# Patient Record
Sex: FEMALE | Race: WHITE | NOT HISPANIC OR LATINO | Employment: OTHER | URBAN - NONMETROPOLITAN AREA
[De-identification: names, ages, dates, MRNs, and addresses within clinical notes are randomized per-mention and may not be internally consistent; named-entity substitution may affect disease eponyms.]

---

## 2017-01-13 RX ORDER — FUROSEMIDE 20 MG/1
TABLET ORAL
Qty: 15 TABLET | Refills: 0 | Status: SHIPPED | OUTPATIENT
Start: 2017-01-13 | End: 2017-02-14 | Stop reason: SDUPTHER

## 2017-01-13 RX ORDER — AMLODIPINE BESYLATE 10 MG/1
TABLET ORAL
Qty: 30 TABLET | Refills: 0 | Status: SHIPPED | OUTPATIENT
Start: 2017-01-13 | End: 2017-03-14 | Stop reason: SDUPTHER

## 2017-02-14 RX ORDER — FUROSEMIDE 20 MG/1
TABLET ORAL
Qty: 15 TABLET | Refills: 0 | Status: SHIPPED | OUTPATIENT
Start: 2017-02-14 | End: 2017-03-14 | Stop reason: SDUPTHER

## 2017-03-14 RX ORDER — OMEPRAZOLE 40 MG/1
40 CAPSULE, DELAYED RELEASE ORAL DAILY
Qty: 30 CAPSULE | Refills: 0 | Status: SHIPPED | OUTPATIENT
Start: 2017-03-14 | End: 2017-11-14 | Stop reason: SDUPTHER

## 2017-03-14 RX ORDER — FUROSEMIDE 20 MG/1
TABLET ORAL
Qty: 15 TABLET | OUTPATIENT
Start: 2017-03-14

## 2017-03-14 RX ORDER — OMEPRAZOLE 40 MG/1
CAPSULE, DELAYED RELEASE ORAL
Qty: 30 CAPSULE | OUTPATIENT
Start: 2017-03-14

## 2017-03-14 RX ORDER — RISPERIDONE 1 MG/1
TABLET ORAL
Qty: 30 TABLET | OUTPATIENT
Start: 2017-03-14

## 2017-03-14 RX ORDER — AMLODIPINE BESYLATE 10 MG/1
TABLET ORAL
Qty: 30 TABLET | OUTPATIENT
Start: 2017-03-14

## 2017-03-14 RX ORDER — FUROSEMIDE 20 MG/1
TABLET ORAL
Qty: 15 TABLET | Refills: 0 | Status: SHIPPED | OUTPATIENT
Start: 2017-03-14 | End: 2017-04-14 | Stop reason: SDUPTHER

## 2017-03-14 RX ORDER — CARVEDILOL 3.12 MG/1
3.12 TABLET ORAL 2 TIMES DAILY
Qty: 60 TABLET | Refills: 11 | Status: CANCELLED | OUTPATIENT
Start: 2017-03-14

## 2017-03-14 RX ORDER — AMLODIPINE BESYLATE 10 MG/1
10 TABLET ORAL DAILY
Qty: 30 TABLET | Refills: 0 | Status: SHIPPED | OUTPATIENT
Start: 2017-03-14 | End: 2017-04-17

## 2017-03-14 RX ORDER — RISPERIDONE 1 MG/1
1 TABLET ORAL NIGHTLY
Qty: 30 TABLET | Refills: 0 | Status: SHIPPED | OUTPATIENT
Start: 2017-03-14 | End: 2017-11-14 | Stop reason: SDUPTHER

## 2017-04-14 RX ORDER — FUROSEMIDE 20 MG/1
TABLET ORAL
Qty: 15 TABLET | Refills: 0 | Status: SHIPPED | OUTPATIENT
Start: 2017-04-14 | End: 2017-05-15 | Stop reason: SDUPTHER

## 2017-04-17 ENCOUNTER — OFFICE VISIT (OUTPATIENT)
Dept: FAMILY MEDICINE CLINIC | Facility: CLINIC | Age: 62
End: 2017-04-17

## 2017-04-17 ENCOUNTER — APPOINTMENT (OUTPATIENT)
Dept: LAB | Facility: HOSPITAL | Age: 62
End: 2017-04-17

## 2017-04-17 VITALS
HEIGHT: 63 IN | DIASTOLIC BLOOD PRESSURE: 68 MMHG | OXYGEN SATURATION: 98 % | WEIGHT: 152 LBS | HEART RATE: 74 BPM | BODY MASS INDEX: 26.93 KG/M2 | SYSTOLIC BLOOD PRESSURE: 120 MMHG

## 2017-04-17 DIAGNOSIS — Z00.00 MEDICARE ANNUAL WELLNESS VISIT, SUBSEQUENT: Primary | ICD-10-CM

## 2017-04-17 DIAGNOSIS — I25.10 CORONARY ARTERIOSCLEROSIS: ICD-10-CM

## 2017-04-17 DIAGNOSIS — Z11.59 NEED FOR HEPATITIS C SCREENING TEST: ICD-10-CM

## 2017-04-17 PROCEDURE — 86803 HEPATITIS C AB TEST: CPT | Performed by: FAMILY MEDICINE

## 2017-04-17 PROCEDURE — G0439 PPPS, SUBSEQ VISIT: HCPCS | Performed by: FAMILY MEDICINE

## 2017-04-17 PROCEDURE — 36415 COLL VENOUS BLD VENIPUNCTURE: CPT | Performed by: FAMILY MEDICINE

## 2017-04-17 RX ORDER — CARVEDILOL 6.25 MG/1
6.25 TABLET ORAL 2 TIMES DAILY WITH MEALS
Qty: 60 TABLET | Refills: 5 | Status: SHIPPED | OUTPATIENT
Start: 2017-04-17 | End: 2017-09-26 | Stop reason: DRUGHIGH

## 2017-04-17 NOTE — PROGRESS NOTES
QUICK REFERENCE INFORMATION:  The ABCs of the Annual Wellness Visit    Subsequent Medicare Wellness Visit    HEALTH RISK ASSESSMENT    1955    Recent Hospitalizations:  No recent hospitalization(s)..        Current Medical Providers:  Patient Care Team:  Coral Berry MD as PCP - General  Coral Berry MD as PCP - Claims Attributed - PLEASE DO NOT REMOVE  Michele Rivera MD as Consulting Physician (Cardiology)  Juan C FARRELL MD as Consulting Physician (Neurology)        Smoking Status:  History   Smoking Status   • Current Every Day Smoker   Smokeless Tobacco   • Never Used     Comment: SMOKED FOR 20>PLUS YRS       Alcohol Consumption:  History   Alcohol use Not on file       Depression Screen:   PHQ-9 Depression Screening 4/17/2017   Little interest or pleasure in doing things 2   Feeling down, depressed, or hopeless 1   Trouble falling or staying asleep, or sleeping too much 2   Feeling tired or having little energy 2   Poor appetite or overeating 2   Feeling bad about yourself - or that you are a failure or have let yourself or your family down 2   Trouble concentrating on things, such as reading the newspaper or watching television 0   Moving or speaking so slowly that other people could have noticed. Or the opposite - being so fidgety or restless that you have been moving around a lot more than usual 0   Thoughts that you would be better off dead, or of hurting yourself in some way 0   PHQ-9 Total Score 11   If you checked off any problems, how difficult have these problems made it for you to do your work, take care of things at home, or get along with other people? Somewhat difficult       Health Habits and Functional and Cognitive Screening:  Functional & Cognitive Status 4/17/2017   Do you have difficulty preparing food and eating? No   Do you have difficulty bathing yourself? No   Do you have difficulty getting dressed? No   Do you have difficulty using the toilet? No   Do you have  difficulty moving around from place to place? No   In the past year have you fallen or experienced a near fall? No   Do you need help using the phone?  No   Are you deaf or do you have serious difficulty hearing?  No   Do you need help with transportation? No   Do you need help shopping? No   Do you need help preparing meals?  No   Do you need help with housework?  No   Do you need help with laundry? No   Do you need help taking your medications? No   Do you need help managing money? No              Does the patient have evidence of cognitive impairment? No    Aspirin use counseling: Taking ASA appropriately as indicated      Recent Lab Results:  CMP:  Lab Results   Component Value Date    BUN 13 04/05/2016    CREATININE 1.0 04/05/2016     (L) 04/05/2016    K 4.5 04/05/2016    CO2 26 04/05/2016    CALCIUM 9.4 04/05/2016    ALBUMIN 4.8 04/05/2016    BILITOT 0.6 04/05/2016    ALKPHOS 136 (H) 04/05/2016    AST 20 04/05/2016    ALT 20 04/05/2016     Lipid Panel:  Lab Results   Component Value Date    CHLPL 225 (H) 04/05/2016    TRIG 119 04/05/2016    HDL 77 04/05/2016     HbA1c:       Visual Acuity:   Visual Acuity Screening    Right eye Left eye Both eyes   Without correction: 20/40 20/50 20/25   With correction:       Finger rub hearing test on the right passed  Finger rub hearing test left passed    Age-appropriate Screening Schedule:  Refer to the list below for future screening recommendations based on patient's age, sex and/or medical conditions. Orders for these recommended tests are listed in the plan section. The patient has been provided with a written plan.    Health Maintenance   Topic Date Due   • TDAP/TD VACCINES (1 - Tdap) 11/13/1972   • ZOSTER VACCINE  11/10/2016   • PAP SMEAR  11/10/2016   • DXA SCAN  04/22/2018   • MAMMOGRAM  04/25/2018   • COLONOSCOPY  05/22/2024   • PNEUMOCOCCAL VACCINE (19-64 MEDIUM RISK)  Completed   • INFLUENZA VACCINE  Completed        Subjective   History of Present  Illness    Michelle Child is a 61 y.o. female who presents for an Subsequent Wellness Visit.    The following portions of the patient's history were reviewed and updated as appropriate: allergies, current medications, past family history, past medical history, past social history, past surgical history and problem list.    Outpatient Medications Prior to Visit   Medication Sig Dispense Refill   • albuterol (PROVENTIL HFA;VENTOLIN HFA) 108 (90 BASE) MCG/ACT inhaler Inhale 2 puffs Every 4 (Four) Hours As Needed for wheezing. 6.7 g 11   • amitriptyline (ELAVIL) 100 MG tablet Take 1 tablet by mouth Every Night. 30 tablet 11   • aspirin 81 MG chewable tablet Chew 1 tablet Daily. 30 tablet 11   • FLUoxetine (PROzac) 20 MG capsule TAKE 3 CAPSULES BY MOUTH ONCE DAILY 90 capsule 11   • furosemide (LASIX) 20 MG tablet TAKE 1/2 TABLET BY MOUTH ONCE DAILY AS NEEDED FOR SWELLING. 15 tablet 0   • losartan (COZAAR) 100 MG tablet Take 1 tablet by mouth Daily. 30 tablet 11   • Magnesium Oxide 400 MG capsule Take  by mouth 2 (Two) Times a Day.     • naproxen (NAPROSYN) 500 MG tablet Take 1 tablet by mouth 2 (Two) Times a Day. 60 tablet 11   • omeprazole (priLOSEC) 40 MG capsule Take 1 capsule by mouth Daily. 30 capsule 0   • risperiDONE (risperDAL) 1 MG tablet Take 1 tablet by mouth Every Night. 30 tablet 0   • alendronate (FOSAMAX) 70 MG tablet Take 70 mg by mouth Every 7 (Seven) Days.     • amLODIPine (NORVASC) 10 MG tablet Take 1 tablet by mouth Daily. 30 tablet 0   • carvedilol (COREG) 3.125 MG tablet Take 1 tablet by mouth 2 (Two) Times a Day. (Patient taking differently: Take 6.25 mg by mouth Daily.) 60 tablet 11   • Cholecalciferol (VITAMIN D3) 5000 UNITS capsule capsule Take 5,000 Units by mouth Daily. TAKE (2) CAPSULES BY MOUTH DAILY       No facility-administered medications prior to visit.        Patient Active Problem List   Diagnosis   • Tobacco dependence   • Migraine   • Iron deficiency   • Gastroesophageal reflux  "disease   • Essential hypertension   • Depressive disorder   • Coronary arteriosclerosis   • Bilateral pseudophakia   • Astigmatism       Advance Care Planning:  has an advance directive - a copy HAS NOT been provided    Identification of Risk Factors:  Risk factors include: weight , cardiovascular risk, tobacco use, increased fall risk and polypharmacy.    Review of Systems    Compared to one year ago, the patient feels her physical health is the same.  Compared to one year ago, the patient feels her mental health is the same.    Objective     Physical Exam    Vitals:    04/17/17 1432   BP: 120/68   BP Location: Left arm   Patient Position: Sitting   Cuff Size: Adult   Pulse: 74   SpO2: 98%   Weight: 152 lb (68.9 kg)   Height: 63\" (160 cm)   PainSc: 0-No pain       Body mass index is 26.93 kg/(m^2).  Discussed the patient's BMI with her. The BMI is above average; BMI management plan is completed.    Assessment/Plan   Patient Self-Management and Personalized Health Advice  The patient has been provided with information about: diet, exercise, weight management and tobacco cessation and preventive services including:   · Fall Risk assessment done, Smoking cessation counseling completed, Zostavax vaccine (Herpes Zoster).    Visit Diagnoses:    ICD-10-CM ICD-9-CM   1. Medicare annual wellness visit, subsequent Z00.00 V70.0   2. Need for hepatitis C screening test Z11.59 V73.89   3. Coronary arteriosclerosis I25.10 414.00       Orders Placed This Encounter   Procedures   • Hepatitis C Antibody       Outpatient Encounter Prescriptions as of 4/17/2017   Medication Sig Dispense Refill   • albuterol (PROVENTIL HFA;VENTOLIN HFA) 108 (90 BASE) MCG/ACT inhaler Inhale 2 puffs Every 4 (Four) Hours As Needed for wheezing. 6.7 g 11   • amitriptyline (ELAVIL) 100 MG tablet Take 1 tablet by mouth Every Night. 30 tablet 11   • aspirin 81 MG chewable tablet Chew 1 tablet Daily. 30 tablet 11   • FLUoxetine (PROzac) 20 MG capsule TAKE 3 " CAPSULES BY MOUTH ONCE DAILY 90 capsule 11   • furosemide (LASIX) 20 MG tablet TAKE 1/2 TABLET BY MOUTH ONCE DAILY AS NEEDED FOR SWELLING. 15 tablet 0   • losartan (COZAAR) 100 MG tablet Take 1 tablet by mouth Daily. 30 tablet 11   • Magnesium Oxide 400 MG capsule Take  by mouth 2 (Two) Times a Day.     • naproxen (NAPROSYN) 500 MG tablet Take 1 tablet by mouth 2 (Two) Times a Day. 60 tablet 11   • omeprazole (priLOSEC) 40 MG capsule Take 1 capsule by mouth Daily. 30 capsule 0   • risperiDONE (risperDAL) 1 MG tablet Take 1 tablet by mouth Every Night. 30 tablet 0   • [DISCONTINUED] alendronate (FOSAMAX) 70 MG tablet Take 70 mg by mouth Every 7 (Seven) Days.     • [DISCONTINUED] amLODIPine (NORVASC) 10 MG tablet Take 1 tablet by mouth Daily. 30 tablet 0   • [DISCONTINUED] carvedilol (COREG) 3.125 MG tablet Take 1 tablet by mouth 2 (Two) Times a Day. (Patient taking differently: Take 6.25 mg by mouth Daily.) 60 tablet 11   • [DISCONTINUED] Cholecalciferol (VITAMIN D3) 5000 UNITS capsule capsule Take 5,000 Units by mouth Daily. TAKE (2) CAPSULES BY MOUTH DAILY     • carvedilol (COREG) 6.25 MG tablet Take 1 tablet by mouth 2 (Two) Times a Day With Meals. 60 tablet 5     No facility-administered encounter medications on file as of 4/17/2017.        Reviewed use of high risk medication in the elderly: yes  Reviewed for potential of harmful drug interactions in the elderly: yes    Follow Up:  Return in about 1 year (around 4/17/2018) for Medicare Wellness, subs.     An After Visit Summary and PPPS with all of these plans were given to the patient. Please refer to scanned documents for full details of visit.         This document has been electronically signed by Coral Berry MD on April 18, 2017 4:42 PM

## 2017-04-18 LAB — HCV AB SER DONR QL: NEGATIVE

## 2017-05-04 ENCOUNTER — PROCEDURE VISIT (OUTPATIENT)
Dept: FAMILY MEDICINE CLINIC | Facility: CLINIC | Age: 62
End: 2017-05-04

## 2017-05-04 VITALS
BODY MASS INDEX: 27.11 KG/M2 | DIASTOLIC BLOOD PRESSURE: 80 MMHG | SYSTOLIC BLOOD PRESSURE: 130 MMHG | HEIGHT: 63 IN | OXYGEN SATURATION: 98 % | WEIGHT: 153 LBS | HEART RATE: 82 BPM

## 2017-05-04 DIAGNOSIS — Z01.419 NORMAL GYNECOLOGIC EXAMINATION: Primary | ICD-10-CM

## 2017-05-04 PROCEDURE — 99212-NC PR NO CHARGE CBC OFFICE OUTPATIENT VISIT 10 MINUTES: Performed by: FAMILY MEDICINE

## 2017-05-15 RX ORDER — FUROSEMIDE 20 MG/1
TABLET ORAL
Qty: 15 TABLET | Refills: 1 | Status: SHIPPED | OUTPATIENT
Start: 2017-05-15 | End: 2017-06-14 | Stop reason: SDUPTHER

## 2017-06-14 RX ORDER — FUROSEMIDE 20 MG/1
TABLET ORAL
Qty: 15 TABLET | Refills: 0 | Status: SHIPPED | OUTPATIENT
Start: 2017-06-14 | End: 2017-07-14 | Stop reason: SDUPTHER

## 2017-06-14 RX ORDER — LOSARTAN POTASSIUM 100 MG/1
TABLET ORAL
Qty: 90 TABLET | Refills: 0 | Status: SHIPPED | OUTPATIENT
Start: 2017-06-14 | End: 2017-09-19 | Stop reason: SDUPTHER

## 2017-07-14 RX ORDER — FUROSEMIDE 20 MG/1
TABLET ORAL
Qty: 15 TABLET | Refills: 3 | Status: ON HOLD | OUTPATIENT
Start: 2017-07-14 | End: 2018-01-22

## 2017-08-03 ENCOUNTER — OFFICE VISIT (OUTPATIENT)
Dept: FAMILY MEDICINE CLINIC | Facility: CLINIC | Age: 62
End: 2017-08-03

## 2017-08-03 VITALS
SYSTOLIC BLOOD PRESSURE: 128 MMHG | BODY MASS INDEX: 27.21 KG/M2 | DIASTOLIC BLOOD PRESSURE: 72 MMHG | HEART RATE: 84 BPM | WEIGHT: 153.56 LBS | OXYGEN SATURATION: 92 % | HEIGHT: 63 IN
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
## 2017-08-03 DIAGNOSIS — S40.021A CONTUSION OF RIGHT UPPER ARM, INITIAL ENCOUNTER: ICD-10-CM

## 2017-08-03 DIAGNOSIS — M79.601 RIGHT ARM PAIN: Primary | ICD-10-CM

## 2017-08-03 PROCEDURE — 99213 OFFICE O/P EST LOW 20 MIN: CPT | Performed by: STUDENT IN AN ORGANIZED HEALTH CARE EDUCATION/TRAINING PROGRAM

## 2017-08-03 NOTE — PROGRESS NOTES
ID: Michlele Child    CC:   Chief Complaint   Patient presents with   • Arm Pain     bruised, fallen        Subjective:     HPI   Michelle Child is a 61 y.o. female who presents for arm pain s/p fall. On 7/28, she was carrying a pot filled with grease, going down steps, when she fell. She missed the second step and fell down. She landed with her R arm on a vacuum  that was at the base of the stairs. She fell sideways onto it, arms were not extended all the way outward. She went down to the ground. She did not hit her head, no LOC. No headaches, dizziness, or nausea.     Since the fall, she has had swelling and bruising of the R upper arm. Her arm has been weaker, she cannot open the fridge. The pain has been gradually worsening, 7/10 baseline. It hurts more with use. She describes the pain as burning. She has been taking naproxen and tylenol, which help.     Preventative:  Over the past 2 weeks, have you felt down, depressed, or hopeless?Yes   Over the past 2 weeks, have you felt little interest or pleasure in doing things?No  Clinical depression screening refused by patient.No     On osteoporosis therapy?No     Past Medical Hx:  Past Medical History:   Diagnosis Date   • Alkaline phosphatase raised    • Astigmatism    • Bilateral pseudophakia    • Coronary arteriosclerosis    • Depressive disorder    • Edema of lower extremity    • Encounter for general adult medical examination with abnormal findings    • Essential hypertension    • Gastroesophageal reflux disease    • H/O bone density study     DXA BONE DENSITY AXIAL    04/22/2016   • H/O screening mammography     SCREENING MAMMOGRAPHY     04/25/2016   • Hx of screening mammography     x3; declined screening, understands risks and benefits and still declines      07/24/2015   • Influenza vaccine administered     INFLUENZA IMMUN ADMIN OR PREV RECV'D   x4       04/01/2016   • Iron deficiency    • Migraine    • Tobacco dependence     continuous          Past Surgical Hx:  Past Surgical History:   Procedure Laterality Date   • APPENDECTOMY     • BREAST IMPLANT SURGERY     • CATARACT EXTRACTION WITH INTRAOCULAR LENS IMPLANT  12/17/2003    Phacoemulsification of a cataract with intraocular lens implant of rigt eye, Nixon model SA 60-AT; serial # [id number];20.5 diopter   • CHOLECYSTECTOMY  08/06/2007    Laparoscopic cholecystectomy. Symptomatic gallstones   • COLONOSCOPY  10/15/2013    declined stool cards and colonscopy   • ENDOSCOPY AND COLONOSCOPY  05/22/2014    Internal & external hemorrhoids found.   • ENDOSCOPY W/ PEG TUBE PLACEMENT  05/22/2014    EGD w/ tube: Normal esopahgus. Gastritis in stomach. Biopsy taken. Normal duodenum. Biopsy taken.   • INJECTION OF MEDICATION  07/24/2013    Inj(s) Tend-Sheath, Ligament, Single   • INJECTION OF MEDICATION  11/17/2014    Kenalog x6   • INJECTION OF MEDICATION  04/01/2016    PNEUMOC VAC/ADMIN/RCVD    • INJECTION OF MEDICATION  12/05/2014    Toradol   • OTHER SURGICAL HISTORY  01/19/2014    Drain/Inject Major Joint   x2   • PAP SMEAR  10/06/2011   • PARTIAL HYSTERECTOMY      Partial hyst    • TONSILLECTOMY     • TUBAL ABDOMINAL LIGATION         Health Maintenance:  Health Maintenance   Topic Date Due   • ZOSTER VACCINE  11/10/2016   • INFLUENZA VACCINE  09/01/2017   • MEDICARE ANNUAL WELLNESS  04/17/2018   • DXA SCAN  04/22/2018   • MAMMOGRAM  04/25/2018   • COLONOSCOPY  05/22/2024   • TDAP/TD VACCINES (2 - Td) 04/20/2027   • PNEUMOCOCCAL VACCINE (19-64 MEDIUM RISK)  Completed   • HEPATITIS C SCREENING  Completed   • PAP SMEAR  Excluded       Current Meds:    Current Outpatient Prescriptions:   •  albuterol (PROVENTIL HFA;VENTOLIN HFA) 108 (90 BASE) MCG/ACT inhaler, Inhale 2 puffs Every 4 (Four) Hours As Needed for wheezing., Disp: 6.7 g, Rfl: 11  •  amitriptyline (ELAVIL) 100 MG tablet, Take 1 tablet by mouth Every Night., Disp: 30 tablet, Rfl: 11  •  aspirin 81 MG chewable tablet, Chew 1 tablet Daily., Disp: 30  tablet, Rfl: 11  •  carvedilol (COREG) 6.25 MG tablet, Take 1 tablet by mouth 2 (Two) Times a Day With Meals., Disp: 60 tablet, Rfl: 5  •  FLUoxetine (PROzac) 20 MG capsule, TAKE 3 CAPSULES BY MOUTH ONCE DAILY, Disp: 90 capsule, Rfl: 11  •  furosemide (LASIX) 20 MG tablet, TAKE 1/2 TABLET BY MOUTH ONCE DAILY AS NEEDED FOR SWELLING., Disp: 15 tablet, Rfl: 3  •  losartan (COZAAR) 100 MG tablet, TAKE ONE TABLET BY MOUTH ONCE DAILY, Disp: 90 tablet, Rfl: 0  •  Magnesium Oxide 400 MG capsule, Take  by mouth 2 (Two) Times a Day., Disp: , Rfl:   •  naproxen (NAPROSYN) 500 MG tablet, Take 1 tablet by mouth 2 (Two) Times a Day., Disp: 60 tablet, Rfl: 11  •  omeprazole (priLOSEC) 40 MG capsule, Take 1 capsule by mouth Daily., Disp: 30 capsule, Rfl: 0  •  risperiDONE (risperDAL) 1 MG tablet, Take 1 tablet by mouth Every Night., Disp: 30 tablet, Rfl: 0    Allergies:  Codeine; Penicillins; and Sulfa antibiotics    Family Hx:  Family History   Problem Relation Age of Onset   • Cholelithiasis Mother    • Thyroid cancer Sister    • Thyroid disease Sister      Thyroid disorder   • Graves' disease Sister    • Cholelithiasis Maternal Grandmother    • Thyroid cancer Other      Other 2nd degree relative, thyroid   • Diabetes Other    • Hypertension Other         Social History:  Social History     Social History   • Marital status:      Spouse name: N/A   • Number of children: N/A   • Years of education: N/A     Occupational History   • Not on file.     Social History Main Topics   • Smoking status: Current Every Day Smoker   • Smokeless tobacco: Never Used      Comment: SMOKED FOR 20>PLUS YRS   • Alcohol use Not on file   • Drug use: Not on file   • Sexual activity: Not on file     Other Topics Concern   • Not on file     Social History Narrative       Review of Systems   Constitutional: Negative for chills and fever.   Respiratory: Negative for cough, shortness of breath and wheezing.    Cardiovascular: Negative for chest pain  "and palpitations.   Gastrointestinal: Negative for nausea and vomiting.   Genitourinary: Negative for difficulty urinating and dysuria.   Musculoskeletal: Positive for myalgias. Negative for joint swelling and neck pain.   Skin: Positive for color change.   Neurological: Positive for weakness (R arm). Negative for dizziness and headaches.   Psychiatric/Behavioral: Positive for dysphoric mood. The patient is not nervous/anxious.            Objective:     /72 (BP Location: Left arm, Patient Position: Sitting, Cuff Size: Adult)  Pulse 84  Ht 63\" (160 cm)  Wt 153 lb 9 oz (69.7 kg)  SpO2 92%  BMI 27.2 kg/m2    Physical Exam   Constitutional: She is oriented to person, place, and time. She appears well-developed and well-nourished.   HENT:   Head: Normocephalic and atraumatic.   Neck: Normal range of motion. Neck supple.   Cardiovascular: Normal rate, regular rhythm and normal heart sounds.    Pulmonary/Chest: Effort normal and breath sounds normal.   Abdominal: Soft. Bowel sounds are normal.   Musculoskeletal:        Arms:  Able to slowly abduct R arm full, but expresses pain in lateral deltoid and bicep  Decreased ROM on internal rotation  No pain with palpation over subacromial bursa, AC joint, bicep tendon, or glenohumberal joint  No pain with palpation over scapular processes  Pain with R bicep and deltoid strength testing, 4/5 strength     Neurological: She is alert and oriented to person, place, and time.   Sensation to light touch intact   Skin:   Large area of ecchymosis R upper arm, from elbow joint to shoulder on anterior surface, few smaller scatter ecchymosis on lower arm ranging from 1-2 cm in size   Psychiatric: She has a normal mood and affect. Her behavior is normal.   Nursing note and vitals reviewed.         Assessment/Plan:     Michelle was seen today for arm pain.    Diagnoses and all orders for this visit:    Right arm pain  -     XR Shoulder 2+ View Right (In Office)    Fall (on) (from) " other stairs and steps, initial encounter  -     XR Shoulder 2+ View Right (In Office)    Contusion of right upper arm, initial encounter      D/w pt that she likely does not have a broken bone. She agrees. The main concern is soreness over the muscle. She did not have any tenderness over bony area, it was generalized over the area covered by the bruise. We discussed that I would place an order for a shoulder xray, and if the pain continues to get worse, she can come get it. She agreed and feels comfortable with that plan. She will continue to use the arm to prevent the shoulder freezing up. She will also use warm packs and anti-inflammatories.     Follow-up:     Return if symptoms worsen or fail to improve.      Health Maintenance   Topic Date Due   • ZOSTER VACCINE  11/10/2016   • INFLUENZA VACCINE  09/01/2017   • MEDICARE ANNUAL WELLNESS  04/17/2018   • DXA SCAN  04/22/2018   • MAMMOGRAM  04/25/2018   • COLONOSCOPY  05/22/2024   • TDAP/TD VACCINES (2 - Td) 04/20/2027   • PNEUMOCOCCAL VACCINE (19-64 MEDIUM RISK)  Completed   • HEPATITIS C SCREENING  Completed   • PAP SMEAR  Excluded       RISK SCORE: 4          This document has been electronically signed by Nicholas Gaines MD on August 3, 2017 11:17 AM

## 2017-08-03 NOTE — PROGRESS NOTES
Subjective   Michelle Child is a 61 y.o. female   CHIEF COMPLAINT/REASON FOR VISIT:  Chief Complaint   Patient presents with   • Arm Pain     bruised, fallen      PROBLEM LIST:  Patient Active Problem List    Diagnosis   • Tobacco dependence [F17.200]     Overview Note:     continuous       • Migraine [G43.909]   • Iron deficiency [E61.1]   • Gastroesophageal reflux disease [K21.9]   • Essential hypertension [I10]   • Depressive disorder [F32.9]   • Coronary arteriosclerosis [I25.10]   • Bilateral pseudophakia [Z96.1]   • Astigmatism [H52.209]     PAST MEDICAL/SURGICAL HISTORY:  She has a past medical history of Alkaline phosphatase raised; Astigmatism; Bilateral pseudophakia; Coronary arteriosclerosis; Depressive disorder; Edema of lower extremity; Encounter for general adult medical examination with abnormal findings; Essential hypertension; Gastroesophageal reflux disease; H/O bone density study; H/O screening mammography; screening mammography; Influenza vaccine administered; Iron deficiency; Migraine; and Tobacco dependence.  She has a past surgical history that includes Appendectomy; Breast Implant Revision; Cholecystectomy (08/06/2007); endoscopy and colonoscopy (05/22/2014); Colonoscopy (10/15/2013); Other surgical history (01/19/2014); Esophagogastroduodenoscopy w/ PEG (05/22/2014); Injection of Medication (07/24/2013); Injection of Medication (11/17/2014); Pap Smear (10/06/2011); Partial hysterectomy; Injection of Medication (04/01/2016); Cataract extraction w/  intraocular lens implant (12/17/2003); Tonsillectomy; Injection of Medication (12/05/2014); and Tubal ligation.  FAMILY HISTORY:  Her family history includes Cholelithiasis in her maternal grandmother and mother; Diabetes in her other; Graves' disease in her sister; Hypertension in her other; Thyroid cancer in her other and sister; Thyroid disease in her sister.  CURRENT MEDICATIONS:  Current Outpatient Prescriptions   Medication Sig Dispense  "Refill   • albuterol (PROVENTIL HFA;VENTOLIN HFA) 108 (90 BASE) MCG/ACT inhaler Inhale 2 puffs Every 4 (Four) Hours As Needed for wheezing. 6.7 g 11   • amitriptyline (ELAVIL) 100 MG tablet Take 1 tablet by mouth Every Night. 30 tablet 11   • aspirin 81 MG chewable tablet Chew 1 tablet Daily. 30 tablet 11   • carvedilol (COREG) 6.25 MG tablet Take 1 tablet by mouth 2 (Two) Times a Day With Meals. 60 tablet 5   • FLUoxetine (PROzac) 20 MG capsule TAKE 3 CAPSULES BY MOUTH ONCE DAILY 90 capsule 11   • furosemide (LASIX) 20 MG tablet TAKE 1/2 TABLET BY MOUTH ONCE DAILY AS NEEDED FOR SWELLING. 15 tablet 3   • losartan (COZAAR) 100 MG tablet TAKE ONE TABLET BY MOUTH ONCE DAILY 90 tablet 0   • Magnesium Oxide 400 MG capsule Take  by mouth 2 (Two) Times a Day.     • naproxen (NAPROSYN) 500 MG tablet Take 1 tablet by mouth 2 (Two) Times a Day. 60 tablet 11   • omeprazole (priLOSEC) 40 MG capsule Take 1 capsule by mouth Daily. 30 capsule 0   • risperiDONE (risperDAL) 1 MG tablet Take 1 tablet by mouth Every Night. 30 tablet 0     No current facility-administered medications for this visit.      ALLERGIES:  Codeine; Penicillins; and Sulfa antibiotics  Objective   /72 (BP Location: Left arm, Patient Position: Sitting, Cuff Size: Adult)  Pulse 84  Ht 63\" (160 cm)  Wt 153 lb 9 oz (69.7 kg)  SpO2 92%  BMI 27.2 kg/m2  Physical Exam   Constitutional: She appears well-developed and well-nourished.   HENT:   Head: Normocephalic and atraumatic.   Neck: Neck supple. No thyromegaly present.   Cardiovascular: Normal rate, regular rhythm and normal heart sounds.    Skin:   Ecchymosis on the right inner arm encompassing most of the upper arm and several smaller ones 3 x 5 cm across the right forearm.  No point tenderness anywhere in the arm.   Assessment/Plan   Orders Placed This Encounter   Procedures   • XR Shoulder 2+ View Right     Order Specific Question:   Reason for Exam:     Answer:   fall, shoulder pain        Michelle was " seen today for arm pain.    Diagnoses and all orders for this visit:    Right arm pain  -     Cancel: XR Shoulder 2+ View Right (In Office)  -     XR Shoulder 2+ View Right    Fall (on) (from) other stairs and steps, initial encounter  -     Cancel: XR Shoulder 2+ View Right (In Office)  -     XR Shoulder 2+ View Right    Contusion of right upper arm, initial encounter    Other orders  -     Cancel: XR Elbow 2 View Right (In Office)    I have seen this patient and discussed the case with resident and agree with the assessment and plan.  MARY Rebolledo M.D.   Sanchez Rebolledo MD  8/3/2017

## 2017-08-14 DIAGNOSIS — S42.301A: Primary | ICD-10-CM

## 2017-08-15 ENCOUNTER — OFFICE VISIT (OUTPATIENT)
Dept: ORTHOPEDIC SURGERY | Facility: CLINIC | Age: 62
End: 2017-08-15

## 2017-08-15 VITALS — WEIGHT: 153 LBS | BODY MASS INDEX: 27.11 KG/M2 | HEIGHT: 63 IN
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
## 2017-08-15 DIAGNOSIS — S42.291A OTHER CLOSED DISPLACED FRACTURE OF PROXIMAL END OF RIGHT HUMERUS, INITIAL ENCOUNTER: Primary | ICD-10-CM

## 2017-08-15 PROCEDURE — 23600 CLTX PROX HUMRL FX W/O MNPJ: CPT | Performed by: NURSE PRACTITIONER

## 2017-08-15 PROCEDURE — 99214 OFFICE O/P EST MOD 30 MIN: CPT | Performed by: NURSE PRACTITIONER

## 2017-08-15 RX ORDER — FUROSEMIDE 20 MG/1
TABLET ORAL
Qty: 15 TABLET | Refills: 1 | Status: SHIPPED | OUTPATIENT
Start: 2017-08-15 | End: 2018-01-10

## 2017-08-15 RX ORDER — HYDROCODONE BITARTRATE AND ACETAMINOPHEN 5; 325 MG/1; MG/1
1 TABLET ORAL EVERY 6 HOURS PRN
Qty: 40 TABLET | Refills: 0 | Status: SHIPPED | OUTPATIENT
Start: 2017-08-15 | End: 2017-08-25

## 2017-08-15 NOTE — PROGRESS NOTES
Michelle Child is a 61 y.o. female   Primary provider:  Coral Berry MD       Chief Complaint   Patient presents with   • Right Shoulder - Establish Care, Fracture       HISTORY OF PRESENT ILLNESS:    HPI Comments: Patient complains of right arm pain since falling down some steps on 7/28/2017.  Patient was seen by her primary care physician, Dr. Gaines, it was examined and had x-rays ordered.  Patient did not get her x-ray done until 2 weeks after the order was placed.  Pain is described as sharp and burning.  Pain is worse with movement of her right arm.  Pain improves with rest of her arm.  Patient's right arm has not been immobilized since the time of the injury.    Fracture   This is a new problem. The current episode started 1 to 4 weeks ago. The problem occurs constantly. The problem has been unchanged. Associated symptoms include neck pain. Associated symptoms comments: Sharp pain with burning. Exacerbated by: movement. She has tried rest for the symptoms.        CONCURRENT MEDICAL HISTORY:    Past Medical History:   Diagnosis Date   • Alkaline phosphatase raised    • Astigmatism    • Bilateral pseudophakia    • Coronary arteriosclerosis    • Depressive disorder    • Edema of lower extremity    • Encounter for general adult medical examination with abnormal findings    • Essential hypertension    • Gastroesophageal reflux disease    • H/O bone density study     DXA BONE DENSITY AXIAL    04/22/2016   • H/O screening mammography     SCREENING MAMMOGRAPHY     04/25/2016   • Hx of screening mammography     x3; declined screening, understands risks and benefits and still declines      07/24/2015   • Influenza vaccine administered     INFLUENZA IMMUN ADMIN OR PREV RECV'D   x4       04/01/2016   • Iron deficiency    • Migraine    • Tobacco dependence     continuous         Allergies   Allergen Reactions   • Codeine Nausea And Vomiting   • Penicillins    • Sulfa Antibiotics Nausea And Vomiting          Current Outpatient Prescriptions:   •  albuterol (PROVENTIL HFA;VENTOLIN HFA) 108 (90 BASE) MCG/ACT inhaler, Inhale 2 puffs Every 4 (Four) Hours As Needed for wheezing., Disp: 6.7 g, Rfl: 11  •  amitriptyline (ELAVIL) 100 MG tablet, Take 1 tablet by mouth Every Night., Disp: 30 tablet, Rfl: 11  •  aspirin 81 MG chewable tablet, Chew 1 tablet Daily., Disp: 30 tablet, Rfl: 11  •  carvedilol (COREG) 6.25 MG tablet, Take 1 tablet by mouth 2 (Two) Times a Day With Meals., Disp: 60 tablet, Rfl: 5  •  FLUoxetine (PROzac) 20 MG capsule, TAKE 3 CAPSULES BY MOUTH ONCE DAILY, Disp: 90 capsule, Rfl: 11  •  furosemide (LASIX) 20 MG tablet, TAKE 1/2 TABLET BY MOUTH ONCE DAILY AS NEEDED FOR SWELLING., Disp: 15 tablet, Rfl: 3  •  furosemide (LASIX) 20 MG tablet, TAKE 1/2 TABLET BY MOUTH ONCE DAILY AS NEEDED FOR SWELLING., Disp: 15 tablet, Rfl: 1  •  HYDROcodone-acetaminophen (NORCO) 5-325 MG per tablet, Take 1 tablet by mouth Every 6 (Six) Hours As Needed (PRN) for up to 10 days., Disp: 40 tablet, Rfl: 0  •  losartan (COZAAR) 100 MG tablet, TAKE ONE TABLET BY MOUTH ONCE DAILY, Disp: 90 tablet, Rfl: 0  •  Magnesium Oxide 400 MG capsule, Take  by mouth 2 (Two) Times a Day., Disp: , Rfl:   •  naproxen (NAPROSYN) 500 MG tablet, Take 1 tablet by mouth 2 (Two) Times a Day., Disp: 60 tablet, Rfl: 11  •  omeprazole (priLOSEC) 40 MG capsule, Take 1 capsule by mouth Daily., Disp: 30 capsule, Rfl: 0  •  risperiDONE (risperDAL) 1 MG tablet, Take 1 tablet by mouth Every Night., Disp: 30 tablet, Rfl: 0    Past Surgical History:   Procedure Laterality Date   • APPENDECTOMY     • BREAST IMPLANT SURGERY     • CATARACT EXTRACTION WITH INTRAOCULAR LENS IMPLANT  12/17/2003    Phacoemulsification of a cataract with intraocular lens implant of rigt eye, Nixon model SA 60-AT; serial # [id number];20.5 diopter   • CHOLECYSTECTOMY  08/06/2007    Laparoscopic cholecystectomy. Symptomatic gallstones   • COLONOSCOPY  10/15/2013    declined stool cards  "and colonscopy   • ENDOSCOPY AND COLONOSCOPY  05/22/2014    Internal & external hemorrhoids found.   • ENDOSCOPY W/ PEG TUBE PLACEMENT  05/22/2014    EGD w/ tube: Normal esopahgus. Gastritis in stomach. Biopsy taken. Normal duodenum. Biopsy taken.   • INJECTION OF MEDICATION  07/24/2013    Inj(s) Tend-Sheath, Ligament, Single   • INJECTION OF MEDICATION  11/17/2014    Kenalog x6   • INJECTION OF MEDICATION  04/01/2016    PNEUMOC VAC/ADMIN/RCVD    • INJECTION OF MEDICATION  12/05/2014    Toradol   • OTHER SURGICAL HISTORY  01/19/2014    Drain/Inject Major Joint   x2   • PAP SMEAR  10/06/2011   • PARTIAL HYSTERECTOMY      Partial hyst    • TONSILLECTOMY     • TUBAL ABDOMINAL LIGATION         Family History   Problem Relation Age of Onset   • Cholelithiasis Mother    • Thyroid cancer Sister    • Thyroid disease Sister      Thyroid disorder   • Graves' disease Sister    • Cholelithiasis Maternal Grandmother    • Thyroid cancer Other      Other 2nd degree relative, thyroid   • Diabetes Other    • Hypertension Other         Social History     Social History   • Marital status:      Spouse name: N/A   • Number of children: N/A   • Years of education: N/A     Occupational History   • Not on file.     Social History Main Topics   • Smoking status: Current Every Day Smoker   • Smokeless tobacco: Never Used      Comment: SMOKED FOR 20>PLUS YRS   • Alcohol use Not on file   • Drug use: Not on file   • Sexual activity: Not on file     Other Topics Concern   • Not on file     Social History Narrative        Review of Systems   Constitutional: Positive for unexpected weight change.   Respiratory: Positive for shortness of breath and wheezing.    Musculoskeletal: Positive for back pain and neck pain.        Right arm pain.    Psychiatric/Behavioral: Positive for dysphoric mood and sleep disturbance. The patient is nervous/anxious.    All other systems reviewed and are negative.      PHYSICAL EXAMINATION:       Ht 63\" (160 " cm)  Wt 153 lb (69.4 kg)  BMI 27.1 kg/m2    Physical Exam   Constitutional: She is oriented to person, place, and time. Vital signs are normal. She appears well-developed and well-nourished.   HENT:   Head: Normocephalic.   Pulmonary/Chest: Effort normal. No respiratory distress.   Abdominal: Soft. She exhibits no distension.   Neurological: She is alert and oriented to person, place, and time. GCS eye subscore is 4. GCS verbal subscore is 5. GCS motor subscore is 6.   Skin: Skin is warm, dry and intact.   Psychiatric: She has a normal mood and affect. Her speech is normal and behavior is normal. Judgment and thought content normal. Cognition and memory are normal.   Vitals reviewed.      GAIT:     [x]  Normal  []  Antalgic    Assistive device: [x]  None  []  Walker     []  Crutches  []  Cane     []  Wheelchair  []  Stretcher    Right Shoulder Exam     Tenderness   Right shoulder tenderness location: diffuse right upper arm tenderness.    Other   Erythema: absent  Scars: absent  Sensation: normal  Pulse: present    Comments:  Mild swelling and extensive ecchymosis  present to the right upper arm.  Strength assessment and range of motion deferred due to acute proximal humerus fracture.       Left Shoulder Exam   Left shoulder exam is normal.              Xr Shoulder 2+ View Right    Result Date: 8/14/2017  Narrative: PROCEDURE: Three views right shoulder COMPARISON: No comparison. HISTORY: Injury, pain FINDINGS: Three views of the right shoulder were obtained There is normal positioning of the humeral head within the glenoid. The acromioclavicular joint is grossly intact There is an acute, mildly impacted fracture of the humeral metaphysis, the more distal fragment is angulated downward. The visualized right lung is clear. The aortic arch contains atherosclerotic calcification.     Impression: CONCLUSION:  Acute, mildly impacted fracture of the humeral metaphysis, the more distal fragment is angulated downward.  Electronically signed by:  Sanchez Meza MD  8/14/2017 9:55 AM CDT Workstation: [id number]      ASSESSMENT:    Diagnoses and all orders for this visit:    Other closed displaced fracture of proximal end of right humerus, initial encounter    Fall with injury, initial encounter    Other orders  -     HYDROcodone-acetaminophen (NORCO) 5-325 MG per tablet; Take 1 tablet by mouth Every 6 (Six) Hours As Needed (PRN) for up to 10 days.    PLAN    X-rays of the right shoulder done on 8/14/2017 were reviewed today.  Recommend sling to the right arm for immobilization of her proximal humerus fracture.  Patient is instructed to keep her arm in the sling at this time and we will gradually transition her out of the sling after some period of time.  Recommend Norco for pain.  Prescription is given today per LUPE Maria.  Discussed fall precautions, especially when taking pain medication.  Discussed importance of safety and prevention of a new injury or worsening of her right arm fracture.  Follow-up in 2 weeks for recheck and repeat x-rays at that time.    Return in about 2 weeks (around 8/29/2017).    LUPE Leo

## 2017-08-29 ENCOUNTER — OFFICE VISIT (OUTPATIENT)
Dept: ORTHOPEDIC SURGERY | Facility: CLINIC | Age: 62
End: 2017-08-29
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
## 2017-08-29 DIAGNOSIS — S42.291D OTHER CLOSED DISPLACED FRACTURE OF PROXIMAL END OF RIGHT HUMERUS WITH ROUTINE HEALING, SUBSEQUENT ENCOUNTER: Primary | ICD-10-CM

## 2017-08-29 PROCEDURE — 99024 POSTOP FOLLOW-UP VISIT: CPT | Performed by: NURSE PRACTITIONER

## 2017-08-29 RX ORDER — HYDROCODONE BITARTRATE AND ACETAMINOPHEN 5; 325 MG/1; MG/1
1 TABLET ORAL EVERY 6 HOURS PRN
Qty: 60 TABLET | Refills: 0 | Status: SHIPPED | OUTPATIENT
Start: 2017-08-29 | End: 2017-09-08

## 2017-08-29 NOTE — PROGRESS NOTES
Michelle Child is a 61 y.o. female returns for     Chief Complaint   Patient presents with   • Right Shoulder - Fracture     Repeat xray         HISTORY OF PRESENT ILLNESS: Presents to office for recheck of right shoulder pain and proximal humerus fracture.  X-rays are repeated today.  Patient states she is doing well and pain has gradually started to improve.  Patient is wearing a sling today.       CONCURRENT MEDICAL HISTORY:    Past Medical History:   Diagnosis Date   • Alkaline phosphatase raised    • Astigmatism    • Bilateral pseudophakia    • Coronary arteriosclerosis    • Depressive disorder    • Edema of lower extremity    • Encounter for general adult medical examination with abnormal findings    • Essential hypertension    • Gastroesophageal reflux disease    • H/O bone density study     DXA BONE DENSITY AXIAL    04/22/2016   • H/O screening mammography     SCREENING MAMMOGRAPHY     04/25/2016   • Hx of screening mammography     x3; declined screening, understands risks and benefits and still declines      07/24/2015   • Influenza vaccine administered     INFLUENZA IMMUN ADMIN OR PREV RECV'D   x4       04/01/2016   • Iron deficiency    • Migraine    • Tobacco dependence     continuous         Allergies   Allergen Reactions   • Codeine Nausea And Vomiting   • Penicillins    • Sulfa Antibiotics Nausea And Vomiting         Current Outpatient Prescriptions:   •  albuterol (PROVENTIL HFA;VENTOLIN HFA) 108 (90 BASE) MCG/ACT inhaler, Inhale 2 puffs Every 4 (Four) Hours As Needed for wheezing., Disp: 6.7 g, Rfl: 11  •  amitriptyline (ELAVIL) 100 MG tablet, Take 1 tablet by mouth Every Night., Disp: 30 tablet, Rfl: 11  •  aspirin 81 MG chewable tablet, Chew 1 tablet Daily., Disp: 30 tablet, Rfl: 11  •  carvedilol (COREG) 6.25 MG tablet, Take 1 tablet by mouth 2 (Two) Times a Day With Meals., Disp: 60 tablet, Rfl: 5  •  FLUoxetine (PROzac) 20 MG capsule, TAKE 3 CAPSULES BY MOUTH ONCE DAILY, Disp: 90 capsule,  Rfl: 11  •  furosemide (LASIX) 20 MG tablet, TAKE 1/2 TABLET BY MOUTH ONCE DAILY AS NEEDED FOR SWELLING., Disp: 15 tablet, Rfl: 3  •  furosemide (LASIX) 20 MG tablet, TAKE 1/2 TABLET BY MOUTH ONCE DAILY AS NEEDED FOR SWELLING., Disp: 15 tablet, Rfl: 1  •  HYDROcodone-acetaminophen (NORCO) 5-325 MG per tablet, Take 1 tablet by mouth Every 6 (Six) Hours As Needed (PRN) for up to 10 days., Disp: 60 tablet, Rfl: 0  •  losartan (COZAAR) 100 MG tablet, TAKE ONE TABLET BY MOUTH ONCE DAILY, Disp: 90 tablet, Rfl: 0  •  Magnesium Oxide 400 MG capsule, Take  by mouth 2 (Two) Times a Day., Disp: , Rfl:   •  naproxen (NAPROSYN) 500 MG tablet, Take 1 tablet by mouth 2 (Two) Times a Day., Disp: 60 tablet, Rfl: 11  •  omeprazole (priLOSEC) 40 MG capsule, Take 1 capsule by mouth Daily., Disp: 30 capsule, Rfl: 0  •  risperiDONE (risperDAL) 1 MG tablet, Take 1 tablet by mouth Every Night., Disp: 30 tablet, Rfl: 0    Past Surgical History:   Procedure Laterality Date   • APPENDECTOMY     • BREAST IMPLANT SURGERY     • CATARACT EXTRACTION WITH INTRAOCULAR LENS IMPLANT  12/17/2003    Phacoemulsification of a cataract with intraocular lens implant of rigt eye, Nixon model SA 60-AT; serial # [id number];20.5 diopter   • CHOLECYSTECTOMY  08/06/2007    Laparoscopic cholecystectomy. Symptomatic gallstones   • COLONOSCOPY  10/15/2013    declined stool cards and colonscopy   • ENDOSCOPY AND COLONOSCOPY  05/22/2014    Internal & external hemorrhoids found.   • ENDOSCOPY W/ PEG TUBE PLACEMENT  05/22/2014    EGD w/ tube: Normal esopahgus. Gastritis in stomach. Biopsy taken. Normal duodenum. Biopsy taken.   • INJECTION OF MEDICATION  07/24/2013    Inj(s) Tend-Sheath, Ligament, Single   • INJECTION OF MEDICATION  11/17/2014    Kenalog x6   • INJECTION OF MEDICATION  04/01/2016    PNEUMOC VAC/ADMIN/RCVD    • INJECTION OF MEDICATION  12/05/2014    Toradol   • OTHER SURGICAL HISTORY  01/19/2014    Drain/Inject Major Joint   x2   • PAP SMEAR  10/06/2011    • PARTIAL HYSTERECTOMY      Partial hyst    • TONSILLECTOMY     • TUBAL ABDOMINAL LIGATION         ROS  No fevers or chills.  No chest pain or shortness of air.  No GI or  disturbances. Left shoulder pain. Left upper arm pain.     PHYSICAL EXAMINATION:       There were no vitals taken for this visit.    Physical Exam   Constitutional: She is oriented to person, place, and time. Vital signs are normal. She appears well-developed and well-nourished.   HENT:   Head: Normocephalic.   Pulmonary/Chest: Effort normal. No respiratory distress.   Abdominal: Soft. She exhibits no distension.   Neurological: She is alert and oriented to person, place, and time. GCS eye subscore is 4. GCS verbal subscore is 5. GCS motor subscore is 6.   Skin: Skin is warm, dry and intact.   Psychiatric: She has a normal mood and affect. Her speech is normal and behavior is normal. Judgment and thought content normal. Cognition and memory are normal.   Vitals reviewed.      GAIT:     [x]  Normal  []  Antalgic    Assistive device: [x]  None  []  Walker     []  Crutches  []  Cane     []  Wheelchair  []  Stretcher    Right Shoulder Exam     Tenderness   Right shoulder tenderness location: diffuse shoulder, upper arm.    Other   Erythema: absent  Scars: absent  Sensation: normal  Pulse: present    Comments:  Mild swelling is present to the right upper arm.  Moderate ecchymosis is noted to the medial aspect of the right upper arm.  Is intact.  Range of motion and strength assessment deferred due to acute proximal humerus fracture.  Capillary refill is less than 3 seconds.      Left Shoulder Exam   Left shoulder exam is normal.            Xr Shoulder 2+ View Right    Result Date: 8/30/2017  Narrative: 3 views of the right shoulder reveal a mildly displaced, stable and impacted fracture of the proximal humerus.  No significant changes or change in degree of displacement is noted when compared with previous images done on 8/14/2017.  No other  abnormalities are noted at this time.    Xr Shoulder 2+ View Right    Result Date: 8/14/2017  Narrative: PROCEDURE: Three views right shoulder COMPARISON: No comparison. HISTORY: Injury, pain FINDINGS: Three views of the right shoulder were obtained There is normal positioning of the humeral head within the glenoid. The acromioclavicular joint is grossly intact There is an acute, mildly impacted fracture of the humeral metaphysis, the more distal fragment is angulated downward. The visualized right lung is clear. The aortic arch contains atherosclerotic calcification.     Impression: CONCLUSION:  Acute, mildly impacted fracture of the humeral metaphysis, the more distal fragment is angulated downward. Electronically signed by:  Sanchez Meza MD  8/14/2017 9:55 AM CDT Workstation: [id number]        ASSESSMENT:    Diagnoses and all orders for this visit:    Other closed displaced fracture of proximal end of right humerus with routine healing, subsequent encounter    Fall with injury, subsequent encounter    Other orders  -     HYDROcodone-acetaminophen (NORCO) 5-325 MG per tablet; Take 1 tablet by mouth Every 6 (Six) Hours As Needed (PRN) for up to 10 days.      PLAN    X-rays of right shoulder reviewed today and compared with previous images done on 8/14/2017.  There are no significant changes noted and the fracture is stable.  Recommend to remain in sling for immobilization.  Patient is instructed to begin gentle, progressive range of motion and movement of her left arm while at rest, as tolerated and based on her pain.  Recommend to continue Norco as needed for pain.  This prescription is refilled today per LUPE Maria.  Follow-up in 4 weeks for recheck and repeat x-rays at that time.    Return in about 4 weeks (around 9/26/2017) for Recheck.    LUPE Leo

## 2017-09-19 RX ORDER — LOSARTAN POTASSIUM 100 MG/1
TABLET ORAL
Qty: 90 TABLET | Refills: 0 | Status: SHIPPED | OUTPATIENT
Start: 2017-09-19 | End: 2018-01-22 | Stop reason: HOSPADM

## 2017-09-25 DIAGNOSIS — S42.291D OTHER CLOSED DISPLACED FRACTURE OF PROXIMAL END OF RIGHT HUMERUS WITH ROUTINE HEALING, SUBSEQUENT ENCOUNTER: Primary | ICD-10-CM

## 2017-09-26 ENCOUNTER — OFFICE VISIT (OUTPATIENT)
Dept: ORTHOPEDIC SURGERY | Facility: CLINIC | Age: 62
End: 2017-09-26

## 2017-09-26 VITALS — BODY MASS INDEX: 25.34 KG/M2 | WEIGHT: 143 LBS | HEIGHT: 63 IN

## 2017-09-26 DIAGNOSIS — S42.291D OTHER CLOSED DISPLACED FRACTURE OF PROXIMAL END OF RIGHT HUMERUS WITH ROUTINE HEALING, SUBSEQUENT ENCOUNTER: Primary | ICD-10-CM

## 2017-09-26 PROBLEM — S42.309A CLOSED FRACTURE OF HUMERUS: Status: ACTIVE | Noted: 2017-09-26

## 2017-09-26 PROCEDURE — 99024 POSTOP FOLLOW-UP VISIT: CPT | Performed by: NURSE PRACTITIONER

## 2017-09-26 RX ORDER — CARVEDILOL 3.12 MG/1
TABLET ORAL
COMMUNITY
Start: 2017-09-18 | End: 2017-11-14 | Stop reason: SDUPTHER

## 2017-09-26 NOTE — PROGRESS NOTES
Michelle Child is a 61 y.o. female returns for     Chief Complaint   Patient presents with   • Right Shoulder - Follow-up   • Results     repeat xrays done today       HISTORY OF PRESENT ILLNESS: Patient follows up today for right humerus fracture. She states it is doing better but she still has some trouble with extension. No complaints of pain today. Patient is not wearing her sling. X-rays repeated today.        CONCURRENT MEDICAL HISTORY:    Past Medical History:   Diagnosis Date   • Alkaline phosphatase raised    • Astigmatism    • Bilateral pseudophakia    • Coronary arteriosclerosis    • Depressive disorder    • Edema of lower extremity    • Encounter for general adult medical examination with abnormal findings    • Essential hypertension    • Gastroesophageal reflux disease    • H/O bone density study     DXA BONE DENSITY AXIAL    04/22/2016   • H/O screening mammography     SCREENING MAMMOGRAPHY     04/25/2016   • Hx of screening mammography     x3; declined screening, understands risks and benefits and still declines      07/24/2015   • Influenza vaccine administered     INFLUENZA IMMUN ADMIN OR PREV RECV'D   x4       04/01/2016   • Iron deficiency    • Migraine    • Tobacco dependence     continuous         Allergies   Allergen Reactions   • Codeine Nausea And Vomiting   • Penicillins    • Sulfa Antibiotics Nausea And Vomiting         Current Outpatient Prescriptions:   •  albuterol (PROVENTIL HFA;VENTOLIN HFA) 108 (90 BASE) MCG/ACT inhaler, Inhale 2 puffs Every 4 (Four) Hours As Needed for wheezing., Disp: 6.7 g, Rfl: 11  •  amitriptyline (ELAVIL) 100 MG tablet, Take 1 tablet by mouth Every Night., Disp: 30 tablet, Rfl: 11  •  aspirin 81 MG chewable tablet, Chew 1 tablet Daily., Disp: 30 tablet, Rfl: 11  •  carvedilol (COREG) 3.125 MG tablet, , Disp: , Rfl:   •  FLUoxetine (PROzac) 20 MG capsule, TAKE 3 CAPSULES BY MOUTH ONCE DAILY, Disp: 90 capsule, Rfl: 11  •  furosemide (LASIX) 20 MG tablet, TAKE  1/2 TABLET BY MOUTH ONCE DAILY AS NEEDED FOR SWELLING., Disp: 15 tablet, Rfl: 3  •  furosemide (LASIX) 20 MG tablet, TAKE 1/2 TABLET BY MOUTH ONCE DAILY AS NEEDED FOR SWELLING., Disp: 15 tablet, Rfl: 1  •  losartan (COZAAR) 100 MG tablet, TAKE ONE TABLET BY MOUTH ONCE DAILY, Disp: 90 tablet, Rfl: 0  •  Magnesium Oxide 400 MG capsule, Take  by mouth 2 (Two) Times a Day., Disp: , Rfl:   •  naproxen (NAPROSYN) 500 MG tablet, Take 1 tablet by mouth 2 (Two) Times a Day., Disp: 60 tablet, Rfl: 11  •  omeprazole (priLOSEC) 40 MG capsule, Take 1 capsule by mouth Daily., Disp: 30 capsule, Rfl: 0  •  risperiDONE (risperDAL) 1 MG tablet, Take 1 tablet by mouth Every Night., Disp: 30 tablet, Rfl: 0    Past Surgical History:   Procedure Laterality Date   • APPENDECTOMY     • BREAST IMPLANT SURGERY     • CATARACT EXTRACTION WITH INTRAOCULAR LENS IMPLANT  12/17/2003    Phacoemulsification of a cataract with intraocular lens implant of rigt eye, Nixon model SA 60-AT; serial # [id number];20.5 diopter   • CHOLECYSTECTOMY  08/06/2007    Laparoscopic cholecystectomy. Symptomatic gallstones   • COLONOSCOPY  10/15/2013    declined stool cards and colonscopy   • ENDOSCOPY AND COLONOSCOPY  05/22/2014    Internal & external hemorrhoids found.   • ENDOSCOPY W/ PEG TUBE PLACEMENT  05/22/2014    EGD w/ tube: Normal esopahgus. Gastritis in stomach. Biopsy taken. Normal duodenum. Biopsy taken.   • INJECTION OF MEDICATION  07/24/2013    Inj(s) Tend-Sheath, Ligament, Single   • INJECTION OF MEDICATION  11/17/2014    Kenalog x6   • INJECTION OF MEDICATION  04/01/2016    PNEUMOC VAC/ADMIN/RCVD    • INJECTION OF MEDICATION  12/05/2014    Toradol   • OTHER SURGICAL HISTORY  01/19/2014    Drain/Inject Major Joint   x2   • PAP SMEAR  10/06/2011   • PARTIAL HYSTERECTOMY      Partial hyst    • TONSILLECTOMY     • TUBAL ABDOMINAL LIGATION         ROS  No fevers or chills.  No chest pain or shortness of air.  No GI or  disturbances.    PHYSICAL  "EXAMINATION:       Ht 63\" (160 cm)  Wt 143 lb (64.9 kg)  BMI 25.33 kg/m2    Physical Exam   Constitutional: She is oriented to person, place, and time. Vital signs are normal. She appears well-developed and well-nourished.   HENT:   Head: Normocephalic.   Pulmonary/Chest: Effort normal. No respiratory distress.   Abdominal: Soft. She exhibits no distension.   Neurological: She is alert and oriented to person, place, and time. GCS eye subscore is 4. GCS verbal subscore is 5. GCS motor subscore is 6.   Skin: Skin is warm, dry and intact.   Psychiatric: She has a normal mood and affect. Her speech is normal and behavior is normal. Judgment and thought content normal. Cognition and memory are normal.   Vitals reviewed.      GAIT:     [x]  Normal  []  Antalgic    Assistive device: [x]  None  []  Walker     []  Crutches  []  Cane     []  Wheelchair  []  Stretcher    Right Shoulder Exam     Tenderness   The patient is experiencing no tenderness.        Range of Motion   Active Abduction: abnormal   Passive Abduction: abnormal   Extension: abnormal   Forward Flexion: abnormal   External Rotation: abnormal     Other   Erythema: absent  Scars: absent  Sensation: normal  Pulse: present    Comments:  Full range of motion and strength assessment deferred due to acute proximal humerus fracture. No pain with gentle, active ROM. Capillary refill less than 3 seconds. No deformity. No swelling. No ecchymosis.       Left Shoulder Exam   Left shoulder exam is normal.              Xr Shoulder 2+ View Right    Result Date: 9/27/2017  Narrative: 3 views of the right shoulder reveal a mildly displaced, stable and impacted fracture of the proximal humerus. Acceptable alignment noted. No significant changes and no evidence of healing noted when compared with previous images from 8/30/2017 and 8/14/2017.  No other abnormalities are noted at this time. 09/27/17 at 8:45 AM by LUPE Leo     Xr Shoulder 2+ View Right    Result Date: " 8/30/2017  Narrative: 3 views of the right shoulder reveal a mildly displaced, stable and impacted fracture of the proximal humerus.  No significant changes or change in degree of displacement is noted when compared with previous images done on 8/14/2017.  No other abnormalities are noted at this time.        ASSESSMENT:    Diagnoses and all orders for this visit:    Other closed displaced fracture of proximal end of right humerus with routine healing, subsequent encounter    PLAN    X-rays of right shoulder reviewed and compared with previous images from 8/30/2017 and 8/14/2017. The fracture is stable, however, there is no evidence of healing noted. Initial injury occurred 8 weeks ago (7/28/2017). Patient reports pain is significantly better. Suspect patient is using her right arm too much. She is not wearing a sling in office today and was noted to  her purse with her right arm. Discussed with patient the importance of immobilization of the fracture and modified activity with limited use of her right arm to facilitate healing. Patient verbalized understanding. Instructed patient to put her sling back on the right arm when she arrived home and to keep it in place most of the time. Follow up in 4 weeks for recheck and repeat x-rays at that time.     Return in about 4 weeks (around 10/24/2017) for Recheck.    LUPE Leo

## 2017-10-23 DIAGNOSIS — S42.291D OTHER CLOSED DISPLACED FRACTURE OF PROXIMAL END OF RIGHT HUMERUS WITH ROUTINE HEALING, SUBSEQUENT ENCOUNTER: Primary | ICD-10-CM

## 2017-10-24 ENCOUNTER — OFFICE VISIT (OUTPATIENT)
Dept: ORTHOPEDIC SURGERY | Facility: CLINIC | Age: 62
End: 2017-10-24

## 2017-10-24 VITALS — HEIGHT: 63 IN | BODY MASS INDEX: 25.34 KG/M2 | WEIGHT: 143 LBS

## 2017-10-24 DIAGNOSIS — S42.291D OTHER CLOSED DISPLACED FRACTURE OF PROXIMAL END OF RIGHT HUMERUS WITH ROUTINE HEALING, SUBSEQUENT ENCOUNTER: Primary | ICD-10-CM
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
## 2017-10-24 PROCEDURE — 99024 POSTOP FOLLOW-UP VISIT: CPT | Performed by: NURSE PRACTITIONER

## 2017-10-24 NOTE — PROGRESS NOTES
Michelle Child is a 61 y.o. female returns for     Chief Complaint   Patient presents with   • Right Shoulder - Follow-up   • Results     repeat xrays done today       HISTORY OF PRESENT ILLNESS: Patient presents to office for follow up of right humerus fracture.  Initial injury occurred on 7/28/2017 due to a fall.  Patient reports that the pain is improved.  Patient has difficulty with range of motion of her right shoulder/arm.  Xrays are repeated today.       CONCURRENT MEDICAL HISTORY:    Past Medical History:   Diagnosis Date   • Alkaline phosphatase raised    • Astigmatism    • Bilateral pseudophakia    • Coronary arteriosclerosis    • Depressive disorder    • Edema of lower extremity    • Encounter for general adult medical examination with abnormal findings    • Essential hypertension    • Gastroesophageal reflux disease    • H/O bone density study     DXA BONE DENSITY AXIAL    04/22/2016   • H/O screening mammography     SCREENING MAMMOGRAPHY     04/25/2016   • Hx of screening mammography     x3; declined screening, understands risks and benefits and still declines      07/24/2015   • Influenza vaccine administered     INFLUENZA IMMUN ADMIN OR PREV RECV'D   x4       04/01/2016   • Iron deficiency    • Migraine    • Tobacco dependence     continuous         Allergies   Allergen Reactions   • Codeine Nausea And Vomiting   • Penicillins    • Sulfa Antibiotics Nausea And Vomiting         Current Outpatient Prescriptions:   •  albuterol (PROVENTIL HFA;VENTOLIN HFA) 108 (90 BASE) MCG/ACT inhaler, Inhale 2 puffs Every 4 (Four) Hours As Needed for wheezing., Disp: 6.7 g, Rfl: 11  •  amitriptyline (ELAVIL) 100 MG tablet, Take 1 tablet by mouth Every Night., Disp: 30 tablet, Rfl: 11  •  aspirin 81 MG chewable tablet, Chew 1 tablet Daily., Disp: 30 tablet, Rfl: 11  •  carvedilol (COREG) 3.125 MG tablet, , Disp: , Rfl:   •  FLUoxetine (PROzac) 20 MG capsule, TAKE 3 CAPSULES BY MOUTH ONCE DAILY, Disp: 90 capsule,  Rfl: 11  •  furosemide (LASIX) 20 MG tablet, TAKE 1/2 TABLET BY MOUTH ONCE DAILY AS NEEDED FOR SWELLING., Disp: 15 tablet, Rfl: 3  •  furosemide (LASIX) 20 MG tablet, TAKE 1/2 TABLET BY MOUTH ONCE DAILY AS NEEDED FOR SWELLING., Disp: 15 tablet, Rfl: 1  •  losartan (COZAAR) 100 MG tablet, TAKE ONE TABLET BY MOUTH ONCE DAILY, Disp: 90 tablet, Rfl: 0  •  Magnesium Oxide 400 MG capsule, Take  by mouth 2 (Two) Times a Day., Disp: , Rfl:   •  naproxen (NAPROSYN) 500 MG tablet, Take 1 tablet by mouth 2 (Two) Times a Day., Disp: 60 tablet, Rfl: 11  •  omeprazole (priLOSEC) 40 MG capsule, Take 1 capsule by mouth Daily., Disp: 30 capsule, Rfl: 0  •  risperiDONE (risperDAL) 1 MG tablet, Take 1 tablet by mouth Every Night., Disp: 30 tablet, Rfl: 0    Past Surgical History:   Procedure Laterality Date   • APPENDECTOMY     • BREAST IMPLANT SURGERY     • CATARACT EXTRACTION WITH INTRAOCULAR LENS IMPLANT  12/17/2003    Phacoemulsification of a cataract with intraocular lens implant of rigt eye, Nixon model SA 60-AT; serial # [id number];20.5 diopter   • CHOLECYSTECTOMY  08/06/2007    Laparoscopic cholecystectomy. Symptomatic gallstones   • COLONOSCOPY  10/15/2013    declined stool cards and colonscopy   • ENDOSCOPY AND COLONOSCOPY  05/22/2014    Internal & external hemorrhoids found.   • ENDOSCOPY W/ PEG TUBE PLACEMENT  05/22/2014    EGD w/ tube: Normal esopahgus. Gastritis in stomach. Biopsy taken. Normal duodenum. Biopsy taken.   • INJECTION OF MEDICATION  07/24/2013    Inj(s) Tend-Sheath, Ligament, Single   • INJECTION OF MEDICATION  11/17/2014    Kenalog x6   • INJECTION OF MEDICATION  04/01/2016    PNEUMOC VAC/ADMIN/RCVD    • INJECTION OF MEDICATION  12/05/2014    Toradol   • OTHER SURGICAL HISTORY  01/19/2014    Drain/Inject Major Joint   x2   • PAP SMEAR  10/06/2011   • PARTIAL HYSTERECTOMY      Partial hyst    • TONSILLECTOMY     • TUBAL ABDOMINAL LIGATION         ROS  No fevers or chills.  No chest pain or shortness of air.   "No GI or  disturbances.    PHYSICAL EXAMINATION:       Ht 63\" (160 cm)  Wt 143 lb (64.9 kg)  BMI 25.33 kg/m2    Physical Exam   Constitutional: She is oriented to person, place, and time. Vital signs are normal. She appears well-developed and well-nourished.   HENT:   Head: Normocephalic.   Pulmonary/Chest: Effort normal. No respiratory distress.   Abdominal: Soft. She exhibits no distension.   Neurological: She is alert and oriented to person, place, and time. GCS eye subscore is 4. GCS verbal subscore is 5. GCS motor subscore is 6.   Skin: Skin is warm, dry and intact.   Psychiatric: She has a normal mood and affect. Her speech is normal and behavior is normal. Judgment and thought content normal. Cognition and memory are normal.   Vitals reviewed.      GAIT:     [x]  Normal  []  Antalgic    Assistive device: [x]  None  []  Walker     []  Crutches  []  Cane     []  Wheelchair  []  Stretcher    Right Shoulder Exam     Tenderness   Right shoulder tenderness location: mild, diffuse upper arm.    Range of Motion   Active Abduction: 50   Passive Abduction: 70   Forward Flexion: 50     Muscle Strength   Abduction: 4/5   Supraspinatus: 4/5   Biceps: 4/5     Other   Erythema: absent  Sensation: normal  Pulse: present    Comments:  Pain and limitations with range of motion.      Left Shoulder Exam   Left shoulder exam is normal.            Xr Shoulder 2+ View Right    Result Date: 10/24/2017  Narrative: 3 views of the right shoulder reveal a mildly displaced, stable and impacted fracture of the proximal humerus. Acceptable alignment noted.  There is evidence of healing noted when compared with previous images from 9/26/2017 at 8/29/2017.  No other acute abnormalities are noted at this time. 10/24/17 at 12:51 PM by LUPE Leo     Xr Shoulder 2+ View Right    Result Date: 9/27/2017  Narrative: 3 views of the right shoulder reveal a mildly displaced, stable and impacted fracture of the proximal humerus. " Acceptable alignment noted. No significant changes and no evidence of healing noted when compared with previous images from 8/30/2017 and 8/14/2017.  No other abnormalities are noted at this time. 09/27/17 at 8:45 AM by LUPE Leo         ASSESSMENT:    Diagnoses and all orders for this visit:    Other closed displaced fracture of proximal end of right humerus with routine healing, subsequent encounter    Fall with injury, subsequent encounter      PLAN    X-rays of right shoulder reviewed today and compared with previous images from 9/27/2017.  The fracture remained stable with evidence of healing on x-ray.  She reports improved pain.  She continues to have difficulty with range of motion.  Recommend physical therapy for strengthening, conditioning and improved range of motion.  Recommend to continue modified activity and use of her right arm with avoidance of heavy lifting, pulling or tugging.  Recommend progressive, gentle range of motion of the right arm/shoulder.  Recommend Tylenol or Ibuprofen as needed for pain.  Follow-up in 6 weeks for recheck and repeat x-rays at that time.    Return in about 6 weeks (around 12/5/2017) for Recheck.      This document has been electronically signed by LUPE Leo on October 25, 2017 4:48 PM      LUPE Leo

## 2017-11-14 RX ORDER — OMEPRAZOLE 40 MG/1
CAPSULE, DELAYED RELEASE ORAL
Qty: 30 CAPSULE | Refills: 1 | Status: SHIPPED | OUTPATIENT
Start: 2017-11-14 | End: 2017-12-14 | Stop reason: SDUPTHER

## 2017-11-14 RX ORDER — RISPERIDONE 1 MG/1
TABLET ORAL
Qty: 30 TABLET | Refills: 1 | Status: SHIPPED | OUTPATIENT
Start: 2017-11-14 | End: 2017-12-14 | Stop reason: SDUPTHER

## 2017-11-14 RX ORDER — FLUOXETINE HYDROCHLORIDE 20 MG/1
CAPSULE ORAL
Qty: 90 CAPSULE | Refills: 1 | Status: SHIPPED | OUTPATIENT
Start: 2017-11-14 | End: 2018-03-16 | Stop reason: SDUPTHER

## 2017-11-14 RX ORDER — CARVEDILOL 3.12 MG/1
TABLET ORAL
Qty: 60 TABLET | Refills: 1 | Status: SHIPPED | OUTPATIENT
Start: 2017-11-14 | End: 2018-01-22 | Stop reason: HOSPADM

## 2017-12-04 DIAGNOSIS — S42.291D OTHER CLOSED DISPLACED FRACTURE OF PROXIMAL END OF RIGHT HUMERUS WITH ROUTINE HEALING, SUBSEQUENT ENCOUNTER: Primary | ICD-10-CM

## 2017-12-05 ENCOUNTER — OFFICE VISIT (OUTPATIENT)
Dept: ORTHOPEDIC SURGERY | Facility: CLINIC | Age: 62
End: 2017-12-05

## 2017-12-05 VITALS — BODY MASS INDEX: 25.34 KG/M2 | WEIGHT: 143 LBS | HEIGHT: 63 IN

## 2017-12-05 DIAGNOSIS — S42.291D OTHER CLOSED DISPLACED FRACTURE OF PROXIMAL END OF RIGHT HUMERUS WITH ROUTINE HEALING, SUBSEQUENT ENCOUNTER: Primary | ICD-10-CM
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
## 2017-12-05 PROBLEM — S42.201D CLOSED FRACTURE OF PROXIMAL END OF RIGHT HUMERUS WITH ROUTINE HEALING: Status: ACTIVE | Noted: 2017-12-05

## 2017-12-05 PROCEDURE — 99213 OFFICE O/P EST LOW 20 MIN: CPT | Performed by: NURSE PRACTITIONER

## 2017-12-05 NOTE — PROGRESS NOTES
The patient is a 62 y.o. female who presents for followup.    Chief Complaint   Patient presents with   • Left Upper Arm - Fracture       HPI: Patient presents to office for follow-up of right humerus fracture.  Initial injury occurred on 7/28/2017 due to a fall.  Patient reports pain in her right arm has improved.  She continues to have some weakness of the right arm and limited range of motion.  X-rays are repeated today. Patient also reports that she fell again one week ago is having some pain on right side in her upper ribs. Denies any new injury to the right arm.       Current Outpatient Prescriptions:   •  albuterol (PROVENTIL HFA;VENTOLIN HFA) 108 (90 BASE) MCG/ACT inhaler, Inhale 2 puffs Every 4 (Four) Hours As Needed for wheezing., Disp: 6.7 g, Rfl: 11  •  amitriptyline (ELAVIL) 100 MG tablet, Take 1 tablet by mouth Every Night., Disp: 30 tablet, Rfl: 11  •  aspirin 81 MG chewable tablet, Chew 1 tablet Daily., Disp: 30 tablet, Rfl: 11  •  carvedilol (COREG) 3.125 MG tablet, TAKE 1 TABLET BY MOUTH TWICE A DAY., Disp: 60 tablet, Rfl: 1  •  FLUoxetine (PROzac) 20 MG capsule, TAKE 3 CAPSULES BY MOUTH ONCE DAILY, Disp: 90 capsule, Rfl: 1  •  furosemide (LASIX) 20 MG tablet, TAKE 1/2 TABLET BY MOUTH ONCE DAILY AS NEEDED FOR SWELLING., Disp: 15 tablet, Rfl: 3  •  furosemide (LASIX) 20 MG tablet, TAKE 1/2 TABLET BY MOUTH ONCE DAILY AS NEEDED FOR SWELLING., Disp: 15 tablet, Rfl: 1  •  losartan (COZAAR) 100 MG tablet, TAKE ONE TABLET BY MOUTH ONCE DAILY, Disp: 90 tablet, Rfl: 0  •  Magnesium Oxide 400 MG capsule, Take  by mouth 2 (Two) Times a Day., Disp: , Rfl:   •  naproxen (NAPROSYN) 500 MG tablet, Take 1 tablet by mouth 2 (Two) Times a Day., Disp: 60 tablet, Rfl: 11  •  omeprazole (priLOSEC) 40 MG capsule, TAKE 1 CAPSULE BY MOUTH ONCE DAILY, Disp: 30 capsule, Rfl: 1  •  risperiDONE (risperDAL) 1 MG tablet, TAKE 1 TABLET BY MOUTH ONCE EVERY EVENING, Disp: 30 tablet, Rfl: 1    Allergies   Allergen Reactions   •  "Codeine Nausea And Vomiting   • Penicillins    • Sulfa Antibiotics Nausea And Vomiting       ROS:  No fevers or chills.  No nausea or vomiting.     PHYSICAL EXAM:    Vitals:    12/05/17 1016   Weight: 64.9 kg (143 lb)   Height: 160 cm (63\")   PainSc:   3       GAIT:     [x]  Normal  []  Antalgic    Assistive device: [x]  None  []  Walker     []  Crutches  []  Cane     []  Wheelchair  []  Stretcher    Patient is awake and alert, answers questions appropriately, and is in no apparent distress.  Left Shoulder Exam   Left shoulder exam is normal.    Right Shoulder Exam     Range of Motion   Active Abduction:                       70  Passive Abduction:                    90  Forward Flexion:                        80    Muscle Strength   Abduction:            4/5  Supraspinatus:     4/5  Biceps:                 4/5    Comments:  Weakness and limitations with range of motion.           Xr Shoulder 2+ View Right    Result Date: 12/5/2017  Narrative: 3 views of the right shoulder reveal a stable, impacted proximal humerus fracture with good evidence of healing when compared with previous images from 10/24/2017 at 9/27/2017.  Alignment remains acceptable.  No other acute radiologic abnormalities are noted at this time.12/05/17 at 11:36 AM by LUPE Leo       ASSESSMENT:  Diagnoses and all orders for this visit:    Other closed displaced fracture of proximal end of right humerus with routine healing, subsequent encounter  -     Ambulatory Referral to Physical Therapy Evaluate and treat    Fall with injury, subsequent encounter  -     Ambulatory Referral to Physical Therapy Evaluate and treat      PLAN:   X-rays of right shoulder reviewed today and compared with previous images from 10/23/2017 and 9/27/2017.  The fracture remains stable with progressive evidence of healing on x-ray. Patient reports improved pain.  She continues to have difficulty with range of motion. Recommend physical therapy for strengthening, " conditioning and improved range of motion. Recommend to continue modified activity and use of her right arm with avoidance of heavy lifting, pulling or tugging.  Recommend progressive, gentle range of motion of the right arm/shoulder. Recommend Tylenol or Ibuprofen as needed for pain.  Follow-up in 6 weeks for recheck and repeat x-rays at that time.    Return in about 6 weeks (around 1/16/2018) for Recheck.      This document has been electronically signed by LUPE Leo on December 5, 2017 4:34 PM      LUPE Leo

## 2017-12-14 RX ORDER — OMEPRAZOLE 40 MG/1
CAPSULE, DELAYED RELEASE ORAL
Qty: 30 CAPSULE | OUTPATIENT
Start: 2017-12-14

## 2017-12-14 RX ORDER — RISPERIDONE 1 MG/1
TABLET ORAL
Qty: 30 TABLET | OUTPATIENT
Start: 2017-12-14

## 2017-12-14 RX ORDER — RISPERIDONE 1 MG/1
1 TABLET ORAL DAILY
Qty: 30 TABLET | Refills: 0 | Status: SHIPPED | OUTPATIENT
Start: 2017-12-14 | End: 2018-01-22 | Stop reason: HOSPADM

## 2017-12-14 RX ORDER — OMEPRAZOLE 40 MG/1
40 CAPSULE, DELAYED RELEASE ORAL DAILY
Qty: 30 CAPSULE | Refills: 0 | Status: SHIPPED | OUTPATIENT
Start: 2017-12-14 | End: 2018-01-30 | Stop reason: ALTCHOICE

## 2018-01-10 ENCOUNTER — APPOINTMENT (OUTPATIENT)
Dept: CT IMAGING | Facility: HOSPITAL | Age: 63
End: 2018-01-10

## 2018-01-10 ENCOUNTER — APPOINTMENT (OUTPATIENT)
Dept: GENERAL RADIOLOGY | Facility: HOSPITAL | Age: 63
End: 2018-01-10

## 2018-01-10 ENCOUNTER — HOSPITAL ENCOUNTER (INPATIENT)
Facility: HOSPITAL | Age: 63
LOS: 12 days | Discharge: NURSING, REHAB OR LONG-TERM CARE | End: 2018-01-22
Attending: FAMILY MEDICINE | Admitting: FAMILY MEDICINE

## 2018-01-10 DIAGNOSIS — E87.6 HYPOKALEMIA: Primary | ICD-10-CM

## 2018-01-10 DIAGNOSIS — Z78.9 DECREASED ACTIVITIES OF DAILY LIVING (ADL): ICD-10-CM

## 2018-01-10 DIAGNOSIS — J18.9 PNEUMONIA OF LEFT LUNG DUE TO INFECTIOUS ORGANISM, UNSPECIFIED PART OF LUNG: ICD-10-CM

## 2018-01-10 DIAGNOSIS — Z74.09 IMPAIRED PHYSICAL MOBILITY: ICD-10-CM
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
## 2018-01-10 DIAGNOSIS — S00.03XA CONTUSION OF SCALP, INITIAL ENCOUNTER: ICD-10-CM

## 2018-01-10 PROBLEM — E87.1 HYPONATREMIA: Status: ACTIVE | Noted: 2018-01-10

## 2018-01-10 PROBLEM — J96.01 ACUTE RESPIRATORY FAILURE WITH HYPOXIA (HCC): Status: ACTIVE | Noted: 2018-01-10

## 2018-01-10 PROBLEM — R53.1 WEAKNESS: Status: ACTIVE | Noted: 2018-01-10

## 2018-01-10 PROBLEM — J15.9 COMMUNITY ACQUIRED BACTERIAL PNEUMONIA: Status: ACTIVE | Noted: 2018-01-10

## 2018-01-10 LAB
ALBUMIN SERPL-MCNC: 2.9 G/DL (ref 3.4–4.8)
ALBUMIN/GLOB SERPL: 1 G/DL (ref 1.1–1.8)
ALP SERPL-CCNC: 149 U/L (ref 38–126)
ALT SERPL W P-5'-P-CCNC: 29 U/L (ref 9–52)
ANION GAP SERPL CALCULATED.3IONS-SCNC: 8 MMOL/L (ref 5–15)
ARTERIAL PATENCY WRIST A: ABNORMAL
AST SERPL-CCNC: 27 U/L (ref 14–36)
ATMOSPHERIC PRESS: ABNORMAL MMHG
BACTERIA UR QL AUTO: NORMAL /HPF
BASE EXCESS BLDA CALC-SCNC: -0.6 MMOL/L (ref -2.4–2.4)
BASOPHILS # BLD AUTO: 0.01 10*3/MM3 (ref 0–0.2)
BASOPHILS NFR BLD AUTO: 0.1 % (ref 0–2)
BDY SITE: ABNORMAL
BILIRUB SERPL-MCNC: 0.5 MG/DL (ref 0.2–1.3)
BILIRUB UR QL STRIP: NEGATIVE
BUN BLD-MCNC: 9 MG/DL (ref 7–21)
BUN/CREAT SERPL: 11.3 (ref 7–25)
CA-I BLD-MCNC: 4.3 MG/DL (ref 4.5–4.9)
CALCIUM SPEC-SCNC: 8.2 MG/DL (ref 8.4–10.2)
CHLORIDE SERPL-SCNC: 97 MMOL/L (ref 95–110)
CK MB SERPL-CCNC: 1.96 NG/ML (ref 0–5)
CK SERPL-CCNC: 49 U/L (ref 30–135)
CLARITY UR: CLEAR
CO2 BLDA-SCNC: 25.5 MMOL/L (ref 23–27)
CO2 SERPL-SCNC: 28 MMOL/L (ref 22–31)
COLOR UR: YELLOW
CREAT BLD-MCNC: 0.8 MG/DL (ref 0.5–1)
CRP SERPL-MCNC: 13.7 MG/DL (ref 0–1)
D-LACTATE SERPL-SCNC: 1.3 MMOL/L (ref 0.5–2)
DEPRECATED RDW RBC AUTO: 48.4 FL (ref 36.4–46.3)
EOSINOPHIL # BLD AUTO: 0.07 10*3/MM3 (ref 0–0.7)
EOSINOPHIL NFR BLD AUTO: 0.6 % (ref 0–7)
ERYTHROCYTE [DISTWIDTH] IN BLOOD BY AUTOMATED COUNT: 15.6 % (ref 11.5–14.5)
GFR SERPL CREATININE-BSD FRML MDRD: 72 ML/MIN/1.73 (ref 45–104)
GLOBULIN UR ELPH-MCNC: 3 GM/DL (ref 2.3–3.5)
GLUCOSE BLD-MCNC: 89 MG/DL (ref 60–100)
GLUCOSE BLDA-MCNC: 133 MMOL/L
GLUCOSE UR STRIP-MCNC: NEGATIVE MG/DL
HCO3 BLDA-SCNC: 24.2 MMOL/L (ref 22–26)
HCT VFR BLD AUTO: 29.7 % (ref 35–45)
HCT VFR BLD CALC: 31 % (ref 38–47)
HGB BLD-MCNC: 9.5 G/DL (ref 12–15.5)
HGB BLDA-MCNC: 10.5 G/DL (ref 12–16)
HGB UR QL STRIP.AUTO: NEGATIVE
HOLD SPECIMEN: NORMAL
HOLD SPECIMEN: NORMAL
HYALINE CASTS UR QL AUTO: NORMAL /LPF
IMM GRANULOCYTES # BLD: 0.03 10*3/MM3 (ref 0–0.02)
IMM GRANULOCYTES NFR BLD: 0.3 % (ref 0–0.5)
INR PPP: 1.2 (ref 0.8–1.2)
KETONES UR QL STRIP: NEGATIVE
L PNEUMO1 AG UR QL IA: NEGATIVE
LEUKOCYTE ESTERASE UR QL STRIP.AUTO: ABNORMAL
LYMPHOCYTES # BLD AUTO: 0.82 10*3/MM3 (ref 0.6–4.2)
LYMPHOCYTES NFR BLD AUTO: 7.3 % (ref 10–50)
MAGNESIUM SERPL-MCNC: 1.8 MG/DL (ref 1.6–2.3)
MCH RBC QN AUTO: 26.8 PG (ref 26.5–34)
MCHC RBC AUTO-ENTMCNC: 32 G/DL (ref 31.4–36)
MCV RBC AUTO: 83.9 FL (ref 80–98)
MODALITY: ABNORMAL
MONOCYTES # BLD AUTO: 0.67 10*3/MM3 (ref 0–0.9)
MONOCYTES NFR BLD AUTO: 6 % (ref 0–12)
NEUTROPHILS # BLD AUTO: 9.59 10*3/MM3 (ref 2–8.6)
NEUTROPHILS NFR BLD AUTO: 85.7 % (ref 37–80)
NITRITE UR QL STRIP: NEGATIVE
NT-PROBNP SERPL-MCNC: 776 PG/ML (ref 0–900)
PCO2 BLDA: 40.5 MM HG (ref 35–45)
PH BLDA: 7.39 PH UNITS (ref 7.35–7.45)
PH UR STRIP.AUTO: 7 [PH] (ref 5–9)
PLATELET # BLD AUTO: 323 10*3/MM3 (ref 150–450)
PMV BLD AUTO: 9.3 FL (ref 8–12)
PO2 BLDA: 75.4 MM HG (ref 80–105)
POTASSIUM BLD-SCNC: 2.5 MMOL/L (ref 3.5–5.1)
POTASSIUM BLD-SCNC: 3.4 MMOL/L (ref 3.5–5.1)
POTASSIUM BLDA-SCNC: 2.67 MMOL/L (ref 3.6–4.9)
PROT SERPL-MCNC: 5.9 G/DL (ref 6.3–8.6)
PROT UR QL STRIP: NEGATIVE
PROTHROMBIN TIME: 15.2 SECONDS (ref 11.1–15.3)
RBC # BLD AUTO: 3.54 10*6/MM3 (ref 3.77–5.16)
RBC # UR: NORMAL /HPF
REF LAB TEST METHOD: NORMAL
S PNEUM AG SPEC QL LA: POSITIVE
SAO2 % BLDCOA: 94.4 % (ref 94–100)
SODIUM BLD-SCNC: 133 MMOL/L (ref 137–145)
SODIUM BLDA-SCNC: 139.8 MMOL/L (ref 138–146)
SP GR UR STRIP: 1.01 (ref 1–1.03)
SQUAMOUS #/AREA URNS HPF: NORMAL /HPF
UROBILINOGEN UR QL STRIP: ABNORMAL
WBC NRBC COR # BLD: 11.19 10*3/MM3 (ref 3.2–9.8)
WBC UR QL AUTO: NORMAL /HPF
WHOLE BLOOD HOLD SPECIMEN: NORMAL
WHOLE BLOOD HOLD SPECIMEN: NORMAL

## 2018-01-10 PROCEDURE — 99285 EMERGENCY DEPT VISIT HI MDM: CPT

## 2018-01-10 PROCEDURE — 82553 CREATINE MB FRACTION: CPT | Performed by: FAMILY MEDICINE

## 2018-01-10 PROCEDURE — 71046 X-RAY EXAM CHEST 2 VIEWS: CPT

## 2018-01-10 PROCEDURE — 25010000002 METHYLPREDNISOLONE PER 125 MG: Performed by: FAMILY MEDICINE

## 2018-01-10 PROCEDURE — 87040 BLOOD CULTURE FOR BACTERIA: CPT | Performed by: FAMILY MEDICINE

## 2018-01-10 PROCEDURE — 25010000002 ENOXAPARIN PER 10 MG: Performed by: FAMILY MEDICINE

## 2018-01-10 PROCEDURE — 87899 AGENT NOS ASSAY W/OPTIC: CPT | Performed by: FAMILY MEDICINE

## 2018-01-10 PROCEDURE — 83880 ASSAY OF NATRIURETIC PEPTIDE: CPT | Performed by: FAMILY MEDICINE

## 2018-01-10 PROCEDURE — 87581 M.PNEUMON DNA AMP PROBE: CPT | Performed by: FAMILY MEDICINE

## 2018-01-10 PROCEDURE — 81001 URINALYSIS AUTO W/SCOPE: CPT | Performed by: FAMILY MEDICINE

## 2018-01-10 PROCEDURE — 87086 URINE CULTURE/COLONY COUNT: CPT | Performed by: FAMILY MEDICINE

## 2018-01-10 PROCEDURE — 93010 ELECTROCARDIOGRAM REPORT: CPT | Performed by: INTERNAL MEDICINE

## 2018-01-10 PROCEDURE — 82550 ASSAY OF CK (CPK): CPT | Performed by: FAMILY MEDICINE

## 2018-01-10 PROCEDURE — 86140 C-REACTIVE PROTEIN: CPT | Performed by: FAMILY MEDICINE

## 2018-01-10 PROCEDURE — 85025 COMPLETE CBC W/AUTO DIFF WBC: CPT | Performed by: FAMILY MEDICINE

## 2018-01-10 PROCEDURE — 85610 PROTHROMBIN TIME: CPT | Performed by: FAMILY MEDICINE

## 2018-01-10 PROCEDURE — 84132 ASSAY OF SERUM POTASSIUM: CPT | Performed by: FAMILY MEDICINE

## 2018-01-10 PROCEDURE — 93005 ELECTROCARDIOGRAM TRACING: CPT | Performed by: FAMILY MEDICINE

## 2018-01-10 PROCEDURE — 94799 UNLISTED PULMONARY SVC/PX: CPT

## 2018-01-10 PROCEDURE — 83605 ASSAY OF LACTIC ACID: CPT | Performed by: FAMILY MEDICINE

## 2018-01-10 PROCEDURE — 94640 AIRWAY INHALATION TREATMENT: CPT

## 2018-01-10 PROCEDURE — 80053 COMPREHEN METABOLIC PANEL: CPT | Performed by: FAMILY MEDICINE

## 2018-01-10 PROCEDURE — 83735 ASSAY OF MAGNESIUM: CPT | Performed by: FAMILY MEDICINE

## 2018-01-10 PROCEDURE — 25010000002 LEVOFLOXACIN PER 250 MG: Performed by: FAMILY MEDICINE

## 2018-01-10 PROCEDURE — 94760 N-INVAS EAR/PLS OXIMETRY 1: CPT

## 2018-01-10 PROCEDURE — 99222 1ST HOSP IP/OBS MODERATE 55: CPT | Performed by: FAMILY MEDICINE

## 2018-01-10 PROCEDURE — 96372 THER/PROPH/DIAG INJ SC/IM: CPT

## 2018-01-10 PROCEDURE — 82803 BLOOD GASES ANY COMBINATION: CPT | Performed by: FAMILY MEDICINE

## 2018-01-10 PROCEDURE — 25010000003 POTASSIUM CHLORIDE 10 MEQ/100ML SOLUTION: Performed by: FAMILY MEDICINE

## 2018-01-10 PROCEDURE — 70450 CT HEAD/BRAIN W/O DYE: CPT

## 2018-01-10 PROCEDURE — 25010000002 LEVOFLOXACIN PER 250 MG

## 2018-01-10 RX ORDER — ALBUTEROL SULFATE 2.5 MG/3ML
2.5 SOLUTION RESPIRATORY (INHALATION) ONCE AS NEEDED
Status: COMPLETED | OUTPATIENT
Start: 2018-01-10 | End: 2018-01-10

## 2018-01-10 RX ORDER — LEVOFLOXACIN 5 MG/ML
INJECTION, SOLUTION INTRAVENOUS
Status: DISCONTINUED
Start: 2018-01-10 | End: 2018-01-22 | Stop reason: HOSPADM

## 2018-01-10 RX ORDER — FLUOXETINE HYDROCHLORIDE 20 MG/1
60 CAPSULE ORAL DAILY
Status: DISCONTINUED | OUTPATIENT
Start: 2018-01-11 | End: 2018-01-22 | Stop reason: HOSPADM

## 2018-01-10 RX ORDER — LEVOFLOXACIN 5 MG/ML
750 INJECTION, SOLUTION INTRAVENOUS EVERY 24 HOURS
Status: DISCONTINUED | OUTPATIENT
Start: 2018-01-10 | End: 2018-01-17

## 2018-01-10 RX ORDER — SODIUM CHLORIDE 9 MG/ML
50 INJECTION, SOLUTION INTRAVENOUS CONTINUOUS
Status: DISCONTINUED | OUTPATIENT
Start: 2018-01-10 | End: 2018-01-12

## 2018-01-10 RX ORDER — LOSARTAN POTASSIUM 50 MG/1
50 TABLET ORAL
Status: DISCONTINUED | OUTPATIENT
Start: 2018-01-10 | End: 2018-01-22 | Stop reason: HOSPADM

## 2018-01-10 RX ORDER — CARVEDILOL 3.12 MG/1
3.12 TABLET ORAL 2 TIMES DAILY WITH MEALS
Status: DISCONTINUED | OUTPATIENT
Start: 2018-01-10 | End: 2018-01-15

## 2018-01-10 RX ORDER — POTASSIUM CHLORIDE 750 MG/1
20 CAPSULE, EXTENDED RELEASE ORAL ONCE
Status: COMPLETED | OUTPATIENT
Start: 2018-01-10 | End: 2018-01-10

## 2018-01-10 RX ORDER — ASPIRIN 325 MG
325 TABLET ORAL ONCE
Status: COMPLETED | OUTPATIENT
Start: 2018-01-10 | End: 2018-01-10

## 2018-01-10 RX ORDER — IPRATROPIUM BROMIDE AND ALBUTEROL SULFATE 2.5; .5 MG/3ML; MG/3ML
3 SOLUTION RESPIRATORY (INHALATION) ONCE
Status: COMPLETED | OUTPATIENT
Start: 2018-01-10 | End: 2018-01-10

## 2018-01-10 RX ORDER — NICOTINE 21 MG/24HR
1 PATCH, TRANSDERMAL 24 HOURS TRANSDERMAL EVERY 24 HOURS
Status: DISCONTINUED | OUTPATIENT
Start: 2018-01-10 | End: 2018-01-22 | Stop reason: HOSPADM

## 2018-01-10 RX ORDER — ASPIRIN 81 MG/1
81 TABLET, CHEWABLE ORAL DAILY
Status: DISCONTINUED | OUTPATIENT
Start: 2018-01-10 | End: 2018-01-22 | Stop reason: HOSPADM

## 2018-01-10 RX ORDER — RISPERIDONE 1 MG/1
1 TABLET ORAL NIGHTLY
Status: DISCONTINUED | OUTPATIENT
Start: 2018-01-10 | End: 2018-01-22 | Stop reason: HOSPADM

## 2018-01-10 RX ORDER — LEVOFLOXACIN 5 MG/ML
750 INJECTION, SOLUTION INTRAVENOUS ONCE
Status: COMPLETED | OUTPATIENT
Start: 2018-01-10 | End: 2018-01-10

## 2018-01-10 RX ORDER — BISACODYL 5 MG/1
5 TABLET, DELAYED RELEASE ORAL DAILY PRN
Status: DISCONTINUED | OUTPATIENT
Start: 2018-01-10 | End: 2018-01-22 | Stop reason: HOSPADM

## 2018-01-10 RX ORDER — ACETAMINOPHEN 325 MG/1
650 TABLET ORAL EVERY 4 HOURS PRN
Status: DISCONTINUED | OUTPATIENT
Start: 2018-01-10 | End: 2018-01-22 | Stop reason: HOSPADM

## 2018-01-10 RX ORDER — FLUOXETINE HYDROCHLORIDE 20 MG/1
20 CAPSULE ORAL DAILY
Status: DISCONTINUED | OUTPATIENT
Start: 2018-01-10 | End: 2018-01-10

## 2018-01-10 RX ORDER — IPRATROPIUM BROMIDE AND ALBUTEROL SULFATE 2.5; .5 MG/3ML; MG/3ML
3 SOLUTION RESPIRATORY (INHALATION)
Status: DISCONTINUED | OUTPATIENT
Start: 2018-01-10 | End: 2018-01-22 | Stop reason: HOSPADM

## 2018-01-10 RX ORDER — HYDROCODONE BITARTRATE AND ACETAMINOPHEN 5; 325 MG/1; MG/1
1 TABLET ORAL EVERY 4 HOURS PRN
Status: DISCONTINUED | OUTPATIENT
Start: 2018-01-10 | End: 2018-01-22 | Stop reason: HOSPADM

## 2018-01-10 RX ORDER — ASPIRIN 81 MG/1
TABLET, CHEWABLE ORAL
Status: DISCONTINUED
Start: 2018-01-10 | End: 2018-01-22 | Stop reason: HOSPADM

## 2018-01-10 RX ORDER — ONDANSETRON 4 MG/1
4 TABLET, FILM COATED ORAL EVERY 6 HOURS PRN
Status: DISCONTINUED | OUTPATIENT
Start: 2018-01-10 | End: 2018-01-22 | Stop reason: HOSPADM

## 2018-01-10 RX ORDER — METHYLPREDNISOLONE SODIUM SUCCINATE 125 MG/2ML
125 INJECTION, POWDER, LYOPHILIZED, FOR SOLUTION INTRAMUSCULAR; INTRAVENOUS ONCE
Status: COMPLETED | OUTPATIENT
Start: 2018-01-10 | End: 2018-01-10

## 2018-01-10 RX ORDER — AMITRIPTYLINE HYDROCHLORIDE 50 MG/1
100 TABLET, FILM COATED ORAL NIGHTLY
Status: DISCONTINUED | OUTPATIENT
Start: 2018-01-10 | End: 2018-01-17

## 2018-01-10 RX ORDER — SODIUM CHLORIDE 0.9 % (FLUSH) 0.9 %
1-10 SYRINGE (ML) INJECTION AS NEEDED
Status: DISCONTINUED | OUTPATIENT
Start: 2018-01-10 | End: 2018-01-22 | Stop reason: HOSPADM

## 2018-01-10 RX ORDER — FAMOTIDINE 40 MG/1
40 TABLET, FILM COATED ORAL DAILY
Status: DISCONTINUED | OUTPATIENT
Start: 2018-01-10 | End: 2018-01-22 | Stop reason: HOSPADM

## 2018-01-10 RX ORDER — RISPERIDONE 1 MG/1
1 TABLET ORAL DAILY
Status: DISCONTINUED | OUTPATIENT
Start: 2018-01-10 | End: 2018-01-10

## 2018-01-10 RX ORDER — POTASSIUM CHLORIDE 7.45 MG/ML
10 INJECTION INTRAVENOUS ONCE
Status: COMPLETED | OUTPATIENT
Start: 2018-01-10 | End: 2018-01-10

## 2018-01-10 RX ORDER — SODIUM CHLORIDE 0.9 % (FLUSH) 0.9 %
10 SYRINGE (ML) INJECTION AS NEEDED
Status: DISCONTINUED | OUTPATIENT
Start: 2018-01-10 | End: 2018-01-22 | Stop reason: HOSPADM

## 2018-01-10 RX ORDER — POTASSIUM CHLORIDE 750 MG/1
40 CAPSULE, EXTENDED RELEASE ORAL
Status: DISCONTINUED | OUTPATIENT
Start: 2018-01-10 | End: 2018-01-12

## 2018-01-10 RX ADMIN — IPRATROPIUM BROMIDE AND ALBUTEROL SULFATE 3 ML: 2.5; .5 SOLUTION RESPIRATORY (INHALATION) at 11:08

## 2018-01-10 RX ADMIN — POTASSIUM CHLORIDE 20 MEQ: 750 CAPSULE, EXTENDED RELEASE ORAL at 12:13

## 2018-01-10 RX ADMIN — SODIUM CHLORIDE 125 ML/HR: 9 INJECTION, SOLUTION INTRAVENOUS at 16:31

## 2018-01-10 RX ADMIN — ENOXAPARIN SODIUM 40 MG: 40 INJECTION SUBCUTANEOUS at 16:33

## 2018-01-10 RX ADMIN — POTASSIUM CHLORIDE 10 MEQ: 7.46 INJECTION, SOLUTION INTRAVENOUS at 14:13

## 2018-01-10 RX ADMIN — ALBUTEROL SULFATE 2.5 MG: 2.5 SOLUTION RESPIRATORY (INHALATION) at 17:19

## 2018-01-10 RX ADMIN — CARVEDILOL 3.12 MG: 3.12 TABLET, FILM COATED ORAL at 17:50

## 2018-01-10 RX ADMIN — LOSARTAN POTASSIUM 50 MG: 50 TABLET, FILM COATED ORAL at 16:32

## 2018-01-10 RX ADMIN — METHYLPREDNISOLONE SODIUM SUCCINATE 125 MG: 125 INJECTION, POWDER, FOR SOLUTION INTRAMUSCULAR; INTRAVENOUS at 11:07

## 2018-01-10 RX ADMIN — FAMOTIDINE 40 MG: 20 TABLET, FILM COATED ORAL at 16:32

## 2018-01-10 RX ADMIN — FLUOXETINE 20 MG: 20 CAPSULE ORAL at 16:32

## 2018-01-10 RX ADMIN — POTASSIUM CHLORIDE 40 MEQ: 750 CAPSULE, EXTENDED RELEASE ORAL at 17:50

## 2018-01-10 RX ADMIN — LEVOFLOXACIN 750 MG: 5 INJECTION, SOLUTION INTRAVENOUS at 12:15

## 2018-01-10 RX ADMIN — ASPIRIN 325 MG: 325 TABLET, COATED ORAL at 12:13

## 2018-01-10 RX ADMIN — AMITRIPTYLINE HYDROCHLORIDE 100 MG: 50 TABLET, FILM COATED ORAL at 21:04

## 2018-01-10 RX ADMIN — NICOTINE 1 PATCH: 21 PATCH TRANSDERMAL at 16:31

## 2018-01-10 RX ADMIN — RISPERIDONE 1 MG: 1 TABLET ORAL at 21:04

## 2018-01-10 RX ADMIN — SODIUM CHLORIDE 500 ML: 9 INJECTION, SOLUTION INTRAVENOUS at 11:07

## 2018-01-10 RX ADMIN — SODIUM CHLORIDE 1905 ML: 900 INJECTION, SOLUTION INTRAVENOUS at 14:12

## 2018-01-10 NOTE — PLAN OF CARE
Problem: Patient Care Overview (Adult)  Goal: Plan of Care Review  Outcome: Ongoing (interventions implemented as appropriate)   01/10/18 9674   Coping/Psychosocial Response Interventions   Plan Of Care Reviewed With patient   Patient Care Overview   Progress no change     Goal: Adult Individualization and Mutuality  Outcome: Ongoing (interventions implemented as appropriate)    Goal: Discharge Needs Assessment  Outcome: Ongoing (interventions implemented as appropriate)      Problem: Pneumonia (Adult)  Goal: Signs and Symptoms of Listed Potential Problems Will be Absent or Manageable (Pneumonia)  Outcome: Ongoing (interventions implemented as appropriate)      Problem: Fall Risk (Adult)  Goal: Identify Related Risk Factors and Signs and Symptoms  Outcome: Outcome(s) achieved Date Met: 01/10/18    Goal: Absence of Falls  Outcome: Ongoing (interventions implemented as appropriate)      Problem: Fluid Volume Deficit (Adult)  Goal: Identify Related Risk Factors and Signs and Symptoms  Outcome: Outcome(s) achieved Date Met: 01/10/18    Goal: Fluid/Electrolyte Balance  Outcome: Ongoing (interventions implemented as appropriate)    Goal: Comfort/Well Being  Outcome: Ongoing (interventions implemented as appropriate)

## 2018-01-10 NOTE — H&P
Community acquired bacterial pneumonia  Subjective:     Michelle Child is a 62 y.o. female who presents after sustaining a fall at the clinic parking lot. She stumbled over an object and fell on the back of her head. Her daughter-in-law notes she has been having weakness and confusion at home since before Fan. She also endorses patient talking about past events frequently.  She has not been able to walk her usual activities and she has been more short of breath at rest.  She is coughing thick yellow sputum.  Patient endorses lightheadedness and has stopped taking some of her medications because she thought they were causing it.    The following portions of the patient's history were reviewed and updated as appropriate: allergies, current medications, past family history, past medical history, past social history, past surgical history and problem list.    Concurrent Medical Hx:  Past Medical History:   Diagnosis Date   • Alkaline phosphatase raised    • Astigmatism    • Bilateral pseudophakia    • Coronary arteriosclerosis    • Depressive disorder    • Edema of lower extremity    • Encounter for general adult medical examination with abnormal findings    • Essential hypertension    • Gastroesophageal reflux disease    • H/O bone density study     DXA BONE DENSITY AXIAL    04/22/2016   • H/O screening mammography     SCREENING MAMMOGRAPHY     04/25/2016   • Hx of screening mammography     x3; declined screening, understands risks and benefits and still declines      07/24/2015   • Influenza vaccine administered     INFLUENZA IMMUN ADMIN OR PREV RECV'D   x4       04/01/2016   • Insomnia    • Iron deficiency    • Migraine    • Tobacco dependence     continuous         Past Surgical Hx:  Past Surgical History:   Procedure Laterality Date   • APPENDECTOMY     • BREAST IMPLANT SURGERY     • CATARACT EXTRACTION WITH INTRAOCULAR LENS IMPLANT  12/17/2003    Phacoemulsification of a cataract with intraocular lens  implant of rigt eye, Nixon model SA 60-AT; serial # [id number];20.5 diopter   • CHOLECYSTECTOMY  08/06/2007    Laparoscopic cholecystectomy. Symptomatic gallstones   • COLONOSCOPY  10/15/2013    declined stool cards and colonscopy   • ENDOSCOPY AND COLONOSCOPY  05/22/2014    Internal & external hemorrhoids found.   • ENDOSCOPY W/ PEG TUBE PLACEMENT  05/22/2014    EGD w/ tube: Normal esopahgus. Gastritis in stomach. Biopsy taken. Normal duodenum. Biopsy taken.   • INJECTION OF MEDICATION  07/24/2013    Inj(s) Tend-Sheath, Ligament, Single   • INJECTION OF MEDICATION  11/17/2014    Kenalog x6   • INJECTION OF MEDICATION  04/01/2016    PNEUMOC VAC/ADMIN/RCVD    • INJECTION OF MEDICATION  12/05/2014    Toradol   • OTHER SURGICAL HISTORY  01/19/2014    Drain/Inject Major Joint   x2   • PAP SMEAR  10/06/2011   • PARTIAL HYSTERECTOMY      Partial hyst    • TONSILLECTOMY     • TUBAL ABDOMINAL LIGATION       Family Hx:  Family History   Problem Relation Age of Onset   • Cholelithiasis Mother    • Thyroid cancer Sister    • Thyroid disease Sister      Thyroid disorder   • Graves' disease Sister    • Cholelithiasis Maternal Grandmother    • Thyroid cancer Other      Other 2nd degree relative, thyroid   • Diabetes Other    • Hypertension Other       Social History:  Social History     Social History   • Marital status:      Spouse name: N/A   • Number of children: N/A   • Years of education: N/A     Occupational History   • Not on file.     Social History Main Topics   • Smoking status: Current Every Day Smoker   • Smokeless tobacco: Never Used      Comment: SMOKED FOR 20>PLUS YRS   • Alcohol use No   • Drug use: No   • Sexual activity: Defer     Other Topics Concern   • Not on file     Social History Narrative       Home Medications:  No current facility-administered medications on file prior to encounter.      Current Outpatient Prescriptions on File Prior to Encounter   Medication Sig Dispense Refill   • albuterol  (PROVENTIL HFA;VENTOLIN HFA) 108 (90 BASE) MCG/ACT inhaler Inhale 2 puffs Every 4 (Four) Hours As Needed for wheezing. 6.7 g 11   • amitriptyline (ELAVIL) 100 MG tablet Take 1 tablet by mouth Every Night. 30 tablet 11   • aspirin 81 MG chewable tablet Chew 1 tablet Daily. 30 tablet 11   • carvedilol (COREG) 3.125 MG tablet TAKE 1 TABLET BY MOUTH TWICE A DAY. 60 tablet 1   • FLUoxetine (PROzac) 20 MG capsule TAKE 3 CAPSULES BY MOUTH ONCE DAILY 90 capsule 1   • losartan (COZAAR) 100 MG tablet TAKE ONE TABLET BY MOUTH ONCE DAILY 90 tablet 0   • naproxen (NAPROSYN) 500 MG tablet Take 1 tablet by mouth 2 (Two) Times a Day. 60 tablet 11   • omeprazole (priLOSEC) 40 MG capsule Take 1 capsule by mouth Daily. 30 capsule 0   • risperiDONE (risperDAL) 1 MG tablet Take 1 tablet by mouth Daily. (Patient taking differently: Take 1 mg by mouth every night at bedtime.) 30 tablet 0   • [DISCONTINUED] Magnesium Oxide 400 MG capsule Take  by mouth 2 (Two) Times a Day.     • furosemide (LASIX) 20 MG tablet TAKE 1/2 TABLET BY MOUTH ONCE DAILY AS NEEDED FOR SWELLING. 15 tablet 3   • [DISCONTINUED] furosemide (LASIX) 20 MG tablet TAKE 1/2 TABLET BY MOUTH ONCE DAILY AS NEEDED FOR SWELLING. 15 tablet 1       Allergies:  Codeine; Penicillins; and Sulfa antibiotics    Review of Systems  Review of Systems   Constitutional: Positive for activity change, appetite change and fatigue. Negative for fever.   HENT: Positive for hearing loss. Negative for ear pain and sore throat.    Eyes: Negative for pain and visual disturbance.   Respiratory: Positive for cough, shortness of breath and wheezing.    Cardiovascular: Negative for chest pain and palpitations.   Gastrointestinal: Negative for abdominal pain, constipation, diarrhea, nausea and vomiting.   Endocrine: Negative for cold intolerance and heat intolerance.   Genitourinary: Negative for difficulty urinating and dysuria.   Musculoskeletal: Positive for arthralgias and gait problem.   Skin:  "Negative for color change and rash.   Neurological: Positive for dizziness, syncope and weakness. Negative for headaches.   Hematological: Negative for adenopathy. Does not bruise/bleed easily.   Psychiatric/Behavioral: Positive for confusion. Negative for agitation and sleep disturbance.       Objective:     /60  Pulse 90  Temp 97.9 °F (36.6 °C) (Oral)   Resp 20  Ht 162.6 cm (64\")  Wt 63.5 kg (140 lb)  SpO2 95%  BMI 24.03 kg/m2  Physical Exam   Constitutional: She is oriented to person, place, and time. She appears well-developed and well-nourished.   HENT:   Head: Normocephalic.       Right Ear: External ear normal.   Left Ear: External ear normal.   Nose: Nose normal.   Mouth/Throat: Oropharynx is clear and moist.   Eyes: Conjunctivae and EOM are normal. Pupils are equal, round, and reactive to light.   Neck: Normal range of motion. Neck supple.   Cardiovascular: Normal rate, regular rhythm and normal heart sounds.    Pulmonary/Chest: Effort normal. She has decreased breath sounds. She has wheezes in the left lower field. She has rhonchi in the left lower field.   Abdominal: Soft. Bowel sounds are normal. She exhibits no distension. There is no tenderness.   Musculoskeletal: Normal range of motion.   Neurological: She is alert and oriented to person, place, and time.   Skin: Skin is warm and dry.   Psychiatric: She has a normal mood and affect. Her speech is normal and behavior is normal. She exhibits abnormal recent memory.   AAOx3 but doesn't always connect thoughts appropriately     I reviewed the patient's new clinical results.  I reviewed the patient's new imaging results.  Assessment/Plan:     Active Hospital Problems (** Indicates Principal Problem)    Diagnosis Date Noted   • **Community acquired bacterial pneumonia [J15.9] 01/10/2018     Left lower lobe  Blood culture, sputum culture, check atypicals.   Levaquin as PCN allergy     • Hypokalemia [E87.6] 01/10/2018     Replace Orally and " recheck   Check magnesium     • Weakness [R53.1] 01/10/2018     PT/OT     • Hyponatremia [E87.1] 01/10/2018     IV fluids     • Acute respiratory failure with hypoxia [J96.01] 01/10/2018     NC oxygen     • Fall with injury [W19.XXXA] 08/15/2017     Monitor for mental status changes with neurochecks  AAOx3     • Tobacco dependence [F17.200]      Nicotine patch 21mg transdermally     • Gastroesophageal reflux disease [K21.9]      Pepcid     • Essential hypertension [I10]      Losartan, coreg     • Depressive disorder [F32.9]      Prozac, Elavil, and Risperdal        Resolved Hospital Problems    Diagnosis Date Noted Date Resolved   No resolved problems to display.     DVT Prophylaxis: SCDs/TEDs/Lovenox  Anticipate 3-4 days of hospitalization. Case Management for discharge planning. Patient lives alone.            This document has been electronically signed by Coral Berry MD on January 10, 2018 4:12 PM

## 2018-01-10 NOTE — ED PROVIDER NOTES
Subjective   Patient is a 62 y.o. female presenting with syncope and shortness of breath.   Syncope   Episode history:  Single  Most recent episode:  Today  Timing:  Constant  Progression:  Improving  Chronicity:  New  Context: standing up    Witnessed: yes    Relieved by:  Nothing  Worsened by:  Nothing  Ineffective treatments:  None tried  Associated symptoms: difficulty breathing, dizziness, malaise/fatigue, recent fall, recent injury, shortness of breath and weakness    Associated symptoms: no chest pain, no confusion, no diaphoresis, no fever, no focal sensory loss, no focal weakness, no headaches, no nausea, no recent surgery, no rectal bleeding, no seizures, no visual change and no vomiting    Shortness of Breath   Severity:  Moderate  Onset quality:  Gradual  Duration:  3 weeks  Timing:  Constant  Progression:  Waxing and waning  Chronicity:  Recurrent  Relieved by:  Rest  Worsened by:  Nothing  Ineffective treatments:  None tried  Associated symptoms: sputum production and syncope    Associated symptoms: no abdominal pain, no chest pain, no cough, no diaphoresis, no ear pain, no fever, no headaches, no neck pain, no rash, no sore throat, no vomiting and no wheezing    Risk factors: tobacco use    Risk factors comment:  COPD      Review of Systems   Constitutional: Positive for malaise/fatigue. Negative for appetite change, chills, diaphoresis, fatigue and fever.   HENT: Negative for congestion, ear discharge, ear pain, nosebleeds, rhinorrhea, sinus pressure, sore throat and trouble swallowing.    Eyes: Negative for discharge and redness.   Respiratory: Positive for sputum production and shortness of breath. Negative for apnea, cough, chest tightness and wheezing.    Cardiovascular: Positive for syncope. Negative for chest pain.   Gastrointestinal: Negative for abdominal pain, diarrhea, nausea and vomiting.   Endocrine: Negative for polyuria.   Genitourinary: Negative for dysuria, frequency and urgency.    Musculoskeletal: Negative for myalgias and neck pain.   Skin: Negative for color change and rash.   Allergic/Immunologic: Negative for immunocompromised state.   Neurological: Positive for dizziness and weakness. Negative for focal weakness, seizures, syncope, light-headedness and headaches.   Hematological: Negative for adenopathy. Does not bruise/bleed easily.   Psychiatric/Behavioral: Negative for behavioral problems and confusion.   All other systems reviewed and are negative.      Past Medical History:   Diagnosis Date   • Alkaline phosphatase raised    • Astigmatism    • Bilateral pseudophakia    • Coronary arteriosclerosis    • Depressive disorder    • Edema of lower extremity    • Encounter for general adult medical examination with abnormal findings    • Essential hypertension    • Gastroesophageal reflux disease    • H/O bone density study     DXA BONE DENSITY AXIAL    04/22/2016   • H/O screening mammography     SCREENING MAMMOGRAPHY     04/25/2016   • Hx of screening mammography     x3; declined screening, understands risks and benefits and still declines      07/24/2015   • Influenza vaccine administered     INFLUENZA IMMUN ADMIN OR PREV RECV'D   x4       04/01/2016   • Iron deficiency    • Migraine    • Tobacco dependence     continuous         Allergies   Allergen Reactions   • Codeine Nausea And Vomiting   • Penicillins    • Sulfa Antibiotics Nausea And Vomiting       Past Surgical History:   Procedure Laterality Date   • APPENDECTOMY     • BREAST IMPLANT SURGERY     • CATARACT EXTRACTION WITH INTRAOCULAR LENS IMPLANT  12/17/2003    Phacoemulsification of a cataract with intraocular lens implant of rigt eye, Nixon model SA 60-AT; serial # [id number];20.5 diopter   • CHOLECYSTECTOMY  08/06/2007    Laparoscopic cholecystectomy. Symptomatic gallstones   • COLONOSCOPY  10/15/2013    declined stool cards and colonscopy   • ENDOSCOPY AND COLONOSCOPY  05/22/2014    Internal & external hemorrhoids found.    • ENDOSCOPY W/ PEG TUBE PLACEMENT  05/22/2014    EGD w/ tube: Normal esopahgus. Gastritis in stomach. Biopsy taken. Normal duodenum. Biopsy taken.   • INJECTION OF MEDICATION  07/24/2013    Inj(s) Tend-Sheath, Ligament, Single   • INJECTION OF MEDICATION  11/17/2014    Kenalog x6   • INJECTION OF MEDICATION  04/01/2016    PNEUMOC VAC/ADMIN/RCVD    • INJECTION OF MEDICATION  12/05/2014    Toradol   • OTHER SURGICAL HISTORY  01/19/2014    Drain/Inject Major Joint   x2   • PAP SMEAR  10/06/2011   • PARTIAL HYSTERECTOMY      Partial hyst    • TONSILLECTOMY     • TUBAL ABDOMINAL LIGATION         Family History   Problem Relation Age of Onset   • Cholelithiasis Mother    • Thyroid cancer Sister    • Thyroid disease Sister      Thyroid disorder   • Graves' disease Sister    • Cholelithiasis Maternal Grandmother    • Thyroid cancer Other      Other 2nd degree relative, thyroid   • Diabetes Other    • Hypertension Other        Social History     Social History   • Marital status:      Spouse name: N/A   • Number of children: N/A   • Years of education: N/A     Social History Main Topics   • Smoking status: Current Every Day Smoker   • Smokeless tobacco: Never Used      Comment: SMOKED FOR 20>PLUS YRS   • Alcohol use None   • Drug use: None   • Sexual activity: Not Asked     Other Topics Concern   • None     Social History Narrative           Objective   Physical Exam   Constitutional: She is oriented to person, place, and time. She appears well-developed and well-nourished.   HENT:   Head: Normocephalic and atraumatic.       Nose: Nose normal.   Mouth/Throat: Oropharynx is clear and moist.   Eyes: Conjunctivae and EOM are normal. Pupils are equal, round, and reactive to light. Right eye exhibits no discharge. Left eye exhibits no discharge. No scleral icterus.   Neck: Normal range of motion. Neck supple. No tracheal deviation present.   Cardiovascular: Normal rate, regular rhythm and normal heart sounds.    No  murmur heard.  Pulmonary/Chest: Effort normal. No stridor. No respiratory distress. She has wheezes. She has rhonchi. She has no rales.   Abdominal: Soft. Bowel sounds are normal. She exhibits no distension and no mass. There is no tenderness. There is no rebound and no guarding.   Musculoskeletal: She exhibits no edema.   Neurological: She is alert and oriented to person, place, and time. Coordination normal.   Skin: Skin is warm and dry. No rash noted. No erythema.   Psychiatric: She has a normal mood and affect. Her behavior is normal. Thought content normal.   Nursing note and vitals reviewed.      ECG 12 Lead    Date/Time: 1/10/2018 12:04 PM  Performed by: LAYTON CAVANAUGH  Authorized by: LAYTON CAVANAUGH   Rhythm: sinus rhythm  Ectopy comments: PAC  Rate: normal  BPM: 75  QRS axis: normal  ST Segments: ST segments normal                 ED Course  ED Course          Labs Reviewed   COMPREHENSIVE METABOLIC PANEL - Abnormal; Notable for the following:        Result Value    Sodium 133 (*)     Potassium 2.5 (*)     Calcium 8.2 (*)     Total Protein 5.9 (*)     Albumin 2.90 (*)     Alkaline Phosphatase 149 (*)     A/G Ratio 1.0 (*)     All other components within normal limits   CBC WITH AUTO DIFFERENTIAL - Abnormal; Notable for the following:     WBC 11.19 (*)     RBC 3.54 (*)     Hemoglobin 9.5 (*)     Hematocrit 29.7 (*)     RDW 15.6 (*)     RDW-SD 48.4 (*)     Neutrophil % 85.7 (*)     Lymphocyte % 7.3 (*)     Neutrophils, Absolute 9.59 (*)     Immature Grans, Absolute 0.03 (*)     All other components within normal limits   BNP (IN-HOUSE) - Normal   PROTIME-INR - Normal    Narrative:     Therapeutic range for most indications is 2.0-3.0 INR,  or 2.5-3.5 for mechanical heart valves.   CK - Normal   CK MB - Normal   BLOOD CULTURE   BLOOD CULTURE   RAINBOW DRAW    Narrative:     The following orders were created for panel order [place] Draw.  Procedure                               Abnormality          Status                     ---------                               -----------         ------                     Light Blue Top[[id number]]                                   Final result               Green Top (Gel)[[id number]]                                  Final result               Lavender Top[[id number]]                                     Final result               Gold Top - SST[[id number]]                                   Final result                 Please view results for these tests on the individual orders.   LACTIC ACID, PLASMA   BLOOD GAS, ARTERIAL   CBC AND DIFFERENTIAL    Narrative:     The following orders were created for panel order CBC & Differential.  Procedure                               Abnormality         Status                     ---------                               -----------         ------                     CBC Auto Differential[[id number]]        Abnormal            Final result                 Please view results for these tests on the individual orders.   LIGHT BLUE TOP   GREEN TOP   LAVENDER TOP   GOLD TOP - SST       CT Head Without Contrast   Final Result   1.  Right parietal scalp hematoma.   2.  Mild cerebral atrophy.   3.  Bilateral frontal and parietal lobe periventricular deep   white matter small foci of chronic ischemic gliosis secondary to   microvascular disease.      Electronically signed by:  Bryon Almendarez MD  1/10/2018 11:58 AM CST   Workstation: [id number]      XR Chest 2 View   Final Result   CONCLUSION:   Lingular and left lower lobe subsegmental infiltrates.   Cardiomegaly.   Previously demonstrated comminuted impacted fracture right   humeral head and surgical neck.      [id number]      Electronically signed by:  Andres Cornejo MD  1/10/2018 11:34 AM   CST Workstation: [id number]                    [facility]    Final diagnoses:   Hypokalemia   Pneumonia of left lung due to infectious organism, unspecified part of lung   Fall, initial encounter   Contusion of scalp,  initial encounter            Toro Shine MD  01/10/18 2533

## 2018-01-11 ENCOUNTER — EPISODE CHANGES (OUTPATIENT)
Dept: CASE MANAGEMENT | Facility: OTHER | Age: 63
End: 2018-01-11

## 2018-01-11 ENCOUNTER — TELEPHONE (OUTPATIENT)
Dept: FAMILY MEDICINE CLINIC | Facility: CLINIC | Age: 63
End: 2018-01-11

## 2018-01-11 PROBLEM — E87.1 HYPONATREMIA: Status: RESOLVED | Noted: 2018-01-10 | Resolved: 2018-01-11

## 2018-01-11 PROBLEM — J13 PNEUMONIA OF LEFT LOWER LOBE DUE TO STREPTOCOCCUS PNEUMONIAE (HCC): Status: ACTIVE | Noted: 2018-01-11

## 2018-01-11 PROBLEM — J15.9 COMMUNITY ACQUIRED BACTERIAL PNEUMONIA: Status: RESOLVED | Noted: 2018-01-10 | Resolved: 2018-01-11

## 2018-01-11 PROBLEM — E87.6 HYPOKALEMIA: Status: RESOLVED | Noted: 2018-01-10 | Resolved: 2018-01-11

## 2018-01-11 LAB
ALBUMIN SERPL-MCNC: 2.7 G/DL (ref 3.4–4.8)
ALBUMIN/GLOB SERPL: 0.9 G/DL (ref 1.1–1.8)
ALP SERPL-CCNC: 132 U/L (ref 38–126)
ALT SERPL W P-5'-P-CCNC: 35 U/L (ref 9–52)
ANION GAP SERPL CALCULATED.3IONS-SCNC: 7 MMOL/L (ref 5–15)
ARTERIAL PATENCY WRIST A: ABNORMAL
AST SERPL-CCNC: 22 U/L (ref 14–36)
ATMOSPHERIC PRESS: ABNORMAL MMHG
BASE EXCESS BLDA CALC-SCNC: -2.3 MMOL/L (ref -2.4–2.4)
BASOPHILS # BLD AUTO: 0.01 10*3/MM3 (ref 0–0.2)
BASOPHILS NFR BLD AUTO: 0.1 % (ref 0–2)
BDY SITE: ABNORMAL
BILIRUB SERPL-MCNC: 0.2 MG/DL (ref 0.2–1.3)
BUN BLD-MCNC: 6 MG/DL (ref 7–21)
BUN/CREAT SERPL: 8.7 (ref 7–25)
CA-I BLD-MCNC: 4.6 MG/DL (ref 4.5–4.9)
CALCIUM SPEC-SCNC: 8.1 MG/DL (ref 8.4–10.2)
CHLORIDE SERPL-SCNC: 108 MMOL/L (ref 95–110)
CO2 BLDA-SCNC: 25.4 MMOL/L (ref 23–27)
CO2 SERPL-SCNC: 22 MMOL/L (ref 22–31)
CREAT BLD-MCNC: 0.69 MG/DL (ref 0.5–1)
DEPRECATED RDW RBC AUTO: 50.1 FL (ref 36.4–46.3)
EOSINOPHIL # BLD AUTO: 0 10*3/MM3 (ref 0–0.7)
EOSINOPHIL NFR BLD AUTO: 0 % (ref 0–7)
ERYTHROCYTE [DISTWIDTH] IN BLOOD BY AUTOMATED COUNT: 16 % (ref 11.5–14.5)
GFR SERPL CREATININE-BSD FRML MDRD: 86 ML/MIN/1.73 (ref 45–104)
GLOBULIN UR ELPH-MCNC: 2.9 GM/DL (ref 2.3–3.5)
GLUCOSE BLD-MCNC: 90 MG/DL (ref 60–100)
GLUCOSE BLDA-MCNC: 104 MMOL/L
GLUCOSE BLDC GLUCOMTR-MCNC: 93 MG/DL (ref 70–130)
HCO3 BLDA-SCNC: 24 MMOL/L (ref 22–26)
HCT VFR BLD AUTO: 28.6 % (ref 35–45)
HCT VFR BLD CALC: 30 % (ref 38–47)
HGB BLD-MCNC: 9.1 G/DL (ref 12–15.5)
HGB BLDA-MCNC: 10.1 G/DL (ref 12–16)
IMM GRANULOCYTES # BLD: 0.04 10*3/MM3 (ref 0–0.02)
IMM GRANULOCYTES NFR BLD: 0.3 % (ref 0–0.5)
LYMPHOCYTES # BLD AUTO: 1.29 10*3/MM3 (ref 0.6–4.2)
LYMPHOCYTES NFR BLD AUTO: 9.2 % (ref 10–50)
MCH RBC QN AUTO: 27.2 PG (ref 26.5–34)
MCHC RBC AUTO-ENTMCNC: 31.8 G/DL (ref 31.4–36)
MCV RBC AUTO: 85.4 FL (ref 80–98)
MODALITY: ABNORMAL
MONOCYTES # BLD AUTO: 0.9 10*3/MM3 (ref 0–0.9)
MONOCYTES NFR BLD AUTO: 6.4 % (ref 0–12)
MYCOPLASMAE PNEUMONIAE BY PCR: NEGATIVE
NEUTROPHILS # BLD AUTO: 11.79 10*3/MM3 (ref 2–8.6)
NEUTROPHILS NFR BLD AUTO: 84 % (ref 37–80)
NRBC BLD MANUAL-RTO: 0 /100 WBC (ref 0–0)
PCO2 BLDA: 48 MM HG (ref 35–45)
PH BLDA: 7.32 PH UNITS (ref 7.35–7.45)
PLATELET # BLD AUTO: 338 10*3/MM3 (ref 150–450)
PMV BLD AUTO: 9 FL (ref 8–12)
PO2 BLDA: 68.9 MM HG (ref 80–105)
POTASSIUM BLD-SCNC: 3.5 MMOL/L (ref 3.5–5.1)
POTASSIUM BLDA-SCNC: 3.87 MMOL/L (ref 3.6–4.9)
PROT SERPL-MCNC: 5.6 G/DL (ref 6.3–8.6)
RBC # BLD AUTO: 3.35 10*6/MM3 (ref 3.77–5.16)
SAO2 % BLDCOA: 92.1 %
SODIUM BLD-SCNC: 137 MMOL/L (ref 137–145)
SODIUM BLDA-SCNC: 145.1 MMOL/L (ref 138–146)
WBC NRBC COR # BLD: 14.03 10*3/MM3 (ref 3.2–9.8)

## 2018-01-11 PROCEDURE — G8987 SELF CARE CURRENT STATUS: HCPCS

## 2018-01-11 PROCEDURE — 85025 COMPLETE CBC W/AUTO DIFF WBC: CPT | Performed by: FAMILY MEDICINE

## 2018-01-11 PROCEDURE — 94799 UNLISTED PULMONARY SVC/PX: CPT

## 2018-01-11 PROCEDURE — G8979 MOBILITY GOAL STATUS: HCPCS | Performed by: PHYSICAL THERAPIST

## 2018-01-11 PROCEDURE — G8978 MOBILITY CURRENT STATUS: HCPCS | Performed by: PHYSICAL THERAPIST

## 2018-01-11 PROCEDURE — 97530 THERAPEUTIC ACTIVITIES: CPT

## 2018-01-11 PROCEDURE — 82803 BLOOD GASES ANY COMBINATION: CPT | Performed by: FAMILY MEDICINE

## 2018-01-11 PROCEDURE — 97166 OT EVAL MOD COMPLEX 45 MIN: CPT

## 2018-01-11 PROCEDURE — G8988 SELF CARE GOAL STATUS: HCPCS

## 2018-01-11 PROCEDURE — 25010000002 ENOXAPARIN PER 10 MG: Performed by: FAMILY MEDICINE

## 2018-01-11 PROCEDURE — 97162 PT EVAL MOD COMPLEX 30 MIN: CPT | Performed by: PHYSICAL THERAPIST

## 2018-01-11 PROCEDURE — 36600 WITHDRAWAL OF ARTERIAL BLOOD: CPT

## 2018-01-11 PROCEDURE — 94640 AIRWAY INHALATION TREATMENT: CPT

## 2018-01-11 PROCEDURE — 97530 THERAPEUTIC ACTIVITIES: CPT | Performed by: PHYSICAL THERAPIST

## 2018-01-11 PROCEDURE — 80053 COMPREHEN METABOLIC PANEL: CPT | Performed by: FAMILY MEDICINE

## 2018-01-11 PROCEDURE — 25010000002 METHYLPREDNISOLONE PER 40 MG: Performed by: FAMILY MEDICINE

## 2018-01-11 PROCEDURE — 99232 SBSQ HOSP IP/OBS MODERATE 35: CPT | Performed by: FAMILY MEDICINE

## 2018-01-11 PROCEDURE — 94760 N-INVAS EAR/PLS OXIMETRY 1: CPT

## 2018-01-11 PROCEDURE — 25010000002 LEVOFLOXACIN PER 250 MG: Performed by: FAMILY MEDICINE

## 2018-01-11 PROCEDURE — 82962 GLUCOSE BLOOD TEST: CPT

## 2018-01-11 RX ORDER — METHYLPREDNISOLONE SODIUM SUCCINATE 40 MG/ML
40 INJECTION, POWDER, LYOPHILIZED, FOR SOLUTION INTRAMUSCULAR; INTRAVENOUS DAILY
Status: COMPLETED | OUTPATIENT
Start: 2018-01-11 | End: 2018-01-15

## 2018-01-11 RX ORDER — PREDNISONE 20 MG/1
40 TABLET ORAL
Status: DISCONTINUED | OUTPATIENT
Start: 2018-01-11 | End: 2018-01-11

## 2018-01-11 RX ADMIN — METHYLPREDNISOLONE SODIUM SUCCINATE 40 MG: 40 INJECTION, POWDER, FOR SOLUTION INTRAMUSCULAR; INTRAVENOUS at 11:03

## 2018-01-11 RX ADMIN — AMITRIPTYLINE HYDROCHLORIDE 100 MG: 50 TABLET, FILM COATED ORAL at 20:33

## 2018-01-11 RX ADMIN — SODIUM CHLORIDE 50 ML/HR: 9 INJECTION, SOLUTION INTRAVENOUS at 21:21

## 2018-01-11 RX ADMIN — POTASSIUM CHLORIDE 40 MEQ: 750 CAPSULE, EXTENDED RELEASE ORAL at 13:27

## 2018-01-11 RX ADMIN — NICOTINE 1 PATCH: 21 PATCH TRANSDERMAL at 18:02

## 2018-01-11 RX ADMIN — POTASSIUM CHLORIDE 40 MEQ: 750 CAPSULE, EXTENDED RELEASE ORAL at 18:02

## 2018-01-11 RX ADMIN — HYDROCODONE BITARTRATE AND ACETAMINOPHEN 1 TABLET: 5; 325 TABLET ORAL at 21:28

## 2018-01-11 RX ADMIN — POTASSIUM CHLORIDE 40 MEQ: 750 CAPSULE, EXTENDED RELEASE ORAL at 09:39

## 2018-01-11 RX ADMIN — ASPIRIN 81 MG 81 MG: 81 TABLET ORAL at 09:39

## 2018-01-11 RX ADMIN — FAMOTIDINE 40 MG: 20 TABLET, FILM COATED ORAL at 09:42

## 2018-01-11 RX ADMIN — RISPERIDONE 1 MG: 1 TABLET ORAL at 20:33

## 2018-01-11 RX ADMIN — ACETAMINOPHEN 650 MG: 325 TABLET ORAL at 15:50

## 2018-01-11 RX ADMIN — IPRATROPIUM BROMIDE AND ALBUTEROL SULFATE 3 ML: 2.5; .5 SOLUTION RESPIRATORY (INHALATION) at 14:47

## 2018-01-11 RX ADMIN — IPRATROPIUM BROMIDE AND ALBUTEROL SULFATE 3 ML: 2.5; .5 SOLUTION RESPIRATORY (INHALATION) at 11:41

## 2018-01-11 RX ADMIN — ONDANSETRON 4 MG: 4 TABLET, FILM COATED ORAL at 19:41

## 2018-01-11 RX ADMIN — CARVEDILOL 3.12 MG: 3.12 TABLET, FILM COATED ORAL at 18:02

## 2018-01-11 RX ADMIN — LOSARTAN POTASSIUM 50 MG: 50 TABLET, FILM COATED ORAL at 09:42

## 2018-01-11 RX ADMIN — Medication 10 ML: at 11:03

## 2018-01-11 RX ADMIN — CARVEDILOL 3.12 MG: 3.12 TABLET, FILM COATED ORAL at 09:39

## 2018-01-11 RX ADMIN — IPRATROPIUM BROMIDE AND ALBUTEROL SULFATE 3 ML: 2.5; .5 SOLUTION RESPIRATORY (INHALATION) at 07:59

## 2018-01-11 RX ADMIN — ENOXAPARIN SODIUM 40 MG: 40 INJECTION SUBCUTANEOUS at 18:01

## 2018-01-11 RX ADMIN — FLUOXETINE 60 MG: 20 CAPSULE ORAL at 13:27

## 2018-01-11 RX ADMIN — LEVOFLOXACIN 750 MG: 5 INJECTION, SOLUTION INTRAVENOUS at 17:23

## 2018-01-11 NOTE — PLAN OF CARE
"Problem: Patient Care Overview (Adult)  Goal: Plan of Care Review  Outcome: Ongoing (interventions implemented as appropriate)   01/11/18 0856   Coping/Psychosocial Response Interventions   Plan Of Care Reviewed With patient   Patient Care Overview   Progress progress toward functional goals as expected   Outcome Evaluation   Outcome Summary/Follow up Plan ALLYSSA eros completed this date. Pt. disoriented at times mistaking therapist for another person and asking off the wall questions. Then patient would become alert, and stated, \"I am just so confused with my thoughts lately.\" Pt. requriring assistance with functional activities and constand re-direction. Pt. woudl cont to benefit from skilled OT d.t decreased balance, decreased strength, decreased activity tolerance, decreased ind in ADLs. Anticipated dc is home with 24 hour assist and HH if cognition does not improve. 1/11 0900       Problem: Inpatient Occupational Therapy  Goal: Transfer Training Goal 1 LTG- OT  Outcome: Ongoing (interventions implemented as appropriate)   01/11/18 0856   Transfer Training OT LTG   Transfer Training OT LTG, Date Established 01/11/18   Transfer Training OT LTG, Time to Achieve by discharge   Transfer Training OT LTG, Activity Type all transfers   Transfer Training OT LTG, [place] Level independent     Goal: Strength Goal LTG- OT  Outcome: Ongoing (interventions implemented as appropriate)   01/11/18 0856   Strength OT LTG   Strength Goal OT LTG, Date Established 01/11/18   Strength Goal OT LTG, Time to Achieve by discharge   Strength Goal OT LTG, Measure to Achieve Pt. will complete BUE strengthening exercises all planes and joints to increase BUE strength to 5/5 for ADLs.      Goal: ADL Goal LTG- OT  Outcome: Ongoing (interventions implemented as appropriate)   01/11/18 0856   ADL OT LTG   ADL OT LTG, Date Established 01/11/18   ADL OT LTG, Time to Achieve by discharge   ADL OT LTG, Activity Type ADL skills   ADL OT LTG, " [place] Level modified independent   ADL OT LTG, Additional Goal AE PRN

## 2018-01-11 NOTE — TELEPHONE ENCOUNTER
DR HERNANDEZ    PT DAUGHTER CALLED ASKING FOR A RETURN CALL ON PT CONDITION IN HOSPITAL. DAUGHTER STATES SHE HAS MISSED YOU BOTH TIMES YOU CAME IN TO ROOM TODAY

## 2018-01-11 NOTE — PROGRESS NOTES
FAMILY MEDICINE PROGRESS NOTE  NAME: Michelle Child  : 1955  MRN: [id number]     LOS: 1 day   Full Code  PROVIDER OF SERVICE:     Chief Complaint:  Pneumonia of left lower lobe due to Streptococcus pneumoniae    Subjective     Interval History:  History taken from: patient  Subjective: Patient endorses insomnia despite getting her medications. She said she coughed all night long. She is still a bit confused from her baseline.      Review of Systems:   Review of Systems   Constitutional: Positive for activity change and appetite change. Negative for fatigue and fever.   HENT: Negative for ear pain and sore throat.    Eyes: Negative for pain and visual disturbance.   Respiratory: Positive for cough, shortness of breath and wheezing.    Cardiovascular: Negative for chest pain and palpitations.   Gastrointestinal: Negative for abdominal pain and nausea.   Endocrine: Negative for cold intolerance and heat intolerance.   Genitourinary: Negative for difficulty urinating and dysuria.   Musculoskeletal: Negative for arthralgias and gait problem.   Skin: Negative for color change and rash.   Neurological: Positive for weakness. Negative for dizziness and headaches.   Hematological: Negative for adenopathy. Does not bruise/bleed easily.   Psychiatric/Behavioral: Positive for confusion. Negative for agitation and sleep disturbance.       Objective     Vital Signs  Temp:  [97.1 °F (36.2 °C)-98.4 °F (36.9 °C)] 97.1 °F (36.2 °C)  Heart Rate:  [77-98] 91  Resp:  [20] 20  BP: (131-171)/(58-83) 148/80    Physical Exam  Physical Exam   Constitutional: She is oriented to person, place, and time. She appears well-developed and well-nourished.   HENT:   Head: Normocephalic and atraumatic.   Right Ear: External ear normal.   Left Ear: External ear normal.   Nose: Nose normal.   Mouth/Throat: Oropharynx is clear and moist.   Eyes: Conjunctivae and EOM are normal.   Neck: Normal range of motion. Neck supple.   Cardiovascular:  Normal rate, regular rhythm and normal heart sounds.    Pulmonary/Chest: Effort normal. She has decreased breath sounds. She has wheezes in the left lower field. She has rhonchi in the left lower field.   Abdominal: Soft. Bowel sounds are normal. She exhibits no distension. There is no tenderness.   Musculoskeletal: Normal range of motion.   Neurological: She is alert and oriented to person, place, and time.   Skin: Skin is warm and dry.   Psychiatric: She has a normal mood and affect. Her speech is normal and behavior is normal. Cognition and memory are normal.       Medication Review    Current Facility-Administered Medications:   •  acetaminophen (TYLENOL) tablet 650 mg, 650 mg, Oral, Q4H PRN, Coral Berry MD  •  amitriptyline (ELAVIL) tablet 100 mg, 100 mg, Oral, Nightly, Coral Berry MD, 100 mg at 01/10/18 2104  •  aspirin chewable tablet 81 mg, 81 mg, Oral, Daily, Coral Berry MD  •  bisacodyl (DULCOLAX) EC tablet 5 mg, 5 mg, Oral, Daily PRN, Coral Berry MD  •  carvedilol (COREG) tablet 3.125 mg, 3.125 mg, Oral, BID With Meals, Coral Berry MD, 3.125 mg at 01/10/18 1750  •  enoxaparin (LOVENOX) syringe 40 mg, 40 mg, Subcutaneous, Q24H, Coral Berry MD, 40 mg at 01/10/18 1633  •  famotidine (PEPCID) tablet 40 mg, 40 mg, Oral, Daily, Coral Berry MD, 40 mg at 01/10/18 1632  •  FLUoxetine (PROzac) capsule 60 mg, 60 mg, Oral, Daily, Coral Berry MD  •  HYDROcodone-acetaminophen (NORCO) 5-325 MG per tablet 1 tablet, 1 tablet, Oral, Q4H PRN, Coral Berry MD  •  influenza vac split quad (FLUZONE QUADRIVALENT) IM suspension 0.5 mL, 0.5 mL, Intramuscular, During Hospitalization, Coral Berry MD  •  ipratropium-albuterol (DUO-NEB) nebulizer solution 3 mL, 3 mL, Nebulization, 4x Daily - RT, Coral Berry MD  •  levoFLOXacin (LEVAQUIN) 750 mg/150 mL D5W (premix) (LEVAQUIN) 750 mg, 750 mg, Intravenous, Q24H, Coral Berry MD  •  losartan (COZAAR) tablet 50 mg, 50  mg, Oral, Q24H, Coral Berry MD, 50 mg at 01/10/18 1632  •  nicotine (NICODERM CQ) 21 MG/24HR patch 1 patch, 1 patch, Transdermal, Q24H, Coral Berry MD, 1 patch at 01/10/18 1631  •  ondansetron (ZOFRAN) tablet 4 mg, 4 mg, Oral, Q6H PRN, Coral Berry MD  •  potassium chloride (MICRO-K) CR capsule 40 mEq, 40 mEq, Oral, TID With Meals, Coral Berry MD, 40 mEq at 01/10/18 1750  •  predniSONE (DELTASONE) tablet 40 mg, 40 mg, Oral, Daily With Breakfast, Coral Berry MD  •  risperiDONE (risperDAL) tablet 1 mg, 1 mg, Oral, Nightly, Coral Berry MD, 1 mg at 01/10/18 2104  •  sodium chloride 0.9 % flush 1-10 mL, 1-10 mL, Intravenous, PRN, Coral Berry MD  •  sodium chloride 0.9 % flush 10 mL, 10 mL, Intravenous, PRN, Toro Shine MD  •  sodium chloride 0.9 % infusion, 50 mL/hr, Intravenous, Continuous, Coral Berry MD, Last Rate: 125 mL/hr at 01/10/18 1717, 125 mL/hr at 01/10/18 1717     Diagnostic Data      I reviewed the patient's new clinical results and imaging.      Assessment/Plan     Active Hospital Problems (** Indicates Principal Problem)    Diagnosis Date Noted   • **Pneumonia of left lower lobe due to Streptococcus pneumoniae [J13] 01/11/2018     Levaquin     • Weakness [R53.1] 01/10/2018     PT/OT     • Acute respiratory failure with hypoxia [J96.01] 01/10/2018     NC oxygen     • Fall with injury [W19.XXXA] 08/15/2017     Monitor for mental status changes with neurochecks  AAOx3     • Tobacco dependence [F17.200]      Nicotine patch 21mg transdermally     • Gastroesophageal reflux disease [K21.9]      Pepcid     • Essential hypertension [I10]      Losartan, coreg     • Depressive disorder [F32.9]      Prozac, Elavil, and Risperdal        Resolved Hospital Problems    Diagnosis Date Noted Date Resolved   • Hypokalemia [E87.6] 01/10/2018 01/11/2018     Replace Orally and recheck   Check magnesium     • Community acquired bacterial pneumonia [J15.9] 01/10/2018  01/11/2018     Left lower lobe  Blood culture, sputum culture, check atypicals.   Levaquin as PCN allergy     • Hyponatremia [E87.1] 01/10/2018 01/11/2018     IV fluids           DVT prophylaxis: SCDs/TEDs      Disposition:Uncertain at this time 3-4 days in hospital          This document has been electronically signed by Coral Berry MD on January 11, 2018 7:31 AM

## 2018-01-11 NOTE — THERAPY EVALUATION
Acute Care - Physical Therapy Initial Evaluation  [facility]     Patient Name: Michelle Child  : 1955  MRN: [id number]  Today's Date: 2018   Onset of Illness/Injury or Date of Surgery Date: 01/10/18  Date of Referral to PT: 01/10/18  Referring Physician: Dr Berry      Admit Date: 1/10/2018     Visit Dx:    ICD-10-CM ICD-9-CM   1. Hypokalemia E87.6 276.8   2. Pneumonia of left lung due to infectious organism, unspecified part of lung J18.9 486   3. Fall, initial encounter W19.XXXA E888.9   4. Contusion of scalp, initial encounter S00.03XA 920   5. Decreased activities of daily living (ADL) Z78.9 V49.89   6. Impaired physical mobility Z74.09 781.99     Patient Active Problem List   Diagnosis   • Tobacco dependence   • Migraine   • Iron deficiency   • Gastroesophageal reflux disease   • Essential hypertension   • Depressive disorder   • Coronary arteriosclerosis   • Bilateral pseudophakia   • Astigmatism   • Right humeral fracture   • Fall with injury   • Closed fracture of humerus   • Cause of injury, fall   • Closed fracture of proximal end of right humerus with routine healing   • Weakness   • Acute respiratory failure with hypoxia   • Pneumonia of left lower lobe due to Streptococcus pneumoniae     Past Medical History:   Diagnosis Date   • Alkaline phosphatase raised    • Astigmatism    • Bilateral pseudophakia    • Coronary arteriosclerosis    • Depressive disorder    • Edema of lower extremity    • Encounter for general adult medical examination with abnormal findings    • Essential hypertension    • Gastroesophageal reflux disease    • H/O bone density study     DXA BONE DENSITY AXIAL    2016   • H/O screening mammography     SCREENING MAMMOGRAPHY     2016   • Hx of screening mammography     x3; declined screening, understands risks and benefits and still declines      2015   • Influenza vaccine administered     INFLUENZA IMMUN ADMIN OR PREV RECV'D   x4       2016  "  • Insomnia    • Iron deficiency    • Migraine    • Tobacco dependence     continuous       Past Surgical History:   Procedure Laterality Date   • APPENDECTOMY     • BREAST IMPLANT SURGERY     • CATARACT EXTRACTION WITH INTRAOCULAR LENS IMPLANT  12/17/2003    Phacoemulsification of a cataract with intraocular lens implant of rigt eye, Nixon model SA 60-AT; serial # [id number];20.5 diopter   • CHOLECYSTECTOMY  08/06/2007    Laparoscopic cholecystectomy. Symptomatic gallstones   • COLONOSCOPY  10/15/2013    declined stool cards and colonscopy   • ENDOSCOPY AND COLONOSCOPY  05/22/2014    Internal & external hemorrhoids found.   • ENDOSCOPY W/ PEG TUBE PLACEMENT  05/22/2014    EGD w/ tube: Normal esopahgus. Gastritis in stomach. Biopsy taken. Normal duodenum. Biopsy taken.   • INJECTION OF MEDICATION  07/24/2013    Inj(s) Tend-Sheath, Ligament, Single   • INJECTION OF MEDICATION  11/17/2014    Kenalog x6   • INJECTION OF MEDICATION  04/01/2016    PNEUMOC VAC/ADMIN/RCVD    • INJECTION OF MEDICATION  12/05/2014    Toradol   • OTHER SURGICAL HISTORY  01/19/2014    Drain/Inject Major Joint   x2   • PAP SMEAR  10/06/2011   • PARTIAL HYSTERECTOMY      Partial hyst    • TONSILLECTOMY     • TUBAL ABDOMINAL LIGATION            PT ASSESSMENT (last 72 hours)      PT Evaluation       01/11/18 0855 01/11/18 0820    Rehab Evaluation    Document Type evaluation  -CB evaluation  -NN    Subjective Information agree to therapy  -CB agree to therapy;complains of;fatigue;weakness;pain;dyspnea  -NN    Patient Effort, Rehab Treatment adequate  -CB     Symptoms Noted During/After Treatment --   would be alert and then say something completely unrelated t  -CB     Symptoms Noted Comment  Pt. very confused and kept mistaking therapist for daughter in law and asked, \"Did you get another deer yet\" \"Are you cleaning the house today\" Pt. would then become alert and would state \"I'm just so confused with my thoughts\" It is as if she becomes confused " but then will become aware she is confused.   -NN    General Information    Patient Profile Review yes  -CB yes  -NN    Onset of Illness/Injury or Date of Surgery Date 01/10/18  -CB 01/10/18  -NN    Referring Physician Dr Berry  -CB Dr. Berry  -NN    General Observations laying in bed O2 on face but not in nose, had telemetry IV SCD   -CB Alert, fowlers, IV, O2 via nc, tele  -NN    Pertinent History Of Current Problem fall in parking lot , had SOB, weakness, confusion and cough  -CB S/P fall at clinic, Admit for Pneumonia LLL  -NN    Precautions/Limitations fall precautions;oxygen therapy device and L/min   droplet precautions  -CB fall precautions;oxygen therapy device and L/min  -NN    Prior Level of Function independent:;gait;ADL's  -CB independent:;all household mobility;community mobility;ADL's;home management;cooking;cleaning;driving  -NN    Equipment Currently Used at Home none   has a cane at home if needed  -CB none  -NN    Plans/Goals Discussed With patient  -CB patient;agreed upon  -NN    Risks Reviewed patient:;LOB;nausea/vomiting  -CB patient:;LOB;nausea/vomiting;dizziness;increased discomfort;change in vital signs;increased drainage;lines disloged  -NN    Benefits Reviewed patient:;increase strength  -CB patient:;improve function;increase strength;increase independence;increase balance;decrease pain;decrease risk of DVT;improve skin integrity;increase knowledge  -NN    Barriers to Rehab cognitive status  -CB none identified  -NN    Living Environment    Lives With alone  -CB alone  -NN    Living Arrangements apartment  -CB apartment  -NN    Home Accessibility no concerns  -CB bed and bath on same level;tub/shower is not walk in  -NN    Stair Railings at Home none  -CB none  -NN    Transportation Available car  -CB     Living Environment Comment  Son lives next door  -NN    Clinical Impression    Date of Referral to PT 01/10/18  -CB     PT Diagnosis impaired physical mobility due to pneumonia, hypokalemia  fall with contusion to scalp  -CB     Criteria for Skilled Therapeutic Interventions Met yes;treatment indicated  -CB     Pathology/Pathophysiology Noted (Describe Specifically for Each System) musculoskeletal;pulmonary  -CB     Impairments Found (describe specific impairments) aerobic capacity/endurance;arousal, attention, and cognition;gait, locomotion, and balance  -CB     Functional Limitations in Following Categories (Describe Specific Limitations) self-care;home management  -CB     Rehab Potential good, to achieve stated therapy goals  -CB     Predicted Duration of Therapy Intervention (days/wks) until goals met or discharged  -CB     Vital Signs    Pre Systolic BP Rehab 142  -  -NN    Pre Treatment Diastolic BP 83  -CB 76  -NN    Post Systolic BP Rehab 143  -CB     Post Treatment Diastolic BP 72  -CB     Pretreatment Heart Rate (beats/min) 100  -  -NN    Posttreatment Heart Rate (beats/min) 102  -  -NN    Pre SpO2 (%) 87  -CB 93  -NN    O2 Delivery Pre Treatment room air  -CB supplemental O2   2L O2  -NN    Post SpO2 (%) 92  -CB 93  -NN    O2 Delivery Post Treatment supplemental O2  -CB supplemental O2  -NN    Pre Patient Position Supine  -CB Sitting  -NN    Intra Patient Position Standing  -CB     Post Patient Position Supine  -CB Supine  -NN    Pain Assessment    Pain Assessment No/denies pain  -CB No/denies pain  -NN    Vision Assessment/Intervention    Visual Impairment WFL with corrective lenses   contact lenses part time  -CB WFL  -NN    Cognitive Assessment/Intervention    Current Cognitive/Communication Assessment impaired  -CB impaired  -NN    Orientation Status oriented x 4  -CB oriented x 4  -NN    Follows Commands/Answers Questions 100% of the time  -% of the time;able to follow multi-step instructions;needs increased time  -NN    Personal Safety decreased awareness, need for assist  -CB decreased awareness, need for assist;decreased awareness, need for safety;decreased  insight to deficits  -NN    Personal Safety Interventions fall prevention program maintained;gait belt;nonskid shoes/slippers when out of bed  -CB fall prevention program maintained;gait belt;muscle strengthening facilitated;nonskid shoes/slippers when out of bed;supervised activity  -NN    ROM (Range of Motion)    General ROM no range of motion deficits identified  -CB upper extremity range of motion deficits identified  -NN    General ROM Detail  Diego shoulders impaired approx 25%, Distal BUE WFL  -NN    MMT (Manual Muscle Testing)    General MMT Assessment lower extremity strength deficits identified  -CB upper extremity strength deficits identified  -NN    General MMT Assessment Detail grossly 3/5 BLE  -CB BUE functionally 4-/5  -NN    Bed Mobility, Assessment/Treatment    Bed Mobility, Assistive Device bed rails;head of bed elevated  -CB bed rails;head of bed elevated  -NN    Bed Mobility, Scoot/Bridge, [place]  minimum assist (75% patient effort)  -NN    Bed Mob, Supine to Sit, [place] contact guard assist  -CB contact guard assist  -NN    Bed Mob, Sit to Supine, [place] contact guard assist  -CB minimum assist (75% patient effort)   Min with BLE  -NN    Bed Mobility, Safety Issues cognitive deficits limit understanding  -CB cognitive deficits limit understanding;decreased use of arms for pushing/pulling;decreased use of legs for bridging/pushing;impaired trunk control for bed mobility  -NN    Bed Mobility, Impairments strength decreased  -CB strength decreased;impaired balance   CONFUSION  -NN    Bed Mobility, Comment  Pt. would doze off sitting EOB and require vc to become alert  -NN    Transfer Assessment/Treatment    Transfers, Sit-Stand [place] contact guard assist  -CB contact guard assist  -NN    Transfers, Stand-Sit [place] contact guard assist  -CB contact guard assist  -NN    Transfers, Sit-Stand-Sit, Assist Device rolling walker  -CB --   HHA  -NN    Transfer, Safety Issues  balance decreased during turns;sequencing ability decreased;step length decreased;weight-shifting ability decreased  -CB step length decreased  -NN    Transfer, Impairments  impaired balance;strength decreased;postural control impaired  -NN    Gait Assessment/Treatment    Gait, [place] Level contact guard assist  -CB     Gait, Assistive Device rolling walker  -CB     Gait, Distance (Feet) 10  -CB     Gait, Safety Issues balance decreased during turns;weight-shifting ability decreased;step length decreased  -CB     Motor Skills/Interventions    Additional Documentation  Balance Skills Training (Group)  -NN    Balance Skills Training    Sitting-Level of Assistance  Close supervision;Contact guard  -NN    Sitting-Balance Support  Right upper extremity supported;Left upper extremity supported;Feet supported  -NN    Standing-Level of Assistance  Contact guard  -NN    Static Standing Balance Support  Right upper extremity supported  -NN    Gait Balance-Level of Assistance  Contact guard  -NN    Gait Balance Support  Right upper extremity supported  -NN    Sensory Assessment/Intervention    Sensory Impairment  --   WNL per pt.   -NN    Light Touch LLE;RLE  -CB     LLE Light Touch WNL  -CB     RLE Light Touch WNL  -CB     Positioning and Restraints    Pre-Treatment Position in bed  -CB in bed  -NN    Post Treatment Position bed  -CB bed  -NN    In Bed supine;call light within reach;encouraged to call for assist;exit alarm on;side rails up x2;SCD pump applied  -CB fowlers;call light within reach;encouraged to call for assist;exit alarm on;side rails up x3;legs elevated  -NN      01/10/18 1610 01/10/18 1607    General Information    Equipment Currently Used at Home  nebulizer  -AB    Living Environment    Lives With alone   son lives next door  -AB     Living Arrangements apartment  -AB     Home Accessibility no concerns  -AB     Stair Railings at Home none  -AB     Type of Financial/Environmental Concern none  -AB      Transportation Available car;family or friend will provide  -AB       User Key  (r) = Recorded By, (t) = Taken By, (c) = Cosigned By    Initials Name Provider Type    CB Vanesa Ramos, PT Physical Therapist    AB Daisy Barnard, RN Registered Nurse    DELFINA Garibay, OTR/L Occupational Therapist          Physical Therapy Education     Title: PT OT SLP Therapies (Active)     Topic: Physical Therapy (Active)     Point: Mobility training (Active)    Learning Progress Summary    Learner Readiness Method Response Comment Documented by Status   Patient Acceptance D NR   01/11/18 1129 Active               Point: Precautions (Active)    Learning Progress Summary    Learner Readiness Method Response Comment Documented by Status   Patient Acceptance D NR   01/11/18 1129 Active                      User Key     Initials Effective Dates Name Provider Type Discipline     04/06/17 -  Vanesa Ramos, PT Physical Therapist PT                PT Recommendation and Plan  Anticipated Equipment Needs At Discharge:  (to be determined)  Anticipated Discharge Disposition: home with home health  Demonstrates Need for Referral to Another Service: home health care  Planned Therapy Interventions: bed mobility training, gait training, home exercise program, strengthening, transfer training  PT Frequency:  (5-14)  Plan of Care Review  Plan Of Care Reviewed With: patient  Outcome Summary/Follow up Plan: PT eval completed, patient intermittentently confused and then alert, able to come to sit EOB with HOB up and supervision of one, able to ambulate with rolling walker and CGA of one but was off balance and leaning and kept her eyes closed, could benefit from skilled PT to regain and return to highest level of function in bed  mobility, trnasfers, gait and strength          IP PT Goals       01/11/18 0855          Bed Mobility PT STG    Bed Mobility PT STG, Date Established 01/11/18  -      Bed Mobility PT STG, Time to Achieve 2 -  3 days  -CB      Bed Mobility PT STG, Activity Type supine to sit/sit to supine  -CB      Bed Mobility PT STG, [place] Level independent  -CB      Bed Mobility PT STG, Additional Goal HOB down and no rails  -CB      Transfer Training PT STG    Transfer Training PT STG, Date Established 01/11/18  -CB      Transfer Training PT STG, Time to Achieve 2 - 3 days  -CB      Transfer Training PT STG, Activity Type bed to chair /chair to bed;sit to stand/stand to sit  -CB      Transfer Training PT STG, [place] Level supervision required  -CB      Transfer Training PT STG, Assist Device --   least restrictive  -CB      Gait Training PT LTG    Gait Training Goal PT LTG, Date Established 01/11/18  -CB      Gait Training Goal PT LTG, Time to Achieve by discharge  -CB      Gait Training Goal PT LTG, [place] Level conditional independence  -CB      Gait Training Goal PT LTG, Assist Device --   least restrictive assistive device  -CB      Gait Training Goal PT LTG, Distance to Achieve 200 feet  -CB      Strength Goal PT LTG    Strength Goal PT LTG, Date Established 01/11/18  -CB      Strength Goal PT LTG, Time to Achieve 4 days  -CB      Strength Goal PT LTG, Measure to Achieve 20 reps all ex BLE actively sitting/ standing  -CB        User Key  (r) = Recorded By, (t) = Taken By, (c) = Cosigned By    Initials Name Provider Type    CB Vanesa Ramos, PT Physical Therapist                Outcome Measures       01/11/18 0855 01/11/18 0800       How much help from another person do you currently need...    Turning from your back to your side while in flat bed without using bedrails? 3  -CB      Moving from lying on back to sitting on the side of a flat bed without bedrails? 2  -CB      Moving to and from a bed to a chair (including a wheelchair)? 2  -CB      Standing up from a chair using your arms (e.g., wheelchair, bedside chair)? 3  -CB      Climbing 3-5 steps with a railing? 2  -CB      To walk in hospital room? 3   -CB      AM-PAC 6 Clicks Score 15  -CB      How much help from another is currently needed...    Putting on and taking off regular lower body clothing?  3  -NN     Bathing (including washing, rinsing, and drying)  2  -NN     Toileting (which includes using toilet bed pan or urinal)  3  -NN     Putting on and taking off regular upper body clothing  3  -NN     Taking care of personal grooming (such as brushing teeth)  3  -NN     Eating meals  4  -NN     Score  18  -NN     Functional Assessment    Outcome Measure Options AM-PAC 6 Clicks Basic Mobility (PT)  -CB AM-PAC 6 Clicks Daily Activity (OT)  -NN       User Key  (r) = Recorded By, (t) = Taken By, (c) = Cosigned By    Initials Name Provider Type    CB Vanesa Ramos, PT Physical Therapist    NN Janet Garibay, OTR/L Occupational Therapist           Time Calculation:         PT Charges       01/11/18 1136          Time Calculation    Start Time 0855  -CB      Stop Time 0925  -CB      Time Calculation (min) 30 min  -CB      PT Received On 01/11/18  -CB      PT Goal Re-Cert Due Date 01/24/18  -CB      Time Calculation- PT    Total Timed Code Minutes- PT 15 minute(s)  -CB        User Key  (r) = Recorded By, (t) = Taken By, (c) = Cosigned By    Initials Name Provider Type    YUE Ramos, PT Physical Therapist          Therapy Charges for Today     Code Description Service Date Service Provider Modifiers Qty    49132949536 HC PT MOBILITY CURRENT 1/11/2018 Vanesa Rmaos, PT GP, CK 1    29208376261 HC PT MOBILITY PROJECTED 1/11/2018 Vanesa Ramos, PT GP, CJ 1    54193008330 HC PT EVAL MOD COMPLEXITY 1 1/11/2018 Vanesa Ramos, PT GP 1    50295925093 HC PT THERAPEUTIC ACT EA 15 MIN 1/11/2018 Vanesa Ramos, PT GP 1          PT G-Codes  PT Professional Judgement Used?: Yes  Outcome Measure Options: AM-PAC 6 Clicks Basic Mobility (PT)  Score: 15  Functional Limitation: Mobility: Walking and moving around  Mobility: Walking and Moving Around Current Status  (): At least 40 percent but less than 60 percent impaired, limited or restricted  Mobility: Walking and Moving Around Goal Status (): At least 20 percent but less than 40 percent impaired, limited or restricted      Vanesa Ramos, PT  1/11/2018

## 2018-01-11 NOTE — PLAN OF CARE
Problem: Patient Care Overview (Adult)  Goal: Plan of Care Review  Outcome: Ongoing (interventions implemented as appropriate)   01/11/18 0855   Coping/Psychosocial Response Interventions   Plan Of Care Reviewed With patient   Outcome Evaluation   Outcome Summary/Follow up Plan PT eval completed, patient intermittentently confused and then alert, able to come to sit EOB with HOB up and supervision of one, able to ambulate with rolling walker and CGA of one but was off balance and leaning and kept her eyes closed, could benefit from skilled PT to regain and return to highest level of function in bed mobility, trnasfers, gait and strength     Goal: Discharge Needs Assessment  Outcome: Ongoing (interventions implemented as appropriate)   01/11/18 0855   Discharge Needs Assessment   Concerns To Be Addressed discharge planning concerns   Equipment Needed After Discharge (to be determined)   Discharge Facility/Level Of Care Needs home with home health   Current Discharge Risk lives alone;physical impairment   Discharge Disposition still a patient   Current Health   Outpatient/Agency/Support Group Needs homecare agency (specify level of care)   Living Environment   Transportation Available car   Self-Care   Equipment Currently Used at Home none  (has a cane at home if needed)       Problem: Inpatient Physical Therapy  Goal: Bed Mobility Goal STG- PT  Outcome: Ongoing (interventions implemented as appropriate)   01/11/18 0855   Bed Mobility PT STG   Bed Mobility PT STG, Date Established 01/11/18   Bed Mobility PT STG, Time to Achieve 2 - 3 days   Bed Mobility PT STG, Activity Type supine to sit/sit to supine   Bed Mobility PT STG, [place] Level independent   Bed Mobility PT STG, Additional Goal HOB down and no rails     Goal: Transfer Training Goal 1 STG- PT  Outcome: Ongoing (interventions implemented as appropriate)   01/11/18 0855   Transfer Training PT STG   Transfer Training PT STG, Date Established 01/11/18    Transfer Training PT STG, Time to Achieve 2 - 3 days   Transfer Training PT STG, Activity Type bed to chair /chair to bed;sit to stand/stand to sit   Transfer Training PT STG, [place] Level supervision required   Transfer Training PT STG, Assist Device (least restrictive)     Goal: Gait Training Goal LTG- PT  Outcome: Ongoing (interventions implemented as appropriate)   01/11/18 0855   Gait Training PT LTG   Gait Training Goal PT LTG, Date Established 01/11/18   Gait Training Goal PT LTG, Time to Achieve by discharge   Gait Training Goal PT LTG, [place] Level conditional independence   Gait Training Goal PT LTG, Assist Device (least restrictive assistive device)   Gait Training Goal PT LTG, Distance to Achieve 200 feet     Goal: Strength Goal LTG- PT  Outcome: Ongoing (interventions implemented as appropriate)

## 2018-01-11 NOTE — PROGRESS NOTES
Discharge Planning Assessment  [facility]     Patient Name: Michelle Child  MRN: [id number]  Today's Date: 1/11/2018    Admit Date: 1/10/2018          Discharge Needs Assessment       01/11/18 1620    Living Environment    Lives With alone    Living Arrangements apartment   Son lives next door    Home Accessibility no concerns    Stair Railings at Home none    Type of Financial/Environmental Concern none    Transportation Available car;family or friend will provide    Living Environment    Provides Primary Care For no one    Quality Of Family Relationships supportive            Discharge Plan       01/11/18 1621    Case Management/Social Work Plan    Additional Comments CM recieved consult, pt confused at this time. CM contacted pt's son Bryon. Per Bryon, pts baseline is alert and oriented that confusion is new.  Per Bryon pt was independent prior and has good family support.  Due to new onset confusion CM will continue to follow for dc needed. Will review PT OT evcamelia and MD notes.   Belgica Guzman RNCM        Discharge Placement     No information found        Expected Discharge Date and Time     Expected Discharge Date Expected Discharge Time    [place] 15, 2018               Demographic Summary       01/11/18 1618    Referral Information    Admission Type inpatient    Reason For Consult discharge planning    Primary Care Physician Information    Name LYNDA Arce MD            Functional Status       01/11/18 1619    Functional Status Prior    Ambulation 0-->independent    Transferring 0-->independent    Toileting 0-->independent    Bathing 0-->independent    Dressing 0-->independent    Eating 0-->independent    Communication 0-->understands/communicates without difficulty    Swallowing 0-->swallows foods/liquids without difficulty    IADL    Medications independent    Meal Preparation independent    Housekeeping independent    Laundry independent    Shopping independent    Oral Care independent    Activity  Tolerance    Current Activity Limitations none    Usual Activity Tolerance excellent    Cognitive/Perceptual/Developmental    Current Mental Status/Cognitive Functioning unable to assess   Pt currently confused    Recent Changes in Mental Status/Cognitive Functioning unable to assess    Developmental Stage (Eriksson's Stages of Development) Stage 7 (35-65 years/Middle Adulthood) Generativity vs. Stagnation            Psychosocial     None            Abuse/Neglect     None            Legal     None            Substance Abuse     None            Patient Forms     None          [place] C

## 2018-01-11 NOTE — PLAN OF CARE
Problem: Patient Care Overview (Adult)  Goal: Plan of Care Review  Outcome: Ongoing (interventions implemented as appropriate)      Problem: Pneumonia (Adult)  Goal: Signs and Symptoms of Listed Potential Problems Will be Absent or Manageable (Pneumonia)  Outcome: Ongoing (interventions implemented as appropriate)      Problem: Fall Risk (Adult)  Goal: Absence of Falls  Outcome: Ongoing (interventions implemented as appropriate)      Problem: Fluid Volume Deficit (Adult)  Goal: Fluid/Electrolyte Balance  Outcome: Ongoing (interventions implemented as appropriate)    Goal: Comfort/Well Being  Outcome: Ongoing (interventions implemented as appropriate)

## 2018-01-11 NOTE — CONSULTS
"Adult Nutrition  Assessment    Patient Name:  Michelle Child  YOB: 1955  MRN: [id number]  Admit Date:  1/10/2018    Assessment Date:  1/11/2018    Comments:  Pt is a 62 year old woman admitted after suffering a fall in the clinic parking lot due to weakness, has pneumonia dx.  RD spoke w/pt and family present.  Pt states that her appetite has been decreased since being ill.  Wt stable.  No GI complaints.  RD made pt aware of menu options available.  Pt requested JellO and willing to try supplements.  Will add Boost on some trays.  RD will monitor.          Reason for Assessment       01/11/18 1520    Reason for Assessment    Reason For Assessment/Visit admission assessment                Nutrition/Diet History       01/11/18 1520    Nutrition/Diet History    Typical Food/Fluid Intake Pt reports she has had decreased appetite since being ill but no GI complaints.  Requested JellO and willing to try supplement.            Anthropometrics       01/11/18 1521    Anthropometrics    Height 162.6 cm (64\")    Weight 66.7 kg (147 lb)    RD Documented Weight on Admission 63.5 kg (140 lb)    Anthropometrics (Special Considerations)    Height Used for Calculations 1.626 m (5' 4\")    Weight Used for Calculations 66.7 kg (147 lb)    RD Calculated     RD Calculated %     Ideal Body Weight (IBW)    Ideal Body Weight (IBW), Female 55.4    % Ideal Body Weight 120.6    Body Mass Index (BMI)    BMI (kg/m2) 25.29    BMI Grade 25 - 29.9 - overweight            Labs/Tests/Procedures/Meds       01/11/18 1522    Labs/Tests/Procedures/Meds    Labs/Tests Review Reviewed    Medication Review Reviewed, pertinent;Steroid              Estimated/Assessed Needs       01/11/18 1523    Calculation Measurements    Weight Used For Calculations 66.7 kg (147 lb)    Height Used for Calculations 1.626 m (5' 4\")    Estimated/Assessed Energy Needs    Energy Need Method [place]    Age 62    RMR ([place] " Equation) 1201.79    Activity Factors ([place] [facility])  Out of bed, ambulatory  1.3    [facility] needs [facility] 1561    Estimated/Assessed Protein Needs    Weight Used for Protein Calculation 66.7 kg (147 lb)    Protein (gm/kg) 1.2    1.2 Gm Protein (gm) 80.01    Estimated Protein Range 67-80g    Estimated/Assessed Fluid Needs    Fluid Need Method RDA method    RDA Method (mL)  1561            Nutrition Prescription Ordered       01/11/18 1523    Nutrition Prescription PO    Current PO Diet Regular            Evaluation of Received Nutrient/Fluid Intake       01/11/18 1524    PO Evaluation    Number of Meals 1    % PO Intake 50            Electronically signed by:  Nikkie Davis RD  01/11/18 3:24 PM

## 2018-01-11 NOTE — THERAPY EVALUATION
Acute Care - Occupational Therapy Initial Evaluation  [facility]     Patient Name: Michelle Child  : 1955  MRN: [id number]  Today's Date: 2018  Onset of Illness/Injury or Date of Surgery Date: 01/10/18  Date of Referral to OT: 01/10/18  Referring Physician: Dr. Berry    Admit Date: 1/10/2018       ICD-10-CM ICD-9-CM   1. Hypokalemia E87.6 276.8   2. Pneumonia of left lung due to infectious organism, unspecified part of lung J18.9 486   3. Fall, initial encounter W19.XXXA E888.9   4. Contusion of scalp, initial encounter S00.03XA 920   5. Decreased activities of daily living (ADL) Z78.9 V49.89     Patient Active Problem List   Diagnosis   • Tobacco dependence   • Migraine   • Iron deficiency   • Gastroesophageal reflux disease   • Essential hypertension   • Depressive disorder   • Coronary arteriosclerosis   • Bilateral pseudophakia   • Astigmatism   • Right humeral fracture   • Fall with injury   • Closed fracture of humerus   • Cause of injury, fall   • Closed fracture of proximal end of right humerus with routine healing   • Weakness   • Acute respiratory failure with hypoxia   • Pneumonia of left lower lobe due to Streptococcus pneumoniae     Past Medical History:   Diagnosis Date   • Alkaline phosphatase raised    • Astigmatism    • Bilateral pseudophakia    • Coronary arteriosclerosis    • Depressive disorder    • Edema of lower extremity    • Encounter for general adult medical examination with abnormal findings    • Essential hypertension    • Gastroesophageal reflux disease    • H/O bone density study     DXA BONE DENSITY AXIAL    2016   • H/O screening mammography     SCREENING MAMMOGRAPHY     2016   • Hx of screening mammography     x3; declined screening, understands risks and benefits and still declines      2015   • Influenza vaccine administered     INFLUENZA IMMUN ADMIN OR PREV RECV'D   x4       2016   • Insomnia    • Iron deficiency    • Migraine    •  "Tobacco dependence     continuous       Past Surgical History:   Procedure Laterality Date   • APPENDECTOMY     • BREAST IMPLANT SURGERY     • CATARACT EXTRACTION WITH INTRAOCULAR LENS IMPLANT  12/17/2003    Phacoemulsification of a cataract with intraocular lens implant of rigt eye, Nixon model SA 60-AT; serial # [id number];20.5 diopter   • CHOLECYSTECTOMY  08/06/2007    Laparoscopic cholecystectomy. Symptomatic gallstones   • COLONOSCOPY  10/15/2013    declined stool cards and colonscopy   • ENDOSCOPY AND COLONOSCOPY  05/22/2014    Internal & external hemorrhoids found.   • ENDOSCOPY W/ PEG TUBE PLACEMENT  05/22/2014    EGD w/ tube: Normal esopahgus. Gastritis in stomach. Biopsy taken. Normal duodenum. Biopsy taken.   • INJECTION OF MEDICATION  07/24/2013    Inj(s) Tend-Sheath, Ligament, Single   • INJECTION OF MEDICATION  11/17/2014    Kenalog x6   • INJECTION OF MEDICATION  04/01/2016    PNEUMOC VAC/ADMIN/RCVD    • INJECTION OF MEDICATION  12/05/2014    Toradol   • OTHER SURGICAL HISTORY  01/19/2014    Drain/Inject Major Joint   x2   • PAP SMEAR  10/06/2011   • PARTIAL HYSTERECTOMY      Partial hyst    • TONSILLECTOMY     • TUBAL ABDOMINAL LIGATION            OT ASSESSMENT FLOWSHEET (last 72 hours)      OT Evaluation       01/11/18 0820 01/10/18 1610 01/10/18 1607          Rehab Evaluation    Document Type evaluation  -NN        Subjective Information agree to therapy;complains of;fatigue;weakness;pain;dyspnea  -NN        Symptoms Noted Comment Pt. very confused and kept mistaking therapist for daughter in law and asked, \"Did you get another deer yet\" \"Are you cleaning the house today\" Pt. would then become alert and would state \"I'm just so confused with my thoughts\" It is as if she becomes confused but then will become aware she is confused.   -NN        General Information    Patient Profile Review yes  -NN        Onset of Illness/Injury or Date of Surgery Date 01/10/18  -NN        Referring Physician Dr. Berry "  -NN        General Observations Alert, fowlers, IV, O2 via nc, tele  -NN        Pertinent History Of Current Problem S/P fall at clinic, Admit for Pneumonia LLL  -NN        Precautions/Limitations fall precautions;oxygen therapy device and L/min  -NN        Prior Level of Function independent:;all household mobility;community mobility;ADL's;home management;cooking;cleaning;driving  -NN        Equipment Currently Used at Home none  -NN  nebulizer  -AB      Plans/Goals Discussed With patient;agreed upon  -NN        Risks Reviewed patient:;LOB;nausea/vomiting;dizziness;increased discomfort;change in vital signs;increased drainage;lines disloged  -NN        Benefits Reviewed patient:;improve function;increase strength;increase independence;increase balance;decrease pain;decrease risk of DVT;improve skin integrity;increase knowledge  -NN        Barriers to Rehab none identified  -NN        Living Environment    Lives With alone  -NN alone   son lives next door  -AB       Living Arrangements apartment  -NN apartment  -AB       Home Accessibility bed and bath on same level;tub/shower is not walk in  -NN no concerns  -AB       Stair Railings at Home none  -NN none  -AB       Type of Financial/Environmental Concern  none  -AB       Transportation Available  car;family or friend will provide  -AB       Living Environment Comment Son lives next door  -NN        Clinical Impression    Date of Referral to OT 01/10/18  -NN        OT Diagnosis decreased adl  -NN        Prognosis good  -NN        Impairments Found (describe specific impairments) aerobic capacity/endurance;gait, locomotion, and balance;ergonomics and body mechanics  -NN        Patient/Family Goals Statement go home  -NN        Criteria for Skilled Therapeutic Interventions Met yes;treatment indicated  -NN        Rehab Potential good, to achieve stated therapy goals  -NN        Therapy Frequency --   3-14x/week  -NN        Predicted Duration of Therapy Intervention  (days/wks) until dc  -NN        Anticipated Discharge Disposition home with home health;home with assist;home with 24/7 care  -NN        Functional Level Prior    Ambulation   0-->independent  -AB      Transferring   0-->independent  -AB      Toileting   0-->independent  -AB      Bathing   0-->independent  -AB      Dressing   0-->independent  -AB      Eating   0-->independent  -AB      Communication   0-->understands/communicates without difficulty  -AB      Swallowing   0-->swallows foods/liquids without difficulty  -AB      Vital Signs    Pre Systolic BP Rehab 150  -NN        Pre Treatment Diastolic BP 76  -NN        Pretreatment Heart Rate (beats/min) 106  -NN        Posttreatment Heart Rate (beats/min) 110  -NN        Pre SpO2 (%) 93  -NN        O2 Delivery Pre Treatment supplemental O2   2L O2  -NN        Post SpO2 (%) 93  -NN        O2 Delivery Post Treatment supplemental O2  -NN        Pre Patient Position Sitting  -NN        Post Patient Position Supine  -NN        Pain Assessment    Pain Assessment No/denies pain  -NN        Vision Assessment/Intervention    Visual Impairment WFL  -NN        Cognitive Assessment/Intervention    Current Cognitive/Communication Assessment impaired  -NN        Orientation Status oriented x 4  -NN        Follows Commands/Answers Questions 100% of the time;able to follow multi-step instructions;needs increased time  -NN        Personal Safety decreased awareness, need for assist;decreased awareness, need for safety;decreased insight to deficits  -NN        Personal Safety Interventions fall prevention program maintained;gait belt;muscle strengthening facilitated;nonskid shoes/slippers when out of bed;supervised activity  -NN        ROM (Range of Motion)    General ROM upper extremity range of motion deficits identified  -NN        General ROM Detail [place] shoulders impaired approx 25%, Distal BUE WFL  -NN        MMT (Manual Muscle Testing)    General MMT Assessment upper extremity  strength deficits identified  -NN        General MMT Assessment Detail BUE functionally 4-/5  -NN        Bed Mobility, Assessment/Treatment    Bed Mobility, Assistive Device bed rails;head of bed elevated  -NN        Bed Mobility, Scoot/Bridge, [place] minimum assist (75% patient effort)  -NN        Bed Mob, Supine to Sit, [place] contact guard assist  -NN        Bed Mob, Sit to Supine, [place] minimum assist (75% patient effort)   Min with BLE  -NN        Bed Mobility, Safety Issues cognitive deficits limit understanding;decreased use of arms for pushing/pulling;decreased use of legs for bridging/pushing;impaired trunk control for bed mobility  -NN        Bed Mobility, Impairments strength decreased;impaired balance   CONFUSION  -NN        Bed Mobility, Comment Pt. would doze off sitting EOB and require vc to become alert  -NN        Transfer Assessment/Treatment    Transfers, Sit-Stand [place] contact guard assist  -NN        Transfers, Stand-Sit [place] contact guard assist  -NN        Transfers, Sit-Stand-Sit, Assist Device --   HHA  -NN        Transfer, Safety Issues step length decreased  -NN        Transfer, Impairments impaired balance;strength decreased;postural control impaired  -NN        Functional Mobility    Functional Mobility- Ind. Level verbal cues required;contact guard assist  -NN        Functional Mobility- Device --   HHA  -NN        Functional Mobility-Distance (Feet) --   in room  -NN        Functional Mobility- Safety Issues step length decreased;supplemental O2  -NN        Functional Mobility- Comment Pt. very lethargic limiting distance and raising safety concern  -NN        Lower Body Dressing Assessment/Training    LB Dressing Assess/Train, Clothing Type donning:;doffing:;socks  -NN        LB Dressing Assess/Train, Position edge of bed  -NN        LB Dressing Assess/Train, [place] contact guard assist  -NN        LB Dressing Assess/Train, Impairments strength  decreased;impaired balance  -NN        Motor Skills/Interventions    Additional Documentation Balance Skills Training (Group)  -NN        Balance Skills Training    Sitting-Level of Assistance Close supervision;Contact guard  -NN        Sitting-Balance Support Right upper extremity supported;Left upper extremity supported;Feet supported  -NN        Standing-Level of Assistance Contact guard  -NN        Static Standing Balance Support Right upper extremity supported  -NN        Gait Balance-Level of Assistance Contact guard  -NN        Gait Balance Support Right upper extremity supported  -NN        Sensory Assessment/Intervention    Sensory Impairment --   WNL per pt.   -NN        General Therapy Interventions    Planned Therapy Interventions activity intolerance;ADL retraining;balance training;bed mobility training;energy conservation;home exercise program;strengthening;transfer training  -NN        Positioning and Restraints    Pre-Treatment Position in bed  -NN        Post Treatment Position bed  -NN        In Bed fowlers;call light within reach;encouraged to call for assist;exit alarm on;side rails up x3;legs elevated  -NN          User Key  (r) = Recorded By, (t) = Taken By, (c) = Cosigned By    Initials Name Effective Dates    AB Daisy Barnard RN 10/17/16 -     NN MAIK Sheffield/DALTON 11/08/17 -            Occupational Therapy Education     Title: PT OT SLP Therapies (Active)     Topic: Occupational Therapy (Active)     Point: ADL training (Active)    Description: Instruct learner(s) on proper safety adaptation and remediation techniques during self care or transfers.   Instruct in proper use of assistive devices.    Learning Progress Summary    Learner Readiness Method Response Comment Documented by Status   Patient Acceptance E NR Pt. educated on role of OT, safe t/f, calling for assistance, benefit of therapy, correcting posture, progression with poc NN 01/11/18 0854 Active               Point: Home  "exercise program (Active)    Description: Instruct learner(s) on appropriate technique for monitoring, assisting and/or progressing therapeutic exercises/activities.    Learning Progress Summary    Learner Readiness Method Response Comment Documented by Status   Patient Acceptance E NR Pt. educated on role of OT, safe t/f, calling for assistance, benefit of therapy, correcting posture, progression with poc NN 01/11/18 0854 Active               Point: Body mechanics (Active)    Description: Instruct learner(s) on proper positioning and spine alignment during self-care, functional mobility activities and/or exercises.    Learning Progress Summary    Learner Readiness Method Response Comment Documented by Status   Patient Acceptance E NR Pt. educated on role of OT, safe t/f, calling for assistance, benefit of therapy, correcting posture, progression with poc NN 01/11/18 0854 Active                      User Key     Initials Effective Dates Name Provider Type Discipline    NN 11/08/17 -  Janet Garibay, OTR/L Occupational Therapist OT                  OT Recommendation and Plan  Anticipated Discharge Disposition: home with home health, home with assist, home with /7 care  Planned Therapy Interventions: activity intolerance, ADL retraining, balance training, bed mobility training, energy conservation, home exercise program, strengthening, transfer training  Therapy Frequency:  (3-14x/week)  Plan of Care Review  Plan Of Care Reviewed With: patient  Progress: progress toward functional goals as expected  Outcome Summary/Follow up Plan: OT eros completed this date. Pt. disoriented at times mistaking therapist for another person and asking off the wall questions. Then patient would become alert, and stated, \"I am just so confused with my thoughts lately.\" Pt. requriring assistance with functional activities and constand re-direction. Pt. woudl cont to benefit from skilled OT d.t decreased balance, decreased strength, " decreased activity tolerance, decreased ind in ADLs. Anticipated dc is home with 24 hour assist and HH if cognition does not improve. 1/11 0900          OT Goals       01/11/18 0856          Transfer Training OT LTG    Transfer Training OT LTG, Date Established 01/11/18  -NN      Transfer Training OT LTG, Time to Achieve by discharge  -NN      Transfer Training OT LTG, Activity Type all transfers  -NN      Transfer Training OT LTG, [place] Level independent  -NN      Strength OT LTG    Strength Goal OT LTG, Date Established 01/11/18  -NN      Strength Goal OT LTG, Time to Achieve by discharge  -NN      Strength Goal OT LTG, Measure to Achieve Pt. will complete BUE strengthening exercises all planes and joints to increase BUE strength to 5/5 for ADLs.   -NN      ADL OT LTG    ADL OT LTG, Date Established 01/11/18  -NN      ADL OT LTG, Time to Achieve by discharge  -NN      ADL OT LTG, Activity Type ADL skills  -NN      ADL OT LTG, [place] Level modified independent  -NN      ADL OT LTG, Additional Goal AE PRN  -NN        User Key  (r) = Recorded By, (t) = Taken By, (c) = Cosigned By    Initials Name Provider Type    DELFINA Garibay, OTR/L Occupational Therapist                Outcome Measures       01/11/18 0800          How much help from another is currently needed...    Putting on and taking off regular lower body clothing? 3  -NN      Bathing (including washing, rinsing, and drying) 2  -NN      Toileting (which includes using toilet bed pan or urinal) 3  -NN      Putting on and taking off regular upper body clothing 3  -NN      Taking care of personal grooming (such as brushing teeth) 3  -NN      Eating meals 4  -NN      Score 18  -NN      Functional Assessment    Outcome Measure Options AM-PAC 6 Clicks Daily Activity (OT)  -NN        User Key  (r) = Recorded By, (t) = Taken By, (c) = Cosigned By    Initials Name Provider Type    DELFINA Garibay, OTR/L Occupational Therapist          Time  Calculation:   OT Start Time: 0820  OT Stop Time: 0850  OT Time Calculation (min): 30 min    Therapy Charges for Today     Code Description Service Date Service Provider Modifiers Qty    14401547680 HC OT EVAL MOD COMPLEXITY 1 1/11/2018 Janet Garibay OTR/L GO 1    38087035879  OT THERAPEUTIC ACT EA 15 MIN 1/11/2018 Janet Garibay OTR/L GO 1    10088921855 HC OT SELFCARE CURRENT 1/11/2018 Janet Garibay OTR/L GO, CK 1    91124163315 HC OT SELFCARE PROJECTED 1/11/2018 Janet Garibay OTR/L GO, CJ 1          OT G-codes  OT Functional Scales Options: AM-PAC 6 Clicks Daily Activity (OT)  Functional Limitation: Self care  Self Care Current Status (): At least 40 percent but less than 60 percent impaired, limited or restricted  Self Care Goal Status (): At least 20 percent but less than 40 percent impaired, limited or restricted    Janet Garibay OTR/L  1/11/2018

## 2018-01-12 LAB
ALBUMIN SERPL-MCNC: 2.7 G/DL (ref 3.4–4.8)
ALBUMIN/GLOB SERPL: 1 G/DL (ref 1.1–1.8)
ALP SERPL-CCNC: 117 U/L (ref 38–126)
ALT SERPL W P-5'-P-CCNC: 33 U/L (ref 9–52)
ANION GAP SERPL CALCULATED.3IONS-SCNC: 7 MMOL/L (ref 5–15)
AST SERPL-CCNC: 28 U/L (ref 14–36)
BACTERIA SPEC AEROBE CULT: NORMAL
BASOPHILS # BLD AUTO: 0.01 10*3/MM3 (ref 0–0.2)
BASOPHILS NFR BLD AUTO: 0.1 % (ref 0–2)
BILIRUB SERPL-MCNC: 0.4 MG/DL (ref 0.2–1.3)
BUN BLD-MCNC: 9 MG/DL (ref 7–21)
BUN/CREAT SERPL: 12.3 (ref 7–25)
CALCIUM SPEC-SCNC: 8.2 MG/DL (ref 8.4–10.2)
CHLORIDE SERPL-SCNC: 107 MMOL/L (ref 95–110)
CO2 SERPL-SCNC: 21 MMOL/L (ref 22–31)
CREAT BLD-MCNC: 0.73 MG/DL (ref 0.5–1)
CRP SERPL-MCNC: 13.8 MG/DL (ref 0–1)
DEPRECATED RDW RBC AUTO: 50.7 FL (ref 36.4–46.3)
EOSINOPHIL # BLD AUTO: 0.01 10*3/MM3 (ref 0–0.7)
EOSINOPHIL NFR BLD AUTO: 0.1 % (ref 0–7)
ERYTHROCYTE [DISTWIDTH] IN BLOOD BY AUTOMATED COUNT: 16 % (ref 11.5–14.5)
GFR SERPL CREATININE-BSD FRML MDRD: 80 ML/MIN/1.73 (ref 60–104)
GLOBULIN UR ELPH-MCNC: 2.8 GM/DL (ref 2.3–3.5)
GLUCOSE BLD-MCNC: 92 MG/DL (ref 60–100)
HCT VFR BLD AUTO: 27.2 % (ref 35–45)
HGB BLD-MCNC: 8.4 G/DL (ref 12–15.5)
IMM GRANULOCYTES # BLD: 0.05 10*3/MM3 (ref 0–0.02)
IMM GRANULOCYTES NFR BLD: 0.4 % (ref 0–0.5)
LYMPHOCYTES # BLD AUTO: 0.78 10*3/MM3 (ref 0.6–4.2)
LYMPHOCYTES NFR BLD AUTO: 5.6 % (ref 10–50)
MCH RBC QN AUTO: 26.7 PG (ref 26.5–34)
MCHC RBC AUTO-ENTMCNC: 30.9 G/DL (ref 31.4–36)
MCV RBC AUTO: 86.3 FL (ref 80–98)
MONOCYTES # BLD AUTO: 0.83 10*3/MM3 (ref 0–0.9)
MONOCYTES NFR BLD AUTO: 5.9 % (ref 0–12)
NEUTROPHILS # BLD AUTO: 12.27 10*3/MM3 (ref 2–8.6)
NEUTROPHILS NFR BLD AUTO: 87.9 % (ref 37–80)
NRBC BLD MANUAL-RTO: 0 /100 WBC (ref 0–0)
PLATELET # BLD AUTO: 322 10*3/MM3 (ref 150–450)
PMV BLD AUTO: 9.3 FL (ref 8–12)
POTASSIUM BLD-SCNC: 5 MMOL/L (ref 3.5–5.1)
PROT SERPL-MCNC: 5.5 G/DL (ref 6.3–8.6)
RBC # BLD AUTO: 3.15 10*6/MM3 (ref 3.77–5.16)
SODIUM BLD-SCNC: 135 MMOL/L (ref 137–145)
WBC NRBC COR # BLD: 13.95 10*3/MM3 (ref 3.2–9.8)

## 2018-01-12 PROCEDURE — 85025 COMPLETE CBC W/AUTO DIFF WBC: CPT | Performed by: FAMILY MEDICINE

## 2018-01-12 PROCEDURE — 94799 UNLISTED PULMONARY SVC/PX: CPT

## 2018-01-12 PROCEDURE — 25010000002 LEVOFLOXACIN PER 250 MG: Performed by: FAMILY MEDICINE

## 2018-01-12 PROCEDURE — 99232 SBSQ HOSP IP/OBS MODERATE 35: CPT | Performed by: FAMILY MEDICINE

## 2018-01-12 PROCEDURE — 80053 COMPREHEN METABOLIC PANEL: CPT | Performed by: FAMILY MEDICINE

## 2018-01-12 PROCEDURE — 94760 N-INVAS EAR/PLS OXIMETRY 1: CPT

## 2018-01-12 PROCEDURE — 86140 C-REACTIVE PROTEIN: CPT | Performed by: FAMILY MEDICINE

## 2018-01-12 PROCEDURE — 97530 THERAPEUTIC ACTIVITIES: CPT

## 2018-01-12 PROCEDURE — 25010000002 ENOXAPARIN PER 10 MG: Performed by: FAMILY MEDICINE

## 2018-01-12 PROCEDURE — 97535 SELF CARE MNGMENT TRAINING: CPT

## 2018-01-12 PROCEDURE — 97116 GAIT TRAINING THERAPY: CPT

## 2018-01-12 PROCEDURE — 25010000002 METHYLPREDNISOLONE PER 40 MG: Performed by: FAMILY MEDICINE

## 2018-01-12 RX ORDER — POTASSIUM CHLORIDE 750 MG/1
40 CAPSULE, EXTENDED RELEASE ORAL DAILY
Status: DISCONTINUED | OUTPATIENT
Start: 2018-01-13 | End: 2018-01-13

## 2018-01-12 RX ADMIN — FAMOTIDINE 40 MG: 20 TABLET, FILM COATED ORAL at 09:57

## 2018-01-12 RX ADMIN — METHYLPREDNISOLONE SODIUM SUCCINATE 40 MG: 40 INJECTION, POWDER, FOR SOLUTION INTRAMUSCULAR; INTRAVENOUS at 09:57

## 2018-01-12 RX ADMIN — CARVEDILOL 3.12 MG: 3.12 TABLET, FILM COATED ORAL at 17:39

## 2018-01-12 RX ADMIN — ASPIRIN 81 MG 81 MG: 81 TABLET ORAL at 09:58

## 2018-01-12 RX ADMIN — AMITRIPTYLINE HYDROCHLORIDE 100 MG: 50 TABLET, FILM COATED ORAL at 20:06

## 2018-01-12 RX ADMIN — LEVOFLOXACIN 750 MG: 5 INJECTION, SOLUTION INTRAVENOUS at 15:54

## 2018-01-12 RX ADMIN — CARVEDILOL 3.12 MG: 3.12 TABLET, FILM COATED ORAL at 09:58

## 2018-01-12 RX ADMIN — LOSARTAN POTASSIUM 50 MG: 50 TABLET, FILM COATED ORAL at 09:57

## 2018-01-12 RX ADMIN — ENOXAPARIN SODIUM 40 MG: 40 INJECTION SUBCUTANEOUS at 15:54

## 2018-01-12 RX ADMIN — NICOTINE 1 PATCH: 21 PATCH TRANSDERMAL at 15:54

## 2018-01-12 RX ADMIN — POTASSIUM CHLORIDE 40 MEQ: 750 CAPSULE, EXTENDED RELEASE ORAL at 09:58

## 2018-01-12 RX ADMIN — HYDROCODONE BITARTRATE AND ACETAMINOPHEN 1 TABLET: 5; 325 TABLET ORAL at 19:45

## 2018-01-12 RX ADMIN — FLUOXETINE 60 MG: 20 CAPSULE ORAL at 09:57

## 2018-01-12 RX ADMIN — IPRATROPIUM BROMIDE AND ALBUTEROL SULFATE 3 ML: 2.5; .5 SOLUTION RESPIRATORY (INHALATION) at 19:23

## 2018-01-12 RX ADMIN — RISPERIDONE 1 MG: 1 TABLET ORAL at 20:06

## 2018-01-12 RX ADMIN — IPRATROPIUM BROMIDE AND ALBUTEROL SULFATE 3 ML: 2.5; .5 SOLUTION RESPIRATORY (INHALATION) at 10:52

## 2018-01-12 NOTE — THERAPY TREATMENT NOTE
Acute Care - Occupational Therapy Treatment Note  [facility]     Patient Name: Michelle Child  : 1955  MRN: [id number]  Today's Date: 2018  Onset of Illness/Injury or Date of Surgery Date: 01/10/18  Date of Referral to OT: 01/10/18  Referring Physician: Dr Berry      Admit Date: 1/10/2018    Visit Dx:     ICD-10-CM ICD-9-CM   1. Hypokalemia E87.6 276.8   2. Pneumonia of left lung due to infectious organism, unspecified part of lung J18.9 486   3. Fall, initial encounter W19.XXXA E888.9   4. Contusion of scalp, initial encounter S00.03XA 920   5. Decreased activities of daily living (ADL) Z78.9 V49.89   6. Impaired physical mobility Z74.09 781.99     Patient Active Problem List   Diagnosis   • Tobacco dependence   • Migraine   • Iron deficiency   • Gastroesophageal reflux disease   • Essential hypertension   • Depressive disorder   • Coronary arteriosclerosis   • Bilateral pseudophakia   • Astigmatism   • Right humeral fracture   • Fall with injury   • Closed fracture of humerus   • Cause of injury, fall   • Closed fracture of proximal end of right humerus with routine healing   • Weakness   • Acute respiratory failure with hypoxia   • Pneumonia of left lower lobe due to Streptococcus pneumoniae             Adult Rehabilitation Note       18 1103 18 0755       Rehab Assessment/Intervention    Discipline physical therapy assistant  -SY occupational therapy assistant  -LW     Document Type therapy note (daily note)  - therapy note (daily note)  -LW     Subjective Information agree to therapy;nausea/vomiting;dyspnea  - agree to therapy  -LW     Patient Effort, Rehab Treatment adequate  - adequate  -LW     Symptoms Noted Comment  Pt is very confused.  -LW     Precautions/Limitations fall precautions;oxygen therapy device and L/min  - fall precautions  -LW     Equipment Issued to Patient gait belt  - gait belt  -LW     Recorded by [SY] Sadia French PTA [LW] Jaz Garcia,  CAN/L     Vital Signs    Pre Systolic BP Rehab 152  -  -LW     Pre Treatment Diastolic BP 72  -SY 69  -LW     Pretreatment Heart Rate (beats/min) 98  -SY 99  -LW     Intratreatment Heart Rate (beats/min) 93  -SY 96  -LW     Posttreatment Heart Rate (beats/min)  93  -LW     Pre SpO2 (%) 95  -SY 96  -LW     O2 Delivery Pre Treatment supplemental O2  -SY supplemental O2  -LW     Intra SpO2 (%)  98  -LW     O2 Delivery Intra Treatment  supplemental O2  -LW     Post SpO2 (%)  97  -LW     O2 Delivery Post Treatment  supplemental O2  -LW     Pre Patient Position Supine  -SY Supine  -LW     Intra Patient Position Sitting  -SY Standing  -LW     Post Patient Position  Sitting  -LW     Recorded by [SY] Sadia French PTA [LW] JUDE WadeA/L     Pain Assessment    Pain Assessment No/denies pain  -SY No/denies pain  -LW     Recorded by [SY] Sadia French PTA [LW] Jaz Garcia CAN/L     Vision Assessment/Intervention    Visual Impairment WNL;WFL  -SY WNL;WFL  -LW     Recorded by [SY] Sadia French PTA [LW] Jaz Garcia, CAN/L     Cognitive Assessment/Intervention    Current Cognitive/Communication Assessment impaired  -SY impaired  -LW     Orientation Status oriented to;person;place  -SY oriented to;person;place  -LW     Follows Commands/Answers Questions 100% of the time  -% of the time  -LW     Personal Safety decreased awareness, need for assist  -SY decreased awareness, need for assist  -LW     Personal Safety Interventions fall prevention program maintained;supervised activity  -SY fall prevention program maintained;gait belt  -LW     Recorded by [SY] Sadia French PTA [LW] Jaz Garcia CAN/L     Bed Mobility, Assessment/Treatment    Bed Mobility, Assistive Device bed rails;head of bed elevated  -SY bed rails;head of bed elevated  -LW     Bed Mobility, Roll Left, [place] supervision required  -SY      Bed Mobility, Roll Right, [place] not tested  -SY supervision required   -LW     Bed Mobility, Scoot/Bridge, [place] supervision required  -SY      Bed Mob, Supine to Sit, [place] supervision required  -SY supervision required  -LW     Bed Mob, Sit to Supine, [place] supervision required  -SY supervision required  -LW     Bed Mobility, Safety Issues cognitive deficits limit understanding  -SY cognitive deficits limit understanding  -LW     Recorded by [SY] Sadia French PTA [LW] JUDE WadeA/L     Transfer Assessment/Treatment    Transfers, Bed-Chair [place]  contact guard assist  -LW     Transfers, Sit-Stand [place] contact guard assist  -SY contact guard assist  -LW     Transfers, Stand-Sit [place] contact guard assist  -SY contact guard assist  -LW     Transfers, Sit-Stand-Sit, Assist Device other (see comments)   none  -SY other (see comments)   none  -LW     Toilet Transfer, [place] contact guard assist  -SY contact guard assist  -LW     Toilet Transfer, Assistive Device other (see comments)   HHA  -SY other (see comments)   none  -LW     Transfer, Impairments impaired balance  -SY impaired balance  -LW     Recorded by [SY] Sadia French PTA [LW] JUDE WadeA/DALTON     Gait Assessment/Treatment    Gait, [place] Level contact guard assist  -SY      Gait, Distance (Feet) 10   x2  -SY      Gait, Comment recommend AD for increased distances  -SY      Recorded by [SY] Sadia French PTA      Functional Mobility    Functional Mobility- Ind. Level  contact guard assist  -LW     Functional Mobility- Device  other (see comments)   HHA  -     Functional Mobility-Distance (Feet)  10  -LW     Recorded by  [LW] Jaz Garcia CAN/L     Upper Body Bathing Assessment/Training    UB Bathing Assess/Train Assistive Device  other (see comments)   wash cloth Pan bath  -LW     UB Bathing Assess/Train, Position  sitting;edge of bed  -LW     UB Bathing Assess/Train, [place] Level  supervision required;set up required  -LW     Recorded by   [LW] KINGSLEY Wade     Lower Body Bathing Assessment/Training    LB Bathing Assess/Train Assistive Device  other (see comments)   wash cloth Pan bath  -LW     LB Bathing Assess/Train, Position  sitting;edge of bed  -LW     LB Bathing Assess/Train, [place] Level  supervision required;set up required  -LW     Recorded by  [LW] KINGSLEY Wade     Upper Body Dressing Assessment/Training    UB Dressing Assess/Train, Clothing Type  doffing:;donning:;hospital gown  -LW     UB Dressing Assess/Train, Position  sitting;edge of bed  -LW     UB Dressing Assess/Train, [place]  supervision required  -LW     Recorded by  [LW] KINGSLEY Wade     Lower Body Dressing Assessment/Training    LB Dressing Assess/Train, Clothing Type  doffing:;donning:;pants;shorts;slipper socks  -LW     LB Dressing Assess/Train, Position  edge of bed;sitting;standing  -LW     LB Dressing Assess/Train, [place]  supervision required  -LW     Recorded by  [LW] KINGSLEY Wade     Toileting Assessment/Training    Toileting Assess/Train, Assistive Device  grab bars  -LW     Toileting Assess/Train, Position  sitting  -LW     Toileting Assess/Train, Indepen Level  supervision required  -LW     Recorded by  [LW] KINGSLEY Wade     Grooming Assessment/Training    Grooming Assess/Train, Position  edge of bed;sitting  -LW     Grooming Assess/Train, Indepen Level  supervision required;set up required  -LW     Grooming Assess/Train, Comment  Oral care, wash face and hands, comb hair.   -LW     Recorded by  [LW] KINGSLEY Wade     Positioning and Restraints    Pre-Treatment Position in bed  -SY in bed  -LW     Post Treatment Position bed  -SY chair  -LW     In Bed fowlers;call light within reach;exit alarm on   eating lunch  -SY sitting;call light within reach;encouraged to call for assist;exit alarm on  -LW     Recorded by [SY] Sadia French PTA [LW] KINGSLEY Wade       User Key  (r) =  Recorded By, (t) = Taken By, (c) = Cosigned By    Initials Name Effective Dates    SY Sadia ALMANZA Manolo, PTA 10/17/16 -     LW PAMELLA Wade/DALTON 10/17/16 -                 OT Goals       01/12/18 1236 01/11/18 0856       Transfer Training OT LTG    Transfer Training OT LTG, Date Established  01/11/18  -NN     Transfer Training OT LTG, Time to Achieve  by discharge  -NN     Transfer Training OT LTG, Activity Type  all transfers  -NN     Transfer Training OT LTG, [place] Level  independent  -NN     Transfer Training OT LTG, Date Goal Reviewed (P)  01/12/18  -LW      Transfer Training OT LTG, Outcome (P)  goal ongoing  -LW      Strength OT LTG    Strength Goal OT LTG, Date Established  01/11/18  -NN     Strength Goal OT LTG, Time to Achieve  by discharge  -NN     Strength Goal OT LTG, Measure to Achieve  Pt. will complete BUE strengthening exercises all planes and joints to increase BUE strength to 5/5 for ADLs.   -NN     Strength Goal OT LTG, Date Goal Reviewed (P)  01/12/18  -LW      Strength Goal OT LTG, Outcome (P)  goal ongoing  -LW      ADL OT LTG    ADL OT LTG, Date Established  01/11/18  -NN     ADL OT LTG, Time to Achieve  by discharge  -NN     ADL OT LTG, Activity Type  ADL skills  -NN     ADL OT LTG, [place] Level  modified independent  -NN     ADL OT LTG, Additional Goal  AE PRN  -NN     ADL OT LTG, Date Goal Reviewed (P)  01/12/18  -LW      ADL OT LTG, Outcome (P)  goal ongoing  -LW        User Key  (r) = Recorded By, (t) = Taken By, (c) = Cosigned By    Initials Name Provider Type    LW Jaz Garcia CAN/L Occupational Therapy Assistant    DELFINA Garibay OTR/L Occupational Therapist          Occupational Therapy Education     Title: PT OT SLP Therapies (Active)     Topic: Occupational Therapy (Done)     Point: ADL training (Done)    Description: Instruct learner(s) on proper safety adaptation and remediation techniques during self care or transfers.   Instruct in proper use of  assistive devices.    Learning Progress Summary    Learner Readiness Method Response Comment Documented by Status   Patient Acceptance E VU  LW 01/12/18 1236 Done    Acceptance E NR Pt. educated on role of OT, safe t/f, calling for assistance, benefit of therapy, correcting posture, progression with poc NN 01/11/18 0854 Active               Point: Home exercise program (Done)    Description: Instruct learner(s) on appropriate technique for monitoring, assisting and/or progressing therapeutic exercises/activities.    Learning Progress Summary    Learner Readiness Method Response Comment Documented by Status   Patient Acceptance E VU  LW 01/12/18 1236 Done    Acceptance E NR Pt. educated on role of OT, safe t/f, calling for assistance, benefit of therapy, correcting posture, progression with poc NN 01/11/18 0854 Active               Point: Body mechanics (Done)    Description: Instruct learner(s) on proper positioning and spine alignment during self-care, functional mobility activities and/or exercises.    Learning Progress Summary    Learner Readiness Method Response Comment Documented by Status   Patient Acceptance E   LW 01/12/18 1236 Done    Acceptance E NR Pt. educated on role of OT, safe t/f, calling for assistance, benefit of therapy, correcting posture, progression with poc NN 01/11/18 0854 Active                      User Key     Initials Effective Dates Name Provider Type Discipline     10/17/16 -  PAMELLA Wade/L Occupational Therapy Assistant OT    NN 11/08/17 -  MAIK Sheffield/L Occupational Therapist OT                  OT Recommendation and Plan  Anticipated Discharge Disposition: home with home health, home with assist, home with 24/7 care  Planned Therapy Interventions: activity intolerance, ADL retraining, balance training, bed mobility training, energy conservation, home exercise program, strengthening, transfer training  Therapy Frequency:  (3-14x/week)  Plan of Care Review  Plan  "Of Care Reviewed With: (P) patient  Progress: (P) improving  Outcome Summary/Follow up Plan: (P) Pt bathed this morning. Pt is very confused....first thing that pt said was \"I don't know what Im doing here\"        Outcome Measures       01/12/18 1103 01/12/18 0755 01/11/18 0855    How much help from another person do you currently need...    Turning from your back to your side while in flat bed without using bedrails? 3  -SY  3  -CB    Moving from lying on back to sitting on the side of a flat bed without bedrails? 3  -SY  2  -CB    Moving to and from a bed to a chair (including a wheelchair)? 3  -SY  2  -CB    Standing up from a chair using your arms (e.g., wheelchair, bedside chair)? 3  -SY  3  -CB    Climbing 3-5 steps with a railing? 2  -SY  2  -CB    To walk in hospital room? 3  -SY  3  -CB    AM-PAC 6 Clicks Score 17  -SY  15  -CB    How much help from another is currently needed...    Putting on and taking off regular lower body clothing?  3  -LW     Bathing (including washing, rinsing, and drying)  3  -LW     Toileting (which includes using toilet bed pan or urinal)  3  -LW     Putting on and taking off regular upper body clothing  3  -LW     Taking care of personal grooming (such as brushing teeth)  3  -LW     Eating meals  4  -LW     Score  19  -LW     Functional Assessment    Outcome Measure Options   AM-PAC 6 Clicks Basic Mobility (PT)  -CB      01/11/18 0800          How much help from another is currently needed...    Putting on and taking off regular lower body clothing? 3  -NN      Bathing (including washing, rinsing, and drying) 2  -NN      Toileting (which includes using toilet bed pan or urinal) 3  -NN      Putting on and taking off regular upper body clothing 3  -NN      Taking care of personal grooming (such as brushing teeth) 3  -NN      Eating meals 4  -NN      Score 18  -NN      Functional Assessment    Outcome Measure Options AM-PAC 6 Clicks Daily Activity (OT)  -NN        User Key  (r) = " Recorded By, (t) = Taken By, (c) = Cosigned By    Initials Name Provider Type    CB Vanesa Ramos, PT Physical Therapist    SY Sadia French, PTA Physical Therapy Assistant    LW PAMELLA Wade/L Occupational Therapy Assistant    DELFNIA Garibay, OTR/L Occupational Therapist           Time Calculation:         Time Calculation- OT       01/12/18 1238          Time Calculation- OT    OT Start Time 0755  -LW      OT Stop Time 0910  -LW      OT Time Calculation (min) 75 min  -LW      Total Timed Code Minutes- OT 75 minute(s)  -LW      OT Received On 01/12/18  -LW        User Key  (r) = Recorded By, (t) = Taken By, (c) = Cosigned By    Initials Name Provider Type    LW JUDE WadeA/L Occupational Therapy Assistant           Therapy Charges for Today     Code Description Service Date Service Provider Modifiers Qty    [id number] HC OT SELF CARE/MGMT/TRAIN EA 15 MIN 1/12/2018 PAMELLA Wade/L GO 5          OT G-codes  OT Functional Scales Options: AM-PAC 6 Clicks Daily Activity (OT)  Functional Limitation: Self care  Self Care Current Status (): At least 40 percent but less than 60 percent impaired, limited or restricted  Self Care Goal Status (): At least 20 percent but less than 40 percent impaired, limited or restricted    PAMELLA Wade/DALTON  1/12/2018

## 2018-01-12 NOTE — PROGRESS NOTES
FAMILY MEDICINE PROGRESS NOTE  NAME: Michelle Child  : 1955  MRN: [id number]     LOS: 2 days   Full Code  PROVIDER OF SERVICE:     Chief Complaint:  Pneumonia of left lower lobe due to Streptococcus pneumoniae    Subjective     Interval History:  History taken from: patient  Subjective: Patient is sitting up at the edge of the bed getting ready to eat breakfast. Says she slept well last night. She says she is feeling some better.      Review of Systems:   Review of Systems   Constitutional: Positive for activity change and appetite change. Negative for fatigue and fever.   HENT: Negative for ear pain and sore throat.    Eyes: Negative for pain and visual disturbance.   Respiratory: Positive for cough, shortness of breath and wheezing.    Cardiovascular: Negative for chest pain and palpitations.   Gastrointestinal: Negative for abdominal pain and nausea.   Endocrine: Negative for cold intolerance and heat intolerance.   Genitourinary: Negative for difficulty urinating and dysuria.   Musculoskeletal: Negative for arthralgias and gait problem.   Skin: Negative for color change and rash.   Neurological: Positive for weakness. Negative for dizziness and headaches.   Hematological: Negative for adenopathy. Does not bruise/bleed easily.   Psychiatric/Behavioral: Positive for confusion. Negative for agitation and sleep disturbance.       Objective     Vital Signs  Temp:  [97.7 °F (36.5 °C)-100 °F (37.8 °C)] 99 °F (37.2 °C)  Heart Rate:  [] 95  Resp:  [18-24] 18  BP: (121-188)/(57-87) 124/59    Physical Exam  Physical Exam   Constitutional: She is oriented to person, place, and time. She appears well-developed and well-nourished.   HENT:   Head: Normocephalic and atraumatic.   Right Ear: External ear normal.   Left Ear: External ear normal.   Nose: Nose normal.   Mouth/Throat: Oropharynx is clear and moist.   Eyes: Conjunctivae and EOM are normal.   Neck: Normal range of motion. Neck supple.    Cardiovascular: Normal rate, regular rhythm and normal heart sounds.    Pulmonary/Chest: Effort normal. She has decreased breath sounds. She has rhonchi in the left lower field.   Abdominal: Soft. Bowel sounds are normal. She exhibits no distension. There is no tenderness.   Musculoskeletal: Normal range of motion.   Neurological: She is alert and oriented to person, place, and time.   Skin: Skin is warm and dry.   Psychiatric: She has a normal mood and affect. Her speech is normal and behavior is normal. Cognition and memory are normal.       Medication Review    Current Facility-Administered Medications:   •  acetaminophen (TYLENOL) tablet 650 mg, 650 mg, Oral, Q4H PRN, Coral Berry MD, 650 mg at 01/11/18 1550  •  amitriptyline (ELAVIL) tablet 100 mg, 100 mg, Oral, Nightly, Coral Berry MD, 100 mg at 01/11/18 2033  •  aspirin chewable tablet 81 mg, 81 mg, Oral, Daily, Coral Berry MD, 81 mg at 01/11/18 0939  •  bisacodyl (DULCOLAX) EC tablet 5 mg, 5 mg, Oral, Daily PRN, Coral Berry MD  •  carvedilol (COREG) tablet 3.125 mg, 3.125 mg, Oral, BID With Meals, Coral Berry MD, 3.125 mg at 01/11/18 1802  •  enoxaparin (LOVENOX) syringe 40 mg, 40 mg, Subcutaneous, Q24H, Coral Berry MD, 40 mg at 01/11/18 1801  •  famotidine (PEPCID) tablet 40 mg, 40 mg, Oral, Daily, Coral Berry MD, 40 mg at 01/11/18 0942  •  FLUoxetine (PROzac) capsule 60 mg, 60 mg, Oral, Daily, Coral Berry MD, 60 mg at 01/11/18 1327  •  HYDROcodone-acetaminophen (NORCO) 5-325 MG per tablet 1 tablet, 1 tablet, Oral, Q4H PRN, Coral Berry MD, 1 tablet at 01/11/18 2128  •  influenza vac split quad (FLUZONE QUADRIVALENT) IM suspension 0.5 mL, 0.5 mL, Intramuscular, During Hospitalization, Coral Berry MD  •  ipratropium-albuterol (DUO-NEB) nebulizer solution 3 mL, 3 mL, Nebulization, 4x Daily - RT, Coral Berry MD, 3 mL at 01/11/18 1447  •  levoFLOXacin (LEVAQUIN) 750 mg/150 mL D5W (premix)  (LEVAQUIN) 750 mg, 750 mg, Intravenous, Q24H, Coral Berry MD, 750 mg at 01/11/18 1723  •  losartan (COZAAR) tablet 50 mg, 50 mg, Oral, Q24H, Coral Berry MD, 50 mg at 01/11/18 0942  •  methylPREDNISolone sodium succinate (SOLU-Medrol) injection 40 mg, 40 mg, Intravenous, Daily, Coral Berry MD, 40 mg at 01/11/18 1103  •  nicotine (NICODERM CQ) 21 MG/24HR patch 1 patch, 1 patch, Transdermal, Q24H, Coral Berry MD, 1 patch at 01/11/18 1802  •  ondansetron (ZOFRAN) tablet 4 mg, 4 mg, Oral, Q6H PRN, Coral Berry MD, 4 mg at 01/11/18 1941  •  pneumococcal polysaccharide 23-valent (PNEUMOVAX-23) vaccine 0.5 mL, 0.5 mL, Intramuscular, During Hospitalization, Coral Berry MD  •  potassium chloride (MICRO-K) CR capsule 40 mEq, 40 mEq, Oral, TID With Meals, Coral Berry MD, 40 mEq at 01/11/18 1802  •  risperiDONE (risperDAL) tablet 1 mg, 1 mg, Oral, Nightly, Coral Berry MD, 1 mg at 01/11/18 2033  •  sodium chloride 0.9 % flush 1-10 mL, 1-10 mL, Intravenous, PRN, Coral Berry MD  •  sodium chloride 0.9 % flush 10 mL, 10 mL, Intravenous, PRN, Toro Shine MD, 10 mL at 01/11/18 1103  •  sodium chloride 0.9 % infusion, 50 mL/hr, Intravenous, Continuous, Coral Berry MD, Last Rate: 50 mL/hr at 01/11/18 2121, 50 mL/hr at 01/11/18 2121     Diagnostic Data      I reviewed the patient's new clinical results and imaging.      Assessment/Plan     Active Hospital Problems (** Indicates Principal Problem)    Diagnosis Date Noted   • **Pneumonia of left lower lobe due to Streptococcus pneumoniae [J13] 01/11/2018     Levaquin     • Weakness [R53.1] 01/10/2018     PT/OT     • Acute respiratory failure with hypoxia [J96.01] 01/10/2018     NC oxygen     • Fall with injury [W19.XXXA] 08/15/2017     PT/OT  AAOx3     • Tobacco dependence [F17.200]      Nicotine patch 21mg transdermally     • Gastroesophageal reflux disease [K21.9]      Pepcid     • Essential hypertension [I10]       Losartan, coreg     • Depressive disorder [F32.9]      Prozac, Elavil, and Risperdal        Resolved Hospital Problems    Diagnosis Date Noted Date Resolved   • Hypokalemia [E87.6] 01/10/2018 01/11/2018     Replace Orally and recheck   Check magnesium     • Community acquired bacterial pneumonia [J15.9] 01/10/2018 01/11/2018     Left lower lobe  Blood culture, sputum culture, check atypicals.   Levaquin as PCN allergy     • Hyponatremia [E87.1] 01/10/2018 01/11/2018     IV fluids           DVT prophylaxis: SCDs/TEDs and Lovenox      Disposition:Uncertain at this time 3-4 days in hospital          This document has been electronically signed by Coral Berry MD on January 12, 2018 7:32 AM

## 2018-01-12 NOTE — PLAN OF CARE
"Problem: Patient Care Overview (Adult)  Goal: Plan of Care Review  Outcome: Ongoing (interventions implemented as appropriate)   01/12/18 1236   Coping/Psychosocial Response Interventions   Plan Of Care Reviewed With patient   Patient Care Overview   Progress improving   Outcome Evaluation   Outcome Summary/Follow up Plan Pt bathed this morning. Pt is very confused....first thing that pt said was \"I don't know what Im doing here\"       Problem: Inpatient Occupational Therapy  Goal: Transfer Training Goal 1 LTG- OT  Outcome: Ongoing (interventions implemented as appropriate)   01/11/18 0856 01/12/18 1236   Transfer Training OT LTG   Transfer Training OT LTG, Date Established 01/11/18 --    Transfer Training OT LTG, Time to Achieve by discharge --    Transfer Training OT LTG, Activity Type all transfers --    Transfer Training OT LTG, [place] Level independent --    Transfer Training OT LTG, Date Goal Reviewed --  01/12/18   Transfer Training OT LTG, Outcome --  goal ongoing     Goal: Strength Goal LTG- OT  Outcome: Ongoing (interventions implemented as appropriate)   01/11/18 0856 01/12/18 1236   Strength OT LTG   Strength Goal OT LTG, Date Established 01/11/18 --    Strength Goal OT LTG, Time to Achieve by discharge --    Strength Goal OT LTG, Measure to Achieve Pt. will complete BUE strengthening exercises all planes and joints to increase BUE strength to 5/5 for ADLs.  --    Strength Goal OT LTG, Date Goal Reviewed --  01/12/18   Strength Goal OT LTG, Outcome --  goal ongoing     Goal: ADL Goal LTG- OT  Outcome: Ongoing (interventions implemented as appropriate)   01/11/18 0856 01/12/18 1236   ADL OT LTG   ADL OT LTG, Date Established 01/11/18 --    ADL OT LTG, Time to Achieve by discharge --    ADL OT LTG, Activity Type ADL skills --    ADL OT LTG, [place] Level modified independent --    ADL OT LTG, Additional Goal AE PRN --    ADL OT LTG, Date Goal Reviewed --  01/12/18   ADL OT LTG, Outcome --  goal " ongoing

## 2018-01-12 NOTE — THERAPY TREATMENT NOTE
Acute Care - Physical Therapy Treatment Note  [facility]     Patient Name: Michelle Child  : 1955  MRN: [id number]  Today's Date: 2018  Onset of Illness/Injury or Date of Surgery Date: 01/10/18  Date of Referral to PT: 01/10/18  Referring Physician: Dr Berry    Admit Date: 1/10/2018    Visit Dx:    ICD-10-CM ICD-9-CM   1. Hypokalemia E87.6 276.8   2. Pneumonia of left lung due to infectious organism, unspecified part of lung J18.9 486   3. Fall, initial encounter W19.XXXA E888.9   4. Contusion of scalp, initial encounter S00.03XA 920   5. Decreased activities of daily living (ADL) Z78.9 V49.89   6. Impaired physical mobility Z74.09 781.99     Patient Active Problem List   Diagnosis   • Tobacco dependence   • Migraine   • Iron deficiency   • Gastroesophageal reflux disease   • Essential hypertension   • Depressive disorder   • Coronary arteriosclerosis   • Bilateral pseudophakia   • Astigmatism   • Right humeral fracture   • Fall with injury   • Closed fracture of humerus   • Cause of injury, fall   • Closed fracture of proximal end of right humerus with routine healing   • Weakness   • Acute respiratory failure with hypoxia   • Pneumonia of left lower lobe due to Streptococcus pneumoniae               Adult Rehabilitation Note       18 1103          Rehab Assessment/Intervention    Discipline physical therapy assistant  -      Document Type therapy note (daily note)  -      Subjective Information agree to therapy;nausea/vomiting;dyspnea  -      Patient Effort, Rehab Treatment adequate  -SY      Precautions/Limitations fall precautions;oxygen therapy device and L/min  -      Equipment Issued to Patient gait belt  -      Recorded by [SY] Sadia French PTA      Vital Signs    Pre Systolic BP Rehab 152  -SY      Pre Treatment Diastolic BP 72  -SY      Pretreatment Heart Rate (beats/min) 98  -SY      Intratreatment Heart Rate (beats/min) 93  -SY      Pre SpO2 (%) 95  -SY      O2  Delivery Pre Treatment supplemental O2  -SY      Pre Patient Position Supine  -SY      Intra Patient Position Sitting  -SY      Recorded by [SY] Sadia French PTA      Pain Assessment    Pain Assessment No/denies pain  -SY      Recorded by [SY] Sadia French PTA      Vision Assessment/Intervention    Visual Impairment WNL;WFL  -SY      Recorded by [SY] Sadia French PTA      Cognitive Assessment/Intervention    Current Cognitive/Communication Assessment impaired  -SY      Orientation Status oriented to;person;place  -SY      Follows Commands/Answers Questions 100% of the time  -SY      Personal Safety decreased awareness, need for assist  -SY      Personal Safety Interventions fall prevention program maintained;supervised activity  -SY      Recorded by [SY] Sadia French PTA      Bed Mobility, Assessment/Treatment    Bed Mobility, Assistive Device bed rails;head of bed elevated  -SY      Bed Mobility, Roll Left, [place] supervision required  -SY      Bed Mobility, Roll Right, [place] not tested  -SY      Bed Mobility, Scoot/Bridge, [place] supervision required  -SY      Bed Mob, Supine to Sit, [place] supervision required  -SY      Bed Mob, Sit to Supine, [place] supervision required  -SY      Bed Mobility, Safety Issues cognitive deficits limit understanding  -SY      Recorded by [SY] Sadia French PTA      Transfer Assessment/Treatment    Transfers, Sit-Stand [place] contact guard assist  -SY      Transfers, Stand-Sit [place] contact guard assist  -SY      Transfers, Sit-Stand-Sit, Assist Device other (see comments)   none  -SY      Toilet Transfer, [place] contact guard assist  -SY      Toilet Transfer, Assistive Device other (see comments)   HHA  -SY      Transfer, Impairments impaired balance  -SY      Recorded by [SY] Sadia French PTA      Gait Assessment/Treatment    Gait, [place] Level contact guard assist  -SY      Gait, Distance (Feet) 10   x2  -SY       Gait, Comment recommend AD for increased distances  -SY      Recorded by [SY] Sadia French PTA      Positioning and Restraints    Pre-Treatment Position in bed  -SY      Post Treatment Position bed  -SY      In Bed fowlers;call light within reach;exit alarm on   eating lunch  -SY      Recorded by [SY] Sadia French PTA        User Key  (r) = Recorded By, (t) = Taken By, (c) = Cosigned By    Initials Name Effective Dates    SY Sadia French PTA 10/17/16 -                 IP PT Goals       01/12/18 1103 01/11/18 0855       Bed Mobility PT STG    Bed Mobility PT STG, Date Established  01/11/18  -CB     Bed Mobility PT STG, Time to Achieve  2 - 3 days  -CB     Bed Mobility PT STG, Activity Type  supine to sit/sit to supine  -CB     Bed Mobility PT STG, [place] Level  independent  -CB     Bed Mobility PT STG, Additional Goal  HOB down and no rails  -CB     Bed Mobility PT STG, Date Goal Reviewed 01/12/18  -SY      Bed Mobility PT STG, Outcome goal ongoing  -SY      Transfer Training PT STG    Transfer Training PT STG, Date Established  01/11/18  -CB     Transfer Training PT STG, Time to Achieve  2 - 3 days  -CB     Transfer Training PT STG, Activity Type  bed to chair /chair to bed;sit to stand/stand to sit  -CB     Transfer Training PT STG, [place] Level  supervision required  -CB     Transfer Training PT STG, Assist Device  --   least restrictive  -CB     Transfer Training PT STG, Date Goal Reviewed 01/12/18  -SY      Transfer Training PT STG, Outcome goal ongoing  -SY      Gait Training PT LTG    Gait Training Goal PT LTG, Date Established  01/11/18  -CB     Gait Training Goal PT LTG, Time to Achieve  by discharge  -CB     Gait Training Goal PT LTG, [place] Level  conditional independence  -CB     Gait Training Goal PT LTG, Assist Device  --   least restrictive assistive device  -CB     Gait Training Goal PT LTG, Distance to Achieve  200 feet  -CB     Gait Training Goal PT LTG, Date Goal  Reviewed 01/12/18  -SY      Gait Training Goal PT LTG, Outcome goal ongoing  -SY      Strength Goal PT LTG    Strength Goal PT LTG, Date Established  01/11/18  -CB     Strength Goal PT LTG, Time to Achieve  4 days  -CB     Strength Goal PT LTG, Measure to Achieve  20 reps all ex BLE actively sitting/ standing  -CB     Strength Goal PT LTG, Date Goal Reviewed 01/12/18  -SY      Strength Goal PT LTG, Outcome goal ongoing  -SY        User Key  (r) = Recorded By, (t) = Taken By, (c) = Cosigned By    Initials Name Provider Type    CB Vanesa Ramos, PT Physical Therapist    SY Sadia French, PTA Physical Therapy Assistant          Physical Therapy Education     Title: PT OT SLP Therapies (Active)     Topic: Physical Therapy (Active)     Point: Mobility training (Active)    Learning Progress Summary    Learner Readiness Method Response Comment Documented by Status   Patient Acceptance D NR   01/11/18 1129 Active               Point: Precautions (Active)    Learning Progress Summary    Learner Readiness Method Response Comment Documented by Status   Patient Acceptance D NR   01/11/18 1129 Active                      User Key     Initials Effective Dates Name Provider Type Discipline     04/06/17 -  Vanesa Ramos, PT Physical Therapist PT                    PT Recommendation and Plan  Anticipated Equipment Needs At Discharge:  (to be determined)  Anticipated Discharge Disposition: home with home health  Demonstrates Need for Referral to Another Service: home health care  Planned Therapy Interventions: bed mobility training, gait training, home exercise program, strengthening, transfer training  PT Frequency:  (5-14)  Plan of Care Review  Plan Of Care Reviewed With: patient  Progress: progress towards functional goals is fair  Outcome Summary/Follow up Plan: pt still showing confusion and demosntrated decreased balance with short distance gait.  Pt required vvc for safety and did not meet any goals at this time.             Outcome Measures       01/12/18 1103 01/11/18 0855 01/11/18 0800    How much help from another person do you currently need...    Turning from your back to your side while in flat bed without using bedrails? 3  -SY 3  -CB     Moving from lying on back to sitting on the side of a flat bed without bedrails? 3  -SY 2  -CB     Moving to and from a bed to a chair (including a wheelchair)? 3  -SY 2  -CB     Standing up from a chair using your arms (e.g., wheelchair, bedside chair)? 3  -SY 3  -CB     Climbing 3-5 steps with a railing? 2  -SY 2  -CB     To walk in hospital room? 3  -SY 3  -CB     AM-PAC 6 Clicks Score 17  -SY 15  -CB     How much help from another is currently needed...    Putting on and taking off regular lower body clothing?   3  -NN    Bathing (including washing, rinsing, and drying)   2  -NN    Toileting (which includes using toilet bed pan or urinal)   3  -NN    Putting on and taking off regular upper body clothing   3  -NN    Taking care of personal grooming (such as brushing teeth)   3  -NN    Eating meals   4  -NN    Score   18  -NN    Functional Assessment    Outcome Measure Options  AM-PAC 6 Clicks Basic Mobility (PT)  -CB AM-PAC 6 Clicks Daily Activity (OT)  -NN      User Key  (r) = Recorded By, (t) = Taken By, (c) = Cosigned By    Initials Name Provider Type    CB Vanesa Ramos, PT Physical Therapist    SY French PTA Physical Therapy Assistant    NN Janet Garibay, OTR/L Occupational Therapist           Time Calculation:         PT Charges       01/12/18 1136          Time Calculation    Start Time 1103  -SY      Stop Time 1130  -SY      Time Calculation (min) 27 min  -SY      Time Calculation- PT    Total Timed Code Minutes- PT 27 minute(s)  -SY        User Key  (r) = Recorded By, (t) = Taken By, (c) = Cosigned By    Initials Name Provider Type    SY French PTA Physical Therapy Assistant          Therapy Charges for Today     Code Description Service Date Service  Provider Modifiers Qty    53083154059 HC GAIT TRAINING EA 15 MIN 1/12/2018 Sadia French PTA GP 1    42342430302 HC PT THERAPEUTIC ACT EA 15 MIN 1/12/2018 Sadia French PTA GP 1          PT G-Codes  PT Professional Judgement Used?: Yes  Outcome Measure Options: AM-PAC 6 Clicks Basic Mobility (PT)  Score: 15  Functional Limitation: Mobility: Walking and moving around  Mobility: Walking and Moving Around Current Status (): At least 40 percent but less than 60 percent impaired, limited or restricted  Mobility: Walking and Moving Around Goal Status (): At least 20 percent but less than 40 percent impaired, limited or restricted    Sadia French PTA  1/12/2018

## 2018-01-12 NOTE — PLAN OF CARE
Problem: Patient Care Overview (Adult)  Goal: Plan of Care Review  Outcome: Ongoing (interventions implemented as appropriate)   01/12/18 1103   Coping/Psychosocial Response Interventions   Plan Of Care Reviewed With patient   Patient Care Overview   Progress progress towards functional goals is fair   Outcome Evaluation   Outcome Summary/Follow up Plan pt still showing confusion and demosntrated decreased balance with short distance gait. Pt required vvc for safety and did not meet any goals at this time.        Problem: Inpatient Physical Therapy  Goal: Bed Mobility Goal STG- PT  Outcome: Ongoing (interventions implemented as appropriate)   01/11/18 0855 01/12/18 1103   Bed Mobility PT STG   Bed Mobility PT STG, Date Established 01/11/18 --    Bed Mobility PT STG, Time to Achieve 2 - 3 days --    Bed Mobility PT STG, Activity Type supine to sit/sit to supine --    Bed Mobility PT STG, [place] Level independent --    Bed Mobility PT STG, Additional Goal HOB down and no rails --    Bed Mobility PT STG, Date Goal Reviewed --  01/12/18   Bed Mobility PT STG, Outcome --  goal ongoing     Goal: Transfer Training Goal 1 STG- PT  Outcome: Ongoing (interventions implemented as appropriate)   01/11/18 0855 01/12/18 1103   Transfer Training PT STG   Transfer Training PT STG, Date Established 01/11/18 --    Transfer Training PT STG, Time to Achieve 2 - 3 days --    Transfer Training PT STG, Activity Type bed to chair /chair to bed;sit to stand/stand to sit --    Transfer Training PT STG, [place] Level supervision required --    Transfer Training PT STG, Assist Device (least restrictive) --    Transfer Training PT STG, Date Goal Reviewed --  01/12/18   Transfer Training PT STG, Outcome --  goal ongoing     Goal: Gait Training Goal LTG- PT  Outcome: Ongoing (interventions implemented as appropriate)   01/11/18 0855 01/12/18 1103   Gait Training PT LTG   Gait Training Goal PT LTG, Date Established 01/11/18 --    Gait  Training Goal PT LTG, Time to Achieve by discharge --    Gait Training Goal PT LTG, [place] Level conditional independence --    Gait Training Goal PT LTG, Assist Device (least restrictive assistive device) --    Gait Training Goal PT LTG, Distance to Achieve 200 feet --    Gait Training Goal PT LTG, Date Goal Reviewed --  01/12/18   Gait Training Goal PT LTG, Outcome --  goal ongoing     Goal: Strength Goal LTG- PT  Outcome: Ongoing (interventions implemented as appropriate)   01/11/18 0855 01/12/18 1103   Strength Goal PT LTG   Strength Goal PT LTG, Date Established 01/11/18 --    Strength Goal PT LTG, Time to Achieve 4 days --    Strength Goal PT LTG, Measure to Achieve 20 reps all ex BLE actively sitting/ standing --    Strength Goal PT LTG, Date Goal Reviewed --  01/12/18   Strength Goal PT LTG, Outcome --  goal ongoing

## 2018-01-12 NOTE — PROGRESS NOTES
Discussed with son, Bryon Nelson, about his mother having Strep pneumo PNA.  Patient's mental status has improved but she is quite hypoxic without oxygen. Anticipate her being her through early next week.  Have discussed that patient does live alone with family close by but am concerned about her eating and taking her medications. Family is not always available and son is unsure if she eats consistently because he works out of town.  He is not sure what to do at this point. I suggested that everyone that would be helping care for her get together and offered to facilitate a meeting to help with decision making.  He will talk to his family and let me know if he needs anything else.         This document has been electronically signed by Coral Berry MD on January 12, 2018 2:52 PM

## 2018-01-13 PROBLEM — E87.1 HYPONATREMIA: Status: ACTIVE | Noted: 2018-01-13

## 2018-01-13 PROBLEM — E87.5 HYPERKALEMIA: Status: ACTIVE | Noted: 2018-01-13

## 2018-01-13 PROBLEM — I67.89 CEREBRAL MICROVASCULAR DISEASE: Status: ACTIVE | Noted: 2018-01-13

## 2018-01-13 LAB
ALBUMIN SERPL-MCNC: 2.5 G/DL (ref 3.4–4.8)
ALBUMIN/GLOB SERPL: 0.9 G/DL (ref 1.1–1.8)
ALP SERPL-CCNC: 108 U/L (ref 38–126)
ALT SERPL W P-5'-P-CCNC: 31 U/L (ref 9–52)
ANION GAP SERPL CALCULATED.3IONS-SCNC: 7 MMOL/L (ref 5–15)
AST SERPL-CCNC: 31 U/L (ref 14–36)
BASOPHILS # BLD AUTO: 0.01 10*3/MM3 (ref 0–0.2)
BASOPHILS NFR BLD AUTO: 0.1 % (ref 0–2)
BILIRUB SERPL-MCNC: 0.4 MG/DL (ref 0.2–1.3)
BUN BLD-MCNC: 11 MG/DL (ref 7–21)
BUN/CREAT SERPL: 14.7 (ref 7–25)
CALCIUM SPEC-SCNC: 8.4 MG/DL (ref 8.4–10.2)
CHLORIDE SERPL-SCNC: 107 MMOL/L (ref 95–110)
CO2 SERPL-SCNC: 20 MMOL/L (ref 22–31)
CREAT BLD-MCNC: 0.75 MG/DL (ref 0.5–1)
DEPRECATED RDW RBC AUTO: 49.8 FL (ref 36.4–46.3)
EOSINOPHIL # BLD AUTO: 0 10*3/MM3 (ref 0–0.7)
EOSINOPHIL NFR BLD AUTO: 0 % (ref 0–7)
ERYTHROCYTE [DISTWIDTH] IN BLOOD BY AUTOMATED COUNT: 16 % (ref 11.5–14.5)
GFR SERPL CREATININE-BSD FRML MDRD: 78 ML/MIN/1.73 (ref 60–104)
GLOBULIN UR ELPH-MCNC: 2.7 GM/DL (ref 2.3–3.5)
GLUCOSE BLD-MCNC: 88 MG/DL (ref 60–100)
HCT VFR BLD AUTO: 26.4 % (ref 35–45)
HGB BLD-MCNC: 8.1 G/DL (ref 12–15.5)
IMM GRANULOCYTES # BLD: 0.08 10*3/MM3 (ref 0–0.02)
IMM GRANULOCYTES NFR BLD: 0.5 % (ref 0–0.5)
LYMPHOCYTES # BLD AUTO: 1.12 10*3/MM3 (ref 0.6–4.2)
LYMPHOCYTES NFR BLD AUTO: 7.5 % (ref 10–50)
MCH RBC QN AUTO: 26.4 PG (ref 26.5–34)
MCHC RBC AUTO-ENTMCNC: 30.7 G/DL (ref 31.4–36)
MCV RBC AUTO: 86 FL (ref 80–98)
MONOCYTES # BLD AUTO: 0.99 10*3/MM3 (ref 0–0.9)
MONOCYTES NFR BLD AUTO: 6.6 % (ref 0–12)
NEUTROPHILS # BLD AUTO: 12.81 10*3/MM3 (ref 2–8.6)
NEUTROPHILS NFR BLD AUTO: 85.3 % (ref 37–80)
NRBC BLD MANUAL-RTO: 0 /100 WBC (ref 0–0)
PLATELET # BLD AUTO: 281 10*3/MM3 (ref 150–450)
PMV BLD AUTO: 9.8 FL (ref 8–12)
POTASSIUM BLD-SCNC: 5.2 MMOL/L (ref 3.5–5.1)
PROT SERPL-MCNC: 5.2 G/DL (ref 6.3–8.6)
RBC # BLD AUTO: 3.07 10*6/MM3 (ref 3.77–5.16)
SODIUM BLD-SCNC: 134 MMOL/L (ref 137–145)
WBC NRBC COR # BLD: 15.01 10*3/MM3 (ref 3.2–9.8)

## 2018-01-13 PROCEDURE — 94799 UNLISTED PULMONARY SVC/PX: CPT

## 2018-01-13 PROCEDURE — 80053 COMPREHEN METABOLIC PANEL: CPT | Performed by: FAMILY MEDICINE

## 2018-01-13 PROCEDURE — 97535 SELF CARE MNGMENT TRAINING: CPT

## 2018-01-13 PROCEDURE — 25010000002 METHYLPREDNISOLONE PER 40 MG: Performed by: FAMILY MEDICINE

## 2018-01-13 PROCEDURE — 99232 SBSQ HOSP IP/OBS MODERATE 35: CPT | Performed by: FAMILY MEDICINE

## 2018-01-13 PROCEDURE — 25010000002 ENOXAPARIN PER 10 MG: Performed by: FAMILY MEDICINE

## 2018-01-13 PROCEDURE — 25010000002 LEVOFLOXACIN PER 250 MG: Performed by: FAMILY MEDICINE

## 2018-01-13 PROCEDURE — 85025 COMPLETE CBC W/AUTO DIFF WBC: CPT | Performed by: FAMILY MEDICINE

## 2018-01-13 PROCEDURE — 97110 THERAPEUTIC EXERCISES: CPT

## 2018-01-13 PROCEDURE — 94760 N-INVAS EAR/PLS OXIMETRY 1: CPT

## 2018-01-13 PROCEDURE — 97116 GAIT TRAINING THERAPY: CPT

## 2018-01-13 RX ORDER — POTASSIUM CHLORIDE 750 MG/1
10 CAPSULE, EXTENDED RELEASE ORAL DAILY
Status: DISCONTINUED | OUTPATIENT
Start: 2018-01-14 | End: 2018-01-16

## 2018-01-13 RX ADMIN — LEVOFLOXACIN 750 MG: 5 INJECTION, SOLUTION INTRAVENOUS at 16:09

## 2018-01-13 RX ADMIN — ENOXAPARIN SODIUM 40 MG: 40 INJECTION SUBCUTANEOUS at 16:09

## 2018-01-13 RX ADMIN — HYDROCODONE BITARTRATE AND ACETAMINOPHEN 1 TABLET: 5; 325 TABLET ORAL at 21:23

## 2018-01-13 RX ADMIN — IPRATROPIUM BROMIDE AND ALBUTEROL SULFATE 3 ML: 2.5; .5 SOLUTION RESPIRATORY (INHALATION) at 14:45

## 2018-01-13 RX ADMIN — IPRATROPIUM BROMIDE AND ALBUTEROL SULFATE 3 ML: 2.5; .5 SOLUTION RESPIRATORY (INHALATION) at 07:17

## 2018-01-13 RX ADMIN — CARVEDILOL 3.12 MG: 3.12 TABLET, FILM COATED ORAL at 09:09

## 2018-01-13 RX ADMIN — CARVEDILOL 3.12 MG: 3.12 TABLET, FILM COATED ORAL at 18:39

## 2018-01-13 RX ADMIN — LOSARTAN POTASSIUM 50 MG: 50 TABLET, FILM COATED ORAL at 09:09

## 2018-01-13 RX ADMIN — AMITRIPTYLINE HYDROCHLORIDE 100 MG: 50 TABLET, FILM COATED ORAL at 21:23

## 2018-01-13 RX ADMIN — METHYLPREDNISOLONE SODIUM SUCCINATE 40 MG: 40 INJECTION, POWDER, FOR SOLUTION INTRAMUSCULAR; INTRAVENOUS at 09:09

## 2018-01-13 RX ADMIN — ASPIRIN 81 MG 81 MG: 81 TABLET ORAL at 09:10

## 2018-01-13 RX ADMIN — FAMOTIDINE 40 MG: 20 TABLET, FILM COATED ORAL at 09:09

## 2018-01-13 RX ADMIN — RISPERIDONE 1 MG: 1 TABLET ORAL at 21:23

## 2018-01-13 RX ADMIN — NICOTINE 1 PATCH: 21 PATCH TRANSDERMAL at 16:09

## 2018-01-13 RX ADMIN — IPRATROPIUM BROMIDE AND ALBUTEROL SULFATE 3 ML: 2.5; .5 SOLUTION RESPIRATORY (INHALATION) at 20:38

## 2018-01-13 RX ADMIN — FLUOXETINE 60 MG: 20 CAPSULE ORAL at 09:09

## 2018-01-13 NOTE — THERAPY TREATMENT NOTE
Acute Care - Occupational Therapy Treatment Note  [facility]     Patient Name: Michelle Child  : 1955  MRN: [id number]  Today's Date: 2018  Onset of Illness/Injury or Date of Surgery Date: 01/10/18  Date of Referral to OT: 01/10/18  Referring Physician: Dr Berry      Admit Date: 1/10/2018    Visit Dx:     ICD-10-CM ICD-9-CM   1. Hypokalemia E87.6 276.8   2. Pneumonia of left lung due to infectious organism, unspecified part of lung J18.9 486   3. Fall, initial encounter W19.XXXA E888.9   4. Contusion of scalp, initial encounter S00.03XA 920   5. Decreased activities of daily living (ADL) Z78.9 V49.89   6. Impaired physical mobility Z74.09 781.99     Patient Active Problem List   Diagnosis   • Tobacco dependence   • Migraine   • Iron deficiency   • Gastroesophageal reflux disease   • Essential hypertension   • Depressive disorder   • Coronary arteriosclerosis   • Bilateral pseudophakia   • Astigmatism   • Right humeral fracture   • Fall with injury   • Closed fracture of humerus   • Cause of injury, fall   • Closed fracture of proximal end of right humerus with routine healing   • Weakness   • Acute respiratory failure with hypoxia   • Pneumonia of left lower lobe due to Streptococcus pneumoniae             Adult Rehabilitation Note       18 1015 18 0919 18 1103    Rehab Assessment/Intervention    Discipline occupational therapist  -NN physical therapy assistant  -CH physical therapy assistant  -SY    Document Type therapy note (daily note)  -NN therapy note (daily note)  - therapy note (daily note)  -SY    Subjective Information agree to therapy;complains of;weakness;fatigue  -NN agree to therapy  - agree to therapy;nausea/vomiting;dyspnea  -SY    Patient Effort, Rehab Treatment  adequate  -CH adequate  -SY    Precautions/Limitations fall precautions   decreased cognition  -NN fall precautions;oxygen therapy device and L/min  - fall precautions;oxygen therapy device and  L/min  -SY    Equipment Issued to Patient   gait belt  -SY    Recorded by [NN] Janet Garibay, OTR/L [] Nichelle Rahman, KIN [SY] Sadia French PTA    Vital Signs    Pre Systolic BP Rehab  141  -  -SY    Pre Treatment Diastolic BP  77  -CH 72  -SY    Intra Treatment Diastolic BP  101  -CH     Post Systolic BP Rehab  63  -CH     Pretreatment Heart Rate (beats/min)  97  -CH 98  -SY    Intratreatment Heart Rate (beats/min)   93  -SY    Posttreatment Heart Rate (beats/min)  101  -CH     Pre SpO2 (%)  92  -CH 95  -SY    O2 Delivery Pre Treatment  supplemental O2  -CH supplemental O2  -SY    Intra SpO2 (%)  90  -CH     O2 Delivery Intra Treatment  supplemental O2  -CH     Post SpO2 (%)  91  -CH     O2 Delivery Post Treatment  supplemental O2  -CH     Pre Patient Position  Sitting  -CH Supine  -SY    Intra Patient Position  Standing  -CH Sitting  -SY    Post Patient Position  Supine  -CH     Recorded by  [CH] Nichelle Rahman PTA [SY] Sadia French PTA    Pain Assessment    Pain Assessment No/denies pain  -NN No/denies pain  -CH No/denies pain  -SY    Recorded by [NN] Janet Garibay, OTR/L [CH] Nichelle Rahman, KIN [SY] Sadia French PTA    Vision Assessment/Intervention    Visual Impairment  WNL;WFL  -CH WNL;WFL  -SY    Recorded by  [CH] Nichelle Rahman, KIN [SY] Sadia French PTA    Cognitive Assessment/Intervention    Current Cognitive/Communication Assessment impaired  -NN impaired  -CH impaired  -SY    Orientation Status oriented to;person;place;disoriented to;time;situation  -NN oriented to;person;other (see comments)     -CH oriented to;person;place  -SY    Follows Commands/Answers Questions 75% of the time;able to follow single-step instructions;needs cueing;needs increased time;needs repetition  -% of the time;needs increased time  -% of the time  -SY    Personal Safety decreased awareness, need for assist;decreased awareness, need for safety;decreased insight to deficits   -NN decreased awareness, need for assist;decreased awareness, need for safety  - decreased awareness, need for assist  -    Personal Safety Interventions fall prevention program maintained;nonskid shoes/slippers when out of bed;muscle strengthening facilitated;supervised activity;gait belt  -NN fall prevention program maintained;gait belt;muscle strengthening facilitated;nonskid shoes/slippers when out of bed;supervised activity  - fall prevention program maintained;supervised activity  -SY    Recorded by [NN] Janet Garibay, OTR/L [CH] Nichelle Rahman PTA [SY] Sadia French, KIN    Bed Mobility, Assessment/Treatment    Bed Mobility, Assistive Device bed rails;head of bed elevated  -NN  bed rails;head of bed elevated  -    Bed Mobility, Roll Left, [place]  not tested  - supervision required  -SY    Bed Mobility, Roll Right, [place]  not tested  - not tested  -SY    Bed Mobility, Scoot/Bridge, [place] contact guard assist  -NN not tested  - supervision required  -    Bed Mob, Supine to Sit, [place] contact guard assist  -NN not tested  - supervision required  -SY    Bed Mob, Sit to Supine, [place] contact guard assist  - contact guard assist;minimum assist (75% patient effort)  - supervision required  -    Bed Mobility, Safety Issues   cognitive deficits limit understanding  -    Bed Mobility, Impairments strength decreased;impaired balance;postural control impaired  -      Bed Mobility, Comment Pt. very lethargic and dozing on and off and requiring min A for posterior leaning  -NN      Recorded by [NN] Janet Garibay, OTR/L [CH] Nichelle Rahman, PTA [SY] Sadia French, KIN    Transfer Assessment/Treatment    Transfers, Sit-Stand [place] contact guard assist  - contact guard assist  - contact guard assist  -SY    Transfers, Stand-Sit [place] contact guard assist  - contact guard assist  - contact guard assist  -SY    Transfers,  Sit-Stand-Sit, Assist Device --   hha  -NN rolling walker  -CH other (see comments)   none  -SY    Toilet Transfer, [place]   contact guard assist  -SY    Toilet Transfer, Assistive Device   other (see comments)   HHA  -SY    Transfer, Safety Issues step length decreased  -NN balance decreased during turns;loses balance backward  -     Transfer, Impairments strength decreased;impaired balance  -NN impaired balance  - impaired balance  -    Transfer, Comment  pt with LOB posteriorly upon standing EOB, was able to correct with cga X1  -CH     Recorded by [NN] Janet Garibay, OTR/L [CH] Nichelle Rahman PTA [SY] Sadia French, KIN    Gait Assessment/Treatment    Gait, [place] Level  contact guard assist;minimum assist (75% patient effort)  - contact guard assist  -SY    Gait, Assistive Device  rolling walker  -     Gait, Distance (Feet)  60  - 10   x2  -SY    Gait, Safety Issues  balance decreased during turns;weight-shifting ability decreased;step length decreased  -     Gait, Comment   recommend AD for increased distances  -SY    Recorded by  [CH] Nichelle Rahman PTA [SY] Sadia French PTA    Stairs Assessment/Treatment    Stairs, [place] Level  not tested  -     Recorded by  [] Nichelle Rahman PTA     Upper Body Bathing Assessment/Training    UB Bathing Assess/Train Assistive Device --   sponge bathing  -NN      UB Bathing Assess/Train, Position sitting  -NN      UB Bathing Assess/Train, [place] Level moderate assist (50% patient effort)  -NN      UB Bathing Assess/Train, Impairments strength decreased;impaired balance;postural control impaired  -NN      Recorded by [NN] Janet Garibay, OTR/L      Lower Body Bathing Assessment/Training    LB Bathing Assess/Train Assistive Device --   sponge bathing  -NN      LB Bathing Assess/Train, Position sitting  -NN      LB Bathing Assess/Train, [place] Level moderate assist (50% patient effort)  -NN      LB Bathing  Assess/Train, Impairments strength decreased   lethargic and disorientation  -NN      Recorded by [NN] Janet Garibay, OTR/L      Upper Body Dressing Assessment/Training    UB Dressing Assess/Train, Clothing Type donning:;doffing:;hospital gown  -NN      UB Dressing Assess/Train, Position edge of bed  -NN      UB Dressing Assess/Train, [place] moderate assist (50% patient effort)  -NN      UB Dressing Assess/Train, Impairments strength decreased;impaired balance;postural control impaired  -NN      Recorded by [NN] Janet Garibay, OTR/L      Grooming Assessment/Training    Grooming Assess/Train, Assistive Device --   wash face, brush hair  -NN      Grooming Assess/Train, Position sitting  -NN      Grooming Assess/Train, [place]ep[place] Level minimum assist (75% patient effort)  -NN      Grooming Assess/Train, Impairments strength decreased;impaired balance  -NN      Recorded by [NN] Janet Garibay, OTR/L      Therapy Exercises    Bilateral Lower Extremities  AROM:;20 reps;sitting;ankle pumps/circles;hip abduction/adduction;hip flexion;LAQ  -     Recorded by  [CH] Nichelle Rahman PTA     Positioning and Restraints    Pre-Treatment Position in bed  - in bed  - in bed  -    Post Treatment Position bed  - bed  - bed  -SY    In Bed fowlers;call light within reach;encouraged to call for assist;side rails up x2  - supine;call light within reach;encouraged to call for assist;exit alarm on;side rails up x2;SCD pump applied  - fowlers;call light within reach;exit alarm on   eating lunch  -    Recorded by [NN] Janet Garibay, OTR/L [CH] Nichelle Rahman PTA [SY] Sadia French PTA      01/12/18 0755          Rehab Assessment/Intervention    Discipline occupational therapy assistant  -LW      Document Type therapy note (daily note)  -LW      Subjective Information agree to therapy  -LW      Patient Effort, Rehab Treatment adequate  -LW      Symptoms Noted Comment Pt is very confused.  -LW       Precautions/Limitations fall precautions  -LW      Equipment Issued to Patient gait belt  -LW      Recorded by [LW] KINGSLEY Wade      Vital Signs    Pre Systolic BP Rehab 145  -LW      Pre Treatment Diastolic BP 69  -LW      Pretreatment Heart Rate (beats/min) 99  -LW      Intratreatment Heart Rate (beats/min) 96  -LW      Posttreatment Heart Rate (beats/min) 93  -LW      Pre SpO2 (%) 96  -LW      O2 Delivery Pre Treatment supplemental O2  -LW      Intra SpO2 (%) 98  -LW      O2 Delivery Intra Treatment supplemental O2  -LW      Post SpO2 (%) 97  -LW      O2 Delivery Post Treatment supplemental O2  -LW      Pre Patient Position Supine  -LW      Intra Patient Position Standing  -LW      Post Patient Position Sitting  -LW      Recorded by [LW] PAMELLA Wade/L      Pain Assessment    Pain Assessment No/denies pain  -LW      Recorded by [LW] PAMELLA Wade/L      Vision Assessment/Intervention    Visual Impairment WNL;WFL  -LW      Recorded by [LW] PAMELLA Wade/L      Cognitive Assessment/Intervention    Current Cognitive/Communication Assessment impaired  -LW      Orientation Status oriented to;person;place  -LW      Follows Commands/Answers Questions 100% of the time  -LW      Personal Safety decreased awareness, need for assist  -LW      Personal Safety Interventions fall prevention program maintained;gait belt  -LW      Recorded by [LW] PAMELLA Wade/L      Bed Mobility, Assessment/Treatment    Bed Mobility, Assistive Device bed rails;head of bed elevated  -LW      Bed Mobility, Roll Right, [place] supervision required  -LW      Bed Mob, Supine to Sit, [place] supervision required  -LW      Bed Mob, Sit to Supine, [place] supervision required  -LW      Bed Mobility, Safety Issues cognitive deficits limit understanding  -LW      Recorded by [LW] PAMELLA Wade/L      Transfer Assessment/Treatment    Transfers, Bed-Chair [place] contact guard assist   -LW      Transfers, Sit-Stand [place] contact guard assist  -LW      Transfers, Stand-Sit [place] contact guard assist  -LW      Transfers, Sit-Stand-Sit, Assist Device other (see comments)   none  -LW      Toilet Transfer, [place] contact guard assist  -LW      Toilet Transfer, Assistive Device other (see comments)   none  -LW      Transfer, Impairments impaired balance  -LW      Recorded by [LW] PAMELLA Wade/L      Functional Mobility    Functional Mobility- Ind. Level contact guard assist  -LW      Functional Mobility- Device other (see comments)   HHA  -LW      Functional Mobility-Distance (Feet) 10  -LW      Recorded by [LW] PAMELLA Wade/L      Upper Body Bathing Assessment/Training    UB Bathing Assess/Train Assistive Device other (see comments)   wash cloth Pan bath  -LW      UB Bathing Assess/Train, Position sitting;edge of bed  -LW      UB Bathing Assess/Train, [place] Level supervision required;set up required  -LW      Recorded by [LW] PAMELLA Wade/L      Lower Body Bathing Assessment/Training    LB Bathing Assess/Train Assistive Device other (see comments)   wash cloth Pan bath  -LW      LB Bathing Assess/Train, Position sitting;edge of bed  -LW      LB Bathing Assess/Train, [place] Level supervision required;set up required  -LW      Recorded by [LW] PAMELLA Wade/L      Upper Body Dressing Assessment/Training    UB Dressing Assess/Train, Clothing Type doffing:;donning:;hospital gown  -LW      UB Dressing Assess/Train, Position sitting;edge of bed  -LW      UB Dressing Assess/Train, [place] supervision required  -LW      Recorded by [LW] PAMELLA Wade/L      Lower Body Dressing Assessment/Training    LB Dressing Assess/Train, Clothing Type doffing:;donning:;pants;shorts;slipper socks  -LW      LB Dressing Assess/Train, Position edge of bed;sitting;standing  -LW      LB Dressing Assess/Train, [place] supervision required  -LW       Recorded by [LW] JUDE WadeA/L      Toileting Assessment/Training    Toileting Assess/Train, Assistive Device grab bars  -LW      Toileting Assess/Train, Position sitting  -LW      Toileting Assess/Train, Indepen Level supervision required  -LW      Recorded by [LW] JUDE WadeA/L      Grooming Assessment/Training    Grooming Assess/Train, Position edge of bed;sitting  -LW      Grooming Assess/Train, Indepen Level supervision required;set up required  -LW      Grooming Assess/Train, Comment Oral care, wash face and hands, comb hair.   -LW      Recorded by [LW] JUDE WadeA/L      Positioning and Restraints    Pre-Treatment Position in bed  -LW      Post Treatment Position chair  -LW      In Bed sitting;call light within reach;encouraged to call for assist;exit alarm on  -LW      Recorded by [LW] Jaz Garcia CAN/L        User Key  (r) = Recorded By, (t) = Taken By, (c) = Cosigned By    Initials Name Effective Dates    SY Sadia French, PTA 10/17/16 -     CH Nichelle Rahman, PTA 10/17/16 -     LW Jaz Garcia, CAN/L 10/17/16 -     NN Janet Garibay, OTR/L 11/08/17 -                 OT Goals       01/12/18 1236 01/11/18 0856       Transfer Training OT LTG    Transfer Training OT LTG, Date Established  01/11/18  -NN     Transfer Training OT LTG, Time to Achieve  by discharge  -NN     Transfer Training OT LTG, Activity Type  all transfers  -NN     Transfer Training OT LTG, [place] Level  independent  -NN     Transfer Training OT LTG, Date Goal Reviewed 01/12/18  -LW      Transfer Training OT LTG, Outcome goal ongoing  -LW      Strength OT LTG    Strength Goal OT LTG, Date Established  01/11/18  -NN     Strength Goal OT LTG, Time to Achieve  by discharge  -NN     Strength Goal OT LTG, Measure to Achieve  Pt. will complete BUE strengthening exercises all planes and joints to increase BUE strength to 5/5 for ADLs.   -NN     Strength Goal OT LTG, Date Goal Reviewed 01/12/18   -LW      Strength Goal OT LTG, Outcome goal ongoing  -LW      ADL OT LTG    ADL OT LTG, Date Established  01/11/18  -NN     ADL OT LTG, Time to Achieve  by discharge  -NN     ADL OT LTG, Activity Type  ADL skills  -NN     ADL OT LTG, [place] Level  modified independent  -NN     ADL OT LTG, Additional Goal  AE PRN  -NN     ADL OT LTG, Date Goal Reviewed 01/12/18  -LW      ADL OT LTG, Outcome goal ongoing  -LW        User Key  (r) = Recorded By, (t) = Taken By, (c) = Cosigned By    Initials Name Provider Type    LW Jaz Garcia, CAN/L Occupational Therapy Assistant    DELFINA Garibay, OTR/L Occupational Therapist          Occupational Therapy Education     Title: PT OT SLP Therapies (Active)     Topic: Occupational Therapy (Done)     Point: ADL training (Done)    Description: Instruct learner(s) on proper safety adaptation and remediation techniques during self care or transfers.   Instruct in proper use of assistive devices.    Learning Progress Summary    Learner Readiness Method Response Comment Documented by Status   Patient Acceptance E VU,NR Pt. educated on role of OT, safe bed mobility, benefit of OOB activity, progression with poc NN 01/13/18 1036 Done    Acceptance E VU  LW 01/12/18 1236 Done    Acceptance E NR Pt. educated on role of OT, safe t/f, calling for assistance, benefit of therapy, correcting posture, progression with poc  01/11/18 0854 Active               Point: Home exercise program (Done)    Description: Instruct learner(s) on appropriate technique for monitoring, assisting and/or progressing therapeutic exercises/activities.    Learning Progress Summary    Learner Readiness Method Response Comment Documented by Status   Patient Acceptance E VU,NR Pt. educated on role of OT, safe bed mobility, benefit of OOB activity, progression with poc NN 01/13/18 1036 Done    Acceptance E VU  LW 01/12/18 1236 Done    Acceptance E NR Pt. educated on role of OT, safe t/f, calling for assistance,  benefit of therapy, correcting posture, progression with poc NN 01/11/18 0854 Active               Point: Body mechanics (Done)    Description: Instruct learner(s) on proper positioning and spine alignment during self-care, functional mobility activities and/or exercises.    Learning Progress Summary    Learner Readiness Method Response Comment Documented by Status   Patient Acceptance E VU,NR Pt. educated on role of OT, safe bed mobility, benefit of OOB activity, progression with poc NN 01/13/18 1036 Done    Acceptance E VU  LW 01/12/18 1236 Done    Acceptance E NR Pt. educated on role of OT, safe t/f, calling for assistance, benefit of therapy, correcting posture, progression with poc NN 01/11/18 0854 Active                      User Key     Initials Effective Dates Name Provider Type Discipline     10/17/16 -  PAMELLA Wade/L Occupational Therapy Assistant OT     11/08/17 -  MAIK Sheffield/L Occupational Therapist OT                  OT Recommendation and Plan  Anticipated Discharge Disposition: home with home health, home with assist, home with 24/7 care  Planned Therapy Interventions: activity intolerance, ADL retraining, balance training, bed mobility training, energy conservation, home exercise program, strengthening, transfer training  Therapy Frequency:  (3-14x/week)  Plan of Care Review  Plan Of Care Reviewed With: patient  Progress: progress toward functional goals as expected  Outcome Summary/Follow up Plan: OT eros completed. Pt. very lethargic and remains very confused stating off the wall comments still. Pt. cont to benefit from skilled OT d.t decreased balance, decreased strength, decreased activity tolerance, decreased ind in ADls. 1/13 1038        Outcome Measures       01/13/18 1000 01/13/18 0919 01/12/18 1103    How much help from another person do you currently need...    Turning from your back to your side while in flat bed without using bedrails?  3  -CH 3  -YS    Moving  from lying on back to sitting on the side of a flat bed without bedrails?  3  -CH 3  -SY    Moving to and from a bed to a chair (including a wheelchair)?  3  -CH 3  -SY    Standing up from a chair using your arms (e.g., wheelchair, bedside chair)?  3  -CH 3  -SY    Climbing 3-5 steps with a railing?  2  -CH 2  -SY    To walk in hospital room?  3  -CH 3  -SY    AM-PAC 6 Clicks Score  17  -CH 17  -SY    How much help from another is currently needed...    Putting on and taking off regular lower body clothing? 2  -NN      Bathing (including washing, rinsing, and drying) 2  -NN      Toileting (which includes using toilet bed pan or urinal) 2  -NN      Putting on and taking off regular upper body clothing 3  -NN      Taking care of personal grooming (such as brushing teeth) 3  -NN      Eating meals 4  -NN      Score 16  -NN      Functional Assessment    Outcome Measure Options  AM-PAC 6 Clicks Basic Mobility (PT)  -       01/12/18 0755 01/11/18 0855 01/11/18 0800    How much help from another person do you currently need...    Turning from your back to your side while in flat bed without using bedrails?  3  -CB     Moving from lying on back to sitting on the side of a flat bed without bedrails?  2  -CB     Moving to and from a bed to a chair (including a wheelchair)?  2  -CB     Standing up from a chair using your arms (e.g., wheelchair, bedside chair)?  3  -CB     Climbing 3-5 steps with a railing?  2  -CB     To walk in hospital room?  3  -CB     AM-PAC 6 Clicks Score  15  -CB     How much help from another is currently needed...    Putting on and taking off regular lower body clothing? 3  -LW  3  -NN    Bathing (including washing, rinsing, and drying) 3  -LW  2  -NN    Toileting (which includes using toilet bed pan or urinal) 3  -LW  3  -NN    Putting on and taking off regular upper body clothing 3  -LW  3  -NN    Taking care of personal grooming (such as brushing teeth) 3  -LW  3  -NN    Eating meals 4  -LW  4  -NN     Score 19  -LW  18  -NN    Functional Assessment    Outcome Measure Options  AM-PAC 6 Clicks Basic Mobility (PT)  -CB AM-PAC 6 Clicks Daily Activity (OT)  -NN      User Key  (r) = Recorded By, (t) = Taken By, (c) = Cosigned By    Initials Name Provider Type    CB Vanesa Ramos, PT Physical Therapist    SY Sadia French, PTA Physical Therapy Assistant    CH Nichelle Rahman, PTA Physical Therapy Assistant    LW Jaz Garcia, CAN/L Occupational Therapy Assistant    NN Janet Garibay, OTR/L Occupational Therapist           Time Calculation:         Time Calculation- OT       01/13/18 1037          Time Calculation- OT    OT Start Time 1015  -NN      OT Stop Time 1038  -NN      OT Time Calculation (min) 23 min  -      Total Timed Code Minutes- OT 23 minute(s)  -      OT Received On 01/13/18  -        User Key  (r) = Recorded By, (t) = Taken By, (c) = Cosigned By    Initials Name Provider Type    NN Janet Garibay, OTR/L Occupational Therapist           Therapy Charges for Today     Code Description Service Date Service Provider Modifiers Qty    91003979223 HC OT SELF CARE/MGMT/TRAIN EA 15 MIN 1/13/2018 Janet Garibay, OTR/L GO 2          OT G-codes  OT Functional Scales Options: AM-PAC 6 Clicks Daily Activity (OT)  Functional Limitation: Self care  Self Care Current Status (): At least 40 percent but less than 60 percent impaired, limited or restricted  Self Care Goal Status (): At least 20 percent but less than 40 percent impaired, limited or restricted    Janet Garibay OTR/L  1/13/2018

## 2018-01-13 NOTE — PLAN OF CARE
Problem: Patient Care Overview (Adult)  Goal: Plan of Care Review  Outcome: Ongoing (interventions implemented as appropriate)   01/13/18 0337   Coping/Psychosocial Response Interventions   Plan Of Care Reviewed With patient   Patient Care Overview   Progress no change   Outcome Evaluation   Outcome Summary/Follow up Plan Pt slept through the night, no needs verbalized, will continue to monitor pt     Goal: Adult Individualization and Mutuality  Outcome: Ongoing (interventions implemented as appropriate)      Problem: Pneumonia (Adult)  Goal: Signs and Symptoms of Listed Potential Problems Will be Absent or Manageable (Pneumonia)  Outcome: Ongoing (interventions implemented as appropriate)      Problem: Fall Risk (Adult)  Goal: Absence of Falls  Outcome: Ongoing (interventions implemented as appropriate)      Problem: Fluid Volume Deficit (Adult)  Goal: Fluid/Electrolyte Balance  Outcome: Ongoing (interventions implemented as appropriate)    Goal: Comfort/Well Being  Outcome: Ongoing (interventions implemented as appropriate)

## 2018-01-13 NOTE — PLAN OF CARE
Problem: Patient Care Overview (Adult)  Goal: Plan of Care Review  Outcome: Ongoing (interventions implemented as appropriate)   01/13/18 1018   Coping/Psychosocial Response Interventions   Plan Of Care Reviewed With patient   Outcome Evaluation   Outcome Summary/Follow up Plan Pt partially met strength goal this tx. Pt presents with decreased balance with standing and mobility requiring cga/min A X1 for increased safety. Pt will continue to benefit from skilled therapy services within SNF upon D/C     Goal: Discharge Needs Assessment  Outcome: Ongoing (interventions implemented as appropriate)   01/11/18 0855 01/11/18 1620   Discharge Needs Assessment   Concerns To Be Addressed discharge planning concerns --    Equipment Needed After Discharge (to be determined) --    Discharge Facility/Level Of Care Needs home with home health --    Current Discharge Risk lives alone;physical impairment --    Discharge Disposition still a patient --    Current Health   Outpatient/Agency/Support Group Needs homecare agency (specify level of care) --    Living Environment   Transportation Available --  car;family or friend will provide   Self-Care   Equipment Currently Used at Home none  (has a cane at home if needed) --        Problem: Inpatient Physical Therapy  Goal: Bed Mobility Goal STG- PT  Outcome: Ongoing (interventions implemented as appropriate)   01/11/18 0855 01/13/18 1018   Bed Mobility PT STG   Bed Mobility PT STG, Date Established 01/11/18 --    Bed Mobility PT STG, Time to Achieve 2 - 3 days --    Bed Mobility PT STG, Activity Type supine to sit/sit to supine --    Bed Mobility PT STG, [place] Level independent --    Bed Mobility PT STG, Additional Goal HOB down and no rails --    Bed Mobility PT STG, Date Goal Reviewed --  01/13/18   Bed Mobility PT STG, Outcome --  goal ongoing     Goal: Transfer Training Goal 1 STG- PT  Outcome: Ongoing (interventions implemented as appropriate)   01/11/18 0855 01/13/18 1018    Transfer Training PT STG   Transfer Training PT STG, Date Established 01/11/18 --    Transfer Training PT STG, Time to Achieve 2 - 3 days --    Transfer Training PT STG, Activity Type bed to chair /chair to bed;sit to stand/stand to sit --    Transfer Training PT STG, [place] Level supervision required --    Transfer Training PT STG, Assist Device (least restrictive) --    Transfer Training PT STG, Date Goal Reviewed --  01/13/18   Transfer Training PT STG, Outcome --  goal ongoing     Goal: Gait Training Goal LTG- PT  Outcome: Ongoing (interventions implemented as appropriate)   01/11/18 0855 01/13/18 1018   Gait Training PT LTG   Gait Training Goal PT LTG, Date Established 01/11/18 --    Gait Training Goal PT LTG, Time to Achieve by discharge --    Gait Training Goal PT LTG, [place] Level conditional independence --    Gait Training Goal PT LTG, Assist Device (least restrictive assistive device) --    Gait Training Goal PT LTG, Distance to Achieve 200 feet --    Gait Training Goal PT LTG, Date Goal Reviewed --  01/13/18   Gait Training Goal PT LTG, Outcome --  goal ongoing     Goal: Strength Goal LTG- PT  Outcome: Ongoing (interventions implemented as appropriate)   01/11/18 0855 01/13/18 1018   Strength Goal PT LTG   Strength Goal PT LTG, Date Established 01/11/18 --    Strength Goal PT LTG, Time to Achieve 4 days --    Strength Goal PT LTG, Measure to Achieve 20 reps all ex BLE actively sitting/ standing --    Strength Goal PT LTG, Date Goal Reviewed --  01/13/18   Strength Goal PT LTG, Outcome --  goal partially met

## 2018-01-13 NOTE — PROGRESS NOTES
FAMILY MEDICINE DAILY PROGRESS NOTE  NAME: Michelle Child  : 1955  MRN: [id number]     LOS: 3 days     PROVIDER OF SERVICE: Sunny Linn DO    Chief Complaint: Pneumonia of left lower lobe due to Streptococcus pneumoniae    Subjective:     Interval History:  History taken from: patient RN  Patient Complaints: Cough  Patient Denies:  Chest pain    Review of Systems:   Review of Systems   Constitutional: Positive for activity change and appetite change. Negative for fatigue and fever.   HENT: Negative for ear pain and sore throat.    Eyes: Negative for pain and visual disturbance.   Respiratory: Positive for cough and shortness of breath.    Cardiovascular: Negative for chest pain and palpitations.   Gastrointestinal: Negative for abdominal pain and nausea.   Endocrine: Negative for cold intolerance and heat intolerance.   Genitourinary: Negative for difficulty urinating and dysuria.   Musculoskeletal: Negative for arthralgias and gait problem.   Skin: Negative for color change and rash.   Neurological: Negative for dizziness, weakness and headaches.   Hematological: Negative for adenopathy. Does not bruise/bleed easily.   Psychiatric/Behavioral: Negative for agitation, confusion and sleep disturbance.       Objective:     Vital Signs  Temp:  [97.5 °F (36.4 °C)-99.8 °F (37.7 °C)] 99.8 °F (37.7 °C)  Heart Rate:  [82-95] 95  Resp:  [18] 18  BP: (135-156)/(65-84) 146/65    Physical Exam  Physical Exam   Constitutional: She is oriented to person, place, and time. She appears well-developed and well-nourished.   HENT:   Head: Normocephalic and atraumatic.   Right Ear: External ear normal.   Left Ear: External ear normal.   Nose: Nose normal.   Mouth/Throat: Oropharynx is clear and moist.   Eyes: Conjunctivae and EOM are normal.   Neck: Normal range of motion. Neck supple.   Cardiovascular: Normal rate, regular rhythm and normal heart sounds.    Pulmonary/Chest: Effort normal. No accessory muscle usage.  No apnea. No respiratory distress. She has decreased breath sounds. She has wheezes. She has rhonchi. She has no rales.   Abdominal: Soft. Bowel sounds are normal. She exhibits no distension. There is no tenderness.   Musculoskeletal: Normal range of motion.   Neurological: She is alert and oriented to person, place, and time.   Skin: Skin is warm and dry.   Psychiatric: She has a normal mood and affect. Her speech is normal and behavior is normal. Cognition and memory are normal.       Medication Review    Current Facility-Administered Medications:   •  acetaminophen (TYLENOL) tablet 650 mg, 650 mg, Oral, Q4H PRN, Coral Berry MD, 650 mg at 01/11/18 1550  •  amitriptyline (ELAVIL) tablet 100 mg, 100 mg, Oral, Nightly, Coral Berry MD, 100 mg at 01/12/18 2006  •  aspirin chewable tablet 81 mg, 81 mg, Oral, Daily, Coral Berry MD, 81 mg at 01/13/18 0910  •  bisacodyl (DULCOLAX) EC tablet 5 mg, 5 mg, Oral, Daily PRN, Coral Berry MD  •  carvedilol (COREG) tablet 3.125 mg, 3.125 mg, Oral, BID With Meals, Coral Berry MD, 3.125 mg at 01/13/18 0909  •  enoxaparin (LOVENOX) syringe 40 mg, 40 mg, Subcutaneous, Q24H, Coral Berry MD, 40 mg at 01/12/18 1554  •  famotidine (PEPCID) tablet 40 mg, 40 mg, Oral, Daily, Coral Berry MD, 40 mg at 01/13/18 0909  •  FLUoxetine (PROzac) capsule 60 mg, 60 mg, Oral, Daily, Coral Berry MD, 60 mg at 01/13/18 0909  •  HYDROcodone-acetaminophen (NORCO) 5-325 MG per tablet 1 tablet, 1 tablet, Oral, Q4H PRN, Coral Berry MD, 1 tablet at 01/12/18 1945  •  influenza vac split quad (FLUZONE QUADRIVALENT) IM suspension 0.5 mL, 0.5 mL, Intramuscular, During Hospitalization, Coral Berry MD  •  ipratropium-albuterol (DUO-NEB) nebulizer solution 3 mL, 3 mL, Nebulization, 4x Daily - RT, Coral Berry MD, 3 mL at 01/13/18 0717  •  levoFLOXacin (LEVAQUIN) 750 mg/150 mL D5W (premix) (LEVAQUIN) 750 mg, 750 mg, Intravenous, Q24H, Coral Berry MD,  750 mg at 01/12/18 1554  •  losartan (COZAAR) tablet 50 mg, 50 mg, Oral, Q24H, Coral Berry MD, 50 mg at 01/13/18 0909  •  methylPREDNISolone sodium succinate (SOLU-Medrol) injection 40 mg, 40 mg, Intravenous, Daily, Coral Berry MD, 40 mg at 01/13/18 0909  •  nicotine (NICODERM CQ) 21 MG/24HR patch 1 patch, 1 patch, Transdermal, Q24H, Coral Berry MD, 1 patch at 01/12/18 1554  •  ondansetron (ZOFRAN) tablet 4 mg, 4 mg, Oral, Q6H PRN, Coral Berry MD, 4 mg at 01/11/18 1941  •  pneumococcal polysaccharide 23-valent (PNEUMOVAX-23) vaccine 0.5 mL, 0.5 mL, Intramuscular, During Hospitalization, Coral Berry MD  •  [START ON 1/14/2018] potassium chloride (MICRO-K) CR capsule 10 mEq, 10 mEq, Oral, Daily, Sunny Linn DO  •  risperiDONE (risperDAL) tablet 1 mg, 1 mg, Oral, Nightly, Coral Berry MD, 1 mg at 01/12/18 2006  •  sodium chloride 0.9 % flush 1-10 mL, 1-10 mL, Intravenous, PRN, Coral Berry MD  •  sodium chloride 0.9 % flush 10 mL, 10 mL, Intravenous, PRN, Toro Shine MD, 10 mL at 01/11/18 1103     Diagnostic Data    Lab Results (last 24 hours)     Procedure Component Value Units Date/Time    Blood Culture - Blood, [[id number]]  (Normal) Collected:  01/10/18 1338    Specimen:  Blood from Hand, Right Updated:  01/12/18 1346     Blood Culture No growth at 2 days    Blood Culture - Blood, [[id number]]  (Normal) Collected:  01/10/18 1339    Specimen:  Blood from Arm, Right Updated:  01/12/18 1346     Blood Culture No growth at 2 days    C-reactive Protein [[id number]]  (Abnormal) Collected:  01/12/18 1543    Specimen:  Blood Updated:  01/12/18 1612     C-Reactive Protein 13.80 (H) mg/dL     Comprehensive Metabolic Panel [[id number]]  (Abnormal) Collected:  01/13/18 0510    Specimen:  Blood Updated:  01/13/18 0604     Glucose 88 mg/dL      BUN 11 mg/dL      Creatinine 0.75 mg/dL      Sodium 134 (L) mmol/L      Potassium 5.2 (H) mmol/L      Chloride 107 mmol/L      CO2 20.0  (L) mmol/L      Calcium 8.4 mg/dL      Total Protein 5.2 (L) g/dL      Albumin 2.50 (L) g/dL      ALT (SGPT) 31 U/L      AST (SGOT) 31 U/L      Alkaline Phosphatase 108 U/L      Total Bilirubin 0.4 mg/dL      eGFR Non African Amer 78 mL/min/1.73      Globulin 2.7 gm/dL      A/G Ratio 0.9 (L) g/dL      BUN/Creatinine Ratio 14.7     Anion Gap 7.0 mmol/L     CBC & Differential [[id number]] Collected:  01/13/18 0510    Specimen:  Blood Updated:  01/13/18 0716    Narrative:       The following orders were created for panel order CBC & Differential.  Procedure                               Abnormality         Status                     ---------                               -----------         ------                     Scan Slide[[id number]]                                                                  CBC Auto Differential[[id number]]        Abnormal            Final result                 Please view results for these tests on the individual orders.    CBC Auto Differential [[id number]]  (Abnormal) Collected:  01/13/18 0641    Specimen:  Blood Updated:  01/13/18 0716     WBC 15.01 (H) 10*3/mm3      RBC 3.07 (L) 10*6/mm3      Hemoglobin 8.1 (L) g/dL      Hematocrit 26.4 (L) %      MCV 86.0 fL      MCH 26.4 (L) pg      MCHC 30.7 (L) g/dL      RDW 16.0 (H) %      RDW-SD 49.8 (H) fl      MPV 9.8 fL      Platelets 281 10*3/mm3      Neutrophil % 85.3 (H) %      Lymphocyte % 7.5 (L) %      Monocyte % 6.6 %      Eosinophil % 0.0 %      Basophil % 0.1 %      Immature Grans % 0.5 %      Neutrophils, Absolute 12.81 (H) 10*3/mm3      Lymphocytes, Absolute 1.12 10*3/mm3      Monocytes, Absolute 0.99 (H) 10*3/mm3      Eosinophils, Absolute 0.00 10*3/mm3      Basophils, Absolute 0.01 10*3/mm3      Immature Grans, Absolute 0.08 (H) 10*3/mm3      nRBC 0.0 /100 WBC            Imaging Results (last 24 hours)     ** No results found for the last 24 hours. **          I reviewed the patient's new clinical results.  I reviewed the patient's  new imaging results and agree with the interpretation.  I reviewed the patient's other test results and agree with the interpretation  I personally viewed and interpreted the patient's EKG/Telemetry data    Assessment/Plan:     Hospital Problem List     * (Principal)Pneumonia of left lower lobe due to Streptococcus pneumoniae    Overview Signed 1/11/2018  7:18 AM by Coral Berry MD     Levaquin         Tobacco dependence    Overview Addendum 1/10/2018  1:33 PM by Coral Berry MD     Nicotine patch 21mg transdermally         Gastroesophageal reflux disease    Overview Signed 1/10/2018  1:41 PM by Coral Berry MD     Pepcid         Essential hypertension    Overview Signed 1/10/2018  1:42 PM by Coral Berry MD     Losartan, coreg         Depressive disorder    Overview Signed 1/10/2018  1:40 PM by Coral Berry MD     Prozac, Elavil, and Risperdal         Fall with injury    Overview Addendum 1/12/2018  7:32 AM by Coral Berry MD     PT/OT  AAOx3         Weakness    Overview Signed 1/10/2018  1:53 PM by Coral Berry MD     PT/OT         Acute respiratory failure with hypoxia    Overview Addendum 1/13/2018 11:39 AM by Sunny Linn DO     NC oxygen, steroids, and neb treatements         Hyperkalemia    Overview Signed 1/13/2018 11:40 AM by Sunny Linn DO     Decrease KCL and monitor         Hyponatremia    Overview Signed 1/13/2018 11:40 AM by Sunny Linn DO     Continue NS and monitor         Cerebral microvascular disease            DVT prophylaxis: SCDs/TEDs and Lovenox  Full Code            This document has been electronically signed by Sunny Linn DO on January 13, 2018 11:43 AM

## 2018-01-13 NOTE — THERAPY TREATMENT NOTE
Acute Care - Physical Therapy Treatment Note  [facility]     Patient Name: Michelle Child  : 1955  MRN: [id number]  Today's Date: 2018  Onset of Illness/Injury or Date of Surgery Date: 01/10/18  Date of Referral to PT: 01/10/18  Referring Physician: Dr Berry    Admit Date: 1/10/2018    Visit Dx:    ICD-10-CM ICD-9-CM   1. Hypokalemia E87.6 276.8   2. Pneumonia of left lung due to infectious organism, unspecified part of lung J18.9 486   3. Fall, initial encounter W19.XXXA E888.9   4. Contusion of scalp, initial encounter S00.03XA 920   5. Decreased activities of daily living (ADL) Z78.9 V49.89   6. Impaired physical mobility Z74.09 781.99     Patient Active Problem List   Diagnosis   • Tobacco dependence   • Migraine   • Iron deficiency   • Gastroesophageal reflux disease   • Essential hypertension   • Depressive disorder   • Coronary arteriosclerosis   • Bilateral pseudophakia   • Astigmatism   • Right humeral fracture   • Fall with injury   • Closed fracture of humerus   • Cause of injury, fall   • Closed fracture of proximal end of right humerus with routine healing   • Weakness   • Acute respiratory failure with hypoxia   • Pneumonia of left lower lobe due to Streptococcus pneumoniae               Adult Rehabilitation Note       18 0919 18 1103 18 0755    Rehab Assessment/Intervention    Discipline physical therapy assistant  -CH physical therapy assistant  -SY occupational therapy assistant  -LW    Document Type therapy note (daily note)  -CH therapy note (daily note)  -SY therapy note (daily note)  -LW    Subjective Information agree to therapy  -CH agree to therapy;nausea/vomiting;dyspnea  -SY agree to therapy  -LW    Patient Effort, Rehab Treatment adequate  - adequate  -SY adequate  -LW    Symptoms Noted Comment   Pt is very confused.  -LW    Precautions/Limitations fall precautions;oxygen therapy device and L/min  -CH fall precautions;oxygen therapy device and  L/min  -SY fall precautions  -LW    Equipment Issued to Patient  gait belt  -SY gait belt  -LW    Recorded by [CH] Nichelle Rahman PTA [SY] Sadia French PTA [LW] Jaz Garcia, CAN/L    Vital Signs    Pre Systolic BP Rehab 141  -  -  -LW    Pre Treatment Diastolic BP 77  -CH 72  -SY 69  -LW    Intra Treatment Diastolic   -CH      Post Systolic BP Rehab 63  -CH      Pretreatment Heart Rate (beats/min) 97  -CH 98  -SY 99  -LW    Intratreatment Heart Rate (beats/min)  93  -SY 96  -LW    Posttreatment Heart Rate (beats/min) 101  -CH  93  -LW    Pre SpO2 (%) 92  -CH 95  -SY 96  -LW    O2 Delivery Pre Treatment supplemental O2  -CH supplemental O2  -SY supplemental O2  -LW    Intra SpO2 (%) 90  -CH  98  -LW    O2 Delivery Intra Treatment supplemental O2  -CH  supplemental O2  -LW    Post SpO2 (%) 91  -CH  97  -LW    O2 Delivery Post Treatment supplemental O2  -CH  supplemental O2  -LW    Pre Patient Position Sitting  -CH Supine  -SY Supine  -LW    Intra Patient Position Standing  -CH Sitting  -SY Standing  -LW    Post Patient Position Supine  -CH  Sitting  -LW    Recorded by [CH] Nichelle Rahman PTA [SY] Sadia French PTA [LW] Jaz Garcia, CAN/L    Pain Assessment    Pain Assessment No/denies pain  - No/denies pain  -SY No/denies pain  -LW    Recorded by [CH] Nichelle Rahman PTA [SY] Sadia French PTA [LW] Jaz Garcia, CAN/L    Vision Assessment/Intervention    Visual Impairment WNL;WFL  -CH WNL;WFL  -SY WNL;WFL  -LW    Recorded by [CH] Nichelle Rahman PTA [SY] Sadia French PTA [LW] Jaz Garcia, CAN/L    Cognitive Assessment/Intervention    Current Cognitive/Communication Assessment impaired  -CH impaired  -SY impaired  -LW    Orientation Status oriented to;person;other (see comments)     - oriented to;person;place  -SY oriented to;person;place  -LW    Follows Commands/Answers Questions 100% of the time;needs increased time  -% of the time  -% of the  time  -LW    Personal Safety decreased awareness, need for assist;decreased awareness, need for safety  - decreased awareness, need for assist  -SY decreased awareness, need for assist  -LW    Personal Safety Interventions fall prevention program maintained;gait belt;muscle strengthening facilitated;nonskid shoes/slippers when out of bed;supervised activity  - fall prevention program maintained;supervised activity  -SY fall prevention program maintained;gait belt  -LW    Recorded by [] Nichelle Rahman PTA [SY] Sadia French PTA [LW] JUDE WadeA/L    Bed Mobility, Assessment/Treatment    Bed Mobility, Assistive Device  bed rails;head of bed elevated  - bed rails;head of bed elevated  -    Bed Mobility, Roll Left, [place] not tested  - supervision required  -     Bed Mobility, Roll Right, [place] not tested  - not tested  - supervision required  -LW    Bed Mobility, Scoot/Bridge, [place] not tested  - supervision required  -SY     Bed Mob, Supine to Sit, [place] not tested  - supervision required  - supervision required  -LW    Bed Mob, Sit to Supine, [place] contact guard assist;minimum assist (75% patient effort)  - supervision required  - supervision required  -LW    Bed Mobility, Safety Issues  cognitive deficits limit understanding  -SY cognitive deficits limit understanding  -LW    Recorded by [CH] Nichelle Rahman PTA [SY] Sadia French PTA [LW] JUDE WadeA/L    Transfer Assessment/Treatment    Transfers, Bed-Chair [place]   contact guard assist  -LW    Transfers, Sit-Stand [place] contact guard assist  - contact guard assist  -SY contact guard assist  -LW    Transfers, Stand-Sit [place] contact guard assist  - contact guard assist  -SY contact guard assist  -LW    Transfers, Sit-Stand-Sit, Assist Device rolling walker  - other (see comments)   none  -SY other (see comments)   none  -LW    Toilet Transfer,  [place]  contact guard assist  -SY contact guard assist  -LW    Toilet Transfer, Assistive Device  other (see comments)   HHA  -SY other (see comments)   none  -LW    Transfer, Safety Issues balance decreased during turns;loses balance backward  -      Transfer, Impairments impaired balance  - impaired balance  -SY impaired balance  -LW    Transfer, Comment pt with LOB posteriorly upon standing EOB, was able to correct with cga X1  -CH      Recorded by [] Nichelle Rahman PTA [SY] Sadia French PTA [LW] PAMELLA Wade/DALTON    Gait Assessment/Treatment    Gait, [place] Level contact guard assist;minimum assist (75% patient effort)  - contact guard assist  -SY     Gait, Assistive Device rolling walker  -      Gait, Distance (Feet) 60  -CH 10   x2  -SY     Gait, Safety Issues balance decreased during turns;weight-shifting ability decreased;step length decreased  -      Gait, Comment  recommend AD for increased distances  -SY     Recorded by [CH] Nihcelle Rahman PTA [SY] Sadia French PTA     Stairs Assessment/Treatment    Stairs, [place] Level not tested  -      Recorded by [CH] Nichelle Rahman PTA      Functional Mobility    Functional Mobility- Ind. Level   contact guard assist  -LW    Functional Mobility- Device   other (see comments)   HHA  -LW    Functional Mobility-Distance (Feet)   10  -LW    Recorded by   [LW] PAMELLA Wade/L    Upper Body Bathing Assessment/Training    UB Bathing Assess/Train Assistive Device   other (see comments)   wash cloth Pan bath  -LW    UB Bathing Assess/Train, Position   sitting;edge of bed  -LW    UB Bathing Assess/Train, [place] Level   supervision required;set up required  -LW    Recorded by   [LW] PAMELLA Wade/L    Lower Body Bathing Assessment/Training    LB Bathing Assess/Train Assistive Device   other (see comments)   wash cloth Pan bath  -LW    LB Bathing Assess/Train, Position   sitting;edge of bed  -LW    LB  Bathing Assess/Train, [place] Level   supervision required;set up required  -LW    Recorded by   [LW] KINGSLEY Wade    Upper Body Dressing Assessment/Training    UB Dressing Assess/Train, Clothing Type   doffing:;donning:;hospital gown  -LW    UB Dressing Assess/Train, Position   sitting;edge of bed  -LW    UB Dressing Assess/Train, [place]   supervision required  -LW    Recorded by   [LW] KINGSLEY Wade    Lower Body Dressing Assessment/Training    LB Dressing Assess/Train, Clothing Type   doffing:;donning:;pants;shorts;slipper socks  -LW    LB Dressing Assess/Train, Position   edge of bed;sitting;standing  -LW    LB Dressing Assess/Train, [place]   supervision required  -LW    Recorded by   [LW] KINGSLEY Wade    Toileting Assessment/Training    Toileting Assess/Train, Assistive Device   grab bars  -LW    Toileting Assess/Train, Position   sitting  -LW    Toileting Assess/Train, Indepen Level   supervision required  -LW    Recorded by   [LW] KINGSLEY Wade    Grooming Assessment/Training    Grooming Assess/Train, Position   edge of bed;sitting  -LW    Grooming Assess/Train, Indepen Level   supervision required;set up required  -LW    Grooming Assess/Train, Comment   Oral care, wash face and hands, comb hair.   -LW    Recorded by   [LW] KINGSLEY Wade    Therapy Exercises    Bilateral Lower Extremities AROM:;20 reps;sitting;ankle pumps/circles;hip abduction/adduction;hip flexion;LAQ  -CH      Recorded by [CH] Nichelle Rahman, KIN      Positioning and Restraints    Pre-Treatment Position in bed  - in bed  - in bed  -    Post Treatment Position bed  - bed  - chair  -    In Bed supine;call light within reach;encouraged to call for assist;exit alarm on;side rails up x2;SCD pump applied  - fowlers;call light within reach;exit alarm on   eating lunch  - sitting;call light within reach;encouraged to call for assist;exit alarm on  -LW    Recorded  by [CH] Nichelle Rahman, PTA [SY] Sadia French, PTA [LW] Jaz Garcia, CAN/L      User Key  (r) = Recorded By, (t) = Taken By, (c) = Cosigned By    Initials Name Effective Dates     Sadia French, PTA 10/17/16 -      Nichelle Rahman, PTA 10/17/16 -     LW Jaz Garcia, CAN/L 10/17/16 -                 IP PT Goals       01/13/18 1018 01/12/18 1103 01/11/18 0855    Bed Mobility PT STG    Bed Mobility PT STG, Date Established   01/11/18  -CB    Bed Mobility PT STG, Time to Achieve   2 - 3 days  -CB    Bed Mobility PT STG, Activity Type   supine to sit/sit to supine  -CB    Bed Mobility PT STG, [place] Level   independent  -CB    Bed Mobility PT STG, Additional Goal   HOB down and no rails  -CB    Bed Mobility PT STG, Date Goal Reviewed 01/13/18  - 01/12/18  -SY     Bed Mobility PT STG, Outcome goal ongoing  - goal ongoing  -SY     Transfer Training PT STG    Transfer Training PT STG, Date Established   01/11/18  -CB    Transfer Training PT STG, Time to Achieve   2 - 3 days  -CB    Transfer Training PT STG, Activity Type   bed to chair /chair to bed;sit to stand/stand to sit  -CB    Transfer Training PT STG, [place] Level   supervision required  -CB    Transfer Training PT STG, Assist Device   --   least restrictive  -CB    Transfer Training PT STG, Date Goal Reviewed 01/13/18  -CH 01/12/18  -SY     Transfer Training PT STG, Outcome goal ongoing  - goal ongoing  -SY     Gait Training PT LTG    Gait Training Goal PT LTG, Date Established   01/11/18  -CB    Gait Training Goal PT LTG, Time to Achieve   by discharge  -CB    Gait Training Goal PT LTG, [place] Level   conditional independence  -CB    Gait Training Goal PT LTG, Assist Device   --   least restrictive assistive device  -CB    Gait Training Goal PT LTG, Distance to Achieve   200 feet  -CB    Gait Training Goal PT LTG, Date Goal Reviewed 01/13/18  - 01/12/18  -SY     Gait Training Goal PT LTG, Outcome goal ongoing  - goal  ongoing  -SY     Strength Goal PT LTG    Strength Goal PT LTG, Date Established   01/11/18  -CB    Strength Goal PT LTG, Time to Achieve   4 days  -CB    Strength Goal PT LTG, Measure to Achieve   20 reps all ex BLE actively sitting/ standing  -CB    Strength Goal PT LTG, Date Goal Reviewed 01/13/18  -CH 01/12/18  -SY     Strength Goal PT LTG, Outcome goal partially met  -CH goal ongoing  -SY       User Key  (r) = Recorded By, (t) = Taken By, (c) = Cosigned By    Initials Name Provider Type    CB Vanesa Ramos, PT Physical Therapist    SY Sadia French, PTA Physical Therapy Assistant     Nichelle Rahman, PTA Physical Therapy Assistant          Physical Therapy Education     Title: PT OT SLP Therapies (Active)     Topic: Physical Therapy (Active)     Point: Mobility training (Active)    Learning Progress Summary    Learner Readiness Method Response Comment Documented by Status   Patient Acceptance D NR  CB 01/11/18 1129 Active               Point: Precautions (Active)    Learning Progress Summary    Learner Readiness Method Response Comment Documented by Status   Patient Acceptance D NR  CB 01/11/18 1129 Active                      User Key     Initials Effective Dates Name Provider Type Discipline     04/06/17 -  Vanesa Ramos, PT Physical Therapist PT                    PT Recommendation and Plan  Anticipated Equipment Needs At Discharge:  (to be determined)  Anticipated Discharge Disposition: home with home health  Demonstrates Need for Referral to Another Service: home health care  Planned Therapy Interventions: bed mobility training, gait training, home exercise program, strengthening, transfer training  PT Frequency:  (5-14)  Plan of Care Review  Plan Of Care Reviewed With: patient  Outcome Summary/Follow up Plan: Pt partially met strength goal this tx. Pt presents with decreased balance with standing and mobility requiring cga/min A X1 for increased safety. Pt will continue to benefit from skilled  therapy services within SNF upon D/C          Outcome Measures       01/13/18 0919 01/12/18 1103 01/12/18 0755    How much help from another person do you currently need...    Turning from your back to your side while in flat bed without using bedrails? 3  -CH 3  -SY     Moving from lying on back to sitting on the side of a flat bed without bedrails? 3  -CH 3  -SY     Moving to and from a bed to a chair (including a wheelchair)? 3  -CH 3  -SY     Standing up from a chair using your arms (e.g., wheelchair, bedside chair)? 3  -CH 3  -SY     Climbing 3-5 steps with a railing? 2  -CH 2  -SY     To walk in hospital room? 3  -CH 3  -SY     AM-PAC 6 Clicks Score 17  -CH 17  -SY     How much help from another is currently needed...    Putting on and taking off regular lower body clothing?   3  -LW    Bathing (including washing, rinsing, and drying)   3  -LW    Toileting (which includes using toilet bed pan or urinal)   3  -LW    Putting on and taking off regular upper body clothing   3  -LW    Taking care of personal grooming (such as brushing teeth)   3  -LW    Eating meals   4  -LW    Score   19  -LW    Functional Assessment    Outcome Measure Options AM-PAC 6 Clicks Basic Mobility (PT)  -CH        01/11/18 0855 01/11/18 0800       How much help from another person do you currently need...    Turning from your back to your side while in flat bed without using bedrails? 3  -CB      Moving from lying on back to sitting on the side of a flat bed without bedrails? 2  -CB      Moving to and from a bed to a chair (including a wheelchair)? 2  -CB      Standing up from a chair using your arms (e.g., wheelchair, bedside chair)? 3  -CB      Climbing 3-5 steps with a railing? 2  -CB      To walk in hospital room? 3  -CB      AM-PAC 6 Clicks Score 15  -CB      How much help from another is currently needed...    Putting on and taking off regular lower body clothing?  3  -NN     Bathing (including washing, rinsing, and drying)  2  -NN      Toileting (which includes using toilet bed pan or urinal)  3  -NN     Putting on and taking off regular upper body clothing  3  -NN     Taking care of personal grooming (such as brushing teeth)  3  -NN     Eating meals  4  -NN     Score  18  -NN     Functional Assessment    Outcome Measure Options AM-PAC 6 Clicks Basic Mobility (PT)  -CB AM-PAC 6 Clicks Daily Activity (OT)  -NN       User Key  (r) = Recorded By, (t) = Taken By, (c) = Cosigned By    Initials Name Provider Type    CB Vanesa Ramos, PT Physical Therapist    SY Sadia French, PTA Physical Therapy Assistant     Nichelle Rahman, PTA Physical Therapy Assistant    LW Jaz Garcia, CAN/L Occupational Therapy Assistant    NN Janet Garibay, OTR/L Occupational Therapist           Time Calculation:         PT Charges       01/13/18 1020          Time Calculation    Start Time 0919  -      Stop Time 0953  -      Time Calculation (min) 34 min  -      PT Received On 01/13/18  -      Time Calculation- PT    Total Timed Code Minutes- PT 34 minute(s)  -        User Key  (r) = Recorded By, (t) = Taken By, (c) = Cosigned By    Initials Name Provider Type     Nichelle Rahman, KIN Physical Therapy Assistant          Therapy Charges for Today     Code Description Service Date Service Provider Modifiers Qty    93011391843 HC GAIT TRAINING EA 15 MIN 1/13/2018 Nichelle Rahman PTA GP 1     Duration:  20m ( 9:19 AM -  9:39 AM)      69325082501 HC PT THER PROC EA 15 MIN 1/13/2018 Nichelle Rahman PTA GP 1     Duration:  14m ( 9:39 AM -  9:53 AM)            PT G-Codes  PT Professional Judgement Used?: Yes  Outcome Measure Options: AM-PAC 6 Clicks Basic Mobility (PT)  Score: 15  Functional Limitation: Mobility: Walking and moving around  Mobility: Walking and Moving Around Current Status (): At least 40 percent but less than 60 percent impaired, limited or restricted  Mobility: Walking and Moving Around Goal Status (): At least 20 percent  but less than 40 percent impaired, limited or restricted    Nichelle Rahman, PTA  1/13/2018

## 2018-01-13 NOTE — SIGNIFICANT NOTE
01/13/18 1342   Rehab Treatment   Discipline physical therapy assistant   Treatment Not Performed patient/family declined treatment  (Pt deferred BID tx this PM despite encourangement from son and PTA. Pt agreeable to therapy checking back tomorrow)   Recommendation   PT - Next Appointment 01/14/18

## 2018-01-14 ENCOUNTER — APPOINTMENT (OUTPATIENT)
Dept: GENERAL RADIOLOGY | Facility: HOSPITAL | Age: 63
End: 2018-01-14

## 2018-01-14 ENCOUNTER — APPOINTMENT (OUTPATIENT)
Dept: CARDIOLOGY | Facility: HOSPITAL | Age: 63
End: 2018-01-14
Attending: FAMILY MEDICINE

## 2018-01-14 PROBLEM — R79.89 POSITIVE D DIMER: Status: ACTIVE | Noted: 2018-01-14

## 2018-01-14 PROBLEM — I07.1 RHEUMATIC TRICUSPID VALVE REGURGITATION: Status: ACTIVE | Noted: 2018-01-14

## 2018-01-14 PROBLEM — R77.8 ELEVATED TROPONIN: Status: ACTIVE | Noted: 2018-01-14

## 2018-01-14 PROBLEM — R79.89 ELEVATED BRAIN NATRIURETIC PEPTIDE (BNP) LEVEL: Status: ACTIVE | Noted: 2018-01-14

## 2018-01-14 PROBLEM — I27.20 PULMONARY HYPERTENSION (HCC): Status: ACTIVE | Noted: 2018-01-14

## 2018-01-14 PROBLEM — E87.5 HYPERKALEMIA: Status: RESOLVED | Noted: 2018-01-13 | Resolved: 2018-01-14

## 2018-01-14 PROBLEM — D64.9 ANEMIA: Status: ACTIVE | Noted: 2018-01-14

## 2018-01-14 PROBLEM — Z63.9 FAMILY DYNAMICS PROBLEM: Status: ACTIVE | Noted: 2018-01-14

## 2018-01-14 PROBLEM — R79.89 ELEVATED TROPONIN: Status: ACTIVE | Noted: 2018-01-14

## 2018-01-14 LAB
ALBUMIN SERPL-MCNC: 2.2 G/DL (ref 3.4–4.8)
ALBUMIN/GLOB SERPL: 0.9 G/DL (ref 1.1–1.8)
ALP SERPL-CCNC: 112 U/L (ref 38–126)
ALT SERPL W P-5'-P-CCNC: 36 U/L (ref 9–52)
ANION GAP SERPL CALCULATED.3IONS-SCNC: 7 MMOL/L (ref 5–15)
ARTERIAL PATENCY WRIST A: ABNORMAL
AST SERPL-CCNC: 26 U/L (ref 14–36)
ATMOSPHERIC PRESS: ABNORMAL MMHG
BASE EXCESS BLDA CALC-SCNC: -0.7 MMOL/L (ref -2.4–2.4)
BASOPHILS # BLD AUTO: 0 10*3/MM3 (ref 0–0.2)
BASOPHILS NFR BLD AUTO: 0 % (ref 0–2)
BDY SITE: ABNORMAL
BH CV ECHO MEAS - ACS: 1.3 CM
BH CV ECHO MEAS - AO ISTHMUS: 2.6 CM
BH CV ECHO MEAS - AO MAX PG (FULL): 9.1 MMHG
BH CV ECHO MEAS - AO MAX PG: 15.9 MMHG
BH CV ECHO MEAS - AO MEAN PG (FULL): 2.8 MMHG
BH CV ECHO MEAS - AO MEAN PG: 6.1 MMHG
BH CV ECHO MEAS - AO ROOT AREA (BSA CORRECTED): 1.7
BH CV ECHO MEAS - AO ROOT AREA: 6.7 CM^2
BH CV ECHO MEAS - AO ROOT DIAM: 2.9 CM
BH CV ECHO MEAS - AO V2 MAX: 199.1 CM/SEC
BH CV ECHO MEAS - AO V2 MEAN: 111.1 CM/SEC
BH CV ECHO MEAS - AO V2 VTI: 23.5 CM
BH CV ECHO MEAS - ASC AORTA: 2.8 CM
BH CV ECHO MEAS - AVA(I,A): 2.5 CM^2
BH CV ECHO MEAS - AVA(I,D): 2.5 CM^2
BH CV ECHO MEAS - AVA(V,A): 1.9 CM^2
BH CV ECHO MEAS - AVA(V,D): 1.9 CM^2
BH CV ECHO MEAS - BSA(HAYCOCK): 1.8 M^2
BH CV ECHO MEAS - BSA: 1.7 M^2
BH CV ECHO MEAS - BZI_BMI: 26.1 KILOGRAMS/M^2
BH CV ECHO MEAS - BZI_METRIC_HEIGHT: 162.6 CM
BH CV ECHO MEAS - BZI_METRIC_WEIGHT: 68.9 KG
BH CV ECHO MEAS - EDV(CUBED): 74.5 ML
BH CV ECHO MEAS - EDV(TEICH): 79 ML
BH CV ECHO MEAS - EF(CUBED): 75.6 %
BH CV ECHO MEAS - EF(TEICH): 68 %
BH CV ECHO MEAS - ESV(CUBED): 18.2 ML
BH CV ECHO MEAS - ESV(TEICH): 25.3 ML
BH CV ECHO MEAS - FS: 37.5 %
BH CV ECHO MEAS - IVS/LVPW: 1.3
BH CV ECHO MEAS - IVSD: 1.1 CM
BH CV ECHO MEAS - LA DIMENSION: 3.5 CM
BH CV ECHO MEAS - LA/AO: 1.2
BH CV ECHO MEAS - LV MASS(C)D: 136.3 GRAMS
BH CV ECHO MEAS - LV MASS(C)DI: 78.3 GRAMS/M^2
BH CV ECHO MEAS - LV MAX PG: 6.7 MMHG
BH CV ECHO MEAS - LV MEAN PG: 3.3 MMHG
BH CV ECHO MEAS - LV V1 MAX: 129.5 CM/SEC
BH CV ECHO MEAS - LV V1 MEAN: 83.5 CM/SEC
BH CV ECHO MEAS - LV V1 VTI: 19.4 CM
BH CV ECHO MEAS - LVIDD: 4.2 CM
BH CV ECHO MEAS - LVIDS: 2.6 CM
BH CV ECHO MEAS - LVOT AREA (M): 3.1 CM^2
BH CV ECHO MEAS - LVOT AREA: 3 CM^2
BH CV ECHO MEAS - LVOT DIAM: 2 CM
BH CV ECHO MEAS - LVPWD: 0.88 CM
BH CV ECHO MEAS - MV A MAX VEL: 199.6 CM/SEC
BH CV ECHO MEAS - MV DEC SLOPE: 631.7 CM/SEC^2
BH CV ECHO MEAS - MV E MAX VEL: 175.9 CM/SEC
BH CV ECHO MEAS - MV E/A: 0.88
BH CV ECHO MEAS - MV MAX PG: 18.9 MMHG
BH CV ECHO MEAS - MV MEAN PG: 9.3 MMHG
BH CV ECHO MEAS - MV P1/2T MAX VEL: 187.9 CM/SEC
BH CV ECHO MEAS - MV P1/2T: 87.1 MSEC
BH CV ECHO MEAS - MV V2 MAX: 217.2 CM/SEC
BH CV ECHO MEAS - MV V2 MEAN: 145.1 CM/SEC
BH CV ECHO MEAS - MV V2 VTI: 51.6 CM
BH CV ECHO MEAS - MVA P1/2T LCG: 1.2 CM^2
BH CV ECHO MEAS - MVA(P1/2T): 2.5 CM^2
BH CV ECHO MEAS - MVA(VTI): 1.1 CM^2
BH CV ECHO MEAS - PA MAX PG: 6.9 MMHG
BH CV ECHO MEAS - PA V2 MAX: 131.7 CM/SEC
BH CV ECHO MEAS - RAP SYSTOLE: 15 MMHG
BH CV ECHO MEAS - RVDD: 2.9 CM
BH CV ECHO MEAS - RVSP: 106.5 MMHG
BH CV ECHO MEAS - SI(AO): 90.9 ML/M^2
BH CV ECHO MEAS - SI(CUBED): 32.4 ML/M^2
BH CV ECHO MEAS - SI(LVOT): 33.4 ML/M^2
BH CV ECHO MEAS - SI(TEICH): 30.8 ML/M^2
BH CV ECHO MEAS - SV(AO): 158.2 ML
BH CV ECHO MEAS - SV(CUBED): 56.4 ML
BH CV ECHO MEAS - SV(LVOT): 58.1 ML
BH CV ECHO MEAS - SV(TEICH): 53.7 ML
BH CV ECHO MEAS - TR MAX VEL: 478.2 CM/SEC
BILIRUB SERPL-MCNC: 0.7 MG/DL (ref 0.2–1.3)
BILIRUB UR QL STRIP: NEGATIVE
BUN BLD-MCNC: 13 MG/DL (ref 7–21)
BUN/CREAT SERPL: 15.3 (ref 7–25)
CA-I BLD-MCNC: 4.4 MG/DL (ref 4.5–4.9)
CALCIUM SPEC-SCNC: 8.4 MG/DL (ref 8.4–10.2)
CHLORIDE SERPL-SCNC: 102 MMOL/L (ref 95–110)
CLARITY UR: CLEAR
CO2 BLDA-SCNC: 24.8 MMOL/L (ref 23–27)
CO2 SERPL-SCNC: 22 MMOL/L (ref 22–31)
COLOR UR: YELLOW
CREAT BLD-MCNC: 0.85 MG/DL (ref 0.5–1)
CRP SERPL-MCNC: 20.9 MG/DL (ref 0–1)
D-DIMER, QUANTITATIVE (MAD,POW, STR): 706 NG/ML (FEU) (ref 0–470)
D-LACTATE SERPL-SCNC: 1.3 MMOL/L (ref 0.5–2)
D-LACTATE SERPL-SCNC: 2.9 MMOL/L (ref 0.5–2)
DEPRECATED RDW RBC AUTO: 46.9 FL (ref 36.4–46.3)
EOSINOPHIL # BLD AUTO: 0 10*3/MM3 (ref 0–0.7)
EOSINOPHIL NFR BLD AUTO: 0 % (ref 0–7)
ERYTHROCYTE [DISTWIDTH] IN BLOOD BY AUTOMATED COUNT: 15.5 % (ref 11.5–14.5)
GFR SERPL CREATININE-BSD FRML MDRD: 67 ML/MIN/1.73 (ref 45–104)
GLOBULIN UR ELPH-MCNC: 2.5 GM/DL (ref 2.3–3.5)
GLUCOSE BLD-MCNC: 93 MG/DL (ref 60–100)
GLUCOSE BLDA-MCNC: 107 MMOL/L
GLUCOSE UR STRIP-MCNC: NEGATIVE MG/DL
HCO3 BLDA-SCNC: 23.7 MMOL/L (ref 22–26)
HCT VFR BLD AUTO: 24.7 % (ref 35–45)
HCT VFR BLD CALC: 26 % (ref 38–47)
HGB BLD-MCNC: 7.9 G/DL (ref 12–15.5)
HGB BLDA-MCNC: 9 G/DL (ref 12–16)
HGB UR QL STRIP.AUTO: NEGATIVE
HOLD SPECIMEN: NORMAL
IMM GRANULOCYTES # BLD: 0.07 10*3/MM3 (ref 0–0.02)
IMM GRANULOCYTES NFR BLD: 0.6 % (ref 0–0.5)
KETONES UR QL STRIP: NEGATIVE
LEUKOCYTE ESTERASE UR QL STRIP.AUTO: NEGATIVE
LYMPHOCYTES # BLD AUTO: 1.29 10*3/MM3 (ref 0.6–4.2)
LYMPHOCYTES NFR BLD AUTO: 10.3 % (ref 10–50)
MCH RBC QN AUTO: 26.6 PG (ref 26.5–34)
MCHC RBC AUTO-ENTMCNC: 32 G/DL (ref 31.4–36)
MCV RBC AUTO: 83.2 FL (ref 80–98)
MODALITY: ABNORMAL
MONOCYTES # BLD AUTO: 0.92 10*3/MM3 (ref 0–0.9)
MONOCYTES NFR BLD AUTO: 7.4 % (ref 0–12)
NEUTROPHILS # BLD AUTO: 10.21 10*3/MM3 (ref 2–8.6)
NEUTROPHILS NFR BLD AUTO: 81.7 % (ref 37–80)
NITRITE UR QL STRIP: NEGATIVE
NT-PROBNP SERPL-MCNC: 9300 PG/ML (ref 0–900)
PCO2 BLDA: 37.4 MM HG (ref 35–45)
PH BLDA: 7.42 PH UNITS (ref 7.35–7.45)
PH UR STRIP.AUTO: 6 [PH] (ref 5–9)
PLATELET # BLD AUTO: 262 10*3/MM3 (ref 150–450)
PMV BLD AUTO: 9.3 FL (ref 8–12)
PO2 BLDA: 70.7 MM HG (ref 80–105)
POTASSIUM BLD-SCNC: 4.8 MMOL/L (ref 3.5–5.1)
POTASSIUM BLDA-SCNC: 3.82 MMOL/L (ref 3.6–4.9)
PROT SERPL-MCNC: 4.7 G/DL (ref 6.3–8.6)
PROT UR QL STRIP: NEGATIVE
RBC # BLD AUTO: 2.97 10*6/MM3 (ref 3.77–5.16)
SAO2 % BLDCOA: 93.6 % (ref 94–100)
SODIUM BLD-SCNC: 131 MMOL/L (ref 137–145)
SODIUM BLDA-SCNC: 138.3 MMOL/L (ref 138–146)
SP GR UR STRIP: 1.01 (ref 1–1.03)
TROPONIN I SERPL-MCNC: 0.04 NG/ML
TROPONIN I SERPL-MCNC: 0.06 NG/ML
UROBILINOGEN UR QL STRIP: NORMAL
WBC NRBC COR # BLD: 12.49 10*3/MM3 (ref 3.2–9.8)

## 2018-01-14 PROCEDURE — 84484 ASSAY OF TROPONIN QUANT: CPT | Performed by: FAMILY MEDICINE

## 2018-01-14 PROCEDURE — 99232 SBSQ HOSP IP/OBS MODERATE 35: CPT | Performed by: FAMILY MEDICINE

## 2018-01-14 PROCEDURE — 82746 ASSAY OF FOLIC ACID SERUM: CPT | Performed by: FAMILY MEDICINE

## 2018-01-14 PROCEDURE — 82803 BLOOD GASES ANY COMBINATION: CPT | Performed by: FAMILY MEDICINE

## 2018-01-14 PROCEDURE — 93005 ELECTROCARDIOGRAM TRACING: CPT | Performed by: FAMILY MEDICINE

## 2018-01-14 PROCEDURE — 83550 IRON BINDING TEST: CPT | Performed by: FAMILY MEDICINE

## 2018-01-14 PROCEDURE — 94799 UNLISTED PULMONARY SVC/PX: CPT

## 2018-01-14 PROCEDURE — 85379 FIBRIN DEGRADATION QUANT: CPT | Performed by: FAMILY MEDICINE

## 2018-01-14 PROCEDURE — 25010000002 LEVOFLOXACIN PER 250 MG: Performed by: FAMILY MEDICINE

## 2018-01-14 PROCEDURE — 25010000002 METHYLPREDNISOLONE PER 40 MG: Performed by: FAMILY MEDICINE

## 2018-01-14 PROCEDURE — 83540 ASSAY OF IRON: CPT | Performed by: FAMILY MEDICINE

## 2018-01-14 PROCEDURE — 82607 VITAMIN B-12: CPT | Performed by: FAMILY MEDICINE

## 2018-01-14 PROCEDURE — 93306 TTE W/DOPPLER COMPLETE: CPT

## 2018-01-14 PROCEDURE — 81003 URINALYSIS AUTO W/O SCOPE: CPT | Performed by: FAMILY MEDICINE

## 2018-01-14 PROCEDURE — 25010000002 ENOXAPARIN PER 10 MG: Performed by: FAMILY MEDICINE

## 2018-01-14 PROCEDURE — 85025 COMPLETE CBC W/AUTO DIFF WBC: CPT | Performed by: FAMILY MEDICINE

## 2018-01-14 PROCEDURE — 97535 SELF CARE MNGMENT TRAINING: CPT

## 2018-01-14 PROCEDURE — 94760 N-INVAS EAR/PLS OXIMETRY 1: CPT

## 2018-01-14 PROCEDURE — 87040 BLOOD CULTURE FOR BACTERIA: CPT | Performed by: FAMILY MEDICINE

## 2018-01-14 PROCEDURE — 86140 C-REACTIVE PROTEIN: CPT | Performed by: FAMILY MEDICINE

## 2018-01-14 PROCEDURE — 83880 ASSAY OF NATRIURETIC PEPTIDE: CPT | Performed by: FAMILY MEDICINE

## 2018-01-14 PROCEDURE — 93306 TTE W/DOPPLER COMPLETE: CPT | Performed by: INTERNAL MEDICINE

## 2018-01-14 PROCEDURE — 80053 COMPREHEN METABOLIC PANEL: CPT | Performed by: FAMILY MEDICINE

## 2018-01-14 PROCEDURE — 93010 ELECTROCARDIOGRAM REPORT: CPT | Performed by: INTERNAL MEDICINE

## 2018-01-14 PROCEDURE — 83605 ASSAY OF LACTIC ACID: CPT | Performed by: FAMILY MEDICINE

## 2018-01-14 PROCEDURE — 36600 WITHDRAWAL OF ARTERIAL BLOOD: CPT

## 2018-01-14 PROCEDURE — 71045 X-RAY EXAM CHEST 1 VIEW: CPT

## 2018-01-14 RX ORDER — FUROSEMIDE 20 MG/1
20 TABLET ORAL
Status: DISCONTINUED | OUTPATIENT
Start: 2018-01-14 | End: 2018-01-19

## 2018-01-14 RX ADMIN — FLUOXETINE 60 MG: 20 CAPSULE ORAL at 10:00

## 2018-01-14 RX ADMIN — METHYLPREDNISOLONE SODIUM SUCCINATE 40 MG: 40 INJECTION, POWDER, FOR SOLUTION INTRAMUSCULAR; INTRAVENOUS at 10:00

## 2018-01-14 RX ADMIN — IPRATROPIUM BROMIDE AND ALBUTEROL SULFATE 3 ML: 2.5; .5 SOLUTION RESPIRATORY (INHALATION) at 16:32

## 2018-01-14 RX ADMIN — CARVEDILOL 3.12 MG: 3.12 TABLET, FILM COATED ORAL at 10:00

## 2018-01-14 RX ADMIN — LOSARTAN POTASSIUM 50 MG: 50 TABLET, FILM COATED ORAL at 10:00

## 2018-01-14 RX ADMIN — FUROSEMIDE 20 MG: 20 TABLET ORAL at 17:25

## 2018-01-14 RX ADMIN — CARVEDILOL 3.12 MG: 3.12 TABLET, FILM COATED ORAL at 17:25

## 2018-01-14 RX ADMIN — NICOTINE 1 PATCH: 21 PATCH TRANSDERMAL at 17:25

## 2018-01-14 RX ADMIN — SODIUM CHLORIDE 500 ML: 9 INJECTION, SOLUTION INTRAVENOUS at 05:07

## 2018-01-14 RX ADMIN — AMITRIPTYLINE HYDROCHLORIDE 100 MG: 50 TABLET, FILM COATED ORAL at 21:55

## 2018-01-14 RX ADMIN — RISPERIDONE 1 MG: 1 TABLET ORAL at 21:55

## 2018-01-14 RX ADMIN — ENOXAPARIN SODIUM 40 MG: 40 INJECTION SUBCUTANEOUS at 17:25

## 2018-01-14 RX ADMIN — IPRATROPIUM BROMIDE AND ALBUTEROL SULFATE 3 ML: 2.5; .5 SOLUTION RESPIRATORY (INHALATION) at 20:00

## 2018-01-14 RX ADMIN — LEVOFLOXACIN 750 MG: 5 INJECTION, SOLUTION INTRAVENOUS at 16:20

## 2018-01-14 RX ADMIN — FUROSEMIDE 20 MG: 20 TABLET ORAL at 10:00

## 2018-01-14 RX ADMIN — ASPIRIN 81 MG 81 MG: 81 TABLET ORAL at 10:00

## 2018-01-14 RX ADMIN — FAMOTIDINE 40 MG: 20 TABLET, FILM COATED ORAL at 10:00

## 2018-01-14 RX ADMIN — POTASSIUM CHLORIDE 10 MEQ: 750 CAPSULE, EXTENDED RELEASE ORAL at 10:00

## 2018-01-14 NOTE — THERAPY TREATMENT NOTE
Acute Care - Occupational Therapy Treatment Note  [facility]     Patient Name: Michelle Child  : 1955  MRN: [id number]  Today's Date: 2018  Onset of Illness/Injury or Date of Surgery Date: 01/10/18  Date of Referral to OT: 01/10/18  Referring Physician: Dr Berry      Admit Date: 1/10/2018    Visit Dx:     ICD-10-CM ICD-9-CM   1. Hypokalemia E87.6 276.8   2. Pneumonia of left lung due to infectious organism, unspecified part of lung J18.9 486   3. Fall, initial encounter W19.XXXA E888.9   4. Contusion of scalp, initial encounter S00.03XA 920   5. Decreased activities of daily living (ADL) Z78.9 V49.89   6. Impaired physical mobility Z74.09 781.99     Patient Active Problem List   Diagnosis   • Tobacco dependence   • Migraine   • Iron deficiency   • Gastroesophageal reflux disease   • Coronary arteriosclerosis   • Bilateral pseudophakia   • Astigmatism   • Right humeral fracture   • Fall with injury   • Closed fracture of humerus   • Cause of injury, fall   • Closed fracture of proximal end of right humerus with routine healing   • Weakness   • Acute respiratory failure with hypoxia   • Pneumonia of left lower lobe due to Streptococcus pneumoniae   • Hyponatremia   • Cerebral microvascular disease   • Anemia   • Elevated brain natriuretic peptide (BNP) level   • Elevated troponin   • Family dynamics problem   • Positive D dimer             Adult Rehabilitation Note       18 1000 18 1015 18 0919    Rehab Assessment/Intervention    Discipline occupational therapy assistant  -LW occupational therapist  -NN physical therapy assistant  -CH    Document Type therapy note (daily note)  -LW therapy note (daily note)  -NN therapy note (daily note)  -CH    Subjective Information agree to therapy  -LW agree to therapy;complains of;weakness;fatigue  -NN agree to therapy  -CH    Patient Effort, Rehab Treatment adequate  -LW  adequate  -CH    Symptoms Noted Comment Pt very confused   -MARTIN       Precautions/Limitations  fall precautions   decreased cognition  -NN fall precautions;oxygen therapy device and L/min  -CH    Recorded by [LW] PAMELLA Wade/DALTON [NN] Janet Garibay, OTR/L [CH] Nichelle Rahman PTA    Vital Signs    Pre Systolic BP Rehab 143  -LW  141  -CH    Pre Treatment Diastolic BP 75  -LW  77  -CH    Intra Treatment Diastolic BP   101  -CH    Post Systolic BP Rehab   63  -CH    Pretreatment Heart Rate (beats/min) 68  -LW  97  -CH    Intratreatment Heart Rate (beats/min) 73  -LW      Posttreatment Heart Rate (beats/min) 78  -LW  101  -CH    Pre SpO2 (%) 94  -LW  92  -CH    O2 Delivery Pre Treatment supplemental O2  -LW  supplemental O2  -CH    Intra SpO2 (%) 95  -LW  90  -CH    O2 Delivery Intra Treatment supplemental O2  -LW  supplemental O2  -CH    Post SpO2 (%) 93  -LW  91  -CH    O2 Delivery Post Treatment supplemental O2  -LW  supplemental O2  -CH    Pre Patient Position Supine  -LW  Sitting  -CH    Intra Patient Position Sitting  -LW  Standing  -CH    Post Patient Position Sitting  -LW  Supine  -CH    Recorded by [LW] PAMELLA Wade/L  [CH] Nichelle Rahman PTA    Pain Assessment    Pain Assessment No/denies pain  -LW No/denies pain  -NN No/denies pain  -CH    Recorded by [LW] PAMELLA Wade/DALTON [NN] Janet Garibay, OTR/L [CH] Nichelle Rahman PTA    Vision Assessment/Intervention    Visual Impairment WNL;WFL  -LW  WNL;WFL  -CH    Recorded by [LW] PAMELLA Wade/DALTON  [CH] Nichelle Rahman PTA    Cognitive Assessment/Intervention    Current Cognitive/Communication Assessment impaired  -LW impaired  -NN impaired  -CH    Orientation Status oriented to;person  -LW oriented to;person;place;disoriented to;time;situation  -NN oriented to;person;other (see comments)     -CH    Follows Commands/Answers Questions 75% of the time;needs increased time  -LW 75% of the time;able to follow single-step instructions;needs cueing;needs increased time;needs repetition  -NN  100% of the time;needs increased time  -    Personal Safety decreased awareness, need for safety  -LW decreased awareness, need for assist;decreased awareness, need for safety;decreased insight to deficits  -NN decreased awareness, need for assist;decreased awareness, need for safety  -    Personal Safety Interventions fall prevention program maintained;nonskid shoes/slippers when out of bed  -LW fall prevention program maintained;nonskid shoes/slippers when out of bed;muscle strengthening facilitated;supervised activity;gait belt  -NN fall prevention program maintained;gait belt;muscle strengthening facilitated;nonskid shoes/slippers when out of bed;supervised activity  -    Recorded by [LW] JUDE WadeA/L [NN] Janet Garibay, OTR/L [CH] Nichelle Rahman PTA    Bed Mobility, Assessment/Treatment    Bed Mobility, Assistive Device bed rails;head of bed elevated  - bed rails;head of bed elevated  -NN     Bed Mobility, Roll Left, [place]   not tested  -    Bed Mobility, Roll Right, [place]   not tested  -    Bed Mobility, Scoot/Bridge, [place]  contact guard assist  -NN not tested  -    Bed Mob, Supine to Sit, [place] contact guard assist  - contact guard assist  -NN not tested  -    Bed Mob, Sit to Supine, [place]  contact guard assist  -NN contact guard assist;minimum assist (75% patient effort)  -    Bed Mobility, Impairments  strength decreased;impaired balance;postural control impaired  -     Bed Mobility, Comment  Pt. very lethargic and dozing on and off and requiring min A for posterior leaning  -     Recorded by [LW] PAMELLA Wade/L [NN] Janet Garibay, OTR/L [CH] Nichelle Rahman PTA    Transfer Assessment/Treatment    Transfers, Bed-Chair [place] contact guard assist  -      Transfers, Sit-Stand [place] contact guard assist  - contact guard assist  -NN contact guard assist  -    Transfers, Stand-Sit [place] contact  guard assist  -LW contact guard assist  -NN contact guard assist  -CH    Transfers, Sit-Stand-Sit, Assist Device other (see comments)   none  -LW --   hha  -NN rolling walker  -CH    Transfer, Safety Issues  step length decreased  -NN balance decreased during turns;loses balance backward  -CH    Transfer, Impairments  strength decreased;impaired balance  -NN impaired balance  -CH    Transfer, Comment   pt with LOB posteriorly upon standing EOB, was able to correct with cga X1  -CH    Recorded by [LW] PAMELLA Wade/L [NN] Janet Garibay, OTR/L [CH] Nichelle Rahman, KIN    Gait Assessment/Treatment    Gait, [place] Level   contact guard assist;minimum assist (75% patient effort)  -CH    Gait, Assistive Device   rolling walker  -CH    Gait, Distance (Feet)   60  -CH    Gait, Safety Issues   balance decreased during turns;weight-shifting ability decreased;step length decreased  -CH    Recorded by   [CH] Nichelle Rahman, KIN    Stairs Assessment/Treatment    Stairs, [place] Level   not tested  -CH    Recorded by   [CH] Nichelle Rahman, PTA    Upper Body Bathing Assessment/Training    UB Bathing Assess/Train Assistive Device other (see comments)   Pan bath wash cloth  -LW --   sponge bathing  -NN     UB Bathing Assess/Train, Position sitting  -LW sitting  -NN     UB Bathing Assess/Train, [place] contact guard assist  -LW moderate assist (50% patient effort)  -NN     UB Bathing Assess/Train, Impairments  strength decreased;impaired balance;postural control impaired  -NN     Recorded by [LW] JUDE WadeA/L [NN] Janet Garibay, OTR/L     Lower Body Bathing Assessment/Training    LB Bathing Assess/Train Assistive Device other (see comments)   Pan bath, wash cloth  -LW --   sponge bathing  -NN     LB Bathing Assess/Train, Position sitting  -LW sitting  -NN     LB Bathing Assess/Train, [place] Level contact guard assist  -LW moderate assist (50% patient effort)  -NN     LB  Bathing Assess/Train, Impairments  strength decreased   lethargic and disorientation  -NN     Recorded by [LW] PAMELLA Wade/L [NN] Janet Garibay, ALLYSSAR/L     Upper Body Dressing Assessment/Training    UB Dressing Assess/Train, Clothing Type doffing:;donning:;hospital gown  -LW donning:;doffing:;hospital gown  -NN     UB Dressing Assess/Train, Position sitting  -LW edge of bed  -NN     UB Dressing Assess/Train, [place] moderate assist (50% patient effort)  -LW moderate assist (50% patient effort)  -NN     UB Dressing Assess/Train, Impairments  strength decreased;impaired balance;postural control impaired  -NN     Recorded by [LW] PAMELLA Wade/DALTON [NN] Janet Garibay, OTR/L     Lower Body Dressing Assessment/Training    LB Dressing Assess/Train, Clothing Type doffing:;donning:;slipper socks  -LW      LB Dressing Assess/Train, Position sitting  -LW      LB Dressing Assess/Train, [place] moderate assist (50% patient effort)  -LW      Recorded by [LW] PAMELLA Wade/L      Grooming Assessment/Training    Grooming Assess/Train, Assistive Device  --   wash face, brush hair  -NN     Grooming Assess/Train, Position sitting  -LW sitting  -NN     Grooming Assess/Train, [place]ep[place] Level minimum assist (75% patient effort)  -LW minimum assist (75% patient effort)  -NN     Grooming Assess/Train, Impairments sensation decreased  -LW strength decreased;impaired balance  -NN     Grooming Assess/Train, Comment Oral care, wash face and hands, washed hair blow dried hair  -LW      Recorded by [LW] PAMELLA Wdae/DALTON [NN] Janet Garibay, OTR/L     Therapy Exercises    Bilateral Lower Extremities   AROM:;20 reps;sitting;ankle pumps/circles;hip abduction/adduction;hip flexion;LAQ  -CH    Recorded by   [CH] Nichelle Rahman PTA    Positioning and Restraints    Pre-Treatment Position in bed  -LW in bed  -NN in bed  -CH    Post Treatment Position bed  -LW bed  -NN bed  -CH    In Bed sitting;call light  within reach;encouraged to call for assist;exit alarm on;with family/caregiver  -LW fowlers;call light within reach;encouraged to call for assist;side rails up x2  -NN supine;call light within reach;encouraged to call for assist;exit alarm on;side rails up x2;SCD pump applied  -    Recorded by [LW] PAMELLA Wade/L [NN] Janet Garibay, OTR/L [CH] Nichelle Rahman PTA      01/12/18 1103 01/12/18 0755       Rehab Assessment/Intervention    Discipline physical therapy assistant  -SY occupational therapy assistant  -     Document Type therapy note (daily note)  -SY therapy note (daily note)  -LW     Subjective Information agree to therapy;nausea/vomiting;dyspnea  -SY agree to therapy  -LW     Patient Effort, Rehab Treatment adequate  -SY adequate  -LW     Symptoms Noted Comment  Pt is very confused.  -LW     Precautions/Limitations fall precautions;oxygen therapy device and L/min  -SY fall precautions  -LW     Equipment Issued to Patient gait belt  -SY gait belt  -LW     Recorded by [SY] Sadia French PTA [LW] Jaz Garcia CAN/L     Vital Signs    Pre Systolic BP Rehab 152  -  -LW     Pre Treatment Diastolic BP 72  -SY 69  -LW     Pretreatment Heart Rate (beats/min) 98  -SY 99  -LW     Intratreatment Heart Rate (beats/min) 93  -SY 96  -LW     Posttreatment Heart Rate (beats/min)  93  -LW     Pre SpO2 (%) 95  -SY 96  -LW     O2 Delivery Pre Treatment supplemental O2  -SY supplemental O2  -LW     Intra SpO2 (%)  98  -LW     O2 Delivery Intra Treatment  supplemental O2  -LW     Post SpO2 (%)  97  -LW     O2 Delivery Post Treatment  supplemental O2  -LW     Pre Patient Position Supine  -SY Supine  -LW     Intra Patient Position Sitting  -SY Standing  -LW     Post Patient Position  Sitting  -LW     Recorded by [SY] Sadia French PTA [LW] JUDE WadeA/L     Pain Assessment    Pain Assessment No/denies pain  -SY No/denies pain  -LW     Recorded by [SY] Sadia French PTA [LW] Jaz QUIGLEY  PAMELLA Garcia/L     Vision Assessment/Intervention    Visual Impairment WNL;WFL  -SY WNL;WFL  -LW     Recorded by [SY] Sadia French PTA [LW] PAMELLA Wade/L     Cognitive Assessment/Intervention    Current Cognitive/Communication Assessment impaired  -SY impaired  -LW     Orientation Status oriented to;person;place  -SY oriented to;person;place  -LW     Follows Commands/Answers Questions 100% of the time  -% of the time  -LW     Personal Safety decreased awareness, need for assist  -SY decreased awareness, need for assist  -LW     Personal Safety Interventions fall prevention program maintained;supervised activity  -SY fall prevention program maintained;gait belt  -LW     Recorded by [SY] Sadia French PTA [LW] PAMELLA Wade/L     Bed Mobility, Assessment/Treatment    Bed Mobility, Assistive Device bed rails;head of bed elevated  -SY bed rails;head of bed elevated  -LW     Bed Mobility, Roll Left, [place] supervision required  -SY      Bed Mobility, Roll Right, [place] not tested  -SY supervision required  -LW     Bed Mobility, Scoot/Bridge, [place] supervision required  -SY      Bed Mob, Supine to Sit, [place] supervision required  -YS supervision required  -LW     Bed Mob, Sit to Supine, [place] supervision required  -SY supervision required  -LW     Bed Mobility, Safety Issues cognitive deficits limit understanding  -SY cognitive deficits limit understanding  -LW     Recorded by [SY] Sadia French PTA [LW] PAMELLA Wade/L     Transfer Assessment/Treatment    Transfers, Bed-Chair [place]  contact guard assist  -LW     Transfers, Sit-Stand [place] contact guard assist  -SY contact guard assist  -LW     Transfers, Stand-Sit [place] contact guard assist  -SY contact guard assist  -LW     Transfers, Sit-Stand-Sit, Assist Device other (see comments)   none  -SY other (see comments)   none  -LW     Toilet Transfer, [place] contact guard  assist  -SY contact guard assist  -LW     Toilet Transfer, Assistive Device other (see comments)   HHA  -SY other (see comments)   none  -LW     Transfer, Impairments impaired balance  -SY impaired balance  -LW     Recorded by [SY] Sadia French PTA [LW] PAMELLA Wade/DALTON     Gait Assessment/Treatment    Gait, [place] Level contact guard assist  -SY      Gait, Distance (Feet) 10   x2  -SY      Gait, Comment recommend AD for increased distances  -SY      Recorded by [SY] Sadia French PTA      Functional Mobility    Functional Mobility- Ind. Level  contact guard assist  -LW     Functional Mobility- Device  other (see comments)   HHA  -LW     Functional Mobility-Distance (Feet)  10  -LW     Recorded by  [LW] PAMELLA Wade/L     Upper Body Bathing Assessment/Training    UB Bathing Assess/Train Assistive Device  other (see comments)   wash cloth Pan bath  -LW     UB Bathing Assess/Train, Position  sitting;edge of bed  -LW     UB Bathing Assess/Train, [place] Level  supervision required;set up required  -LW     Recorded by  [LW] PAMELLA Wade/L     Lower Body Bathing Assessment/Training    LB Bathing Assess/Train Assistive Device  other (see comments)   wash cloth Pan bath  -LW     LB Bathing Assess/Train, Position  sitting;edge of bed  -LW     LB Bathing Assess/Train, [place] Level  supervision required;set up required  -LW     Recorded by  [LW] PAMELLA Wade/L     Upper Body Dressing Assessment/Training    UB Dressing Assess/Train, Clothing Type  doffing:;donning:;hospital gown  -LW     UB Dressing Assess/Train, Position  sitting;edge of bed  -LW     UB Dressing Assess/Train, [place]  supervision required  -LW     Recorded by  [LW] PAMELLA Wade/L     Lower Body Dressing Assessment/Training    LB Dressing Assess/Train, Clothing Type  doffing:;donning:;pants;shorts;slipper socks  -LW     LB Dressing Assess/Train, Position  edge of bed;sitting;standing  -LW      LB Dressing Assess/Train, [place]  supervision required  -LW     Recorded by  [LW] JUDE WadeA/L     Toileting Assessment/Training    Toileting Assess/Train, Assistive Device  grab bars  -LW     Toileting Assess/Train, Position  sitting  -LW     Toileting Assess/Train, Indepen Level  supervision required  -LW     Recorded by  [LW] PAMELLA Wade/L     Grooming Assessment/Training    Grooming Assess/Train, Position  edge of bed;sitting  -LW     Grooming Assess/Train, Indepen Level  supervision required;set up required  -LW     Grooming Assess/Train, Comment  Oral care, wash face and hands, comb hair.   -LW     Recorded by  [LW] JUDE WadeA/L     Positioning and Restraints    Pre-Treatment Position in bed  -SY in bed  -LW     Post Treatment Position bed  -SY chair  -LW     In Bed fowlers;call light within reach;exit alarm on   eating lunch  -SY sitting;call light within reach;encouraged to call for assist;exit alarm on  -LW     Recorded by [SY] Sadia French, PTA [LW] Jaz Garcia CAN/L       User Key  (r) = Recorded By, (t) = Taken By, (c) = Cosigned By    Initials Name Effective Dates     Sadia French, PTA 10/17/16 -     CH Nichelle Rahman, PTA 10/17/16 -     LW Jaz Garcia CAN/L 10/17/16 -     NN Janet Garibay, OTR/L 11/08/17 -                 OT Goals       01/14/18 1529 01/12/18 1236 01/11/18 0856    Transfer Training OT LTG    Transfer Training OT LTG, Date Established   01/11/18  -NN    Transfer Training OT LTG, Time to Achieve   by discharge  -NN    Transfer Training OT LTG, Activity Type   all transfers  -NN    Transfer Training OT LTG, [place] Level   independent  -NN    Transfer Training OT LTG, Date Goal Reviewed 01/14/18  -LW 01/12/18  -LW     Transfer Training OT LTG, Outcome goal ongoing  -LW goal ongoing  -LW     Strength OT LTG    Strength Goal OT LTG, Date Established   01/11/18  -NN    Strength Goal OT LTG, Time to Achieve   by discharge   -NN    Strength Goal OT LTG, Measure to Achieve   Pt. will complete BUE strengthening exercises all planes and joints to increase BUE strength to 5/5 for ADLs.   -NN    Strength Goal OT LTG, Date Goal Reviewed 01/14/18  -LW 01/12/18  -LW     Strength Goal OT LTG, Outcome goal ongoing  -LW goal ongoing  -LW     ADL OT LTG    ADL OT LTG, Date Established   01/11/18  -NN    ADL OT LTG, Time to Achieve   by discharge  -NN    ADL OT LTG, Activity Type   ADL skills  -NN    ADL OT LTG, [place] Level   modified independent  -NN    ADL OT LTG, Additional Goal   AE PRN  -NN    ADL OT LTG, Date Goal Reviewed 01/14/18  -LW 01/12/18  -LW     ADL OT LTG, Outcome goal ongoing  -LW goal ongoing  -LW       User Key  (r) = Recorded By, (t) = Taken By, (c) = Cosigned By    Initials Name Provider Type     Jaz Garcia CAN/L Occupational Therapy Assistant    DELFINA Garibay OTR/L Occupational Therapist          Occupational Therapy Education     Title: PT OT SLP Therapies (Active)     Topic: Occupational Therapy (Done)     Point: ADL training (Done)    Description: Instruct learner(s) on proper safety adaptation and remediation techniques during self care or transfers.   Instruct in proper use of assistive devices.    Learning Progress Summary    Learner Readiness Method Response Comment Documented by Status   Patient Acceptance E VU  LW 01/14/18 1528 Done    Acceptance E VU,NR Pt. educated on role of OT, safe bed mobility, benefit of OOB activity, progression with poc NN 01/13/18 1036 Done    Acceptance E VU  LW 01/12/18 1236 Done    Acceptance E NR Pt. educated on role of OT, safe t/f, calling for assistance, benefit of therapy, correcting posture, progression with poc NN 01/11/18 0854 Active               Point: Home exercise program (Done)    Description: Instruct learner(s) on appropriate technique for monitoring, assisting and/or progressing therapeutic exercises/activities.    Learning Progress Summary    Learner  Readiness Method Response Comment Documented by Status   Patient Acceptance E VU  LW 01/14/18 1528 Done    Acceptance E VU,NR Pt. educated on role of OT, safe bed mobility, benefit of OOB activity, progression with poc NN 01/13/18 1036 Done    Acceptance E VU  LW 01/12/18 1236 Done    Acceptance E NR Pt. educated on role of OT, safe t/f, calling for assistance, benefit of therapy, correcting posture, progression with poc NN 01/11/18 0854 Active               Point: Body mechanics (Done)    Description: Instruct learner(s) on proper positioning and spine alignment during self-care, functional mobility activities and/or exercises.    Learning Progress Summary    Learner Readiness Method Response Comment Documented by Status   Patient Acceptance E VU  LW 01/14/18 1528 Done    Acceptance E FRANCISCO,NR Pt. educated on role of OT, safe bed mobility, benefit of OOB activity, progression with poc NN 01/13/18 1036 Done    Acceptance E VU  LW 01/12/18 1236 Done    Acceptance E NR Pt. educated on role of OT, safe t/f, calling for assistance, benefit of therapy, correcting posture, progression with poc NN 01/11/18 0854 Active                      User Key     Initials Effective Dates Name Provider Type Discipline     10/17/16 -  PAMELLA Wade/L Occupational Therapy Assistant OT     11/08/17 -  MAIK Sheffield/L Occupational Therapist OT                  OT Recommendation and Plan  Anticipated Discharge Disposition: home with home health, home with assist, home with 24/7 care  Planned Therapy Interventions: activity intolerance, ADL retraining, balance training, bed mobility training, energy conservation, home exercise program, strengthening, transfer training  Therapy Frequency:  (3-14x/week)  Plan of Care Review  Plan Of Care Reviewed With: patient  Progress: declining  Outcome Summary/Follow up Plan: Pt very confused today. Pt worked on ADL's         Outcome Measures       01/14/18 1000 01/13/18 1000 01/13/18 0919     How much help from another person do you currently need...    Turning from your back to your side while in flat bed without using bedrails?   3  -CH    Moving from lying on back to sitting on the side of a flat bed without bedrails?   3  -CH    Moving to and from a bed to a chair (including a wheelchair)?   3  -CH    Standing up from a chair using your arms (e.g., wheelchair, bedside chair)?   3  -CH    Climbing 3-5 steps with a railing?   2  -CH    To walk in hospital room?   3  -CH    AM-PAC 6 Clicks Score   17  -CH    How much help from another is currently needed...    Putting on and taking off regular lower body clothing? 2  -LW 2  -NN     Bathing (including washing, rinsing, and drying) 2  -LW 2  -NN     Toileting (which includes using toilet bed pan or urinal) 2  -LW 2  -NN     Putting on and taking off regular upper body clothing 3  -LW 3  -NN     Taking care of personal grooming (such as brushing teeth) 3  -LW 3  -NN     Eating meals 4  -LW 4  -NN     Score 16  -LW 16  -NN     Functional Assessment    Outcome Measure Options   AM-PAC 6 Clicks Basic Mobility (PT)  -      01/12/18 1103 01/12/18 0755       How much help from another person do you currently need...    Turning from your back to your side while in flat bed without using bedrails? 3  -SY      Moving from lying on back to sitting on the side of a flat bed without bedrails? 3  -SY      Moving to and from a bed to a chair (including a wheelchair)? 3  -SY      Standing up from a chair using your arms (e.g., wheelchair, bedside chair)? 3  -SY      Climbing 3-5 steps with a railing? 2  -SY      To walk in hospital room? 3  -SY      AM-PAC 6 Clicks Score 17  -SY      How much help from another is currently needed...    Putting on and taking off regular lower body clothing?  3  -LW     Bathing (including washing, rinsing, and drying)  3  -LW     Toileting (which includes using toilet bed pan or urinal)  3  -LW     Putting on and taking off regular  upper body clothing  3  -LW     Taking care of personal grooming (such as brushing teeth)  3  -LW     Eating meals  4  -LW     Score  19  -LW       User Key  (r) = Recorded By, (t) = Taken By, (c) = Cosigned By    Initials Name Provider Type    SY French, PTA Physical Therapy Assistant     Nichelle Rahman, PTA Physical Therapy Assistant    LW Jaz Garcia CAN/L Occupational Therapy Assistant    NN Janet Garibay, OTR/L Occupational Therapist           Time Calculation:         Time Calculation- OT       01/14/18 1531          Time Calculation- OT    OT Start Time 1000  -LW      OT Stop Time 1110  -LW      OT Time Calculation (min) 70 min  -LW      Total Timed Code Minutes- OT 70 minute(s)  -LW      OT Received On 01/14/18  -LW        User Key  (r) = Recorded By, (t) = Taken By, (c) = Cosigned By    Initials Name Provider Type     Jaz Garcia CAN/L Occupational Therapy Assistant           Therapy Charges for Today     Code Description Service Date Service Provider Modifiers Qty    [id number]  OT SELF CARE/MGMT/TRAIN EA 15 MIN 1/14/2018 PAMELLA Wade/L GO 5          OT G-codes  OT Functional Scales Options: AM-PAC 6 Clicks Daily Activity (OT)  Functional Limitation: Self care  Self Care Current Status (): At least 40 percent but less than 60 percent impaired, limited or restricted  Self Care Goal Status (): At least 20 percent but less than 40 percent impaired, limited or restricted    PAMELLA Wdae/DALTON  1/14/2018

## 2018-01-14 NOTE — PLAN OF CARE
Problem: Patient Care Overview (Adult)  Goal: Plan of Care Review  Outcome: Ongoing (interventions implemented as appropriate)   01/14/18 0600   Coping/Psychosocial Response Interventions   Plan Of Care Reviewed With patient   Patient Care Overview   Progress declining   Outcome Evaluation   Outcome Summary/Follow up Plan Liberty Linn notified of pt BP of 88/46 and being more lethargic this am, NS bolus given and lactic acid lab value of 2.9 came back, MD aware      Goal: Adult Individualization and Mutuality  Outcome: Ongoing (interventions implemented as appropriate)      Problem: Pneumonia (Adult)  Goal: Signs and Symptoms of Listed Potential Problems Will be Absent or Manageable (Pneumonia)  Outcome: Ongoing (interventions implemented as appropriate)      Problem: Fall Risk (Adult)  Goal: Absence of Falls  Outcome: Ongoing (interventions implemented as appropriate)      Problem: Fluid Volume Deficit (Adult)  Goal: Fluid/Electrolyte Balance  Outcome: Ongoing (interventions implemented as appropriate)    Goal: Comfort/Well Being  Outcome: Ongoing (interventions implemented as appropriate)

## 2018-01-14 NOTE — PLAN OF CARE
Problem: Patient Care Overview (Adult)  Goal: Plan of Care Review  Outcome: Ongoing (interventions implemented as appropriate)   01/14/18 1529   Coping/Psychosocial Response Interventions   Plan Of Care Reviewed With patient   Patient Care Overview   Progress declining   Outcome Evaluation   Outcome Summary/Follow up Plan Pt very confused today. Pt worked on ADL's        Problem: Inpatient Occupational Therapy  Goal: Transfer Training Goal 1 LTG- OT  Outcome: Ongoing (interventions implemented as appropriate)   01/11/18 0856 01/14/18 1529   Transfer Training OT LTG   Transfer Training OT LTG, Date Established 01/11/18 --    Transfer Training OT LTG, Time to Achieve by discharge --    Transfer Training OT LTG, Activity Type all transfers --    Transfer Training OT LTG, Grand Level independent --    Transfer Training OT LTG, Date Goal Reviewed --  01/14/18   Transfer Training OT LTG, Outcome --  goal ongoing     Goal: Strength Goal LTG- OT  Outcome: Ongoing (interventions implemented as appropriate)   01/11/18 0856 01/14/18 1529   Strength OT LTG   Strength Goal OT LTG, Date Established 01/11/18 --    Strength Goal OT LTG, Time to Achieve by discharge --    Strength Goal OT LTG, Measure to Achieve Pt. will complete BUE strengthening exercises all planes and joints to increase BUE strength to 5/5 for ADLs.  --    Strength Goal OT LTG, Date Goal Reviewed --  01/14/18   Strength Goal OT LTG, Outcome --  goal ongoing     Goal: ADL Goal LTG- OT  Outcome: Ongoing (interventions implemented as appropriate)   01/11/18 0856 01/14/18 1529   ADL OT LTG   ADL OT LTG, Date Established 01/11/18 --    ADL OT LTG, Time to Achieve by discharge --    ADL OT LTG, Activity Type ADL skills --    ADL OT LTG, Grand Level modified independent --    ADL OT LTG, Additional Goal AE PRN --    ADL OT LTG, Date Goal Reviewed --  01/14/18   ADL OT LTG, Outcome --  goal ongoing

## 2018-01-14 NOTE — SIGNIFICANT NOTE
"   01/14/18 0845   Rehab Treatment   Discipline physical therapy assistant   Treatment Not Performed patient/family declined treatment  (PTA spoke w/RN about new orders. RN stated, \"Pt is more confused & they are checking everything.\" Trops were up slightly. PTA checked on pt while son/MD were talking in [place]. Pt very confused and after 5 minutes of attempting PT- Pt stated, \"I can't.\")   Recommendation   PT - Next Appointment 01/15/18     "

## 2018-01-14 NOTE — PROGRESS NOTES
FAMILY MEDICINE DAILY PROGRESS NOTE  NAME: Michelle Child  : 1955  MRN: [id number]     LOS: 4 days     PROVIDER OF SERVICE: Sunny Linn DO    Chief Complaint: Pneumonia of left lower lobe due to Streptococcus pneumoniae    Subjective: Confusion and hypotension last PM. Responded to 500 cc NS bolus. Talked at length this morning with son who describes is mother's worsening confusion and immobility over the past few months. Her only pleasures in life are watching TV and smoking two packs a day which she's done for 40 years. Son is interested in post discharge options including nursing home.     Interval History:  History taken from: patient chart family RN  Patient Complaints: Confusion on and off last PM  Patient Denies:  Pain or fever    Review of Systems:   Review of Systems   Constitutional: Positive for activity change and appetite change. Negative for fatigue and fever.   HENT: Negative for ear pain and sore throat.    Eyes: Negative for pain and visual disturbance.   Respiratory: Positive for cough and shortness of breath.    Cardiovascular: Negative for chest pain and palpitations.   Gastrointestinal: Negative for abdominal pain and nausea.   Endocrine: Negative for cold intolerance and heat intolerance.   Genitourinary: Negative for difficulty urinating and dysuria.   Musculoskeletal: Negative for arthralgias and gait problem.   Skin: Negative for color change and rash.   Neurological: Negative for dizziness, weakness and headaches.   Hematological: Negative for adenopathy. Does not bruise/bleed easily.   Psychiatric/Behavioral: Positive for agitation and confusion. Negative for sleep disturbance.       Objective:     Vital Signs  Temp:  [97.7 °F (36.5 °C)-99.2 °F (37.3 °C)] 97.7 °F (36.5 °C)  Heart Rate:  [87-97] 95  Resp:  [16-18] 18  BP: ()/(46-76) 151/70    Physical Exam  Physical Exam   Constitutional: She is oriented to person, place, and time. She appears well-developed and  well-nourished. She appears lethargic.   HENT:   Head: Normocephalic and atraumatic.   Right Ear: External ear normal.   Left Ear: External ear normal.   Nose: Nose normal.   Mouth/Throat: Oropharynx is clear and moist.   Eyes: Conjunctivae and EOM are normal.   Neck: Normal range of motion. Neck supple.   Cardiovascular: Normal rate, regular rhythm and normal heart sounds.    Pulmonary/Chest: Effort normal. She has decreased breath sounds. She has no wheezes. She has rhonchi in the right upper field, the right middle field, the right lower field, the left upper field, the left middle field and the left lower field. She has no rales.   Abdominal: Soft. Bowel sounds are normal. She exhibits no distension. There is no tenderness.   Musculoskeletal: Normal range of motion.   Neurological: She is oriented to person, place, and time. She appears lethargic. No cranial nerve deficit or sensory deficit. She exhibits abnormal muscle tone. GCS eye subscore is 4. GCS verbal subscore is 5. GCS motor subscore is 6.   Patient rambling at times but seems focused at others.   Skin: Skin is warm and dry.   Psychiatric: She has a normal mood and affect. Her speech is delayed and slurred. She is slowed. Cognition and memory are impaired. She exhibits abnormal recent memory and abnormal remote memory.       Medication Review    Current Facility-Administered Medications:   •  acetaminophen (TYLENOL) tablet 650 mg, 650 mg, Oral, Q4H PRN, Coral Berry MD, 650 mg at 01/11/18 1550  •  amitriptyline (ELAVIL) tablet 100 mg, 100 mg, Oral, Nightly, Coral Berry MD, 100 mg at 01/13/18 2123  •  aspirin chewable tablet 81 mg, 81 mg, Oral, Daily, Coral Berry MD, 81 mg at 01/13/18 0910  •  bisacodyl (DULCOLAX) EC tablet 5 mg, 5 mg, Oral, Daily PRN, Coral Berry MD  •  carvedilol (COREG) tablet 3.125 mg, 3.125 mg, Oral, BID With Meals, Coral Berry MD, 3.125 mg at 01/13/18 1839  •  enoxaparin (LOVENOX) syringe 40 mg, 40 mg,  Subcutaneous, Q24H, Coral Berry MD, 40 mg at 01/13/18 1609  •  famotidine (PEPCID) tablet 40 mg, 40 mg, Oral, Daily, Coral Berry MD, 40 mg at 01/13/18 0909  •  FLUoxetine (PROzac) capsule 60 mg, 60 mg, Oral, Daily, Coral Berry MD, 60 mg at 01/13/18 0909  •  furosemide (LASIX) tablet 20 mg, 20 mg, Oral, BID, Sunny Linn DO  •  HYDROcodone-acetaminophen (NORCO) 5-325 MG per tablet 1 tablet, 1 tablet, Oral, Q4H PRN, Coral Berry MD, 1 tablet at 01/13/18 2123  •  influenza vac split quad (FLUZONE QUADRIVALENT) IM suspension 0.5 mL, 0.5 mL, Intramuscular, During Hospitalization, Coral Berry MD  •  ipratropium-albuterol (DUO-NEB) nebulizer solution 3 mL, 3 mL, Nebulization, 4x Daily - RT, Coral Berry MD, 3 mL at 01/13/18 2038  •  levoFLOXacin (LEVAQUIN) 750 mg/150 mL D5W (premix) (LEVAQUIN) 750 mg, 750 mg, Intravenous, Q24H, Coral Berry MD, 750 mg at 01/13/18 1609  •  losartan (COZAAR) tablet 50 mg, 50 mg, Oral, Q24H, Coral Berry MD, 50 mg at 01/13/18 0909  •  methylPREDNISolone sodium succinate (SOLU-Medrol) injection 40 mg, 40 mg, Intravenous, Daily, Coral Berry MD, 40 mg at 01/13/18 0909  •  nicotine (NICODERM CQ) 21 MG/24HR patch 1 patch, 1 patch, Transdermal, Q24H, Coral Berry MD, 1 patch at 01/13/18 1609  •  ondansetron (ZOFRAN) tablet 4 mg, 4 mg, Oral, Q6H PRN, Corla Berry MD, 4 mg at 01/11/18 1941  •  pneumococcal polysaccharide 23-valent (PNEUMOVAX-23) vaccine 0.5 mL, 0.5 mL, Intramuscular, During Hospitalization, Coral Berry MD  •  potassium chloride (MICRO-K) CR capsule 10 mEq, 10 mEq, Oral, Daily, Sunny Linn DO  •  risperiDONE (risperDAL) tablet 1 mg, 1 mg, Oral, Nightly, Coral Berry MD, 1 mg at 01/13/18 2123  •  sodium chloride 0.9 % flush 1-10 mL, 1-10 mL, Intravenous, PRN, Coral Berry MD  •  sodium chloride 0.9 % flush 10 mL, 10 mL, Intravenous, PRN, Toro Shine MD, 10 mL at 01/11/18  1103     Diagnostic Data    Lab Results (last 24 hours)     Procedure Component Value Units Date/Time    Blood Culture - Blood, [[id number]]  (Normal) Collected:  01/10/18 1338    Specimen:  Blood from Hand, Right Updated:  01/13/18 1346     Blood Culture No growth at 3 days    Blood Culture - Blood, [[id number]]  (Normal) Collected:  01/10/18 1339    Specimen:  Blood from Arm, Right Updated:  01/13/18 1346     Blood Culture No growth at 3 days    Blood Culture - Blood, [[id number]] Collected:  01/14/18 0450    Specimen:  Blood from Arm, Right Updated:  01/14/18 0457    CBC & Differential [[id number]] Collected:  01/14/18 0450    Specimen:  Blood Updated:  01/14/18 0501    Narrative:       The following orders were created for panel order CBC & Differential.  Procedure                               Abnormality         Status                     ---------                               -----------         ------                     CBC Auto Differential[[id number]]        Abnormal            Final result                 Please view results for these tests on the individual orders.    CBC Auto Differential [[id number]]  (Abnormal) Collected:  01/14/18 0450    Specimen:  Blood Updated:  01/14/18 0501     WBC 12.49 (H) 10*3/mm3      RBC 2.97 (L) 10*6/mm3      Hemoglobin 7.9 (L) g/dL      Hematocrit 24.7 (L) %      MCV 83.2 fL      MCH 26.6 pg      MCHC 32.0 g/dL      RDW 15.5 (H) %      RDW-SD 46.9 (H) fl      MPV 9.3 fL      Platelets 262 10*3/mm3      Neutrophil % 81.7 (H) %      Lymphocyte % 10.3 %      Monocyte % 7.4 %      Eosinophil % 0.0 %      Basophil % 0.0 %      Immature Grans % 0.6 (H) %      Neutrophils, Absolute 10.21 (H) 10*3/mm3      Lymphocytes, Absolute 1.29 10*3/mm3      Monocytes, Absolute 0.92 (H) 10*3/mm3      Eosinophils, Absolute 0.00 10*3/mm3      Basophils, Absolute 0.00 10*3/mm3      Immature Grans, Absolute 0.07 (H) 10*3/mm3     Urinalysis - Urine, Clean Catch [[id number]]  (Normal) Collected:   01/14/18 0441    Specimen:  Urine from Urine, Clean Catch Updated:  01/14/18 0501     Color, UA Yellow     Appearance, UA Clear     pH, UA 6.0     Specific Gravity, UA 1.011     Glucose, UA Negative     Ketones, UA Negative     Bilirubin, UA Negative     Blood, UA Negative     Protein, UA Negative     Leuk Esterase, UA Negative     Nitrite, UA Negative     Urobilinogen, UA 1.0 E.U./dL    Comprehensive Metabolic Panel [[id number]]  (Abnormal) Collected:  01/14/18 0450    Specimen:  Blood Updated:  01/14/18 0510     Glucose 93 mg/dL      BUN 13 mg/dL      Creatinine 0.85 mg/dL      Sodium 131 (L) mmol/L      Potassium 4.8 mmol/L      Chloride 102 mmol/L      CO2 22.0 mmol/L      Calcium 8.4 mg/dL      Total Protein 4.7 (L) g/dL      Albumin 2.20 (L) g/dL      ALT (SGPT) 36 U/L      AST (SGOT) 26 U/L      Alkaline Phosphatase 112 U/L      Total Bilirubin 0.7 mg/dL      eGFR Non African Amer 67 mL/min/1.73      Globulin 2.5 gm/dL      A/G Ratio 0.9 (L) g/dL      BUN/Creatinine Ratio 15.3     Anion Gap 7.0 mmol/L     Lactic Acid, Plasma [[id number]]  (Abnormal) Collected:  01/14/18 0450    Specimen:  Blood Updated:  01/14/18 0516     Lactate 2.9 (C) mmol/L     Troponin [[id number]]  (Abnormal) Collected:  01/14/18 0450    Specimen:  Blood Updated:  01/14/18 0804     Troponin I 0.035 (H) ng/mL     BNP [[id number]]  (Abnormal) Collected:  01/14/18 0450    Specimen:  Blood Updated:  01/14/18 0807     proBNP 9300.0 (H) pg/mL     Lactic Acid, Reflex Timer [[id number]] Collected:  01/14/18 0450    Specimen:  Blood Updated:  01/14/18 0831     Extra Tube Hold for add-ons.      Auto resulted.       Lactic Acid, Reflex [[id number]]  (Normal) Collected:  01/14/18 0845    Specimen:  Blood Updated:  01/14/18 0902     Lactate 1.3 mmol/L     D-dimer, Quantitative [[id number]]  (Abnormal) Collected:  01/14/18 0845    Specimen:  Blood Updated:  01/14/18 0911     D-Dimer, Quantitative 706 (H) ng/mL (FEU)     Narrative:       Dimer values <500  ng/ml FEU are FDA approved as aid in diagnosis of deep venous thrombosis and pulmonary embolism.  This test should not be used in an exclusion strategy with pretest probability alone.    A recent guideline regarding diagnosis for pulmonary thomboembolism recommends an adjusted exclusion criterion of age x 10 ng/ml FEU for patients >50 years of age ([employer] Intern Med 2015; 163: 701-711).           Imaging Results (last 24 hours)     Procedure Component Value Units Date/Time    XR Chest 1 View [[id number]] Collected:  01/14/18 0447     Updated:  01/14/18 0527    Narrative:       Exam: AP portable chest    INDICATION: Acute mental status change    COMPARISON: 1/10/2018    FINDINGS: AP portable chest. Heart is mildly enlarged. Interval  worsening pulmonary venous congestion and development of diffuse  interstitial infiltrates and bilateral pleural effusions  compatible with congestive failure. There is persistent  atelectasis and/or infiltrate in the lingula.      Impression:       Finding compatible with the development of congestive  failure.    Electronically signed by:  Martin Chavarria MD  1/14/2018 5:26 AM  [facility] Workstation: [employer]        EKG showed no acute ST or T wave changes.      I reviewed the patient's new clinical results.  I reviewed the patient's new imaging results and agree with the interpretation.  I reviewed the patient's other test results and agree with the interpretation  I personally viewed and interpreted the patient's EKG/Telemetry data    Assessment/Plan:     Hospital Problem List     * (Principal)Pneumonia of left lower lobe due to Streptococcus pneumoniae    Overview Addendum 1/14/2018  9:15 AM by Sunny Linn,      On Levaquin with falling WBC and no fever.         Tobacco dependence    Overview Addendum 1/10/2018  1:33 PM by Coral Berry MD     Nicotine patch 21mg transdermally         Gastroesophageal reflux disease    Overview Signed 1/10/2018  1:41 PM by Coral Berry  MD Schuster         Fall with injury    Overview Addendum 1/12/2018  7:32 AM by Coral Berry MD     PT/OT  AAOx3         Weakness    Overview Signed 1/10/2018  1:53 PM by Coral Berry MD     PT/OT         Acute respiratory failure with hypoxia    Overview Addendum 1/13/2018 11:39 AM by Sunny Linn DO     NC oxygen, steroids, and neb treatements         Hyponatremia    Overview Addendum 1/14/2018  9:20 AM by Sunny Linn DO     DC Normal saline and monitor         Cerebral microvascular disease    Overview Signed 1/13/2018 11:43 AM by Sunny Linn DO     Per CT head         Anemia    Overview Signed 1/14/2018  9:11 AM by Sunny Linn DO     Monitor Hgb/Hct and check for FOB         Elevated brain natriuretic peptide (BNP) level    Overview Addendum 1/14/2018  9:24 AM by Sunny Linn DO     IVF's discontinued, Fluid Restrictions, Low Na diet. Echocardiogram ordered. Will start Lasix 20 mg po bid and monitor.         Elevated troponin    Overview Addendum 1/14/2018  9:27 AM by Sunny Linn DO     Most likely secondary to elevated BNP. No chest pain or anginal equivalent. Will monitor.         Family dynamics problem    Overview Signed 1/14/2018  9:21 AM by Sunny Linn DO     Sons are having difficulty caring for patient. May be interested in placement. Will consult discharge planning.         Positive D dimer    Overview Signed 1/14/2018 10:41 AM by Sunny Linn DO     Most likely elevated due to pneumonia and elevated BNP. No clinical signs of PE.               DVT prophylaxis: Lovenox  Full Code            This document has been electronically signed by Sunny Linn DO on January 14, 2018 10:41 AM

## 2018-01-15 ENCOUNTER — APPOINTMENT (OUTPATIENT)
Dept: MRI IMAGING | Facility: HOSPITAL | Age: 63
End: 2018-01-15

## 2018-01-15 PROBLEM — E53.8 FOLIC ACID DEFICIENCY: Status: ACTIVE | Noted: 2018-01-15

## 2018-01-15 PROBLEM — R79.89 ELEVATED BRAIN NATRIURETIC PEPTIDE (BNP) LEVEL: Status: RESOLVED | Noted: 2018-01-14 | Resolved: 2018-01-15

## 2018-01-15 PROBLEM — I05.0 RHEUMATIC MITRAL STENOSIS: Status: ACTIVE | Noted: 2018-01-15

## 2018-01-15 PROBLEM — R79.89 ELEVATED TROPONIN: Status: RESOLVED | Noted: 2018-01-14 | Resolved: 2018-01-15

## 2018-01-15 PROBLEM — R79.89 POSITIVE D DIMER: Status: RESOLVED | Noted: 2018-01-14 | Resolved: 2018-01-15

## 2018-01-15 PROBLEM — I50.31 ACUTE DIASTOLIC CHF (CONGESTIVE HEART FAILURE) (HCC): Status: ACTIVE | Noted: 2018-01-15

## 2018-01-15 PROBLEM — I38 PULMONARY HYPERTENSION DUE TO LEFT HEART VALVULAR DISEASE (HCC): Chronic | Status: ACTIVE | Noted: 2018-01-15

## 2018-01-15 PROBLEM — I27.81 COR PULMONALE, CHRONIC (HCC): Status: ACTIVE | Noted: 2018-01-15

## 2018-01-15 PROBLEM — I27.22 PULMONARY HYPERTENSION DUE TO LEFT HEART VALVULAR DISEASE (HCC): Chronic | Status: ACTIVE | Noted: 2018-01-15

## 2018-01-15 PROBLEM — R77.8 ELEVATED TROPONIN: Status: RESOLVED | Noted: 2018-01-14 | Resolved: 2018-01-15

## 2018-01-15 PROBLEM — G93.40 ENCEPHALOPATHY ACUTE: Status: ACTIVE | Noted: 2018-01-15

## 2018-01-15 PROBLEM — D64.9 ANEMIA: Status: RESOLVED | Noted: 2018-01-14 | Resolved: 2018-01-15

## 2018-01-15 LAB
ALBUMIN SERPL-MCNC: 2.3 G/DL (ref 3.4–4.8)
ALBUMIN/GLOB SERPL: 0.9 G/DL (ref 1.1–1.8)
ALP SERPL-CCNC: 105 U/L (ref 38–126)
ALT SERPL W P-5'-P-CCNC: 38 U/L (ref 9–52)
AMMONIA BLD-SCNC: <9 UMOL/L (ref 9–30)
ANION GAP SERPL CALCULATED.3IONS-SCNC: 10 MMOL/L (ref 5–15)
ARTERIAL PATENCY WRIST A: ABNORMAL
AST SERPL-CCNC: 35 U/L (ref 14–36)
ATMOSPHERIC PRESS: ABNORMAL MMHG
BACTERIA SPEC AEROBE CULT: NORMAL
BACTERIA SPEC AEROBE CULT: NORMAL
BASE EXCESS BLDA CALC-SCNC: 5.5 MMOL/L (ref -2.4–2.4)
BASOPHILS # BLD AUTO: 0 10*3/MM3 (ref 0–0.2)
BASOPHILS NFR BLD AUTO: 0 % (ref 0–2)
BDY SITE: ABNORMAL
BILIRUB SERPL-MCNC: 0.4 MG/DL (ref 0.2–1.3)
BUN BLD-MCNC: 12 MG/DL (ref 7–21)
BUN/CREAT SERPL: 16 (ref 7–25)
CA-I BLD-MCNC: 4.2 MG/DL (ref 4.5–4.9)
CALCIUM SPEC-SCNC: 7.8 MG/DL (ref 8.4–10.2)
CHLORIDE SERPL-SCNC: 99 MMOL/L (ref 95–110)
CO2 BLDA-SCNC: 31.7 MMOL/L (ref 23–27)
CO2 SERPL-SCNC: 24 MMOL/L (ref 22–31)
CREAT BLD-MCNC: 0.75 MG/DL (ref 0.5–1)
D-LACTATE SERPL-SCNC: 1.3 MMOL/L (ref 0.5–2)
DEPRECATED RDW RBC AUTO: 49.1 FL (ref 36.4–46.3)
EOSINOPHIL # BLD AUTO: 0 10*3/MM3 (ref 0–0.7)
EOSINOPHIL NFR BLD AUTO: 0 % (ref 0–7)
ERYTHROCYTE [DISTWIDTH] IN BLOOD BY AUTOMATED COUNT: 15.9 % (ref 11.5–14.5)
FOLATE SERPL-MCNC: 2.03 NG/ML (ref 2.76–21)
GFR SERPL CREATININE-BSD FRML MDRD: 78 ML/MIN/1.73 (ref 45–104)
GLOBULIN UR ELPH-MCNC: 2.6 GM/DL (ref 2.3–3.5)
GLUCOSE BLD-MCNC: 101 MG/DL (ref 60–100)
GLUCOSE BLDA-MCNC: 143 MMOL/L
HCO3 BLDA-SCNC: 30.3 MMOL/L (ref 22–26)
HCT VFR BLD AUTO: 24.3 % (ref 35–45)
HCT VFR BLD CALC: 29 % (ref 38–47)
HGB BLD-MCNC: 7.7 G/DL (ref 12–15.5)
HGB BLDA-MCNC: 9.9 G/DL (ref 12–16)
IMM GRANULOCYTES # BLD: 0.06 10*3/MM3 (ref 0–0.02)
IMM GRANULOCYTES NFR BLD: 0.5 % (ref 0–0.5)
IRON 24H UR-MRATE: 16 MCG/DL (ref 37–170)
IRON SATN MFR SERPL: 6 % (ref 15–50)
LYMPHOCYTES # BLD AUTO: 1.01 10*3/MM3 (ref 0.6–4.2)
LYMPHOCYTES NFR BLD AUTO: 9.1 % (ref 10–50)
MCH RBC QN AUTO: 26.6 PG (ref 26.5–34)
MCHC RBC AUTO-ENTMCNC: 31.7 G/DL (ref 31.4–36)
MCV RBC AUTO: 83.8 FL (ref 80–98)
MODALITY: ABNORMAL
MONOCYTES # BLD AUTO: 0.63 10*3/MM3 (ref 0–0.9)
MONOCYTES NFR BLD AUTO: 5.7 % (ref 0–12)
NEUTROPHILS # BLD AUTO: 9.43 10*3/MM3 (ref 2–8.6)
NEUTROPHILS NFR BLD AUTO: 84.7 % (ref 37–80)
NRBC BLD MANUAL-RTO: 0 /100 WBC (ref 0–0)
PCO2 BLDA: 46 MM HG (ref 35–45)
PH BLDA: 7.44 PH UNITS (ref 7.35–7.45)
PLATELET # BLD AUTO: 286 10*3/MM3 (ref 150–450)
PMV BLD AUTO: 8.7 FL (ref 8–12)
PO2 BLDA: 64.1 MM HG (ref 80–105)
POTASSIUM BLD-SCNC: 3.6 MMOL/L (ref 3.5–5.1)
POTASSIUM BLDA-SCNC: 3.41 MMOL/L (ref 3.6–4.9)
PROT SERPL-MCNC: 4.9 G/DL (ref 6.3–8.6)
RBC # BLD AUTO: 2.9 10*6/MM3 (ref 3.77–5.16)
SAO2 % BLDCOA: 91.1 %
SODIUM BLD-SCNC: 133 MMOL/L (ref 137–145)
SODIUM BLDA-SCNC: 136.9 MMOL/L (ref 138–146)
TIBC SERPL-MCNC: 270 MCG/DL (ref 265–497)
TROPONIN I SERPL-MCNC: 0.05 NG/ML
VIT B12 BLD-MCNC: 434 PG/ML (ref 239–931)
WBC NRBC COR # BLD: 11.13 10*3/MM3 (ref 3.2–9.8)

## 2018-01-15 PROCEDURE — 94760 N-INVAS EAR/PLS OXIMETRY 1: CPT

## 2018-01-15 PROCEDURE — 97535 SELF CARE MNGMENT TRAINING: CPT

## 2018-01-15 PROCEDURE — 83605 ASSAY OF LACTIC ACID: CPT | Performed by: FAMILY MEDICINE

## 2018-01-15 PROCEDURE — 97530 THERAPEUTIC ACTIVITIES: CPT

## 2018-01-15 PROCEDURE — 99222 1ST HOSP IP/OBS MODERATE 55: CPT | Performed by: INTERNAL MEDICINE

## 2018-01-15 PROCEDURE — 82140 ASSAY OF AMMONIA: CPT | Performed by: FAMILY MEDICINE

## 2018-01-15 PROCEDURE — 99232 SBSQ HOSP IP/OBS MODERATE 35: CPT | Performed by: FAMILY MEDICINE

## 2018-01-15 PROCEDURE — 97116 GAIT TRAINING THERAPY: CPT

## 2018-01-15 PROCEDURE — 70551 MRI BRAIN STEM W/O DYE: CPT

## 2018-01-15 PROCEDURE — 94799 UNLISTED PULMONARY SVC/PX: CPT

## 2018-01-15 PROCEDURE — 80053 COMPREHEN METABOLIC PANEL: CPT | Performed by: FAMILY MEDICINE

## 2018-01-15 PROCEDURE — 84484 ASSAY OF TROPONIN QUANT: CPT | Performed by: FAMILY MEDICINE

## 2018-01-15 PROCEDURE — 82803 BLOOD GASES ANY COMBINATION: CPT | Performed by: FAMILY MEDICINE

## 2018-01-15 PROCEDURE — 85025 COMPLETE CBC W/AUTO DIFF WBC: CPT | Performed by: FAMILY MEDICINE

## 2018-01-15 PROCEDURE — 25010000002 METHYLPREDNISOLONE PER 40 MG: Performed by: FAMILY MEDICINE

## 2018-01-15 PROCEDURE — 25010000002 LORAZEPAM PER 2 MG: Performed by: FAMILY MEDICINE

## 2018-01-15 PROCEDURE — 97110 THERAPEUTIC EXERCISES: CPT

## 2018-01-15 PROCEDURE — 36600 WITHDRAWAL OF ARTERIAL BLOOD: CPT

## 2018-01-15 PROCEDURE — 25010000002 LEVOFLOXACIN PER 250 MG: Performed by: FAMILY MEDICINE

## 2018-01-15 PROCEDURE — 51798 US URINE CAPACITY MEASURE: CPT

## 2018-01-15 RX ORDER — FERROUS SULFATE TAB EC 324 MG (65 MG FE EQUIVALENT) 324 (65 FE) MG
324 TABLET DELAYED RESPONSE ORAL 2 TIMES DAILY WITH MEALS
Status: DISCONTINUED | OUTPATIENT
Start: 2018-01-15 | End: 2018-01-22 | Stop reason: HOSPADM

## 2018-01-15 RX ORDER — PHENAZOPYRIDINE HYDROCHLORIDE 200 MG/1
200 TABLET, FILM COATED ORAL
Status: DISCONTINUED | OUTPATIENT
Start: 2018-01-15 | End: 2018-01-17

## 2018-01-15 RX ORDER — METOPROLOL SUCCINATE 25 MG/1
25 TABLET, EXTENDED RELEASE ORAL
Status: DISCONTINUED | OUTPATIENT
Start: 2018-01-15 | End: 2018-01-22 | Stop reason: HOSPADM

## 2018-01-15 RX ORDER — LORAZEPAM 0.5 MG/1
0.5 TABLET ORAL ONCE
Status: DISCONTINUED | OUTPATIENT
Start: 2018-01-15 | End: 2018-01-15

## 2018-01-15 RX ORDER — FOLIC ACID 1 MG/1
1 TABLET ORAL DAILY
Status: DISCONTINUED | OUTPATIENT
Start: 2018-01-15 | End: 2018-01-22 | Stop reason: HOSPADM

## 2018-01-15 RX ORDER — LORAZEPAM 2 MG/ML
0.5 INJECTION INTRAMUSCULAR ONCE
Status: COMPLETED | OUTPATIENT
Start: 2018-01-15 | End: 2018-01-15

## 2018-01-15 RX ADMIN — IPRATROPIUM BROMIDE AND ALBUTEROL SULFATE 3 ML: 2.5; .5 SOLUTION RESPIRATORY (INHALATION) at 21:11

## 2018-01-15 RX ADMIN — METHYLPREDNISOLONE SODIUM SUCCINATE 40 MG: 40 INJECTION, POWDER, FOR SOLUTION INTRAMUSCULAR; INTRAVENOUS at 09:58

## 2018-01-15 RX ADMIN — Medication 10 ML: at 20:48

## 2018-01-15 RX ADMIN — LORAZEPAM 0.5 MG: 2 INJECTION INTRAMUSCULAR; INTRAVENOUS at 20:48

## 2018-01-15 RX ADMIN — FERROUS SULFATE TAB EC 324 MG (65 MG FE EQUIVALENT) 324 MG: 324 (65 FE) TABLET DELAYED RESPONSE at 09:58

## 2018-01-15 RX ADMIN — FLUOXETINE 60 MG: 20 CAPSULE ORAL at 09:56

## 2018-01-15 RX ADMIN — ONDANSETRON 4 MG: 4 TABLET, FILM COATED ORAL at 14:17

## 2018-01-15 RX ADMIN — RISPERIDONE 1 MG: 1 TABLET ORAL at 20:12

## 2018-01-15 RX ADMIN — POTASSIUM CHLORIDE 10 MEQ: 750 CAPSULE, EXTENDED RELEASE ORAL at 09:58

## 2018-01-15 RX ADMIN — CARVEDILOL 3.12 MG: 3.12 TABLET, FILM COATED ORAL at 09:56

## 2018-01-15 RX ADMIN — NICOTINE 1 PATCH: 21 PATCH TRANSDERMAL at 15:52

## 2018-01-15 RX ADMIN — LOSARTAN POTASSIUM 50 MG: 50 TABLET, FILM COATED ORAL at 09:56

## 2018-01-15 RX ADMIN — IPRATROPIUM BROMIDE AND ALBUTEROL SULFATE 3 ML: 2.5; .5 SOLUTION RESPIRATORY (INHALATION) at 10:19

## 2018-01-15 RX ADMIN — FUROSEMIDE 20 MG: 20 TABLET ORAL at 20:12

## 2018-01-15 RX ADMIN — LEVOFLOXACIN 750 MG: 5 INJECTION, SOLUTION INTRAVENOUS at 15:58

## 2018-01-15 RX ADMIN — PHENAZOPYRIDINE HYDROCHLORIDE 200 MG: 200 TABLET, FILM COATED ORAL at 18:48

## 2018-01-15 RX ADMIN — FUROSEMIDE 20 MG: 20 TABLET ORAL at 09:55

## 2018-01-15 RX ADMIN — ASPIRIN 81 MG 81 MG: 81 TABLET ORAL at 09:55

## 2018-01-15 RX ADMIN — AMITRIPTYLINE HYDROCHLORIDE 100 MG: 50 TABLET, FILM COATED ORAL at 20:12

## 2018-01-15 RX ADMIN — IPRATROPIUM BROMIDE AND ALBUTEROL SULFATE 3 ML: 2.5; .5 SOLUTION RESPIRATORY (INHALATION) at 15:17

## 2018-01-15 RX ADMIN — FAMOTIDINE 40 MG: 20 TABLET, FILM COATED ORAL at 09:56

## 2018-01-15 RX ADMIN — FERROUS SULFATE TAB EC 324 MG (65 MG FE EQUIVALENT) 324 MG: 324 (65 FE) TABLET DELAYED RESPONSE at 18:48

## 2018-01-15 NOTE — PLAN OF CARE
Problem: Patient Care Overview (Adult)  Goal: Plan of Care Review  Outcome: Ongoing (interventions implemented as appropriate)   01/15/18 1223   Coping/Psychosocial Response Interventions   Plan Of Care Reviewed With patient   Outcome Evaluation   Outcome Summary/Follow up Plan No new goals met this tx. Pt with decreased balance with standing and gait this tx. Pt will continue to benefit from skilled therapy services within SNF rehab to home upon D/C      Goal: Discharge Needs Assessment  Outcome: Ongoing (interventions implemented as appropriate)   01/11/18 0855 01/11/18 1620 01/14/18 1821   Discharge Needs Assessment   Concerns To Be Addressed --  --  no discharge needs identified   Equipment Needed After Discharge (to be determined) --  --    Discharge Facility/Level Of Care Needs home with home health --  --    Current Discharge Risk lives alone;physical impairment --  --    Discharge Disposition still a patient --  --    Current Health   Outpatient/Agency/Support Group Needs homecare agency (specify level of care) --  --    Living Environment   Transportation Available --  car;family or friend will provide --    Self-Care   Equipment Currently Used at Home none  (has a cane at home if needed) --  --        Problem: Inpatient Physical Therapy  Goal: Bed Mobility Goal STG- PT  Outcome: Ongoing (interventions implemented as appropriate)   01/11/18 0855 01/15/18 1223   Bed Mobility PT STG   Bed Mobility PT STG, Date Established 01/11/18 --    Bed Mobility PT STG, Time to Achieve 2 - 3 days --    Bed Mobility PT STG, Activity Type supine to sit/sit to supine --    Bed Mobility PT STG, [place] Level independent --    Bed Mobility PT STG, Additional Goal HOB down and no rails --    Bed Mobility PT STG, Date Goal Reviewed --  01/15/18   Bed Mobility PT STG, Outcome --  goal ongoing     Goal: Transfer Training Goal 1 STG- PT  Outcome: Ongoing (interventions implemented as appropriate)   01/11/18 0855 01/15/18 1223    Transfer Training PT STG   Transfer Training PT STG, Date Established 01/11/18 --    Transfer Training PT STG, Time to Achieve 2 - 3 days --    Transfer Training PT STG, Activity Type bed to chair /chair to bed;sit to stand/stand to sit --    Transfer Training PT STG, [place] Level supervision required --    Transfer Training PT STG, Assist Device (least restrictive) --    Transfer Training PT STG, Date Goal Reviewed --  01/15/18   Transfer Training PT STG, Outcome --  goal ongoing     Goal: Gait Training Goal LTG- PT  Outcome: Ongoing (interventions implemented as appropriate)   01/11/18 0855 01/15/18 1223   Gait Training PT LTG   Gait Training Goal PT LTG, Date Established 01/11/18 --    Gait Training Goal PT LTG, Time to Achieve by discharge --    Gait Training Goal PT LTG, [place] Level conditional independence --    Gait Training Goal PT LTG, Assist Device (least restrictive assistive device) --    Gait Training Goal PT LTG, Distance to Achieve 200 feet --    Gait Training Goal PT LTG, Date Goal Reviewed --  01/15/18   Gait Training Goal PT LTG, Outcome --  goal ongoing     Goal: Strength Goal LTG- PT  Outcome: Ongoing (interventions implemented as appropriate)   01/11/18 0855 01/15/18 1223   Strength Goal PT LTG   Strength Goal PT LTG, Date Established 01/11/18 --    Strength Goal PT LTG, Time to Achieve 4 days --    Strength Goal PT LTG, Measure to Achieve 20 reps all ex BLE actively sitting/ standing --    Strength Goal PT LTG, Date Goal Reviewed --  01/15/18   Strength Goal PT LTG, Outcome --  goal ongoing

## 2018-01-15 NOTE — PLAN OF CARE
Problem: Patient Care Overview (Adult)  Goal: Plan of Care Review  Outcome: Ongoing (interventions implemented as appropriate)   01/15/18 0337   Coping/Psychosocial Response Interventions   Plan Of Care Reviewed With patient   Patient Care Overview   Progress declining   Outcome Evaluation   Outcome Summary/Follow up Plan pt continues to experience confusion, In and out cath performed for urinary retention, will continue to monitor pt     Goal: Adult Individualization and Mutuality  Outcome: Ongoing (interventions implemented as appropriate)      Problem: Pneumonia (Adult)  Goal: Signs and Symptoms of Listed Potential Problems Will be Absent or Manageable (Pneumonia)  Outcome: Ongoing (interventions implemented as appropriate)      Problem: Fall Risk (Adult)  Goal: Absence of Falls  Outcome: Ongoing (interventions implemented as appropriate)      Problem: Fluid Volume Deficit (Adult)  Goal: Fluid/Electrolyte Balance  Outcome: Ongoing (interventions implemented as appropriate)    Goal: Comfort/Well Being  Outcome: Ongoing (interventions implemented as appropriate)

## 2018-01-15 NOTE — PLAN OF CARE
Problem: Patient Care Overview (Adult)  Goal: Plan of Care Review  Outcome: Ongoing (interventions implemented as appropriate)   01/14/18 1827   Coping/Psychosocial Response Interventions   Plan Of Care Reviewed With patient   Patient Care Overview   Progress declining   Outcome Evaluation   Outcome Summary/Follow up Plan pt's confusion increasing. pt's balance decreasing. pt having difficulty taking po medications.     Goal: Adult Individualization and Mutuality  Outcome: Ongoing (interventions implemented as appropriate)    Goal: Discharge Needs Assessment  Outcome: Ongoing (interventions implemented as appropriate)      Problem: Pneumonia (Adult)  Goal: Signs and Symptoms of Listed Potential Problems Will be Absent or Manageable (Pneumonia)  Outcome: Ongoing (interventions implemented as appropriate)      Problem: Fall Risk (Adult)  Goal: Absence of Falls  Outcome: Ongoing (interventions implemented as appropriate)      Problem: Fluid Volume Deficit (Adult)  Goal: Fluid/Electrolyte Balance  Outcome: Ongoing (interventions implemented as appropriate)    Goal: Comfort/Well Being  Outcome: Ongoing (interventions implemented as appropriate)

## 2018-01-15 NOTE — CONSULTS
Adult Nutrition  Assessment    Patient Name:  Michelle Child  YOB: 1955  MRN: [id number]  Admit Date:  1/10/2018    Assessment Date:  1/15/2018    Comments:  Pt has had decline in medical status and PO intakes 0-25% over past several days.  Family present voices no pt needs but does report pt prefers water without ice.  Pt currently having nausea and is not very alert, has had some confusion.  RN administered Zofran.  CHF education not appropriate at this time.  Currently on fluid restriction, will continue to send Boost.  Feosol ordered.  RD will monitor.          Reason for Assessment       01/15/18 1543    Reason for Assessment    Reason For Assessment/Visit follow up protocol                Nutrition/Diet History       01/15/18 1543    Nutrition/Diet History    Typical Food/Fluid Intake Pt had decreased alertness and nausea today.  Family present voices no needs but does indicate pt drinks water better without ice.              Labs/Tests/Procedures/Meds       01/15/18 1544    Labs/Tests/Procedures/Meds    Labs/Tests Review Reviewed    Medication Review Reviewed, pertinent            Physical Findings       01/15/18 1544    Physical Findings/Assessment    Additional Documentation Physical Appearance (Group)    Physical Appearance    Gastrointestinal nausea              Nutrition Prescription Ordered       01/15/18 1544    Nutrition Prescription PO    Current PO Diet Regular    Supplement Boost    Supplement Frequency 2 times a day    Common Modifiers Cardiac;Fluid Restriction    Fluid Restriction mL per Day 1500 mL            Evaluation of Received Nutrient/Fluid Intake       01/15/18 1544    PO Evaluation    Number of Days PO Intake Evaluated --   4 days    % PO Intake 0-25%            Electronically signed by:  Nikkie Davis RD  01/15/18 3:45 PM

## 2018-01-15 NOTE — THERAPY TREATMENT NOTE
Acute Care - Occupational Therapy Treatment Note  [facility]     Patient Name: Michelle Child  : 1955  MRN: [id number]  Today's Date: 1/15/2018  Onset of Illness/Injury or Date of Surgery Date: 01/10/18  Date of Referral to OT: 01/10/18  Referring Physician: Dr Berry      Admit Date: 1/10/2018    Visit Dx:     ICD-10-CM ICD-9-CM   1. Hypokalemia E87.6 276.8   2. Pneumonia of left lung due to infectious organism, unspecified part of lung J18.9 486   3. Fall, initial encounter W19.XXXA E888.9   4. Contusion of scalp, initial encounter S00.03XA 920   5. Decreased activities of daily living (ADL) Z78.9 V49.89   6. Impaired physical mobility Z74.09 781.99     Patient Active Problem List   Diagnosis   • Tobacco dependence   • Migraine   • Iron deficiency   • Gastroesophageal reflux disease   • Coronary arteriosclerosis   • Bilateral pseudophakia   • Astigmatism   • Right humeral fracture   • Fall with injury   • Closed fracture of humerus   • Cause of injury, fall   • Closed fracture of proximal end of right humerus with routine healing   • Weakness   • Acute respiratory failure with hypoxia   • Pneumonia of left lower lobe due to Streptococcus pneumoniae   • Hyponatremia   • Cerebral microvascular disease   • Family dynamics problem   • Pulmonary hypertension   • Rheumatic tricuspid valve regurgitation   • Rheumatic mitral stenosis   • Acute diastolic CHF (congestive heart failure)   • Encephalopathy acute   • Pulmonary hypertension due to left heart valvular disease   • Cor pulmonale, chronic             Adult Rehabilitation Note       01/15/18 0610 18 1000 18 1015    Rehab Assessment/Intervention    Discipline occupational therapy assistant  -BB occupational therapy assistant  -LW occupational therapist  -NN    Document Type therapy note (daily note)  -BB therapy note (daily note)  -LW therapy note (daily note)  -NN    Subjective Information agree to therapy  -BB agree to therapy  -LW  agree to therapy;complains of;weakness;fatigue  -NN    Patient Effort, Rehab Treatment adequate  -BB adequate  -LW     Symptoms Noted Comment --   pt very confused  -BB Pt very confused   -LW     Precautions/Limitations   fall precautions   decreased cognition  -NN    Recorded by [BB] JUDE NgoA/L [LW] JUDE WadeA/L [NN] Janet Garibay, OTR/L    Vital Signs    Pre Systolic BP Rehab 136  -  -LW     Pre Treatment Diastolic BP 68  -BB 75  -LW     Pretreatment Heart Rate (beats/min) 63  -BB 68  -LW     Intratreatment Heart Rate (beats/min)  73  -LW     Posttreatment Heart Rate (beats/min) 76  -BB 78  -LW     Pre SpO2 (%) 94  -BB 94  -LW     O2 Delivery Pre Treatment supplemental O2  -BB supplemental O2  -LW     Intra SpO2 (%)  95  -LW     O2 Delivery Intra Treatment  supplemental O2  -LW     Post SpO2 (%) 94  -BB 93  -LW     O2 Delivery Post Treatment supplemental O2  -BB supplemental O2  -LW     Pre Patient Position  Supine  -LW     Intra Patient Position  Sitting  -LW     Post Patient Position  Sitting  -LW     Recorded by [BB] JUDE NgoA/L [LW] Jaz Garcia CAN/L     Pain Assessment    Pain Assessment No/denies pain  -BB No/denies pain  -LW No/denies pain  -NN    Recorded by [BB] JUDE NgoA/L [LW] JUDE WadeA/L [NN] Janet Garibay, OTR/L    Vision Assessment/Intervention    Visual Impairment  WNL;WFL  -LW     Recorded by  [LW] JUDE WadeA/L     Cognitive Assessment/Intervention    Current Cognitive/Communication Assessment impaired  -BB impaired  -LW impaired  -NN    Orientation Status oriented to;person  -BB oriented to;person  -LW oriented to;person;place;disoriented to;time;situation  -NN    Follows Commands/Answers Questions 75% of the time  -BB 75% of the time;needs increased time  -LW 75% of the time;able to follow single-step instructions;needs cueing;needs increased time;needs repetition  -NN    Personal Safety decreased  awareness, need for assist;decreased awareness, need for safety  -BB decreased awareness, need for safety  -LW decreased awareness, need for assist;decreased awareness, need for safety;decreased insight to deficits  -NN    Personal Safety Interventions fall prevention program maintained;nonskid shoes/slippers when out of bed  -BB fall prevention program maintained;nonskid shoes/slippers when out of bed  -LW fall prevention program maintained;nonskid shoes/slippers when out of bed;muscle strengthening facilitated;supervised activity;gait belt  -NN    Recorded by [BB] JUDE NgoA/L [LW] JUDE WadeA/L [NN] Janet Garibay, OTR/L    Bed Mobility, Assessment/Treatment    Bed Mobility, Assistive Device bed rails;head of bed elevated  -BB bed rails;head of bed elevated  -LW bed rails;head of bed elevated  -NN    Bed Mobility, Scoot/Bridge, Page   contact guard assist  -NN    Bed Mob, Supine to Sit, [place] contact guard assist  -BB contact guard assist  -LW contact guard assist  -NN    Bed Mob, Sit to Supine, [place] contact guard assist  -BB  contact guard assist  -NN    Bed Mobility, Safety Issues cognitive deficits limit understanding  -BB      Bed Mobility, Impairments strength decreased  -BB  strength decreased;impaired balance;postural control impaired  -NN    Bed Mobility, Comment   Pt. very lethargic and dozing on and off and requiring min A for posterior leaning  -NN    Recorded by [BB] JUDE NgoA/DALTON [LW] Jaz Garcia CAN/L [NN] Janet Garibay, OTR/L    Transfer Assessment/Treatment    Transfers, Bed-Chair [place]  contact guard assist  -LW     Transfers, Sit-Stand [place]  contact guard assist  -LW contact guard assist  -NN    Transfers, Stand-Sit [place]  contact guard assist  -LW contact guard assist  -NN    Transfers, Sit-Stand-Sit, Assist Device  other (see comments)   none  -LW --   hha  -NN    Transfer, Safety Issues   step length  decreased  -NN    Transfer, Impairments   strength decreased;impaired balance  -NN    Recorded by  [LW] Jaz Garcia CAN/L [NN] Janet Garibay, OTR/L    Upper Body Bathing Assessment/Training    UB Bathing Assess/Train Assistive Device --   pan bath  -BB other (see comments)   Pan bath wash cloth  -LW --   sponge bathing  -NN    UB Bathing Assess/Train, Position sitting  -BB sitting  -LW sitting  -NN    UB Bathing Assess/Train, [place] Level contact guard assist  -BB contact guard assist  -LW moderate assist (50% patient effort)  -NN    UB Bathing Assess/Train, Impairments strength decreased  -BB  strength decreased;impaired balance;postural control impaired  -NN    Recorded by [BB] JUDE NgoA/L [LW] Jaz Garcia CAN/L [NN] Janet Garibay, OTR/L    Lower Body Bathing Assessment/Training    LB Bathing Assess/Train Assistive Device --   pan bath  -BB other (see comments)   Pan bath, wash cloth  -LW --   sponge bathing  -NN    LB Bathing Assess/Train, Position sitting  -BB sitting  -LW sitting  -NN    LB Bathing Assess/Train, [place] Level contact guard assist  -BB contact guard assist  -LW moderate assist (50% patient effort)  -NN    LB Bathing Assess/Train, Impairments strength decreased  -BB  strength decreased   lethargic and disorientation  -NN    Recorded by [BB] JUDE NgoA/L [LW] Jaz Garcia CAN/L [NN] Janet Garibya, OTR/L    Upper Body Dressing Assessment/Training    UB Dressing Assess/Train, Clothing Type doffing:;donning:;hospital gown  -BB doffing:;donning:;hospital gown  -LW donning:;doffing:;hospital gown  -NN    UB Dressing Assess/Train, Position sitting  -BB sitting  -LW edge of bed  -NN    UB Dressing Assess/Train, [place] moderate assist (50% patient effort)  -BB moderate assist (50% patient effort)  -LW moderate assist (50% patient effort)  -NN    UB Dressing Assess/Train, Impairments strength decreased  -BB  strength decreased;impaired  balance;postural control impaired  -NN    Recorded by [BB] PAMELLA Ngo/DALTON [LW] PAMELLA Wade/DALTON [NN] Janet Garibay, OTR/L    Lower Body Dressing Assessment/Training    LB Dressing Assess/Train, Clothing Type doffing:;donning:;socks  -BB doffing:;donning:;slipper socks  -LW     LB Dressing Assess/Train, Position sitting  -BB sitting  -LW     LB Dressing Assess/Train, [place] moderate assist (50% patient effort)  -BB moderate assist (50% patient effort)  -LW     LB Dressing Assess/Train, Impairments strength decreased;impaired balance  -BB      Recorded by [BB] PAMELLA Ngo/DALTON [LW] PAMELLA Wade/L     Grooming Assessment/Training    Grooming Assess/Train, Assistive Device   --   wash face, brush hair  -NN    Grooming Assess/Train, Position sitting  -BB sitting  -LW sitting  -NN    Grooming Assess/Train, [place] Level moderate assist (50% patient effort)  -BB minimum assist (75% patient effort)  -LW minimum assist (75% patient effort)  -NN    Grooming Assess/Train, Impairments --   ability to follow directions  -BB sensation decreased  -LW strength decreased;impaired balance  -NN    Grooming Assess/Train, Comment oral care, comb hair, wash face and hands  -BB Oral care, wash face and hands, washed hair blow dried hair  -LW     Recorded by [BB] PAMELLA Ngo/DALTON [LW] PAMELLA Wade/DALTON [NN] Janet Garibay, OTR/L    Balance Skills Training    Sitting-Level of Assistance Close supervision  -BB      Sitting-Balance Support Feet supported  -BB      Recorded by [BB] PAMELLA Ngo/L      Therapy Exercises    Bilateral Upper Extremity AROM:;5 reps;10 reps;elbow flexion/extension;hand pumps;pronation/supination;shoulder rolls/shrugs   wrist flexion/extension, max verbal cues, increased time  -BB      Recorded by [BB] PAMELLA Ngo/L      Positioning and Restraints    Pre-Treatment Position in bed  -BB in bed  -LW in bed  -NN    Post Treatment  Position bed  -BB bed  -LW bed  -NN    In Bed supine;call light within reach;encouraged to call for assist;exit alarm on  -BB sitting;call light within reach;encouraged to call for assist;exit alarm on;with family/caregiver  -LW fowlers;call light within reach;encouraged to call for assist;side rails up x2  -NN    Recorded by [BB] Bibi Chester, CAN/L [LW] Jaz Garcia CAN/L [NN] Janet Garibay, OTR/L      01/13/18 0919 01/12/18 1103       Rehab Assessment/Intervention    Discipline physical therapy assistant  - physical therapy assistant  -SY     Document Type therapy note (daily note)  - therapy note (daily note)  -SY     Subjective Information agree to therapy  - agree to therapy;nausea/vomiting;dyspnea  -SY     Patient Effort, Rehab Treatment adequate  -CH adequate  -SY     Precautions/Limitations fall precautions;oxygen therapy device and L/min  -CH fall precautions;oxygen therapy device and L/min  -SY     Equipment Issued to Patient  gait belt  -SY     Recorded by [CH] Nichelle Rahman PTA [SY] Sadia French PTA     Vital Signs    Pre Systolic BP Rehab 141  -  -SY     Pre Treatment Diastolic BP 77  -CH 72  -SY     Intra Treatment Diastolic   -CH      Post Systolic BP Rehab 63  -CH      Pretreatment Heart Rate (beats/min) 97  -CH 98  -SY     Intratreatment Heart Rate (beats/min)  93  -SY     Posttreatment Heart Rate (beats/min) 101  -CH      Pre SpO2 (%) 92  -CH 95  -SY     O2 Delivery Pre Treatment supplemental O2  -CH supplemental O2  -SY     Intra SpO2 (%) 90  -CH      O2 Delivery Intra Treatment supplemental O2  -CH      Post SpO2 (%) 91  -CH      O2 Delivery Post Treatment supplemental O2  -CH      Pre Patient Position Sitting  -CH Supine  -SY     Intra Patient Position Standing  -CH Sitting  -SY     Post Patient Position Supine  -CH      Recorded by [CH] Nichelle Rahman PTA [SY] Sadia French PTA     Pain Assessment    Pain Assessment No/denies pain  -CH No/denies  pain  -SY     Recorded by [] Nichelle Rahman PTA [SY] Sadia French PTA     Vision Assessment/Intervention    Visual Impairment WNL;WFL  - WNL;WFL  -SY     Recorded by [] Nichelle Rahman PTA [] Sadia French PTA     Cognitive Assessment/Intervention    Current Cognitive/Communication Assessment impaired  - impaired  -     Orientation Status oriented to;person;other (see comments)     - oriented to;person;place  -     Follows Commands/Answers Questions 100% of the time;needs increased time  - 100% of the time  -     Personal Safety decreased awareness, need for assist;decreased awareness, need for safety  - decreased awareness, need for assist  -     Personal Safety Interventions fall prevention program maintained;gait belt;muscle strengthening facilitated;nonskid shoes/slippers when out of bed;supervised activity  - fall prevention program maintained;supervised activity  -SY     Recorded by [] Nichelle Rahman PTA [SY] Sadia French PTA     Bed Mobility, Assessment/Treatment    Bed Mobility, Assistive Device  bed rails;head of bed elevated  -     Bed Mobility, Roll Left, [place] not tested  - supervision required  -     Bed Mobility, Roll Right, [place] not tested  - not tested  -     Bed Mobility, Scoot/Bridge, [place] not tested  - supervision required  -     Bed Mob, Supine to Sit, [place] not tested  - supervision required  -SY     Bed Mob, Sit to Supine, [place] contact guard assist;minimum assist (75% patient effort)  - supervision required  -     Bed Mobility, Safety Issues  cognitive deficits limit understanding  -SY     Recorded by [] Nichelle Rahman PTA [SY] Sadia French PTA     Transfer Assessment/Treatment    Transfers, Sit-Stand [place] contact guard assist  - contact guard assist  -SY     Transfers, Stand-Sit [place] contact guard assist  - contact guard assist  -SY     Transfers, Sit-Stand-Sit,  Assist Device rolling walker  - other (see comments)   none  -SY     Toilet Transfer, [place]  contact guard assist  -SY     Toilet Transfer, Assistive Device  other (see comments)   HHA  -SY     Transfer, Safety Issues balance decreased during turns;loses balance backward  -      Transfer, Impairments impaired balance  - impaired balance  -     Transfer, Comment pt with LOB posteriorly upon standing EOB, was able to correct with cga X1  -CH      Recorded by [] Nichelle Rahman PTA [] Sadia French PTA     Gait Assessment/Treatment    Gait, [place] Level contact guard assist;minimum assist (75% patient effort)  - contact guard assist  -     Gait, Assistive Device rolling walker  -      Gait, Distance (Feet) 60  - 10   x2  -SY     Gait, Safety Issues balance decreased during turns;weight-shifting ability decreased;step length decreased  -      Gait, Comment  recommend AD for increased distances  -SY     Recorded by [] Nichelle Rahman PTA [SY] Sadia French PTA     Stairs Assessment/Treatment    Stairs, [place] Level not tested  -      Recorded by [] Nichelle Rahman PTA      Therapy Exercises    Bilateral Lower Extremities AROM:;20 reps;sitting;ankle pumps/circles;hip abduction/adduction;hip flexion;LAQ  -      Recorded by [] Nichelle Rahman PTA      Positioning and Restraints    Pre-Treatment Position in bed  - in bed  -     Post Treatment Position bed  - bed  -     In Bed supine;call light within reach;encouraged to call for assist;exit alarm on;side rails up x2;SCD pump applied  - fowlers;call light within reach;exit alarm on   eating lunch  -     Recorded by [] Nichelle Rahman PTA [] Sadia French PTA       User Key  (r) = Recorded By, (t) = Taken By, (c) = Cosigned By    Initials Name Effective Dates     Sadia French PTA 10/17/16 -     CH Nichelle Rahman PTA 10/17/16 -     BB PAMELLA Ngo/L 10/17/16 -     MARTIN QUIGLEY  Jose, CAN/L 10/17/16 -     NN Janet Garibay, OTR/L 11/08/17 -                 OT Goals       01/15/18 1020 01/14/18 1529 01/12/18 1236    Transfer Training OT LTG    Transfer Training OT LTG, Date Goal Reviewed 01/15/18  -BB 01/14/18  -LW 01/12/18  -LW    Transfer Training OT LTG, Outcome  goal ongoing  -LW goal ongoing  -LW    Strength OT LTG    Strength Goal OT LTG, Date Goal Reviewed 01/15/18  -BB 01/14/18  -LW 01/12/18  -LW    Strength Goal OT LTG, Outcome  goal ongoing  -LW goal ongoing  -LW    ADL OT LTG    ADL OT LTG, Date Goal Reviewed 01/15/18  -BB 01/14/18  -LW 01/12/18  -LW    ADL OT LTG, Outcome  goal ongoing  -LW goal ongoing  -LW      01/11/18 0856          Transfer Training OT LTG    Transfer Training OT LTG, Date Established 01/11/18  -NN      Transfer Training OT LTG, Time to Achieve by discharge  -NN      Transfer Training OT LTG, Activity Type all transfers  -NN      Transfer Training OT LTG, [place] Level independent  -NN      Strength OT LTG    Strength Goal OT LTG, Date Established 01/11/18  -NN      Strength Goal OT LTG, Time to Achieve by discharge  -NN      Strength Goal OT LTG, Measure to Achieve Pt. will complete BUE strengthening exercises all planes and joints to increase BUE strength to 5/5 for ADLs.   -NN      ADL OT LTG    ADL OT LTG, Date Established 01/11/18  -NN      ADL OT LTG, Time to Achieve by discharge  -NN      ADL OT LTG, Activity Type ADL skills  -NN      ADL OT LTG, [place] Level modified independent  -NN      ADL OT LTG, Additional Goal AE PRN  -NN        User Key  (r) = Recorded By, (t) = Taken By, (c) = Cosigned By    Initials Name Provider Type    FINA Chester, CAN/L Occupational Therapy Assistant    MARTIN Garcia, CAN/L Occupational Therapy Assistant    NN Janet Garibay, OTR/L Occupational Therapist          Occupational Therapy Education     Title: PT OT SLP Therapies (Active)     Topic: Occupational Therapy (Active)     Point: ADL  training (Active)    Description: Instruct learner(s) on proper safety adaptation and remediation techniques during self care or transfers.   Instruct in proper use of assistive devices.    Learning Progress Summary    Learner Readiness Method Response Comment Documented by Status   Patient Nonacceptance E NL  BB 01/15/18 1020 Active    Acceptance E VU  LW 01/14/18 1528 Done    Acceptance E VU,NR Pt. educated on role of OT, safe bed mobility, benefit of OOB activity, progression with poc NN 01/13/18 1036 Done    Acceptance E VU  LW 01/12/18 1236 Done    Acceptance E NR Pt. educated on role of OT, safe t/f, calling for assistance, benefit of therapy, correcting posture, progression with poc NN 01/11/18 0854 Active               Point: Home exercise program (Done)    Description: Instruct learner(s) on appropriate technique for monitoring, assisting and/or progressing therapeutic exercises/activities.    Learning Progress Summary    Learner Readiness Method Response Comment Documented by Status   Patient Acceptance E VU  LW 01/14/18 1528 Done    Acceptance E VU,NR Pt. educated on role of OT, safe bed mobility, benefit of OOB activity, progression with poc NN 01/13/18 1036 Done    Acceptance E VU  LW 01/12/18 1236 Done    Acceptance E NR Pt. educated on role of OT, safe t/f, calling for assistance, benefit of therapy, correcting posture, progression with poc NN 01/11/18 0854 Active               Point: Body mechanics (Active)    Description: Instruct learner(s) on proper positioning and spine alignment during self-care, functional mobility activities and/or exercises.    Learning Progress Summary    Learner Readiness Method Response Comment Documented by Status   Patient Nonacceptance E NL  BB 01/15/18 1020 Active    Acceptance E VU  LW 01/14/18 1528 Done    Acceptance E VU,NR Pt. educated on role of OT, safe bed mobility, benefit of OOB activity, progression with poc NN 01/13/18 1036 Done    Acceptance E VU  LW  01/12/18 1236 Done    Acceptance E NR Pt. educated on role of OT, safe t/f, calling for assistance, benefit of therapy, correcting posture, progression with poc NN 01/11/18 0854 Active                      User Key     Initials Effective Dates Name Provider Type Discipline    BB 10/17/16 -  Bibi Chester, CAN/L Occupational Therapy Assistant OT    LW 10/17/16 -  Jaz Garcia CAN/L Occupational Therapy Assistant OT    NN 11/08/17 -  Janet Garibay, OTR/L Occupational Therapist OT                  OT Recommendation and Plan  Anticipated Discharge Disposition: home with home health, home with assist, home with /7 care  Planned Therapy Interventions: activity intolerance, ADL retraining, balance training, bed mobility training, energy conservation, home exercise program, strengthening, transfer training  Therapy Frequency:  (3-14x/week)  Plan of Care Review  Plan Of Care Reviewed With: patient  Progress: declining  Outcome Summary/Follow up Plan: Pt min/mod UB ADL and mod/max LB ADL. Pt requires increased time and effort 2' to confusion and difficult to follow directions.No goals met at this time.        Outcome Measures       01/15/18 0610 01/14/18 1000 01/13/18 1000    How much help from another is currently needed...    Putting on and taking off regular lower body clothing? 2  -BB 2  -LW 2  -NN    Bathing (including washing, rinsing, and drying) 2  -BB 2  -LW 2  -NN    Toileting (which includes using toilet bed pan or urinal) 2  -BB 2  -LW 2  -NN    Putting on and taking off regular upper body clothing 3  -BB 3  -LW 3  -NN    Taking care of personal grooming (such as brushing teeth) 3  -BB 3  -LW 3  -NN    Eating meals 4  -BB 4  -LW 4  -NN    Score 16  -BB 16  -LW 16  -NN      01/13/18 0919 01/12/18 1103       How much help from another person do you currently need...    Turning from your back to your side while in flat bed without using bedrails? 3  -CH 3  -SY     Moving from lying on back to sitting  on the side of a flat bed without bedrails? 3  - 3  -SY     Moving to and from a bed to a chair (including a wheelchair)? 3  - 3  -SY     Standing up from a chair using your arms (e.g., wheelchair, bedside chair)? 3  - 3  -SY     Climbing 3-5 steps with a railing? 2  - 2  -SY     To walk in hospital room? 3  - 3  -SY     AM-PAC 6 Clicks Score 17  - 17  -SY     Functional Assessment    Outcome Measure Options AM-PAC 6 Clicks Basic Mobility (PT)  -        User Key  (r) = Recorded By, (t) = Taken By, (c) = Cosigned By    Initials Name Provider Type    SY Sadia French, PTA Physical Therapy Assistant    CH Nichelle Rahman, PTA Physical Therapy Assistant    BB Bibi Chester, CAN/L Occupational Therapy Assistant    LW Jaz Garcia, CAN/L Occupational Therapy Assistant    NN Janet Garibay, OTR/L Occupational Therapist           Time Calculation:         Time Calculation- OT       01/15/18 [facility]          Time Calculation- OT    OT Start Time 0610  -BB      OT Stop Time 0725  -      OT Time Calculation (min) 75 min  -      Total Timed Code Minutes- OT 75 minute(s)  -      OT Received On 01/15/18  -        User Key  (r) = Recorded By, (t) = Taken By, (c) = Cosigned By    Initials Name Provider Type    BB Bibi Chester, CAN/L Occupational Therapy Assistant           Therapy Charges for Today     Code Description Service Date Service Provider Modifiers Qty    10245895784 HC OT THERAPEUTIC ACT EA 15 MIN 1/15/2018 JUDE NgoA/L GO 2    85156268974 HC OT SELF CARE/MGMT/TRAIN EA 15 MIN 1/15/2018 JUDE NgoA/L GO 3          OT G-codes  OT Functional Scales Options: AM-PAC 6 Clicks Daily Activity (OT)  Functional Limitation: Self care  Self Care Current Status (): At least 40 percent but less than 60 percent impaired, limited or restricted  Self Care Goal Status (): At least 20 percent but less than 40 percent impaired, limited or restricted    Bibi QUIGLEY  Henrik, CAN/DALTON  1/15/2018

## 2018-01-15 NOTE — DISCHARGE PLACEMENT REQUEST
"Michelle Child (62 y.o. Female)     Date of Birth Social Security Number Address Home Phone MRN    [id number]  [id number] [facility] [address] [phone] [id number]    [employer] Marital Status          Episcopalian        Admission Date Admission Type Admitting Provider Attending Provider Department, Room/Bed    1/10/18 Emergency Coral Berry MD N[place], Coral Hamilton MD [address], 412/1    Discharge Date Discharge Disposition Discharge Destination                      Attending Provider: Coral Berry MD     Allergies:  Codeine, Penicillins, Sulfa Antibiotics    Isolation:  None   Infection:  None   Code Status:  FULL    Ht:  162.6 cm (64\")   Wt:  63.3 kg (139 lb 9.6 oz)    Admission Cmt:  None   Principal Problem:  Pneumonia of left lower lobe due to Streptococcus pneumoniae [J13] More...                 Active Insurance as of 1/10/2018     Primary Coverage     Payor Plan Insurance Group Employer/Plan Group    MEDICARE MEDICARE A & B      Payor Plan Address Payor Plan Phone Number Effective From Effective To    [address] [phone] 8/1/2015     [place] [address]       Subscriber Name Subscriber Birth Date Member ID       MICHELLE CHILD 1955 [id number]           Secondary Coverage     Payor Plan Insurance Group Employer/Plan Group    [place]      Payor Plan Address Payor Plan Phone Number Effective From Effective To    [address] [phone] 1/10/2018     [place] [id number]       Subscriber Name Subscriber Birth Date Member ID       MICHELLE CHILD 1955 [id number]                 Emergency Contacts      (Rel.) Home Phone Work Phone Mobile Phone    Bryon Nelson (Son) [phone] -- --    Katarzyna Nelson (Daughter) [phone] -- [phone]               History & Physical      Coral Berry MD at 1/10/2018  1:23 PM          Community acquired bacterial " pneumonia  Subjective:     Michelle Child is a 62 y.o. female who presents after sustaining a fall at the clinic parking lot. She stumbled over an object and fell on the back of her head. Her daughter-in-law notes she has been having weakness and confusion at home since before Fan. She also endorses patient talking about past events frequently.  She has not been able to walk her usual activities and she has been more short of breath at rest.  She is coughing thick yellow sputum.  Patient endorses lightheadedness and has stopped taking some of her medications because she thought they were causing it.    The following portions of the patient's history were reviewed and updated as appropriate: allergies, current medications, past family history, past medical history, past social history, past surgical history and problem list.    Concurrent Medical Hx:  Past Medical History:   Diagnosis Date   • Alkaline phosphatase raised    • Astigmatism    • Bilateral pseudophakia    • Coronary arteriosclerosis    • Depressive disorder    • Edema of lower extremity    • Encounter for general adult medical examination with abnormal findings    • Essential hypertension    • Gastroesophageal reflux disease    • H/O bone density study     DXA BONE DENSITY AXIAL    04/22/2016   • H/O screening mammography     SCREENING MAMMOGRAPHY     04/25/2016   • Hx of screening mammography     x3; declined screening, understands risks and benefits and still declines      07/24/2015   • Influenza vaccine administered     INFLUENZA IMMUN ADMIN OR PREV RECV'D   x4       04/01/2016   • Insomnia    • Iron deficiency    • Migraine    • Tobacco dependence     continuous         Past Surgical Hx:  Past Surgical History:   Procedure Laterality Date   • APPENDECTOMY     • BREAST IMPLANT SURGERY     • CATARACT EXTRACTION WITH INTRAOCULAR LENS IMPLANT  12/17/2003    Phacoemulsification of a cataract with intraocular lens implant of fran eyeNixon  model  60-AT; serial # [id number];20.5 diopter   • CHOLECYSTECTOMY  08/06/2007    Laparoscopic cholecystectomy. Symptomatic gallstones   • COLONOSCOPY  10/15/2013    declined stool cards and colonscopy   • ENDOSCOPY AND COLONOSCOPY  05/22/2014    Internal & external hemorrhoids found.   • ENDOSCOPY W/ PEG TUBE PLACEMENT  05/22/2014    EGD w/ tube: Normal esopahgus. Gastritis in stomach. Biopsy taken. Normal duodenum. Biopsy taken.   • INJECTION OF MEDICATION  07/24/2013    Inj(s) Tend-Sheath, Ligament, Single   • INJECTION OF MEDICATION  11/17/2014    Kenalog x6   • INJECTION OF MEDICATION  04/01/2016    PNEUMOC VAC/ADMIN/RCVD    • INJECTION OF MEDICATION  12/05/2014    Toradol   • OTHER SURGICAL HISTORY  01/19/2014    Drain/Inject Major Joint   x2   • PAP SMEAR  10/06/2011   • PARTIAL HYSTERECTOMY      Partial hyst    • TONSILLECTOMY     • TUBAL ABDOMINAL LIGATION       Family Hx:  Family History   Problem Relation Age of Onset   • Cholelithiasis Mother    • Thyroid cancer Sister    • Thyroid disease Sister      Thyroid disorder   • Graves' disease Sister    • Cholelithiasis Maternal Grandmother    • Thyroid cancer Other      Other 2nd degree relative, thyroid   • Diabetes Other    • Hypertension Other       Social History:  Social History     Social History   • Marital status:      Spouse name: N/A   • Number of children: N/A   • Years of education: N/A     Occupational History   • Not on file.     Social History Main Topics   • Smoking status: Current Every Day Smoker   • Smokeless tobacco: Never Used      Comment: SMOKED FOR 20>PLUS YRS   • Alcohol use No   • Drug use: No   • Sexual activity: Defer     Other Topics Concern   • Not on file     Social History Narrative       Home Medications:  No current facility-administered medications on file prior to encounter.      Current Outpatient Prescriptions on File Prior to Encounter   Medication Sig Dispense Refill   • albuterol (PROVENTIL HFA;VENTOLIN HFA)  108 (90 BASE) MCG/ACT inhaler Inhale 2 puffs Every 4 (Four) Hours As Needed for wheezing. 6.7 g 11   • amitriptyline (ELAVIL) 100 MG tablet Take 1 tablet by mouth Every Night. 30 tablet 11   • aspirin 81 MG chewable tablet Chew 1 tablet Daily. 30 tablet 11   • carvedilol (COREG) 3.125 MG tablet TAKE 1 TABLET BY MOUTH TWICE A DAY. 60 tablet 1   • FLUoxetine (PROzac) 20 MG capsule TAKE 3 CAPSULES BY MOUTH ONCE DAILY 90 capsule 1   • losartan (COZAAR) 100 MG tablet TAKE ONE TABLET BY MOUTH ONCE DAILY 90 tablet 0   • naproxen (NAPROSYN) 500 MG tablet Take 1 tablet by mouth 2 (Two) Times a Day. 60 tablet 11   • omeprazole (priLOSEC) 40 MG capsule Take 1 capsule by mouth Daily. 30 capsule 0   • risperiDONE (risperDAL) 1 MG tablet Take 1 tablet by mouth Daily. (Patient taking differently: Take 1 mg by mouth every night at bedtime.) 30 tablet 0   • [DISCONTINUED] Magnesium Oxide 400 MG capsule Take  by mouth 2 (Two) Times a Day.     • furosemide (LASIX) 20 MG tablet TAKE 1/2 TABLET BY MOUTH ONCE DAILY AS NEEDED FOR SWELLING. 15 tablet 3   • [DISCONTINUED] furosemide (LASIX) 20 MG tablet TAKE 1/2 TABLET BY MOUTH ONCE DAILY AS NEEDED FOR SWELLING. 15 tablet 1       Allergies:  Codeine; Penicillins; and Sulfa antibiotics    Review of Systems  Review of Systems   Constitutional: Positive for activity change, appetite change and fatigue. Negative for fever.   HENT: Positive for hearing loss. Negative for ear pain and sore throat.    Eyes: Negative for pain and visual disturbance.   Respiratory: Positive for cough, shortness of breath and wheezing.    Cardiovascular: Negative for chest pain and palpitations.   Gastrointestinal: Negative for abdominal pain, constipation, diarrhea, nausea and vomiting.   Endocrine: Negative for cold intolerance and heat intolerance.   Genitourinary: Negative for difficulty urinating and dysuria.   Musculoskeletal: Positive for arthralgias and gait problem.   Skin: Negative for color change and  "rash.   Neurological: Positive for dizziness, syncope and weakness. Negative for headaches.   Hematological: Negative for adenopathy. Does not bruise/bleed easily.   Psychiatric/Behavioral: Positive for confusion. Negative for agitation and sleep disturbance.       Objective:     /60  Pulse 90  Temp 97.9 °F (36.6 °C) (Oral)   Resp 20  Ht 162.6 cm (64\")  Wt 63.5 kg (140 lb)  SpO2 95%  BMI 24.03 kg/m2  Physical Exam   Constitutional: She is oriented to person, place, and time. She appears well-developed and well-nourished.   HENT:   Head: Normocephalic.       Right Ear: External ear normal.   Left Ear: External ear normal.   Nose: Nose normal.   Mouth/Throat: Oropharynx is clear and moist.   Eyes: Conjunctivae and EOM are normal. Pupils are equal, round, and reactive to light.   Neck: Normal range of motion. Neck supple.   Cardiovascular: Normal rate, regular rhythm and normal heart sounds.    Pulmonary/Chest: Effort normal. She has decreased breath sounds. She has wheezes in the left lower field. She has rhonchi in the left lower field.   Abdominal: Soft. Bowel sounds are normal. She exhibits no distension. There is no tenderness.   Musculoskeletal: Normal range of motion.   Neurological: She is alert and oriented to person, place, and time.   Skin: Skin is warm and dry.   Psychiatric: She has a normal mood and affect. Her speech is normal and behavior is normal. She exhibits abnormal recent memory.   AAOx3 but doesn't always connect thoughts appropriately     I reviewed the patient's new clinical results.  I reviewed the patient's new imaging results.  Assessment/Plan:     Active Hospital Problems (** Indicates Principal Problem)    Diagnosis Date Noted   • **Community acquired bacterial pneumonia [J15.9] 01/10/2018     Left lower lobe  Blood culture, sputum culture, check atypicals.   Levaquin as PCN allergy     • Hypokalemia [E87.6] 01/10/2018     Replace Orally and recheck   Check magnesium     • " Weakness [R53.1] 01/10/2018     PT/OT     • Hyponatremia [E87.1] 01/10/2018     IV fluids     • Acute respiratory failure with hypoxia [J96.01] 01/10/2018     NC oxygen     • Fall with injury [W19.XXXA] 08/15/2017     Monitor for mental status changes with neurochecks  AAOx3     • Tobacco dependence [F17.200]      Nicotine patch 21mg transdermally     • Gastroesophageal reflux disease [K21.9]      Pepcid     • Essential hypertension [I10]      Losartan, coreg     • Depressive disorder [F32.9]      Prozac, Elavil, and Risperdal        Resolved Hospital Problems    Diagnosis Date Noted Date Resolved   No resolved problems to display.     DVT Prophylaxis: SCDs/TEDs/Lovenox  Anticipate 3-4 days of hospitalization. Case Management for discharge planning. Patient lives alone.            This document has been electronically signed by Coral Berry MD on January 10, 2018 4:12 PM          Electronically signed by Coral Berry MD at 1/10/2018  4:12 PM        Vital Signs (last 24 hours)       01/14 0700  -  01/15 0659 01/15 0700  -  01/15 1237   Most Recent    Temp (°F) 97.3 -  98.8      98.6     98.6 (37)    Heart Rate 89 -  98    82 -  98     82    Resp 18 -  20    18 -  20     20    /66 -  174/77      124/72     124/72    SpO2 (%) (!)87 -  99      94     94          Hospital Medications (active)       Dose Frequency Start End    acetaminophen (TYLENOL) tablet 650 mg 650 mg Every 4 Hours PRN 1/10/2018     Sig - Route: Take 2 tablets by mouth Every 4 (Four) Hours As Needed for Mild Pain . - Oral    amitriptyline (ELAVIL) tablet 100 mg 100 mg Nightly 1/10/2018     Sig - Route: Take 2 tablets by mouth Every Night. - Oral    aspirin chewable tablet 81 mg 81 mg Daily 1/10/2018     Sig - Route: Chew 1 tablet Daily. - Oral    bisacodyl (DULCOLAX) EC tablet 5 mg 5 mg Daily PRN 1/10/2018     Sig - Route: Take 1 tablet by mouth Daily As Needed for Constipation. - Oral    famotidine (PEPCID) tablet 40 mg 40 mg Daily  1/10/2018     Sig - Route: Take 1 tablet by mouth Daily. - Oral    ferrous sulfate EC tablet 324 mg 324 mg 2 Times Daily With Meals 1/15/2018     Sig - Route: Take 1 tablet by mouth 2 (Two) Times a Day With Meals. - Oral    FLUoxetine (PROzac) capsule 60 mg 60 mg Daily 1/11/2018     Sig - Route: Take 3 capsules by mouth Daily. - Oral    folic acid (FOLVITE) tablet 1 mg 1 mg Daily 1/15/2018     Sig - Route: Take 1 tablet by mouth Daily. - Oral    furosemide (LASIX) tablet 20 mg 20 mg 2 Times Daily (Diuretics) 1/14/2018     Sig - Route: Take 1 tablet by mouth 2 (Two) Times a Day. - Oral    HYDROcodone-acetaminophen (NORCO) 5-325 MG per tablet 1 tablet 1 tablet Every 4 Hours PRN 1/10/2018 1/20/2018    Sig - Route: Take 1 tablet by mouth Every 4 (Four) Hours As Needed for Moderate Pain . - Oral    influenza vac split quad (FLUZONE QUADRIVALENT) IM suspension 0.5 mL 0.5 mL During Hospitalization 1/10/2018     Sig - Route: Inject 0.5 mL into the shoulder, thigh, or buttocks During Hospitalization for Immunization. - Intramuscular    ipratropium-albuterol (DUO-NEB) nebulizer solution 3 mL 3 mL 4 Times Daily - RT 1/10/2018     Sig - Route: Take 3 mL by nebulization 4 (Four) Times a Day. - Nebulization    levoFLOXacin (LEVAQUIN) 750 mg/150 mL D5W (premix) (LEVAQUIN) 750 mg 750 mg Every 24 Hours 1/10/2018 1/17/2018    Sig - Route: Infuse 150 mL into a venous catheter Daily. - Intravenous    LORazepam (ATIVAN) tablet 0.5 mg 0.5 mg Once 1/15/2018     Sig - Route: Take 1 tablet by mouth 1 (One) Time. - Oral    losartan (COZAAR) tablet 50 mg 50 mg Every 24 Hours Scheduled 1/10/2018     Sig - Route: Take 1 tablet by mouth Daily. - Oral    methylPREDNISolone sodium succinate (SOLU-Medrol) injection 40 mg 40 mg Daily 1/11/2018 1/15/2018    Sig - Route: Infuse 1 mL into a venous catheter Daily. - Intravenous    metoprolol succinate XL (TOPROL-XL) 24 hr tablet 25 mg 25 mg Every 24 Hours Scheduled 1/15/2018     Sig - Route: Take 1  tablet by mouth Daily. - Oral    nicotine (NICODERM CQ) 21 MG/24HR patch 1 patch 1 patch Every 24 Hours 1/10/2018     Sig - Route: Place 1 patch on the skin Daily. - Transdermal    ondansetron (ZOFRAN) tablet 4 mg 4 mg Every 6 Hours PRN 1/10/2018     Sig - Route: Take 1 tablet by mouth Every 6 (Six) Hours As Needed for Nausea or Vomiting. - Oral    pneumococcal polysaccharide 23-valent (PNEUMOVAX-23) vaccine 0.5 mL 0.5 mL During Hospitalization 2018     Sig - Route: Inject 0.5 mL into the shoulder, thigh, or buttocks During Hospitalization for Immunization. - Intramuscular    potassium chloride (MICRO-K) CR capsule 10 mEq 10 mEq Daily 2018     Sig - Route: Take 1 capsule by mouth Daily. - Oral    risperiDONE (risperDAL) tablet 1 mg 1 mg Nightly 1/10/2018     Sig - Route: Take 1 tablet by mouth Every Night. - Oral    sodium chloride 0.9 % flush 1-10 mL 1-10 mL As Needed 1/10/2018     Sig - Route: Infuse 1-10 mL into a venous catheter As Needed for Line Care. - Intravenous    sodium chloride 0.9 % flush 10 mL 10 mL As Needed 1/10/2018     Sig - Route: Infuse 10 mL into a venous catheter As Needed for Line Care. - Intravenous    carvedilol (COREG) tablet 3.125 mg (Discontinued) 3.125 mg 2 Times Daily With Meals 1/10/2018 1/15/2018    Sig - Route: Take 1 tablet by mouth 2 (Two) Times a Day With Meals. - Oral    enoxaparin (LOVENOX) syringe 40 mg (Discontinued) 40 mg Every 24 Hours 1/10/2018 1/15/2018    Sig - Route: Inject 0.4 mL under the skin Daily. - Subcutaneous             Physician Progress Notes (last 24 hours) (Notes from 2018 12:37 PM through 1/15/2018 12:37 PM)      Coral Berry MD at 1/15/2018  7:57 AM  Version 1 of 1         FAMILY MEDICINE PROGRESS NOTE  NAME: Michelle Child  : 1955  MRN: [id number]     LOS: 5 days   Full Code  PROVIDER OF SERVICE:     Chief Complaint:  Pneumonia of left lower lobe due to Streptococcus pneumoniae    Subjective     Interval History:  History  taken from: patient family RN  Subjective: Patient is sitting up at the edge of the bed and is quite confused. She doesn't know where she is.  Night shift RN endorses increasing confusion throughout the weekend.    Review of Systems:   Review of Systems   Constitutional: Positive for activity change and appetite change. Negative for fatigue and fever.   HENT: Negative for ear pain and sore throat.    Eyes: Negative for pain and visual disturbance.   Respiratory: Positive for cough and shortness of breath.    Cardiovascular: Negative for chest pain and palpitations.   Gastrointestinal: Negative for abdominal pain and nausea.   Endocrine: Negative for cold intolerance and heat intolerance.   Genitourinary: Positive for decreased urine volume. Negative for difficulty urinating and dysuria.   Musculoskeletal: Positive for gait problem. Negative for arthralgias.   Skin: Negative for color change and rash.   Neurological: Positive for weakness. Negative for dizziness and headaches.   Hematological: Negative for adenopathy. Does not bruise/bleed easily.   Psychiatric/Behavioral: Positive for confusion. Negative for agitation and sleep disturbance.       Objective     Vital Signs  Temp:  [97.3 °F (36.3 °C)-98.8 °F (37.1 °C)] 98.2 °F (36.8 °C)  Heart Rate:  [89-98] 98  Resp:  [18-20] 20  BP: (134-174)/(65-84) 134/66    Physical Exam  Physical Exam   Constitutional: She appears well-developed and well-nourished.   HENT:   Head: Normocephalic and atraumatic.   Right Ear: External ear normal.   Left Ear: External ear normal.   Nose: Nose normal.   Mouth/Throat: Oropharynx is clear and moist.   Left sided facial droop     Eyes: Conjunctivae and EOM are normal.   Neck: Normal range of motion. Neck supple.   Cardiovascular: Normal rate, regular rhythm and normal heart sounds.    Pulmonary/Chest: Effort normal. She has decreased breath sounds. She has rhonchi in the left lower field.   Abdominal: Soft. Bowel sounds are normal. She  exhibits no distension. There is no tenderness.   Musculoskeletal: Normal range of motion.   Left upper extremity decreased strength 4/5 compared to right 5/5   Neurological: She is alert.   Skin: Skin is warm and dry.   Psychiatric: Her speech is slurred. She is slowed and withdrawn. Cognition and memory are impaired. She exhibits abnormal recent memory.   Disoriented to time and place       Medication Review    Current Facility-Administered Medications:   •  acetaminophen (TYLENOL) tablet 650 mg, 650 mg, Oral, Q4H PRN, Coral Berry MD, 650 mg at 01/11/18 1550  •  amitriptyline (ELAVIL) tablet 100 mg, 100 mg, Oral, Nightly, Coral Berry MD, 100 mg at 01/14/18 2155  •  aspirin chewable tablet 81 mg, 81 mg, Oral, Daily, Coral Berry MD, 81 mg at 01/14/18 1000  •  bisacodyl (DULCOLAX) EC tablet 5 mg, 5 mg, Oral, Daily PRN, Coral Berry MD  •  carvedilol (COREG) tablet 3.125 mg, 3.125 mg, Oral, BID With Meals, Coral Berry MD, 3.125 mg at 01/14/18 1725  •  famotidine (PEPCID) tablet 40 mg, 40 mg, Oral, Daily, Coral Berry MD, 40 mg at 01/14/18 1000  •  ferrous sulfate EC tablet 324 mg, 324 mg, Oral, BID With Meals, Coral Berry MD  •  FLUoxetine (PROzac) capsule 60 mg, 60 mg, Oral, Daily, Coral Berry MD, 60 mg at 01/14/18 1000  •  furosemide (LASIX) tablet 20 mg, 20 mg, Oral, BID, Sunny Linn DO, 20 mg at 01/14/18 1725  •  HYDROcodone-acetaminophen (NORCO) 5-325 MG per tablet 1 tablet, 1 tablet, Oral, Q4H PRN, Coral Berry MD, 1 tablet at 01/13/18 2123  •  influenza vac split quad (FLUZONE QUADRIVALENT) IM suspension 0.5 mL, 0.5 mL, Intramuscular, During Hospitalization, Coral Berry MD  •  ipratropium-albuterol (DUO-NEB) nebulizer solution 3 mL, 3 mL, Nebulization, 4x Daily - RT, Coral Berry MD, 3 mL at 01/14/18 2000  •  levoFLOXacin (LEVAQUIN) 750 mg/150 mL D5W (premix) (LEVAQUIN) 750 mg, 750 mg, Intravenous, Q24H, Coral Berry MD, 750 mg at  01/14/18 1620  •  LORazepam (ATIVAN) tablet 0.5 mg, 0.5 mg, Oral, Once, Coral Berry MD  •  losartan (COZAAR) tablet 50 mg, 50 mg, Oral, Q24H, Coral Berry MD, 50 mg at 01/14/18 1000  •  methylPREDNISolone sodium succinate (SOLU-Medrol) injection 40 mg, 40 mg, Intravenous, Daily, Coral Berry MD, 40 mg at 01/14/18 1000  •  nicotine (NICODERM CQ) 21 MG/24HR patch 1 patch, 1 patch, Transdermal, Q24H, Coral Berry MD, 1 patch at 01/14/18 1725  •  ondansetron (ZOFRAN) tablet 4 mg, 4 mg, Oral, Q6H PRN, Coral Berry MD, 4 mg at 01/11/18 1941  •  pneumococcal polysaccharide 23-valent (PNEUMOVAX-23) vaccine 0.5 mL, 0.5 mL, Intramuscular, During Hospitalization, Coral Berry MD  •  potassium chloride (MICRO-K) CR capsule 10 mEq, 10 mEq, Oral, Daily, Sunny Linn DO, 10 mEq at 01/14/18 1000  •  risperiDONE (risperDAL) tablet 1 mg, 1 mg, Oral, Nightly, Coral Berry MD, 1 mg at 01/14/18 2155  •  sodium chloride 0.9 % flush 1-10 mL, 1-10 mL, Intravenous, PRN, Coral Berry MD  •  sodium chloride 0.9 % flush 10 mL, 10 mL, Intravenous, PRN, Toro Shine MD, 10 mL at 01/11/18 1103     Diagnostic Data      I reviewed the patient's new clinical results and imaging.      Assessment/Plan     Active Hospital Problems (** Indicates Principal Problem)    Diagnosis Date Noted   • **Pneumonia of left lower lobe due to Streptococcus pneumoniae [J13] 01/11/2018     On Levaquin with falling WBC and no fever.     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      • Pulmonary hypertension [I27.20] 01/14/2018      Cards consult.      • Rheumatic tricuspid valve regurgitation [I07.1] 01/14/2018   • Hyponatremia [E87.1] 01/13/2018     Improved today  Monitor closely     • Cerebral microvascular disease [I67.9] 01/13/2018     Per CT head  Patient more confused today with slurring of speech. Have to consider acute CVA.   Will obtain MRI brain.      • Weakness [R53.1] 01/10/2018     PT/OT     • Acute respiratory failure with hypoxia [J96.01] 01/10/2018     NC oxygen, steroids, and neb treatements     • Fall with injury [W19.XXXA] 08/15/2017     PT/OT       • Tobacco dependence [F17.200]      Nicotine patch 21mg transdermally     • Gastroesophageal reflux disease [K21.9]      Pepcid     • Iron deficiency [E61.1]      Iron studies   Restart feosol        Resolved Hospital Problems    Diagnosis Date Noted Date Resolved   • Anemia [D64.9] 01/14/2018 01/15/2018     Start iron supplementation.  Obtain iron studies.      • Elevated brain natriuretic peptide (BNP) level [R79.89] 01/14/2018 01/15/2018     IVF's discontinued, Fluid Restrictions, Low Na diet. Echocardiogram ordered. Will start Lasix 20 mg po bid and monitor.     • Elevated troponin [R74.8] 01/14/2018 01/15/2018     Most likely secondary to elevated BNP. No chest pain or anginal equivalent. Will monitor.     • Positive D dimer [R79.89] 01/14/2018 01/15/2018     Most likely elevated due to pneumonia and elevated BNP. No clinical signs of PE.     • Hyperkalemia [E87.5] 01/13/2018 01/14/2018     Decrease KCL and monitor     • Hypokalemia [E87.6] 01/10/2018 01/11/2018     Replace Orally and recheck   Check magnesium     • Community acquired bacterial pneumonia [J15.9] 01/10/2018 01/11/2018     Left lower lobe  Blood culture, sputum culture, check atypicals.   Levaquin as PCN allergy     • Hyponatremia [E87.1] 01/10/2018 01/11/2018     IV fluids     • Essential hypertension [I10]  01/14/2018     Losartan, coreg     • Depressive disorder [F32.9]  01/14/2018     Prozac, Elavil,  and Risperdal     Have discussed with her daughter in law, Katarzyna Nelson, for 16 minutes regarding declining mental status. Have discussed plan for Cardiology consultation and Case Management consultation. Have also discussed obtaining MRI of brain for concern about another possible stroke.        DVT prophylaxis: SCDs/TEDs      Disposition:Uncertain at this time 3-4 days in hospital. Have discussed that SNF placement is probably what is best for patient at this point.           This document has been electronically signed by Coral Berry MD on January 15, 2018 8:00 AM           Electronically signed by Coral Berry MD at 1/15/2018  8:02 AM           Consult Notes (last 24 hours) (Notes from 1/14/2018 12:37 PM through 1/15/2018 12:37 PM)      Stevenson Brown MD PhD at 1/15/2018 10:08 AM  Version 1 of 1     Consult Orders:    1. Inpatient Consult to Cardiology [[id number]] ordered by Coral Berry MD at 01/15/18 0734                CARDIOVASCULAR CONSULTATION   Stevenson Brown M.D., Ph.D., [facility]                Referring Provider:  Dr. 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     History of present illness:  This is a 62-year-old female patient of Dr. Berry with heavy ongoing smoking, hypertension and recent weakness/deconditioning who actually fell in the parking lot while going to visit her PCP.  She was sent to our emergency department.  There, she was diagnosed with LLL pneumonia secondary to strep pneumo.  She was placed on antibiotics and admitted to the family practice service.    Over the weekend she had increased confusion and hypotension that required IV fluids.  During the workup for this a TTE was obtained which showed rheumatic MS, TR and severe PAH with RV pressure overload pattern.  Dr. Berry is requesting consultation given her valvular disease and pulmonary hypertension for additional  recommendations.    Of note, the patient is currently confused and unable to provide history.  History is provided from the EMR and in discussions with Dr. Berry.    Review of Systems - History obtained from chart review and Dr. Berry    History  Past Medical History:   Diagnosis Date   • Alkaline phosphatase raised    • Astigmatism    • Bilateral pseudophakia    • Coronary arteriosclerosis    • Depressive disorder    • Edema of lower extremity    • Encounter for general adult medical examination with abnormal findings    • Essential hypertension    • Gastroesophageal reflux disease    • H/O bone density study     DXA BONE DENSITY AXIAL    04/22/2016   • H/O screening mammography     SCREENING MAMMOGRAPHY     04/25/2016   • Hx of screening mammography     x3; declined screening, understands risks and benefits and still declines      07/24/2015   • Influenza vaccine administered     INFLUENZA IMMUN ADMIN OR PREV RECV'D   x4       04/01/2016   • Insomnia    • Iron deficiency    • Migraine    • Tobacco dependence     continuous     ,   Past Surgical History:   Procedure Laterality Date   • APPENDECTOMY     • BREAST IMPLANT SURGERY     • CATARACT EXTRACTION WITH INTRAOCULAR LENS IMPLANT  12/17/2003    Phacoemulsification of a cataract with intraocular lens implant of rigt eye, Nixon model SA 60-AT; serial # [id number];20.5 diopter   • CHOLECYSTECTOMY  08/06/2007    Laparoscopic cholecystectomy. Symptomatic gallstones   • COLONOSCOPY  10/15/2013    declined stool cards and colonscopy   • ENDOSCOPY AND COLONOSCOPY  05/22/2014    Internal & external hemorrhoids found.   • ENDOSCOPY W/ PEG TUBE PLACEMENT  05/22/2014    EGD w/ tube: Normal esopahgus. Gastritis in stomach. Biopsy taken. Normal duodenum. Biopsy taken.   • INJECTION OF MEDICATION  07/24/2013    Inj(s) Tend-Sheath, Ligament, Single   • INJECTION OF MEDICATION  11/17/2014    Kenalog x6   • INJECTION OF MEDICATION  04/01/2016    PNEUMOC VAC/ADMIN/RCVD    • INJECTION  OF MEDICATION  12/05/2014    Toradol   • OTHER SURGICAL HISTORY  01/19/2014    Drain/Inject Major Joint   x2   • PAP SMEAR  10/06/2011   • PARTIAL HYSTERECTOMY      Partial hyst    • TONSILLECTOMY     • TUBAL ABDOMINAL LIGATION     ,   Family History   Problem Relation Age of Onset   • Cholelithiasis Mother    • Thyroid cancer Sister    • Thyroid disease Sister      Thyroid disorder   • Graves' disease Sister    • Cholelithiasis Maternal Grandmother    • Thyroid cancer Other      Other 2nd degree relative, thyroid   • Diabetes Other    • Hypertension Other    ,   Social History   Substance Use Topics   • Smoking status: Current Every Day Smoker   • Smokeless tobacco: Never Used      Comment: SMOKED FOR 20>PLUS YRS   • Alcohol use No   ,   Prescriptions Prior to Admission   Medication Sig Dispense Refill Last Dose   • albuterol (PROVENTIL HFA;VENTOLIN HFA) 108 (90 BASE) MCG/ACT inhaler Inhale 2 puffs Every 4 (Four) Hours As Needed for wheezing. 6.7 g 11 1/9/2018 at 2100   • amitriptyline (ELAVIL) 100 MG tablet Take 1 tablet by mouth Every Night. 30 tablet 11 1/9/2018 at 2100   • aspirin 81 MG chewable tablet Chew 1 tablet Daily. 30 tablet 11 1/9/2018 at 0900   • carvedilol (COREG) 3.125 MG tablet TAKE 1 TABLET BY MOUTH TWICE A DAY. 60 tablet 1 1/9/2018 at 2100   • FLUoxetine (PROzac) 20 MG capsule TAKE 3 CAPSULES BY MOUTH ONCE DAILY 90 capsule 1 1/9/2018 at 0900   • losartan (COZAAR) 100 MG tablet TAKE ONE TABLET BY MOUTH ONCE DAILY 90 tablet 0 Past Month at Unknown time   • naproxen (NAPROSYN) 500 MG tablet Take 1 tablet by mouth 2 (Two) Times a Day. 60 tablet 11 1/9/2018 at 2100   • omeprazole (priLOSEC) 40 MG capsule Take 1 capsule by mouth Daily. 30 capsule 0 1/9/2018 at 0900   • risperiDONE (risperDAL) 1 MG tablet Take 1 tablet by mouth Daily. (Patient taking differently: Take 1 mg by mouth every night at bedtime.) 30 tablet 0 1/9/2018 at 2100   • furosemide (LASIX) 20 MG tablet TAKE 1/2 TABLET BY MOUTH ONCE  DAILY AS NEEDED FOR SWELLING. 15 tablet 3 Unknown at Unknown time    and Allergies:  Codeine; Penicillins; and Sulfa antibiotics    Objective   Body mass index is 23.96 kg/(m^2).         Anticoagulants     None          Vital Sign Min/Max for last 24 hours  Temp  Min: 97.3 °F (36.3 °C)  Max: 98.8 °F (37.1 °C)   BP  Min: 124/72  Max: 174/77   Pulse  Min: 89  Max: 98   Resp  Min: 18  Max: 20   SpO2  Min: 87 %  Max: 99 %   Flow (L/min)  Min: 2  Max: 3.5   Weight  Min: 63 kg (139 lb)  Max: 63.3 kg (139 lb 9.6 oz)        Physical Exam:   GEN: alert and delirious  Body Habitus: well-nourished  HEENT: Head: Normocephalic, no lesions, without obvious abnormality.  Neck / Thyroid: Supple, no masses, nodes, nodules or enlargement. No arcus senilis, xanthelasma or xanthomas.   JVP: 11 cm of water at 45 degrees HJR: present      Carotid:  Upstroke: easily palpated bilaterally Volume: Normal.    Carotid Bruit:  None  Subclavian Bruit: Not present.    Chest:  Normal Excursion: Good    I:E: Prolonged  Pulmonary: Decreased breath sounds with rhonchi present in the LLL     Precordium:  No palpable heaves or thrusts. P2 is not palpable.   Beatty:  normal size and placement Palpable S4: Not present.   Heart rate: normal  Heart Rhythm: regular     Heart Sounds: S1: increased intensity  S2: increased intensity, increased P2  S3: absent   S4: absent  Opening Snap: present  A2-OS:  Close  Pericardial rub: absent    Ejection click: None      Murmurs: Systolic: 2/6 systolic LSB  Diastolic: 2/6 diastolic at apex. Best appreciated in the left lateral decubitus position  Abdomen: Soft, non-tender, normal bowel sounds; no bruits, organomegaly or masses.  Extremity: no edema, cyanosis  Trophic changes: None   Pallor on elevation: Absent.    Rubor on dependency: None  Pulses: Right radial artery has 2+ (normal) pulse and Left radial artery has 2+ (normal) pulse    Results Review:  Lab Results (last 24 hours)     Procedure Component Value Units  Date/Time    Blood Culture - Blood, [[id number]]  (Normal) Collected:  01/10/18 1338    Specimen:  Blood from Hand, Right Updated:  01/14/18 1346     Blood Culture No growth at 4 days    Blood Culture - Blood, [[id number]]  (Normal) Collected:  01/10/18 1339    Specimen:  Blood from Arm, Right Updated:  01/14/18 1346     Blood Culture No growth at 4 days    C-reactive Protein [[id number]]  (Abnormal) Collected:  01/14/18 1519    Specimen:  Blood Updated:  01/14/18 1609     C-Reactive Protein 20.90 (H) mg/dL     Troponin [[id number]]  (Abnormal) Collected:  01/14/18 1519    Specimen:  Blood Updated:  01/14/18 1609     Troponin I 0.055 (H) ng/mL     Blood Gas, Arterial [[id number]]  (Abnormal) Collected:  01/14/18 1634    Specimen:  Arterial Blood Updated:  01/14/18 1636     Site --      Not performed at this site.        Alfonso's Test --      Not performed at this site.        pH, Arterial 7.419 pH units      pCO2, Arterial 37.4 mm Hg      pO2, Arterial 70.7 (L) mm Hg      HCO3, Arterial 23.7 mmol/L      Base Excess, Arterial -0.7 mmol/L      O2 Saturation, Arterial 93.6 (L) %      Hemoglobin, Blood Gas 9.0 (L) g/dL      Hematocrit, Blood Gas 26.0 %      CO2 Content 24.8     Sodium, Arterial 138.3 mmol/L      Potassium, Arterial 3.82 mmol/L      Glucose, Arterial 107 mmol/L      Barometric Pressure for Blood Gas -- mmHg       Not performed at this site.        Modality --      Not performed at this site.        Ionized Calcium 4.40 (L) mg/dL     Troponin [[id number]]  (Abnormal) Collected:  01/15/18 0005    Specimen:  Blood Updated:  01/15/18 0039     Troponin I 0.046 (H) ng/mL     Blood Culture - Blood, [[id number]]  (Normal) Collected:  01/14/18 0450    Specimen:  Blood from Arm, Right Updated:  01/15/18 0501     Blood Culture No growth at 24 hours    CBC & Differential [[id number]] Collected:  01/15/18 0557    Specimen:  Blood Updated:  01/15/18 0613    Narrative:       The following orders were created for panel order CBC  & Differential.  Procedure                               Abnormality         Status                     ---------                               -----------         ------                     CBC Auto Differential[[id number]]        Abnormal            Final result                 Please view results for these tests on the individual orders.    CBC Auto Differential [[id number]]  (Abnormal) Collected:  01/15/18 0557    Specimen:  Blood Updated:  01/15/18 0613     WBC 11.13 (H) 10*3/mm3      RBC 2.90 (L) 10*6/mm3      Hemoglobin 7.7 (L) g/dL      Hematocrit 24.3 (L) %      MCV 83.8 fL      MCH 26.6 pg      MCHC 31.7 g/dL      RDW 15.9 (H) %      RDW-SD 49.1 (H) fl      MPV 8.7 fL      Platelets 286 10*3/mm3      Neutrophil % 84.7 (H) %      Lymphocyte % 9.1 (L) %      Monocyte % 5.7 %      Eosinophil % 0.0 %      Basophil % 0.0 %      Immature Grans % 0.5 %      Neutrophils, Absolute 9.43 (H) 10*3/mm3      Lymphocytes, Absolute 1.01 10*3/mm3      Monocytes, Absolute 0.63 10*3/mm3      Eosinophils, Absolute 0.00 10*3/mm3      Basophils, Absolute 0.00 10*3/mm3      Immature Grans, Absolute 0.06 (H) 10*3/mm3      nRBC 0.0 /100 WBC     Lactic Acid, Plasma [[id number]]  (Normal) Collected:  01/15/18 0557    Specimen:  Blood Updated:  01/15/18 0624     Lactate 1.3 mmol/L     Comprehensive Metabolic Panel [[id number]]  (Abnormal) Collected:  01/15/18 0557    Specimen:  Blood Updated:  01/15/18 0635     Glucose 101 (H) mg/dL      BUN 12 mg/dL      Creatinine 0.75 mg/dL      Sodium 133 (L) mmol/L      Potassium 3.6 mmol/L      Chloride 99 mmol/L      CO2 24.0 mmol/L      Calcium 7.8 (L) mg/dL      Total Protein 4.9 (L) g/dL      Albumin 2.30 (L) g/dL      ALT (SGPT) 38 U/L      AST (SGOT) 35 U/L      Alkaline Phosphatase 105 U/L      Total Bilirubin 0.4 mg/dL      eGFR Non African Amer 78 mL/min/1.73      Globulin 2.6 gm/dL      A/G Ratio 0.9 (L) g/dL      BUN/Creatinine Ratio 16.0     Anion Gap 10.0 mmol/L     Iron Profile  [[id number]]  (Abnormal) Collected:  01/14/18 1519    Specimen:  Blood Updated:  01/15/18 0805     Iron 16 (L) mcg/dL      TIBC 270 mcg/dL      Iron Saturation 6 (L) %     Vitamin B12 [[id number]]  (Normal) Collected:  01/14/18 1519    Specimen:  Blood Updated:  01/15/18 0904     Vitamin B-12 434 pg/mL     Folate [[id number]]  (Abnormal) Collected:  01/14/18 1519    Specimen:  Blood Updated:  01/15/18 0904     Folate 2.03 (L) ng/mL         Imaging Results (last 24 hours)     Procedure Component Value Units Date/Time    MRI Brain Without Contrast [[id number]] Collected:  01/15/18 0817     Updated:  01/15/18 0955    Narrative:       MRI brain without contrast.       CLINICAL INDICATION: Left-sided facial droop. Slurred speech.          COMPARISON: None    PROCEDURE/TECHNIQUE:    MRI of the brain was performed utilizing the standard protocol  without the administration of gadolinium.    FINDINGS: Diffusion-weighted images demonstrate no restricted  diffusion, i.e., no evidence of acute stroke. Involutional,  atrophic changes. Periventricular foci of increased signal  intensity, chronic small vessel ischemic changes.    The gyri and sulci are otherwise unremarkable, bilaterally  symmetric consistent with patient's age.  No mass lesions,  hydrocephalus, midline shift, intracranial hemorrhage, or  abnormal intra- or extra-axial fluid collections are identified.   The brainstem and sellar and parasellar structures are without  abnormalities.  The orbits, sinuses and mastoid air cells are  unremarkable.  The visualized intracranial vessels show normal  signal void.      Impression:       CONCLUSION: Involutional, atrophic changes.    Periventricular foci of increased signal intensity, chronic small  vessel ischemic changes.    Otherwise unremarkable MRI of the brain without contrast.       Electronically signed by:  Niles Zaidi MD  1/15/2018 9:54 AM CST  Workstation: [id number]            Assessment/Plan      1. PAH/PVH with cor  pulmonale and RV pressure overload pattern. WHO Group 2.3/3; FC: II/III.    RV status: Adversely adapted.  The patient is in an hypervolemic  and perfused physiologic state.  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  She does have significant risk factors for the development of COPD including heavy ongoing smoking.  However, I do not believe her mental status will allow her to participate in PFTs today.  The definitive treatment for this will be intervention of her mitral valve. Her mean gradient is 9 and she has severe pulmonary hypertension.  I suspect that she will require PBMV.  She will need invasive RHC in order to evaluate her PA pressures and possible contraindications for this procedure.  Also, this procedure is not performed at our institution.  After an initial workup she would need to be referred to another institution.  At this point I agree with aggressive diuresis.  I would like to change her beta blocker over to provide more diastolic filling time and have less of an effect on her mean arterial pressure as it has been running low.  -Agree with diuretic  -Change carvedilol to Toprol-XL  -If the patient would like to follow-up as an outpatient I'll be happy to see her in clinic.  She will need a EDWIN and RHC as an outpatient for further evaluation.  -Deferred referral to a center for PBMV  -Outpatient PFTs    2. Moderate MS with PAH and severe TR.   -As above    Thank you so much for allowing me to participate and consult on Michelle Gordon Danyell.        Disposition: Will continue to follow for ongoing diuresis.          This document has been electronically signed by Stevenson Brown MD PhD on January 15, 2018 10:25 AM               Electronically signed by Stevenson Brown MD PhD at 1/15/2018 10:25 AM

## 2018-01-15 NOTE — SIGNIFICANT NOTE
Have met with patient's son and daughter-in-law. Marissa, Case Management, was also present.  Discussed current lab results and imaging.  They feel patient is more alert now than she has been all weekend. Have discussed need for at least rehabilitation and possible long term stay at SNF. They desire referral to first to [facility] and second to [facility].  All questions have been answered.        This document has been electronically signed by Coral Berry MD on January 15, 2018 12:33 PM

## 2018-01-15 NOTE — THERAPY TREATMENT NOTE
Acute Care - Physical Therapy Treatment Note  [facility]     Patient Name: Michelle Child  : 1955  MRN: [id number]  Today's Date: 1/15/2018  Onset of Illness/Injury or Date of Surgery Date: 01/10/18  Date of Referral to PT: 01/10/18  Referring Physician: Dr Berry    Admit Date: 1/10/2018    Visit Dx:    ICD-10-CM ICD-9-CM   1. Hypokalemia E87.6 276.8   2. Pneumonia of left lung due to infectious organism, unspecified part of lung J18.9 486   3. Fall, initial encounter W19.XXXA E888.9   4. Contusion of scalp, initial encounter S00.03XA 920   5. Decreased activities of daily living (ADL) Z78.9 V49.89   6. Impaired physical mobility Z74.09 781.99     Patient Active Problem List   Diagnosis   • Tobacco dependence   • Migraine   • Iron deficiency   • Gastroesophageal reflux disease   • Coronary arteriosclerosis   • Bilateral pseudophakia   • Astigmatism   • Right humeral fracture   • Fall with injury   • Closed fracture of humerus   • Cause of injury, fall   • Closed fracture of proximal end of right humerus with routine healing   • Weakness   • Acute respiratory failure with hypoxia   • Pneumonia of left lower lobe due to Streptococcus pneumoniae   • Hyponatremia   • Cerebral microvascular disease   • Family dynamics problem   • Pulmonary hypertension   • Rheumatic tricuspid valve regurgitation   • Rheumatic mitral stenosis   • Acute diastolic CHF (congestive heart failure)   • Encephalopathy acute   • Pulmonary hypertension due to left heart valvular disease   • Cor pulmonale, chronic   • Folic acid deficiency               Adult Rehabilitation Note       01/15/18 1100 01/15/18 0610 18 1000    Rehab Assessment/Intervention    Discipline physical therapy assistant  -CH occupational therapy assistant  -BB occupational therapy assistant  -LW    Document Type therapy note (daily note)  -CH therapy note (daily note)  -BB therapy note (daily note)  -LW    Subjective Information agree to therapy;no  complaints  -CH agree to therapy  -BB agree to therapy  -LW    Patient Effort, Rehab Treatment adequate  -CH adequate  -BB adequate  -LW    Symptoms Noted Comment  --   pt very confused  -BB Pt very confused   -LW    Precautions/Limitations fall precautions;oxygen therapy device and L/min  -CH      Recorded by [CH] Nichelle Rahman PTA [BB] Bibi Chester, CAN/L [LW] Jaz Garcia, CAN/L    Vital Signs    Pre Systolic BP Rehab 150  -  -  -LW    Pre Treatment Diastolic BP 78  -CH 68  -BB 75  -LW    Post Systolic BP Rehab 150  -CH      Post Treatment Diastolic BP 94  -CH      Pretreatment Heart Rate (beats/min) 80  -CH 63  -BB 68  -LW    Intratreatment Heart Rate (beats/min)   73  -LW    Posttreatment Heart Rate (beats/min) 78  -CH 76  -BB 78  -LW    Pre SpO2 (%) 96  -CH 94  -BB 94  -LW    O2 Delivery Pre Treatment supplemental O2  -CH supplemental O2  -BB supplemental O2  -LW    Intra SpO2 (%)   95  -LW    O2 Delivery Intra Treatment   supplemental O2  -LW    Post SpO2 (%) 95  -CH 94  -BB 93  -LW    O2 Delivery Post Treatment supplemental O2  -CH supplemental O2  -BB supplemental O2  -LW    Pre Patient Position Supine  -CH  Supine  -LW    Intra Patient Position Standing  -CH  Sitting  -LW    Post Patient Position Supine  -CH  Sitting  -LW    Recorded by [CH] Nichelle Rahman PTA [BB] Bibi Chester, CAN/L [LW] Jaz Garcia, CAN/L    Pain Assessment    Pain Assessment No/denies pain  -CH No/denies pain  -BB No/denies pain  -LW    Recorded by [CH] Nichelle Rahman PTA [BB] Bibi Chester, CAN/L [LW] Jaz Garcia, CAN/L    Vision Assessment/Intervention    Visual Impairment WNL;WFL  -CH  WNL;WFL  -LW    Recorded by [] Nichelle Rahman PTA  [LW] JUDE WadeA/L    Cognitive Assessment/Intervention    Current Cognitive/Communication Assessment impaired  -CH impaired  -BB impaired  -LW    Orientation Status oriented to;person     -CH oriented to;person  -BB oriented  to;person  -LW    Follows Commands/Answers Questions 75% of the time;able to follow single-step instructions;needs cueing;needs increased time;needs repetition  -CH 75% of the time  -BB 75% of the time;needs increased time  -LW    Personal Safety decreased awareness, need for assist;decreased awareness, need for safety;mild impairment  -CH decreased awareness, need for assist;decreased awareness, need for safety  -BB decreased awareness, need for safety  -LW    Personal Safety Interventions fall prevention program maintained;gait belt;muscle strengthening facilitated;nonskid shoes/slippers when out of bed;supervised activity  -CH fall prevention program maintained;nonskid shoes/slippers when out of bed  -BB fall prevention program maintained;nonskid shoes/slippers when out of bed  -LW    Recorded by [CH] Nichelle Rahman, KIN [BB] PAMELLA Ngo/DALTON [LW] JUDE WadeA/L    Bed Mobility, Assessment/Treatment    Bed Mobility, Assistive Device bed rails;head of bed elevated  - bed rails;head of bed elevated  -BB bed rails;head of bed elevated  -LW    Bed Mobility, Roll Left, [place] not tested  -CH      Bed Mobility, Roll Right, [place] not tested  -CH      Bed Mobility, Scoot/Bridge, [place] contact guard assist  -CH      Bed Mob, Supine to Sit, [place] contact guard assist  -CH contact guard assist  -BB contact guard assist  -LW    Bed Mob, Sit to Supine, [place] contact guard assist  -CH contact guard assist  -BB     Bed Mobility, Safety Issues  cognitive deficits limit understanding  -BB     Bed Mobility, Impairments  strength decreased  -BB     Recorded by [CH] Nichelle Rahman PTA [BB] JUDE NgoA/DALTON [LW] JUDE WadeA/L    Transfer Assessment/Treatment    Transfers, Bed-Chair [place]   contact guard assist  -LW    Transfers, Sit-Stand [place] minimum assist (75% patient effort)  -  contact guard assist  -LW    Transfers, Stand-Sit  [place] minimum assist (75% patient effort)  -  contact guard assist  -LW    Transfers, Sit-Stand-Sit, Assist Device rolling walker  -CH  other (see comments)   none  -LW    Transfer, Comment pt with posterior lean once standing and required vc's to stand upright  -      Recorded by [CH] Nichelle Rahman PTA  [LW] Jaz Garcia CAN/L    Gait Assessment/Treatment    Gait, [place] Level minimum assist (75% patient effort);contact guard assist;2 person assist required  -CH      Gait, Assistive Device rolling walker  -      Gait, Distance (Feet) 60  -CH      Gait, Safety Issues balance decreased during turns;loses balance backward  -CH      Recorded by [CH] Nichelle Rahman PTA      Stairs Assessment/Treatment    Stairs, [place] Level not tested  -CH      Recorded by [CH] Nichelle Rahman PTA      Upper Body Bathing Assessment/Training    UB Bathing Assess/Train Assistive Device  --   pan bath  -BB other (see comments)   Pan bath wash cloth  -LW    UB Bathing Assess/Train, Position  sitting  -BB sitting  -LW    UB Bathing Assess/Train, [place] Level  contact guard assist  -BB contact guard assist  -LW    UB Bathing Assess/Train, Impairments  strength decreased  -BB     Recorded by  [BB] Bibi Chester CAN/L [LW] Jaz Garcia, CAN/L    Lower Body Bathing Assessment/Training    LB Bathing Assess/Train Assistive Device  --   pan bath  -BB other (see comments)   Pan bath, wash cloth  -LW    LB Bathing Assess/Train, Position  sitting  -BB sitting  -LW    LB Bathing Assess/Train, [place] Level  contact guard assist  -BB contact guard assist  -LW    LB Bathing Assess/Train, Impairments  strength decreased  -BB     Recorded by  [BB] JUDE NgoA/L [LW] Jaz Garcia, CAN/L    Upper Body Dressing Assessment/Training    UB Dressing Assess/Train, Clothing Type  doffing:;donning:;hospital gown  -BB doffing:;donning:;hospital gown  -LW    UB Dressing Assess/Train,  Position  sitting  -BB sitting  -LW    UB Dressing Assess/Train, [place]  moderate assist (50% patient effort)  -BB moderate assist (50% patient effort)  -LW    UB Dressing Assess/Train, Impairments  strength decreased  -BB     Recorded by  [BB] PAMELLA Ngo/DALTON [LW] PAMELLA Wade/L    Lower Body Dressing Assessment/Training    LB Dressing Assess/Train, Clothing Type  doffing:;donning:;socks  -BB doffing:;donning:;slipper socks  -LW    LB Dressing Assess/Train, Position  sitting  -BB sitting  -LW    LB Dressing Assess/Train, [place]  moderate assist (50% patient effort)  -BB moderate assist (50% patient effort)  -LW    LB Dressing Assess/Train, Impairments  strength decreased;impaired balance  -BB     Recorded by  [BB] PAMELLA Ngo/DALTON [LW] PAMELLA Wade/L    Grooming Assessment/Training    Grooming Assess/Train, Position  sitting  -BB sitting  -LW    Grooming Assess/Train, [place]ep[place] Level  moderate assist (50% patient effort)  -BB minimum assist (75% patient effort)  -LW    Grooming Assess/Train, Impairments  --   ability to follow directions  -BB sensation decreased  -LW    Grooming Assess/Train, Comment  oral care, comb hair, wash face and hands  -BB Oral care, wash face and hands, washed hair blow dried hair  -LW    Recorded by  [BB] PAMELLA Ngo/DALTON [LW] PAMELLA Wade/L    Balance Skills Training    Sitting-Level of Assistance  Close supervision  -BB     Sitting-Balance Support  Feet supported  -BB     Recorded by  [BB] PAMELLA Ngo/L     Therapy Exercises    Bilateral Lower Extremities AROM:;20 reps;supine;ankle pumps/circles;heel slides;hip abduction/adduction;SAQ  -CH      Bilateral Upper Extremity  AROM:;5 reps;10 reps;elbow flexion/extension;hand pumps;pronation/supination;shoulder rolls/shrugs   wrist flexion/extension, max verbal cues, increased time  -BB     Recorded by [CH] Nichelle Rahman PTA [BB] PAMELLA Ngo/DALTON      Positioning and Restraints    Pre-Treatment Position in bed  -CH in bed  -BB in bed  -LW    Post Treatment Position bed  -CH bed  -BB bed  -LW    In Bed supine;call light within reach;encouraged to call for assist;exit alarm on;side rails up x3  -CH supine;call light within reach;encouraged to call for assist;exit alarm on  -BB sitting;call light within reach;encouraged to call for assist;exit alarm on;with family/caregiver  -LW    Recorded by [CH] Nichelle Rahman PTA [BB] Bibi Chester CAN/L [LW] Jaz Garcia, CAN/L      01/13/18 1015 01/13/18 0919       Rehab Assessment/Intervention    Discipline occupational therapist  -NN physical therapy assistant  -CH     Document Type therapy note (daily note)  -NN therapy note (daily note)  -     Subjective Information agree to therapy;complains of;weakness;fatigue  -NN agree to therapy  -CH     Patient Effort, Rehab Treatment  adequate  -CH     Precautions/Limitations fall precautions   decreased cognition  -NN fall precautions;oxygen therapy device and L/min  -CH     Recorded by [NN] Janet Garibay, OTR/L [CH] Nichelle Rahman PTA     Vital Signs    Pre Systolic BP Rehab  141  -CH     Pre Treatment Diastolic BP  77  -CH     Intra Treatment Diastolic BP  101  -CH     Post Systolic BP Rehab  63  -CH     Pretreatment Heart Rate (beats/min)  97  -CH     Posttreatment Heart Rate (beats/min)  101  -CH     Pre SpO2 (%)  92  -CH     O2 Delivery Pre Treatment  supplemental O2  -CH     Intra SpO2 (%)  90  -CH     O2 Delivery Intra Treatment  supplemental O2  -CH     Post SpO2 (%)  91  -CH     O2 Delivery Post Treatment  supplemental O2  -CH     Pre Patient Position  Sitting  -CH     Intra Patient Position  Standing  -CH     Post Patient Position  Supine  -CH     Recorded by  [CH] Nichelle Rahman PTA     Pain Assessment    Pain Assessment No/denies pain  -NN No/denies pain  -CH     Recorded by [NN] Janet Garibay, OTR/L [CH] Nichelle Rahman PTA     Vision  Assessment/Intervention    Visual Impairment  WNL;WFL  -     Recorded by  [CH] Nichelle Rahman PTA     Cognitive Assessment/Intervention    Current Cognitive/Communication Assessment impaired  -NN impaired  -CH     Orientation Status oriented to;person;place;disoriented to;time;situation  -NN oriented to;person;other (see comments)     -     Follows Commands/Answers Questions 75% of the time;able to follow single-step instructions;needs cueing;needs increased time;needs repetition  -% of the time;needs increased time  -     Personal Safety decreased awareness, need for assist;decreased awareness, need for safety;decreased insight to deficits  -NN decreased awareness, need for assist;decreased awareness, need for safety  -     Personal Safety Interventions fall prevention program maintained;nonskid shoes/slippers when out of bed;muscle strengthening facilitated;supervised activity;gait belt  -NN fall prevention program maintained;gait belt;muscle strengthening facilitated;nonskid shoes/slippers when out of bed;supervised activity  -CH     Recorded by [NN] Janet Garibay, OTR/L [CH] Nichelle Rahman PTA     Bed Mobility, Assessment/Treatment    Bed Mobility, Assistive Device bed rails;head of bed elevated  -NN      Bed Mobility, Roll Left, [place]  not tested  -CH     Bed Mobility, Roll Right, [place]  not tested  -     Bed Mobility, Scoot/Bridge, [place] contact guard assist  -NN not tested  -     Bed Mob, Supine to Sit, [place] contact guard assist  -NN not tested  -     Bed Mob, Sit to Supine, [place] contact guard assist  -NN contact guard assist;minimum assist (75% patient effort)  -     Bed Mobility, Impairments strength decreased;impaired balance;postural control impaired  -NN      Bed Mobility, Comment Pt. very lethargic and dozing on and off and requiring min A for posterior leaning  -NN      Recorded by [NN] Janet Garibay, OTR/L [CH] Nichelle Rahman,  PTA     Transfer Assessment/Treatment    Transfers, Sit-Stand [place] contact guard assist  -NN contact guard assist  -CH     Transfers, Stand-Sit [place] contact guard assist  -NN contact guard assist  -CH     Transfers, Sit-Stand-Sit, Assist Device --   hha  -NN rolling walker  -CH     Transfer, Safety Issues step length decreased  -NN balance decreased during turns;loses balance backward  -CH     Transfer, Impairments strength decreased;impaired balance  -NN impaired balance  -CH     Transfer, Comment  pt with LOB posteriorly upon standing EOB, was able to correct with cga X1  -CH     Recorded by [NN] Janet Garibay, OTR/L [CH] Nichelle Rahman, KIN     Gait Assessment/Treatment    Gait, [place] Level  contact guard assist;minimum assist (75% patient effort)  -CH     Gait, Assistive Device  rolling walker  -CH     Gait, Distance (Feet)  60  -CH     Gait, Safety Issues  balance decreased during turns;weight-shifting ability decreased;step length decreased  -CH     Recorded by  [CH] Nichelle Rahman, KIN     Stairs Assessment/Treatment    Stairs, [place] Level  not tested  -CH     Recorded by  [CH] Nichelle Rahman PTA     Upper Body Bathing Assessment/Training    UB Bathing Assess/Train Assistive Device --   sponge bathing  -NN      UB Bathing Assess/Train, Position sitting  -NN      UB Bathing Assess/Train, [place] Level moderate assist (50% patient effort)  -NN      UB Bathing Assess/Train, Impairments strength decreased;impaired balance;postural control impaired  -NN      Recorded by [NN] Janet Garibay, OTR/L      Lower Body Bathing Assessment/Training    LB Bathing Assess/Train Assistive Device --   sponge bathing  -NN      LB Bathing Assess/Train, Position sitting  -NN      LB Bathing Assess/Train, [place] Level moderate assist (50% patient effort)  -NN      LB Bathing Assess/Train, Impairments strength decreased   lethargic and disorientation  -NN      Recorded by [NN]  Janet Garibay, OTR/L      Upper Body Dressing Assessment/Training    UB Dressing Assess/Train, Clothing Type donning:;doffing:;hospital gown  -NN      UB Dressing Assess/Train, Position edge of bed  -NN      UB Dressing Assess/Train, [place] moderate assist (50% patient effort)  -NN      UB Dressing Assess/Train, Impairments strength decreased;impaired balance;postural control impaired  -NN      Recorded by [NN] Janet Garibay OTR/L      Grooming Assessment/Training    Grooming Assess/Train, Assistive Device --   wash face, brush hair  -NN      Grooming Assess/Train, Position sitting  -NN      Grooming Assess/Train, [place] Level minimum assist (75% patient effort)  -NN      Grooming Assess/Train, Impairments strength decreased;impaired balance  -NN      Recorded by [NN] Janet Garibay OTR/L      Therapy Exercises    Bilateral Lower Extremities  AROM:;20 reps;sitting;ankle pumps/circles;hip abduction/adduction;hip flexion;LAQ  -CH     Recorded by  [CH] Nichelle Rahman PTA     Positioning and Restraints    Pre-Treatment Position in bed  -NN in bed  -     Post Treatment Position bed  - bed  -     In Bed fowlers;call light within reach;encouraged to call for assist;side rails up x2  -NN supine;call light within reach;encouraged to call for assist;exit alarm on;side rails up x2;SCD pump applied  -CH     Recorded by [NN] Janet Garibay, OTR/L [CH] Nichelle Rahman PTA       User Key  (r) = Recorded By, (t) = Taken By, (c) = Cosigned By    Initials Name Effective Dates    CH Nichelle Rahman PTA 10/17/16 -     BB Bibi Chester, CAN/L 10/17/16 -     LW Jaz Garcia, CAN/L 10/17/16 -     NN Janet Garibay, OTR/L 11/08/17 -                 IP PT Goals       01/15/18 1223 01/13/18 1018 01/12/18 1103    Bed Mobility PT STG    Bed Mobility PT STG, Date Goal Reviewed 01/15/18  -CH 01/13/18  -CH 01/12/18  -SY    Bed Mobility PT STG, Outcome goal ongoing  -CH goal ongoing  -CH goal ongoing   -SY    Transfer Training PT STG    Transfer Training PT STG, Date Goal Reviewed 01/15/18  -CH 01/13/18  -CH 01/12/18  -SY    Transfer Training PT STG, Outcome goal ongoing  -CH goal ongoing  -CH goal ongoing  -SY    Gait Training PT LTG    Gait Training Goal PT LTG, Date Goal Reviewed 01/15/18  -CH 01/13/18  -CH 01/12/18  -SY    Gait Training Goal PT LTG, Outcome goal ongoing  -CH goal ongoing  -CH goal ongoing  -SY    Strength Goal PT LTG    Strength Goal PT LTG, Date Goal Reviewed 01/15/18  -CH 01/13/18  -CH 01/12/18  -SY    Strength Goal PT LTG, Outcome goal ongoing  - goal partially met  -CH goal ongoing  -SY      01/11/18 0855          Bed Mobility PT STG    Bed Mobility PT STG, Date Established 01/11/18  -CB      Bed Mobility PT STG, Time to Achieve 2 - 3 days  -CB      Bed Mobility PT STG, Activity Type supine to sit/sit to supine  -CB      Bed Mobility PT STG, [place] Level independent  -CB      Bed Mobility PT STG, Additional Goal HOB down and no rails  -CB      Transfer Training PT STG    Transfer Training PT STG, Date Established 01/11/18  -CB      Transfer Training PT STG, Time to Achieve 2 - 3 days  -CB      Transfer Training PT STG, Activity Type bed to chair /chair to bed;sit to stand/stand to sit  -CB      Transfer Training PT STG, [place] Level supervision required  -CB      Transfer Training PT STG, Assist Device --   least restrictive  -CB      Gait Training PT LTG    Gait Training Goal PT LTG, Date Established 01/11/18  -CB      Gait Training Goal PT LTG, Time to Achieve by discharge  -CB      Gait Training Goal PT LTG, [place] Level conditional independence  -CB      Gait Training Goal PT LTG, Assist Device --   least restrictive assistive device  -CB      Gait Training Goal PT LTG, Distance to Achieve 200 feet  -CB      Strength Goal PT LTG    Strength Goal PT LTG, Date Established 01/11/18  -CB      Strength Goal PT LTG, Time to Achieve 4 days  -CB      Strength Goal PT LTG,  Measure to Achieve 20 reps all ex BLE actively sitting/ standing  -CB        User Key  (r) = Recorded By, (t) = Taken By, (c) = Cosigned By    Initials Name Provider Type    CB Vanesa Ramos, PT Physical Therapist    SY French, PTA Physical Therapy Assistant    LAURI Rahman PTA Physical Therapy Assistant          Physical Therapy Education     Title: PT OT SLP Therapies (Active)     Topic: Physical Therapy (Active)     Point: Mobility training (Active)    Learning Progress Summary    Learner Readiness Method Response Comment Documented by Status   Patient Acceptance D NR   01/11/18 1129 Active               Point: Precautions (Active)    Learning Progress Summary    Learner Readiness Method Response Comment Documented by Status   Patient Acceptance D NR   01/11/18 1129 Active                      User Key     Initials Effective Dates Name Provider Type Discipline     04/06/17 -  Vanesa Ramos, PT Physical Therapist PT                    PT Recommendation and Plan  Anticipated Equipment Needs At Discharge:  (to be determined)  Anticipated Discharge Disposition: home with home health  Demonstrates Need for Referral to Another Service: home health care  Planned Therapy Interventions: bed mobility training, gait training, home exercise program, strengthening, transfer training  PT Frequency:  (5-14)  Plan of Care Review  Plan Of Care Reviewed With: patient  Outcome Summary/Follow up Plan: No new goals met this tx. Pt with decreased balance with standing and gait this tx. Pt will continue to benefit from skilled therapy services within SNF rehab to home upon D/C           Outcome Measures       01/15/18 1100 01/15/18 0610 01/14/18 1000    How much help from another person do you currently need...    Turning from your back to your side while in flat bed without using bedrails? 3  -CH      Moving from lying on back to sitting on the side of a flat bed without bedrails? 3  -CH      Moving to and  from a bed to a chair (including a wheelchair)? 3  -CH      Standing up from a chair using your arms (e.g., wheelchair, bedside chair)? 3  -CH      Climbing 3-5 steps with a railing? 2  -CH      To walk in hospital room? 3  -CH      AM-PAC 6 Clicks Score 17  -CH      How much help from another is currently needed...    Putting on and taking off regular lower body clothing?  2  -BB 2  -LW    Bathing (including washing, rinsing, and drying)  2  -BB 2  -LW    Toileting (which includes using toilet bed pan or urinal)  2  -BB 2  -LW    Putting on and taking off regular upper body clothing  3  -BB 3  -LW    Taking care of personal grooming (such as brushing teeth)  3  -BB 3  -LW    Eating meals  4  -BB 4  -LW    Score  16  -BB 16  -LW    Functional Assessment    Outcome Measure Options AM-PAC 6 Clicks Basic Mobility (PT)  -CH        01/13/18 1000 01/13/18 0919       How much help from another person do you currently need...    Turning from your back to your side while in flat bed without using bedrails?  3  -CH     Moving from lying on back to sitting on the side of a flat bed without bedrails?  3  -CH     Moving to and from a bed to a chair (including a wheelchair)?  3  -CH     Standing up from a chair using your arms (e.g., wheelchair, bedside chair)?  3  -CH     Climbing 3-5 steps with a railing?  2  -CH     To walk in hospital room?  3  -CH     AM-PAC 6 Clicks Score  17  -CH     How much help from another is currently needed...    Putting on and taking off regular lower body clothing? 2  -NN      Bathing (including washing, rinsing, and drying) 2  -NN      Toileting (which includes using toilet bed pan or urinal) 2  -NN      Putting on and taking off regular upper body clothing 3  -NN      Taking care of personal grooming (such as brushing teeth) 3  -NN      Eating meals 4  -NN      Score 16  -NN      Functional Assessment    Outcome Measure Options  AM-PAC 6 Clicks Basic Mobility (PT)  -       User Key  (r) =  Recorded By, (t) = Taken By, (c) = Cosigned By    Initials Name Provider Type    LAURI Rahman PTA Physical Therapy Assistant    BB Bibi Chester, CAN/L Occupational Therapy Assistant    LW Jaz Garcia, CAN/L Occupational Therapy Assistant    NN Janet Garibay, OTR/L Occupational Therapist           Time Calculation:         PT Charges       01/15/18 1226          Time Calculation    Start Time 1100  -CH      Stop Time 1132  -CH      Time Calculation (min) 32 min  -CH      PT Received On 01/15/18  -CH      Time Calculation- PT    Total Timed Code Minutes- PT 32 minute(s)  -CH        User Key  (r) = Recorded By, (t) = Taken By, (c) = Cosigned By    Initials Name Provider Type     Nichelle Rahman PTA Physical Therapy Assistant          Therapy Charges for Today     Code Description Service Date Service Provider Modifiers Qty    33034489091 HC GAIT TRAINING EA 15 MIN 1/15/2018 Nichelle Rahman PTA GP 1     Duration:  16m (11:00 AM - 11:16 AM)      [id number] HC PT THER PROC EA 15 MIN 1/15/2018 Nichelle Rahman PTA GP 1     Duration:  16m (11:16 AM - 11:32 AM)            PT G-Codes  PT Professional Judgement Used?: Yes  Outcome Measure Options: AM-PAC 6 Clicks Basic Mobility (PT)  Score: 15  Functional Limitation: Mobility: Walking and moving around  Mobility: Walking and Moving Around Current Status (): At least 40 percent but less than 60 percent impaired, limited or restricted  Mobility: Walking and Moving Around Goal Status (): At least 20 percent but less than 40 percent impaired, limited or restricted    Nichelle Rahman PTA  1/15/2018

## 2018-01-15 NOTE — PLAN OF CARE
Problem: Patient Care Overview (Adult)  Goal: Adult Individualization and Mutuality  Outcome: Ongoing (interventions implemented as appropriate)   01/15/18 1545   Individualization   Patient Specific Preferences Pt prefers water without ice cubes.

## 2018-01-15 NOTE — PLAN OF CARE
Problem: Patient Care Overview (Adult)  Goal: Plan of Care Review  Outcome: Ongoing (interventions implemented as appropriate)   01/15/18 1020   Coping/Psychosocial Response Interventions   Plan Of Care Reviewed With patient   Patient Care Overview   Progress declining   Outcome Evaluation   Outcome Summary/Follow up Plan Pt min/mod UB ADL and mod/max LB ADL. Pt requires increased time and effort to complete tasks 2' to confusion and difficult to follow directions.No goals met at this time.       Problem: Inpatient Occupational Therapy  Goal: Transfer Training Goal 1 LTG- OT  Outcome: Ongoing (interventions implemented as appropriate)   01/11/18 0856 01/14/18 1529 01/15/18 1020   Transfer Training OT LTG   Transfer Training OT LTG, Date Established 01/11/18 --  --    Transfer Training OT LTG, Time to Achieve by discharge --  --    Transfer Training OT LTG, Activity Type all transfers --  --    Transfer Training OT LTG, [place] Level independent --  --    Transfer Training OT LTG, Date Goal Reviewed --  --  01/15/18   Transfer Training OT LTG, Outcome --  goal ongoing --      Goal: Strength Goal LTG- OT  Outcome: Ongoing (interventions implemented as appropriate)   01/11/18 0856 01/14/18 1529 01/15/18 1020   Strength OT LTG   Strength Goal OT LTG, Date Established 01/11/18 --  --    Strength Goal OT LTG, Time to Achieve by discharge --  --    Strength Goal OT LTG, Measure to Achieve Pt. will complete BUE strengthening exercises all planes and joints to increase BUE strength to 5/5 for ADLs.  --  --    Strength Goal OT LTG, Date Goal Reviewed --  --  01/15/18   Strength Goal OT LTG, Outcome --  goal ongoing --      Goal: ADL Goal LTG- OT  Outcome: Ongoing (interventions implemented as appropriate)   01/11/18 0856 01/14/18 1529 01/15/18 1020   ADL OT LTG   ADL OT LTG, Date Established 01/11/18 --  --    ADL OT LTG, Time to Achieve by discharge --  --    ADL OT LTG, Activity Type ADL skills --  --    ADL OT LTG,  [place] Level modified independent --  --    ADL OT LTG, Additional Goal AE PRN --  --    ADL OT LTG, Date Goal Reviewed --  --  01/15/18   ADL OT LTG, Outcome --  goal ongoing --

## 2018-01-15 NOTE — PLAN OF CARE
Problem: Patient Care Overview (Adult)  Goal: Plan of Care Review  Outcome: Ongoing (interventions implemented as appropriate)    Goal: Discharge Needs Assessment  Outcome: Ongoing (interventions implemented as appropriate)      Problem: Pneumonia (Adult)  Goal: Signs and Symptoms of Listed Potential Problems Will be Absent or Manageable (Pneumonia)  Outcome: Ongoing (interventions implemented as appropriate)      Problem: Fluid Volume Deficit (Adult)  Goal: Fluid/Electrolyte Balance  Outcome: Ongoing (interventions implemented as appropriate)    Goal: Comfort/Well Being  Outcome: Ongoing (interventions implemented as appropriate)

## 2018-01-15 NOTE — PROGRESS NOTES
FAMILY MEDICINE PROGRESS NOTE  NAME: Michelle Child  : 1955  MRN: [id number]     LOS: 5 days   Full Code  PROVIDER OF SERVICE:     Chief Complaint:  Pneumonia of left lower lobe due to Streptococcus pneumoniae    Subjective     Interval History:  History taken from: patient family RN  Subjective: Patient is sitting up at the edge of the bed and is quite confused. She doesn't know where she is.  Night shift RN endorses increasing confusion throughout the weekend.    Review of Systems:   Review of Systems   Constitutional: Positive for activity change and appetite change. Negative for fatigue and fever.   HENT: Negative for ear pain and sore throat.    Eyes: Negative for pain and visual disturbance.   Respiratory: Positive for cough and shortness of breath.    Cardiovascular: Negative for chest pain and palpitations.   Gastrointestinal: Negative for abdominal pain and nausea.   Endocrine: Negative for cold intolerance and heat intolerance.   Genitourinary: Positive for decreased urine volume. Negative for difficulty urinating and dysuria.   Musculoskeletal: Positive for gait problem. Negative for arthralgias.   Skin: Negative for color change and rash.   Neurological: Positive for weakness. Negative for dizziness and headaches.   Hematological: Negative for adenopathy. Does not bruise/bleed easily.   Psychiatric/Behavioral: Positive for confusion. Negative for agitation and sleep disturbance.       Objective     Vital Signs  Temp:  [97.3 °F (36.3 °C)-98.8 °F (37.1 °C)] 98.2 °F (36.8 °C)  Heart Rate:  [89-98] 98  Resp:  [18-20] 20  BP: (134-174)/(65-84) 134/66    Physical Exam  Physical Exam   Constitutional: She appears well-developed and well-nourished.   HENT:   Head: Normocephalic and atraumatic.   Right Ear: External ear normal.   Left Ear: External ear normal.   Nose: Nose normal.   Mouth/Throat: Oropharynx is clear and moist.   Left sided facial droop     Eyes: Conjunctivae and EOM are normal.    Neck: Normal range of motion. Neck supple.   Cardiovascular: Normal rate, regular rhythm and normal heart sounds.    Pulmonary/Chest: Effort normal. She has decreased breath sounds. She has rhonchi in the left lower field.   Abdominal: Soft. Bowel sounds are normal. She exhibits no distension. There is no tenderness.   Musculoskeletal: Normal range of motion.   Left upper extremity decreased strength 4/5 compared to right 5/5   Neurological: She is alert.   Skin: Skin is warm and dry.   Psychiatric: Her speech is slurred. She is slowed and withdrawn. Cognition and memory are impaired. She exhibits abnormal recent memory.   Disoriented to time and place       Medication Review    Current Facility-Administered Medications:   •  acetaminophen (TYLENOL) tablet 650 mg, 650 mg, Oral, Q4H PRN, Coral Berry MD, 650 mg at 01/11/18 1550  •  amitriptyline (ELAVIL) tablet 100 mg, 100 mg, Oral, Nightly, Coral Berry MD, 100 mg at 01/14/18 2155  •  aspirin chewable tablet 81 mg, 81 mg, Oral, Daily, Coral Berry MD, 81 mg at 01/14/18 1000  •  bisacodyl (DULCOLAX) EC tablet 5 mg, 5 mg, Oral, Daily PRN, Coral Berry MD  •  carvedilol (COREG) tablet 3.125 mg, 3.125 mg, Oral, BID With Meals, Coral Berry MD, 3.125 mg at 01/14/18 1725  •  famotidine (PEPCID) tablet 40 mg, 40 mg, Oral, Daily, Coral Berry MD, 40 mg at 01/14/18 1000  •  ferrous sulfate EC tablet 324 mg, 324 mg, Oral, BID With Meals, Coral Berry MD  •  FLUoxetine (PROzac) capsule 60 mg, 60 mg, Oral, Daily, Coral Berry MD, 60 mg at 01/14/18 1000  •  furosemide (LASIX) tablet 20 mg, 20 mg, Oral, BID, Sunny Linn DO, 20 mg at 01/14/18 1725  •  HYDROcodone-acetaminophen (NORCO) 5-325 MG per tablet 1 tablet, 1 tablet, Oral, Q4H PRN, Coral Berry MD, 1 tablet at 01/13/18 2123  •  influenza vac split quad (FLUZONE QUADRIVALENT) IM suspension 0.5 mL, 0.5 mL, Intramuscular, During Hospitalization, Coral Berry MD  •   ipratropium-albuterol (DUO-NEB) nebulizer solution 3 mL, 3 mL, Nebulization, 4x Daily - RT, Coral Berry MD, 3 mL at 01/14/18 2000  •  levoFLOXacin (LEVAQUIN) 750 mg/150 mL D5W (premix) (LEVAQUIN) 750 mg, 750 mg, Intravenous, Q24H, Coral Berry MD, 750 mg at 01/14/18 1620  •  LORazepam (ATIVAN) tablet 0.5 mg, 0.5 mg, Oral, Once, Coral Berry MD  •  losartan (COZAAR) tablet 50 mg, 50 mg, Oral, Q24H, Coral Berry MD, 50 mg at 01/14/18 1000  •  methylPREDNISolone sodium succinate (SOLU-Medrol) injection 40 mg, 40 mg, Intravenous, Daily, Coral Berry MD, 40 mg at 01/14/18 1000  •  nicotine (NICODERM CQ) 21 MG/24HR patch 1 patch, 1 patch, Transdermal, Q24H, Coral Berry MD, 1 patch at 01/14/18 1725  •  ondansetron (ZOFRAN) tablet 4 mg, 4 mg, Oral, Q6H PRN, Coral Berry MD, 4 mg at 01/11/18 1941  •  pneumococcal polysaccharide 23-valent (PNEUMOVAX-23) vaccine 0.5 mL, 0.5 mL, Intramuscular, During Hospitalization, Coral Berry MD  •  potassium chloride (MICRO-K) CR capsule 10 mEq, 10 mEq, Oral, Daily, Sunny Linn DO, 10 mEq at 01/14/18 1000  •  risperiDONE (risperDAL) tablet 1 mg, 1 mg, Oral, Nightly, Coral Berry MD, 1 mg at 01/14/18 2155  •  sodium chloride 0.9 % flush 1-10 mL, 1-10 mL, Intravenous, PRN, Coral Berry MD  •  sodium chloride 0.9 % flush 10 mL, 10 mL, Intravenous, PRN, Toro Shine MD, 10 mL at 01/11/18 1103     Diagnostic Data      I reviewed the patient's new clinical results and imaging.      Assessment/Plan     Active Hospital Problems (** Indicates Principal Problem)    Diagnosis Date Noted   • **Pneumonia of left lower lobe due to Streptococcus pneumoniae [J13] 01/11/2018     On Levaquin with falling WBC and no fever.     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      • Pulmonary hypertension [I27.20] 01/14/2018     Cards consult.      • Rheumatic tricuspid valve regurgitation [I07.1] 01/14/2018   • Hyponatremia [E87.1] 01/13/2018     Improved today  Monitor closely     • Cerebral microvascular disease [I67.9] 01/13/2018     Per CT head  Patient more confused today with slurring of speech. Have to consider acute CVA.   Will obtain MRI brain.      • Weakness [R53.1] 01/10/2018     PT/OT     • Acute respiratory failure with hypoxia [J96.01] 01/10/2018     NC oxygen, steroids, and neb treatements     • Fall with injury [W19.XXXA] 08/15/2017     PT/OT       • Tobacco dependence [F17.200]      Nicotine patch 21mg transdermally     • Gastroesophageal reflux disease [K21.9]      Pepcid     • Iron deficiency [E61.1]      Iron studies   Restart feosol        Resolved Hospital Problems    Diagnosis Date Noted Date Resolved   • Anemia [D64.9] 01/14/2018 01/15/2018     Start iron supplementation.  Obtain iron studies.      • Elevated brain natriuretic peptide (BNP) level [R79.89] 01/14/2018 01/15/2018     IVF's discontinued, Fluid Restrictions, Low Na diet. Echocardiogram ordered. Will start Lasix 20 mg po bid and monitor.     • Elevated troponin [R74.8] 01/14/2018 01/15/2018     Most likely secondary to elevated BNP. No chest pain or anginal equivalent. Will monitor.     • Positive D dimer [R79.89] 01/14/2018 01/15/2018     Most likely elevated due to pneumonia and elevated BNP. No clinical signs of PE.     • Hyperkalemia [E87.5] 01/13/2018 01/14/2018     Decrease KCL and monitor     • Hypokalemia [E87.6] 01/10/2018 01/11/2018     Replace Orally and recheck   Check magnesium     • Community acquired bacterial pneumonia [J15.9] 01/10/2018 01/11/2018     Left lower  lobe  Blood culture, sputum culture, check atypicals.   Levaquin as PCN allergy     • Hyponatremia [E87.1] 01/10/2018 01/11/2018     IV fluids     • Essential hypertension [I10]  01/14/2018     Losartan, coreg     • Depressive disorder [F32.9]  01/14/2018     Prozac, Elavil, and Risperdal     Have discussed with her daughter in law, Katarzyna Nelson, for 16 minutes regarding declining mental status. Have discussed plan for Cardiology consultation and Case Management consultation. Have also discussed obtaining MRI of brain for concern about another possible stroke.        DVT prophylaxis: SCDs/TEDs      Disposition:Uncertain at this time 3-4 days in hospital. Have discussed that SNF placement is probably what is best for patient at this point.           This document has been electronically signed by Coral Berry MD on January 15, 2018 8:00 AM

## 2018-01-15 NOTE — CONSULTS
CARDIOVASCULAR CONSULTATION   Stevenson Brown M.D., Ph.D., [facility]                Referring Provider:  Dr. 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     History of present illness:  This is a 62-year-old female patient of Dr. Berry with heavy ongoing smoking, hypertension and recent weakness/deconditioning who actually fell in the parking lot while going to visit her PCP.  She was sent to our emergency department.  There, she was diagnosed with LLL pneumonia secondary to strep pneumo.  She was placed on antibiotics and admitted to the [facility] practice service.    Over the weekend she had increased confusion and hypotension that required IV fluids.  During the workup for this a TTE was obtained which showed rheumatic MS, TR and severe PAH with RV pressure overload pattern.  Dr. Berry is requesting consultation given her valvular disease and pulmonary hypertension for additional recommendations.    Of note, the patient is currently confused and unable to provide history.  History is provided from the EMR and in discussions with Dr. Berry.    Review of Systems - History obtained from chart review and Dr. Berry    History  Past Medical History:   Diagnosis Date   • Alkaline phosphatase raised    • Astigmatism    • Bilateral pseudophakia    • Coronary arteriosclerosis    • Depressive disorder    • Edema of lower extremity    • Encounter for general adult medical examination with abnormal findings    • Essential hypertension    • Gastroesophageal reflux disease    • H/O bone density study     DXA BONE DENSITY AXIAL    04/22/2016   • H/O screening mammography     SCREENING MAMMOGRAPHY     04/25/2016   • Hx of screening mammography     x3; declined screening, understands risks and benefits and still declines      07/24/2015   • Influenza vaccine administered     INFLUENZA IMMUN ADMIN OR PREV RECV'D   x4       04/01/2016   • Insomnia    • Iron  deficiency    • Migraine    • Tobacco dependence     continuous     ,   Past Surgical History:   Procedure Laterality Date   • APPENDECTOMY     • BREAST IMPLANT SURGERY     • CATARACT EXTRACTION WITH INTRAOCULAR LENS IMPLANT  12/17/2003    Phacoemulsification of a cataract with intraocular lens implant of rigt eye, Nixon model SA 60-AT; serial # [id number];20.5 diopter   • CHOLECYSTECTOMY  08/06/2007    Laparoscopic cholecystectomy. Symptomatic gallstones   • COLONOSCOPY  10/15/2013    declined stool cards and colonscopy   • ENDOSCOPY AND COLONOSCOPY  05/22/2014    Internal & external hemorrhoids found.   • ENDOSCOPY W/ PEG TUBE PLACEMENT  05/22/2014    EGD w/ tube: Normal esopahgus. Gastritis in stomach. Biopsy taken. Normal duodenum. Biopsy taken.   • INJECTION OF MEDICATION  07/24/2013    Inj(s) Tend-Sheath, Ligament, Single   • INJECTION OF MEDICATION  11/17/2014    Kenalog x6   • INJECTION OF MEDICATION  04/01/2016    PNEUMOC VAC/ADMIN/RCVD    • INJECTION OF MEDICATION  12/05/2014    Toradol   • OTHER SURGICAL HISTORY  01/19/2014    Drain/Inject Major Joint   x2   • PAP SMEAR  10/06/2011   • PARTIAL HYSTERECTOMY      Partial hyst    • TONSILLECTOMY     • TUBAL ABDOMINAL LIGATION     ,   Family History   Problem Relation Age of Onset   • Cholelithiasis Mother    • Thyroid cancer Sister    • Thyroid disease Sister      Thyroid disorder   • Graves' disease Sister    • Cholelithiasis Maternal Grandmother    • Thyroid cancer Other      Other 2nd degree relative, thyroid   • Diabetes Other    • Hypertension Other    ,   Social History   Substance Use Topics   • Smoking status: Current Every Day Smoker   • Smokeless tobacco: Never Used      Comment: SMOKED FOR 20>PLUS YRS   • Alcohol use No   ,   Prescriptions Prior to Admission   Medication Sig Dispense Refill Last Dose   • albuterol (PROVENTIL HFA;VENTOLIN HFA) 108 (90 BASE) MCG/ACT inhaler Inhale 2 puffs Every 4 (Four) Hours As Needed for wheezing. 6.7 g 11 1/9/2018  at 2100   • amitriptyline (ELAVIL) 100 MG tablet Take 1 tablet by mouth Every Night. 30 tablet 11 1/9/2018 at 2100   • aspirin 81 MG chewable tablet Chew 1 tablet Daily. 30 tablet 11 1/9/2018 at 0900   • carvedilol (COREG) 3.125 MG tablet TAKE 1 TABLET BY MOUTH TWICE A DAY. 60 tablet 1 1/9/2018 at 2100   • FLUoxetine (PROzac) 20 MG capsule TAKE 3 CAPSULES BY MOUTH ONCE DAILY 90 capsule 1 1/9/2018 at 0900   • losartan (COZAAR) 100 MG tablet TAKE ONE TABLET BY MOUTH ONCE DAILY 90 tablet 0 Past Month at Unknown time   • naproxen (NAPROSYN) 500 MG tablet Take 1 tablet by mouth 2 (Two) Times a Day. 60 tablet 11 1/9/2018 at 2100   • omeprazole (priLOSEC) 40 MG capsule Take 1 capsule by mouth Daily. 30 capsule 0 1/9/2018 at 0900   • risperiDONE (risperDAL) 1 MG tablet Take 1 tablet by mouth Daily. (Patient taking differently: Take 1 mg by mouth every night at bedtime.) 30 tablet 0 1/9/2018 at 2100   • furosemide (LASIX) 20 MG tablet TAKE 1/2 TABLET BY MOUTH ONCE DAILY AS NEEDED FOR SWELLING. 15 tablet 3 Unknown at Unknown time    and Allergies:  Codeine; Penicillins; and Sulfa antibiotics    Objective   Body mass index is 23.96 kg/(m^2).         Anticoagulants     None          Vital Sign Min/Max for last 24 hours  Temp  Min: 97.3 °F (36.3 °C)  Max: 98.8 °F (37.1 °C)   BP  Min: 124/72  Max: 174/77   Pulse  Min: 89  Max: 98   Resp  Min: 18  Max: 20   SpO2  Min: 87 %  Max: 99 %   Flow (L/min)  Min: 2  Max: 3.5   Weight  Min: 63 kg (139 lb)  Max: 63.3 kg (139 lb 9.6 oz)        Physical Exam:   GEN: alert and delirious  Body Habitus: well-nourished  HEENT: Head: Normocephalic, no lesions, without obvious abnormality.  Neck / Thyroid: Supple, no masses, nodes, nodules or enlargement. No arcus senilis, xanthelasma or xanthomas.   JVP: 11 cm of water at 45 degrees HJR: present      Carotid:  Upstroke: easily palpated bilaterally Volume: Normal.    Carotid Bruit:  None  Subclavian Bruit: Not present.    Chest:  Normal Excursion:  Good    I:E: Prolonged  Pulmonary: Decreased breath sounds with rhonchi present in the LLL     Precordium:  No palpable heaves or thrusts. P2 is not palpable.   [place]:  normal size and placement Palpable S4: Not present.   Heart rate: normal  Heart Rhythm: regular     Heart Sounds: S1: increased intensity  S2: increased intensity, increased P2  S3: absent   S4: absent  Opening Snap: present  A2-OS:  Close  Pericardial rub: absent    Ejection click: None      Murmurs: Systolic: 2/6 systolic LSB  Diastolic: 2/6 diastolic at apex. Best appreciated in the left lateral decubitus position  Abdomen: Soft, non-tender, normal bowel sounds; no bruits, organomegaly or masses.  Extremity: no edema, cyanosis  Trophic changes: None   Pallor on elevation: Absent.    Rubor on dependency: None  Pulses: Right radial artery has 2+ (normal) pulse and Left radial artery has 2+ (normal) pulse    Results Review:  Lab Results (last 24 hours)     Procedure Component Value Units Date/Time    Blood Culture - Blood, [[id number]]  (Normal) Collected:  01/10/18 1338    Specimen:  Blood from Hand, Right Updated:  01/14/18 1346     Blood Culture No growth at 4 days    Blood Culture - Blood, [[id number]]  (Normal) Collected:  01/10/18 1339    Specimen:  Blood from Arm, Right Updated:  01/14/18 1346     Blood Culture No growth at 4 days    C-reactive Protein [[id number]]  (Abnormal) Collected:  01/14/18 1519    Specimen:  Blood Updated:  01/14/18 1609     C-Reactive Protein 20.90 (H) mg/dL     Troponin [[id number]]  (Abnormal) Collected:  01/14/18 1519    Specimen:  Blood Updated:  01/14/18 1609     Troponin I 0.055 (H) ng/mL     Blood Gas, Arterial [[id number]]  (Abnormal) Collected:  01/14/18 1634    Specimen:  Arterial Blood Updated:  01/14/18 1636     Site --      Not performed at this site.        Alfonso's Test --      Not performed at this site.        pH, Arterial 7.419 pH units      pCO2, Arterial 37.4 mm Hg      pO2, Arterial 70.7 (L) mm Hg       HCO3, Arterial 23.7 mmol/L      Base Excess, Arterial -0.7 mmol/L      O2 Saturation, Arterial 93.6 (L) %      Hemoglobin, Blood Gas 9.0 (L) g/dL      Hematocrit, Blood Gas 26.0 %      CO2 Content 24.8     Sodium, Arterial 138.3 mmol/L      Potassium, Arterial 3.82 mmol/L      Glucose, Arterial 107 mmol/L      Barometric Pressure for Blood Gas -- mmHg       Not performed at this site.        Modality --      Not performed at this site.        Ionized Calcium 4.40 (L) mg/dL     Troponin [[id number]]  (Abnormal) Collected:  01/15/18 0005    Specimen:  Blood Updated:  01/15/18 0039     Troponin I 0.046 (H) ng/mL     Blood Culture - Blood, [[id number]]  (Normal) Collected:  01/14/18 0450    Specimen:  Blood from Arm, Right Updated:  01/15/18 0501     Blood Culture No growth at 24 hours    CBC & Differential [[id number]] Collected:  01/15/18 0557    Specimen:  Blood Updated:  01/15/18 0613    Narrative:       The following orders were created for panel order CBC & Differential.  Procedure                               Abnormality         Status                     ---------                               -----------         ------                     CBC Auto Differential[[id number]]        Abnormal            Final result                 Please view results for these tests on the individual orders.    CBC Auto Differential [[id number]]  (Abnormal) Collected:  01/15/18 0557    Specimen:  Blood Updated:  01/15/18 0613     WBC 11.13 (H) 10*3/mm3      RBC 2.90 (L) 10*6/mm3      Hemoglobin 7.7 (L) g/dL      Hematocrit 24.3 (L) %      MCV 83.8 fL      MCH 26.6 pg      MCHC 31.7 g/dL      RDW 15.9 (H) %      RDW-SD 49.1 (H) fl      MPV 8.7 fL      Platelets 286 10*3/mm3      Neutrophil % 84.7 (H) %      Lymphocyte % 9.1 (L) %      Monocyte % 5.7 %      Eosinophil % 0.0 %      Basophil % 0.0 %      Immature Grans % 0.5 %      Neutrophils, Absolute 9.43 (H) 10*3/mm3      Lymphocytes, Absolute 1.01 10*3/mm3      Monocytes, Absolute  0.63 10*3/mm3      Eosinophils, Absolute 0.00 10*3/mm3      Basophils, Absolute 0.00 10*3/mm3      Immature Grans, Absolute 0.06 (H) 10*3/mm3      nRBC 0.0 /100 WBC     Lactic Acid, Plasma [[id number]]  (Normal) Collected:  01/15/18 0557    Specimen:  Blood Updated:  01/15/18 0624     Lactate 1.3 mmol/L     Comprehensive Metabolic Panel [[id number]]  (Abnormal) Collected:  01/15/18 0557    Specimen:  Blood Updated:  01/15/18 0635     Glucose 101 (H) mg/dL      BUN 12 mg/dL      Creatinine 0.75 mg/dL      Sodium 133 (L) mmol/L      Potassium 3.6 mmol/L      Chloride 99 mmol/L      CO2 24.0 mmol/L      Calcium 7.8 (L) mg/dL      Total Protein 4.9 (L) g/dL      Albumin 2.30 (L) g/dL      ALT (SGPT) 38 U/L      AST (SGOT) 35 U/L      Alkaline Phosphatase 105 U/L      Total Bilirubin 0.4 mg/dL      eGFR Non African Amer 78 mL/min/1.73      Globulin 2.6 gm/dL      A/G Ratio 0.9 (L) g/dL      BUN/Creatinine Ratio 16.0     Anion Gap 10.0 mmol/L     Iron Profile [[id number]]  (Abnormal) Collected:  01/14/18 1519    Specimen:  Blood Updated:  01/15/18 0805     Iron 16 (L) mcg/dL      TIBC 270 mcg/dL      Iron Saturation 6 (L) %     Vitamin B12 [[id number]]  (Normal) Collected:  01/14/18 1519    Specimen:  Blood Updated:  01/15/18 0904     Vitamin B-12 434 pg/mL     Folate [[id number]]  (Abnormal) Collected:  01/14/18 1519    Specimen:  Blood Updated:  01/15/18 0904     Folate 2.03 (L) ng/mL         Imaging Results (last 24 hours)     Procedure Component Value Units Date/Time    MRI Brain Without Contrast [[id number]] Collected:  01/15/18 0817     Updated:  01/15/18 0955    Narrative:       MRI brain without contrast.       CLINICAL INDICATION: Left-sided facial droop. Slurred speech.          COMPARISON: None    PROCEDURE/TECHNIQUE:    MRI of the brain was performed utilizing the standard protocol  without the administration of gadolinium.    FINDINGS: Diffusion-weighted images demonstrate no restricted  diffusion, i.e., no  evidence of acute stroke. Involutional,  atrophic changes. Periventricular foci of increased signal  intensity, chronic small vessel ischemic changes.    The gyri and sulci are otherwise unremarkable, bilaterally  symmetric consistent with patient's age.  No mass lesions,  hydrocephalus, midline shift, intracranial hemorrhage, or  abnormal intra- or extra-axial fluid collections are identified.   The brainstem and sellar and parasellar structures are without  abnormalities.  The orbits, sinuses and mastoid air cells are  unremarkable.  The visualized intracranial vessels show normal  signal void.      Impression:       CONCLUSION: Involutional, atrophic changes.    Periventricular foci of increased signal intensity, chronic small  vessel ischemic changes.    Otherwise unremarkable MRI of the brain without contrast.       Electronically signed by:  Niles Zaidi MD  1/15/2018 9:54 AM CST  Workstation: [id number]            Assessment/Plan      1. PAH/PVH with cor pulmonale and RV pressure overload pattern. WHO Group 2.3/3; FC: II/III.    RV status: Adversely adapted.  The patient is in an hypervolemic  and perfused physiologic state.  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  She does have significant risk factors for the development of COPD including heavy ongoing smoking.  However, I do not believe her mental status will allow her to participate in PFTs today.  The definitive treatment for this will be intervention of her mitral valve. Her mean gradient is 9 and she has severe pulmonary hypertension.  I suspect that she will require PBMV.  She will need invasive RHC in order to evaluate her PA pressures and possible contraindications for this procedure.  Also, this procedure is not performed at our institution.  After an initial workup she would need to be referred to another institution.  At this point I agree with aggressive diuresis.  I would like to change her beta  blocker over to provide more diastolic filling time and have less of an effect on her mean arterial pressure as it has been running low.  -Agree with diuretic  -Change carvedilol to Toprol-XL  -If the patient would like to follow-up as an outpatient I'll be happy to see her in clinic.  She will need a EDWIN and RHC as an outpatient for further evaluation.  -Deferred referral to a center for PBMV  -Outpatient PFTs    2. Moderate MS with PAH and severe TR.   -As above    Thank you so much for allowing me to participate and consult on Michelle Child.        Disposition: Will continue to follow for ongoing diuresis.          This document has been electronically signed by Stevenson Brown MD PhD on January 15, 2018 10:25 AM

## 2018-01-16 PROBLEM — G93.41 ENCEPHALOPATHY, METABOLIC: Status: ACTIVE | Noted: 2018-01-15

## 2018-01-16 PROBLEM — G93.1 HYPOXIC ENCEPHALOPATHY (HCC): Status: ACTIVE | Noted: 2018-01-16

## 2018-01-16 PROBLEM — Z63.9 FAMILY DYNAMICS PROBLEM: Status: RESOLVED | Noted: 2018-01-14 | Resolved: 2018-01-16

## 2018-01-16 LAB
ALBUMIN SERPL-MCNC: 2.7 G/DL (ref 3.4–4.8)
ALBUMIN/GLOB SERPL: 1 G/DL (ref 1.1–1.8)
ALP SERPL-CCNC: 112 U/L (ref 38–126)
ALT SERPL W P-5'-P-CCNC: 40 U/L (ref 9–52)
ANION GAP SERPL CALCULATED.3IONS-SCNC: 8 MMOL/L (ref 5–15)
AST SERPL-CCNC: 32 U/L (ref 14–36)
BASOPHILS # BLD AUTO: 0.01 10*3/MM3 (ref 0–0.2)
BASOPHILS NFR BLD AUTO: 0.1 % (ref 0–2)
BILIRUB SERPL-MCNC: 0.6 MG/DL (ref 0.2–1.3)
BUN BLD-MCNC: 14 MG/DL (ref 7–21)
BUN/CREAT SERPL: 19.2 (ref 7–25)
CALCIUM SPEC-SCNC: 8 MG/DL (ref 8.4–10.2)
CHLORIDE SERPL-SCNC: 95 MMOL/L (ref 95–110)
CO2 SERPL-SCNC: 29 MMOL/L (ref 22–31)
CREAT BLD-MCNC: 0.73 MG/DL (ref 0.5–1)
CRP SERPL-MCNC: 16.7 MG/DL (ref 0–1)
DEPRECATED RDW RBC AUTO: 45.4 FL (ref 36.4–46.3)
EOSINOPHIL # BLD AUTO: 0 10*3/MM3 (ref 0–0.7)
EOSINOPHIL NFR BLD AUTO: 0 % (ref 0–7)
ERYTHROCYTE [DISTWIDTH] IN BLOOD BY AUTOMATED COUNT: 15.5 % (ref 11.5–14.5)
GFR SERPL CREATININE-BSD FRML MDRD: 80 ML/MIN/1.73 (ref 45–104)
GLOBULIN UR ELPH-MCNC: 2.8 GM/DL (ref 2.3–3.5)
GLUCOSE BLD-MCNC: 102 MG/DL (ref 60–100)
HCT VFR BLD AUTO: 26 % (ref 35–45)
HGB BLD-MCNC: 8.4 G/DL (ref 12–15.5)
IMM GRANULOCYTES # BLD: 0.04 10*3/MM3 (ref 0–0.02)
IMM GRANULOCYTES NFR BLD: 0.3 % (ref 0–0.5)
LYMPHOCYTES # BLD AUTO: 1.01 10*3/MM3 (ref 0.6–4.2)
LYMPHOCYTES NFR BLD AUTO: 8 % (ref 10–50)
MCH RBC QN AUTO: 26 PG (ref 26.5–34)
MCHC RBC AUTO-ENTMCNC: 32.3 G/DL (ref 31.4–36)
MCV RBC AUTO: 80.5 FL (ref 80–98)
MONOCYTES # BLD AUTO: 0.73 10*3/MM3 (ref 0–0.9)
MONOCYTES NFR BLD AUTO: 5.8 % (ref 0–12)
NEUTROPHILS # BLD AUTO: 10.77 10*3/MM3 (ref 2–8.6)
NEUTROPHILS NFR BLD AUTO: 85.8 % (ref 37–80)
NRBC BLD MANUAL-RTO: 0 /100 WBC (ref 0–0)
PLATELET # BLD AUTO: 289 10*3/MM3 (ref 150–450)
PMV BLD AUTO: 9.4 FL (ref 8–12)
POTASSIUM BLD-SCNC: 3.3 MMOL/L (ref 3.5–5.1)
PROT SERPL-MCNC: 5.5 G/DL (ref 6.3–8.6)
RBC # BLD AUTO: 3.23 10*6/MM3 (ref 3.77–5.16)
SODIUM BLD-SCNC: 132 MMOL/L (ref 137–145)
WBC NRBC COR # BLD: 12.56 10*3/MM3 (ref 3.2–9.8)

## 2018-01-16 PROCEDURE — 85025 COMPLETE CBC W/AUTO DIFF WBC: CPT | Performed by: FAMILY MEDICINE

## 2018-01-16 PROCEDURE — 97530 THERAPEUTIC ACTIVITIES: CPT

## 2018-01-16 PROCEDURE — 94799 UNLISTED PULMONARY SVC/PX: CPT

## 2018-01-16 PROCEDURE — 94760 N-INVAS EAR/PLS OXIMETRY 1: CPT

## 2018-01-16 PROCEDURE — 97110 THERAPEUTIC EXERCISES: CPT

## 2018-01-16 PROCEDURE — 99232 SBSQ HOSP IP/OBS MODERATE 35: CPT | Performed by: FAMILY MEDICINE

## 2018-01-16 PROCEDURE — 25010000002 LEVOFLOXACIN PER 250 MG: Performed by: FAMILY MEDICINE

## 2018-01-16 PROCEDURE — 86140 C-REACTIVE PROTEIN: CPT | Performed by: FAMILY MEDICINE

## 2018-01-16 PROCEDURE — 80053 COMPREHEN METABOLIC PANEL: CPT | Performed by: FAMILY MEDICINE

## 2018-01-16 PROCEDURE — 25010000002 MAGNESIUM SULFATE 2 GM/50ML SOLUTION: Performed by: FAMILY MEDICINE

## 2018-01-16 RX ORDER — MAGNESIUM SULFATE HEPTAHYDRATE 40 MG/ML
2 INJECTION, SOLUTION INTRAVENOUS ONCE
Status: COMPLETED | OUTPATIENT
Start: 2018-01-16 | End: 2018-01-16

## 2018-01-16 RX ORDER — POTASSIUM CHLORIDE 750 MG/1
40 CAPSULE, EXTENDED RELEASE ORAL DAILY
Status: DISCONTINUED | OUTPATIENT
Start: 2018-01-16 | End: 2018-01-19

## 2018-01-16 RX ADMIN — NICOTINE 1 PATCH: 21 PATCH TRANSDERMAL at 15:46

## 2018-01-16 RX ADMIN — PHENAZOPYRIDINE HYDROCHLORIDE 200 MG: 200 TABLET, FILM COATED ORAL at 18:41

## 2018-01-16 RX ADMIN — FLUOXETINE 60 MG: 20 CAPSULE ORAL at 09:17

## 2018-01-16 RX ADMIN — MAGNESIUM SULFATE HEPTAHYDRATE 2 G: 40 INJECTION, SOLUTION INTRAVENOUS at 09:29

## 2018-01-16 RX ADMIN — ACETAMINOPHEN 650 MG: 325 TABLET ORAL at 11:10

## 2018-01-16 RX ADMIN — PHENAZOPYRIDINE HYDROCHLORIDE 200 MG: 200 TABLET, FILM COATED ORAL at 09:20

## 2018-01-16 RX ADMIN — FAMOTIDINE 40 MG: 20 TABLET, FILM COATED ORAL at 09:17

## 2018-01-16 RX ADMIN — IPRATROPIUM BROMIDE AND ALBUTEROL SULFATE 3 ML: 2.5; .5 SOLUTION RESPIRATORY (INHALATION) at 07:38

## 2018-01-16 RX ADMIN — FERROUS SULFATE TAB EC 324 MG (65 MG FE EQUIVALENT) 324 MG: 324 (65 FE) TABLET DELAYED RESPONSE at 09:17

## 2018-01-16 RX ADMIN — AMITRIPTYLINE HYDROCHLORIDE 100 MG: 50 TABLET, FILM COATED ORAL at 20:56

## 2018-01-16 RX ADMIN — LOSARTAN POTASSIUM 50 MG: 50 TABLET, FILM COATED ORAL at 09:19

## 2018-01-16 RX ADMIN — IPRATROPIUM BROMIDE AND ALBUTEROL SULFATE 3 ML: 2.5; .5 SOLUTION RESPIRATORY (INHALATION) at 15:12

## 2018-01-16 RX ADMIN — RISPERIDONE 1 MG: 1 TABLET ORAL at 20:57

## 2018-01-16 RX ADMIN — PHENAZOPYRIDINE HYDROCHLORIDE 200 MG: 200 TABLET, FILM COATED ORAL at 11:11

## 2018-01-16 RX ADMIN — ASPIRIN 81 MG 81 MG: 81 TABLET ORAL at 09:16

## 2018-01-16 RX ADMIN — FUROSEMIDE 20 MG: 20 TABLET ORAL at 18:40

## 2018-01-16 RX ADMIN — FOLIC ACID 1 MG: 1 TABLET ORAL at 09:18

## 2018-01-16 RX ADMIN — POTASSIUM CHLORIDE 40 MEQ: 750 CAPSULE, EXTENDED RELEASE ORAL at 09:20

## 2018-01-16 RX ADMIN — FERROUS SULFATE TAB EC 324 MG (65 MG FE EQUIVALENT) 324 MG: 324 (65 FE) TABLET DELAYED RESPONSE at 18:40

## 2018-01-16 RX ADMIN — FUROSEMIDE 20 MG: 20 TABLET ORAL at 09:18

## 2018-01-16 RX ADMIN — LEVOFLOXACIN 750 MG: 5 INJECTION, SOLUTION INTRAVENOUS at 15:39

## 2018-01-16 RX ADMIN — METOPROLOL SUCCINATE 25 MG: 25 TABLET, EXTENDED RELEASE ORAL at 09:19

## 2018-01-16 RX ADMIN — IPRATROPIUM BROMIDE AND ALBUTEROL SULFATE 3 ML: 2.5; .5 SOLUTION RESPIRATORY (INHALATION) at 19:11

## 2018-01-16 NOTE — PLAN OF CARE
Problem: Patient Care Overview (Adult)  Goal: Plan of Care Review  Outcome: Ongoing (interventions implemented as appropriate)   01/16/18 1253   Coping/Psychosocial Response Interventions   Plan Of Care Reviewed With patient   Patient Care Overview   Progress unable to show any progress toward functional goals   Outcome Evaluation   Outcome Summary/Follow up Plan Pt lethargic throughout and was not able to be progressed with progressive goals at this time.        Problem: Inpatient Physical Therapy  Goal: Bed Mobility Goal STG- PT  Outcome: Ongoing (interventions implemented as appropriate)   01/11/18 0855 01/16/18 1253   Bed Mobility PT STG   Bed Mobility PT STG, Date Established --  01/16/18   Bed Mobility PT STG, Time to Achieve 2 - 3 days --    Bed Mobility PT STG, Activity Type supine to sit/sit to supine --    Bed Mobility PT STG, [place] Level independent --    Bed Mobility PT STG, Additional Goal HOB down and no rails --    Bed Mobility PT STG, Date Goal Reviewed --  01/16/18   Bed Mobility PT STG, Outcome --  goal ongoing     Goal: Transfer Training Goal 1 STG- PT  Outcome: Ongoing (interventions implemented as appropriate)   01/11/18 0855 01/16/18 1253   Transfer Training PT STG   Transfer Training PT STG, Date Established 01/11/18 --    Transfer Training PT STG, Time to Achieve 2 - 3 days --    Transfer Training PT STG, Activity Type bed to chair /chair to bed;sit to stand/stand to sit --    Transfer Training PT STG, [place] Level supervision required --    Transfer Training PT STG, Assist Device (least restrictive) --    Transfer Training PT STG, Date Goal Reviewed --  01/16/18   Transfer Training PT STG, Outcome --  goal ongoing     Goal: Gait Training Goal LTG- PT  Outcome: Ongoing (interventions implemented as appropriate)   01/11/18 0855 01/16/18 1253   Gait Training PT LTG   Gait Training Goal PT LTG, Date Established 01/11/18 --    Gait Training Goal PT LTG, Time to Achieve by discharge  --    Gait Training Goal PT LTG, [place] Level conditional independence --    Gait Training Goal PT LTG, Assist Device (least restrictive assistive device) --    Gait Training Goal PT LTG, Distance to Achieve 200 feet --    Gait Training Goal PT LTG, Date Goal Reviewed --  01/16/18   Gait Training Goal PT LTG, Outcome --  goal ongoing     Goal: Strength Goal LTG- PT  Outcome: Ongoing (interventions implemented as appropriate)   01/11/18 0855 01/16/18 1253   Strength Goal PT LTG   Strength Goal PT LTG, Date Established 01/11/18 --    Strength Goal PT LTG, Time to Achieve 4 days --    Strength Goal PT LTG, Measure to Achieve 20 reps all ex BLE actively sitting/ standing --    Strength Goal PT LTG, Date Goal Reviewed --  01/16/18   Strength Goal PT LTG, Outcome --  goal ongoing

## 2018-01-16 NOTE — NURSING NOTE
Pt being combative and refusing to wear O2 mask. Attempted to reorient pt on importance of wearing O2 mask. Dr Berry paged and received one time dose of Ativan 0.5 mg IV.

## 2018-01-16 NOTE — THERAPY TREATMENT NOTE
Inpatient Rehabilitation - Occupational Therapy Treatment Note  [facility]     Patient Name: Michelle Child  : 1955  MRN: [id number]  Today's Date: 2018  Onset of Illness/Injury or Date of Surgery Date: 01/10/18  Date of Referral to OT: 01/10/18  Referring Physician: Dr Berry      Admit Date: 1/10/2018    Visit Dx:     ICD-10-CM ICD-9-CM   1. Hypokalemia E87.6 276.8   2. Pneumonia of left lung due to infectious organism, unspecified part of lung J18.9 486   3. Fall, initial encounter W19.XXXA E888.9   4. Contusion of scalp, initial encounter S00.03XA 920   5. Decreased activities of daily living (ADL) Z78.9 V49.89   6. Impaired physical mobility Z74.09 781.99     Patient Active Problem List   Diagnosis   • Tobacco dependence   • Migraine   • Iron deficiency anemia   • Gastroesophageal reflux disease   • Coronary arteriosclerosis   • Bilateral pseudophakia   • Astigmatism   • Right humeral fracture   • Fall with injury   • Closed fracture of humerus   • Cause of injury, fall   • Closed fracture of proximal end of right humerus with routine healing   • Hypokalemia   • Weakness   • Acute respiratory failure with hypoxia   • Pneumonia of left lower lobe due to Streptococcus pneumoniae   • Hyponatremia   • Cerebral microvascular disease   • Pulmonary hypertension   • Rheumatic tricuspid valve regurgitation   • Rheumatic mitral stenosis   • Acute diastolic CHF (congestive heart failure)   • Encephalopathy, metabolic   • Pulmonary hypertension due to left heart valvular disease   • Cor pulmonale, chronic   • Folic acid deficiency   • Hypoxic encephalopathy             Adult Rehabilitation Note       18 1410 18 1300 01/15/18 1100    Rehab Assessment/Intervention    Discipline occupational therapy assistant  -LM physical therapy assistant  -SY physical therapy assistant  -    Document Type therapy note (daily note)  -GREGORY therapy note (daily note)  -SY therapy note (daily note)  -LAURI     Subjective Information unable to respond   unable to respond but nodded to in bed rom pt confused  -LM unable to respond  -SY agree to therapy;no complaints  -CH    Patient Effort, Rehab Treatment --   pt sitter/family stated pt been trying climb oob relayed-RN  -GREGORY fair  -SY adequate  -CH    Precautions/Limitations   fall precautions;oxygen therapy device and L/min  -CH    Specific Treatment Considerations  pt too lethargic for progressive PT at this time, however she needed pericare and pad changed  -SY     Recorded by [LM] JUDE WongA/L [SY] Sadia French, PTA [CH] Nichelle Rahman PTA    Vital Signs    Pre Systolic BP Rehab  108  -  -CH    Pre Treatment Diastolic BP  57  -SY 78  -CH    Post Systolic BP Rehab   150  -CH    Post Treatment Diastolic BP   94  -CH    Pretreatment Heart Rate (beats/min)   80  -CH    Posttreatment Heart Rate (beats/min)   78  -CH    Pre SpO2 (%)  76  -SY 96  -CH    O2 Delivery Pre Treatment   supplemental O2  -CH    Post SpO2 (%)   95  -CH    O2 Delivery Post Treatment   supplemental O2  -CH    Pre Patient Position  Supine  -SY Supine  -CH    Intra Patient Position  Supine  -SY Standing  -CH    Post Patient Position  Supine  -SY Supine  -CH    Recorded by  [SY] Sadai French PTA [CH] Nichelle Rahman PTA    Pain Assessment    Pain Assessment Unable to assess  -LM Unable to assess  -SY No/denies pain  -CH    Recorded by [LM] PAMELLA Wong/L [SY] Sadia French PTA [CH] Nichelle Rahman PTA    Vision Assessment/Intervention    Visual Impairment   WNL;WFL  -CH    Recorded by   [CH] Nichelle Rahman PTA    Cognitive Assessment/Intervention    Current Cognitive/Communication Assessment could not assess  -LM could not assess  -SY impaired  -CH    Orientation Status  other (see comments)   pt lethargic and would not respond  -SY oriented to;person     -CH    Follows Commands/Answers Questions  unable to answer questions  -SY 75% of the time;able to  follow single-step instructions;needs cueing;needs increased time;needs repetition  -    Personal Safety  unable/difficult to assess  - decreased awareness, need for assist;decreased awareness, need for safety;mild impairment  -    Personal Safety Interventions  fall prevention program maintained  - fall prevention program maintained;gait belt;muscle strengthening facilitated;nonskid shoes/slippers when out of bed;supervised activity  -    Recorded by [LM] PAMELLA Wong/L [SY] Sadia French PTA [] Nichelle Rahman PTA    Bed Mobility, Assessment/Treatment    Bed Mobility, Assistive Device   bed rails;head of bed elevated  -    Bed Mobility, Roll Left, [place]  maximum assist (25% patient effort);dependent (less than 25% patient effort)  - not tested  -    Bed Mobility, Roll Right, [place]  maximum assist (25% patient effort);dependent (less than 25% patient effort)  - not tested  -    Bed Mobility, Scoot/Bridge, [place]  dependent (less than 25% patient effort)  - contact guard assist  -    Bed Mob, Supine to Sit, [place]   contact guard assist  -    Bed Mob, Sit to Supine, [place]   contact guard assist  -    Recorded by  [SY] Sadia French PTA [] Nichelle Rahman PTA    Transfer Assessment/Treatment    Transfers, Sit-Stand [place]   minimum assist (75% patient effort)  -    Transfers, Stand-Sit [place]   minimum assist (75% patient effort)  -    Transfers, Sit-Stand-Sit, Assist Device   rolling walker  -    Transfer, Comment  too lethargic for OOB   - pt with posterior lean once standing and required vc's to stand upright  -    Recorded by  [SY] Sadia French PTA [] Nichelle Rahman PTA    Gait Assessment/Treatment    Gait, [place] Level   minimum assist (75% patient effort);contact guard assist;2 person assist required  -    Gait, Assistive Device   rolling walker  -    Gait, Distance (Feet)   60  -     Gait, Safety Issues   balance decreased during turns;loses balance backward  -CH    Recorded by   [CH] Nichelle Rahman PTA    Stairs Assessment/Treatment    Stairs, [place] Level   not tested  -CH    Recorded by   [CH] Nichelle Rahman PTA    Therapy Exercises    Bilateral Lower Extremities  PROM:;10 reps;supine;ankle pumps/circles;heel slides;hip ER;hip IR  -SY AROM:;20 reps;supine;ankle pumps/circles;heel slides;hip abduction/adduction;SAQ  -CH    Bilateral Upper Extremity PROM:;10 reps;elbow flexion/extension;shoulder horizontal abd/add;shoulder extension/flexion;shoulder abduction/adduction;pronation/supination   wrist flex ext  -LM      Recorded by [LM] PAMELLA Wogn/L [SY] Sadia French PTA [CH] Nichelle Rahman PTA    Positioning and Restraints    Pre-Treatment Position in bed  -LM in bed  -SY in bed  -CH    Post Treatment Position bed  -LM bed  -SY bed  -CH    In Bed  supine  -SY supine;call light within reach;encouraged to call for assist;exit alarm on;side rails up x3  -CH    Recorded by [LM] PAMELLA Wong/L [SY] Sadia French PTA [CH] Nichelle Rahman PTA      01/15/18 0610 01/14/18 1000       Rehab Assessment/Intervention    Discipline occupational therapy assistant  -BB occupational therapy assistant  -LW     Document Type therapy note (daily note)  -BB therapy note (daily note)  -LW     Subjective Information agree to therapy  -BB agree to therapy  -LW     Patient Effort, Rehab Treatment adequate  -BB adequate  -LW     Symptoms Noted Comment --   pt very confused  -BB Pt very confused   -LW     Recorded by [BB] JUDE NgoA/L [LW] JUDE WadeA/L     Vital Signs    Pre Systolic BP Rehab 136  -  -LW     Pre Treatment Diastolic BP 68  -BB 75  -LW     Pretreatment Heart Rate (beats/min) 63  -BB 68  -LW     Intratreatment Heart Rate (beats/min)  73  -LW     Posttreatment Heart Rate (beats/min) 76  -BB 78  -LW     Pre SpO2 (%) 94  -BB 94  -LW      O2 Delivery Pre Treatment supplemental O2  -BB supplemental O2  -LW     Intra SpO2 (%)  95  -LW     O2 Delivery Intra Treatment  supplemental O2  -LW     Post SpO2 (%) 94  -BB 93  -LW     O2 Delivery Post Treatment supplemental O2  -BB supplemental O2  -LW     Pre Patient Position  Supine  -LW     Intra Patient Position  Sitting  -LW     Post Patient Position  Sitting  -LW     Recorded by [BB] Bibi Chester CAN/L [LW] JUDE WadeA/L     Pain Assessment    Pain Assessment No/denies pain  -BB No/denies pain  -LW     Recorded by [BB] Bibi Chester CAN/L [LW] JUDE WadeA/L     Vision Assessment/Intervention    Visual Impairment  WNL;WFL  -LW     Recorded by  [LW] JUDE WadeA/L     Cognitive Assessment/Intervention    Current Cognitive/Communication Assessment impaired  -BB impaired  -LW     Orientation Status oriented to;person  -BB oriented to;person  -LW     Follows Commands/Answers Questions 75% of the time  -BB 75% of the time;needs increased time  -LW     Personal Safety decreased awareness, need for assist;decreased awareness, need for safety  -BB decreased awareness, need for safety  -LW     Personal Safety Interventions fall prevention program maintained;nonskid shoes/slippers when out of bed  -BB fall prevention program maintained;nonskid shoes/slippers when out of bed  -LW     Recorded by [BB] JUDE NgoA/L [LW] Jaz Garcia CAN/L     Bed Mobility, Assessment/Treatment    Bed Mobility, Assistive Device bed rails;head of bed elevated  -BB bed rails;head of bed elevated  -LW     Bed Mob, Supine to Sit, [place] contact guard assist  -BB contact guard assist  -LW     Bed Mob, Sit to Supine, [place] contact guard assist  -BB      Bed Mobility, Safety Issues cognitive deficits limit understanding  -BB      Bed Mobility, Impairments strength decreased  -BB      Recorded by [BB] JUDE NgoA/L [LW] JUDE WadeA/DALTON     Transfer  Assessment/Treatment    Transfers, Bed-Chair [place]  contact guard assist  -LW     Transfers, Sit-Stand [place]  contact guard assist  -LW     Transfers, Stand-Sit [place]  contact guard assist  -LW     Transfers, Sit-Stand-Sit, Assist Device  other (see comments)   none  -LW     Recorded by  [LW] JUDE WadeA/L     Upper Body Bathing Assessment/Training    UB Bathing Assess/Train Assistive Device --   pan bath  -BB other (see comments)   Pan bath wash cloth  -LW     UB Bathing Assess/Train, Position sitting  -BB sitting  -LW     UB Bathing Assess/Train, [place] Level contact guard assist  -BB contact guard assist  -LW     UB Bathing Assess/Train, Impairments strength decreased  -BB      Recorded by [BB] JUDE NgoA/L [LW] JUDE WadeA/L     Lower Body Bathing Assessment/Training    LB Bathing Assess/Train Assistive Device --   pan bath  -BB other (see comments)   Pan bath, wash cloth  -LW     LB Bathing Assess/Train, Position sitting  -BB sitting  -LW     LB Bathing Assess/Train, [place] Level contact guard assist  -BB contact guard assist  -LW     LB Bathing Assess/Train, Impairments strength decreased  -BB      Recorded by [BB] Bibi Chester CAN/L [LW] Jaz Garcia CAN/L     Upper Body Dressing Assessment/Training    UB Dressing Assess/Train, Clothing Type doffing:;donning:;hospital gown  -BB doffing:;donning:;hospital gown  -LW     UB Dressing Assess/Train, Position sitting  -BB sitting  -LW     UB Dressing Assess/Train, [place] moderate assist (50% patient effort)  -BB moderate assist (50% patient effort)  -LW     UB Dressing Assess/Train, Impairments strength decreased  -BB      Recorded by [BB] Bibi Chester CAN/L [LW] Jaz Garcia, CAN/L     Lower Body Dressing Assessment/Training    LB Dressing Assess/Train, Clothing Type doffing:;donning:;socks  -BB doffing:;donning:;slipper socks  -LW     LB Dressing Assess/Train,  Position sitting  -BB sitting  -LW     LB Dressing Assess/Train, [place] moderate assist (50% patient effort)  -BB moderate assist (50% patient effort)  -LW     LB Dressing Assess/Train, Impairments strength decreased;impaired balance  -BB      Recorded by [BB] KINGSLEY Ngo [LW] KINGSLEY Wade     Grooming Assessment/Training    Grooming Assess/Train, Position sitting  -BB sitting  -LW     Grooming Assess/Train, [place] Level moderate assist (50% patient effort)  -BB minimum assist (75% patient effort)  -LW     Grooming Assess/Train, Impairments --   ability to follow directions  -BB sensation decreased  -LW     Grooming Assess/Train, Comment oral care, comb hair, wash face and hands  -BB Oral care, wash face and hands, washed hair blow dried hair  -LW     Recorded by [BB] PAMELLA Ngo/DALTON [LW] KINGSLEY Wade     Balance Skills Training    Sitting-Level of Assistance Close supervision  -BB      Sitting-Balance Support Feet supported  -BB      Recorded by [BB] PAMELLA Ngo/L      Therapy Exercises    Bilateral Upper Extremity AROM:;5 reps;10 reps;elbow flexion/extension;hand pumps;pronation/supination;shoulder rolls/shrugs   wrist flexion/extension, max verbal cues, increased time  -BB      Recorded by [BB] PAMELLA Ngo/L      Positioning and Restraints    Pre-Treatment Position in bed  -BB in bed  -LW     Post Treatment Position bed  -BB bed  -LW     In Bed supine;call light within reach;encouraged to call for assist;exit alarm on  -BB sitting;call light within reach;encouraged to call for assist;exit alarm on;with family/caregiver  -LW     Recorded by [BB] PAMELLA Ngo/DALTON [LW] PAMELLA Wade/L       User Key  (r) = Recorded By, (t) = Taken By, (c) = Cosigned By    Initials Name Effective Dates    SY Sadia French, PTA 10/17/16 -     CH Nichelle Rahman, PTA 10/17/16 -     BB PAMELLA Ngo/L 10/17/16 -     GREGORY Ragsdale  JAMES Bell, CAN/L 10/17/16 -     LW Jaz DANN Garcia, CAN/L 10/17/16 -                 OT Goals       01/16/18 1457 01/15/18 1020 01/14/18 1529    Transfer Training OT LTG    Transfer Training OT LTG, Date Goal Reviewed 01/16/18  -LM 01/15/18  -BB 01/14/18  -LW    Transfer Training OT LTG, Outcome goal ongoing  -LM  goal ongoing  -LW    Strength OT LTG    Strength Goal OT LTG, Date Goal Reviewed 01/16/18  -LM 01/15/18  -BB 01/14/18  -LW    Strength Goal OT LTG, Outcome goal ongoing  -LM  goal ongoing  -LW    ADL OT LTG    ADL OT LTG, Date Goal Reviewed 01/16/18  -LM 01/15/18  -BB 01/14/18  -LW    ADL OT LTG, Outcome goal ongoing  -LM  goal ongoing  -LW      01/12/18 1236 01/11/18 0856       Transfer Training OT LTG    Transfer Training OT LTG, Date Established  01/11/18  -NN     Transfer Training OT LTG, Time to Achieve  by discharge  -NN     Transfer Training OT LTG, Activity Type  all transfers  -NN     Transfer Training OT LTG, [place] Level  independent  -NN     Transfer Training OT LTG, Date Goal Reviewed 01/12/18  -LW      Transfer Training OT LTG, Outcome goal ongoing  -LW      Strength OT LTG    Strength Goal OT LTG, Date Established  01/11/18  -NN     Strength Goal OT LTG, Time to Achieve  by discharge  -NN     Strength Goal OT LTG, Measure to Achieve  Pt. will complete BUE strengthening exercises all planes and joints to increase BUE strength to 5/5 for ADLs.   -NN     Strength Goal OT LTG, Date Goal Reviewed 01/12/18  -LW      Strength Goal OT LTG, Outcome goal ongoing  -LW      ADL OT LTG    ADL OT LTG, Date Established  01/11/18  -NN     ADL OT LTG, Time to Achieve  by discharge  -NN     ADL OT LTG, Activity Type  ADL skills  -NN     ADL OT LTG, [place] Level  modified independent  -NN     ADL OT LTG, Additional Goal  AE PRN  -NN     ADL OT LTG, Date Goal Reviewed 01/12/18  -LW      ADL OT LTG, Outcome goal ongoing  -LW        User Key  (r) = Recorded By, (t) = Taken By, (c) = Cosigned By     Initials Name Provider Type    BB Bibi Chester, CAN/L Occupational Therapy Assistant    LM Melyssa Bell, CAN/L Occupational Therapy Assistant    LW Jaz Garcia, CAN/L Occupational Therapy Assistant    NN Janet Garibay, OTR/L Occupational Therapist          Occupational Therapy Education     Title: PT OT SLP Therapies (Active)     Topic: Occupational Therapy (Active)     Point: ADL training (Active)    Description: Instruct learner(s) on proper safety adaptation and remediation techniques during self care or transfers.   Instruct in proper use of assistive devices.    Learning Progress Summary    Learner Readiness Method Response Comment Documented by Status   Patient Nonacceptance E NL  BB 01/15/18 1020 Active    Acceptance E VU  LW 01/14/18 1528 Done    Acceptance E VU,NR Pt. educated on role of OT, safe bed mobility, benefit of OOB activity, progression with poc NN 01/13/18 1036 Done    Acceptance E VU  LW 01/12/18 1236 Done    Acceptance E NR Pt. educated on role of OT, safe t/f, calling for assistance, benefit of therapy, correcting posture, progression with poc  01/11/18 0854 Active               Point: Home exercise program (Done)    Description: Instruct learner(s) on appropriate technique for monitoring, assisting and/or progressing therapeutic exercises/activities.    Learning Progress Summary    Learner Readiness Method Response Comment Documented by Status   Patient Acceptance E VU  LW 01/14/18 1528 Done    Acceptance E VU,NR Pt. educated on role of OT, safe bed mobility, benefit of OOB activity, progression with poc NN 01/13/18 1036 Done    Acceptance E VU  LW 01/12/18 1236 Done    Acceptance E NR Pt. educated on role of OT, safe t/f, calling for assistance, benefit of therapy, correcting posture, progression with poc  01/11/18 0854 Active               Point: Body mechanics (Active)    Description: Instruct learner(s) on proper positioning and spine alignment during self-care,  functional mobility activities and/or exercises.    Learning Progress Summary    Learner Readiness Method Response Comment Documented by Status   Patient Nonacceptance E NL  BB 01/15/18 1020 Active    Acceptance E VU  LW 01/14/18 1528 Done    Acceptance E VU,NR Pt. educated on role of OT, safe bed mobility, benefit of OOB activity, progression with poc NN 01/13/18 1036 Done    Acceptance E VU  LW 01/12/18 1236 Done    Acceptance E NR Pt. educated on role of OT, safe t/f, calling for assistance, benefit of therapy, correcting posture, progression with poc NN 01/11/18 0854 Active                      User Key     Initials Effective Dates Name Provider Type Discipline    BB 10/17/16 -  Bibi Chester CAN/L Occupational Therapy Assistant OT    LW 10/17/16 -  PAMELLA Wade/L Occupational Therapy Assistant OT    NN 11/08/17 -  MAIK Sheffield/L Occupational Therapist OT                  OT Recommendation and Plan  Anticipated Discharge Disposition: home with home health, home with assist, home with / care  Planned Therapy Interventions: activity intolerance, ADL retraining, balance training, bed mobility training, energy conservation, home exercise program, strengthening, transfer training  Therapy Frequency:  (3-14x/week)  Plan of Care Review  Plan Of Care Reviewed With: patient  Progress: improving  Outcome Summary/Follow up Plan: pt very confused with OT although tolerated ROM in supine         Outcome Measures       01/16/18 1300 01/15/18 1100 01/15/18 0610    How much help from another person do you currently need...    Turning from your back to your side while in flat bed without using bedrails? 3  -SY 3  -CH     Moving from lying on back to sitting on the side of a flat bed without bedrails? 3  -SY 3  -CH     Moving to and from a bed to a chair (including a wheelchair)? 3  -SY 3  -CH     Standing up from a chair using your arms (e.g., wheelchair, bedside chair)? 3  -SY 3  -CH     Climbing 3-5  steps with a railing? 2  -SY 2  -CH     To walk in hospital room? 3  -SY 3  -CH     AM-PAC 6 Clicks Score 17  -SY 17  -CH     How much help from another is currently needed...    Putting on and taking off regular lower body clothing?   2  -BB    Bathing (including washing, rinsing, and drying)   2  -BB    Toileting (which includes using toilet bed pan or urinal)   2  -BB    Putting on and taking off regular upper body clothing   3  -BB    Taking care of personal grooming (such as brushing teeth)   3  -BB    Eating meals   4  -BB    Score   16  -BB    Functional Assessment    Outcome Measure Options  AM-PAC 6 Clicks Basic Mobility (PT)  -CH       01/14/18 1000          How much help from another is currently needed...    Putting on and taking off regular lower body clothing? 2  -LW      Bathing (including washing, rinsing, and drying) 2  -LW      Toileting (which includes using toilet bed pan or urinal) 2  -LW      Putting on and taking off regular upper body clothing 3  -LW      Taking care of personal grooming (such as brushing teeth) 3  -LW      Eating meals 4  -LW      Score 16  -LW        User Key  (r) = Recorded By, (t) = Taken By, (c) = Cosigned By    Initials Name Provider Type    SY Sadia French, KIN Physical Therapy Assistant    CH Nichelle Rahman PTA Physical Therapy Assistant    BB PAMELLA Ngo/L Occupational Therapy Assistant    PAMELLA George/L Occupational Therapy Assistant           Time Calculation:         Time Calculation- OT       01/16/18 1443          Time Calculation- OT    OT Start Time 1410  -LM      OT Stop Time 1435  -LM      OT Time Calculation (min) 25 min  -LM      Total Timed Code Minutes- OT 25 minute(s)  -LM      OT Received On 01/16/18  -LM        User Key  (r) = Recorded By, (t) = Taken By, (c) = Cosigned By    Initials Name Provider Type    PAMELLA Mobley/L Occupational Therapy Assistant           Therapy Charges for Today     Code Description  Service Date Service Provider Modifiers Qty    07224015409 HC OT THER PROC EA 15 MIN 1/16/2018 PAMELLA Wong/L GO 2          OT G-codes  OT Functional Scales Options: AM-PAC 6 Clicks Daily Activity (OT)  Functional Limitation: Self care  Self Care Current Status (): At least 40 percent but less than 60 percent impaired, limited or restricted  Self Care Goal Status (): At least 20 percent but less than 40 percent impaired, limited or restricted    PAMELLA Wong/DALTON  1/16/2018

## 2018-01-16 NOTE — THERAPY TREATMENT NOTE
Acute Care - Physical Therapy Treatment Note  [facility]     Patient Name: Michelle Child  : 1955  MRN: [id number]  Today's Date: 2018  Onset of Illness/Injury or Date of Surgery Date: 01/10/18  Date of Referral to PT: 01/10/18  Referring Physician: Dr Berry    Admit Date: 1/10/2018    Visit Dx:    ICD-10-CM ICD-9-CM   1. Hypokalemia E87.6 276.8   2. Pneumonia of left lung due to infectious organism, unspecified part of lung J18.9 486   3. Fall, initial encounter W19.XXXA E888.9   4. Contusion of scalp, initial encounter S00.03XA 920   5. Decreased activities of daily living (ADL) Z78.9 V49.89   6. Impaired physical mobility Z74.09 781.99     Patient Active Problem List   Diagnosis   • Tobacco dependence   • Migraine   • Iron deficiency anemia   • Gastroesophageal reflux disease   • Coronary arteriosclerosis   • Bilateral pseudophakia   • Astigmatism   • Right humeral fracture   • Fall with injury   • Closed fracture of humerus   • Cause of injury, fall   • Closed fracture of proximal end of right humerus with routine healing   • Hypokalemia   • Weakness   • Acute respiratory failure with hypoxia   • Pneumonia of left lower lobe due to Streptococcus pneumoniae   • Hyponatremia   • Cerebral microvascular disease   • Pulmonary hypertension   • Rheumatic tricuspid valve regurgitation   • Rheumatic mitral stenosis   • Acute diastolic CHF (congestive heart failure)   • Encephalopathy, metabolic   • Pulmonary hypertension due to left heart valvular disease   • Cor pulmonale, chronic   • Folic acid deficiency   • Hypoxic encephalopathy               Adult Rehabilitation Note       18 1300 01/15/18 1100 01/15/18 0610    Rehab Assessment/Intervention    Discipline physical therapy assistant  -SY physical therapy assistant  -CH occupational therapy assistant  -BB    Document Type therapy note (daily note)  -SY therapy note (daily note)  -CH therapy note (daily note)  -BB    Subjective Information  unable to respond  -SY agree to therapy;no complaints  -CH agree to therapy  -BB    Patient Effort, Rehab Treatment fair  -SY adequate  -CH adequate  -BB    Symptoms Noted Comment   --   pt very confused  -BB    Precautions/Limitations  fall precautions;oxygen therapy device and L/min  -CH     Specific Treatment Considerations pt too lethargic for progressive PT at this time, however she needed pericare and pad changed  -SY      Recorded by [SY] Sadia French PTA [CH] Nichelle Rahman PTA [BB] Bibi Chester, CAN/L    Vital Signs    Pre Systolic BP Rehab 108  -  -  -BB    Pre Treatment Diastolic BP 57  -SY 78  -CH 68  -BB    Post Systolic BP Rehab  150  -CH     Post Treatment Diastolic BP  94  -CH     Pretreatment Heart Rate (beats/min)  80  -CH 63  -BB    Posttreatment Heart Rate (beats/min)  78  -CH 76  -BB    Pre SpO2 (%) 76  -SY 96  -CH 94  -BB    O2 Delivery Pre Treatment  supplemental O2  -CH supplemental O2  -BB    Post SpO2 (%)  95  -CH 94  -BB    O2 Delivery Post Treatment  supplemental O2  -CH supplemental O2  -BB    Pre Patient Position Supine  -SY Supine  -CH     Intra Patient Position Supine  -SY Standing  -CH     Post Patient Position Supine  -SY Supine  -CH     Recorded by [SY] Sadia French PTA [CH] Nichelle Rahman PTA [BB] Bibi Chester, CAN/L    Pain Assessment    Pain Assessment Unable to assess  -SY No/denies pain  -CH No/denies pain  -BB    Recorded by [SY] Sadia French PTA [CH] Nichelle Rahman PTA [BB] Bibi Chester, CAN/L    Vision Assessment/Intervention    Visual Impairment  WNL;WFL  -CH     Recorded by  [CH] Nichelle Rahman PTA     Cognitive Assessment/Intervention    Current Cognitive/Communication Assessment could not assess  -SY impaired  -CH impaired  -BB    Orientation Status other (see comments)   pt lethargic and would not respond  -SY oriented to;person     -CH oriented to;person  -BB    Follows Commands/Answers Questions unable to  answer questions  -SY 75% of the time;able to follow single-step instructions;needs cueing;needs increased time;needs repetition  - 75% of the time  -BB    Personal Safety unable/difficult to assess  -SY decreased awareness, need for assist;decreased awareness, need for safety;mild impairment  - decreased awareness, need for assist;decreased awareness, need for safety  -BB    Personal Safety Interventions fall prevention program maintained  -SY fall prevention program maintained;gait belt;muscle strengthening facilitated;nonskid shoes/slippers when out of bed;supervised activity  - fall prevention program maintained;nonskid shoes/slippers when out of bed  -BB    Recorded by [SY] Sadia French, PTA [CH] Nichelle Rahman PTA [BB] Bibi Chester, CAN/L    Bed Mobility, Assessment/Treatment    Bed Mobility, Assistive Device  bed rails;head of bed elevated  - bed rails;head of bed elevated  -    Bed Mobility, Roll Left, [place] maximum assist (25% patient effort);dependent (less than 25% patient effort)  -SY not tested  -     Bed Mobility, Roll Right, [place] maximum assist (25% patient effort);dependent (less than 25% patient effort)  -SY not tested  -     Bed Mobility, Scoot/Bridge, [place] dependent (less than 25% patient effort)  -SY contact guard assist  -     Bed Mob, Supine to Sit, [place]  contact guard assist  - contact guard assist  -BB    Bed Mob, Sit to Supine, [place]  contact guard assist  - contact guard assist  -    Bed Mobility, Safety Issues   cognitive deficits limit understanding  -BB    Bed Mobility, Impairments   strength decreased  -BB    Recorded by [SY] Sadia French, PTA [CH] Nichelle Rahman, PTA [BB] Bibi Chseter, CAN/L    Transfer Assessment/Treatment    Transfers, Sit-Stand [place]  minimum assist (75% patient effort)  -     Transfers, Stand-Sit [place]  minimum assist (75% patient effort)  -     Transfers,  Sit-Stand-Sit, Assist Device  rolling walker  -     Transfer, Comment too lethargic for OOB   -SY pt with posterior lean once standing and required vc's to stand upright  -     Recorded by [SY] Sadia French, PTA [CH] Nichelle Rahman PTA     Gait Assessment/Treatment    Gait, [place] Level  minimum assist (75% patient effort);contact guard assist;2 person assist required  -     Gait, Assistive Device  rolling walker  -     Gait, Distance (Feet)  60  -CH     Gait, Safety Issues  balance decreased during turns;loses balance backward  -     Recorded by  [CH] Nichelle Rahman PTA     Stairs Assessment/Treatment    Stairs, [place] Level  not tested  -CH     Recorded by  [CH] Nichelle Rahman PTA     Upper Body Bathing Assessment/Training    UB Bathing Assess/Train Assistive Device   --   pan bath  -BB    UB Bathing Assess/Train, Position   sitting  -BB    UB Bathing Assess/Train, [place] Level   contact guard assist  -BB    UB Bathing Assess/Train, Impairments   strength decreased  -BB    Recorded by   [BB] PAMELLA Ngo/L    Lower Body Bathing Assessment/Training    LB Bathing Assess/Train Assistive Device   --   pan bath  -BB    LB Bathing Assess/Train, Position   sitting  -BB    LB Bathing Assess/Train, [place] Level   contact guard assist  -BB    LB Bathing Assess/Train, Impairments   strength decreased  -BB    Recorded by   [BB] JUDE NgoA/L    Upper Body Dressing Assessment/Training    UB Dressing Assess/Train, Clothing Type   doffing:;donning:;hospital gown  -BB    UB Dressing Assess/Train, Position   sitting  -BB    UB Dressing Assess/Train, [place]   moderate assist (50% patient effort)  -BB    UB Dressing Assess/Train, Impairments   strength decreased  -BB    Recorded by   [BB] JUDE NgoA/L    Lower Body Dressing Assessment/Training    LB Dressing Assess/Train, Clothing Type   doffing:;donning:;socks  -BB    LB Dressing Assess/Train,  Position   sitting  -BB    LB Dressing Assess/Train, [place]   moderate assist (50% patient effort)  -BB    LB Dressing Assess/Train, Impairments   strength decreased;impaired balance  -BB    Recorded by   [BB] PAMELLA Ngo/L    Grooming Assessment/Training    Grooming Assess/Train, Position   sitting  -BB    Grooming Assess/Train, [place] Level   moderate assist (50% patient effort)  -BB    Grooming Assess/Train, Impairments   --   ability to follow directions  -BB    Grooming Assess/Train, Comment   oral care, comb hair, wash face and hands  -BB    Recorded by   [BB] PAMELLA Ngo/L    Balance Skills Training    Sitting-Level of Assistance   Close supervision  -BB    Sitting-Balance Support   Feet supported  -BB    Recorded by   [BB] PAMELLA Ngo/L    Therapy Exercises    Bilateral Lower Extremities PROM:;10 reps;supine;ankle pumps/circles;heel slides;hip ER;hip IR  -SY AROM:;20 reps;supine;ankle pumps/circles;heel slides;hip abduction/adduction;SAQ  -CH     Bilateral Upper Extremity   AROM:;5 reps;10 reps;elbow flexion/extension;hand pumps;pronation/supination;shoulder rolls/shrugs   wrist flexion/extension, max verbal cues, increased time  -BB    Recorded by [SY] Sadia French PTA [CH] Nichelle Rahman PTA [BB] PAMELLA Ngo/L    Positioning and Restraints    Pre-Treatment Position in bed  -SY in bed  -CH in bed  -BB    Post Treatment Position bed  -SY bed  - bed  -BB    In Bed supine  -SY supine;call light within reach;encouraged to call for assist;exit alarm on;side rails up x3  -CH supine;call light within reach;encouraged to call for assist;exit alarm on  -BB    Recorded by [SY] Sadia French PTA [CH] Nichelle Rahman PTA [BB] PAMELLA Ngo/L      01/14/18 1000          Rehab Assessment/Intervention    Discipline occupational therapy assistant  -LW      Document Type therapy note (daily note)  -LW      Subjective Information agree to  therapy  -LW      Patient Effort, Rehab Treatment adequate  -LW      Symptoms Noted Comment Pt very confused   -LW      Recorded by [LW] KINGSLEY Wade      Vital Signs    Pre Systolic BP Rehab 143  -LW      Pre Treatment Diastolic BP 75  -LW      Pretreatment Heart Rate (beats/min) 68  -LW      Intratreatment Heart Rate (beats/min) 73  -LW      Posttreatment Heart Rate (beats/min) 78  -LW      Pre SpO2 (%) 94  -LW      O2 Delivery Pre Treatment supplemental O2  -LW      Intra SpO2 (%) 95  -LW      O2 Delivery Intra Treatment supplemental O2  -LW      Post SpO2 (%) 93  -LW      O2 Delivery Post Treatment supplemental O2  -LW      Pre Patient Position Supine  -LW      Intra Patient Position Sitting  -LW      Post Patient Position Sitting  -LW      Recorded by [LW] KINGSLEY Wade      Pain Assessment    Pain Assessment No/denies pain  -LW      Recorded by [LW] KINGSLEY Wade      Vision Assessment/Intervention    Visual Impairment WNL;WFL  -LW      Recorded by [LW] KINGSLEY Wade      Cognitive Assessment/Intervention    Current Cognitive/Communication Assessment impaired  -LW      Orientation Status oriented to;person  -LW      Follows Commands/Answers Questions 75% of the time;needs increased time  -LW      Personal Safety decreased awareness, need for safety  -LW      Personal Safety Interventions fall prevention program maintained;nonskid shoes/slippers when out of bed  -LW      Recorded by [LW] KINGSLEY Wade      Bed Mobility, Assessment/Treatment    Bed Mobility, Assistive Device bed rails;head of bed elevated  -LW      Bed Mob, Supine to Sit, [place] contact guard assist  -LW      Recorded by [LW] KINGSLEY Wade      Transfer Assessment/Treatment    Transfers, Bed-Chair [place] contact guard assist  -LW      Transfers, Sit-Stand [place] contact guard assist  -LW      Transfers, Stand-Sit [place] contact guard assist  -LW      Transfers,  Sit-Stand-Sit, Assist Device other (see comments)   none  -LW      Recorded by [LW] KINGSLEY Wade      Upper Body Bathing Assessment/Training    UB Bathing Assess/Train Assistive Device other (see comments)   Pan bath wash cloth  -LW      UB Bathing Assess/Train, Position sitting  -LW      UB Bathing Assess/Train, [place] Level contact guard assist  -LW      Recorded by [LW] PAMELLA Wade/L      Lower Body Bathing Assessment/Training    LB Bathing Assess/Train Assistive Device other (see comments)   Pan bath, wash cloth  -LW      LB Bathing Assess/Train, Position sitting  -LW      LB Bathing Assess/Train, [place] Level contact guard assist  -LW      Recorded by [LW] KINGSLEY Wade      Upper Body Dressing Assessment/Training    UB Dressing Assess/Train, Clothing Type doffing:;donning:;hospital gown  -LW      UB Dressing Assess/Train, Position sitting  -LW      UB Dressing Assess/Train, [place] moderate assist (50% patient effort)  -LW      Recorded by [LW] KINGSLEY Wade      Lower Body Dressing Assessment/Training    LB Dressing Assess/Train, Clothing Type doffing:;donning:;slipper socks  -LW      LB Dressing Assess/Train, Position sitting  -LW      LB Dressing Assess/Train, [place] moderate assist (50% patient effort)  -LW      Recorded by [LW] KINGSLEY Wade      Grooming Assessment/Training    Grooming Assess/Train, Position sitting  -LW      Grooming Assess/Train, [place] minimum assist (75% patient effort)  -LW      Grooming Assess/Train, Impairments sensation decreased  -LW      Grooming Assess/Train, Comment Oral care, wash face and hands, washed hair blow dried hair  -LW      Recorded by [LW] KINGSLEY Wade      Positioning and Restraints    Pre-Treatment Position in bed  -LW      Post Treatment Position bed  -LW      In Bed sitting;call light within reach;encouraged to call for assist;exit alarm on;with family/caregiver  -LW       Recorded by [LW] Jaz Garcia, CAN/L        User Key  (r) = Recorded By, (t) = Taken By, (c) = Cosigned By    Initials Name Effective Dates     Sadia French, PTA 10/17/16 -     CH Nichelle Rahman, PTA 10/17/16 -     BB Bibi Ramosing, CAN/L 10/17/16 -     LW Jaz Garcia, CAN/L 10/17/16 -                 IP PT Goals       01/16/18 1253 01/15/18 1223 01/13/18 1018    Bed Mobility PT STG    Bed Mobility PT STG, Date Established 01/16/18  -SY      Bed Mobility PT STG, Date Goal Reviewed 01/16/18  -SY 01/15/18  -CH 01/13/18  -CH    Bed Mobility PT STG, Outcome goal ongoing  -SY goal ongoing  -CH goal ongoing  -CH    Transfer Training PT STG    Transfer Training PT STG, Date Goal Reviewed 01/16/18  -SY 01/15/18  -CH 01/13/18  -    Transfer Training PT STG, Outcome goal ongoing  -SY goal ongoing  - goal ongoing  -CH    Gait Training PT LTG    Gait Training Goal PT LTG, Date Goal Reviewed 01/16/18  -SY 01/15/18  -CH 01/13/18  -CH    Gait Training Goal PT LTG, Outcome goal ongoing  - goal ongoing  -CH goal ongoing  -CH    Strength Goal PT LTG    Strength Goal PT LTG, Date Goal Reviewed 01/16/18  -SY 01/15/18  -CH 01/13/18  -CH    Strength Goal PT LTG, Outcome goal ongoing  - goal ongoing  - goal partially met  -CH      01/12/18 1103 01/11/18 0855       Bed Mobility PT STG    Bed Mobility PT STG, Date Established  01/11/18  -CB     Bed Mobility PT STG, Time to Achieve  2 - 3 days  -CB     Bed Mobility PT STG, Activity Type  supine to sit/sit to supine  -CB     Bed Mobility PT STG, [place] Level  independent  -CB     Bed Mobility PT STG, Additional Goal  HOB down and no rails  -CB     Bed Mobility PT STG, Date Goal Reviewed 01/12/18  -SY      Bed Mobility PT STG, Outcome goal ongoing  -SY      Transfer Training PT STG    Transfer Training PT STG, Date Established  01/11/18  -CB     Transfer Training PT STG, Time to Achieve  2 - 3 days  -CB     Transfer Training PT STG, Activity Type  bed to  chair /chair to bed;sit to stand/stand to sit  -CB     Transfer Training PT STG, [place] Level  supervision required  -CB     Transfer Training PT STG, Assist Device  --   least restrictive  -CB     Transfer Training PT STG, Date Goal Reviewed 01/12/18  -SY      Transfer Training PT STG, Outcome goal ongoing  -SY      Gait Training PT LTG    Gait Training Goal PT LTG, Date Established  01/11/18  -CB     Gait Training Goal PT LTG, Time to Achieve  by discharge  -CB     Gait Training Goal PT LTG, [place] Level  conditional independence  -CB     Gait Training Goal PT LTG, Assist Device  --   least restrictive assistive device  -CB     Gait Training Goal PT LTG, Distance to Achieve  200 feet  -CB     Gait Training Goal PT LTG, Date Goal Reviewed 01/12/18  -SY      Gait Training Goal PT LTG, Outcome goal ongoing  -SY      Strength Goal PT LTG    Strength Goal PT LTG, Date Established  01/11/18  -CB     Strength Goal PT LTG, Time to Achieve  4 days  -CB     Strength Goal PT LTG, Measure to Achieve  20 reps all ex BLE actively sitting/ standing  -CB     Strength Goal PT LTG, Date Goal Reviewed 01/12/18  -SY      Strength Goal PT LTG, Outcome goal ongoing  -SY        User Key  (r) = Recorded By, (t) = Taken By, (c) = Cosigned By    Initials Name Provider Type    CB Vanesa Ramos, PT Physical Therapist    SY French, PTA Physical Therapy Assistant    LAURI Rahman, PTA Physical Therapy Assistant          Physical Therapy Education     Title: PT OT SLP Therapies (Active)     Topic: Physical Therapy (Active)     Point: Mobility training (Active)    Learning Progress Summary    Learner Readiness Method Response Comment Documented by Status   Patient Acceptance D NR  CB 01/11/18 1129 Active               Point: Precautions (Active)    Learning Progress Summary    Learner Readiness Method Response Comment Documented by Status   Patient Acceptance D NR  CB 01/11/18 1129 Active                      User Key      Initials Effective Dates Name Provider Type Discipline     04/06/17 -  Vanesa Ramos, PT Physical Therapist PT                    PT Recommendation and Plan  Anticipated Equipment Needs At Discharge:  (to be determined)  Anticipated Discharge Disposition: home with home health  Demonstrates Need for Referral to Another Service: home health care  Planned Therapy Interventions: bed mobility training, gait training, home exercise program, strengthening, transfer training  PT Frequency:  (5-14)  Plan of Care Review  Plan Of Care Reviewed With: patient  Progress: unable to show any progress toward functional goals  Outcome Summary/Follow up Plan: Pt lethargic throughout and was not able to be progressed with progressive goals at this time.            Outcome Measures       01/16/18 1300 01/15/18 1100 01/15/18 0610    How much help from another person do you currently need...    Turning from your back to your side while in flat bed without using bedrails? 3  -SY 3  -CH     Moving from lying on back to sitting on the side of a flat bed without bedrails? 3  -SY 3  -CH     Moving to and from a bed to a chair (including a wheelchair)? 3  -SY 3  -CH     Standing up from a chair using your arms (e.g., wheelchair, bedside chair)? 3  -SY 3  -CH     Climbing 3-5 steps with a railing? 2  -SY 2  -CH     To walk in hospital room? 3  -SY 3  -CH     AM-PAC 6 Clicks Score 17  -SY 17  -CH     How much help from another is currently needed...    Putting on and taking off regular lower body clothing?   2  -BB    Bathing (including washing, rinsing, and drying)   2  -BB    Toileting (which includes using toilet bed pan or urinal)   2  -BB    Putting on and taking off regular upper body clothing   3  -BB    Taking care of personal grooming (such as brushing teeth)   3  -BB    Eating meals   4  -BB    Score   16  -BB    Functional Assessment    Outcome Measure Options  AM-PAC 6 Clicks Basic Mobility (PT)  -CH       01/14/18 1000           How much help from another is currently needed...    Putting on and taking off regular lower body clothing? 2  -LW      Bathing (including washing, rinsing, and drying) 2  -LW      Toileting (which includes using toilet bed pan or urinal) 2  -LW      Putting on and taking off regular upper body clothing 3  -LW      Taking care of personal grooming (such as brushing teeth) 3  -LW      Eating meals 4  -LW      Score 16  -LW        User Key  (r) = Recorded By, (t) = Taken By, (c) = Cosigned By    Initials Name Provider Type    SY French PTA Physical Therapy Assistant    LAURI Rahman PTA Physical Therapy Assistant    BB Bibi Chester, CAN/L Occupational Therapy Assistant    MARTIN Garcia, CAN/L Occupational Therapy Assistant           Time Calculation:         PT Charges       01/16/18 1339          Time Calculation    Start Time 1253  -SY      Stop Time 1320  -SY      Time Calculation (min) 27 min  -SY      Time Calculation- PT    Total Timed Code Minutes- PT 27 minute(s)  -SY        User Key  (r) = Recorded By, (t) = Taken By, (c) = Cosigned By    Initials Name Provider Type    YS French PTA Physical Therapy Assistant          Therapy Charges for Today     Code Description Service Date Service Provider Modifiers Qty    78827843439 HC PT THERAPEUTIC ACT EA 15 MIN 1/16/2018 Sadia French PTA GP 1    44159248197 HC PT THERAPEUTIC ACT EA 15 MIN 1/16/2018 Sadia French PTA GP 1          PT G-Codes  PT Professional Judgement Used?: Yes  Outcome Measure Options: AM-PAC 6 Clicks Basic Mobility (PT)  Score: 15  Functional Limitation: Mobility: Walking and moving around  Mobility: Walking and Moving Around Current Status (): At least 40 percent but less than 60 percent impaired, limited or restricted  Mobility: Walking and Moving Around Goal Status (): At least 20 percent but less than 40 percent impaired, limited or restricted    Sadia French PTA  1/16/2018

## 2018-01-16 NOTE — PROGRESS NOTES
FAMILY MEDICINE PROGRESS NOTE  NAME: Michelle Child  : 1955  MRN: [id number]     LOS: 6 days   Full Code  PROVIDER OF SERVICE:     Chief Complaint:  Pneumonia of left lower lobe due to Streptococcus pneumoniae    Subjective     Interval History:  History taken from: patient family RN  Subjective:  Patient is sitting up trying to eat breakfast with assistance. She is a little groggy but understands more questioning today. Her daughter-in-law says she found out last night that the patient has had some kind of valve procedure done in the past. Patient does endorse this as well, but unsure which valve and what procedure.    Review of Systems:   Review of Systems   Constitutional: Positive for activity change and appetite change. Negative for fatigue and fever.   HENT: Negative for ear pain and sore throat.    Eyes: Negative for pain and visual disturbance.   Respiratory: Positive for cough and shortness of breath.    Cardiovascular: Negative for chest pain and palpitations.   Gastrointestinal: Negative for abdominal pain and nausea.   Endocrine: Negative for cold intolerance and heat intolerance.   Genitourinary: Negative for difficulty urinating and dysuria.   Musculoskeletal: Positive for gait problem. Negative for arthralgias.   Skin: Negative for color change and rash.   Neurological: Positive for weakness. Negative for dizziness and headaches.   Hematological: Negative for adenopathy. Does not bruise/bleed easily.   Psychiatric/Behavioral: Positive for confusion. Negative for agitation and sleep disturbance.       Objective     Vital Signs  Temp:  [97.7 °F (36.5 °C)-98.7 °F (37.1 °C)] 97.7 °F (36.5 °C)  Heart Rate:  [75-98] 77  Resp:  [18-20] 20  BP: (117-177)/(58-78) 150/78    Physical Exam  Physical Exam   Constitutional: She appears well-developed and well-nourished.   HENT:   Head: Normocephalic and atraumatic.   Right Ear: External ear normal.   Left Ear: External ear normal.   Nose: Nose normal.    Mouth/Throat: Oropharynx is clear and moist.   Eyes: Conjunctivae and EOM are normal.   Neck: Normal range of motion. Neck supple.   Cardiovascular: Normal rate, regular rhythm and normal heart sounds.    Pulmonary/Chest: Effort normal. She has decreased breath sounds. She has rhonchi in the left lower field.   Abdominal: Soft. Bowel sounds are normal. She exhibits no distension. There is no tenderness.   Musculoskeletal: Normal range of motion.   Neurological: She is alert.   Skin: Skin is warm and dry.   Psychiatric: Her speech is delayed. She is slowed and withdrawn. Cognition and memory are impaired. She exhibits abnormal recent memory.   Disoriented to time        Medication Review    Current Facility-Administered Medications:   •  acetaminophen (TYLENOL) tablet 650 mg, 650 mg, Oral, Q4H PRN, Coral Berry MD, 650 mg at 01/11/18 1550  •  amitriptyline (ELAVIL) tablet 100 mg, 100 mg, Oral, Nightly, Coarl Berry MD, 100 mg at 01/15/18 2012  •  aspirin chewable tablet 81 mg, 81 mg, Oral, Daily, Coral Berry MD, 81 mg at 01/15/18 0955  •  bisacodyl (DULCOLAX) EC tablet 5 mg, 5 mg, Oral, Daily PRN, Coral Berry MD  •  famotidine (PEPCID) tablet 40 mg, 40 mg, Oral, Daily, Coral Berry MD, 40 mg at 01/15/18 0956  •  ferrous sulfate EC tablet 324 mg, 324 mg, Oral, BID With Meals, Coral Berry MD, 324 mg at 01/15/18 1848  •  FLUoxetine (PROzac) capsule 60 mg, 60 mg, Oral, Daily, Coral Berry MD, 60 mg at 01/15/18 0956  •  folic acid (FOLVITE) tablet 1 mg, 1 mg, Oral, Daily, Coral Berry MD  •  furosemide (LASIX) tablet 20 mg, 20 mg, Oral, BID, Sunny Linn DO, 20 mg at 01/15/18 2012  •  HYDROcodone-acetaminophen (NORCO) 5-325 MG per tablet 1 tablet, 1 tablet, Oral, Q4H PRN, Coral Berry MD, 1 tablet at 01/13/18 2123  •  influenza vac split quad (FLUZONE QUADRIVALENT) IM suspension 0.5 mL, 0.5 mL, Intramuscular, During Hospitalization, Coral Berry MD  •   ipratropium-albuterol (DUO-NEB) nebulizer solution 3 mL, 3 mL, Nebulization, 4x Daily - RT, Coral Berry MD, 3 mL at 01/16/18 0738  •  levoFLOXacin (LEVAQUIN) 750 mg/150 mL D5W (premix) (LEVAQUIN) 750 mg, 750 mg, Intravenous, Q24H, Coral Berry MD, 750 mg at 01/15/18 1558  •  losartan (COZAAR) tablet 50 mg, 50 mg, Oral, Q24H, Coral Berry MD, 50 mg at 01/15/18 0956  •  magnesium sulfate 2g/50 mL (PREMIX) infusion, 2 g, Intravenous, Once, Coral Berry MD  •  metoprolol succinate XL (TOPROL-XL) 24 hr tablet 25 mg, 25 mg, Oral, Q24H, Stevenson Brown MD PhD  •  nicotine (NICODERM CQ) 21 MG/24HR patch 1 patch, 1 patch, Transdermal, Q24H, Coral Berry MD, 1 patch at 01/15/18 1552  •  ondansetron (ZOFRAN) tablet 4 mg, 4 mg, Oral, Q6H PRN, Coral Berry MD, 4 mg at 01/15/18 1417  •  phenazopyridine (PYRIDIUM) tablet 200 mg, 200 mg, Oral, TID With Meals, Coral Berry MD, 200 mg at 01/15/18 1848  •  pneumococcal polysaccharide 23-valent (PNEUMOVAX-23) vaccine 0.5 mL, 0.5 mL, Intramuscular, During Hospitalization, Coral Berry MD  •  potassium chloride (MICRO-K) CR capsule 40 mEq, 40 mEq, Oral, Daily, Coral Berry MD  •  risperiDONE (risperDAL) tablet 1 mg, 1 mg, Oral, Nightly, Coral Berry MD, 1 mg at 01/15/18 2012  •  sodium chloride 0.9 % flush 1-10 mL, 1-10 mL, Intravenous, PRN, Coral Berry MD  •  sodium chloride 0.9 % flush 10 mL, 10 mL, Intravenous, PRN, Toro Shine MD, 10 mL at 01/15/18 2048     Diagnostic Data      I reviewed the patient's new clinical results and imaging.      Assessment/Plan     Active Hospital Problems (** Indicates Principal Problem)    Diagnosis Date Noted   • **Pneumonia of left lower lobe due to Streptococcus pneumoniae [J13] 01/11/2018     On Levaquin with falling WBC and no fever.     • Hypoxic encephalopathy [G93.1] 01/16/2018     Patient still requiring nasal cannula or venturi mask.  Last ABG on NC still showed  significant hypoxia     • Rheumatic mitral stenosis [I05.0] 01/15/2018     Cards Consultation     • Acute diastolic CHF (congestive heart failure) [I50.31] 01/15/2018     Lasix  Cards consultation       • Encephalopathy, metabolic [G93.41] 01/15/2018     Replace electrolytes   Replace Folic Acid     • Pulmonary hypertension due to left heart valvular disease [I27.22, I38] 01/15/2018   • Cor pulmonale, chronic [I27.81] 01/15/2018   • Folic acid deficiency [E53.8] 01/15/2018     Replace orally     • Pulmonary hypertension [I27.20] 01/14/2018     Cards appreciated.      • Rheumatic tricuspid valve regurgitation [I07.1] 01/14/2018   • Hyponatremia [E87.1] 01/13/2018     Fluctuating  Monitor closely     • Cerebral microvascular disease [I67.9] 01/13/2018     Per CT head  Patient more confused today with slurring of speech. Have to consider acute CVA.   Will obtain MRI brain.      • Hypokalemia [E87.6] 01/10/2018     Replace Orally   Also replace magnesium       • Weakness [R53.1] 01/10/2018     PT/OT     • Acute respiratory failure with hypoxia [J96.01] 01/10/2018     NC oxygen, steroids, and neb treatements     • Fall with injury [W19.XXXA] 08/15/2017     PT/OT       • Tobacco dependence [F17.200]      Nicotine patch 21mg transdermally     • Gastroesophageal reflux disease [K21.9]      Pepcid     • Iron deficiency anemia [D50.9]      Continue feosol        Resolved Hospital Problems    Diagnosis Date Noted Date Resolved   • Anemia [D64.9] 01/14/2018 01/15/2018     Start iron supplementation.  Obtain iron studies.      • Elevated brain natriuretic peptide (BNP) level [R79.89] 01/14/2018 01/15/2018     IVF's discontinued, Fluid Restrictions, Low Na diet. Echocardiogram ordered. Will start Lasix 20 mg po bid and monitor.     • Elevated troponin [R74.8] 01/14/2018 01/15/2018     Most likely secondary to elevated BNP. No chest pain or anginal equivalent. Will monitor.     • Family dynamics problem [Z63.9] 01/14/2018  01/16/2018     Will discuss with case case management.      • Positive D dimer [R79.89] 01/14/2018 01/15/2018     Most likely elevated due to pneumonia and elevated BNP. No clinical signs of PE.     • Hyperkalemia [E87.5] 01/13/2018 01/14/2018     Decrease KCL and monitor     • Community acquired bacterial pneumonia [J15.9] 01/10/2018 01/11/2018     Left lower lobe  Blood culture, sputum culture, check atypicals.   Levaquin as PCN allergy     • Hyponatremia [E87.1] 01/10/2018 01/11/2018     IV fluids     • Essential hypertension [I10]  01/14/2018     Losartan, coreg     • Depressive disorder [F32.9]  01/14/2018     Prozac, Elavil, and Risperdal       Have discussed with Katarzyna Nelson, Daughter-in-law, regarding her current status and plan for continued treatment. Still pending nursing home placement.   DVT prophylaxis: SCDs/TEDs      Disposition:Working on SNF placement. 2-3 days depending on insurance approval to SNF          This document has been electronically signed by Coral Berry MD on January 16, 2018 7:56 AM

## 2018-01-16 NOTE — PLAN OF CARE
Problem: Patient Care Overview (Adult)  Goal: Plan of Care Review  Outcome: Ongoing (interventions implemented as appropriate)   01/16/18 0258   Coping/Psychosocial Response Interventions   Plan Of Care Reviewed With patient   Patient Care Overview   Progress no change     Goal: Adult Individualization and Mutuality  Outcome: Ongoing (interventions implemented as appropriate)    Goal: Discharge Needs Assessment  Outcome: Ongoing (interventions implemented as appropriate)      Problem: Pneumonia (Adult)  Goal: Signs and Symptoms of Listed Potential Problems Will be Absent or Manageable (Pneumonia)  Outcome: Ongoing (interventions implemented as appropriate)      Problem: Fall Risk (Adult)  Goal: Absence of Falls  Outcome: Ongoing (interventions implemented as appropriate)      Problem: Fluid Volume Deficit (Adult)  Goal: Fluid/Electrolyte Balance  Outcome: Ongoing (interventions implemented as appropriate)    Goal: Comfort/Well Being  Outcome: Ongoing (interventions implemented as appropriate)

## 2018-01-16 NOTE — PLAN OF CARE
Problem: Patient Care Overview (Adult)  Goal: Plan of Care Review  Outcome: Ongoing (interventions implemented as appropriate)   01/16/18 1457   Coping/Psychosocial Response Interventions   Plan Of Care Reviewed With patient   Patient Care Overview   Progress improving   Outcome Evaluation   Outcome Summary/Follow up Plan pt very confused with OT although tolerated ROM in supine        Problem: Inpatient Occupational Therapy  Goal: Transfer Training Goal 1 LTG- OT  Outcome: Ongoing (interventions implemented as appropriate)   01/11/18 0856 01/16/18 1457   Transfer Training OT LTG   Transfer Training OT LTG, Date Established 01/11/18 --    Transfer Training OT LTG, Time to Achieve by discharge --    Transfer Training OT LTG, Activity Type all transfers --    Transfer Training OT LTG, White Level independent --    Transfer Training OT LTG, Date Goal Reviewed --  01/16/18   Transfer Training OT LTG, Outcome --  goal ongoing     Goal: Strength Goal LTG- OT  Outcome: Ongoing (interventions implemented as appropriate)   01/11/18 0856 01/16/18 1457   Strength OT LTG   Strength Goal OT LTG, Date Established 01/11/18 --    Strength Goal OT LTG, Time to Achieve by discharge --    Strength Goal OT LTG, Measure to Achieve Pt. will complete BUE strengthening exercises all planes and joints to increase BUE strength to 5/5 for ADLs.  --    Strength Goal OT LTG, Date Goal Reviewed --  01/16/18   Strength Goal OT LTG, Outcome --  goal ongoing     Goal: ADL Goal LTG- OT  Outcome: Ongoing (interventions implemented as appropriate)   01/11/18 0856 01/16/18 1457   ADL OT LTG   ADL OT LTG, Date Established 01/11/18 --    ADL OT LTG, Time to Achieve by discharge --    ADL OT LTG, Activity Type ADL skills --    ADL OT LTG, White Level modified independent --    ADL OT LTG, Additional Goal AE PRN --    ADL OT LTG, Date Goal Reviewed --  01/16/18   ADL OT LTG, Outcome --  goal ongoing

## 2018-01-17 PROBLEM — R30.0 DYSURIA: Status: ACTIVE | Noted: 2018-01-17

## 2018-01-17 PROBLEM — B37.0 ORAL THRUSH: Status: ACTIVE | Noted: 2018-01-17

## 2018-01-17 PROBLEM — J04.0 LARYNGITIS, ACUTE: Status: ACTIVE | Noted: 2018-01-17

## 2018-01-17 LAB
ALBUMIN SERPL-MCNC: 2.6 G/DL (ref 3.4–4.8)
ALBUMIN/GLOB SERPL: 1 G/DL (ref 1.1–1.8)
ALP SERPL-CCNC: 109 U/L (ref 38–126)
ALT SERPL W P-5'-P-CCNC: 42 U/L (ref 9–52)
ANION GAP SERPL CALCULATED.3IONS-SCNC: 11 MMOL/L (ref 5–15)
AST SERPL-CCNC: 22 U/L (ref 14–36)
BACTERIA UR QL AUTO: NORMAL /HPF
BASOPHILS # BLD AUTO: 0.01 10*3/MM3 (ref 0–0.2)
BASOPHILS NFR BLD AUTO: 0.1 % (ref 0–2)
BILIRUB SERPL-MCNC: 0.7 MG/DL (ref 0.2–1.3)
BILIRUB UR QL STRIP: ABNORMAL
BUN BLD-MCNC: 11 MG/DL (ref 7–21)
BUN/CREAT SERPL: 15.3 (ref 7–25)
CALCIUM SPEC-SCNC: 7.7 MG/DL (ref 8.4–10.2)
CHLORIDE SERPL-SCNC: 89 MMOL/L (ref 95–110)
CLARITY UR: CLEAR
CO2 SERPL-SCNC: 34 MMOL/L (ref 22–31)
COLOR UR: ABNORMAL
CREAT BLD-MCNC: 0.72 MG/DL (ref 0.5–1)
DEPRECATED RDW RBC AUTO: 45.3 FL (ref 36.4–46.3)
EOSINOPHIL # BLD AUTO: 0 10*3/MM3 (ref 0–0.7)
EOSINOPHIL NFR BLD AUTO: 0 % (ref 0–7)
ERYTHROCYTE [DISTWIDTH] IN BLOOD BY AUTOMATED COUNT: 15.5 % (ref 11.5–14.5)
GFR SERPL CREATININE-BSD FRML MDRD: 82 ML/MIN/1.73 (ref 60–104)
GLOBULIN UR ELPH-MCNC: 2.6 GM/DL (ref 2.3–3.5)
GLUCOSE BLD-MCNC: 115 MG/DL (ref 60–100)
GLUCOSE UR STRIP-MCNC: NEGATIVE MG/DL
HCT VFR BLD AUTO: 29.4 % (ref 35–45)
HGB BLD-MCNC: 9.4 G/DL (ref 12–15.5)
HGB UR QL STRIP.AUTO: NEGATIVE
HYALINE CASTS UR QL AUTO: NORMAL /LPF
IMM GRANULOCYTES # BLD: 0.04 10*3/MM3 (ref 0–0.02)
IMM GRANULOCYTES NFR BLD: 0.4 % (ref 0–0.5)
KETONES UR QL STRIP: NEGATIVE
LEUKOCYTE ESTERASE UR QL STRIP.AUTO: NEGATIVE
LYMPHOCYTES # BLD AUTO: 0.66 10*3/MM3 (ref 0.6–4.2)
LYMPHOCYTES NFR BLD AUTO: 6.2 % (ref 10–50)
MAGNESIUM SERPL-MCNC: 1.8 MG/DL (ref 1.6–2.3)
MCH RBC QN AUTO: 26 PG (ref 26.5–34)
MCHC RBC AUTO-ENTMCNC: 32 G/DL (ref 31.4–36)
MCV RBC AUTO: 81.4 FL (ref 80–98)
MONOCYTES # BLD AUTO: 0.67 10*3/MM3 (ref 0–0.9)
MONOCYTES NFR BLD AUTO: 6.3 % (ref 0–12)
NEUTROPHILS # BLD AUTO: 9.23 10*3/MM3 (ref 2–8.6)
NEUTROPHILS NFR BLD AUTO: 87 % (ref 37–80)
NITRITE UR QL STRIP: POSITIVE
PH UR STRIP.AUTO: 7 [PH] (ref 5–9)
PLATELET # BLD AUTO: 241 10*3/MM3 (ref 150–450)
PMV BLD AUTO: 9.4 FL (ref 8–12)
POTASSIUM BLD-SCNC: 2.7 MMOL/L (ref 3.5–5.1)
POTASSIUM BLD-SCNC: 3.1 MMOL/L (ref 3.5–5.1)
PROT SERPL-MCNC: 5.2 G/DL (ref 6.3–8.6)
PROT UR QL STRIP: NEGATIVE
RBC # BLD AUTO: 3.61 10*6/MM3 (ref 3.77–5.16)
RBC # UR: NORMAL /HPF
REF LAB TEST METHOD: NORMAL
SODIUM BLD-SCNC: 134 MMOL/L (ref 137–145)
SP GR UR STRIP: 1.01 (ref 1–1.03)
SQUAMOUS #/AREA URNS HPF: NORMAL /HPF
UROBILINOGEN UR QL STRIP: ABNORMAL
WBC NRBC COR # BLD: 10.61 10*3/MM3 (ref 3.2–9.8)
WBC UR QL AUTO: NORMAL /HPF

## 2018-01-17 PROCEDURE — 84132 ASSAY OF SERUM POTASSIUM: CPT | Performed by: FAMILY MEDICINE

## 2018-01-17 PROCEDURE — 94799 UNLISTED PULMONARY SVC/PX: CPT

## 2018-01-17 PROCEDURE — 99232 SBSQ HOSP IP/OBS MODERATE 35: CPT | Performed by: FAMILY MEDICINE

## 2018-01-17 PROCEDURE — 25010000002 POTASSIUM CHLORIDE PER 2 MEQ OF POTASSIUM: Performed by: FAMILY MEDICINE

## 2018-01-17 PROCEDURE — 94760 N-INVAS EAR/PLS OXIMETRY 1: CPT

## 2018-01-17 PROCEDURE — 85025 COMPLETE CBC W/AUTO DIFF WBC: CPT | Performed by: FAMILY MEDICINE

## 2018-01-17 PROCEDURE — 97530 THERAPEUTIC ACTIVITIES: CPT

## 2018-01-17 PROCEDURE — 81001 URINALYSIS AUTO W/SCOPE: CPT | Performed by: FAMILY MEDICINE

## 2018-01-17 PROCEDURE — 97535 SELF CARE MNGMENT TRAINING: CPT

## 2018-01-17 PROCEDURE — 83735 ASSAY OF MAGNESIUM: CPT | Performed by: FAMILY MEDICINE

## 2018-01-17 PROCEDURE — 80053 COMPREHEN METABOLIC PANEL: CPT | Performed by: FAMILY MEDICINE

## 2018-01-17 RX ORDER — AMITRIPTYLINE HYDROCHLORIDE 50 MG/1
50 TABLET, FILM COATED ORAL NIGHTLY
Status: DISCONTINUED | OUTPATIENT
Start: 2018-01-17 | End: 2018-01-18

## 2018-01-17 RX ORDER — POTASSIUM CHLORIDE 7.45 MG/ML
10 INJECTION INTRAVENOUS
Status: DISCONTINUED | OUTPATIENT
Start: 2018-01-17 | End: 2018-01-17 | Stop reason: RX

## 2018-01-17 RX ORDER — POTASSIUM CHLORIDE 7.45 MG/ML
10 INJECTION INTRAVENOUS
Status: DISCONTINUED | OUTPATIENT
Start: 2018-01-17 | End: 2018-01-22 | Stop reason: HOSPADM

## 2018-01-17 RX ADMIN — IPRATROPIUM BROMIDE AND ALBUTEROL SULFATE 3 ML: 2.5; .5 SOLUTION RESPIRATORY (INHALATION) at 15:20

## 2018-01-17 RX ADMIN — PHENAZOPYRIDINE HYDROCHLORIDE 200 MG: 200 TABLET, FILM COATED ORAL at 08:57

## 2018-01-17 RX ADMIN — FUROSEMIDE 20 MG: 20 TABLET ORAL at 08:57

## 2018-01-17 RX ADMIN — RISPERIDONE 1 MG: 1 TABLET ORAL at 21:11

## 2018-01-17 RX ADMIN — POTASSIUM CHLORIDE 40 MEQ: 750 CAPSULE, EXTENDED RELEASE ORAL at 08:56

## 2018-01-17 RX ADMIN — FAMOTIDINE 40 MG: 20 TABLET, FILM COATED ORAL at 08:56

## 2018-01-17 RX ADMIN — POTASSIUM CHLORIDE: 149 INJECTION, SOLUTION, CONCENTRATE INTRAVENOUS at 10:15

## 2018-01-17 RX ADMIN — FERROUS SULFATE TAB EC 324 MG (65 MG FE EQUIVALENT) 324 MG: 324 (65 FE) TABLET DELAYED RESPONSE at 17:29

## 2018-01-17 RX ADMIN — FLUOXETINE 60 MG: 20 CAPSULE ORAL at 08:56

## 2018-01-17 RX ADMIN — ASPIRIN 81 MG 81 MG: 81 TABLET ORAL at 08:57

## 2018-01-17 RX ADMIN — FERROUS SULFATE TAB EC 324 MG (65 MG FE EQUIVALENT) 324 MG: 324 (65 FE) TABLET DELAYED RESPONSE at 08:56

## 2018-01-17 RX ADMIN — IPRATROPIUM BROMIDE AND ALBUTEROL SULFATE 3 ML: 2.5; .5 SOLUTION RESPIRATORY (INHALATION) at 20:46

## 2018-01-17 RX ADMIN — IPRATROPIUM BROMIDE AND ALBUTEROL SULFATE 3 ML: 2.5; .5 SOLUTION RESPIRATORY (INHALATION) at 07:40

## 2018-01-17 RX ADMIN — NICOTINE 1 PATCH: 21 PATCH TRANSDERMAL at 15:36

## 2018-01-17 RX ADMIN — LIDOCAINE HYDROCHLORIDE 5 ML: 20 SOLUTION ORAL; TOPICAL at 21:11

## 2018-01-17 RX ADMIN — FOLIC ACID 1 MG: 1 TABLET ORAL at 08:57

## 2018-01-17 RX ADMIN — IPRATROPIUM BROMIDE AND ALBUTEROL SULFATE 3 ML: 2.5; .5 SOLUTION RESPIRATORY (INHALATION) at 11:58

## 2018-01-17 RX ADMIN — FUROSEMIDE 20 MG: 20 TABLET ORAL at 17:29

## 2018-01-17 RX ADMIN — AMITRIPTYLINE HYDROCHLORIDE 50 MG: 50 TABLET, FILM COATED ORAL at 21:11

## 2018-01-17 RX ADMIN — ACETAMINOPHEN 650 MG: 325 TABLET ORAL at 08:56

## 2018-01-17 RX ADMIN — LOSARTAN POTASSIUM 50 MG: 50 TABLET, FILM COATED ORAL at 08:56

## 2018-01-17 RX ADMIN — POTASSIUM CHLORIDE: 149 INJECTION, SOLUTION, CONCENTRATE INTRAVENOUS at 13:25

## 2018-01-17 RX ADMIN — LIDOCAINE HYDROCHLORIDE 5 ML: 20 SOLUTION ORAL; TOPICAL at 17:29

## 2018-01-17 RX ADMIN — METOPROLOL SUCCINATE 25 MG: 25 TABLET, EXTENDED RELEASE ORAL at 08:57

## 2018-01-17 RX ADMIN — POTASSIUM CHLORIDE: 149 INJECTION, SOLUTION, CONCENTRATE INTRAVENOUS at 22:11

## 2018-01-17 RX ADMIN — LIDOCAINE HYDROCHLORIDE 5 ML: 20 SOLUTION ORAL; TOPICAL at 10:15

## 2018-01-17 NOTE — PROGRESS NOTES
[facility] was called and asked about medication compliance.  She has not filled the Nortriptyline since October will taper that down and stop.

## 2018-01-17 NOTE — PROGRESS NOTES
FAMILY MEDICINE PROGRESS NOTE  NAME: Michelle Child  : 1955  MRN: [id number]     LOS: 7 days   Full Code  PROVIDER OF SERVICE:     Chief Complaint:  Pneumonia of left lower lobe due to Streptococcus pneumoniae    Subjective     Interval History:  History taken from: patient family RN  Subjective: Patient is a 61 yo  female who was admitted with pneumonia.  She is complaining of a headache today.  She hasn't been eating well per son.  She denies any diarrhea.    Review of Systems:   Review of Systems   Constitutional: Positive for activity change, appetite change and fatigue.   HENT: Positive for mouth sores, trouble swallowing and voice change.    Respiratory: Positive for cough. Negative for shortness of breath.    Cardiovascular: Negative for chest pain, palpitations and leg swelling.   Gastrointestinal: Negative for abdominal pain, diarrhea, nausea and vomiting.   Genitourinary: Positive for dysuria.   Neurological: Positive for weakness and headaches.   Psychiatric/Behavioral: Positive for confusion.       Objective     Vital Signs  Temp:  [96.7 °F (35.9 °C)-99.6 °F (37.6 °C)] 99.4 °F (37.4 °C)  Heart Rate:  [68-78] 77  Resp:  [16-20] 20  BP: (102-145)/(55-71) 145/71    Physical Exam  Physical Exam   Constitutional: She appears well-developed and well-nourished. No distress.   HENT:   Mouth/Throat: Oropharyngeal exudate present.   Mucosa inflamed, erythema, patches   Neck: No JVD present.   Cardiovascular: Normal rate, regular rhythm and intact distal pulses.  Exam reveals no gallop and no friction rub.    Murmur heard.  Pulmonary/Chest: No respiratory distress. She has no wheezes. She has no rales.   Diminished breath sounds in the bases   Abdominal: Soft. Bowel sounds are normal. She exhibits no distension. There is no tenderness. There is no rebound and no guarding.   Musculoskeletal: She exhibits no edema.   Neurological: She is alert.   Skin: She is not diaphoretic.   Nursing note and  vitals reviewed.      Medication Review    Current Facility-Administered Medications:   •  acetaminophen (TYLENOL) tablet 650 mg, 650 mg, Oral, Q4H PRN, Coral Berry MD, 650 mg at 01/17/18 0856  •  amitriptyline (ELAVIL) tablet 100 mg, 100 mg, Oral, Nightly, Coral Berry MD, 100 mg at 01/16/18 2056  •  aspirin chewable tablet 81 mg, 81 mg, Oral, Daily, Coral Berry MD, 81 mg at 01/17/18 0857  •  bisacodyl (DULCOLAX) EC tablet 5 mg, 5 mg, Oral, Daily PRN, Coral Berry MD  •  famotidine (PEPCID) tablet 40 mg, 40 mg, Oral, Daily, Coral Berry MD, 40 mg at 01/17/18 0856  •  ferrous sulfate EC tablet 324 mg, 324 mg, Oral, BID With Meals, Coral Berry MD, 324 mg at 01/17/18 0856  •  FLUoxetine (PROzac) capsule 60 mg, 60 mg, Oral, Daily, Coral Berry MD, 60 mg at 01/17/18 0856  •  folic acid (FOLVITE) tablet 1 mg, 1 mg, Oral, Daily, Coral Berry MD, 1 mg at 01/17/18 0857  •  furosemide (LASIX) tablet 20 mg, 20 mg, Oral, BID, Sunny Linn DO, 20 mg at 01/17/18 0857  •  HYDROcodone-acetaminophen (NORCO) 5-325 MG per tablet 1 tablet, 1 tablet, Oral, Q4H PRN, Coral Berry MD, 1 tablet at 01/13/18 2123  •  influenza vac split quad (FLUZONE QUADRIVALENT) IM suspension 0.5 mL, 0.5 mL, Intramuscular, During Hospitalization, Coral Berry MD  •  ipratropium-albuterol (DUO-NEB) nebulizer solution 3 mL, 3 mL, Nebulization, 4x Daily - RT, Coral Berry MD, 3 mL at 01/17/18 0740  •  levoFLOXacin (LEVAQUIN) 750 mg/150 mL D5W (premix) (LEVAQUIN) 750 mg, 750 mg, Intravenous, Q24H, Coral Berry MD, 750 mg at 01/16/18 1539  •  losartan (COZAAR) tablet 50 mg, 50 mg, Oral, Q24H, Coral Berry MD, 50 mg at 01/17/18 0856  •  magic mouthwash with nystatin oral supsension 5 mL, 5 mL, Swish & Swallow, 4x Daily, Bridget Ortega MD  •  metoprolol succinate XL (TOPROL-XL) 24 hr tablet 25 mg, 25 mg, Oral, Q24H, Stevenson Brown MD PhD, 25 mg at 01/17/18 0857  •  nicotine  (NICODERM CQ) 21 MG/24HR patch 1 patch, 1 patch, Transdermal, Q24H, Coral Berry MD, 1 patch at 01/16/18 1546  •  ondansetron (ZOFRAN) tablet 4 mg, 4 mg, Oral, Q6H PRN, Coral Berry MD, 4 mg at 01/15/18 1417  •  phenazopyridine (PYRIDIUM) tablet 200 mg, 200 mg, Oral, TID With Meals, Coral Berry MD, 200 mg at 01/17/18 0857  •  pneumococcal polysaccharide 23-valent (PNEUMOVAX-23) vaccine 0.5 mL, 0.5 mL, Intramuscular, During Hospitalization, Coral Berry MD  •  potassium chloride (MICRO-K) CR capsule 40 mEq, 40 mEq, Oral, Daily, Coral Berry MD, 40 mEq at 01/17/18 0856  •  potassium chloride 30 mEq in sodium chloride 0.9 % 250 mL IVPB, , Intravenous, Q3H, Bridget Ortega MD  •  risperiDONE (risperDAL) tablet 1 mg, 1 mg, Oral, Nightly, Coral Berry MD, 1 mg at 01/16/18 2057  •  sodium chloride 0.9 % flush 1-10 mL, 1-10 mL, Intravenous, PRN, Coral Berry MD  •  sodium chloride 0.9 % flush 10 mL, 10 mL, Intravenous, PRN, Toro Shine MD, 10 mL at 01/15/18 2048     Diagnostic Data      I reviewed the patient's new clinical results and imaging.      Assessment/Plan     Active Hospital Problems (** Indicates Principal Problem)    Diagnosis Date Noted   • **Pneumonia of left lower lobe due to Streptococcus pneumoniae [J13] 01/11/2018     On Levaquin with falling WBC and no fever.     • Oral thrush [B37.0] 01/17/2018   • Laryngitis, acute [J04.0] 01/17/2018   • Hypoxic encephalopathy [G93.1] 01/16/2018     Patient still requiring nasal cannula or venturi mask.  Last ABG on NC still showed significant hypoxia     • Rheumatic mitral stenosis [I05.0] 01/15/2018     Cards Consultation     • Acute diastolic CHF (congestive heart failure) [I50.31] 01/15/2018     Lasix  Cards consultation       • Encephalopathy, metabolic [G93.41] 01/15/2018     Replace electrolytes   Replace Folic Acid     • Pulmonary hypertension due to left heart valvular disease [I27.22, I38] 01/15/2018   • Cor  pulmonale, chronic [I27.81] 01/15/2018   • Folic acid deficiency [E53.8] 01/15/2018     Replace orally     • Pulmonary hypertension [I27.20] 01/14/2018     Cards appreciated.      • Rheumatic tricuspid valve regurgitation [I07.1] 01/14/2018   • Hyponatremia [E87.1] 01/13/2018     Fluctuating  Monitor closely     • Cerebral microvascular disease [I67.9] 01/13/2018     Per CT head  Patient more confused today with slurring of speech. Have to consider acute CVA.   Will obtain MRI brain.      • Hypokalemia [E87.6] 01/10/2018     Replace Orally and IV  Also replace magnesium       • Weakness [R53.1] 01/10/2018     PT/OT     • Acute respiratory failure with hypoxia [J96.01] 01/10/2018     NC oxygen, steroids, and neb treatements     • Fall with injury [W19.XXXA] 08/15/2017     PT/OT       • Tobacco dependence [F17.200]      Nicotine patch 21mg transdermally     • Gastroesophageal reflux disease [K21.9]      Pepcid     • Iron deficiency anemia [D50.9]      Continue feosol        Resolved Hospital Problems    Diagnosis Date Noted Date Resolved   • Anemia [D64.9] 01/14/2018 01/15/2018     Start iron supplementation.  Obtain iron studies.      • Elevated brain natriuretic peptide (BNP) level [R79.89] 01/14/2018 01/15/2018     IVF's discontinued, Fluid Restrictions, Low Na diet. Echocardiogram ordered. Will start Lasix 20 mg po bid and monitor.     • Elevated troponin [R74.8] 01/14/2018 01/15/2018     Most likely secondary to elevated BNP. No chest pain or anginal equivalent. Will monitor.     • Family dynamics problem [Z63.9] 01/14/2018 01/16/2018     Will discuss with case case management.      • Positive D dimer [R79.89] 01/14/2018 01/15/2018     Most likely elevated due to pneumonia and elevated BNP. No clinical signs of PE.     • Hyperkalemia [E87.5] 01/13/2018 01/14/2018     Decrease KCL and monitor     • Community acquired bacterial pneumonia [J15.9] 01/10/2018 01/11/2018     Left lower lobe  Blood culture, sputum  culture, check atypicals.   Levaquin as PCN allergy     • Hyponatremia [E87.1] 01/10/2018 01/11/2018     IV fluids     • Essential hypertension [I10]  01/14/2018     Losartan, coreg     • Depressive disorder [F32.9]  01/14/2018     Prozac, Elavil, and Risperdal       Replace potassium with IV after failing to raise with oral.  Check mag level.  Treat for thrush with magic mouth with nystatin.  Hopefully discharge in the next day or so.  Check Urinalysis and culture for dysuria.    DVT prophylaxis: SCDs/TEDs      Disposition:Nursing Home          This document has been electronically signed by Bridget Ortega MD on January 17, 2018 9:34 AM          This document has been electronically signed by Bridget Ortega MD on January 17, 2018 9:33 AM

## 2018-01-17 NOTE — PLAN OF CARE
Problem: Patient Care Overview (Adult)  Goal: Plan of Care Review  Outcome: Ongoing (interventions implemented as appropriate)   01/17/18 1047   Coping/Psychosocial Response Interventions   Plan Of Care Reviewed With patient   Patient Care Overview   Progress progress toward functional goals as expected   Outcome Evaluation   Outcome Summary/Follow up Plan OT tx completed. Pt. remains very lethargic and disoriented and severe decreased safety awareness. Pt. completed bed mobility mod A with severe posterior leaning and decreased alertness. Pt. Min/Mod A with r/w to complete sit to stand t/f/ Pt. completed AAROM sitting EOB with assist to complete full ROM. Pt. cont to benefit from skilled OT d/t decreased balance, decreased strength, decreased cognition, decreased ind in ADLs. 1/17 7591

## 2018-01-17 NOTE — SIGNIFICANT NOTE
01/17/18 1431   Rehab Treatment   Discipline physical therapy assistant   Treatment Not Performed other (see comments)  (due to pts low K and still being lethargic and disoriented, PTA deferred Tx at this time.  PT will follow up in AM)

## 2018-01-17 NOTE — CONSULTS
Adult Nutrition  Assessment    Patient Name:  Michelle Child  YOB: 1955  MRN: [id number]  Admit Date:  1/10/2018    Assessment Date:  1/17/2018    Comments:  Pt sleeping soundly.  Both family and nursing report pt has had minimal PO intake, will drink a few sips of Boost.  Pt has also developed thrush.  Will add yogurt on trays with probiotics.  K+ now low.  RD will monitor.          Reason for Assessment       01/17/18 1536    Reason for Assessment    Reason For Assessment/Visit follow up protocol                Nutrition/Diet History       01/17/18 1537    Nutrition/Diet History    Typical Food/Fluid Intake Pt sleeping.  Both family and nursing report pt has not eaten much, only a few sips of Boost.              Labs/Tests/Procedures/Meds       01/17/18 1537    Labs/Tests/Procedures/Meds    Labs/Tests Review Reviewed;K+    Medication Review Reviewed, pertinent;Diuretic                Nutrition Prescription Ordered       01/17/18 1538    Nutrition Prescription PO    Current PO Diet Regular    Common Modifiers Low Sodium;Cardiac    Low Sodium Details 2,000 mg Sodium            Evaluation of Received Nutrient/Fluid Intake       01/17/18 1539    PO Evaluation    Number of Days PO Intake Evaluated 2 days    % PO Intake 0-25            Electronically signed by:  Nikkie Davis RD  01/17/18 3:40 PM

## 2018-01-17 NOTE — THERAPY TREATMENT NOTE
Acute Care - Occupational Therapy Treatment Note  [facility]     Patient Name: Michelle Child  : 1955  MRN: [id number]  Today's Date: 2018  Onset of Illness/Injury or Date of Surgery Date: 01/10/18  Date of Referral to OT: 01/10/18  Referring Physician: Dr Berry      Admit Date: 1/10/2018    Visit Dx:     ICD-10-CM ICD-9-CM   1. Hypokalemia E87.6 276.8   2. Pneumonia of left lung due to infectious organism, unspecified part of lung J18.9 486   3. Fall, initial encounter W19.XXXA E888.9   4. Contusion of scalp, initial encounter S00.03XA 920   5. Decreased activities of daily living (ADL) Z78.9 V49.89   6. Impaired physical mobility Z74.09 781.99     Patient Active Problem List   Diagnosis   • Tobacco dependence   • Migraine   • Iron deficiency anemia   • Gastroesophageal reflux disease   • Coronary arteriosclerosis   • Bilateral pseudophakia   • Astigmatism   • Right humeral fracture   • Fall with injury   • Closed fracture of humerus   • Cause of injury, fall   • Closed fracture of proximal end of right humerus with routine healing   • Hypokalemia   • Weakness   • Acute respiratory failure with hypoxia   • Pneumonia of left lower lobe due to Streptococcus pneumoniae   • Hyponatremia   • Cerebral microvascular disease   • Pulmonary hypertension   • Rheumatic tricuspid valve regurgitation   • Rheumatic mitral stenosis   • Acute diastolic CHF (congestive heart failure)   • Encephalopathy, metabolic   • Pulmonary hypertension due to left heart valvular disease   • Cor pulmonale, chronic   • Folic acid deficiency   • Hypoxic encephalopathy   • Oral thrush   • Laryngitis, acute   • Dysuria             Adult Rehabilitation Note       18 0908 18 1410 18 1300    Rehab Assessment/Intervention    Discipline occupational therapist  -NN occupational therapy assistant  -LM physical therapy assistant  -SY    Document Type therapy note (daily note)  -NN therapy note (daily note)  -GREGORY  therapy note (daily note)  -SY    Subjective Information unable to respond  -NN unable to respond   unable to respond but nodded to in bed rom pt confused  -LM unable to respond  -SY    Patient Effort, Rehab Treatment  --   pt sitter/family stated pt been trying climb oob relayed-RN  -GREGORY fair  -SY    Specific Treatment Considerations   pt too lethargic for progressive PT at this time, however she needed pericare and pad changed  -SY    Recorded by [NN] Janet Garibay, OTR/L [LM] Melyssa Bell CAN/L [SY] Sadia French, KIN    Vital Signs    Pre Systolic BP Rehab   108  -SY    Pre Treatment Diastolic BP   57  -SY    Pretreatment Heart Rate (beats/min) 77  -NN      Intratreatment Heart Rate (beats/min) 76  -NN      Posttreatment Heart Rate (beats/min) 77  -NN      Pre SpO2 (%) 98  -NN  76  -YS    O2 Delivery Pre Treatment supplemental O2  -NN      Intra SpO2 (%) 97  -NN      O2 Delivery Intra Treatment supplemental O2  -NN      Post SpO2 (%) 98  -NN      O2 Delivery Post Treatment supplemental O2  -NN      Pre Patient Position Supine  -NN  Supine  -SY    Intra Patient Position Sitting  -NN  Supine  -SY    Post Patient Position Supine  -NN  Supine  -SY    Recorded by [NN] Janet Garibay, OTR/L  [SY] Sadia French, KIN    Pain Assessment    Pain Assessment Unable to assess  -NN Unable to assess  -LM Unable to assess  -SY    Recorded by [NN] Janet Garibay, OTR/L [LM] JUDE WongA/L [SY] Sadia French, KIN    Cognitive Assessment/Intervention    Current Cognitive/Communication Assessment impaired  -NN could not assess  -LM could not assess  -SY    Orientation Status unable/difficult to assess   very lethargic and would not answer appropriate  -NN  other (see comments)   pt lethargic and would not respond  -SY    Follows Commands/Answers Questions 25% of the time;able to follow single-step instructions;needs cueing;needs increased time;needs repetition  -NN  unable to answer questions  -SY     Personal Safety severe impairment;decreased awareness, need for assist;decreased insight to deficits;decreased awareness, need for safety  -NN  unable/difficult to assess  -    Personal Safety Interventions fall prevention program maintained;gait belt;muscle strengthening facilitated;supervised activity;nonskid shoes/slippers when out of bed  -  fall prevention program maintained  -SY    Recorded by [NN] Janet Garibay, OTR/L [LM] Melyssa Bell CAN/L [SY] Sadia French PTA    Bed Mobility, Assessment/Treatment    Bed Mobility, Assistive Device bed rails;head of bed elevated  -NN      Bed Mobility, Roll Left, [place]   maximum assist (25% patient effort);dependent (less than 25% patient effort)  -SY    Bed Mobility, Roll Right, [place]   maximum assist (25% patient effort);dependent (less than 25% patient effort)  -SY    Bed Mobility, Scoot/Bridge, [place] moderate assist (50% patient effort)  -NN  dependent (less than 25% patient effort)  -    Bed Mob, Supine to Sit, [place] moderate assist (50% patient effort)  -NN      Bed Mob, Sit to Supine, [place] moderate assist (50% patient effort)  -NN      Bed Mobility, Safety Issues decreased use of arms for pushing/pulling;decreased use of legs for bridging/pushing;cognitive deficits limit understanding  -      Bed Mobility, Impairments strength decreased;impaired balance;postural control impaired  -      Bed Mobility, Comment Pt. VERY lethargic and disoriented limiting ability to safely complete bed mob without assistance.   -NN      Recorded by [NN] Janet Garibay, OTR/L  [SY] Sadia French PTA    Transfer Assessment/Treatment    Transfers, Sit-Stand [place] verbal cues required;nonverbal cues required (demo/gesture);moderate assist (50% patient effort);minimum assist (75% patient effort)  -NN      Transfers, Stand-Sit [place] verbal cues required;nonverbal cues required (demo/gesture);minimum assist (75%  patient effort);moderate assist (50% patient effort)  -NN      Transfers, Sit-Stand-Sit, Assist Device rolling walker  -NN      Transfer, Safety Issues step length decreased  -NN      Transfer, Impairments strength decreased;impaired balance;postural control impaired  -NN      Transfer, Comment Pt. has severe posterior leaning and will not keep eyes open even with multiple vc/tc  -NN  too lethargic for OOB   -SY    Recorded by [NN] Janet Garibay, OTR/L  [SY] Sadia French, PTA    Functional Mobility    Functional Mobility- Comment Pt. was cued to takes side steps and she would not keep eyes open  -NN      Recorded by [NN] Janet Garibay, OTR/L      Motor Skills/Interventions    Additional Documentation Balance Skills Training (Group)  -NN      Recorded by [NN] Janet Garibay OTR/L      Balance Skills Training    Sitting-Level of Assistance Minimum assistance;Contact guard  -NN      Sitting-Balance Support Right upper extremity supported;Left upper extremity supported;Feet supported  -NN      Sitting-Balance Activities Lateral lean;Forward lean;Reaching across midline;Right UE Weight Bearing;Left UE Weight Bearing  -NN      Standing-Level of Assistance Minimum assistance;Moderate assistance  -NN      Static Standing Balance Support assistive device  -NN      Recorded by [NN] Janet Garibay, OTR/L      Therapy Exercises    Bilateral Lower Extremities   PROM:;10 reps;supine;ankle pumps/circles;heel slides;hip ER;hip IR  -SY    Bilateral Upper Extremity AAROM:;10 reps;sitting;elbow flexion/extension;hand pumps;pronation/supination;shoulder extension/flexion  -NN PROM:;10 reps;elbow flexion/extension;shoulder horizontal abd/add;shoulder extension/flexion;shoulder abduction/adduction;pronation/supination   wrist flex ext  -LM     Recorded by [NN] Janet Garibay, OTR/L [LM] Melyssa Bell CAN/L [SY] Sadia French, PTA    Positioning and Restraints    Pre-Treatment Position in bed  -NN in bed  -LM in bed   -SY    Post Treatment Position bed  -NN bed  -LM bed  -SY    In Bed fowlers;call light within reach;encouraged to call for assist;exit alarm on;with family/caregiver;side rails up x2;legs elevated  -NN  supine  -SY    Recorded by [NN] Janet Garibay, OTR/L [LM] JUDE WongA/L [SY] Sadia French, PTA      01/15/18 1100 01/15/18 0610       Rehab Assessment/Intervention    Discipline physical therapy assistant  -CH occupational therapy assistant  -BB     Document Type therapy note (daily note)  -CH therapy note (daily note)  -BB     Subjective Information agree to therapy;no complaints  -CH agree to therapy  -BB     Patient Effort, Rehab Treatment adequate  -CH adequate  -BB     Symptoms Noted Comment  --   pt very confused  -BB     Precautions/Limitations fall precautions;oxygen therapy device and L/min  -CH      Recorded by [CH] Nichelle Rahman PTA [BB] JUDE gNoA/L     Vital Signs    Pre Systolic BP Rehab 150  -  -BB     Pre Treatment Diastolic BP 78  -CH 68  -BB     Post Systolic BP Rehab 150  -CH      Post Treatment Diastolic BP 94  -CH      Pretreatment Heart Rate (beats/min) 80  -CH 63  -BB     Posttreatment Heart Rate (beats/min) 78  -CH 76  -BB     Pre SpO2 (%) 96  -CH 94  -BB     O2 Delivery Pre Treatment supplemental O2  -CH supplemental O2  -BB     Post SpO2 (%) 95  -CH 94  -BB     O2 Delivery Post Treatment supplemental O2  -CH supplemental O2  -BB     Pre Patient Position Supine  -CH      Intra Patient Position Standing  -CH      Post Patient Position Supine  -CH      Recorded by [CH] Nichelle Rahman PTA [BB] JUDE NgoA/L     Pain Assessment    Pain Assessment No/denies pain  -CH No/denies pain  -BB     Recorded by [CH] Nichelle Rahman PTA [BB] JUDE NgoA/L     Vision Assessment/Intervention    Visual Impairment WNL;WFL  -CH      Recorded by [CH] Nichelle Rahman PTA      Cognitive Assessment/Intervention    Current  Cognitive/Communication Assessment impaired  - impaired  -BB     Orientation Status oriented to;person     - oriented to;person  -BB     Follows Commands/Answers Questions 75% of the time;able to follow single-step instructions;needs cueing;needs increased time;needs repetition  - 75% of the time  -     Personal Safety decreased awareness, need for assist;decreased awareness, need for safety;mild impairment  - decreased awareness, need for assist;decreased awareness, need for safety  -     Personal Safety Interventions fall prevention program maintained;gait belt;muscle strengthening facilitated;nonskid shoes/slippers when out of bed;supervised activity  - fall prevention program maintained;nonskid shoes/slippers when out of bed  -BB     Recorded by [CH] Nichelle Rahman, PTA [BB] PAMELLA Ngo/L     Bed Mobility, Assessment/Treatment    Bed Mobility, Assistive Device bed rails;head of bed elevated  - bed rails;head of bed elevated  -     Bed Mobility, Roll Left, [place] not tested  -      Bed Mobility, Roll Right, [place] not tested  -      Bed Mobility, Scoot/Bridge, [place] contact guard assist  -      Bed Mob, Supine to Sit, [place] contact guard assist  - contact guard assist  -BB     Bed Mob, Sit to Supine, [place] contact guard assist  - contact guard assist  -BB     Bed Mobility, Safety Issues  cognitive deficits limit understanding  -BB     Bed Mobility, Impairments  strength decreased  -BB     Recorded by [CH] Nichelle Rahman, PTA [BB] JUDE NgoA/L     Transfer Assessment/Treatment    Transfers, Sit-Stand [place] minimum assist (75% patient effort)  -      Transfers, Stand-Sit [place] minimum assist (75% patient effort)  -      Transfers, Sit-Stand-Sit, Assist Device rolling walker  -      Transfer, Comment pt with posterior lean once standing and required vc's to stand upright  -      Recorded by [CH]  Nichelle Rahman, PTA      Gait Assessment/Treatment    Gait, [place] Level minimum assist (75% patient effort);contact guard assist;2 person assist required  -      Gait, Assistive Device rolling walker  -      Gait, Distance (Feet) 60  -      Gait, Safety Issues balance decreased during turns;loses balance backward  -      Recorded by [CH] Nichelle Rahman, PTA      Stairs Assessment/Treatment    Stairs, [place] Level not tested  -CH      Recorded by [CH] Nichelle Rahman, PTA      Upper Body Bathing Assessment/Training    UB Bathing Assess/Train Assistive Device  --   pan bath  -BB     UB Bathing Assess/Train, Position  sitting  -BB     UB Bathing Assess/Train, [place] Level  contact guard assist  -BB     UB Bathing Assess/Train, Impairments  strength decreased  -BB     Recorded by  [BB] JUDE NgoA/L     Lower Body Bathing Assessment/Training    LB Bathing Assess/Train Assistive Device  --   pan bath  -BB     LB Bathing Assess/Train, Position  sitting  -BB     LB Bathing Assess/Train, [place] Level  contact guard assist  -BB     LB Bathing Assess/Train, Impairments  strength decreased  -BB     Recorded by  [BB] Bibi Chester CAN/L     Upper Body Dressing Assessment/Training    UB Dressing Assess/Train, Clothing Type  doffing:;donning:;hospital gown  -BB     UB Dressing Assess/Train, Position  sitting  -BB     UB Dressing Assess/Train, [place]  moderate assist (50% patient effort)  -BB     UB Dressing Assess/Train, Impairments  strength decreased  -BB     Recorded by  [BB] JUDE NgoA/L     Lower Body Dressing Assessment/Training    LB Dressing Assess/Train, Clothing Type  doffing:;donning:;socks  -BB     LB Dressing Assess/Train, Position  sitting  -BB     LB Dressing Assess/Train, [place]  moderate assist (50% patient effort)  -BB     LB Dressing Assess/Train, Impairments  strength decreased;impaired balance  -BB     Recorded by  [BB]  PAMELLA Ngo/L     Grooming Assessment/Training    Grooming Assess/Train, Position  sitting  -BB     Grooming Assess/Train, Indepen Level  moderate assist (50% patient effort)  -BB     Grooming Assess/Train, Impairments  --   ability to follow directions  -BB     Grooming Assess/Train, Comment  oral care, comb hair, wash face and hands  -BB     Recorded by  [BB] PAMELLA Ngo/L     Balance Skills Training    Sitting-Level of Assistance  Close supervision  -BB     Sitting-Balance Support  Feet supported  -BB     Recorded by  [BB] PAMELLA Ngo/L     Therapy Exercises    Bilateral Lower Extremities AROM:;20 reps;supine;ankle pumps/circles;heel slides;hip abduction/adduction;SAQ  -CH      Bilateral Upper Extremity  AROM:;5 reps;10 reps;elbow flexion/extension;hand pumps;pronation/supination;shoulder rolls/shrugs   wrist flexion/extension, max verbal cues, increased time  -BB     Recorded by [CH] Nichelle Rahman PTA [BB] PAMELLA Ngo/L     Positioning and Restraints    Pre-Treatment Position in bed  -CH in bed  -BB     Post Treatment Position bed  -CH bed  -BB     In Bed supine;call light within reach;encouraged to call for assist;exit alarm on;side rails up x3  -CH supine;call light within reach;encouraged to call for assist;exit alarm on  -BB     Recorded by [CH] Nichelle Rahman PTA [BB] PAMELLA Ngo/L       User Key  (r) = Recorded By, (t) = Taken By, (c) = Cosigned By    Initials Name Effective Dates    SY Sadia French, PTA 10/17/16 -     CH Nichelle Rahman, KIN 10/17/16 -     BB Bibi Chester CAN/L 10/17/16 -     LM Melyssa Bell, CAN/L 10/17/16 -     NN Janet Garibay, OTR/L 11/08/17 -                 OT Goals       01/16/18 1457 01/15/18 1020 01/14/18 1529    Transfer Training OT LTG    Transfer Training OT LTG, Date Goal Reviewed 01/16/18  -LM 01/15/18  -BB 01/14/18  -LW    Transfer Training OT LTG, Outcome goal ongoing  -LM  goal  ongoing  -LW    Strength OT LTG    Strength Goal OT LTG, Date Goal Reviewed 01/16/18  -LM 01/15/18  -BB 01/14/18  -LW    Strength Goal OT LTG, Outcome goal ongoing  -LM  goal ongoing  -LW    ADL OT LTG    ADL OT LTG, Date Goal Reviewed 01/16/18  -LM 01/15/18  -BB 01/14/18  -LW    ADL OT LTG, Outcome goal ongoing  -LM  goal ongoing  -LW      01/12/18 1236 01/11/18 0856       Transfer Training OT LTG    Transfer Training OT LTG, Date Established  01/11/18  -NN     Transfer Training OT LTG, Time to Achieve  by discharge  -NN     Transfer Training OT LTG, Activity Type  all transfers  -NN     Transfer Training OT LTG, [place] Level  independent  -NN     Transfer Training OT LTG, Date Goal Reviewed 01/12/18  -LW      Transfer Training OT LTG, Outcome goal ongoing  -LW      Strength OT LTG    Strength Goal OT LTG, Date Established  01/11/18  -NN     Strength Goal OT LTG, Time to Achieve  by discharge  -NN     Strength Goal OT LTG, Measure to Achieve  Pt. will complete BUE strengthening exercises all planes and joints to increase BUE strength to 5/5 for ADLs.   -NN     Strength Goal OT LTG, Date Goal Reviewed 01/12/18  -LW      Strength Goal OT LTG, Outcome goal ongoing  -LW      ADL OT LTG    ADL OT LTG, Date Established  01/11/18  -NN     ADL OT LTG, Time to Achieve  by discharge  -NN     ADL OT LTG, Activity Type  ADL skills  -NN     ADL OT LTG, [place] Level  modified independent  -NN     ADL OT LTG, Additional Goal  AE PRN  -NN     ADL OT LTG, Date Goal Reviewed 01/12/18  -LW      ADL OT LTG, Outcome goal ongoing  -LW        User Key  (r) = Recorded By, (t) = Taken By, (c) = Cosigned By    Initials Name Provider Type    FINA Chester, CAN/L Occupational Therapy Assistant    GREGORY Bell, CAN/L Occupational Therapy Assistant    MARTIN Garcia, CAN/L Occupational Therapy Assistant    NN Janet Garibay, OTR/L Occupational Therapist          Occupational Therapy Education     Title: PT  OT SLP Therapies (Active)     Topic: Occupational Therapy (Done)     Point: ADL training (Done)    Description: Instruct learner(s) on proper safety adaptation and remediation techniques during self care or transfers.   Instruct in proper use of assistive devices.    Learning Progress Summary    Learner Readiness Method Response Comment Documented by Status   Patient Acceptance E VU,NR Pt. and son educated on role of OT, safe t/f, benefit of therapy, upright posture, progression with poc NN 01/17/18 1046 Done    Nonacceptance E NL  BB 01/15/18 1020 Active    Acceptance E VU  LW 01/14/18 1528 Done    Acceptance E VU,NR Pt. educated on role of OT, safe bed mobility, benefit of OOB activity, progression with poc NN 01/13/18 1036 Done    Acceptance E VU  LW 01/12/18 1236 Done    Acceptance E NR Pt. educated on role of OT, safe t/f, calling for assistance, benefit of therapy, correcting posture, progression with poc NN 01/11/18 0854 Active   Family Acceptance E VU,NR Pt. and son educated on role of OT, safe t/f, benefit of therapy, upright posture, progression with poc NN 01/17/18 1046 Done               Point: Home exercise program (Done)    Description: Instruct learner(s) on appropriate technique for monitoring, assisting and/or progressing therapeutic exercises/activities.    Learning Progress Summary    Learner Readiness Method Response Comment Documented by Status   Patient Acceptance E VU,NR Pt. and son educated on role of OT, safe t/f, benefit of therapy, upright posture, progression with poc NN 01/17/18 1046 Done    Acceptance E VU  LW 01/14/18 1528 Done    Acceptance E VU,NR Pt. educated on role of OT, safe bed mobility, benefit of OOB activity, progression with poc NN 01/13/18 1036 Done    Acceptance E VU  LW 01/12/18 1236 Done    Acceptance E NR Pt. educated on role of OT, safe t/f, calling for assistance, benefit of therapy, correcting posture, progression with poc NN 01/11/18 0854 Active   Family Acceptance  E FRANCISCO,NR Pt. and son educated on role of OT, safe t/f, benefit of therapy, upright posture, progression with poc NN 01/17/18 1046 Done               Point: Body mechanics (Done)    Description: Instruct learner(s) on proper positioning and spine alignment during self-care, functional mobility activities and/or exercises.    Learning Progress Summary    Learner Readiness Method Response Comment Documented by Status   Patient Acceptance E VU,NR Pt. and son educated on role of OT, safe t/f, benefit of therapy, upright posture, progression with poc NN 01/17/18 1046 Done    Nonacceptance E NL  BB 01/15/18 1020 Active    Acceptance E VU  LW 01/14/18 1528 Done    Acceptance E VU,NR Pt. educated on role of OT, safe bed mobility, benefit of OOB activity, progression with poc NN 01/13/18 1036 Done    Acceptance E VU  LW 01/12/18 1236 Done    Acceptance E NR Pt. educated on role of OT, safe t/f, calling for assistance, benefit of therapy, correcting posture, progression with poc NN 01/11/18 0854 Active   Family Acceptance E FRANCISCO,NR Pt. and son educated on role of OT, safe t/f, benefit of therapy, upright posture, progression with poc  01/17/18 1046 Done                      User Key     Initials Effective Dates Name Provider Type Discipline     10/17/16 -  PAMELLA Ngo/L Occupational Therapy Assistant OT     10/17/16 -  PAMELLA Wade/L Occupational Therapy Assistant OT     11/08/17 -  MAIK Sheffield/L Occupational Therapist OT                  OT Recommendation and Plan  Anticipated Discharge Disposition: home with home health, home with assist, home with 24/7 care  Planned Therapy Interventions: activity intolerance, ADL retraining, balance training, bed mobility training, energy conservation, home exercise program, strengthening, transfer training  Therapy Frequency:  (3-14x/week)  Plan of Care Review  Plan Of Care Reviewed With: patient  Progress: progress toward functional goals as  expected  Outcome Summary/Follow up Plan: OT tx completed. Pt. remains very lethargic and disoriented  and severe decreased safety awareness. Pt. completed bed mobility  mod A with severe posterior leaning and decreased alertness. Pt. Min/Mod A with r/w to complete sit to stand t/f/ Pt. completed  AAROM sitting EOB with assist to complete full ROM. Pt. cont to benefit from skilled OT d/t decreased balance, decreased strength, decreased cognition, decreased ind in ADLs. 1/17 0940        Outcome Measures       01/16/18 1300 01/15/18 1100 01/15/18 0610    How much help from another person do you currently need...    Turning from your back to your side while in flat bed without using bedrails? 3  -SY 3  -CH     Moving from lying on back to sitting on the side of a flat bed without bedrails? 3  -SY 3  -CH     Moving to and from a bed to a chair (including a wheelchair)? 3  -SY 3  -CH     Standing up from a chair using your arms (e.g., wheelchair, bedside chair)? 3  -SY 3  -CH     Climbing 3-5 steps with a railing? 2  -SY 2  -CH     To walk in hospital room? 3  -SY 3  -CH     AM-PAC 6 Clicks Score 17  -SY 17  -CH     How much help from another is currently needed...    Putting on and taking off regular lower body clothing?   2  -BB    Bathing (including washing, rinsing, and drying)   2  -BB    Toileting (which includes using toilet bed pan or urinal)   2  -BB    Putting on and taking off regular upper body clothing   3  -BB    Taking care of personal grooming (such as brushing teeth)   3  -BB    Eating meals   4  -BB    Score   16  -BB    Functional Assessment    Outcome Measure Options  AM-PAC 6 Clicks Basic Mobility (PT)  -CH       User Key  (r) = Recorded By, (t) = Taken By, (c) = Cosigned By    Initials Name Provider Type    SY French PTA Physical Therapy Assistant    LAURI Rahman PTA Physical Therapy Assistant    PAMELLA Henderson/L Occupational Therapy Assistant           Time  Calculation:         Time Calculation- OT       01/17/18 1051          Time Calculation- OT    OT Start Time 0908  -NN      OT Stop Time 0940  -NN      OT Time Calculation (min) 32 min  -NN      Total Timed Code Minutes- OT 32 minute(s)  -NN      OT Received On 01/17/18  -        User Key  (r) = Recorded By, (t) = Taken By, (c) = Cosigned By    Initials Name Provider Type    NN Janet Garibay OTR/L Occupational Therapist           Therapy Charges for Today     Code Description Service Date Service Provider Modifiers Qty    50276452993 HC OT THERAPEUTIC ACT EA 15 MIN 1/17/2018 Janet Garibay OTR/L GO 1    56370848666 HC OT SELF CARE/MGMT/TRAIN EA 15 MIN 1/17/2018 Janet Garibay OTR/L GO 1          OT G-codes  OT Functional Scales Options: AM-PAC 6 Clicks Daily Activity (OT)  Functional Limitation: Self care  Self Care Current Status (): At least 40 percent but less than 60 percent impaired, limited or restricted  Self Care Goal Status (): At least 20 percent but less than 40 percent impaired, limited or restricted    Janet Garibay OTR/L  1/17/2018

## 2018-01-17 NOTE — PLAN OF CARE
Problem: Pneumonia (Adult)  Goal: Signs and Symptoms of Listed Potential Problems Will be Absent or Manageable (Pneumonia)  Outcome: Ongoing (interventions implemented as appropriate)   01/16/18 2013   Pneumonia   Problems Assessed (Pneumonia) all       Problem: Fall Risk (Adult)  Goal: Absence of Falls  Outcome: Ongoing (interventions implemented as appropriate)      Problem: Fluid Volume Deficit (Adult)  Goal: Fluid/Electrolyte Balance  Outcome: Ongoing (interventions implemented as appropriate)

## 2018-01-18 PROBLEM — N30.80 BACTERIAL CYSTITIS: Status: ACTIVE | Noted: 2018-01-18

## 2018-01-18 LAB
ALBUMIN SERPL-MCNC: 2.6 G/DL (ref 3.4–4.8)
ALBUMIN/GLOB SERPL: 1 G/DL (ref 1.1–1.8)
ALP SERPL-CCNC: 95 U/L (ref 38–126)
ALT SERPL W P-5'-P-CCNC: 37 U/L (ref 9–52)
ANION GAP SERPL CALCULATED.3IONS-SCNC: 6 MMOL/L (ref 5–15)
AST SERPL-CCNC: 23 U/L (ref 14–36)
BASOPHILS # BLD AUTO: 0.01 10*3/MM3 (ref 0–0.2)
BASOPHILS NFR BLD AUTO: 0.1 % (ref 0–2)
BILIRUB SERPL-MCNC: 0.5 MG/DL (ref 0.2–1.3)
BUN BLD-MCNC: 13 MG/DL (ref 7–21)
BUN/CREAT SERPL: 19.1 (ref 7–25)
CALCIUM SPEC-SCNC: 7.8 MG/DL (ref 8.4–10.2)
CHLORIDE SERPL-SCNC: 99 MMOL/L (ref 95–110)
CO2 SERPL-SCNC: 32 MMOL/L (ref 22–31)
CREAT BLD-MCNC: 0.68 MG/DL (ref 0.5–1)
CRP SERPL-MCNC: 12.1 MG/DL (ref 0–1)
DEPRECATED RDW RBC AUTO: 48.1 FL (ref 36.4–46.3)
EOSINOPHIL # BLD AUTO: 0.05 10*3/MM3 (ref 0–0.7)
EOSINOPHIL NFR BLD AUTO: 0.5 % (ref 0–7)
ERYTHROCYTE [DISTWIDTH] IN BLOOD BY AUTOMATED COUNT: 15.9 % (ref 11.5–14.5)
GFR SERPL CREATININE-BSD FRML MDRD: 87 ML/MIN/1.73 (ref 45–104)
GLOBULIN UR ELPH-MCNC: 2.7 GM/DL (ref 2.3–3.5)
GLUCOSE BLD-MCNC: 106 MG/DL (ref 60–100)
HCT VFR BLD AUTO: 27.4 % (ref 35–45)
HGB BLD-MCNC: 8.6 G/DL (ref 12–15.5)
IMM GRANULOCYTES # BLD: 0.02 10*3/MM3 (ref 0–0.02)
IMM GRANULOCYTES NFR BLD: 0.2 % (ref 0–0.5)
LYMPHOCYTES # BLD AUTO: 0.95 10*3/MM3 (ref 0.6–4.2)
LYMPHOCYTES NFR BLD AUTO: 10.3 % (ref 10–50)
MCH RBC QN AUTO: 26.1 PG (ref 26.5–34)
MCHC RBC AUTO-ENTMCNC: 31.4 G/DL (ref 31.4–36)
MCV RBC AUTO: 83.3 FL (ref 80–98)
MONOCYTES # BLD AUTO: 0.81 10*3/MM3 (ref 0–0.9)
MONOCYTES NFR BLD AUTO: 8.8 % (ref 0–12)
NEUTROPHILS # BLD AUTO: 7.37 10*3/MM3 (ref 2–8.6)
NEUTROPHILS NFR BLD AUTO: 80.1 % (ref 37–80)
PLATELET # BLD AUTO: 230 10*3/MM3 (ref 150–450)
PMV BLD AUTO: 9.6 FL (ref 8–12)
POTASSIUM BLD-SCNC: 3.7 MMOL/L (ref 3.5–5.1)
POTASSIUM BLD-SCNC: 4.2 MMOL/L (ref 3.5–5.1)
PROT SERPL-MCNC: 5.3 G/DL (ref 6.3–8.6)
RBC # BLD AUTO: 3.29 10*6/MM3 (ref 3.77–5.16)
SODIUM BLD-SCNC: 137 MMOL/L (ref 137–145)
WBC NRBC COR # BLD: 9.21 10*3/MM3 (ref 3.2–9.8)

## 2018-01-18 PROCEDURE — 85025 COMPLETE CBC W/AUTO DIFF WBC: CPT | Performed by: FAMILY MEDICINE

## 2018-01-18 PROCEDURE — 86140 C-REACTIVE PROTEIN: CPT | Performed by: FAMILY MEDICINE

## 2018-01-18 PROCEDURE — 97535 SELF CARE MNGMENT TRAINING: CPT

## 2018-01-18 PROCEDURE — 94799 UNLISTED PULMONARY SVC/PX: CPT

## 2018-01-18 PROCEDURE — 97530 THERAPEUTIC ACTIVITIES: CPT

## 2018-01-18 PROCEDURE — 25010000002 POTASSIUM CHLORIDE PER 2 MEQ OF POTASSIUM: Performed by: FAMILY MEDICINE

## 2018-01-18 PROCEDURE — 80053 COMPREHEN METABOLIC PANEL: CPT | Performed by: FAMILY MEDICINE

## 2018-01-18 PROCEDURE — 99232 SBSQ HOSP IP/OBS MODERATE 35: CPT | Performed by: FAMILY MEDICINE

## 2018-01-18 PROCEDURE — 84132 ASSAY OF SERUM POTASSIUM: CPT | Performed by: FAMILY MEDICINE

## 2018-01-18 PROCEDURE — 94760 N-INVAS EAR/PLS OXIMETRY 1: CPT

## 2018-01-18 PROCEDURE — 97110 THERAPEUTIC EXERCISES: CPT

## 2018-01-18 RX ADMIN — IPRATROPIUM BROMIDE AND ALBUTEROL SULFATE 3 ML: 2.5; .5 SOLUTION RESPIRATORY (INHALATION) at 14:18

## 2018-01-18 RX ADMIN — LIDOCAINE HYDROCHLORIDE 5 ML: 20 SOLUTION ORAL; TOPICAL at 11:31

## 2018-01-18 RX ADMIN — FERROUS SULFATE TAB EC 324 MG (65 MG FE EQUIVALENT) 324 MG: 324 (65 FE) TABLET DELAYED RESPONSE at 09:10

## 2018-01-18 RX ADMIN — ASPIRIN 81 MG 81 MG: 81 TABLET ORAL at 09:09

## 2018-01-18 RX ADMIN — IPRATROPIUM BROMIDE AND ALBUTEROL SULFATE 3 ML: 2.5; .5 SOLUTION RESPIRATORY (INHALATION) at 19:46

## 2018-01-18 RX ADMIN — RISPERIDONE 1 MG: 1 TABLET ORAL at 20:50

## 2018-01-18 RX ADMIN — HYDROCODONE BITARTRATE AND ACETAMINOPHEN 1 TABLET: 5; 325 TABLET ORAL at 20:50

## 2018-01-18 RX ADMIN — DOXYCYCLINE 100 MG: 100 INJECTION, POWDER, LYOPHILIZED, FOR SOLUTION INTRAVENOUS at 20:58

## 2018-01-18 RX ADMIN — POTASSIUM CHLORIDE 40 MEQ: 750 CAPSULE, EXTENDED RELEASE ORAL at 09:06

## 2018-01-18 RX ADMIN — FOLIC ACID 1 MG: 1 TABLET ORAL at 09:08

## 2018-01-18 RX ADMIN — FLUOXETINE 60 MG: 20 CAPSULE ORAL at 09:08

## 2018-01-18 RX ADMIN — ACETAMINOPHEN 650 MG: 325 TABLET ORAL at 01:19

## 2018-01-18 RX ADMIN — FUROSEMIDE 20 MG: 20 TABLET ORAL at 16:52

## 2018-01-18 RX ADMIN — LOSARTAN POTASSIUM 50 MG: 50 TABLET, FILM COATED ORAL at 09:09

## 2018-01-18 RX ADMIN — DOXYCYCLINE 100 MG: 100 INJECTION, POWDER, LYOPHILIZED, FOR SOLUTION INTRAVENOUS at 09:26

## 2018-01-18 RX ADMIN — METOPROLOL SUCCINATE 25 MG: 25 TABLET, EXTENDED RELEASE ORAL at 09:09

## 2018-01-18 RX ADMIN — LIDOCAINE HYDROCHLORIDE 5 ML: 20 SOLUTION ORAL; TOPICAL at 20:57

## 2018-01-18 RX ADMIN — POTASSIUM CHLORIDE: 149 INJECTION, SOLUTION, CONCENTRATE INTRAVENOUS at 01:55

## 2018-01-18 RX ADMIN — FAMOTIDINE 40 MG: 20 TABLET, FILM COATED ORAL at 09:09

## 2018-01-18 RX ADMIN — IPRATROPIUM BROMIDE AND ALBUTEROL SULFATE 3 ML: 2.5; .5 SOLUTION RESPIRATORY (INHALATION) at 06:49

## 2018-01-18 RX ADMIN — FERROUS SULFATE TAB EC 324 MG (65 MG FE EQUIVALENT) 324 MG: 324 (65 FE) TABLET DELAYED RESPONSE at 16:51

## 2018-01-18 RX ADMIN — FUROSEMIDE 20 MG: 20 TABLET ORAL at 09:08

## 2018-01-18 RX ADMIN — LIDOCAINE HYDROCHLORIDE 5 ML: 20 SOLUTION ORAL; TOPICAL at 09:09

## 2018-01-18 RX ADMIN — NICOTINE 1 PATCH: 21 PATCH TRANSDERMAL at 16:51

## 2018-01-18 RX ADMIN — IPRATROPIUM BROMIDE AND ALBUTEROL SULFATE 3 ML: 2.5; .5 SOLUTION RESPIRATORY (INHALATION) at 11:10

## 2018-01-18 RX ADMIN — LIDOCAINE HYDROCHLORIDE 5 ML: 20 SOLUTION ORAL; TOPICAL at 16:51

## 2018-01-18 NOTE — PLAN OF CARE
Problem: Patient Care Overview (Adult)  Goal: Plan of Care Review  Outcome: Ongoing (interventions implemented as appropriate)   01/18/18 1521   Coping/Psychosocial Response Interventions   Plan Of Care Reviewed With patient   Patient Care Overview   Progress no change   Outcome Evaluation   Outcome Summary/Follow up Plan Pt completed AAROM/AROM UE exercises to increase strength and Indep with ADL's. Con't with POC.        Problem: Inpatient Occupational Therapy  Goal: Transfer Training Goal 1 LTG- OT  Outcome: Ongoing (interventions implemented as appropriate)   01/11/18 0856 01/16/18 1457 01/18/18 1521   Transfer Training OT LTG   Transfer Training OT LTG, Date Established 01/11/18 --  --    Transfer Training OT LTG, Time to Achieve by discharge --  --    Transfer Training OT LTG, Activity Type all transfers --  --    Transfer Training OT LTG, [place] Level independent --  --    Transfer Training OT LTG, Date Goal Reviewed --  --  01/18/18   Transfer Training OT LTG, Outcome --  goal ongoing --      Goal: Strength Goal LTG- OT  Outcome: Ongoing (interventions implemented as appropriate)   01/11/18 0856 01/16/18 1457 01/18/18 1521   Strength OT LTG   Strength Goal OT LTG, Date Established 01/11/18 --  --    Strength Goal OT LTG, Time to Achieve by discharge --  --    Strength Goal OT LTG, Measure to Achieve Pt. will complete BUE strengthening exercises all planes and joints to increase BUE strength to 5/5 for ADLs.  --  --    Strength Goal OT LTG, Date Goal Reviewed --  --  01/18/18   Strength Goal OT LTG, Outcome --  goal ongoing --      Goal: ADL Goal LTG- OT  Outcome: Ongoing (interventions implemented as appropriate)   01/11/18 0856 01/16/18 1457 01/18/18 1521   ADL OT LTG   ADL OT LTG, Date Established 01/11/18 --  --    ADL OT LTG, Time to Achieve by discharge --  --    ADL OT LTG, Activity Type ADL skills --  --    ADL OT LTG, [place] Level modified independent --  --    ADL OT LTG, Additional Goal  [place] PRN --  --    ADL OT LTG, Date Goal Reviewed --  --  01/18/18   ADL OT LTG, Outcome --  goal ongoing --

## 2018-01-18 NOTE — PLAN OF CARE
Problem: Patient Care Overview (Adult)  Goal: Plan of Care Review  Outcome: Ongoing (interventions implemented as appropriate)   01/18/18 1124   Outcome Evaluation   Outcome Summary/Follow up Plan Pt remains disoriented but more alert than prior tx. Pt completed bed mobility min/mod A with verbal cueing for hand placement. Pt t/f from sit-stand-sit mod A with posterior lean. Pt is making slow progress towards goals. Pt is expected to be discharged to SNF for rehab to home.       Problem: Inpatient Physical Therapy  Goal: Bed Mobility Goal STG- PT  Outcome: Ongoing (interventions implemented as appropriate)   01/11/18 0855 01/16/18 1253 01/18/18 1124   Bed Mobility PT STG   Bed Mobility PT STG, Date Established --  01/16/18 --    Bed Mobility PT STG, Time to Achieve 2 - 3 days --  --    Bed Mobility PT STG, Activity Type supine to sit/sit to supine --  --    Bed Mobility PT STG, [place] Level independent --  --    Bed Mobility PT STG, Additional Goal HOB down and no rails --  --    Bed Mobility PT STG, Date Goal Reviewed --  --  01/18/18   Bed Mobility PT STG, Outcome --  --  goal ongoing     Goal: Transfer Training Goal 1 STG- PT  Outcome: Ongoing (interventions implemented as appropriate)   01/11/18 0855 01/18/18 1032   Transfer Training PT STG   Transfer Training PT STG, Date Established 01/11/18 --    Transfer Training PT STG, Time to Achieve 2 - 3 days --    Transfer Training PT STG, Activity Type bed to chair /chair to bed;sit to stand/stand to sit --    Transfer Training PT STG, [place] Level supervision required --    Transfer Training PT STG, Assist Device (least restrictive) --    Transfer Training PT STG, Date Goal Reviewed --  01/18/18   Transfer Training PT STG, Outcome --  goal ongoing     Goal: Gait Training Goal LTG- PT  Outcome: Ongoing (interventions implemented as appropriate)   01/11/18 0855 01/18/18 1032   Gait Training PT LTG   Gait Training Goal PT LTG, Date Established 01/11/18 --     Gait Training Goal PT LTG, Time to Achieve by discharge --    Gait Training Goal PT LTG, [place] Level conditional independence --    Gait Training Goal PT LTG, Assist Device (least restrictive assistive device) --    Gait Training Goal PT LTG, Distance to Achieve 200 feet --    Gait Training Goal PT LTG, Date Goal Reviewed --  01/18/18   Gait Training Goal PT LTG, Outcome --  goal ongoing     Goal: Strength Goal LTG- PT  Outcome: Ongoing (interventions implemented as appropriate)   01/11/18 0855 01/18/18 1032   Strength Goal PT LTG   Strength Goal PT LTG, Date Established 01/11/18 --    Strength Goal PT LTG, Time to Achieve 4 days --    Strength Goal PT LTG, Measure to Achieve 20 reps all ex BLE actively sitting/ standing --    Strength Goal PT LTG, Date Goal Reviewed --  01/18/18   Strength Goal PT LTG, Outcome --  goal ongoing

## 2018-01-18 NOTE — THERAPY TREATMENT NOTE
Acute Care - Occupational Therapy Treatment Note  [facility]     Patient Name: Michelle Child  : 1955  MRN: [id number]  Today's Date: 2018  Onset of Illness/Injury or Date of Surgery Date: 01/10/18  Date of Referral to OT: 01/10/18  Referring Physician: Dr Berry      Admit Date: 1/10/2018    Visit Dx:     ICD-10-CM ICD-9-CM   1. Hypokalemia E87.6 276.8   2. Pneumonia of left lung due to infectious organism, unspecified part of lung J18.9 486   3. Fall, initial encounter W19.XXXA E888.9   4. Contusion of scalp, initial encounter S00.03XA 920   5. Decreased activities of daily living (ADL) Z78.9 V49.89   6. Impaired physical mobility Z74.09 781.99     Patient Active Problem List   Diagnosis   • Tobacco dependence   • Migraine   • Iron deficiency anemia   • Gastroesophageal reflux disease   • Coronary arteriosclerosis   • Bilateral pseudophakia   • Astigmatism   • Right humeral fracture   • Fall with injury   • Closed fracture of humerus   • Cause of injury, fall   • Closed fracture of proximal end of right humerus with routine healing   • Hypokalemia   • Weakness   • Acute respiratory failure with hypoxia   • Pneumonia of left lower lobe due to Streptococcus pneumoniae   • Hyponatremia   • Cerebral microvascular disease   • Pulmonary hypertension   • Rheumatic tricuspid valve regurgitation   • Rheumatic mitral stenosis   • Acute diastolic CHF (congestive heart failure)   • Encephalopathy, metabolic   • Pulmonary hypertension due to left heart valvular disease   • Cor pulmonale, chronic   • Folic acid deficiency   • Hypoxic encephalopathy   • Oral thrush   • Laryngitis, acute   • Dysuria   • Bacterial cystitis             Adult Rehabilitation Note       18 1327 18 1032 18 0730    Rehab Assessment/Intervention    Discipline occupational therapy assistant  -BB  occupational therapy assistant  -BB    Document Type therapy note (daily note)  -BB therapy note (daily note)  -SY,BM,KC2  therapy note (daily note)  -BB    Subjective Information agree to therapy  -BB agree to therapy  -SY,BM,KC2 agree to therapy  -BB    Patient Effort, Rehab Treatment fair  -BB  fair  -BB    Symptoms Noted During/After Treatment --   confusion  -BB  none  -BB    Precautions/Limitations fall precautions;oxygen therapy device and L/min  -BB  fall precautions;oxygen therapy device and L/min  -BB    Recorded by [BB] JUDE NgoA/L [SY,BM,KC2] Sadia French, KIN (r) Monse Garg, PT Student (t) Sadia French PTA (c) [BB] Bibi Chester, CAN/L    Vital Signs    Pre Systolic BP Rehab  131  -SY,BM,KC2 115  -BB    Pre Treatment Diastolic BP  69  -SY,BM,KC2 58  -BB    Intra Systolic BP Rehab   112  -BB    Intra Treatment Diastolic BP   56  -BB    Pretreatment Heart Rate (beats/min) 78  -BB 82  -SY,BM,KC2 77  -BB    Intratreatment Heart Rate (beats/min)   76  -BB    Posttreatment Heart Rate (beats/min) 83  -BB      Pre SpO2 (%) 93  -BB  97  -BB    O2 Delivery Pre Treatment supplemental O2  -BB  supplemental O2  -BB    Intra SpO2 (%)   96  -BB    O2 Delivery Intra Treatment   supplemental O2  -BB    Post SpO2 (%) 91  -BB      O2 Delivery Post Treatment supplemental O2  -BB      Pre Patient Position Supine  -BB  Supine  -BB    Intra Patient Position   Sitting  -BB    Post Patient Position Supine  -BB      Recorded by [BB] JUDE NgoA/L [SY,BM,KC2] Sadia French PTA (r) Monse Garg, PT Student (t) Sadia French PTA (c) [BB] JUDE NgoA/L    Pain Assessment    Pain Assessment No/denies pain  -BB  No/denies pain  -BB    Recorded by [BB] JUDE NgoA/L  [BB] JUDE NgoA/L    Vision Assessment/Intervention    Visual Impairment  WNL;WFL  -SY,BM,KC2     Recorded by  [SY,BM,KC2] Sadia French PTA (r) Monse Garg, PT Student (t) Sadia French PTA (c)     Cognitive Assessment/Intervention    Current Cognitive/Communication Assessment impaired  -BB impaired   -SY,BM,KC2 impaired  -BB    Orientation Status oriented to;person  -BB oriented to;person;place;disoriented to;situation  -SY,BM,KC2 oriented to;person  -BB    Follows Commands/Answers Questions 75% of the time  -BB 50% of the time;needs cueing;needs increased time;needs repetition  -GIL DAN,KC2 50% of the time  -BB    Personal Safety moderate impairment  -BB moderate impairment;decreased awareness, need for assist;decreased awareness, need for safety  -GIL DAN,KC2 severe impairment  -BB    Personal Safety Interventions fall prevention program maintained;nonskid shoes/slippers when out of bed  -BB gait belt;nonskid shoes/slippers when out of bed;supervised activity  -GIL DAN,KC2 fall prevention program maintained;nonskid shoes/slippers when out of bed  -BB    Short/Long Term Memory  unable/difficult to assess  -GIL DAN,KC2     Recorded by [BB] PAMELLA Ngo/DALTON [SY,GLI,KC2] Sadia French PTA (r) Monse Garg PT Student (t) Sadia French PTA (c) [BB] PAMELLA Ngo/L    Bed Mobility, Assessment/Treatment    Bed Mobility, Assistive Device  bed rails;head of bed elevated  -SYBM,KC2     Bed Mobility, Roll Right, [place]  contact guard assist;verbal cues required  -SYBM,KC2     Bed Mobility, Scoot/Bridge, [place]  dependent (less than 25% patient effort)  -SYBM,KC2     Bed Mob, Supine to Sit, [place] conditional independence  -BB moderate assist (50% patient effort);minimum assist (75% patient effort)  -SYBM,KC2 moderate assist (50% patient effort)  -BB    Bed Mob, Sit to Supine, [place] minimum assist (75% patient effort);moderate assist (50% patient effort)  -BB moderate assist (50% patient effort);2 person assist required  -SY,BM,KC2 moderate assist (50% patient effort)  -BB    Bed Mobility, Safety Issues   decreased use of arms for pushing/pulling;decreased use of legs for bridging/pushing;cognitive deficits limit understanding  -BB    Bed Mobility, Impairments   strength  decreased  -BB    Bed Mobility, Comment Pt sat EOB 15 min  -BB pt sat EOB ~ 25 minutes with mod A sitting balance and brief moments of SBA  -SY,BM,KC2 Pt sat EOB ~5 min  -BB    Recorded by [BB] KINGSLEY Ngo [SY,BM,KC2] Sadia French, PTA (r) Monse Garg, PT Student (t) Sadia French PTA (c) [BB] PAMELLA Ngo/L    Transfer Assessment/Treatment    Transfers, Sit-Stand [place]  moderate assist (50% patient effort);verbal cues required  -SY,BM,KC2     Transfers, Stand-Sit [place]  minimum assist (75% patient effort);verbal cues required  -SY,BM,KC2     Recorded by  [SY,BM,KC2] Sadia French, PTA (r) Monse Garg, PT Student (t) Sadia French PTA (c)     Upper Body Bathing Assessment/Training    UB Bathing Assess/Train Assistive Device   --   bath wipes  -BB    UB Bathing Assess/Train, Position   long sitting  -BB    UB Bathing Assess/Train, [place] Level   contact guard assist  -BB    UB Bathing Assess/Train, Impairments   strength decreased  -BB    Recorded by   [BB] PAMELLA Ngo/L    Lower Body Bathing Assessment/Training    LB Bathing Assess/Train Assistive Device   --   bath wipes  -BB    LB Bathing Assess/Train, Position   long sitting  -BB    LB Bathing Assess/Train, [place] Level   minimum assist (75% patient effort);moderate assist (50% patient effort)  -BB    LB Bathing Assess/Train, Impairments   strength decreased;decreased flexibility  -BB    Recorded by   [BB] PAMELLA gNo/L    Upper Body Dressing Assessment/Training    UB Dressing Assess/Train, Clothing Type   donning:;doffing:;hospital gown  -BB    UB Dressing Assess/Train, Position   long sitting  -BB    UB Dressing Assess/Train, [place]   minimum assist (75% patient effort)  -BB    UB Dressing Assess/Train, Impairments   strength decreased  -BB    Recorded by   [BB] PAMELLA Ngo/L    Lower Body Dressing Assessment/Training    LB Dressing Assess/Train, Comment    pt did not want to wear socks  -BB    Recorded by   [BB] PAMELLA Ngo/L    Grooming Assessment/Training    Grooming Assess/Train, Position sitting  -BB  sitting  -BB    Grooming Assess/Train, Indepen Level set up required;contact guard assist  -BB  contact guard assist  -BB    Grooming Assess/Train, Impairments strength decreased;impaired balance  -BB  strength decreased  -BB    Grooming Assess/Train, Comment --   oral care  -BB  wash face, hands, cpmb hair, oral care  -BB    Recorded by [BB] PAMELLA Ngo/L  [BB] PAMELLA Ngo/L    Balance Skills Training    Sitting-Level of Assistance  Moderate assistance  -SY,BM,KC2 Contact guard  -BB    Sitting-Balance Support  Feet unsupported  -SY,BM,KC2 Right upper extremity supported;Left upper extremity supported;Feet supported  -BB    Sitting-Balance Activities  --   LE exercises  -SY,BM,KC2 Lateral lean;Left UE Weight Bearing;Right UE Weight Bearing;Forward lean;Reaching for objects;Reaching across midline  -BB    Sitting # of Minutes  ~25  -SY,BM,KC2 5  -BB    Standing-Level of Assistance  Moderate assistance  -SY,BM,KC2     Static Standing Balance Support  assistive device  -SY,BM,KC2     Standing Balance # of Minutes  3-5   posterior lean   -SY,BM,KC2     Recorded by  [SY,BM,KC2] Sadia French, PTA (r) Monse Garg, PT Student (t) Sadia French PTA (c) [BB] PAMELLA Ngo/L    Therapy Exercises    Bilateral Lower Extremities  AROM:;15 reps;sitting;ankle pumps/circles;hip flexion;LAQ  -SY,BM,KC2     Bilateral Upper Extremity AROM:;AAROM:;10 reps;elbow flexion/extension;hand pumps;pronation/supination;shoulder rolls/shrugs;shoulder ER/IR   long sitting, wrist flexion/extension  -BB      Recorded by [BB] PAMELLA Ngo/DALTON [SY,BM,KC2] Sadia French, PTA (r) Monse Garg, PT Student (t) Sadia French, KIN (c)     Positioning and Restraints    Pre-Treatment Position in bed  -BB in bed  -SY,BM,KC2 in bed  -BB     Post Treatment Position bed  -BB bed  -SY,BM,KC2 bed  -BB    In Bed supine;call light within reach;encouraged to call for assist;exit alarm on  -BB supine;call light within reach;encouraged to call for assist;exit alarm on  -SY,BM,KC2 fowlers;call light within reach;exit alarm on;encouraged to call for assist  -BB    Recorded by [BB] Bibi Chester, CAN/L [SY,BM,KC2] Sadia French, KIN (r) Monse Garg PT Student (t) Sadia French PTA (c) [BB] Bibi Chester CAN/L      01/17/18 0908 01/16/18 1410 01/16/18 1300    Rehab Assessment/Intervention    Discipline occupational therapist  -NN occupational therapy assistant  -LM physical therapy assistant  -SY    Document Type therapy note (daily note)  -NN therapy note (daily note)  -LM therapy note (daily note)  -SY    Subjective Information unable to respond  -NN unable to respond   unable to respond but nodded to in bed rom pt confused  -LM unable to respond  -SY    Patient Effort, Rehab Treatment  --   pt sitter/family stated pt been trying climb oob relayed-RN  -GREGORY fair  -SY    Specific Treatment Considerations   pt too lethargic for progressive PT at this time, however she needed pericare and pad changed  -SY    Recorded by [NN] Janet Garibay, OTR/L [LM] JUDE WongA/L [SY] Sadia French, PTA    Vital Signs    Pre Systolic BP Rehab   108  -SY    Pre Treatment Diastolic BP   57  -SY    Pretreatment Heart Rate (beats/min) 77  -NN      Intratreatment Heart Rate (beats/min) 76  -NN      Posttreatment Heart Rate (beats/min) 77  -NN      Pre SpO2 (%) 98  -NN  76  -SY    O2 Delivery Pre Treatment supplemental O2  -NN      Intra SpO2 (%) 97  -NN      O2 Delivery Intra Treatment supplemental O2  -NN      Post SpO2 (%) 98  -NN      O2 Delivery Post Treatment supplemental O2  -NN      Pre Patient Position Supine  -NN  Supine  -SY    Intra Patient Position Sitting  -NN  Supine  -SY    Post Patient Position Supine  -NN  Supine  -SY    Recorded by [NN]  Janet Garibay OTR/L  [SY] Sadia French PTA    Pain Assessment    Pain Assessment Unable to assess  -NN Unable to assess  -LM Unable to assess  -SY    Recorded by [NN] Janet Garibay OTR/L [LM] PAMELLA Wong/L [SY] Sadia French PTA    Cognitive Assessment/Intervention    Current Cognitive/Communication Assessment impaired  -NN could not assess  -LM could not assess  -SY    Orientation Status unable/difficult to assess   very lethargic and would not answer appropriate  -NN  other (see comments)   pt lethargic and would not respond  -SY    Follows Commands/Answers Questions 25% of the time;able to follow single-step instructions;needs cueing;needs increased time;needs repetition  -NN  unable to answer questions  -SY    Personal Safety severe impairment;decreased awareness, need for assist;decreased insight to deficits;decreased awareness, need for safety  -NN  unable/difficult to assess  -SY    Personal Safety Interventions fall prevention program maintained;gait belt;muscle strengthening facilitated;supervised activity;nonskid shoes/slippers when out of bed  -NN  fall prevention program maintained  -SY    Recorded by [NN] Janet Garibay OTR/L [LM] PAMELLA Wong/L [SY] Sadia French PTA    Bed Mobility, Assessment/Treatment    Bed Mobility, Assistive Device bed rails;head of bed elevated  -NN      Bed Mobility, Roll Left, [place]   maximum assist (25% patient effort);dependent (less than 25% patient effort)  -SY    Bed Mobility, Roll Right, [place]   maximum assist (25% patient effort);dependent (less than 25% patient effort)  -SY    Bed Mobility, Scoot/Bridge, [place] moderate assist (50% patient effort)  -NN  dependent (less than 25% patient effort)  -SY    Bed Mob, Supine to Sit, [place] moderate assist (50% patient effort)  -NN      Bed Mob, Sit to Supine, [place] moderate assist (50% patient effort)  -NN      Bed Mobility, Safety Issues decreased use  of arms for pushing/pulling;decreased use of legs for bridging/pushing;cognitive deficits limit understanding  -NN      Bed Mobility, Impairments strength decreased;impaired balance;postural control impaired  -NN      Bed Mobility, Comment Pt. VERY lethargic and disoriented limiting ability to safely complete bed mob without assistance.   -NN      Recorded by [NN] Janet Garibay, OTR/L  [SY] Sadia French, PTA    Transfer Assessment/Treatment    Transfers, Sit-Stand [place] verbal cues required;nonverbal cues required (demo/gesture);moderate assist (50% patient effort);minimum assist (75% patient effort)  -NN      Transfers, Stand-Sit [place] verbal cues required;nonverbal cues required (demo/gesture);minimum assist (75% patient effort);moderate assist (50% patient effort)  -NN      Transfers, Sit-Stand-Sit, Assist Device rolling walker  -NN      Transfer, Safety Issues step length decreased  -NN      Transfer, Impairments strength decreased;impaired balance;postural control impaired  -NN      Transfer, Comment Pt. has severe posterior leaning and will not keep eyes open even with multiple vc/tc  -NN  too lethargic for OOB   -SY    Recorded by [NN] Janet Garibay, OTR/L  [SY] Sadia French, PTA    Functional Mobility    Functional Mobility- Comment Pt. was cued to takes side steps and she would not keep eyes open  -NN      Recorded by [NN] Janet Garibay, OTR/L      Motor Skills/Interventions    Additional Documentation Balance Skills Training (Group)  -NN      Recorded by [NN] Janet Garibay, OTR/L      Balance Skills Training    Sitting-Level of Assistance Minimum assistance;Contact guard  -NN      Sitting-Balance Support Right upper extremity supported;Left upper extremity supported;Feet supported  -NN      Sitting-Balance Activities Lateral lean;Forward lean;Reaching across midline;Right UE Weight Bearing;Left UE Weight Bearing  -NN      Standing-Level of Assistance Minimum  assistance;Moderate assistance  -NN      Static Standing Balance Support assistive device  -NN      Recorded by [NN] Janet Garibay, OTR/L      Therapy Exercises    Bilateral Lower Extremities   PROM:;10 reps;supine;ankle pumps/circles;heel slides;hip ER;hip IR  -SY    Bilateral Upper Extremity AAROM:;10 reps;sitting;elbow flexion/extension;hand pumps;pronation/supination;shoulder extension/flexion  -NN PROM:;10 reps;elbow flexion/extension;shoulder horizontal abd/add;shoulder extension/flexion;shoulder abduction/adduction;pronation/supination   wrist flex ext  -LM     Recorded by [NN] Janet Garibay, OTR/L [LM] Melyssa Bell, CAN/L [SY] Sadia French PTA    Positioning and Restraints    Pre-Treatment Position in bed  -NN in bed  -LM in bed  -SY    Post Treatment Position bed  -NN bed  - bed  -SY    In Bed fowlers;call light within reach;encouraged to call for assist;exit alarm on;with family/caregiver;side rails up x2;legs elevated  -NN  supine  -SY    Recorded by [NN] Janet Garibay, OTR/L [LM] Melyssa Bell CAN/L [SY] Sadia French PTA      User Key  (r) = Recorded By, (t) = Taken By, (c) = Cosigned By    Initials Name Effective Dates    SY Sadia French, KIN 10/17/16 -     BB Bibi Chester, CAN/L 10/17/16 -     LM Melyssa Bell, CAN/L 10/17/16 -     NN Janet Garibay, OTR/L 11/08/17 -     BM Monse Garg, PT Student 12/05/17 -                 OT Goals       01/18/18 1521 01/18/18 1141 01/16/18 1457    Transfer Training OT LTG    Transfer Training OT LTG, Date Goal Reviewed 01/18/18  -BB 01/18/18  -BB 01/16/18  -LM    Transfer Training OT LTG, Outcome   goal ongoing  -LM    Strength OT LTG    Strength Goal OT LTG, Date Goal Reviewed 01/18/18  -BB 01/18/18  -BB 01/16/18  -LM    Strength Goal OT LTG, Outcome   goal ongoing  -LM    ADL OT LTG    ADL OT LTG, Date Goal Reviewed 01/18/18  -BB 01/18/18  -BB 01/16/18  -LM    ADL OT LTG, Outcome   goal ongoing  -LM      01/15/18 1020  01/14/18 1529 01/12/18 1236    Transfer Training OT LTG    Transfer Training OT LTG, Date Goal Reviewed 01/15/18  -BB 01/14/18  -LW 01/12/18  -LW    Transfer Training OT LTG, Outcome  goal ongoing  -LW goal ongoing  -LW    Strength OT LTG    Strength Goal OT LTG, Date Goal Reviewed 01/15/18  -BB 01/14/18  -LW 01/12/18  -LW    Strength Goal OT LTG, Outcome  goal ongoing  -LW goal ongoing  -LW    ADL OT LTG    ADL OT LTG, Date Goal Reviewed 01/15/18  -BB 01/14/18  -LW 01/12/18  -LW    ADL OT LTG, Outcome  goal ongoing  -LW goal ongoing  -LW      01/11/18 0856          Transfer Training OT LTG    Transfer Training OT LTG, Date Established 01/11/18  -NN      Transfer Training OT LTG, Time to Achieve by discharge  -NN      Transfer Training OT LTG, Activity Type all transfers  -NN      Transfer Training OT LTG, [place] Level independent  -NN      Strength OT LTG    Strength Goal OT LTG, Date Established 01/11/18  -NN      Strength Goal OT LTG, Time to Achieve by discharge  -NN      Strength Goal OT LTG, Measure to Achieve Pt. will complete BUE strengthening exercises all planes and joints to increase BUE strength to 5/5 for ADLs.   -NN      ADL OT LTG    ADL OT LTG, Date Established 01/11/18  -NN      ADL OT LTG, Time to Achieve by discharge  -NN      ADL OT LTG, Activity Type ADL skills  -NN      ADL OT LTG, [place] Level modified independent  -NN      ADL OT LTG, Additional Goal AE PRN  -NN        User Key  (r) = Recorded By, (t) = Taken By, (c) = Cosigned By    Initials Name Provider Type    BB Bibi Chester, CAN/L Occupational Therapy Assistant    LM Melyssa Bell CAN/L Occupational Therapy Assistant    LW Jaz Garcia, CAN/L Occupational Therapy Assistant    DELFINA Garibay OTJAMES/L Occupational Therapist          Occupational Therapy Education     Title: PT OT SLP Therapies (Active)     Topic: Occupational Therapy (Active)     Point: ADL training (Active)    Description: Instruct  learner(s) on proper safety adaptation and remediation techniques during self care or transfers.   Instruct in proper use of assistive devices.    Learning Progress Summary    Learner Readiness Method Response Comment Documented by Status   Patient Nonacceptance E NL  BB 01/18/18 1520 Active    Acceptance E NL,NR  BB 01/18/18 1141 Active    Acceptance E VU,NR Pt. and son educated on role of OT, safe t/f, benefit of therapy, upright posture, progression with poc NN 01/17/18 1046 Done    Nonacceptance E NL  BB 01/15/18 1020 Active    Acceptance E VU  LW 01/14/18 1528 Done    Acceptance E VU,NR Pt. educated on role of OT, safe bed mobility, benefit of OOB activity, progression with poc NN 01/13/18 1036 Done    Acceptance E VU  LW 01/12/18 1236 Done    Acceptance E NR Pt. educated on role of OT, safe t/f, calling for assistance, benefit of therapy, correcting posture, progression with poc NN 01/11/18 0854 Active   Family Acceptance E VU,NR Pt. and son educated on role of OT, safe t/f, benefit of therapy, upright posture, progression with poc NN 01/17/18 1046 Done               Point: Home exercise program (Done)    Description: Instruct learner(s) on appropriate technique for monitoring, assisting and/or progressing therapeutic exercises/activities.    Learning Progress Summary    Learner Readiness Method Response Comment Documented by Status   Patient Acceptance E VU,NR Pt. and son educated on role of OT, safe t/f, benefit of therapy, upright posture, progression with poc NN 01/17/18 1046 Done    Acceptance E VU  LW 01/14/18 1528 Done    Acceptance E VU,NR Pt. educated on role of OT, safe bed mobility, benefit of OOB activity, progression with poc NN 01/13/18 1036 Done    Acceptance E VU  LW 01/12/18 1236 Done    Acceptance E NR Pt. educated on role of OT, safe t/f, calling for assistance, benefit of therapy, correcting posture, progression with poc NN 01/11/18 0854 Active   Family Acceptance E VU,NR Pt. and son  educated on role of OT, safe t/f, benefit of therapy, upright posture, progression with poc  01/17/18 1046 Done               Point: Body mechanics (Active)    Description: Instruct learner(s) on proper positioning and spine alignment during self-care, functional mobility activities and/or exercises.    Learning Progress Summary    Learner Readiness Method Response Comment Documented by Status   Patient Nonacceptance E NL   01/18/18 1520 Active    Acceptance E NL,NR   01/18/18 1141 Active    Acceptance E VU,NR Pt. and son educated on role of OT, safe t/f, benefit of therapy, upright posture, progression with poc  01/17/18 1046 Done    Nonacceptance E NL   01/15/18 1020 Active    Acceptance E VU   01/14/18 1528 Done    Acceptance E VU,NR Pt. educated on role of OT, safe bed mobility, benefit of OOB activity, progression with poc  01/13/18 1036 Done    Acceptance E VU   01/12/18 1236 Done    Acceptance E NR Pt. educated on role of OT, safe t/f, calling for assistance, benefit of therapy, correcting posture, progression with poc  01/11/18 0854 Active   Family Acceptance E VU,NR Pt. and son educated on role of OT, safe t/f, benefit of therapy, upright posture, progression with poc  01/17/18 1046 Done                      User Key     Initials Effective Dates Name Provider Type Discipline     10/17/16 -  PAMELLA Ngo/L Occupational Therapy Assistant OT     10/17/16 -  PAMELLA Wade/L Occupational Therapy Assistant OT     11/08/17 -  MAIK Sheffield/L Occupational Therapist OT                  OT Recommendation and Plan  Anticipated Discharge Disposition: home with home health, home with assist, home with 24/7 care  Planned Therapy Interventions: activity intolerance, ADL retraining, balance training, bed mobility training, energy conservation, home exercise program, strengthening, transfer training  Therapy Frequency:  (3-14x/week)  Plan of Care Review  Plan Of Care  Reviewed With: patient  Progress: no change  Outcome Summary/Follow up Plan: Pt completed AAROM/AROM UE exercises to increase strength and Indep with ADL's. Con't with POC.         Outcome Measures       01/18/18 1327 01/18/18 1032 01/18/18 0730    How much help from another person do you currently need...    Turning from your back to your side while in flat bed without using bedrails?  3  -SY (r) BM (t) SY (c)     Moving from lying on back to sitting on the side of a flat bed without bedrails?  2  -SY (r) BM (t) SY (c)     Moving to and from a bed to a chair (including a wheelchair)?  2  -SY (r) BM (t) SY (c)     Standing up from a chair using your arms (e.g., wheelchair, bedside chair)?  3  -SY (r) BM (t) SY (c)     Climbing 3-5 steps with a railing?  2  -SY (r) BM (t) SY (c)     To walk in hospital room?  2  -SY (r) BM (t) SY (c)     AM-PAC 6 Clicks Score  14  -SY (r) BM (t)     How much help from another is currently needed...    Putting on and taking off regular lower body clothing? 2  -BB  2  -BB    Bathing (including washing, rinsing, and drying) 2  -BB  2  -BB    Toileting (which includes using toilet bed pan or urinal) 2  -BB  2  -BB    Putting on and taking off regular upper body clothing 3  -BB  3  -BB    Taking care of personal grooming (such as brushing teeth) 3  -BB  3  -BB    Eating meals 3  -BB  3  -BB    Score 15  -BB  15  -BB      01/16/18 1300          How much help from another person do you currently need...    Turning from your back to your side while in flat bed without using bedrails? 3  -SY      Moving from lying on back to sitting on the side of a flat bed without bedrails? 3  -SY      Moving to and from a bed to a chair (including a wheelchair)? 3  -SY      Standing up from a chair using your arms (e.g., wheelchair, bedside chair)? 3  -SY      Climbing 3-5 steps with a railing? 2  -SY      To walk in hospital room? 3  -SY      AM-PAC 6 Clicks Score 17  -SY        User Key  (r) = Recorded  By, (t) = Taken By, (c) = Cosigned By    Initials Name Provider Type    SY Sadia French, PTA Physical Therapy Assistant    BB PAMELLA Ngo/L Occupational Therapy Assistant    BM Monse Garg, PT Student PT Student           Time Calculation:         Time Calculation- OT       01/18/18 1526 01/18/18 1145       Time Calculation- OT    OT Start Time 1327  -BB 0730  -BB     OT Stop Time 1416  -BB 0825  -BB     OT Time Calculation (min) 49 min  -BB 55 min  -BB     Total Timed Code Minutes- OT 49 minute(s)  -BB 55 minute(s)  -BB     OT Received On 01/18/18  -BB 01/18/18  -BB       User Key  (r) = Recorded By, (t) = Taken By, (c) = Cosigned By    Initials Name Provider Type    PAMELLA Henderson/L Occupational Therapy Assistant           Therapy Charges for Today     Code Description Service Date Service Provider Modifiers Qty    83928887964 HC OT SELF CARE/MGMT/TRAIN EA 15 MIN 1/18/2018 JUDE NgoA/L GO 4    34740034417 HC OT SELF CARE/MGMT/TRAIN EA 15 MIN 1/18/2018 JUDE NgoA/L GO 1    73986325342 HC OT THERAPEUTIC ACT EA 15 MIN 1/18/2018 PAMELLA Ngo/L GO 2          OT G-codes  OT Functional Scales Options: AM-PAC 6 Clicks Daily Activity (OT)  Functional Limitation: Self care  Self Care Current Status (): At least 40 percent but less than 60 percent impaired, limited or restricted  Self Care Goal Status (): At least 20 percent but less than 40 percent impaired, limited or restricted    PAMELLA Ngo/DALTON  1/18/2018

## 2018-01-18 NOTE — THERAPY TREATMENT NOTE
Acute Care - Physical Therapy Treatment Note  [facility]     Patient Name: Michelle Child  : 1955  MRN: [id number]  Today's Date: 2018  Onset of Illness/Injury or Date of Surgery Date: 01/10/18  Date of Referral to PT: 01/10/18  Referring Physician: Dr Berry    Admit Date: 1/10/2018    Visit Dx:    ICD-10-CM ICD-9-CM   1. Hypokalemia E87.6 276.8   2. Pneumonia of left lung due to infectious organism, unspecified part of lung J18.9 486   3. Fall, initial encounter W19.XXXA E888.9   4. Contusion of scalp, initial encounter S00.03XA 920   5. Decreased activities of daily living (ADL) Z78.9 V49.89   6. Impaired physical mobility Z74.09 781.99     Patient Active Problem List   Diagnosis   • Tobacco dependence   • Migraine   • Iron deficiency anemia   • Gastroesophageal reflux disease   • Coronary arteriosclerosis   • Bilateral pseudophakia   • Astigmatism   • Right humeral fracture   • Fall with injury   • Closed fracture of humerus   • Cause of injury, fall   • Closed fracture of proximal end of right humerus with routine healing   • Hypokalemia   • Weakness   • Acute respiratory failure with hypoxia   • Pneumonia of left lower lobe due to Streptococcus pneumoniae   • Hyponatremia   • Cerebral microvascular disease   • Pulmonary hypertension   • Rheumatic tricuspid valve regurgitation   • Rheumatic mitral stenosis   • Acute diastolic CHF (congestive heart failure)   • Encephalopathy, metabolic   • Pulmonary hypertension due to left heart valvular disease   • Cor pulmonale, chronic   • Folic acid deficiency   • Hypoxic encephalopathy   • Oral thrush   • Laryngitis, acute   • Dysuria   • Bacterial cystitis               Adult Rehabilitation Note       18 1032 18 0908 18 1410    Rehab Assessment/Intervention    Discipline  occupational therapist  -NN occupational therapy assistant  -LM    Document Type (P)  therapy note (daily note)  -BM therapy note (daily note)  -NN therapy note  (daily note)  -LM    Subjective Information (P)  agree to therapy  -BM unable to respond  -NN unable to respond   unable to respond but nodded to in bed rom pt confused  -LM    Patient Effort, Rehab Treatment   --   pt sitter/family stated pt been trying climb oob relayed-RN  -LM    Recorded by [BM] Monse Garg, PT Student [NN] Janet Garibay, OTR/L [LM] Melyssa Bell, CAN/L    Vital Signs    Pre Systolic BP Rehab (P)  131  -BM      Pre Treatment Diastolic BP (P)  69  -BM      Pretreatment Heart Rate (beats/min) (P)  82  -BM 77  -NN     Intratreatment Heart Rate (beats/min)  76  -NN     Posttreatment Heart Rate (beats/min)  77  -NN     Pre SpO2 (%)  98  -NN     O2 Delivery Pre Treatment  supplemental O2  -NN     Intra SpO2 (%)  97  -NN     O2 Delivery Intra Treatment  supplemental O2  -NN     Post SpO2 (%)  98  -NN     O2 Delivery Post Treatment  supplemental O2  -NN     Pre Patient Position  Supine  -NN     Intra Patient Position  Sitting  -NN     Post Patient Position  Supine  -NN     Recorded by [BM] Monse Garg, PT Student [NN] Janet Garibay OTR/L     Pain Assessment    Pain Assessment  Unable to assess  -NN Unable to assess  -LM    Recorded by  [NN] Janet Garibay OTR/L [LM] Melyssa Bell, CAN/L    Vision Assessment/Intervention    Visual Impairment (P)  WNL;WFL  -BM      Recorded by [BM] Monse Garg, PT Student      Cognitive Assessment/Intervention    Current Cognitive/Communication Assessment (P)  impaired  -BM impaired  -NN could not assess  -LM    Orientation Status (P)  oriented to;person;place;disoriented to;situation  -BM unable/difficult to assess   very lethargic and would not answer appropriate  -NN     Follows Commands/Answers Questions (P)  50% of the time;needs cueing;needs increased time;needs repetition  -BM 25% of the time;able to follow single-step instructions;needs cueing;needs increased time;needs repetition  -NN     Personal Safety (P)  moderate  impairment;decreased awareness, need for assist;decreased awareness, need for safety  -BM severe impairment;decreased awareness, need for assist;decreased insight to deficits;decreased awareness, need for safety  -NN     Personal Safety Interventions (P)  gait belt;nonskid shoes/slippers when out of bed;supervised activity  - fall prevention program maintained;gait belt;muscle strengthening facilitated;supervised activity;nonskid shoes/slippers when out of bed  -NN     Short/Long Term Memory (P)  unable/difficult to assess  -BM      Recorded by [BM] Monse Garg, PT Student [NN] Janet Garibay, OTR/L [LM] Melyssa Bell, CAN/L    Bed Mobility, Assessment/Treatment    Bed Mobility, Assistive Device (P)  bed rails;head of bed elevated  -BM bed rails;head of bed elevated  -NN     Bed Mobility, Roll Right, [place] (P)  contact guard assist;verbal cues required  -      Bed Mobility, Scoot/Bridge, [place] (P)  dependent (less than 25% patient effort)  -BM moderate assist (50% patient effort)  -NN     Bed Mob, Supine to Sit, [place] (P)  moderate assist (50% patient effort);minimum assist (75% patient effort)  -BM moderate assist (50% patient effort)  -NN     Bed Mob, Sit to Supine, [place] (P)  moderate assist (50% patient effort);2 person assist required  -BM moderate assist (50% patient effort)  -NN     Bed Mobility, Safety Issues  decreased use of arms for pushing/pulling;decreased use of legs for bridging/pushing;cognitive deficits limit understanding  -NN     Bed Mobility, Impairments  strength decreased;impaired balance;postural control impaired  -NN     Bed Mobility, Comment (P)  pt sat EOB ~ 25 minutes with mod A sitting balance and brief moments of SBA  -BM Pt. VERY lethargic and disoriented limiting ability to safely complete bed mob without assistance.   -NN     Recorded by [BM] Monse Garg, PT Student [NN] Janet Garibay, OTR/L     Transfer Assessment/Treatment    Transfers,  Sit-Stand [place] (P)  moderate assist (50% patient effort);verbal cues required  -BM verbal cues required;nonverbal cues required (demo/gesture);moderate assist (50% patient effort);minimum assist (75% patient effort)  -NN     Transfers, Stand-Sit [place] (P)  minimum assist (75% patient effort);verbal cues required  -BM verbal cues required;nonverbal cues required (demo/gesture);minimum assist (75% patient effort);moderate assist (50% patient effort)  -NN     Transfers, Sit-Stand-Sit, Assist Device  rolling walker  -NN     Transfer, Safety Issues  step length decreased  -NN     Transfer, Impairments  strength decreased;impaired balance;postural control impaired  -NN     Transfer, Comment  Pt. has severe posterior leaning and will not keep eyes open even with multiple vc/tc  -NN     Recorded by [BM] Monse Garg, PT Student [NN] Janet Garibay, OTR/L     Functional Mobility    Functional Mobility- Comment  Pt. was cued to takes side steps and she would not keep eyes open  -NN     Recorded by  [NN] Janet Garibay, OTR/L     Motor Skills/Interventions    Additional Documentation  Balance Skills Training (Group)  -NN     Recorded by  [NN] Janet Garibay, OTR/L     Balance Skills Training    Sitting-Level of Assistance (P)  Moderate assistance  -BM Minimum assistance;Contact guard  -NN     Sitting-Balance Support (P)  Feet unsupported  -BM Right upper extremity supported;Left upper extremity supported;Feet supported  -NN     Sitting-Balance Activities (P)  --   LE exercises  -BM Lateral lean;Forward lean;Reaching across midline;Right UE Weight Bearing;Left UE Weight Bearing  -NN     Sitting # of Minutes (P)  ~25  -BM      Standing-Level of Assistance (P)  Moderate assistance  -BM Minimum assistance;Moderate assistance  -NN     Static Standing Balance Support (P)  assistive device  -BM assistive device  -NN     Standing Balance # of Minutes (P)  3-5   posterior lean   -BM      Recorded by [BM] Monse  Forest, PT Student [NN] Janet Garibay, OTR/L     Therapy Exercises    Bilateral Lower Extremities (P)  AROM:;15 reps;sitting;ankle pumps/circles;hip flexion;LAQ  -BM      Bilateral Upper Extremity  AAROM:;10 reps;sitting;elbow flexion/extension;hand pumps;pronation/supination;shoulder extension/flexion  -NN PROM:;10 reps;elbow flexion/extension;shoulder horizontal abd/add;shoulder extension/flexion;shoulder abduction/adduction;pronation/supination   wrist flex ext  -LM    Recorded by [BM] Monse Garg, PT Student [NN] Janet Garibay, OTR/L [LM] Melyssa Bell, CAN/L    Positioning and Restraints    Pre-Treatment Position (P)  in bed  -BM in bed  -NN in bed  -LM    Post Treatment Position (P)  bed  -BM bed  -NN bed  -LM    In Bed (P)  supine;call light within reach;encouraged to call for assist;exit alarm on  -BM fowlers;call light within reach;encouraged to call for assist;exit alarm on;with family/caregiver;side rails up x2;legs elevated  -NN     Recorded by [BM] Monse Garg, PT Student [NN] Janet Garibay, OTR/L [LM] Melyssa Bell, CAN/L      01/16/18 1300          Rehab Assessment/Intervention    Discipline physical therapy assistant  -SY      Document Type therapy note (daily note)  -SY      Subjective Information unable to respond  -SY      Patient Effort, Rehab Treatment fair  -SY      Specific Treatment Considerations pt too lethargic for progressive PT at this time, however she needed pericare and pad changed  -SY      Recorded by [SY] Sadia French PTA      Vital Signs    Pre Systolic BP Rehab 108  -SY      Pre Treatment Diastolic BP 57  -SY      Pre SpO2 (%) 76  -SY      Pre Patient Position Supine  -SY      Intra Patient Position Supine  -SY      Post Patient Position Supine  -SY      Recorded by [SY] Sadia French PTA      Pain Assessment    Pain Assessment Unable to assess  -SY      Recorded by [SY] Sadia French PTA      Cognitive Assessment/Intervention    Current  Cognitive/Communication Assessment could not assess  -SY      Orientation Status other (see comments)   pt lethargic and would not respond  -SY      Follows Commands/Answers Questions unable to answer questions  -SY      Personal Safety unable/difficult to assess  -SY      Personal Safety Interventions fall prevention program maintained  -SY      Recorded by [SY] Sadia French PTA      Bed Mobility, Assessment/Treatment    Bed Mobility, Roll Left, [place] maximum assist (25% patient effort);dependent (less than 25% patient effort)  -SY      Bed Mobility, Roll Right, [place] maximum assist (25% patient effort);dependent (less than 25% patient effort)  -SY      Bed Mobility, Scoot/Bridge, [place] dependent (less than 25% patient effort)  -SY      Recorded by [SY] Sadia French PTA      Transfer Assessment/Treatment    Transfer, Comment too lethargic for OOB   -SY      Recorded by [SY] Sadia French PTA      Therapy Exercises    Bilateral Lower Extremities PROM:;10 reps;supine;ankle pumps/circles;heel slides;hip ER;hip IR  -SY      Recorded by [SY] Sadia French PTA      Positioning and Restraints    Pre-Treatment Position in bed  -SY      Post Treatment Position bed  -SY      In Bed supine  -SY      Recorded by [SY] Sadia French PTA        User Key  (r) = Recorded By, (t) = Taken By, (c) = Cosigned By    Initials Name Effective Dates     Sadia French PTA 10/17/16 -     LM Melyssa Bell, CAN/L 10/17/16 -     NN Janet Garibay, OTR/L 11/08/17 -     BM Monse Garg, PT Student 12/05/17 -                 IP PT Goals       01/18/18 1124 01/18/18 1032 01/16/18 1253    Bed Mobility PT STG    Bed Mobility PT STG, Date Established   01/16/18  -SY    Bed Mobility PT STG, Date Goal Reviewed (P)  01/18/18  -BM  01/16/18  -SY    Bed Mobility PT STG, Outcome (P)  goal ongoing  -BM  goal ongoing  -SY    Transfer Training PT STG    Transfer Training PT STG, Date Goal Reviewed  (P)  01/18/18  -BM  01/16/18  -SY    Transfer Training PT STG, Outcome  (P)  goal ongoing  - goal ongoing  -SY    Gait Training PT LTG    Gait Training Goal PT LTG, Date Goal Reviewed  (P)  01/18/18  -BM 01/16/18  -SY    Gait Training Goal PT LTG, Outcome  (P)  goal ongoing  - goal ongoing  -SY    Strength Goal PT LTG    Strength Goal PT LTG, Date Goal Reviewed  (P)  01/18/18  -BM 01/16/18  -SY    Strength Goal PT LTG, Outcome  (P)  goal ongoing  - goal ongoing  -SY      01/15/18 1223 01/13/18 1018 01/12/18 1103    Bed Mobility PT STG    Bed Mobility PT STG, Date Goal Reviewed 01/15/18  -CH 01/13/18  -CH 01/12/18  -SY    Bed Mobility PT STG, Outcome goal ongoing  - goal ongoing  - goal ongoing  -SY    Transfer Training PT STG    Transfer Training PT STG, Date Goal Reviewed 01/15/18  -CH 01/13/18  -CH 01/12/18  -SY    Transfer Training PT STG, Outcome goal ongoing  - goal ongoing  - goal ongoing  -SY    Gait Training PT LTG    Gait Training Goal PT LTG, Date Goal Reviewed 01/15/18  -CH 01/13/18  -CH 01/12/18  -SY    Gait Training Goal PT LTG, Outcome goal ongoing  - goal ongoing  - goal ongoing  -SY    Strength Goal PT LTG    Strength Goal PT LTG, Date Goal Reviewed 01/15/18  -CH 01/13/18  -CH 01/12/18  -SY    Strength Goal PT LTG, Outcome goal ongoing  - goal partially met  -CH goal ongoing  -SY      01/11/18 0855          Bed Mobility PT STG    Bed Mobility PT STG, Date Established 01/11/18  -CB      Bed Mobility PT STG, Time to Achieve 2 - 3 days  -CB      Bed Mobility PT STG, Activity Type supine to sit/sit to supine  -CB      Bed Mobility PT STG, [place] Level independent  -CB      Bed Mobility PT STG, Additional Goal HOB down and no rails  -CB      Transfer Training PT STG    Transfer Training PT STG, Date Established 01/11/18  -CB      Transfer Training PT STG, Time to Achieve 2 - 3 days  -CB      Transfer Training PT STG, Activity Type bed to chair /chair to bed;sit to stand/stand to sit  -CB       Transfer Training PT STG, [place] Level supervision required  -CB      Transfer Training PT STG, Assist Device --   least restrictive  -CB      Gait Training PT LTG    Gait Training Goal PT LTG, Date Established 01/11/18  -CB      Gait Training Goal PT LTG, Time to Achieve by discharge  -CB      Gait Training Goal PT LTG, [place] Level conditional independence  -CB      Gait Training Goal PT LTG, Assist Device --   least restrictive assistive device  -CB      Gait Training Goal PT LTG, Distance to Achieve 200 feet  -CB      Strength Goal PT LTG    Strength Goal PT LTG, Date Established 01/11/18  -CB      Strength Goal PT LTG, Time to Achieve 4 days  -CB      Strength Goal PT LTG, Measure to Achieve 20 reps all ex BLE actively sitting/ standing  -CB        User Key  (r) = Recorded By, (t) = Taken By, (c) = Cosigned By    Initials Name Provider Type    CB Vanesa Ramos, PT Physical Therapist    SY French, PTA Physical Therapy Assistant    CH Nichelle Rahman, PTA Physical Therapy Assistant    GIL Garg, PT Student PT Student          Physical Therapy Education     Title: PT OT SLP Therapies (Active)     Topic: Physical Therapy (Active)     Point: Mobility training (Active)    Learning Progress Summary    Learner Readiness Method Response Comment Documented by Status   Patient Acceptance D NR  CB 01/11/18 1129 Active               Point: Precautions (Active)    Learning Progress Summary    Learner Readiness Method Response Comment Documented by Status   Patient Acceptance D NR  CB 01/11/18 1129 Active                      User Key     Initials Effective Dates Name Provider Type Discipline    CB 04/06/17 -  Vanesa Ramos, PT Physical Therapist PT                    PT Recommendation and Plan  Anticipated Equipment Needs At Discharge:  (to be determined)  Anticipated Discharge Disposition: home with home health  Demonstrates Need for Referral to Another Service: home health care  Planned  Therapy Interventions: bed mobility training, gait training, home exercise program, strengthening, transfer training  PT Frequency:  (5-14)  Plan of Care Review  Outcome Summary/Follow up Plan: (P) Pt remains disoriented and lethargic throughout tx. Pt completed bed mobility min/mod A  with verbal cueing for hand placement. Pt transferred from sit-stand-sit at mod A with posterior lean. Pt is expected to be discharged to SNF for rehab to home.          Outcome Measures       01/18/18 1032 01/16/18 1300       How much help from another person do you currently need...    Turning from your back to your side while in flat bed without using bedrails? (P)  3  -BM 3  -SY     Moving from lying on back to sitting on the side of a flat bed without bedrails? (P)  2  -BM 3  -SY     Moving to and from a bed to a chair (including a wheelchair)? (P)  2  -BM 3  -SY     Standing up from a chair using your arms (e.g., wheelchair, bedside chair)? (P)  3  -BM 3  -SY     Climbing 3-5 steps with a railing? (P)  2  -BM 2  -SY     To walk in hospital room? (P)  2  -BM 3  -SY     AM-PAC 6 Clicks Score (P)  14  -BM 17  -SY       User Key  (r) = Recorded By, (t) = Taken By, (c) = Cosigned By    Initials Name Provider Type    SY French PTA Physical Therapy Assistant    BM Monse Garg, PT Student PT Student           Time Calculation:         PT Charges       01/18/18 1123          Time Calculation    Start Time 1032  -SY      Stop Time 1110  -SY      Time Calculation (min) 38 min  -SY      Time Calculation- PT    Total Timed Code Minutes- PT 38 minute(s)  -SY        User Key  (r) = Recorded By, (t) = Taken By, (c) = Cosigned By    Initials Name Provider Type    SY French PTA Physical Therapy Assistant              PT G-Codes  PT Professional Judgement Used?: Yes  Outcome Measure Options: AM-PAC 6 Clicks Basic Mobility (PT)  Score: 15  Functional Limitation: Mobility: Walking and moving around  Mobility: Walking and Moving  Around Current Status (): At least 40 percent but less than 60 percent impaired, limited or restricted  Mobility: Walking and Moving Around Goal Status (): At least 20 percent but less than 40 percent impaired, limited or restricted    Monse Garg, PT Student  1/18/2018

## 2018-01-18 NOTE — PLAN OF CARE
Problem: Patient Care Overview (Adult)  Goal: Plan of Care Review  Outcome: Ongoing (interventions implemented as appropriate)   01/18/18 1141   Coping/Psychosocial Response Interventions   Plan Of Care Reviewed With patient   Patient Care Overview   Progress no change   Outcome Evaluation   Outcome Summary/Follow up Plan Pt t/f supine-sit min/mod assist.Pt completed ADL with CGA for groooming and UB and mod Assist for LB. No goals met at this time. Con't with current POC.       Problem: Inpatient Occupational Therapy  Goal: Transfer Training Goal 1 LTG- OT  Outcome: Ongoing (interventions implemented as appropriate)   01/11/18 0856 01/16/18 1457 01/18/18 1141   Transfer Training OT LTG   Transfer Training OT LTG, Date Established 01/11/18 --  --    Transfer Training OT LTG, Time to Achieve by discharge --  --    Transfer Training OT LTG, Activity Type all transfers --  --    Transfer Training OT LTG, [place] Level independent --  --    Transfer Training OT LTG, Date Goal Reviewed --  --  01/18/18   Transfer Training OT LTG, Outcome --  goal ongoing --      Goal: Strength Goal LTG- OT  Outcome: Ongoing (interventions implemented as appropriate)   01/11/18 0856 01/16/18 1457 01/18/18 1141   Strength OT LTG   Strength Goal OT LTG, Date Established 01/11/18 --  --    Strength Goal OT LTG, Time to Achieve by discharge --  --    Strength Goal OT LTG, Measure to Achieve Pt. will complete BUE strengthening exercises all planes and joints to increase BUE strength to 5/5 for ADLs.  --  --    Strength Goal OT LTG, Date Goal Reviewed --  --  01/18/18   Strength Goal OT LTG, Outcome --  goal ongoing --      Goal: ADL Goal LTG- OT  Outcome: Ongoing (interventions implemented as appropriate)   01/11/18 0856 01/16/18 1457 01/18/18 1141   ADL OT LTG   ADL OT LTG, Date Established 01/11/18 --  --    ADL OT LTG, Time to Achieve by discharge --  --    ADL OT LTG, Activity Type ADL skills --  --    ADL OT LTG, [place] Level  modified independent --  --    ADL OT LTG, Additional Goal AE PRN --  --    ADL OT LTG, Date Goal Reviewed --  --  01/18/18   ADL OT LTG, Outcome --  goal ongoing --

## 2018-01-18 NOTE — PLAN OF CARE
Problem: Patient Care Overview (Adult)  Goal: Plan of Care Review  Outcome: Ongoing (interventions implemented as appropriate)   01/18/18 0233   Coping/Psychosocial Response Interventions   Plan Of Care Reviewed With patient   Patient Care Overview   Progress no change     Goal: Adult Individualization and Mutuality  Outcome: Ongoing (interventions implemented as appropriate)    Goal: Discharge Needs Assessment  Outcome: Ongoing (interventions implemented as appropriate)      Problem: Pneumonia (Adult)  Goal: Signs and Symptoms of Listed Potential Problems Will be Absent or Manageable (Pneumonia)  Outcome: Ongoing (interventions implemented as appropriate)      Problem: Fall Risk (Adult)  Goal: Absence of Falls  Outcome: Ongoing (interventions implemented as appropriate)      Problem: Fluid Volume Deficit (Adult)  Goal: Fluid/Electrolyte Balance  Outcome: Ongoing (interventions implemented as appropriate)    Goal: Comfort/Well Being  Outcome: Ongoing (interventions implemented as appropriate)

## 2018-01-18 NOTE — THERAPY TREATMENT NOTE
Acute Care - Occupational Therapy Treatment Note  [facility]     Patient Name: Michelle Child  : 1955  MRN: [id number]  Today's Date: 2018  Onset of Illness/Injury or Date of Surgery Date: 01/10/18  Date of Referral to OT: 01/10/18  Referring Physician: Dr Berry      Admit Date: 1/10/2018    Visit Dx:     ICD-10-CM ICD-9-CM   1. Hypokalemia E87.6 276.8   2. Pneumonia of left lung due to infectious organism, unspecified part of lung J18.9 486   3. Fall, initial encounter W19.XXXA E888.9   4. Contusion of scalp, initial encounter S00.03XA 920   5. Decreased activities of daily living (ADL) Z78.9 V49.89   6. Impaired physical mobility Z74.09 781.99     Patient Active Problem List   Diagnosis   • Tobacco dependence   • Migraine   • Iron deficiency anemia   • Gastroesophageal reflux disease   • Coronary arteriosclerosis   • Bilateral pseudophakia   • Astigmatism   • Right humeral fracture   • Fall with injury   • Closed fracture of humerus   • Cause of injury, fall   • Closed fracture of proximal end of right humerus with routine healing   • Hypokalemia   • Weakness   • Acute respiratory failure with hypoxia   • Pneumonia of left lower lobe due to Streptococcus pneumoniae   • Hyponatremia   • Cerebral microvascular disease   • Pulmonary hypertension   • Rheumatic tricuspid valve regurgitation   • Rheumatic mitral stenosis   • Acute diastolic CHF (congestive heart failure)   • Encephalopathy, metabolic   • Pulmonary hypertension due to left heart valvular disease   • Cor pulmonale, chronic   • Folic acid deficiency   • Hypoxic encephalopathy   • Oral thrush   • Laryngitis, acute   • Dysuria   • Bacterial cystitis             Adult Rehabilitation Note       18 1032 18 0730 18 0908    Rehab Assessment/Intervention    Discipline  occupational therapy assistant  -BB occupational therapist  -NN    Document Type therapy note (daily note)  -SY,BM,KC2 therapy note (daily note)  -BB therapy  note (daily note)  -NN    Subjective Information agree to therapy  -SY,BM,KC2 agree to therapy  -BB unable to respond  -NN    Patient Effort, Rehab Treatment  fair  -BB     Symptoms Noted During/After Treatment  none  -BB     Precautions/Limitations  fall precautions;oxygen therapy device and L/min  -BB     Recorded by [SY,BM,KC2] Sadia French PTA (r) Monse Garg, PT Student (t) Sadia French PTA (c) [BB] Bibi Chester, CAN/L [NN] Janet Garibay, OTR/L    Vital Signs    Pre Systolic BP Rehab 131  -SY,BM,KC2 115  -BB     Pre Treatment Diastolic BP 69  -SY,BM,KC2 58  -BB     Intra Systolic BP Rehab  112  -BB     Intra Treatment Diastolic BP  56  -BB     Pretreatment Heart Rate (beats/min) 82  -SY,BM,KC2 77  -BB 77  -NN    Intratreatment Heart Rate (beats/min)  76  -BB 76  -NN    Posttreatment Heart Rate (beats/min)   77  -NN    Pre SpO2 (%)  97  -BB 98  -NN    O2 Delivery Pre Treatment  supplemental O2  -BB supplemental O2  -NN    Intra SpO2 (%)  96  -BB 97  -NN    O2 Delivery Intra Treatment  supplemental O2  -BB supplemental O2  -NN    Post SpO2 (%)   98  -NN    O2 Delivery Post Treatment   supplemental O2  -NN    Pre Patient Position  Supine  -BB Supine  -NN    Intra Patient Position  Sitting  -BB Sitting  -NN    Post Patient Position   Supine  -NN    Recorded by [SY,BM,KC2] Sadia French PTA (r) Monse Garg, PT Student (t) Sadia French PTA (c) [BB] Bibi Chester, CAN/L [NN] Janet Garibay, OTR/L    Pain Assessment    Pain Assessment  No/denies pain  -BB Unable to assess  -NN    Recorded by  [BB] Bibi Chester CAN/L [NN] Janet Garibay, OTR/L    Vision Assessment/Intervention    Visual Impairment WNL;WFL  -SY,BM,KC2      Recorded by [YS,BM,KC2] Sadia French PTA (r) Monse Garg, PT Student (t) Sadia French PTA (c)      Cognitive Assessment/Intervention    Current Cognitive/Communication Assessment impaired  -SY,BM,KC2 impaired  -BB impaired  -NN    Orientation Status  oriented to;person;place;disoriented to;situation  -SY,BM,KC2 oriented to;person  -BB unable/difficult to assess   very lethargic and would not answer appropriate  -NN    Follows Commands/Answers Questions 50% of the time;needs cueing;needs increased time;needs repetition  -SY,BM,KC2 50% of the time  -BB 25% of the time;able to follow single-step instructions;needs cueing;needs increased time;needs repetition  -NN    Personal Safety moderate impairment;decreased awareness, need for assist;decreased awareness, need for safety  -SY,BM,KC2 severe impairment  -BB severe impairment;decreased awareness, need for assist;decreased insight to deficits;decreased awareness, need for safety  -NN    Personal Safety Interventions gait belt;nonskid shoes/slippers when out of bed;supervised activity  -SY,BM,KC2 fall prevention program maintained;nonskid shoes/slippers when out of bed  -BB fall prevention program maintained;gait belt;muscle strengthening facilitated;supervised activity;nonskid shoes/slippers when out of bed  -NN    Short/Long Term Memory unable/difficult to assess  -SY,BM,KC2      Recorded by [SY,BM,KC2] Sadia French PTA (r) Monse Garg PT Student (t) Sadia French PTA (c) [BB] PAMELLA Ngo/DALTON [NN] Janet Garibay OTR/L    Bed Mobility, Assessment/Treatment    Bed Mobility, Assistive Device bed rails;head of bed elevated  -SY,BM,KC2  bed rails;head of bed elevated  -NN    Bed Mobility, Roll Right, [place] contact guard assist;verbal cues required  -SY,BM,KC2      Bed Mobility, Scoot/Bridge, [place] dependent (less than 25% patient effort)  -SY,BM,KC2  moderate assist (50% patient effort)  -NN    Bed Mob, Supine to Sit, [place] moderate assist (50% patient effort);minimum assist (75% patient effort)  -SY,BM,KC2 moderate assist (50% patient effort)  -BB moderate assist (50% patient effort)  -NN    Bed Mob, Sit to Supine, [place] moderate assist (50% patient effort);2 person  assist required  -GIL DAN,KC2 moderate assist (50% patient effort)  -BB moderate assist (50% patient effort)  -NN    Bed Mobility, Safety Issues  decreased use of arms for pushing/pulling;decreased use of legs for bridging/pushing;cognitive deficits limit understanding  -BB decreased use of arms for pushing/pulling;decreased use of legs for bridging/pushing;cognitive deficits limit understanding  -NN    Bed Mobility, Impairments  strength decreased  -BB strength decreased;impaired balance;postural control impaired  -NN    Bed Mobility, Comment pt sat EOB ~ 25 minutes with mod A sitting balance and brief moments of SBA  -GIL DAN,KC2 Pt sat EOB ~5 min  -BB Pt. VERY lethargic and disoriented limiting ability to safely complete bed mob without assistance.   -NN    Recorded by [GIL DAN,KC2] Sadia French PTA (r) Monse Garg, PT Student (t) Sadia French PTA (c) [BB] Bibi Chester, CAN/L [NN] Janet Garibay, OTR/L    Transfer Assessment/Treatment    Transfers, Sit-Stand [place] moderate assist (50% patient effort);verbal cues required  -GIL DAN,KC2  verbal cues required;nonverbal cues required (demo/gesture);moderate assist (50% patient effort);minimum assist (75% patient effort)  -NN    Transfers, Stand-Sit [place] minimum assist (75% patient effort);verbal cues required  -GIL DAN,KC2  verbal cues required;nonverbal cues required (demo/gesture);minimum assist (75% patient effort);moderate assist (50% patient effort)  -NN    Transfers, Sit-Stand-Sit, Assist Device   rolling walker  -NN    Transfer, Safety Issues   step length decreased  -NN    Transfer, Impairments   strength decreased;impaired balance;postural control impaired  -NN    Transfer, Comment   Pt. has severe posterior leaning and will not keep eyes open even with multiple vc/tc  -NN    Recorded by [GIL DAN,KC2] Sadia French PTA (r) Monse Garg, PT Student (t) Sadia French PTA (c)  [NN] Janet Garibay, OTR/L    Functional Mobility     Functional Mobility- Comment   Pt. was cued to takes side steps and she would not keep eyes open  -NN    Recorded by   [NN] Janet Garibay, OTR/L    Upper Body Bathing Assessment/Training    UB Bathing Assess/Train Assistive Device  --   bath wipes  -BB     UB Bathing Assess/Train, Position  long sitting  -BB     UB Bathing Assess/Train, [place] Level  contact guard assist  -BB     UB Bathing Assess/Train, Impairments  strength decreased  -BB     Recorded by  [BB] JUDE NgoA/L     Lower Body Bathing Assessment/Training    LB Bathing Assess/Train Assistive Device  --   bath wipes  -BB     LB Bathing Assess/Train, Position  long sitting  -BB     LB Bathing Assess/Train, [place] Level  minimum assist (75% patient effort);moderate assist (50% patient effort)  -BB     LB Bathing Assess/Train, Impairments  strength decreased;decreased flexibility  -BB     Recorded by  [BB] JUDE NgoA/L     Upper Body Dressing Assessment/Training    UB Dressing Assess/Train, Clothing Type  donning:;doffing:;hospital gown  -BB     UB Dressing Assess/Train, Position  long sitting  -BB     UB Dressing Assess/Train, [place]  minimum assist (75% patient effort)  -BB     UB Dressing Assess/Train, Impairments  strength decreased  -BB     Recorded by  [BB] JUDE NgoA/L     Lower Body Dressing Assessment/Training    LB Dressing Assess/Train, Comment  pt did not want to wear socks  -BB     Recorded by  [BB] JUDE NgoA/L     Grooming Assessment/Training    Grooming Assess/Train, Position  sitting  -BB     Grooming Assess/Train, [place]ep[place] Level  contact guard assist  -BB     Grooming Assess/Train, Impairments  strength decreased  -BB     Grooming Assess/Train, Comment  wash face, hands, cpmb hair, oral care  -BB     Recorded by  [BB] JUDE NgoA/L     Motor Skills/Interventions    Additional Documentation   Balance Skills Training (Group)  -NN    Recorded by   [NN]  Janet Garibay OTR/L    Balance Skills Training    Sitting-Level of Assistance Moderate assistance  -SY,BM,KC2 Contact guard  -BB Minimum assistance;Contact guard  -NN    Sitting-Balance Support Feet unsupported  -SY,BM,KC2 Right upper extremity supported;Left upper extremity supported;Feet supported  -BB Right upper extremity supported;Left upper extremity supported;Feet supported  -NN    Sitting-Balance Activities --   LE exercises  -SY,BM,KC2 Lateral lean;Left UE Weight Bearing;Right UE Weight Bearing;Forward lean;Reaching for objects;Reaching across midline  -BB Lateral lean;Forward lean;Reaching across midline;Right UE Weight Bearing;Left UE Weight Bearing  -NN    Sitting # of Minutes ~25  -SY,BM,KC2 5  -BB     Standing-Level of Assistance Moderate assistance  -SY,BM,KC2  Minimum assistance;Moderate assistance  -NN    Static Standing Balance Support assistive device  -SY,BM,KC2  assistive device  -NN    Standing Balance # of Minutes 3-5   posterior lean   -SY,BM,KC2      Recorded by [SY,BM,KC2] Sadia French PTA (r) Monse Garg, PT Student (t) Sadia French PTA (c) [BB] JUDE NgoA/L [NN] Janet Garibay OTR/L    Therapy Exercises    Bilateral Lower Extremities AROM:;15 reps;sitting;ankle pumps/circles;hip flexion;LAQ  -SY,BM,KC2      Bilateral Upper Extremity   AAROM:;10 reps;sitting;elbow flexion/extension;hand pumps;pronation/supination;shoulder extension/flexion  -NN    Recorded by [SY,BM,KC2] Sadia French PTA (r) Monse Garg, PT Student (t) Sadia French PTA (c)  [NN] Janet Garibay OTR/L    Positioning and Restraints    Pre-Treatment Position in bed  -SY,BM,KC2 in bed  -BB in bed  -NN    Post Treatment Position bed  -SY,BM,KC2 bed  -BB bed  -NN    In Bed supine;call light within reach;encouraged to call for assist;exit alarm on  -SY,BM,KC2 fowlers;call light within reach;exit alarm on;encouraged to call for assist  -BB fowlers;call light within reach;encouraged to call  for assist;exit alarm on;with family/caregiver;side rails up x2;legs elevated  -NN    Recorded by [SY,BM,KC2] Sadia French PTA (r) Monse Garg, PT Student (t) Sadia French PTA (c) [BB] Bibi Chester, ACN/DALTON [NN] Janet CARLOS Garibay, OTR/L      01/16/18 1410 01/16/18 1300       Rehab Assessment/Intervention    Discipline occupational therapy assistant  -LM physical therapy assistant  -SY     Document Type therapy note (daily note)  -LM therapy note (daily note)  -SY     Subjective Information unable to respond   unable to respond but nodded to in bed rom pt confused  -LM unable to respond  -SY     Patient Effort, Rehab Treatment --   pt sitter/family stated pt been trying climb oob relayed-RN  -GREGORY fair  -SY     Specific Treatment Considerations  pt too lethargic for progressive PT at this time, however she needed pericare and pad changed  -SY     Recorded by [LM] JUDE WongA/L [SY] Sadia French PTA     Vital Signs    Pre Systolic BP Rehab  108  -SY     Pre Treatment Diastolic BP  57  -SY     Pre SpO2 (%)  76  -SY     Pre Patient Position  Supine  -SY     Intra Patient Position  Supine  -SY     Post Patient Position  Supine  -SY     Recorded by  [SY] Sadia French PTA     Pain Assessment    Pain Assessment Unable to assess  -LM Unable to assess  -SY     Recorded by [LM] JUDE WongA/L [SY] Sadia French PTA     Cognitive Assessment/Intervention    Current Cognitive/Communication Assessment could not assess  -LM could not assess  -SY     Orientation Status  other (see comments)   pt lethargic and would not respond  -SY     Follows Commands/Answers Questions  unable to answer questions  -SY     Personal Safety  unable/difficult to assess  -SY     Personal Safety Interventions  fall prevention program maintained  -SY     Recorded by [LM] JUDE WongA/L [SY] Sadia French PTA     Bed Mobility, Assessment/Treatment    Bed Mobility, Roll Left, [place]  maximum assist  (25% patient effort);dependent (less than 25% patient effort)  -SY     Bed Mobility, Roll Right, [place]  maximum assist (25% patient effort);dependent (less than 25% patient effort)  -SY     Bed Mobility, Scoot/Bridge, [place]  dependent (less than 25% patient effort)  -SY     Recorded by  [SY] Sadia French PTA     Transfer Assessment/Treatment    Transfer, Comment  too lethargic for OOB   -SY     Recorded by  [SY] Sadia French PTA     Therapy Exercises    Bilateral Lower Extremities  PROM:;10 reps;supine;ankle pumps/circles;heel slides;hip ER;hip IR  -SY     Bilateral Upper Extremity PROM:;10 reps;elbow flexion/extension;shoulder horizontal abd/add;shoulder extension/flexion;shoulder abduction/adduction;pronation/supination   wrist flex ext  -LM      Recorded by [LM] Melyssa Bell CAN/L [SY] Sadia French PTA     Positioning and Restraints    Pre-Treatment Position in bed  -LM in bed  -SY     Post Treatment Position bed  -LM bed  -SY     In Bed  supine  -SY     Recorded by [LM] Melyssa Bell CAN/L [SY] Sadia French PTA       User Key  (r) = Recorded By, (t) = Taken By, (c) = Cosigned By    Initials Name Effective Dates     Sadia French, KIN 10/17/16 -     BB Bibi Chester, CAN/L 10/17/16 -     LM Melyssa Bell CNA/L 10/17/16 -     NN Janet Garibay, OTR/L 11/08/17 -     BM Monse Garg, PT Student 12/05/17 -                 OT Goals       01/18/18 1141 01/16/18 1457 01/15/18 1020    Transfer Training OT LTG    Transfer Training OT LTG, Date Goal Reviewed 01/18/18  -BB 01/16/18  -LM 01/15/18  -BB    Transfer Training OT LTG, Outcome  goal ongoing  -LM     Strength OT LTG    Strength Goal OT LTG, Date Goal Reviewed 01/18/18  -BB 01/16/18  -LM 01/15/18  -BB    Strength Goal OT LTG, Outcome  goal ongoing  -LM     ADL OT LTG    ADL OT LTG, Date Goal Reviewed 01/18/18  -BB 01/16/18  -LM 01/15/18  -BB    ADL OT LTG, Outcome  goal ongoing  -       01/14/18 9501  01/12/18 1236 01/11/18 0856    Transfer Training OT LTG    Transfer Training OT LTG, Date Established   01/11/18  -NN    Transfer Training OT LTG, Time to Achieve   by discharge  -NN    Transfer Training OT LTG, Activity Type   all transfers  -NN    Transfer Training OT LTG, [place] Level   independent  -NN    Transfer Training OT LTG, Date Goal Reviewed 01/14/18  -LW 01/12/18  -LW     Transfer Training OT LTG, Outcome goal ongoing  -LW goal ongoing  -LW     Strength OT LTG    Strength Goal OT LTG, Date Established   01/11/18  -NN    Strength Goal OT LTG, Time to Achieve   by discharge  -NN    Strength Goal OT LTG, Measure to Achieve   Pt. will complete BUE strengthening exercises all planes and joints to increase BUE strength to 5/5 for ADLs.   -NN    Strength Goal OT LTG, Date Goal Reviewed 01/14/18  -LW 01/12/18  -LW     Strength Goal OT LTG, Outcome goal ongoing  -LW goal ongoing  -LW     ADL OT LTG    ADL OT LTG, Date Established   01/11/18  -NN    ADL OT LTG, Time to Achieve   by discharge  -NN    ADL OT LTG, Activity Type   ADL skills  -NN    ADL OT LTG, [place] Level   modified independent  -NN    ADL OT LTG, Additional Goal   AE PRN  -NN    ADL OT LTG, Date Goal Reviewed 01/14/18  -LW 01/12/18  -LW     ADL OT LTG, Outcome goal ongoing  -LW goal ongoing  -LW       User Key  (r) = Recorded By, (t) = Taken By, (c) = Cosigned By    Initials Name Provider Type    BB Bibi Chester, CAN/L Occupational Therapy Assistant    GREGORY Bell CAN/L Occupational Therapy Assistant    LW Jaz Garcia, CAN/L Occupational Therapy Assistant    NN Janet Garibay OTR/L Occupational Therapist          Occupational Therapy Education     Title: PT OT SLP Therapies (Active)     Topic: Occupational Therapy (Active)     Point: ADL training (Active)    Description: Instruct learner(s) on proper safety adaptation and remediation techniques during self care or transfers.   Instruct in proper use of  assistive devices.    Learning Progress Summary    Learner Readiness Method Response Comment Documented by Status   Patient Acceptance E NL,NR  BB 01/18/18 1141 Active    Acceptance E VU,NR Pt. and son educated on role of OT, safe t/f, benefit of therapy, upright posture, progression with poc NN 01/17/18 1046 Done    Nonacceptance E NL  BB 01/15/18 1020 Active    Acceptance E VU  LW 01/14/18 1528 Done    Acceptance E VU,NR Pt. educated on role of OT, safe bed mobility, benefit of OOB activity, progression with poc NN 01/13/18 1036 Done    Acceptance E VU  LW 01/12/18 1236 Done    Acceptance E NR Pt. educated on role of OT, safe t/f, calling for assistance, benefit of therapy, correcting posture, progression with poc NN 01/11/18 0854 Active   Family Acceptance E VU,NR Pt. and son educated on role of OT, safe t/f, benefit of therapy, upright posture, progression with poc NN 01/17/18 1046 Done               Point: Home exercise program (Done)    Description: Instruct learner(s) on appropriate technique for monitoring, assisting and/or progressing therapeutic exercises/activities.    Learning Progress Summary    Learner Readiness Method Response Comment Documented by Status   Patient Acceptance E VU,NR Pt. and son educated on role of OT, safe t/f, benefit of therapy, upright posture, progression with poc NN 01/17/18 1046 Done    Acceptance E VU  LW 01/14/18 1528 Done    Acceptance E VU,NR Pt. educated on role of OT, safe bed mobility, benefit of OOB activity, progression with poc NN 01/13/18 1036 Done    Acceptance E VU  LW 01/12/18 1236 Done    Acceptance E NR Pt. educated on role of OT, safe t/f, calling for assistance, benefit of therapy, correcting posture, progression with poc NN 01/11/18 0854 Active   Family Acceptance E VU,NR Pt. and son educated on role of OT, safe t/f, benefit of therapy, upright posture, progression with poc NN 01/17/18 1046 Done               Point: Body mechanics (Active)    Description:  Instruct learner(s) on proper positioning and spine alignment during self-care, functional mobility activities and/or exercises.    Learning Progress Summary    Learner Readiness Method Response Comment Documented by Status   Patient Acceptance E NL,NR   01/18/18 1141 Active    Acceptance E VU,NR Pt. and son educated on role of OT, safe t/f, benefit of therapy, upright posture, progression with poc  01/17/18 1046 Done    Nonacceptance E NL  BB 01/15/18 1020 Active    Acceptance E VU  LW 01/14/18 1528 Done    Acceptance E VU,NR Pt. educated on role of OT, safe bed mobility, benefit of OOB activity, progression with poc NN 01/13/18 1036 Done    Acceptance E VU  LW 01/12/18 1236 Done    Acceptance E NR Pt. educated on role of OT, safe t/f, calling for assistance, benefit of therapy, correcting posture, progression with poc  01/11/18 0854 Active   Family Acceptance E VU,NR Pt. and son educated on role of OT, safe t/f, benefit of therapy, upright posture, progression with poc  01/17/18 1046 Done                      User Key     Initials Effective Dates Name Provider Type Discipline     10/17/16 -  PAMELLA Ngo/L Occupational Therapy Assistant OT     10/17/16 -  PAMELLA Wade/L Occupational Therapy Assistant OT     11/08/17 -  Janet Garibay OTJAMES/L Occupational Therapist OT                  OT Recommendation and Plan  Anticipated Discharge Disposition: home with home health, home with assist, home with 24/7 care  Planned Therapy Interventions: activity intolerance, ADL retraining, balance training, bed mobility training, energy conservation, home exercise program, strengthening, transfer training  Therapy Frequency:  (3-14x/week)  Plan of Care Review  Plan Of Care Reviewed With: patient  Progress: no change  Outcome Summary/Follow up Plan: Pt t/f supine-sit min/mod assist.Pt completed ADL with CGA  for groooming and UB and mod Assist for LB. No goals met at this time.        Outcome  Measures       01/18/18 1032 01/18/18 0730 01/16/18 1300    How much help from another person do you currently need...    Turning from your back to your side while in flat bed without using bedrails? 3  -SY (r) BM (t) SY (c)  3  -SY    Moving from lying on back to sitting on the side of a flat bed without bedrails? 2  -SY (r) BM (t) SY (c)  3  -SY    Moving to and from a bed to a chair (including a wheelchair)? 2  -SY (r) BM (t) SY (c)  3  -SY    Standing up from a chair using your arms (e.g., wheelchair, bedside chair)? 3  -SY (r) BM (t) SY (c)  3  -SY    Climbing 3-5 steps with a railing? 2  -SY (r) BM (t) SY (c)  2  -SY    To walk in hospital room? 2  -SY (r) BM (t) SY (c)  3  -SY    AM-PAC 6 Clicks Score 14  -SY (r) BM (t)  17  -SY    How much help from another is currently needed...    Putting on and taking off regular lower body clothing?  2  -BB     Bathing (including washing, rinsing, and drying)  2  -BB     Toileting (which includes using toilet bed pan or urinal)  2  -BB     Putting on and taking off regular upper body clothing  3  -BB     Taking care of personal grooming (such as brushing teeth)  3  -BB     Eating meals  3  -BB     Score  15  -BB       User Key  (r) = Recorded By, (t) = Taken By, (c) = Cosigned By    Initials Name Provider Type    SY French, PTA Physical Therapy Assistant    KINGSLEY Henderson Occupational Therapy Assistant    BM Monse Garg, PT Student PT Student           Time Calculation:         Time Calculation- OT       01/18/18 1145          Time Calculation- OT    OT Start Time 0730  -BB      OT Stop Time 0825  -BB      OT Time Calculation (min) 55 min  -BB      Total Timed Code Minutes- OT 55 minute(s)  -BB      OT Received On 01/18/18  -BB        User Key  (r) = Recorded By, (t) = Taken By, (c) = Cosigned By    Initials Name Provider Type    KINGSLEY Henderson Occupational Therapy Assistant           Therapy Charges for Today     Code Description  Service Date Service Provider Modifiers Qty    [id number] HC OT SELF CARE/MGMT/TRAIN EA 15 MIN 1/18/2018 PAMELLA Ngo/L GO 4          OT G-codes  OT Functional Scales Options: AM-PAC 6 Clicks Daily Activity (OT)  Functional Limitation: Self care  Self Care Current Status (): At least 40 percent but less than 60 percent impaired, limited or restricted  Self Care Goal Status (): At least 20 percent but less than 40 percent impaired, limited or restricted    PAMELLA Ngo/DALTON  1/18/2018

## 2018-01-19 ENCOUNTER — APPOINTMENT (OUTPATIENT)
Dept: CT IMAGING | Facility: HOSPITAL | Age: 63
End: 2018-01-19

## 2018-01-19 ENCOUNTER — APPOINTMENT (OUTPATIENT)
Dept: GENERAL RADIOLOGY | Facility: HOSPITAL | Age: 63
End: 2018-01-19

## 2018-01-19 PROBLEM — S22.41XA CLOSED FRACTURE OF MULTIPLE RIBS OF RIGHT SIDE: Status: ACTIVE | Noted: 2018-01-19

## 2018-01-19 PROBLEM — R33.8 ACUTE RETENTION OF URINE: Status: ACTIVE | Noted: 2018-01-19

## 2018-01-19 LAB
ALBUMIN SERPL-MCNC: 2.5 G/DL (ref 3.4–4.8)
ALBUMIN/GLOB SERPL: 0.9 G/DL (ref 1.1–1.8)
ALP SERPL-CCNC: 98 U/L (ref 38–126)
ALT SERPL W P-5'-P-CCNC: 36 U/L (ref 9–52)
ANION GAP SERPL CALCULATED.3IONS-SCNC: 7 MMOL/L (ref 5–15)
AST SERPL-CCNC: 23 U/L (ref 14–36)
BACTERIA SPEC AEROBE CULT: NORMAL
BASOPHILS # BLD AUTO: 0 10*3/MM3 (ref 0–0.2)
BASOPHILS NFR BLD AUTO: 0 % (ref 0–2)
BILIRUB SERPL-MCNC: 0.4 MG/DL (ref 0.2–1.3)
BUN BLD-MCNC: 14 MG/DL (ref 7–21)
BUN/CREAT SERPL: 19.2 (ref 7–25)
CALCIUM SPEC-SCNC: 7.8 MG/DL (ref 8.4–10.2)
CHLORIDE SERPL-SCNC: 95 MMOL/L (ref 95–110)
CO2 SERPL-SCNC: 32 MMOL/L (ref 22–31)
CREAT BLD-MCNC: 0.73 MG/DL (ref 0.5–1)
DEPRECATED RDW RBC AUTO: 48.4 FL (ref 36.4–46.3)
EOSINOPHIL # BLD AUTO: 0 10*3/MM3 (ref 0–0.7)
EOSINOPHIL NFR BLD AUTO: 0 % (ref 0–7)
ERYTHROCYTE [DISTWIDTH] IN BLOOD BY AUTOMATED COUNT: 16.1 % (ref 11.5–14.5)
GFR SERPL CREATININE-BSD FRML MDRD: 80 ML/MIN/1.73 (ref 45–104)
GLOBULIN UR ELPH-MCNC: 2.8 GM/DL (ref 2.3–3.5)
GLUCOSE BLD-MCNC: 90 MG/DL (ref 60–100)
HCT VFR BLD AUTO: 27.9 % (ref 35–45)
HGB BLD-MCNC: 8.9 G/DL (ref 12–15.5)
IMM GRANULOCYTES # BLD: 0.04 10*3/MM3 (ref 0–0.02)
IMM GRANULOCYTES NFR BLD: 0.5 % (ref 0–0.5)
LYMPHOCYTES # BLD AUTO: 1.31 10*3/MM3 (ref 0.6–4.2)
LYMPHOCYTES NFR BLD AUTO: 15.7 % (ref 10–50)
MCH RBC QN AUTO: 26.6 PG (ref 26.5–34)
MCHC RBC AUTO-ENTMCNC: 31.9 G/DL (ref 31.4–36)
MCV RBC AUTO: 83.3 FL (ref 80–98)
MONOCYTES # BLD AUTO: 1.01 10*3/MM3 (ref 0–0.9)
MONOCYTES NFR BLD AUTO: 12.1 % (ref 0–12)
NEUTROPHILS # BLD AUTO: 5.99 10*3/MM3 (ref 2–8.6)
NEUTROPHILS NFR BLD AUTO: 71.7 % (ref 37–80)
PLATELET # BLD AUTO: 219 10*3/MM3 (ref 150–450)
PMV BLD AUTO: 9.4 FL (ref 8–12)
POTASSIUM BLD-SCNC: 3.3 MMOL/L (ref 3.5–5.1)
PROT SERPL-MCNC: 5.3 G/DL (ref 6.3–8.6)
RBC # BLD AUTO: 3.35 10*6/MM3 (ref 3.77–5.16)
SODIUM BLD-SCNC: 134 MMOL/L (ref 137–145)
WBC NRBC COR # BLD: 8.35 10*3/MM3 (ref 3.2–9.8)

## 2018-01-19 PROCEDURE — 97110 THERAPEUTIC EXERCISES: CPT

## 2018-01-19 PROCEDURE — 71275 CT ANGIOGRAPHY CHEST: CPT

## 2018-01-19 PROCEDURE — 80053 COMPREHEN METABOLIC PANEL: CPT | Performed by: FAMILY MEDICINE

## 2018-01-19 PROCEDURE — 94799 UNLISTED PULMONARY SVC/PX: CPT

## 2018-01-19 PROCEDURE — 85025 COMPLETE CBC W/AUTO DIFF WBC: CPT | Performed by: FAMILY MEDICINE

## 2018-01-19 PROCEDURE — 71046 X-RAY EXAM CHEST 2 VIEWS: CPT

## 2018-01-19 PROCEDURE — 97535 SELF CARE MNGMENT TRAINING: CPT

## 2018-01-19 PROCEDURE — 87086 URINE CULTURE/COLONY COUNT: CPT | Performed by: FAMILY MEDICINE

## 2018-01-19 PROCEDURE — 94760 N-INVAS EAR/PLS OXIMETRY 1: CPT

## 2018-01-19 PROCEDURE — 0 IOPAMIDOL PER 1 ML: Performed by: FAMILY MEDICINE

## 2018-01-19 PROCEDURE — 25010000002 FUROSEMIDE PER 20 MG: Performed by: FAMILY MEDICINE

## 2018-01-19 PROCEDURE — 99232 SBSQ HOSP IP/OBS MODERATE 35: CPT | Performed by: FAMILY MEDICINE

## 2018-01-19 PROCEDURE — 97530 THERAPEUTIC ACTIVITIES: CPT

## 2018-01-19 RX ORDER — FUROSEMIDE 10 MG/ML
40 INJECTION INTRAMUSCULAR; INTRAVENOUS ONCE
Status: COMPLETED | OUTPATIENT
Start: 2018-01-19 | End: 2018-01-19

## 2018-01-19 RX ORDER — POTASSIUM CHLORIDE 750 MG/1
40 CAPSULE, EXTENDED RELEASE ORAL 2 TIMES DAILY WITH MEALS
Status: DISCONTINUED | OUTPATIENT
Start: 2018-01-19 | End: 2018-01-21

## 2018-01-19 RX ORDER — POTASSIUM CHLORIDE 750 MG/1
40 CAPSULE, EXTENDED RELEASE ORAL 2 TIMES DAILY WITH MEALS
Status: DISCONTINUED | OUTPATIENT
Start: 2018-01-19 | End: 2018-01-19

## 2018-01-19 RX ORDER — PHENAZOPYRIDINE HYDROCHLORIDE 100 MG/1
100 TABLET, FILM COATED ORAL
Status: DISCONTINUED | OUTPATIENT
Start: 2018-01-19 | End: 2018-01-22

## 2018-01-19 RX ADMIN — ASPIRIN 81 MG 81 MG: 81 TABLET ORAL at 09:01

## 2018-01-19 RX ADMIN — POTASSIUM CHLORIDE 40 MEQ: 750 CAPSULE, EXTENDED RELEASE ORAL at 09:01

## 2018-01-19 RX ADMIN — IOPAMIDOL 79 ML: 755 INJECTION, SOLUTION INTRAVENOUS at 15:16

## 2018-01-19 RX ADMIN — LIDOCAINE HYDROCHLORIDE 5 ML: 20 SOLUTION ORAL; TOPICAL at 08:59

## 2018-01-19 RX ADMIN — NICOTINE 1 PATCH: 21 PATCH TRANSDERMAL at 15:37

## 2018-01-19 RX ADMIN — PHENAZOPYRIDINE HYDROCHLORIDE 100 MG: 100 TABLET ORAL at 23:18

## 2018-01-19 RX ADMIN — FERROUS SULFATE TAB EC 324 MG (65 MG FE EQUIVALENT) 324 MG: 324 (65 FE) TABLET DELAYED RESPONSE at 18:21

## 2018-01-19 RX ADMIN — DOXYCYCLINE 100 MG: 100 INJECTION, POWDER, LYOPHILIZED, FOR SOLUTION INTRAVENOUS at 20:07

## 2018-01-19 RX ADMIN — FUROSEMIDE 40 MG: 10 INJECTION, SOLUTION INTRAMUSCULAR; INTRAVENOUS at 18:21

## 2018-01-19 RX ADMIN — DOXYCYCLINE 100 MG: 100 INJECTION, POWDER, LYOPHILIZED, FOR SOLUTION INTRAVENOUS at 09:01

## 2018-01-19 RX ADMIN — POTASSIUM CHLORIDE 40 MEQ: 750 CAPSULE, EXTENDED RELEASE ORAL at 18:21

## 2018-01-19 RX ADMIN — IPRATROPIUM BROMIDE AND ALBUTEROL SULFATE 3 ML: 2.5; .5 SOLUTION RESPIRATORY (INHALATION) at 06:44

## 2018-01-19 RX ADMIN — FERROUS SULFATE TAB EC 324 MG (65 MG FE EQUIVALENT) 324 MG: 324 (65 FE) TABLET DELAYED RESPONSE at 09:00

## 2018-01-19 RX ADMIN — IPRATROPIUM BROMIDE AND ALBUTEROL SULFATE 3 ML: 2.5; .5 SOLUTION RESPIRATORY (INHALATION) at 15:38

## 2018-01-19 RX ADMIN — IPRATROPIUM BROMIDE AND ALBUTEROL SULFATE 3 ML: 2.5; .5 SOLUTION RESPIRATORY (INHALATION) at 19:17

## 2018-01-19 RX ADMIN — FUROSEMIDE 20 MG: 20 TABLET ORAL at 08:59

## 2018-01-19 RX ADMIN — FLUOXETINE 60 MG: 20 CAPSULE ORAL at 08:59

## 2018-01-19 RX ADMIN — FAMOTIDINE 40 MG: 20 TABLET, FILM COATED ORAL at 09:00

## 2018-01-19 RX ADMIN — FOLIC ACID 1 MG: 1 TABLET ORAL at 08:59

## 2018-01-19 RX ADMIN — LIDOCAINE HYDROCHLORIDE 5 ML: 20 SOLUTION ORAL; TOPICAL at 12:00

## 2018-01-19 RX ADMIN — LIDOCAINE HYDROCHLORIDE 5 ML: 20 SOLUTION ORAL; TOPICAL at 18:22

## 2018-01-19 RX ADMIN — RISPERIDONE 1 MG: 1 TABLET ORAL at 20:07

## 2018-01-19 RX ADMIN — LIDOCAINE HYDROCHLORIDE 5 ML: 20 SOLUTION ORAL; TOPICAL at 20:07

## 2018-01-19 NOTE — THERAPY TREATMENT NOTE
Acute Care - Physical Therapy Treatment Note  [facility]     Patient Name: Michelle Child  : 1955  MRN: [id number]  Today's Date: 2018  Onset of Illness/Injury or Date of Surgery Date: 01/10/18  Date of Referral to PT: 01/10/18  Referring Physician: Dr Berry    Admit Date: 1/10/2018    Visit Dx:    ICD-10-CM ICD-9-CM   1. Hypokalemia E87.6 276.8   2. Pneumonia of left lung due to infectious organism, unspecified part of lung J18.9 486   3. Fall, initial encounter W19.XXXA E888.9   4. Contusion of scalp, initial encounter S00.03XA 920   5. Decreased activities of daily living (ADL) Z78.9 V49.89   6. Impaired physical mobility Z74.09 781.99     Patient Active Problem List   Diagnosis   • Tobacco dependence   • Migraine   • Iron deficiency anemia   • Gastroesophageal reflux disease   • Coronary arteriosclerosis   • Bilateral pseudophakia   • Astigmatism   • Right humeral fracture   • Fall with injury   • Closed fracture of humerus   • Cause of injury, fall   • Closed fracture of proximal end of right humerus with routine healing   • Hypokalemia   • Weakness   • Acute respiratory failure with hypoxia   • Pneumonia of left lower lobe due to Streptococcus pneumoniae   • Hyponatremia   • Cerebral microvascular disease   • Pulmonary hypertension   • Rheumatic tricuspid valve regurgitation   • Rheumatic mitral stenosis   • Acute diastolic CHF (congestive heart failure)   • Encephalopathy, metabolic   • Pulmonary hypertension due to left heart valvular disease   • Cor pulmonale, chronic   • Folic acid deficiency   • Hypoxic encephalopathy   • Oral thrush   • Laryngitis, acute   • Dysuria   • Bacterial cystitis               Adult Rehabilitation Note       18 0845 18 1327 18 1032    Rehab Assessment/Intervention    Discipline physical therapy assistant  -CH occupational therapy assistant  -BB     Document Type therapy note (daily note)  -CH therapy note (daily note)  -BB therapy note  (daily note)  -SY,BM,KC2    Subjective Information agree to therapy  -CH agree to therapy  -BB agree to therapy  -SY,BM,KC2    Patient Effort, Rehab Treatment fair  -CH fair  -BB     Symptoms Noted During/After Treatment  --   confusion  -BB     Precautions/Limitations fall precautions;oxygen therapy device and L/min  -CH fall precautions;oxygen therapy device and L/min  -BB     Specific Treatment Considerations pt somewhat lethargic this tx with c/o dizziness once standing limiting progression  -CH      Recorded by [CH] Nichelle Rahman PTA [BB] Bibi Chester, CAN/L [SY,BM,KC2] Sadia French PTA (r) Monse Garg, PT Student (t) Sadia French PTA (c)    Vital Signs    Pre Systolic BP Rehab 98   nursing notified and agreeable to continue tx  -CH  131  -SY,BM,KC2    Pre Treatment Diastolic BP 46  -CH  69  -SY,BM,KC2    Intra Systolic BP Rehab 94  -CH      Intra Treatment Diastolic BP 55  -CH      Post Systolic BP Rehab 89   nursing notified  -CH      Post Treatment Diastolic BP 50  -CH      Pretreatment Heart Rate (beats/min) 79  -CH 78  -BB 82  -SY,BM,KC2    Posttreatment Heart Rate (beats/min) 79  -CH 83  -BB     Pre SpO2 (%) 94  -CH 93  -BB     O2 Delivery Pre Treatment supplemental O2  -CH supplemental O2  -BB     Post SpO2 (%) 92  -CH 91  -BB     O2 Delivery Post Treatment supplemental O2  -CH supplemental O2  -BB     Pre Patient Position Supine  -CH Supine  -BB     Intra Patient Position Sitting  -CH      Post Patient Position Supine  -CH Supine  -BB     Recorded by [CH] Nichelle Rahman PTA [BB] Bibi Chester, CAN/L [SY,BM,KC2] Sadia French PTA (r) Monse Garg, PT Student (t) Sadia French PTA (c)    Pain Assessment    Pain Assessment No/denies pain  -CH No/denies pain  -BB     Recorded by [CH] Nichelle Rahman PTA [BB] Bibi Chester, CAN/L     Vision Assessment/Intervention    Visual Impairment WNL;WFL  -CH  WNL;WFL  -SY,BM,KC2    Recorded by [CH] Nichelle Rahman PTA   [SY,BM,KC2] Sadia French PTA (r) Monse Garg, PT Student (t) Sadia French PTA (c)    Cognitive Assessment/Intervention    Current Cognitive/Communication Assessment impaired  - impaired  -BB impaired  -SY,BM,KC2    Orientation Status oriented to;person     -CH oriented to;person  -BB oriented to;person;place;disoriented to;situation  -SY,BM,KC2    Follows Commands/Answers Questions 75% of the time;needs cueing;needs increased time;needs repetition  -CH 75% of the time  -BB 50% of the time;needs cueing;needs increased time;needs repetition  -SY,BM,KC2    Personal Safety moderate impairment;decreased awareness, need for assist;decreased awareness, need for safety  -CH moderate impairment  -BB moderate impairment;decreased awareness, need for assist;decreased awareness, need for safety  -SY,BM,KC2    Personal Safety Interventions fall prevention program maintained;gait belt;muscle strengthening facilitated;nonskid shoes/slippers when out of bed;supervised activity  - fall prevention program maintained;nonskid shoes/slippers when out of bed  -BB gait belt;nonskid shoes/slippers when out of bed;supervised activity  -SY,BM,KC2    Short/Long Term Memory   unable/difficult to assess  -SY,BM,KC2    Recorded by [] Nichelle Rahman PTA [BB] PAMELLA Ngo/DALTON [SY,BM,KC2] Sadia French PTA (r) Monse Garg, PT Student (t) Sadia French PTA (c)    Bed Mobility, Assessment/Treatment    Bed Mobility, Assistive Device bed rails;head of bed elevated  -  bed rails;head of bed elevated  -SY,BM,KC2    Bed Mobility, Roll Left, [place] contact guard assist  -      Bed Mobility, Roll Right, [place] contact guard assist  -  contact guard assist;verbal cues required  -SY,BM,KC2    Bed Mobility, Scoot/Bridge, [place] minimum assist (75% patient effort)  -  dependent (less than 25% patient effort)  -SY,BM,KC2    Bed Mob, Supine to Sit, [place] minimum assist (75% patient effort)  -  "conditional independence  -BB moderate assist (50% patient effort);minimum assist (75% patient effort)  -SY,BM,KC2    Bed Mob, Sit to Supine, [place] moderate assist (50% patient effort)  - minimum assist (75% patient effort);moderate assist (50% patient effort)  -BB moderate assist (50% patient effort);2 person assist required  -SY,BM,KC2    Bed Mobility, Comment pt sat EOB and performed ther ex with min A for sitting balance due to posterior lean and lateral lean to R  -CH Pt sat EOB 15 min  -BB pt sat EOB ~ 25 minutes with mod A sitting balance and brief moments of SBA  -SY,BM,KC2    Recorded by [CH] Nichelle Rahman PTA [BB] PAMELLA Ngo/DALTON [SY,BM,KC2] Sadia French PTA (r) Monse Garg, PT Student (t) Sadia French PTA (c)    Transfer Assessment/Treatment    Transfers, Bed-Chair [place] not tested  -      Transfers, Chair-Bed [place] not tested  -      Transfers, Sit-Stand [place] moderate assist (50% patient effort)  -  moderate assist (50% patient effort);verbal cues required  -SY,BM,KC2    Transfers, Stand-Sit [place] moderate assist (50% patient effort)  -  minimum assist (75% patient effort);verbal cues required  -SY,BM,KC2    Transfers, Sit-Stand-Sit, Assist Device rolling walker  -      Toilet Transfer, [place] not tested  -      Transfer, Comment Pt stood X1 and demonstrates posterior lean posteriorly, pt became dizzy and stated she was going to \"fall out.\"PTA immediately assisted pt to supine and vitals signs assessed  -      Recorded by [CH] Nichelle Rahman PTA  [SY,BM,KC2] Sadia French PTA (r) Monse Garg, PT Student (t) Sadia French PTA (c)    Gait Assessment/Treatment    Gait, Comment Deferred this date  -      Recorded by [CH] Nichelle Rahman PTA      Grooming Assessment/Training    Grooming Assess/Train, Position  sitting  -BB     Grooming Assess/Train, Indepen Level  set up required;contact guard assist  -BB     " Grooming Assess/Train, Impairments  strength decreased;impaired balance  -BB     Grooming Assess/Train, Comment  --   oral care  -BB     Recorded by  [BB] PAMELLA Ngo/L     Balance Skills Training    Sitting-Level of Assistance   Moderate assistance  -SY,BM,KC2    Sitting-Balance Support   Feet unsupported  -YS,BM,KC2    Sitting-Balance Activities   --   LE exercises  -SY,BM,KC2    Sitting # of Minutes   ~25  -SY,BM,KC2    Standing-Level of Assistance   Moderate assistance  -SY,BM,KC2    Static Standing Balance Support   assistive device  -SY,BM,KC2    Standing Balance # of Minutes   3-5   posterior lean   -SY,BM,KC2    Recorded by   [SY,BM,KC2] Sadia French PTA (r) Monse Garg, PT Student (t) Sadia French PTA (c)    Therapy Exercises    Bilateral Lower Extremities AROM:;15 reps;sitting;ankle pumps/circles;glut sets;hip abduction/adduction;hip flexion;LAQ  -CH  AROM:;15 reps;sitting;ankle pumps/circles;hip flexion;LAQ  -SY,BM,KC2    Bilateral Upper Extremity  AROM:;AAROM:;10 reps;elbow flexion/extension;hand pumps;pronation/supination;shoulder rolls/shrugs;shoulder ER/IR   long sitting, wrist flexion/extension  -BB     Recorded by [CH] Nichelle Rahman PTA [BB] PAMELLA Ngo/DALTON [SY,BM,KC2] Sadia French PTA (r) Monse Garg, PT Student (t) Sadia French PTA (c)    Positioning and Restraints    Pre-Treatment Position in bed  - in bed  -BB in bed  -SY,BM,KC2    Post Treatment Position bed  - bed  - bed  -SY,BM,KC2    In Bed supine;call light within reach;encouraged to call for assist;exit alarm on;side rails up x2;heels elevated;SCD pump applied  - supine;call light within reach;encouraged to call for assist;exit alarm on  -BB supine;call light within reach;encouraged to call for assist;exit alarm on  -SY,BM,KC2    Recorded by [CH] Nichelle Rahman PTA [BB] PAMELLA Ngo/DALTON [SY,BM,KC2] Sadia French, PTA (r) Monse Garg, PT Student (t) Sadia French, KIN (c)       01/18/18 0730 01/17/18 0908 01/16/18 1410    Rehab Assessment/Intervention    Discipline occupational therapy assistant  -BB occupational therapist  -NN occupational therapy assistant  -LM    Document Type therapy note (daily note)  -BB therapy note (daily note)  -NN therapy note (daily note)  -LM    Subjective Information agree to therapy  -BB unable to respond  -NN unable to respond   unable to respond but nodded to in bed rom pt confused  -LM    Patient Effort, Rehab Treatment fair  -BB  --   pt sitter/family stated pt been trying climb oob relayed-RN  -LM    Symptoms Noted During/After Treatment none  -BB      Precautions/Limitations fall precautions;oxygen therapy device and L/min  -BB      Recorded by [BB] JUDE NgoA/L [NN] Janet Garibay OTR/L [LM] Melyssa Bell CAN/L    Vital Signs    Pre Systolic BP Rehab 115  -BB      Pre Treatment Diastolic BP 58  -BB      Intra Systolic BP Rehab 112  -BB      Intra Treatment Diastolic BP 56  -BB      Pretreatment Heart Rate (beats/min) 77  -BB 77  -NN     Intratreatment Heart Rate (beats/min) 76  -BB 76  -NN     Posttreatment Heart Rate (beats/min)  77  -NN     Pre SpO2 (%) 97  -BB 98  -NN     O2 Delivery Pre Treatment supplemental O2  -BB supplemental O2  -NN     Intra SpO2 (%) 96  -BB 97  -NN     O2 Delivery Intra Treatment supplemental O2  -BB supplemental O2  -NN     Post SpO2 (%)  98  -NN     O2 Delivery Post Treatment  supplemental O2  -NN     Pre Patient Position Supine  -BB Supine  -NN     Intra Patient Position Sitting  -BB Sitting  -NN     Post Patient Position  Supine  -NN     Recorded by [BB] JUDE NgoA/L [NN] Janet Garibay OTR/L     Pain Assessment    Pain Assessment No/denies pain  -BB Unable to assess  -NN Unable to assess  -LM    Recorded by [BB] JUDE NgoA/DALTON [NN] Janet Garibay OTR/L [LM] JUDE WongA/L    Cognitive Assessment/Intervention    Current Cognitive/Communication  Assessment impaired  -BB impaired  -NN could not assess  -LM    Orientation Status oriented to;person  -BB unable/difficult to assess   very lethargic and would not answer appropriate  -NN     Follows Commands/Answers Questions 50% of the time  -BB 25% of the time;able to follow single-step instructions;needs cueing;needs increased time;needs repetition  -NN     Personal Safety severe impairment  -BB severe impairment;decreased awareness, need for assist;decreased insight to deficits;decreased awareness, need for safety  -NN     Personal Safety Interventions fall prevention program maintained;nonskid shoes/slippers when out of bed  -BB fall prevention program maintained;gait belt;muscle strengthening facilitated;supervised activity;nonskid shoes/slippers when out of bed  -NN     Recorded by [BB] PAMELLA Ngo/DALTON [NN] Janet Garibay OTR/L [LM] JUDE WongA/L    Bed Mobility, Assessment/Treatment    Bed Mobility, Assistive Device  bed rails;head of bed elevated  -NN     Bed Mobility, Scoot/Bridge, [place]  moderate assist (50% patient effort)  -NN     Bed Mob, Supine to Sit, [place] moderate assist (50% patient effort)  -BB moderate assist (50% patient effort)  -NN     Bed Mob, Sit to Supine, [place] moderate assist (50% patient effort)  -BB moderate assist (50% patient effort)  -NN     Bed Mobility, Safety Issues decreased use of arms for pushing/pulling;decreased use of legs for bridging/pushing;cognitive deficits limit understanding  -BB decreased use of arms for pushing/pulling;decreased use of legs for bridging/pushing;cognitive deficits limit understanding  -NN     Bed Mobility, Impairments strength decreased  -BB strength decreased;impaired balance;postural control impaired  -NN     Bed Mobility, Comment Pt sat EOB ~5 min  -BB Pt. VERY lethargic and disoriented limiting ability to safely complete bed mob without assistance.   -NN     Recorded by [BB] Bibi Chester,  CAN/L [NN] Janet Garibay OTR/L     Transfer Assessment/Treatment    Transfers, Sit-Stand [place]  verbal cues required;nonverbal cues required (demo/gesture);moderate assist (50% patient effort);minimum assist (75% patient effort)  -NN     Transfers, Stand-Sit [place]  verbal cues required;nonverbal cues required (demo/gesture);minimum assist (75% patient effort);moderate assist (50% patient effort)  -NN     Transfers, Sit-Stand-Sit, Assist Device  rolling walker  -NN     Transfer, Safety Issues  step length decreased  -NN     Transfer, Impairments  strength decreased;impaired balance;postural control impaired  -NN     Transfer, Comment  Pt. has severe posterior leaning and will not keep eyes open even with multiple vc/tc  -NN     Recorded by  [NN] Janet Garibay OTR/L     Functional Mobility    Functional Mobility- Comment  Pt. was cued to takes side steps and she would not keep eyes open  -NN     Recorded by  [NN] Janet Garibay OTR/L     Upper Body Bathing Assessment/Training    UB Bathing Assess/Train Assistive Device --   bath wipes  -BB      UB Bathing Assess/Train, Position long sitting  -BB      UB Bathing Assess/Train, [place] Level contact guard assist  -BB      UB Bathing Assess/Train, Impairments strength decreased  -BB      Recorded by [BB] PAMELLA Ngo/L      Lower Body Bathing Assessment/Training    LB Bathing Assess/Train Assistive Device --   bath wipes  -BB      LB Bathing Assess/Train, Position long sitting  -BB      LB Bathing Assess/Train, [place] Level minimum assist (75% patient effort);moderate assist (50% patient effort)  -BB      LB Bathing Assess/Train, Impairments strength decreased;decreased flexibility  -BB      Recorded by [BB] PAMELLA Ngo/L      Upper Body Dressing Assessment/Training    UB Dressing Assess/Train, Clothing Type donning:;doffing:;hospital gown  -BB      UB Dressing Assess/Train, Position long sitting  -BB      UB  Dressing Assess/Train, [place] minimum assist (75% patient effort)  -BB      UB Dressing Assess/Train, Impairments strength decreased  -BB      Recorded by [BB] PAMELLA Ngo/L      Lower Body Dressing Assessment/Training    LB Dressing Assess/Train, Comment pt did not want to wear socks  -BB      Recorded by [BB] PAMELLA Ngo/L      Grooming Assessment/Training    Grooming Assess/Train, Position sitting  -BB      Grooming Assess/Train, Indep[place] Level contact guard assist  -BB      Grooming Assess/Train, Impairments strength decreased  -BB      Grooming Assess/Train, Comment wash face, hands, cpmb hair, oral care  -BB      Recorded by [BB] JUDE NgoA/L      Motor Skills/Interventions    Additional Documentation  Balance Skills Training (Group)  -NN     Recorded by  [NN] Janet Garibay OTR/L     Balance Skills Training    Sitting-Level of Assistance Contact guard  -BB Minimum assistance;Contact guard  -NN     Sitting-Balance Support Right upper extremity supported;Left upper extremity supported;Feet supported  -BB Right upper extremity supported;Left upper extremity supported;Feet supported  -NN     Sitting-Balance Activities Lateral lean;Left UE Weight Bearing;Right UE Weight Bearing;Forward lean;Reaching for objects;Reaching across midline  -BB Lateral lean;Forward lean;Reaching across midline;Right UE Weight Bearing;Left UE Weight Bearing  -NN     Sitting # of Minutes 5  -BB      Standing-Level of Assistance  Minimum assistance;Moderate assistance  -NN     Static Standing Balance Support  assistive device  -NN     Recorded by [BB] PAMELLA Ngo/DALTON [NN] Janet Garibay OTR/L     Therapy Exercises    Bilateral Upper Extremity  AAROM:;10 reps;sitting;elbow flexion/extension;hand pumps;pronation/supination;shoulder extension/flexion  -NN PROM:;10 reps;elbow flexion/extension;shoulder horizontal abd/add;shoulder extension/flexion;shoulder  abduction/adduction;pronation/supination   wrist flex ext  -LM    Recorded by  [NN] Janet Garibay, OTR/L [LM] Melyssa Bell, CAN/L    Positioning and Restraints    Pre-Treatment Position in bed  -BB in bed  -NN in bed  -LM    Post Treatment Position bed  -BB bed  -NN bed  -LM    In Bed fowlers;call light within reach;exit alarm on;encouraged to call for assist  -BB fowlers;call light within reach;encouraged to call for assist;exit alarm on;with family/caregiver;side rails up x2;legs elevated  -NN     Recorded by [BB] Bibi Chester, CAN/L [NN] Janet Garibay, OTR/L [LM] Melyssa Bell, CAN/L      01/16/18 1300          Rehab Assessment/Intervention    Discipline physical therapy assistant  -SY      Document Type therapy note (daily note)  -SY      Subjective Information unable to respond  -SY      Patient Effort, Rehab Treatment fair  -SY      Specific Treatment Considerations pt too lethargic for progressive PT at this time, however she needed pericare and pad changed  -SY      Recorded by [SY] Sadia French PTA      Vital Signs    Pre Systolic BP Rehab 108  -SY      Pre Treatment Diastolic BP 57  -SY      Pre SpO2 (%) 76  -SY      Pre Patient Position Supine  -SY      Intra Patient Position Supine  -SY      Post Patient Position Supine  -SY      Recorded by [SY] Sadia French PTA      Pain Assessment    Pain Assessment Unable to assess  -SY      Recorded by [SY] Sadia French PTA      Cognitive Assessment/Intervention    Current Cognitive/Communication Assessment could not assess  -SY      Orientation Status other (see comments)   pt lethargic and would not respond  -SY      Follows Commands/Answers Questions unable to answer questions  -SY      Personal Safety unable/difficult to assess  -SY      Personal Safety Interventions fall prevention program maintained  -SY      Recorded by [SY] Sadia French PTA      Bed Mobility, Assessment/Treatment    Bed Mobility, Roll Left, [place]  maximum assist (25% patient effort);dependent (less than 25% patient effort)  -SY      Bed Mobility, Roll Right, [place] maximum assist (25% patient effort);dependent (less than 25% patient effort)  -SY      Bed Mobility, Scoot/Bridge, [place] dependent (less than 25% patient effort)  -SY      Recorded by [SY] Sadia French, PTA      Transfer Assessment/Treatment    Transfer, Comment too lethargic for OOB   -SY      Recorded by [SY] Sadia French PTA      Therapy Exercises    Bilateral Lower Extremities PROM:;10 reps;supine;ankle pumps/circles;heel slides;hip ER;hip IR  -SY      Recorded by [SY] Sadia French PTA      Positioning and Restraints    Pre-Treatment Position in bed  -SY      Post Treatment Position bed  -SY      In Bed supine  -SY      Recorded by [SY] Sadia French PTA        User Key  (r) = Recorded By, (t) = Taken By, (c) = Cosigned By    Initials Name Effective Dates     Sadia French, PTA 10/17/16 -     CH Nichelle Rahman, PTA 10/17/16 -     BB Bibi Chester, CAN/L 10/17/16 -     LM Melyssa Bell, CAN/L 10/17/16 -     NN Janet Garibay, OTR/L 11/08/17 -     BM Monse Garg, PT Student 12/05/17 -                 IP PT Goals       01/19/18 1011 01/18/18 1124 01/18/18 1032    Bed Mobility PT STG    Bed Mobility PT STG, Date Goal Reviewed 01/19/18  - 01/18/18  -SY (r) BM (t) SY (c)     Bed Mobility PT STG, Outcome goal ongoing  - goal ongoing  -SY (r) BM (t) SY (c)     Transfer Training PT STG    Transfer Training PT STG, Date Goal Reviewed 01/19/18  -CH  01/18/18  -SY (r) BM (t) SY (c)    Transfer Training PT STG, Outcome goal ongoing  -  goal ongoing  -SY (r) BM (t) SY (c)    Gait Training PT LTG    Gait Training Goal PT LTG, Date Goal Reviewed 01/19/18  -CH  01/18/18  -SY (r) BM (t) SY (c)    Gait Training Goal PT LTG, Outcome goal ongoing  -CH  goal ongoing  -SY (r) BM (t) SY (c)    Strength Goal PT LTG    Strength Goal PT LTG, Date Goal Reviewed 01/19/18   -CH  01/18/18  -SY (r) BM (t) SY (c)    Strength Goal PT LTG, Outcome goal ongoing  -  goal ongoing  -SY (r) BM (t) SY (c)      01/16/18 1253 01/15/18 1223 01/13/18 1018    Bed Mobility PT STG    Bed Mobility PT STG, Date Established 01/16/18  -SY      Bed Mobility PT STG, Date Goal Reviewed 01/16/18  -SY 01/15/18  -CH 01/13/18  -CH    Bed Mobility PT STG, Outcome goal ongoing  -SY goal ongoing  -CH goal ongoing  -CH    Transfer Training PT STG    Transfer Training PT STG, Date Goal Reviewed 01/16/18  -SY 01/15/18  -CH 01/13/18  -CH    Transfer Training PT STG, Outcome goal ongoing  -SY goal ongoing  - goal ongoing  -    Gait Training PT LTG    Gait Training Goal PT LTG, Date Goal Reviewed 01/16/18  -SY 01/15/18  -CH 01/13/18  -CH    Gait Training Goal PT LTG, Outcome goal ongoing  -SY goal ongoing  -CH goal ongoing  -CH    Strength Goal PT LTG    Strength Goal PT LTG, Date Goal Reviewed 01/16/18  -SY 01/15/18  -CH 01/13/18  -CH    Strength Goal PT LTG, Outcome goal ongoing  -SY goal ongoing  - goal partially met  -CH      01/12/18 1103 01/11/18 0855       Bed Mobility PT STG    Bed Mobility PT STG, Date Established  01/11/18  -CB     Bed Mobility PT STG, Time to Achieve  2 - 3 days  -CB     Bed Mobility PT STG, Activity Type  supine to sit/sit to supine  -CB     Bed Mobility PT STG, [place] Level  independent  -CB     Bed Mobility PT STG, Additional Goal  HOB down and no rails  -CB     Bed Mobility PT STG, Date Goal Reviewed 01/12/18  -SY      Bed Mobility PT STG, Outcome goal ongoing  -SY      Transfer Training PT STG    Transfer Training PT STG, Date Established  01/11/18  -CB     Transfer Training PT STG, Time to Achieve  2 - 3 days  -CB     Transfer Training PT STG, Activity Type  bed to chair /chair to bed;sit to stand/stand to sit  -CB     Transfer Training PT STG, [place] Level  supervision required  -CB     Transfer Training PT STG, Assist Device  --   least restrictive  -CB     Transfer  Training PT STG, Date Goal Reviewed 01/12/18  -SY      Transfer Training PT STG, Outcome goal ongoing  -SY      Gait Training PT LTG    Gait Training Goal PT LTG, Date Established  01/11/18  -CB     Gait Training Goal PT LTG, Time to Achieve  by discharge  -CB     Gait Training Goal PT LTG, [place] Level  conditional independence  -CB     Gait Training Goal PT LTG, Assist Device  --   least restrictive assistive device  -CB     Gait Training Goal PT LTG, Distance to Achieve  200 feet  -CB     Gait Training Goal PT LTG, Date Goal Reviewed 01/12/18  -SY      Gait Training Goal PT LTG, Outcome goal ongoing  -SY      Strength Goal PT LTG    Strength Goal PT LTG, Date Established  01/11/18  -CB     Strength Goal PT LTG, Time to Achieve  4 days  -CB     Strength Goal PT LTG, Measure to Achieve  20 reps all ex BLE actively sitting/ standing  -CB     Strength Goal PT LTG, Date Goal Reviewed 01/12/18  -SY      Strength Goal PT LTG, Outcome goal ongoing  -SY        User Key  (r) = Recorded By, (t) = Taken By, (c) = Cosigned By    Initials Name Provider Type    CB Vanesa Ramos, PT Physical Therapist    SY Sadia French, PTA Physical Therapy Assistant    CH Nichelle Rahman, PTA Physical Therapy Assistant    GIL Garg, PT Student PT Student          Physical Therapy Education     Title: PT OT SLP Therapies (Active)     Topic: Physical Therapy (Active)     Point: Mobility training (Active)    Learning Progress Summary    Learner Readiness Method Response Comment Documented by Status   Patient Acceptance D NR   01/11/18 1129 Active               Point: Precautions (Active)    Learning Progress Summary    Learner Readiness Method Response Comment Documented by Status   Patient Acceptance D NR   01/11/18 1129 Active                      User Key     Initials Effective Dates Name Provider Type Discipline     04/06/17 -  Vanesa Ramos, PT Physical Therapist PT                    PT Recommendation and  Plan  Anticipated Equipment Needs At Discharge:  (to be determined)  Anticipated Discharge Disposition: home with home health  Demonstrates Need for Referral to Another Service: home health care  Planned Therapy Interventions: bed mobility training, gait training, home exercise program, strengthening, transfer training  PT Frequency:  (5-14)  Plan of Care Review  Plan Of Care Reviewed With: patient  Outcome Summary/Follow up Plan: No new goals met this tx. Pt lethargic this date and reported c/o dizziness and feeling lightheaded this tx limiting progression. Pt will continue to benefit from skilled therapy services within SNF upon D/C          Outcome Measures       01/19/18 0845 01/18/18 1327 01/18/18 1032    How much help from another person do you currently need...    Turning from your back to your side while in flat bed without using bedrails? 3  -CH  3  -SY (r) BM (t) SY (c)    Moving from lying on back to sitting on the side of a flat bed without bedrails? 2  -CH  2  -SY (r) BM (t) SY (c)    Moving to and from a bed to a chair (including a wheelchair)? 2  -CH  2  -SY (r) BM (t) SY (c)    Standing up from a chair using your arms (e.g., wheelchair, bedside chair)? 2  -CH  3  -SY (r) BM (t) SY (c)    Climbing 3-5 steps with a railing? 1  -CH  2  -SY (r) BM (t) SY (c)    To walk in hospital room? 2  -CH  2  -SY (r) BM (t) SY (c)    AM-PAC 6 Clicks Score 12  -CH  14  -SY (r) BM (t)    How much help from another is currently needed...    Putting on and taking off regular lower body clothing?  2  -BB     Bathing (including washing, rinsing, and drying)  2  -BB     Toileting (which includes using toilet bed pan or urinal)  2  -BB     Putting on and taking off regular upper body clothing  3  -BB     Taking care of personal grooming (such as brushing teeth)  3  -BB     Eating meals  3  -BB     Score  15  -BB     Functional Assessment    Outcome Measure Options AM-PAC 6 Clicks Basic Mobility (PT)  -CH        01/18/18  0730 01/16/18 1300       How much help from another person do you currently need...    Turning from your back to your side while in flat bed without using bedrails?  3  -SY     Moving from lying on back to sitting on the side of a flat bed without bedrails?  3  -SY     Moving to and from a bed to a chair (including a wheelchair)?  3  -SY     Standing up from a chair using your arms (e.g., wheelchair, bedside chair)?  3  -SY     Climbing 3-5 steps with a railing?  2  -SY     To walk in hospital room?  3  -SY     AM-PAC 6 Clicks Score  17  -SY     How much help from another is currently needed...    Putting on and taking off regular lower body clothing? 2  -BB      Bathing (including washing, rinsing, and drying) 2  -BB      Toileting (which includes using toilet bed pan or urinal) 2  -BB      Putting on and taking off regular upper body clothing 3  -BB      Taking care of personal grooming (such as brushing teeth) 3  -BB      Eating meals 3  -BB      Score 15  -BB        User Key  (r) = Recorded By, (t) = Taken By, (c) = Cosigned By    Initials Name Provider Type     Sadia French PTA Physical Therapy Assistant    LAURI Rahman PTA Physical Therapy Assistant    BB PAMELLA Ngo/L Occupational Therapy Assistant    GIL Garg, PT Student PT Student           Time Calculation:         PT Charges       01/19/18 1015          Time Calculation    Start Time 0845  -      Stop Time 0925  -      Time Calculation (min) 40 min  -      PT Received On 01/19/18  -      Time Calculation- PT    Total Timed Code Minutes- PT 40 minute(s)  -        User Key  (r) = Recorded By, (t) = Taken By, (c) = Cosigned By    Initials Name Provider Type     Nichelle Rahman PTA Physical Therapy Assistant          Therapy Charges for Today     Code Description Service Date Service Provider Modifiers Qty    [id number]  PT THERAPEUTIC ACT EA 15 MIN 1/19/2018 Nichelle Rahman PTA GP 2     Duration:  25m (  8:45 AM -  9:10 AM)      [id number] HC PT THER PROC EA 15 MIN 1/19/2018 Nichelle Rahman PTA GP 1     Duration:  15m ( 9:10 AM -  9:25 AM)            PT G-Codes  PT Professional Judgement Used?: Yes  Outcome Measure Options: AM-PAC 6 Clicks Basic Mobility (PT)  Score: 15  Functional Limitation: Mobility: Walking and moving around  Mobility: Walking and Moving Around Current Status (): At least 40 percent but less than 60 percent impaired, limited or restricted  Mobility: Walking and Moving Around Goal Status (): At least 20 percent but less than 40 percent impaired, limited or restricted    Nichelle Rahman PTA  1/19/2018

## 2018-01-19 NOTE — THERAPY TREATMENT NOTE
Acute Care - Occupational Therapy Treatment Note  [facility]     Patient Name: Michelle Child  : 1955  MRN: [id number]  Today's Date: 2018  Onset of Illness/Injury or Date of Surgery Date: 01/10/18  Date of Referral to OT: 01/10/18  Referring Physician: Dr Berry      Admit Date: 1/10/2018    Visit Dx:     ICD-10-CM ICD-9-CM   1. Hypokalemia E87.6 276.8   2. Pneumonia of left lung due to infectious organism, unspecified part of lung J18.9 486   3. Fall, initial encounter W19.XXXA E888.9   4. Contusion of scalp, initial encounter S00.03XA 920   5. Decreased activities of daily living (ADL) Z78.9 V49.89   6. Impaired physical mobility Z74.09 781.99     Patient Active Problem List   Diagnosis   • Tobacco dependence   • Migraine   • Iron deficiency anemia   • Gastroesophageal reflux disease   • Coronary arteriosclerosis   • Bilateral pseudophakia   • Astigmatism   • Right humeral fracture   • Fall with injury   • Closed fracture of humerus   • Cause of injury, fall   • Closed fracture of proximal end of right humerus with routine healing   • Hypokalemia   • Weakness   • Acute respiratory failure with hypoxia   • Pneumonia of left lower lobe due to Streptococcus pneumoniae   • Hyponatremia   • Cerebral microvascular disease   • Pulmonary hypertension   • Rheumatic tricuspid valve regurgitation   • Rheumatic mitral stenosis   • Acute diastolic CHF (congestive heart failure)   • Encephalopathy, metabolic   • Pulmonary hypertension due to left heart valvular disease   • Cor pulmonale, chronic   • Folic acid deficiency   • Hypoxic encephalopathy   • Oral thrush   • Laryngitis, acute   • Dysuria   • Bacterial cystitis             Adult Rehabilitation Note       18 1058 18 0845 18 1327    Rehab Assessment/Intervention    Discipline occupational therapy assistant  -BB physical therapy assistant  -CH occupational therapy assistant  -BB    Document Type therapy note (daily note)  -FINA  therapy note (daily note)  -CH therapy note (daily note)  -BB    Subjective Information agree to therapy  -BB agree to therapy  -CH agree to therapy  -BB    Patient Effort, Rehab Treatment fair  -BB fair  -CH fair  -BB    Symptoms Noted During/After Treatment --   confusion  -BB  --   confusion  -BB    Precautions/Limitations fall precautions;oxygen therapy device and L/min  -BB fall precautions;oxygen therapy device and L/min  -CH fall precautions;oxygen therapy device and L/min  -BB    Specific Treatment Considerations  pt somewhat lethargic this tx with c/o dizziness once standing limiting progression  -CH     Recorded by [BB] JUDE NgoA/L [CH] Nichelle Rahman PTA [BB] JUDE NgoA/L    Vital Signs    Pre Systolic BP Rehab 112  -BB 98   nursing notified and agreeable to continue tx  -CH     Pre Treatment Diastolic BP 58  -BB 46  -CH     Intra Systolic BP Rehab  94  -CH     Intra Treatment Diastolic BP  55  -CH     Post Systolic BP Rehab  89   nursing notified  -CH     Post Treatment Diastolic BP  50  -CH     Pretreatment Heart Rate (beats/min) 73  -BB 79  -CH 78  -BB    Posttreatment Heart Rate (beats/min)  79  -CH 83  -BB    Pre SpO2 (%) 96  -BB 94  -CH 93  -BB    O2 Delivery Pre Treatment supplemental O2  -BB supplemental O2  -CH supplemental O2  -BB    Post SpO2 (%)  92  -CH 91  -BB    O2 Delivery Post Treatment  supplemental O2  -CH supplemental O2  -BB    Pre Patient Position --   long sitting  -BB Supine  -CH Supine  -BB    Intra Patient Position  Sitting  -CH     Post Patient Position  Supine  -CH Supine  -BB    Recorded by [BB] JUDE NgoA/L [CH] Nichelle Rahman PTA [BB] JUDE NgoA/L    Pain Assessment    Pain Assessment  No/denies pain  -CH No/denies pain  -BB    Recorded by  [CH] Nichelle Rahman PTA [BB] JUDE NgoA/L    Vision Assessment/Intervention    Visual Impairment  WNL;WFL  -CH     Recorded by  [CH] Nichelle Rahman PTA      Cognitive Assessment/Intervention    Current Cognitive/Communication Assessment impaired  -BB impaired  -CH impaired  -BB    Orientation Status  oriented to;person     - oriented to;person  -BB    Follows Commands/Answers Questions 75% of the time  -BB 75% of the time;needs cueing;needs increased time;needs repetition  - 75% of the time  -BB    Personal Safety moderate impairment  -BB moderate impairment;decreased awareness, need for assist;decreased awareness, need for safety  - moderate impairment  -BB    Personal Safety Interventions fall prevention program maintained;nonskid shoes/slippers when out of bed  -BB fall prevention program maintained;gait belt;muscle strengthening facilitated;nonskid shoes/slippers when out of bed;supervised activity  - fall prevention program maintained;nonskid shoes/slippers when out of bed  -BB    Recorded by [BB] PAMELLA Ngo/DALTON [CH] Nichelle Rahman, PTA [BB] PAMELLA Ngo/L    Bed Mobility, Assessment/Treatment    Bed Mobility, Assistive Device  bed rails;head of bed elevated  -     Bed Mobility, Roll Left, [place] contact guard assist  -BB contact guard assist  -     Bed Mobility, Roll Right, [place] contact guard assist  -BB contact guard assist  -     Bed Mobility, Scoot/Bridge, [place]  minimum assist (75% patient effort)  -     Bed Mob, Supine to Sit, [place]  minimum assist (75% patient effort)  - conditional independence  -BB    Bed Mob, Sit to Supine, [place]  moderate assist (50% patient effort)  - minimum assist (75% patient effort);moderate assist (50% patient effort)  -BB    Bed Mobility, Comment  pt sat EOB and performed ther ex with min A for sitting balance due to posterior lean and lateral lean to R  -CH Pt sat EOB 15 min  -BB    Recorded by [BB] PAMELLA Ngo/DALTON [CH] Nichelle Rahman, PTA [BB] PAMELLA Ngo/L    Transfer Assessment/Treatment    Transfers, Bed-Chair  "[place]  not tested  -CH     Transfers, Chair-Bed [place]  not tested  -CH     Transfers, Sit-Stand [place]  moderate assist (50% patient effort)  -CH     Transfers, Stand-Sit [place]  moderate assist (50% patient effort)  -CH     Transfers, Sit-Stand-Sit, Assist Device  rolling walker  -CH     Toilet Transfer, [place] contact guard assist  -BB not tested  -CH     Toilet Transfer, Assistive Device --   bed pan  -BB      Transfer, Comment  Pt stood X1 and demonstrates posterior lean posteriorly, pt became dizzy and stated she was going to \"fall out.\"PTA immediately assisted pt to supine and vitals signs assessed  -CH     Recorded by [BB] PAMELLA Ngo/L [CH] Nichelle Rahman, PTA     Gait Assessment/Treatment    Gait, Comment  Deferred this date  -CH     Recorded by  [CH] Nichelle Rahman, PTA     Toileting Assessment/Training    Toileting Assess/Train, Position supine  -BB      Toileting Assess/Train, Indepen Level dependent (less than 25% patient effort)  -BB      Recorded by [BB] PAMELLA Ngo/L      Grooming Assessment/Training    Grooming Assess/Train, Position long sitting  -BB  sitting  -BB    Grooming Assess/Train, Indepen Level set up required;contact guard assist  -BB  set up required;contact guard assist  -BB    Grooming Assess/Train, Impairments strength decreased;coordination impaired  -BB  strength decreased;impaired balance  -BB    Grooming Assess/Train, Comment   --   oral care  -BB    Recorded by [BB] PAMELLA Ngo/L  [BB] JUDE NgoA/L    Self-Feeding Assessment/Training    Self-Feeding Assess/Train, Position --   long sitting  -BB      Self-Feeding Assess/Train, [place] set up required;moderate assist (50% patient effort);minimum assist (75% patient effort)   family requests assistance for pt's meal  -BB      Self-Feeding Assess/Train, Spillage Amount minimal  -BB      Recorded by [BB] PAMELLA Ngo/DALTON      " Therapy Exercises    Bilateral Lower Extremities  AROM:;15 reps;sitting;ankle pumps/circles;glut sets;hip abduction/adduction;hip flexion;LAQ  -CH     Bilateral Upper Extremity   AROM:;AAROM:;10 reps;elbow flexion/extension;hand pumps;pronation/supination;shoulder rolls/shrugs;shoulder ER/IR   long sitting, wrist flexion/extension  -BB    Recorded by  [CH] Nichelle Rahman PTA [BB] JUDE NgoA/L    Positioning and Restraints    Pre-Treatment Position in bed  -BB in bed  -CH in bed  -BB    Post Treatment Position bed  -BB bed  -CH bed  -BB    In Bed call light within reach;encouraged to call for assist;exit alarm on;with family/caregiver   long sitting  -BB supine;call light within reach;encouraged to call for assist;exit alarm on;side rails up x2;heels elevated;SCD pump applied  - supine;call light within reach;encouraged to call for assist;exit alarm on  -BB    Recorded by [BB] PAMELLA Ngo/DALTON [CH] Nichelle Rahman PTA [BB] PAMELLA Ngo/DALTON      01/18/18 1032 01/18/18 0730 01/17/18 0908    Rehab Assessment/Intervention    Discipline  occupational therapy assistant  -BB occupational therapist  -NN    Document Type therapy note (daily note)  -GIL DAN KC2 therapy note (daily note)  -BB therapy note (daily note)  -NN    Subjective Information agree to therapy  -GIL DANKC2 agree to therapy  -BB unable to respond  -NN    Patient Effort, Rehab Treatment  fair  -BB     Symptoms Noted During/After Treatment  none  -BB     Precautions/Limitations  fall precautions;oxygen therapy device and L/min  -BB     Recorded by [SY,GIL,KC2] Sadia French PTA (r) Monse Garg, DI Student (t) Sadia French PTA (c) [BB] PAMELLA Ngo/DALTON [NN] Janet Garibay, OTR/L    Vital Signs    Pre Systolic BP Rehab 131  -SY,BM,KC2 115  -BB     Pre Treatment Diastolic BP 69  -SY,BM,KC2 58  -BB     Intra Systolic BP Rehab  112  -BB     Intra Treatment Diastolic BP  56  -BB     Pretreatment Heart Rate  (beats/min) 82  -SY,BM,KC2 77  -BB 77  -NN    Intratreatment Heart Rate (beats/min)  76  -BB 76  -NN    Posttreatment Heart Rate (beats/min)   77  -NN    Pre SpO2 (%)  97  -BB 98  -NN    O2 Delivery Pre Treatment  supplemental O2  -BB supplemental O2  -NN    Intra SpO2 (%)  96  -BB 97  -NN    O2 Delivery Intra Treatment  supplemental O2  -BB supplemental O2  -NN    Post SpO2 (%)   98  -NN    O2 Delivery Post Treatment   supplemental O2  -NN    Pre Patient Position  Supine  -BB Supine  -NN    Intra Patient Position  Sitting  -BB Sitting  -NN    Post Patient Position   Supine  -NN    Recorded by [SY,BM,KC2] Sadia French, PTA (r) Monse Garg, PT Student (t) Sadia French PTA (c) [BB] Bibi Chester CAN/L [NN] Janet Garibay, OTR/L    Pain Assessment    Pain Assessment  No/denies pain  -BB Unable to assess  -NN    Recorded by  [BB] Bibi Chester CAN/L [NN] Janet Garibay, OTR/L    Vision Assessment/Intervention    Visual Impairment WNL;WFL  -SY,BM,KC2      Recorded by [SY,BM,KC2] Sadia French PTA (r) Monse Garg, PT Student (t) Sadia French PTA (c)      Cognitive Assessment/Intervention    Current Cognitive/Communication Assessment impaired  -SY,BM,KC2 impaired  -BB impaired  -NN    Orientation Status oriented to;person;place;disoriented to;situation  -SY,BM,KC2 oriented to;person  -BB unable/difficult to assess   very lethargic and would not answer appropriate  -NN    Follows Commands/Answers Questions 50% of the time;needs cueing;needs increased time;needs repetition  -SY,BM,KC2 50% of the time  -BB 25% of the time;able to follow single-step instructions;needs cueing;needs increased time;needs repetition  -NN    Personal Safety moderate impairment;decreased awareness, need for assist;decreased awareness, need for safety  -SY,BM,KC2 severe impairment  -BB severe impairment;decreased awareness, need for assist;decreased insight to deficits;decreased awareness, need for safety  -NN     Personal Safety Interventions gait belt;nonskid shoes/slippers when out of bed;supervised activity  -SY,BM,KC2 fall prevention program maintained;nonskid shoes/slippers when out of bed  -BB fall prevention program maintained;gait belt;muscle strengthening facilitated;supervised activity;nonskid shoes/slippers when out of bed  -NN    Short/Long Term Memory unable/difficult to assess  -SY,BM,KC2      Recorded by [SY,BM,KC2] Sadia French PTA (r) Monse Garg, PT Student (t) Sadia French PTA (c) [BB] Bibi Chester, CAN/L [NN] Janet Garibay, OTR/L    Bed Mobility, Assessment/Treatment    Bed Mobility, Assistive Device bed rails;head of bed elevated  -SY,BM,KC2  bed rails;head of bed elevated  -NN    Bed Mobility, Roll Right, [place] contact guard assist;verbal cues required  -SY,BM,KC2      Bed Mobility, Scoot/Bridge, [place] dependent (less than 25% patient effort)  -SY,BM,KC2  moderate assist (50% patient effort)  -NN    Bed Mob, Supine to Sit, [place] moderate assist (50% patient effort);minimum assist (75% patient effort)  -SY,BM,KC2 moderate assist (50% patient effort)  -BB moderate assist (50% patient effort)  -NN    Bed Mob, Sit to Supine, [place] moderate assist (50% patient effort);2 person assist required  -SY,BM,KC2 moderate assist (50% patient effort)  -BB moderate assist (50% patient effort)  -NN    Bed Mobility, Safety Issues  decreased use of arms for pushing/pulling;decreased use of legs for bridging/pushing;cognitive deficits limit understanding  -BB decreased use of arms for pushing/pulling;decreased use of legs for bridging/pushing;cognitive deficits limit understanding  -NN    Bed Mobility, Impairments  strength decreased  -BB strength decreased;impaired balance;postural control impaired  -NN    Bed Mobility, Comment pt sat EOB ~ 25 minutes with mod A sitting balance and brief moments of SBA  -SY,BM,KC2 Pt sat EOB ~5 min  -BB Pt. VERY lethargic and disoriented  limiting ability to safely complete bed mob without assistance.   -NN    Recorded by [SY,BM,KC2] Sadia French PTA (r) Monse Garg, PT Student (t) Sadia French PTA (c) [BB] PAMELLA Ngo/DALTON [NN] Janet Garibay OTR/L    Transfer Assessment/Treatment    Transfers, Sit-Stand [place] moderate assist (50% patient effort);verbal cues required  -SY,BM,KC2  verbal cues required;nonverbal cues required (demo/gesture);moderate assist (50% patient effort);minimum assist (75% patient effort)  -NN    Transfers, Stand-Sit [place] minimum assist (75% patient effort);verbal cues required  -SY,BM,KC2  verbal cues required;nonverbal cues required (demo/gesture);minimum assist (75% patient effort);moderate assist (50% patient effort)  -NN    Transfers, Sit-Stand-Sit, Assist Device   rolling walker  -NN    Transfer, Safety Issues   step length decreased  -NN    Transfer, Impairments   strength decreased;impaired balance;postural control impaired  -NN    Transfer, Comment   Pt. has severe posterior leaning and will not keep eyes open even with multiple vc/tc  -NN    Recorded by [SY,BM,KC2] Sadia French PTA (r) Monse Garg, PT Student (t) Sadia French PTA (c)  [NN] Janet Garibay, OTR/L    Functional Mobility    Functional Mobility- Comment   Pt. was cued to takes side steps and she would not keep eyes open  -NN    Recorded by   [NN] Janet Garibay OTR/L    Upper Body Bathing Assessment/Training    UB Bathing Assess/Train Assistive Device  --   bath wipes  -BB     UB Bathing Assess/Train, Position  long sitting  -BB     UB Bathing Assess/Train, [place] Level  contact guard assist  -BB     UB Bathing Assess/Train, Impairments  strength decreased  -BB     Recorded by  [BB] JUDE NgoA/L     Lower Body Bathing Assessment/Training    LB Bathing Assess/Train Assistive Device  --   bath wipes  -BB     LB Bathing Assess/Train, Position  long sitting  -BB     LB Bathing Assess/Train,  [place] Level  minimum assist (75% patient effort);moderate assist (50% patient effort)  -BB     LB Bathing Assess/Train, Impairments  strength decreased;decreased flexibility  -BB     Recorded by  [BB] PAMELLA Ngo/L     Upper Body Dressing Assessment/Training    UB Dressing Assess/Train, Clothing Type  donning:;doffing:;hospital gown  -BB     UB Dressing Assess/Train, Position  long sitting  -BB     UB Dressing Assess/Train, [place]  minimum assist (75% patient effort)  -BB     UB Dressing Assess/Train, Impairments  strength decreased  -BB     Recorded by  [BB] PAMELLA Ngo/L     Lower Body Dressing Assessment/Training    LB Dressing Assess/Train, Comment  pt did not want to wear socks  -BB     Recorded by  [BB] PAMELLA Ngo/L     Grooming Assessment/Training    Grooming Assess/Train, Position  sitting  -BB     Grooming Assess/Train, Indepen Level  contact guard assist  -BB     Grooming Assess/Train, Impairments  strength decreased  -BB     Grooming Assess/Train, Comment  wash face, hands, cpmb hair, oral care  -BB     Recorded by  [BB] PAMELLA Ngo/L     Motor Skills/Interventions    Additional Documentation   Balance Skills Training (Group)  -NN    Recorded by   [NN] Janet Garibay, OTR/L    Balance Skills Training    Sitting-Level of Assistance Moderate assistance  -SY,GIL,KC2 Contact guard  -BB Minimum assistance;Contact guard  -NN    Sitting-Balance Support Feet unsupported  -SY,BM,KC2 Right upper extremity supported;Left upper extremity supported;Feet supported  -BB Right upper extremity supported;Left upper extremity supported;Feet supported  -NN    Sitting-Balance Activities --   LE exercises  -SY,GIL,KC2 Lateral lean;Left UE Weight Bearing;Right UE Weight Bearing;Forward lean;Reaching for objects;Reaching across midline  -BB Lateral lean;Forward lean;Reaching across midline;Right UE Weight Bearing;Left UE Weight Bearing  -NN    Sitting # of Minutes  ~25  -SY,BM,KC2 5  -BB     Standing-Level of Assistance Moderate assistance  -SY,BM,KC2  Minimum assistance;Moderate assistance  -NN    Static Standing Balance Support assistive device  -SY,BM,KC2  assistive device  -NN    Standing Balance # of Minutes 3-5   posterior lean   -SY,BM,KC2      Recorded by [SY,BM,KC2] Sadia French PTA (r) Monse Garg, PT Student (t) Sadia French PTA (c) [BB] PAMELLA Ngo/L [NN] Janet Garibay, OTR/L    Therapy Exercises    Bilateral Lower Extremities AROM:;15 reps;sitting;ankle pumps/circles;hip flexion;LAQ  -SY,BM,KC2      Bilateral Upper Extremity   AAROM:;10 reps;sitting;elbow flexion/extension;hand pumps;pronation/supination;shoulder extension/flexion  -NN    Recorded by [SY,BM,KC2] Sadia French PTA (r) Monse Garg, PT Student (t) Sadia French PTA (c)  [NN] Janet Garibay, OTR/L    Positioning and Restraints    Pre-Treatment Position in bed  -SY,BM,KC2 in bed  -BB in bed  -NN    Post Treatment Position bed  -SY,BM,KC2 bed  -BB bed  -NN    In Bed supine;call light within reach;encouraged to call for assist;exit alarm on  -SY,BM,KC2 fowlers;call light within reach;exit alarm on;encouraged to call for assist  -BB fowlers;call light within reach;encouraged to call for assist;exit alarm on;with family/caregiver;side rails up x2;legs elevated  -NN    Recorded by [SY,BM,KC2] Sadia French PTA (r) Monse Garg PT Student (t) Sadia French PTA (c) [BB] PAMELLA Ngo/DALTON [NN] Janet Garibay OTR/L      01/16/18 1410          Rehab Assessment/Intervention    Discipline occupational therapy assistant  -LM      Document Type therapy note (daily note)  -LM      Subjective Information unable to respond   unable to respond but nodded to in bed rom pt confused  -LM      Patient Effort, Rehab Treatment --   pt sitter/family stated pt been trying climb oob relayed-RN  -LM      Recorded by [LM] PAMELLA Wong/L      Pain Assessment    Pain  Assessment Unable to assess  -LM      Recorded by [LM] JUDE WongA/L      Cognitive Assessment/Intervention    Current Cognitive/Communication Assessment could not assess  -LM      Recorded by [LM] JUDE WongA/L      Therapy Exercises    Bilateral Upper Extremity PROM:;10 reps;elbow flexion/extension;shoulder horizontal abd/add;shoulder extension/flexion;shoulder abduction/adduction;pronation/supination   wrist flex ext  -LM      Recorded by [LM] JUDE WongA/L      Positioning and Restraints    Pre-Treatment Position in bed  -LM      Post Treatment Position bed  -LM      Recorded by [LM] JUDE WongA/L        User Key  (r) = Recorded By, (t) = Taken By, (c) = Cosigned By    Initials Name Effective Dates    SY Sadia French, PTA 10/17/16 -     CH Nichelle Rahman, PTA 10/17/16 -     BB Bibi Chester, CAN/L 10/17/16 -     LM Melyssa Bell CAN/L 10/17/16 -     NN Janet Garibay, OTR/L 11/08/17 -     BM Monse Garg, PT Student 12/05/17 -                 OT Goals       01/19/18 1352 01/18/18 1521 01/18/18 1141    Transfer Training OT LTG    Transfer Training OT LTG, Date Goal Reviewed 01/19/18  -BB 01/18/18  -BB 01/18/18  -BB    Strength OT LTG    Strength Goal OT LTG, Date Goal Reviewed 01/19/18  -BB 01/18/18  -BB 01/18/18  -BB    ADL OT LTG    ADL OT LTG, Date Goal Reviewed 01/19/18  -BB 01/18/18  -BB 01/18/18  -BB      01/16/18 1457 01/15/18 1020 01/14/18 1529    Transfer Training OT LTG    Transfer Training OT LTG, Date Goal Reviewed 01/16/18  -LM 01/15/18  -BB 01/14/18  -LW    Transfer Training OT LTG, Outcome goal ongoing  -LM  goal ongoing  -LW    Strength OT LTG    Strength Goal OT LTG, Date Goal Reviewed 01/16/18  -LM 01/15/18  -BB 01/14/18  -LW    Strength Goal OT LTG, Outcome goal ongoing  -LM  goal ongoing  -LW    ADL OT LTG    ADL OT LTG, Date Goal Reviewed 01/16/18  -LM 01/15/18  -BB 01/14/18  -LW    ADL OT LTG, Outcome goal ongoing  -LM  goal  ongoing  -LW      01/12/18 1236 01/11/18 0856       Transfer Training OT LTG    Transfer Training OT LTG, Date Established  01/11/18  -NN     Transfer Training OT LTG, Time to Achieve  by discharge  -NN     Transfer Training OT LTG, Activity Type  all transfers  -NN     Transfer Training OT LTG, [place] Level  independent  -NN     Transfer Training OT LTG, Date Goal Reviewed 01/12/18  -LW      Transfer Training OT LTG, Outcome goal ongoing  -LW      Strength OT LTG    Strength Goal OT LTG, Date Established  01/11/18  -NN     Strength Goal OT LTG, Time to Achieve  by discharge  -NN     Strength Goal OT LTG, Measure to Achieve  Pt. will complete BUE strengthening exercises all planes and joints to increase BUE strength to 5/5 for ADLs.   -NN     Strength Goal OT LTG, Date Goal Reviewed 01/12/18  -LW      Strength Goal OT LTG, Outcome goal ongoing  -LW      ADL OT LTG    ADL OT LTG, Date Established  01/11/18  -NN     ADL OT LTG, Time to Achieve  by discharge  -NN     ADL OT LTG, Activity Type  ADL skills  -NN     ADL OT LTG, [place] Level  modified independent  -NN     ADL OT LTG, Additional Goal  AE PRN  -NN     ADL OT LTG, Date Goal Reviewed 01/12/18  -LW      ADL OT LTG, Outcome goal ongoing  -LW        User Key  (r) = Recorded By, (t) = Taken By, (c) = Cosigned By    Initials Name Provider Type    BB Bibi Chester, CAN/L Occupational Therapy Assistant    GREGORY Bell CAN/L Occupational Therapy Assistant    MARTIN Garcia CAN/L Occupational Therapy Assistant    DELFINA Garibay OTJAMES/L Occupational Therapist          Occupational Therapy Education     Title: PT OT SLP Therapies (Active)     Topic: Occupational Therapy (Active)     Point: ADL training (Active)    Description: Instruct learner(s) on proper safety adaptation and remediation techniques during self care or transfers.   Instruct in proper use of assistive devices.    Learning Progress Summary    Learner Readiness Method  Response Comment Documented by Status   Patient Acceptance E NR  BB 01/19/18 1351 Active    Nonacceptance E NL  BB 01/18/18 1520 Active    Acceptance E NL,NR  BB 01/18/18 1141 Active    Acceptance E VU,NR Pt. and son educated on role of OT, safe t/f, benefit of therapy, upright posture, progression with poc NN 01/17/18 1046 Done    Nonacceptance E NL  BB 01/15/18 1020 Active    Acceptance E VU  LW 01/14/18 1528 Done    Acceptance E VU,NR Pt. educated on role of OT, safe bed mobility, benefit of OOB activity, progression with poc NN 01/13/18 1036 Done    Acceptance E VU  LW 01/12/18 1236 Done    Acceptance E NR Pt. educated on role of OT, safe t/f, calling for assistance, benefit of therapy, correcting posture, progression with poc NN 01/11/18 0854 Active   Family Acceptance E NR  BB 01/19/18 1351 Active    Acceptance E VU,NR Pt. and son educated on role of OT, safe t/f, benefit of therapy, upright posture, progression with poc NN 01/17/18 1046 Done               Point: Home exercise program (Done)    Description: Instruct learner(s) on appropriate technique for monitoring, assisting and/or progressing therapeutic exercises/activities.    Learning Progress Summary    Learner Readiness Method Response Comment Documented by Status   Patient Acceptance E VU,NR Pt. and son educated on role of OT, safe t/f, benefit of therapy, upright posture, progression with poc NN 01/17/18 1046 Done    Acceptance E VU  LW 01/14/18 1528 Done    Acceptance E VU,NR Pt. educated on role of OT, safe bed mobility, benefit of OOB activity, progression with poc NN 01/13/18 1036 Done    Acceptance E VU  LW 01/12/18 1236 Done    Acceptance E NR Pt. educated on role of OT, safe t/f, calling for assistance, benefit of therapy, correcting posture, progression with poc NN 01/11/18 0854 Active   Family Acceptance E VU,NR Pt. and son educated on role of OT, safe t/f, benefit of therapy, upright posture, progression with poc NN 01/17/18 1046 Done                Point: Body mechanics (Active)    Description: Instruct learner(s) on proper positioning and spine alignment during self-care, functional mobility activities and/or exercises.    Learning Progress Summary    Learner Readiness Method Response Comment Documented by Status   Patient Acceptance E NR  BB 01/19/18 1351 Active    Nonacceptance E NL  BB 01/18/18 1520 Active    Acceptance E NL,NR  BB 01/18/18 1141 Active    Acceptance E VU,NR Pt. and son educated on role of OT, safe t/f, benefit of therapy, upright posture, progression with poc NN 01/17/18 1046 Done    Nonacceptance E NL   01/15/18 1020 Active    Acceptance E VU  LW 01/14/18 1528 Done    Acceptance E VU,NR Pt. educated on role of OT, safe bed mobility, benefit of OOB activity, progression with poc  01/13/18 1036 Done    Acceptance E VU  LW 01/12/18 1236 Done    Acceptance E NR Pt. educated on role of OT, safe t/f, calling for assistance, benefit of therapy, correcting posture, progression with poc  01/11/18 0854 Active   Family Acceptance E NR  BB 01/19/18 1351 Active    Acceptance E VU,NR Pt. and son educated on role of OT, safe t/f, benefit of therapy, upright posture, progression with poc  01/17/18 1046 Done                      User Key     Initials Effective Dates Name Provider Type Discipline     10/17/16 -  Bibi Chester CAN/L Occupational Therapy Assistant OT     10/17/16 -  Jaz Garcia CAN/L Occupational Therapy Assistant OT     11/08/17 -  Janet Garibay OTJAMES/L Occupational Therapist OT                  OT Recommendation and Plan  Anticipated Discharge Disposition: home with home health, home with assist, home with 24/7 care  Planned Therapy Interventions: activity intolerance, ADL retraining, balance training, bed mobility training, energy conservation, home exercise program, strengthening, transfer training  Therapy Frequency:  (3-14x/week)  Plan of Care Review  Plan Of Care Reviewed With: patient  Progress:  no change  Outcome Summary/Follow up Plan: No new goals met this date. Pt is alert and confused. Pt requires min/mod assist with meals for self feeding. Con't OT POC.        Outcome Measures       01/19/18 1058 01/19/18 0845 01/18/18 1327    How much help from another person do you currently need...    Turning from your back to your side while in flat bed without using bedrails?  3  -CH     Moving from lying on back to sitting on the side of a flat bed without bedrails?  2  -CH     Moving to and from a bed to a chair (including a wheelchair)?  2  -CH     Standing up from a chair using your arms (e.g., wheelchair, bedside chair)?  2  -CH     Climbing 3-5 steps with a railing?  1  -CH     To walk in hospital room?  2  -CH     AM-PAC 6 Clicks Score  12  -CH     How much help from another is currently needed...    Putting on and taking off regular lower body clothing? 2  -BB  2  -BB    Bathing (including washing, rinsing, and drying) 2  -BB  2  -BB    Toileting (which includes using toilet bed pan or urinal) 2  -BB  2  -BB    Putting on and taking off regular upper body clothing 2  -BB  3  -BB    Taking care of personal grooming (such as brushing teeth) 2  -BB  3  -BB    Eating meals 2  -BB  3  -BB    Score 12  -BB  15  -BB    Functional Assessment    Outcome Measure Options  AM-PAC 6 Clicks Basic Mobility (PT)  -CH       01/18/18 1032 01/18/18 0730       How much help from another person do you currently need...    Turning from your back to your side while in flat bed without using bedrails? 3  -SY (r) BM (t) SY (c)      Moving from lying on back to sitting on the side of a flat bed without bedrails? 2  -SY (r) BM (t) SY (c)      Moving to and from a bed to a chair (including a wheelchair)? 2  -SY (r) BM (t) SY (c)      Standing up from a chair using your arms (e.g., wheelchair, bedside chair)? 3  -SY (r) BM (t) SY (c)      Climbing 3-5 steps with a railing? 2  -SY (r) BM (t) SY (c)      To walk in hospital room? 2   -SY (r) BM (t) SY (c)      AM-PAC 6 Clicks Score 14  -SY (r) BM (t)      How much help from another is currently needed...    Putting on and taking off regular lower body clothing?  2  -BB     Bathing (including washing, rinsing, and drying)  2  -BB     Toileting (which includes using toilet bed pan or urinal)  2  -BB     Putting on and taking off regular upper body clothing  3  -BB     Taking care of personal grooming (such as brushing teeth)  3  -BB     Eating meals  3  -BB     Score  15  -BB       User Key  (r) = Recorded By, (t) = Taken By, (c) = Cosigned By    Initials Name Provider Type    SY Sadia French, PTA Physical Therapy Assistant    CH Nichelle Rahman, PTA Physical Therapy Assistant    BB PAMELLA Ngo/L Occupational Therapy Assistant    BM Monse Garg, PT Student PT Student           Time Calculation:         Time Calculation- OT       01/19/18 1354          Time Calculation- OT    OT Start Time 1058  -BB      OT Stop Time 1152  -BB      OT Time Calculation (min) 54 min  -BB      Total Timed Code Minutes- OT 54 minute(s)  -BB      OT Received On 01/19/18  -BB        User Key  (r) = Recorded By, (t) = Taken By, (c) = Cosigned By    Initials Name Provider Type    BB PAMELLA Ngo/L Occupational Therapy Assistant           Therapy Charges for Today     Code Description Service Date Service Provider Modifiers Qty    06534331266 HC OT SELF CARE/MGMT/TRAIN EA 15 MIN 1/18/2018 PAMELLA Ngo/L GO 4    18540641798 HC OT SELF CARE/MGMT/TRAIN EA 15 MIN 1/18/2018 PAMELLA Nog/L GO 1    09620883679 HC OT THERAPEUTIC ACT EA 15 MIN 1/18/2018 PAMELLA Ngo/L GO 2    56851583704 HC OT SELF CARE/MGMT/TRAIN EA 15 MIN 1/19/2018 PAMELLA Ngo/L GO 4          OT G-codes  OT Functional Scales Options: AM-PAC 6 Clicks Daily Activity (OT)  Functional Limitation: Self care  Self Care Current Status (): At least 40 percent but less than 60 percent  impaired, limited or restricted  Self Care Goal Status (): At least 20 percent but less than 40 percent impaired, limited or restricted    PAMELLA Ngo/DALTON  1/19/2018

## 2018-01-19 NOTE — PLAN OF CARE
Problem: Patient Care Overview (Adult)  Goal: Plan of Care Review  Outcome: Ongoing (interventions implemented as appropriate)   01/19/18 0250   Coping/Psychosocial Response Interventions   Plan Of Care Reviewed With patient   Patient Care Overview   Progress no change   Outcome Evaluation   Outcome Summary/Follow up Plan Pt's o2 dropped to 72% on nasal cannula placing the pt back on the adenike mask. She is still having confusion but was more alert and talking to her son and me at the beginning of the shift. Will continue to monitor       Problem: Pneumonia (Adult)  Goal: Signs and Symptoms of Listed Potential Problems Will be Absent or Manageable (Pneumonia)  Outcome: Ongoing (interventions implemented as appropriate)      Problem: Fall Risk (Adult)  Goal: Absence of Falls  Outcome: Ongoing (interventions implemented as appropriate)      Problem: Fluid Volume Deficit (Adult)  Goal: Fluid/Electrolyte Balance  Outcome: Ongoing (interventions implemented as appropriate)    Goal: Comfort/Well Being  Outcome: Ongoing (interventions implemented as appropriate)

## 2018-01-19 NOTE — PROGRESS NOTES
FAMILY MEDICINE PROGRESS NOTE  NAME: Michelle Child  : 1955  MRN: [id number]     LOS: 8 days   Full Code  PROVIDER OF SERVICE:     Chief Complaint:  Pneumonia of left lower lobe due to Streptococcus pneumoniae    Subjective     Interval History:  History taken from: patient family RN  Subjective: Patient is a 61 yo  female who was admitted with pneumonia.  She is having a hard time talking due to the laryngitis.    Review of Systems:   Review of Systems   Constitutional: Positive for activity change, appetite change and fatigue.   HENT: Positive for mouth sores, trouble swallowing and voice change.    Respiratory: Positive for cough. Negative for shortness of breath.    Cardiovascular: Negative for chest pain, palpitations and leg swelling.   Gastrointestinal: Negative for abdominal pain, diarrhea, nausea and vomiting.   Genitourinary: Positive for dysuria.   Neurological: Positive for weakness and headaches.   Psychiatric/Behavioral: Positive for confusion.       Objective     Vital Signs  Temp:  [98.1 °F (36.7 °C)-100.3 °F (37.9 °C)] 98.7 °F (37.1 °C)  Heart Rate:  [72-83] 79  Resp:  [16-20] 20  BP: (121-134)/(60-66) 127/60    Physical Exam  Physical Exam   Constitutional: She appears well-developed and well-nourished. No distress.   HENT:   Mouth/Throat: Oropharyngeal exudate present.   Mucosa inflamed, erythema, patches   Neck: No JVD present.   Cardiovascular: Normal rate, regular rhythm and intact distal pulses.  Exam reveals no gallop and no friction rub.    Murmur heard.  Pulmonary/Chest: No respiratory distress. She has no wheezes. She has no rales.   Diminished breath sounds in the bases   Abdominal: Soft. Bowel sounds are normal. She exhibits no distension. There is no tenderness. There is no rebound and no guarding.   Musculoskeletal: She exhibits no edema.   Neurological: She is alert.   Skin: She is not diaphoretic.   Nursing note and vitals reviewed.      Medication  Review    Current Facility-Administered Medications:   •  acetaminophen (TYLENOL) tablet 650 mg, 650 mg, Oral, Q4H PRN, Coral Berry MD, 650 mg at 01/18/18 0119  •  aspirin chewable tablet 81 mg, 81 mg, Oral, Daily, Coral Berry MD, 81 mg at 01/18/18 0909  •  bisacodyl (DULCOLAX) EC tablet 5 mg, 5 mg, Oral, Daily PRN, Coral Berry MD  •  doxycycline (VIBRAMYCIN) 100 mg/250 mL 0.9% NS VTB, 100 mg, Intravenous, Q12H, Bridget Ortega MD, 100 mg at 01/18/18 2058  •  famotidine (PEPCID) tablet 40 mg, 40 mg, Oral, Daily, Coral Berry MD, 40 mg at 01/18/18 0909  •  ferrous sulfate EC tablet 324 mg, 324 mg, Oral, BID With Meals, Coral Berry MD, 324 mg at 01/18/18 1651  •  FLUoxetine (PROzac) capsule 60 mg, 60 mg, Oral, Daily, Coral Berry MD, 60 mg at 01/18/18 0908  •  folic acid (FOLVITE) tablet 1 mg, 1 mg, Oral, Daily, Coral Berry MD, 1 mg at 01/18/18 0908  •  furosemide (LASIX) tablet 20 mg, 20 mg, Oral, BID, Sunny Linn DO, 20 mg at 01/18/18 1652  •  HYDROcodone-acetaminophen (NORCO) 5-325 MG per tablet 1 tablet, 1 tablet, Oral, Q4H PRN, Coral Berry MD, 1 tablet at 01/18/18 2050  •  influenza vac split quad (FLUZONE QUADRIVALENT) IM suspension 0.5 mL, 0.5 mL, Intramuscular, During Hospitalization, Coral Berry MD  •  ipratropium-albuterol (DUO-NEB) nebulizer solution 3 mL, 3 mL, Nebulization, 4x Daily - RT, Coral Berry MD, 3 mL at 01/18/18 1946  •  losartan (COZAAR) tablet 50 mg, 50 mg, Oral, Q24H, Coral Berry MD, 50 mg at 01/18/18 0909  •  magic mouthwash with nystatin oral supsension 5 mL, 5 mL, Swish & Swallow, 4x Daily, Bridget Ortega MD, 5 mL at 01/18/18 2057  •  metoprolol succinate XL (TOPROL-XL) 24 hr tablet 25 mg, 25 mg, Oral, Q24H, Stevenson Brown MD PhD, 25 mg at 01/18/18 0909  •  nicotine (NICODERM CQ) 21 MG/24HR patch 1 patch, 1 patch, Transdermal, Q24H, Coral Berry MD, 1 patch at 01/18/18 1651  •  ondansetron (ZOFRAN) tablet 4  mg, 4 mg, Oral, Q6H PRN, Coral Berry MD, 4 mg at 01/15/18 1417  •  pneumococcal polysaccharide 23-valent (PNEUMOVAX-23) vaccine 0.5 mL, 0.5 mL, Intramuscular, During Hospitalization, Coral Berry MD  •  potassium chloride (MICRO-K) CR capsule 40 mEq, 40 mEq, Oral, Daily, Coral Berry MD, 40 mEq at 01/18/18 0906  •  potassium chloride 10 mEq in 100 mL IVPB, 10 mEq, Intravenous, Q1H PRN, Sunny Linn DO  •  risperiDONE (risperDAL) tablet 1 mg, 1 mg, Oral, Nightly, Coral Berry MD, 1 mg at 01/18/18 2050  •  sodium chloride 0.9 % flush 1-10 mL, 1-10 mL, Intravenous, PRN, Coral Berry MD  •  sodium chloride 0.9 % flush 10 mL, 10 mL, Intravenous, PRN, Toro Shine MD, 10 mL at 01/15/18 2048     Diagnostic Data      I reviewed the patient's new clinical results and imaging.      Assessment/Plan     Active Hospital Problems (** Indicates Principal Problem)    Diagnosis Date Noted   • **Pneumonia of left lower lobe due to Streptococcus pneumoniae [J13] 01/11/2018     On Levaquin with falling WBC and no fever.     • Bacterial cystitis [N30.80] 01/18/2018   • Oral thrush [B37.0] 01/17/2018   • Laryngitis, acute [J04.0] 01/17/2018   • Dysuria [R30.0] 01/17/2018   • Hypoxic encephalopathy [G93.1] 01/16/2018     Patient still requiring nasal cannula or venturi mask.  Last ABG on NC still showed significant hypoxia     • Rheumatic mitral stenosis [I05.0] 01/15/2018     Cards Consultation     • Acute diastolic CHF (congestive heart failure) [I50.31] 01/15/2018     Lasix  Cards consultation       • Encephalopathy, metabolic [G93.41] 01/15/2018     Replace electrolytes   Replace Folic Acid     • Pulmonary hypertension due to left heart valvular disease [I27.22, I38] 01/15/2018   • Cor pulmonale, chronic [I27.81] 01/15/2018   • Folic acid deficiency [E53.8] 01/15/2018     Replace orally     • Pulmonary hypertension [I27.20] 01/14/2018     Cards appreciated.      • Rheumatic tricuspid valve  regurgitation [I07.1] 01/14/2018   • Hyponatremia [E87.1] 01/13/2018     Fluctuating  Monitor closely     • Cerebral microvascular disease [I67.9] 01/13/2018     Per CT head  Patient more confused today with slurring of speech. Have to consider acute CVA.   Will obtain MRI brain.      • Hypokalemia [E87.6] 01/10/2018     Replace Orally and IV  Also replace magnesium       • Weakness [R53.1] 01/10/2018     PT/OT     • Acute respiratory failure with hypoxia [J96.01] 01/10/2018     NC oxygen, steroids, and neb treatements     • Fall with injury [W19.XXXA] 08/15/2017     PT/OT       • Tobacco dependence [F17.200]      Nicotine patch 21mg transdermally     • Gastroesophageal reflux disease [K21.9]      Pepcid     • Iron deficiency anemia [D50.9]      Continue feosol        Resolved Hospital Problems    Diagnosis Date Noted Date Resolved   • Anemia [D64.9] 01/14/2018 01/15/2018     Start iron supplementation.  Obtain iron studies.      • Elevated brain natriuretic peptide (BNP) level [R79.89] 01/14/2018 01/15/2018     IVF's discontinued, Fluid Restrictions, Low Na diet. Echocardiogram ordered. Will start Lasix 20 mg po bid and monitor.     • Elevated troponin [R74.8] 01/14/2018 01/15/2018     Most likely secondary to elevated BNP. No chest pain or anginal equivalent. Will monitor.     • Family dynamics problem [Z63.9] 01/14/2018 01/16/2018     Will discuss with case case management.      • Positive D dimer [R79.89] 01/14/2018 01/15/2018     Most likely elevated due to pneumonia and elevated BNP. No clinical signs of PE.     • Hyperkalemia [E87.5] 01/13/2018 01/14/2018     Decrease KCL and monitor     • Community acquired bacterial pneumonia [J15.9] 01/10/2018 01/11/2018     Left lower lobe  Blood culture, sputum culture, check atypicals.   Levaquin as PCN allergy     • Hyponatremia [E87.1] 01/10/2018 01/11/2018     IV fluids     • Essential hypertension [I10]  01/14/2018     Losartan, coreg     • Depressive disorder  [F32.9]  01/14/2018     Prozac, Elavil, and Risperdal       Stop the TCA.  Doxycycline for bacterial cystitis and wait on culture.  Hopefully discharge in the next day or so.  She still isn't eating well.  Recommend warm tea with honey and resting her voice for the laryngitis.  Continue therapy.    DVT prophylaxis: SCDs/TEDs      Disposition:Nursing Home          This document has been electronically signed by Bridget Ortega MD on January 18, 2018 11:19 PM          This document has been electronically signed by Bridget Ortega MD on January 18, 2018 11:19 PM

## 2018-01-19 NOTE — PLAN OF CARE
Problem: Patient Care Overview (Adult)  Goal: Plan of Care Review  Outcome: Ongoing (interventions implemented as appropriate)   01/19/18 1352   Coping/Psychosocial Response Interventions   Plan Of Care Reviewed With patient   Patient Care Overview   Progress no change   Outcome Evaluation   Outcome Summary/Follow up Plan No new goals met this date. Pt is alert and confused. Pt requires min/mod assist with meals for self feeding. Con't OT POC.       Problem: Inpatient Occupational Therapy  Goal: Transfer Training Goal 1 LTG- OT  Outcome: Ongoing (interventions implemented as appropriate)   01/11/18 0856 01/16/18 1457 01/19/18 1352   Transfer Training OT LTG   Transfer Training OT LTG, Date Established 01/11/18 --  --    Transfer Training OT LTG, Time to Achieve by discharge --  --    Transfer Training OT LTG, Activity Type all transfers --  --    Transfer Training OT LTG, [place] Level independent --  --    Transfer Training OT LTG, Date Goal Reviewed --  --  01/19/18   Transfer Training OT LTG, Outcome --  goal ongoing --      Goal: Strength Goal LTG- OT  Outcome: Ongoing (interventions implemented as appropriate)   01/11/18 0856 01/16/18 1457 01/19/18 1352   Strength OT LTG   Strength Goal OT LTG, Date Established 01/11/18 --  --    Strength Goal OT LTG, Time to Achieve by discharge --  --    Strength Goal OT LTG, Measure to Achieve Pt. will complete BUE strengthening exercises all planes and joints to increase BUE strength to 5/5 for ADLs.  --  --    Strength Goal OT LTG, Date Goal Reviewed --  --  01/19/18   Strength Goal OT LTG, Outcome --  goal ongoing --      Goal: ADL Goal LTG- OT  Outcome: Ongoing (interventions implemented as appropriate)   01/11/18 0856 01/16/18 1457 01/19/18 1352   ADL OT LTG   ADL OT LTG, Date Established 01/11/18 --  --    ADL OT LTG, Time to Achieve by discharge --  --    ADL OT LTG, Activity Type ADL skills --  --    ADL OT LTG, [place] Level modified independent --  --     ADL OT LTG, Additional Goal AE PRN --  --    ADL OT LTG, Date Goal Reviewed --  --  01/19/18   ADL OT LTG, Outcome --  goal ongoing --

## 2018-01-19 NOTE — CONSULTS
Adult Nutrition  Assessment    Patient Name:  Michelle Child  YOB: 1955  MRN: [id number]  Admit Date:  1/10/2018    Assessment Date:  1/19/2018    Comments:  Pt more alert today but continues to have some confusion. She asks to go use the restroom, hutchins cath in place.  No complaints of mouth pain.  Pt continues to have poor intakes, with PO documented 0-25%.  Will continue to send Boost and yogurt on all trays.  RD offered hot tea to ease pt laryngitis and pt asked for coffee which RD provided.  Will monitor.          Reason for Assessment       01/19/18 1448    Reason for Assessment    Reason For Assessment/Visit follow up protocol                Nutrition/Diet History       01/19/18 1448    Nutrition/Diet History    Typical Food/Fluid Intake Pt more alert today.  Continues to have poor appetite and intakes.  Agreeable to drink a cup of coffee.              Labs/Tests/Procedures/Meds       01/19/18 1449    Labs/Tests/Procedures/Meds    Labs/Tests Review Reviewed    Medication Review Reviewed, pertinent                Nutrition Prescription Ordered       01/19/18 1449    Nutrition Prescription PO    Current PO Diet Regular    Supplement Boost;Yougurt    Supplement Frequency 2 times a day;3 times a day    Common Modifiers Cardiac;Low Sodium    Low Sodium Details 2,000 mg Sodium            Evaluation of Received Nutrient/Fluid Intake       01/19/18 1449    PO Evaluation    Number of Days PO Intake Evaluated 2 days    % PO Intake 0-25            Electronically signed by:  Nikkie Davis RD  01/19/18 2:50 PM

## 2018-01-19 NOTE — PLAN OF CARE
Problem: Patient Care Overview (Adult)  Goal: Plan of Care Review  Outcome: Ongoing (interventions implemented as appropriate)   01/19/18 1011   Coping/Psychosocial Response Interventions   Plan Of Care Reviewed With patient   Outcome Evaluation   Outcome Summary/Follow up Plan No new goals met this tx. Pt lethargic this date and reported c/o dizziness and feeling lightheaded this tx limiting progression. Pt will continue to benefit from skilled therapy services within SNF upon D/C     Goal: Discharge Needs Assessment  Outcome: Ongoing (interventions implemented as appropriate)   01/11/18 0855 01/14/18 1821 01/15/18 1305   Discharge Needs Assessment   Concerns To Be Addressed --  no discharge needs identified --    Equipment Needed After Discharge (to be determined) --  --    Discharge Facility/Level Of Care Needs --  --  nursing facility, skilled   Current Discharge Risk --  --  lives alone;other (see comments)  (confusion)   Discharge Disposition still a patient --  --    Current Health   Outpatient/Agency/Support Group Needs homecare agency (specify level of care) --  --    Living Environment   Transportation Available --  --  car;family or friend will provide  (Up until hospitalization, patient was driving)   Self-Care   Equipment Currently Used at Home none  (has a cane at home if needed) --  --        Problem: Inpatient Physical Therapy  Goal: Bed Mobility Goal STG- PT  Outcome: Ongoing (interventions implemented as appropriate)   01/11/18 0855 01/16/18 1253 01/19/18 1011   Bed Mobility PT STG   Bed Mobility PT STG, Date Established --  01/16/18 --    Bed Mobility PT STG, Time to Achieve 2 - 3 days --  --    Bed Mobility PT STG, Activity Type supine to sit/sit to supine --  --    Bed Mobility PT STG, [place] Level independent --  --    Bed Mobility PT STG, Additional Goal HOB down and no rails --  --    Bed Mobility PT STG, Date Goal Reviewed --  --  01/19/18   Bed Mobility PT STG, Outcome --  --  goal  ongoing     Goal: Transfer Training Goal 1 STG- PT  Outcome: Ongoing (interventions implemented as appropriate)   01/11/18 0855 01/19/18 1011   Transfer Training PT STG   Transfer Training PT STG, Date Established 01/11/18 --    Transfer Training PT STG, Time to Achieve 2 - 3 days --    Transfer Training PT STG, Activity Type bed to chair /chair to bed;sit to stand/stand to sit --    Transfer Training PT STG, [place] Level supervision required --    Transfer Training PT STG, Assist Device (least restrictive) --    Transfer Training PT STG, Date Goal Reviewed --  01/19/18   Transfer Training PT STG, Outcome --  goal ongoing     Goal: Gait Training Goal LTG- PT  Outcome: Ongoing (interventions implemented as appropriate)   01/11/18 0855 01/19/18 1011   Gait Training PT LTG   Gait Training Goal PT LTG, Date Established 01/11/18 --    Gait Training Goal PT LTG, Time to Achieve by discharge --    Gait Training Goal PT LTG, [place] Level conditional independence --    Gait Training Goal PT LTG, Assist Device (least restrictive assistive device) --    Gait Training Goal PT LTG, Distance to Achieve 200 feet --    Gait Training Goal PT LTG, Date Goal Reviewed --  01/19/18   Gait Training Goal PT LTG, Outcome --  goal ongoing     Goal: Strength Goal LTG- PT  Outcome: Ongoing (interventions implemented as appropriate)   01/11/18 0855 01/19/18 1011   Strength Goal PT LTG   Strength Goal PT LTG, Date Established 01/11/18 --    Strength Goal PT LTG, Time to Achieve 4 days --    Strength Goal PT LTG, Measure to Achieve 20 reps all ex BLE actively sitting/ standing --    Strength Goal PT LTG, Date Goal Reviewed --  01/19/18   Strength Goal PT LTG, Outcome --  goal ongoing

## 2018-01-20 LAB
ALBUMIN SERPL-MCNC: 2.7 G/DL (ref 3.4–4.8)
ALBUMIN/GLOB SERPL: 1 G/DL (ref 1.1–1.8)
ALP SERPL-CCNC: 103 U/L (ref 38–126)
ALT SERPL W P-5'-P-CCNC: 32 U/L (ref 9–52)
ANION GAP SERPL CALCULATED.3IONS-SCNC: 8 MMOL/L (ref 5–15)
AST SERPL-CCNC: 31 U/L (ref 14–36)
BACTERIA SPEC AEROBE CULT: NORMAL
BASOPHILS # BLD AUTO: 0.01 10*3/MM3 (ref 0–0.2)
BASOPHILS NFR BLD AUTO: 0.1 % (ref 0–2)
BILIRUB SERPL-MCNC: 0.4 MG/DL (ref 0.2–1.3)
BUN BLD-MCNC: 15 MG/DL (ref 7–21)
BUN/CREAT SERPL: 19 (ref 7–25)
CALCIUM SPEC-SCNC: 8 MG/DL (ref 8.4–10.2)
CHLORIDE SERPL-SCNC: 97 MMOL/L (ref 95–110)
CO2 SERPL-SCNC: 29 MMOL/L (ref 22–31)
CREAT BLD-MCNC: 0.79 MG/DL (ref 0.5–1)
CRP SERPL-MCNC: 7.5 MG/DL (ref 0–1)
DEPRECATED RDW RBC AUTO: 50.8 FL (ref 36.4–46.3)
EOSINOPHIL # BLD AUTO: 0.15 10*3/MM3 (ref 0–0.7)
EOSINOPHIL NFR BLD AUTO: 1.4 % (ref 0–7)
ERYTHROCYTE [DISTWIDTH] IN BLOOD BY AUTOMATED COUNT: 16.8 % (ref 11.5–14.5)
GFR SERPL CREATININE-BSD FRML MDRD: 73 ML/MIN/1.73 (ref 60–104)
GLOBULIN UR ELPH-MCNC: 2.7 GM/DL (ref 2.3–3.5)
GLUCOSE BLD-MCNC: 90 MG/DL (ref 60–100)
HCT VFR BLD AUTO: 28.3 % (ref 35–45)
HGB BLD-MCNC: 8.9 G/DL (ref 12–15.5)
IMM GRANULOCYTES # BLD: 0.04 10*3/MM3 (ref 0–0.02)
IMM GRANULOCYTES NFR BLD: 0.4 % (ref 0–0.5)
LYMPHOCYTES # BLD AUTO: 1.06 10*3/MM3 (ref 0.6–4.2)
LYMPHOCYTES NFR BLD AUTO: 10 % (ref 10–50)
MCH RBC QN AUTO: 26.5 PG (ref 26.5–34)
MCHC RBC AUTO-ENTMCNC: 31.4 G/DL (ref 31.4–36)
MCV RBC AUTO: 84.2 FL (ref 80–98)
MONOCYTES # BLD AUTO: 1.19 10*3/MM3 (ref 0–0.9)
MONOCYTES NFR BLD AUTO: 11.3 % (ref 0–12)
NEUTROPHILS # BLD AUTO: 8.11 10*3/MM3 (ref 2–8.6)
NEUTROPHILS NFR BLD AUTO: 76.8 % (ref 37–80)
NRBC BLD MANUAL-RTO: 0 /100 WBC (ref 0–0)
PLATELET # BLD AUTO: 234 10*3/MM3 (ref 150–450)
PMV BLD AUTO: 9.6 FL (ref 8–12)
POTASSIUM BLD-SCNC: 3.9 MMOL/L (ref 3.5–5.1)
PROT SERPL-MCNC: 5.4 G/DL (ref 6.3–8.6)
RBC # BLD AUTO: 3.36 10*6/MM3 (ref 3.77–5.16)
SODIUM BLD-SCNC: 134 MMOL/L (ref 137–145)
WBC NRBC COR # BLD: 10.56 10*3/MM3 (ref 3.2–9.8)

## 2018-01-20 PROCEDURE — 99232 SBSQ HOSP IP/OBS MODERATE 35: CPT | Performed by: FAMILY MEDICINE

## 2018-01-20 PROCEDURE — 94799 UNLISTED PULMONARY SVC/PX: CPT

## 2018-01-20 PROCEDURE — 94760 N-INVAS EAR/PLS OXIMETRY 1: CPT

## 2018-01-20 PROCEDURE — 85025 COMPLETE CBC W/AUTO DIFF WBC: CPT | Performed by: FAMILY MEDICINE

## 2018-01-20 PROCEDURE — 97530 THERAPEUTIC ACTIVITIES: CPT

## 2018-01-20 PROCEDURE — 80053 COMPREHEN METABOLIC PANEL: CPT | Performed by: FAMILY MEDICINE

## 2018-01-20 PROCEDURE — 97116 GAIT TRAINING THERAPY: CPT

## 2018-01-20 PROCEDURE — 97110 THERAPEUTIC EXERCISES: CPT

## 2018-01-20 PROCEDURE — 86140 C-REACTIVE PROTEIN: CPT | Performed by: FAMILY MEDICINE

## 2018-01-20 RX ADMIN — FERROUS SULFATE TAB EC 324 MG (65 MG FE EQUIVALENT) 324 MG: 324 (65 FE) TABLET DELAYED RESPONSE at 08:15

## 2018-01-20 RX ADMIN — LIDOCAINE HYDROCHLORIDE 5 ML: 20 SOLUTION ORAL; TOPICAL at 17:19

## 2018-01-20 RX ADMIN — DOXYCYCLINE 100 MG: 100 INJECTION, POWDER, LYOPHILIZED, FOR SOLUTION INTRAVENOUS at 20:59

## 2018-01-20 RX ADMIN — LIDOCAINE HYDROCHLORIDE 5 ML: 20 SOLUTION ORAL; TOPICAL at 20:59

## 2018-01-20 RX ADMIN — METOPROLOL SUCCINATE 25 MG: 25 TABLET, EXTENDED RELEASE ORAL at 08:15

## 2018-01-20 RX ADMIN — PHENAZOPYRIDINE HYDROCHLORIDE 100 MG: 100 TABLET ORAL at 11:33

## 2018-01-20 RX ADMIN — FOLIC ACID 1 MG: 1 TABLET ORAL at 08:15

## 2018-01-20 RX ADMIN — POTASSIUM CHLORIDE 40 MEQ: 750 CAPSULE, EXTENDED RELEASE ORAL at 08:14

## 2018-01-20 RX ADMIN — IPRATROPIUM BROMIDE AND ALBUTEROL SULFATE 3 ML: 2.5; .5 SOLUTION RESPIRATORY (INHALATION) at 16:30

## 2018-01-20 RX ADMIN — IPRATROPIUM BROMIDE AND ALBUTEROL SULFATE 3 ML: 2.5; .5 SOLUTION RESPIRATORY (INHALATION) at 21:04

## 2018-01-20 RX ADMIN — FERROUS SULFATE TAB EC 324 MG (65 MG FE EQUIVALENT) 324 MG: 324 (65 FE) TABLET DELAYED RESPONSE at 17:19

## 2018-01-20 RX ADMIN — ASPIRIN 81 MG 81 MG: 81 TABLET ORAL at 08:14

## 2018-01-20 RX ADMIN — IPRATROPIUM BROMIDE AND ALBUTEROL SULFATE 3 ML: 2.5; .5 SOLUTION RESPIRATORY (INHALATION) at 12:49

## 2018-01-20 RX ADMIN — FAMOTIDINE 40 MG: 20 TABLET, FILM COATED ORAL at 08:15

## 2018-01-20 RX ADMIN — NICOTINE 1 PATCH: 21 PATCH TRANSDERMAL at 15:11

## 2018-01-20 RX ADMIN — LOSARTAN POTASSIUM 50 MG: 50 TABLET, FILM COATED ORAL at 08:15

## 2018-01-20 RX ADMIN — PHENAZOPYRIDINE HYDROCHLORIDE 100 MG: 100 TABLET ORAL at 08:15

## 2018-01-20 RX ADMIN — ONDANSETRON 4 MG: 4 TABLET, FILM COATED ORAL at 18:20

## 2018-01-20 RX ADMIN — ACETAMINOPHEN 650 MG: 325 TABLET ORAL at 04:30

## 2018-01-20 RX ADMIN — LIDOCAINE HYDROCHLORIDE 5 ML: 20 SOLUTION ORAL; TOPICAL at 08:15

## 2018-01-20 RX ADMIN — PHENAZOPYRIDINE HYDROCHLORIDE 100 MG: 100 TABLET ORAL at 17:19

## 2018-01-20 RX ADMIN — RISPERIDONE 1 MG: 1 TABLET ORAL at 20:59

## 2018-01-20 RX ADMIN — IPRATROPIUM BROMIDE AND ALBUTEROL SULFATE 3 ML: 2.5; .5 SOLUTION RESPIRATORY (INHALATION) at 09:08

## 2018-01-20 RX ADMIN — LIDOCAINE HYDROCHLORIDE 5 ML: 20 SOLUTION ORAL; TOPICAL at 11:33

## 2018-01-20 RX ADMIN — POTASSIUM CHLORIDE 40 MEQ: 750 CAPSULE, EXTENDED RELEASE ORAL at 17:19

## 2018-01-20 RX ADMIN — DOXYCYCLINE 100 MG: 100 INJECTION, POWDER, LYOPHILIZED, FOR SOLUTION INTRAVENOUS at 08:14

## 2018-01-20 RX ADMIN — FLUOXETINE 60 MG: 20 CAPSULE ORAL at 08:14

## 2018-01-20 NOTE — PLAN OF CARE
Problem: Patient Care Overview (Adult)  Goal: Plan of Care Review  Outcome: Ongoing (interventions implemented as appropriate)   01/20/18 1533   Coping/Psychosocial Response Interventions   Plan Of Care Reviewed With patient   Outcome Evaluation   Outcome Summary/Follow up Plan No new goals met this tx. Pt continues to demonstrate increased confusion and decreased balance with mobility,. Pt will continue to benefit from skilled therapy services within SNF upon D/C     Goal: Discharge Needs Assessment  Outcome: Ongoing (interventions implemented as appropriate)   01/11/18 0855 01/14/18 1821 01/15/18 1305   Discharge Needs Assessment   Concerns To Be Addressed --  no discharge needs identified --    Equipment Needed After Discharge (to be determined) --  --    Discharge Facility/Level Of Care Needs --  --  nursing facility, skilled   Current Discharge Risk --  --  lives alone;other (see comments)  (confusion)   Discharge Disposition still a patient --  --    Current Health   Outpatient/Agency/Support Group Needs homecare agency (specify level of care) --  --    Living Environment   Transportation Available --  --  car;family or friend will provide  (Up until hospitalization, patient was driving)   Self-Care   Equipment Currently Used at Home none  (has a cane at home if needed) --  --        Problem: Inpatient Physical Therapy  Goal: Bed Mobility Goal STG- PT  Outcome: Ongoing (interventions implemented as appropriate)   01/11/18 0855 01/16/18 1253 01/20/18 1533   Bed Mobility PT STG   Bed Mobility PT STG, Date Established --  01/16/18 --    Bed Mobility PT STG, Time to Achieve 2 - 3 days --  --    Bed Mobility PT STG, Activity Type supine to sit/sit to supine --  --    Bed Mobility PT STG, [place] Level independent --  --    Bed Mobility PT STG, Additional Goal HOB down and no rails --  --    Bed Mobility PT STG, Date Goal Reviewed --  --  01/20/18   Bed Mobility PT STG, Outcome --  --  goal ongoing     Goal:  Transfer Training Goal 1 STG- PT  Outcome: Ongoing (interventions implemented as appropriate)   01/11/18 0855 01/20/18 1533   Transfer Training PT STG   Transfer Training PT STG, Date Established 01/11/18 --    Transfer Training PT STG, Time to Achieve 2 - 3 days --    Transfer Training PT STG, Activity Type bed to chair /chair to bed;sit to stand/stand to sit --    Transfer Training PT STG, [place] Level supervision required --    Transfer Training PT STG, Assist Device (least restrictive) --    Transfer Training PT STG, Date Goal Reviewed --  01/20/18   Transfer Training PT STG, Outcome --  goal ongoing     Goal: Gait Training Goal LTG- PT  Outcome: Ongoing (interventions implemented as appropriate)   01/11/18 0855 01/20/18 1533   Gait Training PT LTG   Gait Training Goal PT LTG, Date Established 01/11/18 --    Gait Training Goal PT LTG, Time to Achieve by discharge --    Gait Training Goal PT LTG, [place] Level conditional independence --    Gait Training Goal PT LTG, Assist Device (least restrictive assistive device) --    Gait Training Goal PT LTG, Distance to Achieve 200 feet --    Gait Training Goal PT LTG, Date Goal Reviewed --  01/20/18   Gait Training Goal PT LTG, Outcome --  goal ongoing     Goal: Strength Goal LTG- PT  Outcome: Ongoing (interventions implemented as appropriate)   01/11/18 0855 01/20/18 1533   Strength Goal PT LTG   Strength Goal PT LTG, Date Established 01/11/18 --    Strength Goal PT LTG, Time to Achieve 4 days --    Strength Goal PT LTG, Measure to Achieve 20 reps all ex BLE actively sitting/ standing --    Strength Goal PT LTG, Date Goal Reviewed --  01/20/18   Strength Goal PT LTG, Outcome --  goal ongoing

## 2018-01-20 NOTE — PLAN OF CARE
Problem: Patient Care Overview (Adult)  Goal: Plan of Care Review  Outcome: Ongoing (interventions implemented as appropriate)   01/20/18 0331   Coping/Psychosocial Response Interventions   Plan Of Care Reviewed With patient   Patient Care Overview   Progress no change   Outcome Evaluation   Outcome Summary/Follow up Plan pt vs stable , pt still trying to get out of bed, has hutchins still states she has to unrinate, will continue to monitor closely.     Goal: Adult Individualization and Mutuality  Outcome: Ongoing (interventions implemented as appropriate)    Goal: Discharge Needs Assessment  Outcome: Ongoing (interventions implemented as appropriate)      Problem: Pneumonia (Adult)  Goal: Signs and Symptoms of Listed Potential Problems Will be Absent or Manageable (Pneumonia)  Outcome: Ongoing (interventions implemented as appropriate)      Problem: Fall Risk (Adult)  Goal: Absence of Falls  Outcome: Ongoing (interventions implemented as appropriate)      Problem: Fluid Volume Deficit (Adult)  Goal: Fluid/Electrolyte Balance  Outcome: Ongoing (interventions implemented as appropriate)    Goal: Comfort/Well Being  Outcome: Ongoing (interventions implemented as appropriate)

## 2018-01-20 NOTE — PROGRESS NOTES
[address] [address]  T - [phone]         PROGRESS NOTE         SUBJECTIVE:   Patient Care Team:  Coral Berry MD as PCP - General  Michele Rivera MD as PCP - Claims Attributed  Michele Rivera MD as Consulting Physician (Cardiology)  Juan C FARRELL MD as Consulting Physician (Neurology)    Chief Complaint:   Chief Compliant: dyspnea    Subjective     Patient is 62 y.o. female presents with dyspnea. She is doing better. Potassium is better..      ROS/HISTORY/ CURRENT MEDICATIONS/OBJECTIVE/VS/PE:   Review of Systems:   Review of Systems    History:     Past Medical History:   Diagnosis Date   • Alkaline phosphatase raised    • Astigmatism    • Bilateral pseudophakia    • Coronary arteriosclerosis    • Depressive disorder    • Edema of lower extremity    • Encounter for general adult medical examination with abnormal findings    • Essential hypertension    • Gastroesophageal reflux disease    • H/O bone density study     DXA BONE DENSITY AXIAL    04/22/2016   • H/O screening mammography     SCREENING MAMMOGRAPHY     04/25/2016   • Hx of screening mammography     x3; declined screening, understands risks and benefits and still declines      07/24/2015   • Influenza vaccine administered     INFLUENZA IMMUN ADMIN OR PREV RECV'D   x4       04/01/2016   • Insomnia    • Iron deficiency    • Migraine    • Tobacco dependence     continuous       Past Surgical History:   Procedure Laterality Date   • APPENDECTOMY     • BREAST IMPLANT SURGERY     • CATARACT EXTRACTION WITH INTRAOCULAR LENS IMPLANT  12/17/2003    Phacoemulsification of a cataract with intraocular lens implant of rigt eye, Nixon model SA 60-AT; serial # [id number];20.5 diopter   • CHOLECYSTECTOMY  08/06/2007    Laparoscopic cholecystectomy. Symptomatic gallstones   • COLONOSCOPY  10/15/2013    declined stool cards and colonscopy   • ENDOSCOPY AND COLONOSCOPY  05/22/2014    Internal & external  hemorrhoids found.   • ENDOSCOPY W/ PEG TUBE PLACEMENT  05/22/2014    EGD w/ tube: Normal esopahgus. Gastritis in stomach. Biopsy taken. Normal duodenum. Biopsy taken.   • INJECTION OF MEDICATION  07/24/2013    Inj(s) Tend-Sheath, Ligament, Single   • INJECTION OF MEDICATION  11/17/2014    Kenalog x6   • INJECTION OF MEDICATION  04/01/2016    PNEUMOC VAC/ADMIN/RCVD    • INJECTION OF MEDICATION  12/05/2014    Toradol   • OTHER SURGICAL HISTORY  01/19/2014    Drain/Inject Major Joint   x2   • PAP SMEAR  10/06/2011   • PARTIAL HYSTERECTOMY      Partial hyst    • TONSILLECTOMY     • TUBAL ABDOMINAL LIGATION       Family History   Problem Relation Age of Onset   • Cholelithiasis Mother    • Thyroid cancer Sister    • Thyroid disease Sister      Thyroid disorder   • Graves' disease Sister    • Cholelithiasis Maternal Grandmother    • Thyroid cancer Other      Other 2nd degree relative, thyroid   • Diabetes Other    • Hypertension Other      Social History   Substance Use Topics   • Smoking status: Current Every Day Smoker   • Smokeless tobacco: Never Used      Comment: SMOKED FOR 20>PLUS YRS   • Alcohol use No     Prescriptions Prior to Admission   Medication Sig Dispense Refill Last Dose   • albuterol (PROVENTIL HFA;VENTOLIN HFA) 108 (90 BASE) MCG/ACT inhaler Inhale 2 puffs Every 4 (Four) Hours As Needed for wheezing. 6.7 g 11 1/9/2018 at 2100   • amitriptyline (ELAVIL) 100 MG tablet Take 1 tablet by mouth Every Night. 30 tablet 11 1/9/2018 at 2100   • aspirin 81 MG chewable tablet Chew 1 tablet Daily. 30 tablet 11 1/9/2018 at 0900   • carvedilol (COREG) 3.125 MG tablet TAKE 1 TABLET BY MOUTH TWICE A DAY. 60 tablet 1 1/9/2018 at 2100   • FLUoxetine (PROzac) 20 MG capsule TAKE 3 CAPSULES BY MOUTH ONCE DAILY 90 capsule 1 1/9/2018 at 0900   • losartan (COZAAR) 100 MG tablet TAKE ONE TABLET BY MOUTH ONCE DAILY 90 tablet 0 Past Month at Unknown time   • naproxen (NAPROSYN) 500 MG tablet Take 1 tablet by mouth 2 (Two) Times a  Day. 60 tablet 11 1/9/2018 at 2100   • omeprazole (priLOSEC) 40 MG capsule Take 1 capsule by mouth Daily. 30 capsule 0 1/9/2018 at 0900   • risperiDONE (risperDAL) 1 MG tablet Take 1 tablet by mouth Daily. (Patient taking differently: Take 1 mg by mouth every night at bedtime.) 30 tablet 0 1/9/2018 at 2100   • furosemide (LASIX) 20 MG tablet TAKE 1/2 TABLET BY MOUTH ONCE DAILY AS NEEDED FOR SWELLING. 15 tablet 3 Unknown at Unknown time     Allergies:  Codeine; Penicillins; and Sulfa antibiotics    Current Medications:     Current Facility-Administered Medications   Medication Dose Route Frequency Provider Last Rate Last Dose   • acetaminophen (TYLENOL) tablet 650 mg  650 mg Oral Q4H PRN Coral Berry MD   650 mg at 01/20/18 0430   • aspirin chewable tablet 81 mg  81 mg Oral Daily Coral Berry MD   81 mg at 01/20/18 0814   • bisacodyl (DULCOLAX) EC tablet 5 mg  5 mg Oral Daily PRN Coral Berry MD       • doxycycline (VIBRAMYCIN) 100 mg/250 mL 0.9% NS VTB  100 mg Intravenous Q12H Bridget Ortega MD   100 mg at 01/20/18 0814   • famotidine (PEPCID) tablet 40 mg  40 mg Oral Daily Coral Berry MD   40 mg at 01/20/18 0815   • ferrous sulfate EC tablet 324 mg  324 mg Oral BID With Meals Coral Berry MD   324 mg at 01/20/18 0815   • FLUoxetine (PROzac) capsule 60 mg  60 mg Oral Daily Coral Berry MD   60 mg at 01/20/18 0814   • folic acid (FOLVITE) tablet 1 mg  1 mg Oral Daily Coral Berry MD   1 mg at 01/20/18 0815   • HYDROcodone-acetaminophen (NORCO) 5-325 MG per tablet 1 tablet  1 tablet Oral Q4H PRN Bridget Ortega MD   1 tablet at 01/18/18 2050   • influenza vac split quad (FLUZONE QUADRIVALENT) IM suspension 0.5 mL  0.5 mL Intramuscular During Hospitalization Coral Berry MD       • ipratropium-albuterol (DUO-NEB) nebulizer solution 3 mL  3 mL Nebulization 4x Daily - RT Coral Berry MD   3 mL at 01/20/18 0908   • losartan (COZAAR) tablet 50 mg  50 mg Oral Q24H Coral Hamilton  MD Kristi   50 mg at 01/20/18 0815   • magic mouthwash with nystatin oral supsension 5 mL  5 mL Swish & Swallow 4x Daily Bridget Ortega MD   5 mL at 01/20/18 1133   • metoprolol succinate XL (TOPROL-XL) 24 hr tablet 25 mg  25 mg Oral Q24H Stevenson Brown MD PhD   25 mg at 01/20/18 0815   • nicotine (NICODERM CQ) 21 MG/24HR patch 1 patch  1 patch Transdermal Q24H Coral Berry MD   1 patch at 01/19/18 1537   • ondansetron (ZOFRAN) tablet 4 mg  4 mg Oral Q6H PRN Coral Berry MD   4 mg at 01/15/18 1417   • phenazopyridine (PYRIDIUM) tablet 100 mg  100 mg Oral TID With Meals Ernie Bourgeois MD   100 mg at 01/20/18 1133   • pneumococcal polysaccharide 23-valent (PNEUMOVAX-23) vaccine 0.5 mL  0.5 mL Intramuscular During Hospitalization Coral Berry MD       • potassium chloride (MICRO-K) CR capsule 40 mEq  40 mEq Oral BID With Meals Bridget Ortega MD   40 mEq at 01/20/18 0814   • potassium chloride 10 mEq in 100 mL IVPB  10 mEq Intravenous Q1H PRN Sunny Linn DO       • risperiDONE (risperDAL) tablet 1 mg  1 mg Oral Nightly Coral Berry MD   1 mg at 01/19/18 2007   • sodium chloride 0.9 % flush 1-10 mL  1-10 mL Intravenous PRN Coral Berry MD       • sodium chloride 0.9 % flush 10 mL  10 mL Intravenous PRN Toro Shine MD   10 mL at 01/15/18 2048       Objective     Physical Exam:   Temp:  [97.8 °F (36.6 °C)-98.9 °F (37.2 °C)] 98.7 °F (37.1 °C)  Heart Rate:  [77-89] 77  Resp:  [16-18] 16  BP: ()/(53-68) 123/68    Physical Exam   Constitutional: She appears well-developed and well-nourished.   HENT:   Head: Normocephalic and atraumatic.   Cardiovascular: Normal rate, regular rhythm and normal heart sounds.  Exam reveals no gallop and no friction rub.    No murmur heard.  Pulmonary/Chest: Effort normal and breath sounds normal. No respiratory distress. She has no wheezes. She has no rales.   Abdominal: Soft. Bowel sounds are normal. She exhibits no distension. There is  no tenderness.   Skin: Skin is warm.   Vitals reviewed.           Results Review:   Lab Results   Component Value Date    GLUCOSE 90 01/20/2018    BUN 15 01/20/2018    CREATININE 0.79 01/20/2018    EGFRIFNONA 73 01/20/2018    BCR 19.0 01/20/2018    CO2 29.0 01/20/2018    CALCIUM 8.0 (L) 01/20/2018    ALBUMIN 2.70 (L) 01/20/2018    LABIL2 1.0 (L) 01/20/2018    AST 31 01/20/2018    ALT 32 01/20/2018         WBC WBC   Date Value Ref Range Status   01/20/2018 10.56 (H) 3.20 - 9.80 10*3/mm3 Final   01/19/2018 8.35 3.20 - 9.80 10*3/mm3 Final   01/18/2018 9.21 3.20 - 9.80 10*3/mm3 Final      HGB Hemoglobin   Date Value Ref Range Status   01/20/2018 8.9 (L) 12.0 - 15.5 g/dL Final   01/19/2018 8.9 (L) 12.0 - 15.5 g/dL Final   01/18/2018 8.6 (L) 12.0 - 15.5 g/dL Final      HCT Hematocrit   Date Value Ref Range Status   01/20/2018 28.3 (L) 35.0 - 45.0 % Final   01/19/2018 27.9 (L) 35.0 - 45.0 % Final   01/18/2018 27.4 (L) 35.0 - 45.0 % Final      Platlets Platelets   Date Value Ref Range Status   01/20/2018 234 150 - 450 10*3/mm3 Final   01/19/2018 219 150 - 450 10*3/mm3 Final   01/18/2018 230 150 - 450 10*3/mm3 Final          Imaging Results (last 24 hours)     Procedure Component Value Units Date/Time    CT Angiogram Chest With Contrast [[id number]] Collected:  01/19/18 1506     Updated:  01/19/18 1545    Narrative:       [facility] [place]    Patient Name: KAROL DOUGLAS    ORDERING: ANTONY WAITE    ATTENDING: ANTONY WAITE     REFERRING: ANTONY WAITE    -----------------------    EXAM DESCRIPTION: CT ANGIOGRAM CHEST W CONTRAST    CLINICAL HISTORY:  chest pain, shortness of breath, E87.6  Hypokalemia J18.9 Pneumonia, unspecified organism W19.XXXA  Unspecified fall, initial encounter S00.03XA Contusion of scalp,  initial encounter Z78.9 Other specified health status Z74.09  Other reduced mobility       COMPARISON: Chest radiograph 1/19/2018    TECHNIQUE:   Axial images were obtained and multiplanar  reconstructions were  made.    This exam was performed according to our departmental dose  optimization program, which includes automated exposure control,  adjustment of the mA and/or KV according to patient size and/or  use of iterative reconstruction technique.  Dose Length Product 182.50  CONTRAST:   79 cc intravenous Isovue 370    FINDINGS:  Diagnostic quality: Adequate .  Pulmonary embolism: No evidence of pulmonary embolism.  Pulmonary arteries:  Normal in caliber.  Lung parenchyma: There is a background of COPD with superimposed  interstitial prominence which is felt to represent edema  superimposing chronic interstitial markings. There is dense  consolidation within both lower lobes within the bases. This may  represent atelectasis and/or consolidating pneumonia. There is  areas of consolidation within the right middle lobe and lingula  at the base. There is otherwise some hazy patchy groundglass  opacities which likely represents edema and/or pneumonitis. There  is small bilateral pleural effusions. No pneumothorax.  Central airways: Patent.  Adenopathy: There is enlarged left superior the mediastinal lymph  nodes along the left lateral margin of the arch measuring up to  1.8 cm, likely reactive. No necrotic or conglomerate adenopathy.  Heart and great vessels: There is mild cardiac enlargement. No  significant pericardial effusion. There is coronary vascular  calcification. There is vascular calcification involving the  thoracic aorta. No aneurysmal dilation.   Upper abdomen: No acute finding.    Bones: No compression fracture or subluxation. There is evidence  of right fifth-seventh posterior rib fractures which appear  acute-subacute without healing.    Additional findings: Evidence of bilateral breast implants.      Impression:       No CT evidence of pulmonary embolism.    Background of COPD with superimposed interstitial /pulmonary  edema. There is bilateral lower lobe basilar consolidation  which  may represent atelectasis and/or pneumonia. Smaller areas of  consolidation present within the right middle lobe and lingula at  the base. There is bilateral pleural effusions.    Likely reactive hilar and mediastinal lymph nodes and no necrotic  or conglomerate adenopathy.    Acute-subacute right fifth-seventh posterior rib fractures.    Electronically signed by:  Karlo Matos MD  1/19/2018 3:44 PM  CST Workstation: [id number]           I reviewed the patient's new clinical results.  I reviewed the patient's new imaging results and agree with the interpretation.     ASSESSMENT/PLAN:   Assessment/Plan   Principal Problem:    Pneumonia of left lower lobe due to Streptococcus pneumoniae  Active Problems:    Tobacco dependence    Iron deficiency anemia    Gastroesophageal reflux disease    Fall with injury    Hypokalemia    Weakness    Acute respiratory failure with hypoxia    Hyponatremia    Cerebral microvascular disease    Pulmonary hypertension    Rheumatic tricuspid valve regurgitation    Rheumatic mitral stenosis    Acute diastolic CHF (congestive heart failure)    Encephalopathy, metabolic    Pulmonary hypertension due to left heart valvular disease    Cor pulmonale, chronic    Folic acid deficiency    Hypoxic encephalopathy    Oral thrush    Laryngitis, acute    Dysuria    Bacterial cystitis    Closed fracture of multiple ribs of right side    Acute retention of urine      Continue with current treatment.  Will monitor her labs.    I discussed the patients findings and my recommendations with patient and family.      Ernie Bourgeois MD  01/20/18  11:41 AM

## 2018-01-20 NOTE — THERAPY TREATMENT NOTE
Acute Care - Physical Therapy Treatment Note  [facility]     Patient Name: Michelle Child  : 1955  MRN: [id number]  Today's Date: 2018  Onset of Illness/Injury or Date of Surgery Date: 01/10/18  Date of Referral to PT: 01/10/18  Referring Physician: Dr Berry    Admit Date: 1/10/2018    Visit Dx:    ICD-10-CM ICD-9-CM   1. Hypokalemia E87.6 276.8   2. Pneumonia of left lung due to infectious organism, unspecified part of lung J18.9 486   3. Fall, initial encounter W19.XXXA E888.9   4. Contusion of scalp, initial encounter S00.03XA 920   5. Decreased activities of daily living (ADL) Z78.9 V49.89   6. Impaired physical mobility Z74.09 781.99     Patient Active Problem List   Diagnosis   • Tobacco dependence   • Migraine   • Iron deficiency anemia   • Gastroesophageal reflux disease   • Coronary arteriosclerosis   • Bilateral pseudophakia   • Astigmatism   • Right humeral fracture   • Fall with injury   • Closed fracture of humerus   • Cause of injury, fall   • Closed fracture of proximal end of right humerus with routine healing   • Hypokalemia   • Weakness   • Acute respiratory failure with hypoxia   • Pneumonia of left lower lobe due to Streptococcus pneumoniae   • Hyponatremia   • Cerebral microvascular disease   • Pulmonary hypertension   • Rheumatic tricuspid valve regurgitation   • Rheumatic mitral stenosis   • Acute diastolic CHF (congestive heart failure)   • Encephalopathy, metabolic   • Pulmonary hypertension due to left heart valvular disease   • Cor pulmonale, chronic   • Folic acid deficiency   • Hypoxic encephalopathy   • Oral thrush   • Laryngitis, acute   • Dysuria   • Bacterial cystitis   • Closed fracture of multiple ribs of right side   • Acute retention of urine               Adult Rehabilitation Note       18 1418 18 1058 18 0845    Rehab Assessment/Intervention    Discipline physical therapy assistant  -CH occupational therapy assistant  -BB physical  therapy assistant  -CH    Document Type therapy note (daily note)  -CH therapy note (daily note)  -BB therapy note (daily note)  -CH    Subjective Information agree to therapy  -CH agree to therapy  -BB agree to therapy  -CH    Patient Effort, Rehab Treatment fair  -CH fair  -BB fair  -CH    Symptoms Noted During/After Treatment  --   confusion  -BB     Precautions/Limitations fall precautions;oxygen therapy device and L/min  -CH fall precautions;oxygen therapy device and L/min  -BB fall precautions;oxygen therapy device and L/min  -CH    Specific Treatment Considerations   pt somewhat lethargic this tx with c/o dizziness once standing limiting progression  -CH    Recorded by [CH] Nichelle Rahman PTA [BB] Bibi Chester, CAN/L [CH] Nichelle Rahman PTA    Vital Signs    Pre Systolic BP Rehab 103  -  -BB 98   nursing notified and agreeable to continue tx  -CH    Pre Treatment Diastolic BP 53  -CH 58  -BB 46  -CH    Intra Systolic BP Rehab 89   standing, increased to 91/51 sitting EOB, 107/54 post gait  -CH  94  -CH    Intra Treatment Diastolic BP 52  -CH  55  -CH    Post Systolic BP Rehab 107  -CH  89   nursing notified  -CH    Post Treatment Diastolic BP 56  -CH  50  -CH    Pretreatment Heart Rate (beats/min) 81  -CH 73  -BB 79  -CH    Posttreatment Heart Rate (beats/min) 78  -CH  79  -CH    Pre SpO2 (%) 96  -CH 96  -BB 94  -CH    O2 Delivery Pre Treatment supplemental O2  -CH supplemental O2  -BB supplemental O2  -CH    Post SpO2 (%) 97  -CH  92  -CH    O2 Delivery Post Treatment supplemental O2  -CH  supplemental O2  -CH    Pre Patient Position Supine  -CH --   long sitting  -BB Supine  -CH    Intra Patient Position Sitting  -CH  Sitting  -CH    Post Patient Position Supine  -CH  Supine  -CH    Recorded by [CH] Nichelle Rahman PTA [BB] Bibi Chester, CAN/L [CH] Nichelle Rahman PTA    Pain Assessment    Pain Assessment No/denies pain  -CH  No/denies pain  -CH    Recorded by [CH] Nichelle VELEZ  KIN Rahman  [CH] Nichelle Rahman PTA    Vision Assessment/Intervention    Visual Impairment WNL  -CH  WNL;WFL  -CH    Recorded by [CH] Nichelle Rahman PTA  [CH] Nichelle Rahman PTA    Cognitive Assessment/Intervention    Current Cognitive/Communication Assessment impaired  -CH impaired  -BB impaired  -CH    Orientation Status oriented to;person  -CH  oriented to;person     -CH    Follows Commands/Answers Questions 75% of the time;able to follow single-step instructions;needs cueing;needs increased time;needs repetition  -CH 75% of the time  -BB 75% of the time;needs cueing;needs increased time;needs repetition  -CH    Personal Safety moderate impairment;decreased awareness, need for assist;decreased awareness, need for safety  -CH moderate impairment  -BB moderate impairment;decreased awareness, need for assist;decreased awareness, need for safety  -    Personal Safety Interventions fall prevention program maintained;gait belt;muscle strengthening facilitated;nonskid shoes/slippers when out of bed;supervised activity  - fall prevention program maintained;nonskid shoes/slippers when out of bed  -BB fall prevention program maintained;gait belt;muscle strengthening facilitated;nonskid shoes/slippers when out of bed;supervised activity  -    Recorded by [CH] Nichelle Rahman PTA [BB] PAMELLA Ngo/DALTON [CH] Nichelle Rahman PTA    Bed Mobility, Assessment/Treatment    Bed Mobility, Assistive Device   bed rails;head of bed elevated  -    Bed Mobility, Roll Left, [place] contact guard assist  - contact guard assist  -BB contact guard assist  -    Bed Mobility, Roll Right, [place] not tested  - contact guard assist  -BB contact guard assist  -    Bed Mobility, Scoot/Bridge, [place] minimum assist (75% patient effort)  -  minimum assist (75% patient effort)  -    Bed Mob, Supine to Sit, [place] minimum assist (75% patient effort)  -  minimum assist (75% patient  "effort)  -    Bed Mob, Sit to Supine, [place] minimum assist (75% patient effort)  -  moderate assist (50% patient effort)  -    Bed Mobility, Safety Issues decreased use of legs for bridging/pushing;decreased use of arms for pushing/pulling  -      Bed Mobility, Impairments strength decreased  -      Bed Mobility, Comment   pt sat EOB and performed ther ex with min A for sitting balance due to posterior lean and lateral lean to R  -    Recorded by [] Nichelle Rahman PTA [BB] PAMELLA Ngo/DALTON [] Nichelle Rahman PTA    Transfer Assessment/Treatment    Transfers, Bed-Chair [place]   not tested  -    Transfers, Chair-Bed [place]   not tested  -    Transfers, Sit-Stand [place] moderate assist (50% patient effort)  -  moderate assist (50% patient effort)  -    Transfers, Stand-Sit [place] moderate assist (50% patient effort)  -  moderate assist (50% patient effort)  -    Transfers, Sit-Stand-Sit, Assist Device rolling walker  -  rolling walker  -    Toilet Transfer, [place] minimum assist (75% patient effort)  - contact guard assist  - not tested  -    Toilet Transfer, Assistive Device rolling walker  - --   bed pan  -BB     Transfer, Comment pt t/f'd X2 trials to [facility]  -  Pt stood X1 and demonstrates posterior lean posteriorly, pt became dizzy and stated she was going to \"fall out.\"PTA immediately assisted pt to supine and vitals signs assessed  -    Recorded by [] Nichelle Rahman PTA [BB] PAMELLA Ngo/DALTON [] Nichelle Rahman PTA    Gait Assessment/Treatment    Gait, [place] Level minimum assist (75% patient effort);moderate assist (50% patient effort)  -      Gait, Assistive Device rolling walker  -      Gait, Distance (Feet) 90  -      Gait, Safety Issues balance decreased during turns;step length decreased;weight-shifting ability decreased;supplemental O2  -      Gait, Comment   Deferred this date  - "    Recorded by [CH] Nichelle Rahman PTA  [CH] Nichelle Rahman PTA    Stairs Assessment/Treatment    Stairs, [place] Level not tested  -CH      Recorded by [CH] Nichelle Rahman PTA      Toileting Assessment/Training    Toileting Assess/Train, Position  supine  -BB     Toileting Assess/Train, Indepen Level  dependent (less than 25% patient effort)  -BB     Recorded by  [BB] JUDE NgoA/L     Grooming Assessment/Training    Grooming Assess/Train, Position  long sitting  -BB     Grooming Assess/Train, Indepen Level  set up required;contact guard assist  -BB     Grooming Assess/Train, Impairments  strength decreased;coordination impaired  -BB     Recorded by  [BB] JUDE NgoA/L     Self-Feeding Assessment/Training    Self-Feeding Assess/Train, Position  --   long sitting  -BB     Self-Feeding Assess/Train, [place]  set up required;moderate assist (50% patient effort);minimum assist (75% patient effort)   family requests assistance for pt's meal  -BB     Self-Feeding Assess/Train, Spillage Amount  minimal  -BB     Recorded by  [BB] JUDE NgoA/L     Therapy Exercises    Bilateral Lower Extremities AROM:;10 reps;sitting;hip abduction/adduction;hip flexion;LAQ  -CH  AROM:;15 reps;sitting;ankle pumps/circles;glut sets;hip abduction/adduction;hip flexion;LAQ  -CH    Recorded by [CH] Nichelle Rahman PTA  [CH] Nichelle Rahman PTA    Positioning and Restraints    Pre-Treatment Position in bed  -CH in bed  -BB in bed  -CH    Post Treatment Position bed  - bed  -BB bed  -CH    In Bed supine;call light within reach;encouraged to call for assist;exit alarm on;side rails up x3;SCD pump applied;heels elevated  - call light within reach;encouraged to call for assist;exit alarm on;with family/caregiver   long sitting  -BB supine;call light within reach;encouraged to call for assist;exit alarm on;side rails up x2;heels elevated;SCD pump applied  -    Recorded by [CH] Nichelle  AGUSTIN Rahman PTA [BB] PAMELLA Ngo/DALTON [CH] Nichelledidi Rahman, KIN      01/18/18 1327 01/18/18 1032 01/18/18 0730    Rehab Assessment/Intervention    Discipline occupational therapy assistant  -FINA  occupational therapy assistant  -BB    Document Type therapy note (daily note)  -BB therapy note (daily note)  -SY,BM,KC2 therapy note (daily note)  -BB    Subjective Information agree to therapy  -BB agree to therapy  -SY,BM,KC2 agree to therapy  -BB    Patient Effort, Rehab Treatment fair  -BB  fair  -BB    Symptoms Noted During/After Treatment --   confusion  -BB  none  -BB    Precautions/Limitations fall precautions;oxygen therapy device and L/min  -BB  fall precautions;oxygen therapy device and L/min  -BB    Recorded by [BB] JUDE NgoA/L [SY,BM,KC2] Sadia French PTA (r) Monse Garg, PT Student (t) Sadia French PTA (c) [BB] JUDE NgoA/L    Vital Signs    Pre Systolic BP Rehab  131  -SY,BM,KC2 115  -BB    Pre Treatment Diastolic BP  69  -SY,BM,KC2 58  -BB    Intra Systolic BP Rehab   112  -BB    Intra Treatment Diastolic BP   56  -BB    Pretreatment Heart Rate (beats/min) 78  -BB 82  -SY,BM,KC2 77  -BB    Intratreatment Heart Rate (beats/min)   76  -BB    Posttreatment Heart Rate (beats/min) 83  -BB      Pre SpO2 (%) 93  -BB  97  -BB    O2 Delivery Pre Treatment supplemental O2  -BB  supplemental O2  -BB    Intra SpO2 (%)   96  -BB    O2 Delivery Intra Treatment   supplemental O2  -BB    Post SpO2 (%) 91  -BB      O2 Delivery Post Treatment supplemental O2  -BB      Pre Patient Position Supine  -BB  Supine  -BB    Intra Patient Position   Sitting  -BB    Post Patient Position Supine  -BB      Recorded by [BB] PAMELLA Ngo/L [SY,BM,KC2] Sadia French PTA (r) Monse Garg, PT Student (t) Sadia French PTA (c) [BB] JUDE NgoA/L    Pain Assessment    Pain Assessment No/denies pain  -BB  No/denies pain  -BB    Recorded by [BB] Bibi Chester,  CAN/L  [BB] JUDE NgoA/L    Vision Assessment/Intervention    Visual Impairment  WNL;WFL  -SY,BM,KC2     Recorded by  [SY,BM,KC2] Sadia French PTA (r) Monse Garg, PT Student (t) Sadia French PTA (c)     Cognitive Assessment/Intervention    Current Cognitive/Communication Assessment impaired  -BB impaired  -SY,BM,KC2 impaired  -BB    Orientation Status oriented to;person  -BB oriented to;person;place;disoriented to;situation  -SY,BM,KC2 oriented to;person  -BB    Follows Commands/Answers Questions 75% of the time  -BB 50% of the time;needs cueing;needs increased time;needs repetition  -SY,BM,KC2 50% of the time  -BB    Personal Safety moderate impairment  -BB moderate impairment;decreased awareness, need for assist;decreased awareness, need for safety  -SY,BM,KC2 severe impairment  -BB    Personal Safety Interventions fall prevention program maintained;nonskid shoes/slippers when out of bed  -BB gait belt;nonskid shoes/slippers when out of bed;supervised activity  -SY,BM,KC2 fall prevention program maintained;nonskid shoes/slippers when out of bed  -BB    Short/Long Term Memory  unable/difficult to assess  -SY,BM,KC2     Recorded by [BB] PAMELLA Ngo/DALTON [SY,BM,KC2] Sadia French PTA (r) Monse Garg, PT Student (t) Sadia French PTA (c) [BB] JUDE NgoA/L    Bed Mobility, Assessment/Treatment    Bed Mobility, Assistive Device  bed rails;head of bed elevated  -SY,BM,KC2     Bed Mobility, Roll Right, [place]  contact guard assist;verbal cues required  -SY,BM,KC2     Bed Mobility, Scoot/Bridge, [place]  dependent (less than 25% patient effort)  -SY,BM,KC2     Bed Mob, Supine to Sit, [place] conditional independence  -BB moderate assist (50% patient effort);minimum assist (75% patient effort)  -SY,BM,KC2 moderate assist (50% patient effort)  -BB    Bed Mob, Sit to Supine, [place] minimum assist (75% patient effort);moderate assist (50% patient  effort)  -BB moderate assist (50% patient effort);2 person assist required  -SY,BM,KC2 moderate assist (50% patient effort)  -BB    Bed Mobility, Safety Issues   decreased use of arms for pushing/pulling;decreased use of legs for bridging/pushing;cognitive deficits limit understanding  -BB    Bed Mobility, Impairments   strength decreased  -BB    Bed Mobility, Comment Pt sat EOB 15 min  -BB pt sat EOB ~ 25 minutes with mod A sitting balance and brief moments of SBA  -SY,BM,KC2 Pt sat EOB ~5 min  -BB    Recorded by [BB] PAMELLA Ngo/DALTON [SY,BM,KC2] Sadia French PTA (r) Monse Garg, PT Student (t) Sadia French PTA (c) [BB] PAMELLA Ngo/L    Transfer Assessment/Treatment    Transfers, Sit-Stand [place]  moderate assist (50% patient effort);verbal cues required  -SY,BM,KC2     Transfers, Stand-Sit [place]  minimum assist (75% patient effort);verbal cues required  -SY,BM,KC2     Recorded by  [SY,BM,KC2] Sadia French PTA (r) Monse Garg, PT Student (t) Sadia French PTA (c)     Upper Body Bathing Assessment/Training    UB Bathing Assess/Train Assistive Device   --   bath wipes  -BB    UB Bathing Assess/Train, Position   long sitting  -BB    UB Bathing Assess/Train, [place] Level   contact guard assist  -BB    UB Bathing Assess/Train, Impairments   strength decreased  -BB    Recorded by   [BB] PAMELLA Ngo/L    Lower Body Bathing Assessment/Training    LB Bathing Assess/Train Assistive Device   --   bath wipes  -BB    LB Bathing Assess/Train, Position   long sitting  -BB    LB Bathing Assess/Train, [place] Level   minimum assist (75% patient effort);moderate assist (50% patient effort)  -BB    LB Bathing Assess/Train, Impairments   strength decreased;decreased flexibility  -BB    Recorded by   [BB] PAMELLA Ngo/L    Upper Body Dressing Assessment/Training    UB Dressing Assess/Train, Clothing Type   donning:;doffing:;hospital gown  -BB    UB  Dressing Assess/Train, Position   long sitting  -BB    UB Dressing Assess/Train, [place]   minimum assist (75% patient effort)  -BB    UB Dressing Assess/Train, Impairments   strength decreased  -BB    Recorded by   [BB] PAMELLA Ngo/L    Lower Body Dressing Assessment/Training    LB Dressing Assess/Train, Comment   pt did not want to wear socks  -BB    Recorded by   [BB] PAMELLA Ngo/L    Grooming Assessment/Training    Grooming Assess/Train, Position sitting  -BB  sitting  -BB    Grooming Assess/Train, Indepen Level set up required;contact guard assist  -BB  contact guard assist  -BB    Grooming Assess/Train, Impairments strength decreased;impaired balance  -BB  strength decreased  -BB    Grooming Assess/Train, Comment --   oral care  -BB  wash face, hands, cpmb hair, oral care  -BB    Recorded by [BB] PAMELLA Ngo/L  [BB] PAMELLA Ngo/L    Balance Skills Training    Sitting-Level of Assistance  Moderate assistance  -SY,BM,KC2 Contact guard  -BB    Sitting-Balance Support  Feet unsupported  -SY,BM,KC2 Right upper extremity supported;Left upper extremity supported;Feet supported  -BB    Sitting-Balance Activities  --   LE exercises  -SY,BM,KC2 Lateral lean;Left UE Weight Bearing;Right UE Weight Bearing;Forward lean;Reaching for objects;Reaching across midline  -BB    Sitting # of Minutes  ~25  -SY,BM,KC2 5  -BB    Standing-Level of Assistance  Moderate assistance  -SY,BM,KC2     Static Standing Balance Support  assistive device  -SY,BM,KC2     Standing Balance # of Minutes  3-5   posterior lean   -SY,BM,KC2     Recorded by  [SY,BM,KC2] Sadia French, PTA (r) Monse Garg, PT Student (t) Sadia French PTA (c) [BB] PAMELLA Ngo/L    Therapy Exercises    Bilateral Lower Extremities  AROM:;15 reps;sitting;ankle pumps/circles;hip flexion;LAQ  -SY,BM,KC2     Bilateral Upper Extremity AROM:;AAROM:;10 reps;elbow flexion/extension;hand  pumps;pronation/supination;shoulder rolls/shrugs;shoulder ER/IR   long sitting, wrist flexion/extension  -BB      Recorded by [BB] PAMELLA Ngo/DALTON [SY,BM,KC2] Sadia French PTA (r) Monse Garg, PT Student (t) Sadia French PTA (c)     Positioning and Restraints    Pre-Treatment Position in bed  -BB in bed  -SY,BM,KC2 in bed  -BB    Post Treatment Position bed  -BB bed  -SY,BM,KC2 bed  -BB    In Bed supine;call light within reach;encouraged to call for assist;exit alarm on  -BB supine;call light within reach;encouraged to call for assist;exit alarm on  -SY,BM,KC2 fowlers;call light within reach;exit alarm on;encouraged to call for assist  -BB    Recorded by [BB] PAMELLA Ngo/DALTON [SY,BM,KC2] Sadia French PTA (r) Monse Garg, PT Student (t) Sadia French PTA (c) [BB] PAMELLA Ngo/L      User Key  (r) = Recorded By, (t) = Taken By, (c) = Cosigned By    Initials Name Effective Dates    SY Sadia French, PTA 10/17/16 -      Nichelle Rahman, PTA 10/17/16 -     BB PAMELLA Ngo/L 10/17/16 -     GIL Garg, PT Student 12/05/17 -                 IP PT Goals       01/20/18 1533 01/19/18 1011 01/18/18 1124    Bed Mobility PT STG    Bed Mobility PT STG, Date Goal Reviewed 01/20/18  -CH 01/19/18  -CH 01/18/18  -SY (r) BM (t) SY (c)    Bed Mobility PT STG, Outcome goal ongoing  - goal ongoing  - goal ongoing  -SY (r) BM (t) SY (c)    Transfer Training PT STG    Transfer Training PT STG, Date Goal Reviewed 01/20/18  -CH 01/19/18  -     Transfer Training PT STG, Outcome goal ongoing  - goal ongoing  -     Gait Training PT LTG    Gait Training Goal PT LTG, Date Goal Reviewed 01/20/18  -CH 01/19/18  -CH     Gait Training Goal PT LTG, Outcome goal ongoing  -CH goal ongoing  -CH     Strength Goal PT LTG    Strength Goal PT LTG, Date Goal Reviewed 01/20/18  - 01/19/18  -     Strength Goal PT LTG, Outcome goal ongoing  - goal ongoing  -CH       01/18/18  1032 01/16/18 1253 01/15/18 1223    Bed Mobility PT STG    Bed Mobility PT STG, Date Established  01/16/18  -SY     Bed Mobility PT STG, Date Goal Reviewed  01/16/18  -SY 01/15/18  -CH    Bed Mobility PT STG, Outcome  goal ongoing  -SY goal ongoing  -CH    Transfer Training PT STG    Transfer Training PT STG, Date Goal Reviewed 01/18/18  -SY (r) BM (t) SY (c) 01/16/18  -SY 01/15/18  -CH    Transfer Training PT STG, Outcome goal ongoing  -SY (r) BM (t) SY (c) goal ongoing  -SY goal ongoing  -CH    Gait Training PT LTG    Gait Training Goal PT LTG, Date Goal Reviewed 01/18/18  -SY (r) BM (t) SY (c) 01/16/18  -SY 01/15/18  -CH    Gait Training Goal PT LTG, Outcome goal ongoing  -SY (r) BM (t) SY (c) goal ongoing  -SY goal ongoing  -CH    Strength Goal PT LTG    Strength Goal PT LTG, Date Goal Reviewed 01/18/18  -SY (r) BM (t) SY (c) 01/16/18  -SY 01/15/18  -CH    Strength Goal PT LTG, Outcome goal ongoing  -SY (r) BM (t) SY (c) goal ongoing  -SY goal ongoing  -CH      01/13/18 1018 01/12/18 1103 01/11/18 0855    Bed Mobility PT STG    Bed Mobility PT STG, Date Established   01/11/18  -CB    Bed Mobility PT STG, Time to Achieve   2 - 3 days  -CB    Bed Mobility PT STG, Activity Type   supine to sit/sit to supine  -CB    Bed Mobility PT STG, [place] Level   independent  -CB    Bed Mobility PT STG, Additional Goal   HOB down and no rails  -CB    Bed Mobility PT STG, Date Goal Reviewed 01/13/18  -CH 01/12/18  -SY     Bed Mobility PT STG, Outcome goal ongoing  -CH goal ongoing  -SY     Transfer Training PT STG    Transfer Training PT STG, Date Established   01/11/18  -CB    Transfer Training PT STG, Time to Achieve   2 - 3 days  -CB    Transfer Training PT STG, Activity Type   bed to chair /chair to bed;sit to stand/stand to sit  -CB    Transfer Training PT STG, [place] Level   supervision required  -CB    Transfer Training PT STG, Assist Device   --   least restrictive  -CB    Transfer Training PT STG, Date Goal  Reviewed 01/13/18  - 01/12/18  -SY     Transfer Training PT STG, Outcome goal ongoing  - goal ongoing  -SY     Gait Training PT LTG    Gait Training Goal PT LTG, Date Established   01/11/18  -CB    Gait Training Goal PT LTG, Time to Achieve   by discharge  -CB    Gait Training Goal PT LTG, [place] Level   conditional independence  -CB    Gait Training Goal PT LTG, Assist Device   --   least restrictive assistive device  -CB    Gait Training Goal PT LTG, Distance to Achieve   200 feet  -CB    Gait Training Goal PT LTG, Date Goal Reviewed 01/13/18  - 01/12/18  -SY     Gait Training Goal PT LTG, Outcome goal ongoing  - goal ongoing  -SY     Strength Goal PT LTG    Strength Goal PT LTG, Date Established   01/11/18  -CB    Strength Goal PT LTG, Time to Achieve   4 days  -CB    Strength Goal PT LTG, Measure to Achieve   20 reps all ex BLE actively sitting/ standing  -CB    Strength Goal PT LTG, Date Goal Reviewed 01/13/18  - 01/12/18  -SY     Strength Goal PT LTG, Outcome goal partially met  - goal ongoing  -SY       User Key  (r) = Recorded By, (t) = Taken By, (c) = Cosigned By    Initials Name Provider Type    CB Vanesa Ramos, PT Physical Therapist    SY Sadia French, PTA Physical Therapy Assistant    CH Nichelle Rahman, PTA Physical Therapy Assistant    GIL Garg, PT Student PT Student          Physical Therapy Education     Title: PT OT SLP Therapies (Active)     Topic: Physical Therapy (Active)     Point: Mobility training (Active)    Learning Progress Summary    Learner Readiness Method Response Comment Documented by Status   Patient Acceptance D NR   01/11/18 1129 Active               Point: Precautions (Active)    Learning Progress Summary    Learner Readiness Method Response Comment Documented by Status   Patient Acceptance D NR   01/11/18 1129 Active                      User Key     Initials Effective Dates Name Provider Type Discipline    CB 04/06/17 -  Vanesa Ramos, PT  Physical Therapist PT                    PT Recommendation and Plan  Anticipated Equipment Needs At Discharge:  (to be determined)  Anticipated Discharge Disposition: home with home health  Demonstrates Need for Referral to Another Service: home health care  Planned Therapy Interventions: bed mobility training, gait training, home exercise program, strengthening, transfer training  PT Frequency:  (5-14)  Plan of Care Review  Plan Of Care Reviewed With: patient  Outcome Summary/Follow up Plan: No new goals met this tx. Pt continues to demonstrate increased confusion and decreased balance with mobility,. Pt will continue to benefit from skilled therapy services within SNF upon D/C          Outcome Measures       01/20/18 1418 01/19/18 1058 01/19/18 0845    How much help from another person do you currently need...    Turning from your back to your side while in flat bed without using bedrails? 3  -CH  3  -CH    Moving from lying on back to sitting on the side of a flat bed without bedrails? 3  -CH  2  -CH    Moving to and from a bed to a chair (including a wheelchair)? 3  -CH  2  -CH    Standing up from a chair using your arms (e.g., wheelchair, bedside chair)? 3  -CH  2  -CH    Climbing 3-5 steps with a railing? 1  -CH  1  -CH    To walk in hospital room? 2  -CH  2  -CH    AM-PAC 6 Clicks Score 15  -CH  12  -CH    How much help from another is currently needed...    Putting on and taking off regular lower body clothing?  2  -BB     Bathing (including washing, rinsing, and drying)  2  -BB     Toileting (which includes using toilet bed pan or urinal)  2  -BB     Putting on and taking off regular upper body clothing  2  -BB     Taking care of personal grooming (such as brushing teeth)  2  -BB     Eating meals  2  -BB     Score  12  -BB     Functional Assessment    Outcome Measure Options AM-PAC 6 Clicks Basic Mobility (PT)  -CH  AM-PAC 6 Clicks Basic Mobility (PT)  -CH      01/18/18 1327 01/18/18 1032 01/18/18 0730     How much help from another person do you currently need...    Turning from your back to your side while in flat bed without using bedrails?  3  -SY (r) BM (t) SY (c)     Moving from lying on back to sitting on the side of a flat bed without bedrails?  2  -SY (r) BM (t) SY (c)     Moving to and from a bed to a chair (including a wheelchair)?  2  -SY (r) BM (t) SY (c)     Standing up from a chair using your arms (e.g., wheelchair, bedside chair)?  3  -SY (r) BM (t) SY (c)     Climbing 3-5 steps with a railing?  2  -SY (r) BM (t) SY (c)     To walk in hospital room?  2  -SY (r) BM (t) SY (c)     AM-PAC 6 Clicks Score  14  -SY (r) BM (t)     How much help from another is currently needed...    Putting on and taking off regular lower body clothing? 2  -BB  2  -BB    Bathing (including washing, rinsing, and drying) 2  -BB  2  -BB    Toileting (which includes using toilet bed pan or urinal) 2  -BB  2  -BB    Putting on and taking off regular upper body clothing 3  -BB  3  -BB    Taking care of personal grooming (such as brushing teeth) 3  -BB  3  -BB    Eating meals 3  -BB  3  -BB    Score 15  -BB  15  -BB      User Key  (r) = Recorded By, (t) = Taken By, (c) = Cosigned By    Initials Name Provider Type    SY French PTA Physical Therapy Assistant    LAURI Rahman PTA Physical Therapy Assistant    PAMELLA Henderson/L Occupational Therapy Assistant     Monse Garg, PT Student PT Student           Time Calculation:         PT Charges       01/20/18 1536          Time Calculation    Start Time 1418  -      Stop Time 1526  -      Time Calculation (min) 68 min  -      PT Received On 01/20/18  -      Time Calculation- PT    Total Timed Code Minutes- PT 68 minute(s)  -        User Key  (r) = Recorded By, (t) = Taken By, (c) = Cosigned By    Initials Name Provider Type    LAURI Rahman PTA Physical Therapy Assistant          Therapy Charges for Today     Code Description Service Date  Service Provider Modifiers Qty    88414409369 HC PT THERAPEUTIC ACT EA 15 MIN 1/19/2018 Nichelle Rahman, PTA GP 2     Duration:  25m ( 8:45 AM -  9:10 AM)      54649888438 HC PT THER PROC EA 15 MIN 1/19/2018 Nichelle Rahman, PTA GP 1     Duration:  15m ( 9:10 AM -  9:25 AM)      08157892033 HC PT THERAPEUTIC ACT EA 15 MIN 1/20/2018 Nichelle Rahman, PTA GP 2     Duration:  -11h 30m ( 2:18 PM -  2:48 AM)      35181046976 HC GAIT TRAINING EA 15 MIN 1/20/2018 Nichelle Rahman, PTA GP 2     Duration:  27m ( 2:48 PM -  3:15 PM)      46205131566 HC PT THER PROC EA 15 MIN 1/20/2018 Nichelle Rahman, PTA GP 1     Duration:  11m ( 3:15 PM -  3:26 PM)            PT G-Codes  PT Professional Judgement Used?: Yes  Outcome Measure Options: AM-PAC 6 Clicks Basic Mobility (PT)  Score: 15  Functional Limitation: Mobility: Walking and moving around  Mobility: Walking and Moving Around Current Status (): At least 40 percent but less than 60 percent impaired, limited or restricted  Mobility: Walking and Moving Around Goal Status (): At least 20 percent but less than 40 percent impaired, limited or restricted    Nichelle Loverani, PTA  1/20/2018

## 2018-01-20 NOTE — SIGNIFICANT NOTE
During nursing round patient was found kneeling at the foot of her bed with no visible injuries. Patient is currently being treated for pneumonia and encephalopathy. Cognitive impairment appears to be the baseline of this encounter. During assessment of patient she stated that she needed to urinate; however, patient had hutchins placed today due to acute retention. Attempted to re-educate patient on this, but due to cognition was unable to fully understand. I did contact Dr. Bourgeois who is the on call provider and discussed the patients case, allergies and the known details of the fall. It is suspected that patient had placed herself in the floor due to location and position, but due to being unobserved nursing was unsure of the nature of the fall. I explained to Dr. Bourgeois that the patient was repeatedly stating she needed to urinate and that she had excellent urinary output with no apparent blockage. I did suggest that patient may be having bladder spasms secondary to the newly placed hutchins. Dr. Bourgeois stated to start Pyridium 100mg TID.

## 2018-01-20 NOTE — NURSING NOTE
Called pt's son Remy Nelson, spoke to him about pt found squatting on side of bed, turned pts alert to higher level of alert, stated no injuries occurred. Explained to son that she could possibly be having bladder spasms since pt kept saying she had to urinate MD ordered medication to help with the spasms.

## 2018-01-21 LAB
ALBUMIN SERPL-MCNC: 2.7 G/DL (ref 3.4–4.8)
ALBUMIN/GLOB SERPL: 1 G/DL (ref 1.1–1.8)
ALP SERPL-CCNC: 107 U/L (ref 38–126)
ALT SERPL W P-5'-P-CCNC: 38 U/L (ref 9–52)
ANION GAP SERPL CALCULATED.3IONS-SCNC: 7 MMOL/L (ref 5–15)
AST SERPL-CCNC: 31 U/L (ref 14–36)
BASOPHILS # BLD AUTO: 0.01 10*3/MM3 (ref 0–0.2)
BASOPHILS NFR BLD AUTO: 0.1 % (ref 0–2)
BILIRUB SERPL-MCNC: 0.4 MG/DL (ref 0.2–1.3)
BUN BLD-MCNC: 14 MG/DL (ref 7–21)
BUN/CREAT SERPL: 19.4 (ref 7–25)
CALCIUM SPEC-SCNC: 8.6 MG/DL (ref 8.4–10.2)
CHLORIDE SERPL-SCNC: 103 MMOL/L (ref 95–110)
CO2 SERPL-SCNC: 27 MMOL/L (ref 22–31)
CREAT BLD-MCNC: 0.72 MG/DL (ref 0.5–1)
DEPRECATED RDW RBC AUTO: 51.7 FL (ref 36.4–46.3)
EOSINOPHIL # BLD AUTO: 0.24 10*3/MM3 (ref 0–0.7)
EOSINOPHIL NFR BLD AUTO: 2.4 % (ref 0–7)
ERYTHROCYTE [DISTWIDTH] IN BLOOD BY AUTOMATED COUNT: 17.2 % (ref 11.5–14.5)
GFR SERPL CREATININE-BSD FRML MDRD: 82 ML/MIN/1.73 (ref 45–104)
GLOBULIN UR ELPH-MCNC: 2.8 GM/DL (ref 2.3–3.5)
GLUCOSE BLD-MCNC: 86 MG/DL (ref 60–100)
HCT VFR BLD AUTO: 29.6 % (ref 35–45)
HGB BLD-MCNC: 9.1 G/DL (ref 12–15.5)
IMM GRANULOCYTES # BLD: 0.04 10*3/MM3 (ref 0–0.02)
IMM GRANULOCYTES NFR BLD: 0.4 % (ref 0–0.5)
LYMPHOCYTES # BLD AUTO: 1.65 10*3/MM3 (ref 0.6–4.2)
LYMPHOCYTES NFR BLD AUTO: 16.5 % (ref 10–50)
MCH RBC QN AUTO: 26.1 PG (ref 26.5–34)
MCHC RBC AUTO-ENTMCNC: 30.7 G/DL (ref 31.4–36)
MCV RBC AUTO: 84.8 FL (ref 80–98)
MONOCYTES # BLD AUTO: 1.3 10*3/MM3 (ref 0–0.9)
MONOCYTES NFR BLD AUTO: 13 % (ref 0–12)
NEUTROPHILS # BLD AUTO: 6.77 10*3/MM3 (ref 2–8.6)
NEUTROPHILS NFR BLD AUTO: 67.6 % (ref 37–80)
PLATELET # BLD AUTO: 234 10*3/MM3 (ref 150–450)
PMV BLD AUTO: 9.3 FL (ref 8–12)
POTASSIUM BLD-SCNC: 4.9 MMOL/L (ref 3.5–5.1)
PROT SERPL-MCNC: 5.5 G/DL (ref 6.3–8.6)
RBC # BLD AUTO: 3.49 10*6/MM3 (ref 3.77–5.16)
SODIUM BLD-SCNC: 137 MMOL/L (ref 137–145)
WBC NRBC COR # BLD: 10.01 10*3/MM3 (ref 3.2–9.8)

## 2018-01-21 PROCEDURE — 94799 UNLISTED PULMONARY SVC/PX: CPT

## 2018-01-21 PROCEDURE — 99232 SBSQ HOSP IP/OBS MODERATE 35: CPT | Performed by: FAMILY MEDICINE

## 2018-01-21 PROCEDURE — 80053 COMPREHEN METABOLIC PANEL: CPT | Performed by: FAMILY MEDICINE

## 2018-01-21 PROCEDURE — 85025 COMPLETE CBC W/AUTO DIFF WBC: CPT | Performed by: FAMILY MEDICINE

## 2018-01-21 PROCEDURE — 97530 THERAPEUTIC ACTIVITIES: CPT

## 2018-01-21 PROCEDURE — 97535 SELF CARE MNGMENT TRAINING: CPT

## 2018-01-21 RX ORDER — POTASSIUM CHLORIDE 750 MG/1
20 CAPSULE, EXTENDED RELEASE ORAL 2 TIMES DAILY WITH MEALS
Status: DISCONTINUED | OUTPATIENT
Start: 2018-01-21 | End: 2018-01-22 | Stop reason: HOSPADM

## 2018-01-21 RX ADMIN — LIDOCAINE HYDROCHLORIDE 5 ML: 20 SOLUTION ORAL; TOPICAL at 18:00

## 2018-01-21 RX ADMIN — NICOTINE 1 PATCH: 21 PATCH TRANSDERMAL at 15:49

## 2018-01-21 RX ADMIN — DOXYCYCLINE 100 MG: 100 INJECTION, POWDER, LYOPHILIZED, FOR SOLUTION INTRAVENOUS at 09:03

## 2018-01-21 RX ADMIN — Medication 10 ML: at 23:52

## 2018-01-21 RX ADMIN — FLUOXETINE 60 MG: 20 CAPSULE ORAL at 09:08

## 2018-01-21 RX ADMIN — LIDOCAINE HYDROCHLORIDE 5 ML: 20 SOLUTION ORAL; TOPICAL at 09:03

## 2018-01-21 RX ADMIN — ASPIRIN 81 MG 81 MG: 81 TABLET ORAL at 09:07

## 2018-01-21 RX ADMIN — LIDOCAINE HYDROCHLORIDE 5 ML: 20 SOLUTION ORAL; TOPICAL at 11:48

## 2018-01-21 RX ADMIN — PHENAZOPYRIDINE HYDROCHLORIDE 100 MG: 100 TABLET ORAL at 11:48

## 2018-01-21 RX ADMIN — PHENAZOPYRIDINE HYDROCHLORIDE 100 MG: 100 TABLET ORAL at 09:03

## 2018-01-21 RX ADMIN — METOPROLOL SUCCINATE 25 MG: 25 TABLET, EXTENDED RELEASE ORAL at 09:09

## 2018-01-21 RX ADMIN — DOXYCYCLINE 100 MG: 100 INJECTION, POWDER, LYOPHILIZED, FOR SOLUTION INTRAVENOUS at 20:17

## 2018-01-21 RX ADMIN — FERROUS SULFATE TAB EC 324 MG (65 MG FE EQUIVALENT) 324 MG: 324 (65 FE) TABLET DELAYED RESPONSE at 09:07

## 2018-01-21 RX ADMIN — HYDROCODONE BITARTRATE AND ACETAMINOPHEN 1 TABLET: 5; 325 TABLET ORAL at 11:48

## 2018-01-21 RX ADMIN — RISPERIDONE 1 MG: 1 TABLET ORAL at 20:18

## 2018-01-21 RX ADMIN — FAMOTIDINE 40 MG: 20 TABLET, FILM COATED ORAL at 09:03

## 2018-01-21 RX ADMIN — IPRATROPIUM BROMIDE AND ALBUTEROL SULFATE 3 ML: 2.5; .5 SOLUTION RESPIRATORY (INHALATION) at 19:30

## 2018-01-21 RX ADMIN — POTASSIUM CHLORIDE 40 MEQ: 750 CAPSULE, EXTENDED RELEASE ORAL at 09:07

## 2018-01-21 RX ADMIN — LIDOCAINE HYDROCHLORIDE 5 ML: 20 SOLUTION ORAL; TOPICAL at 23:51

## 2018-01-21 RX ADMIN — LOSARTAN POTASSIUM 50 MG: 50 TABLET, FILM COATED ORAL at 09:03

## 2018-01-21 RX ADMIN — FOLIC ACID 1 MG: 1 TABLET ORAL at 09:03

## 2018-01-21 RX ADMIN — BISACODYL 5 MG: 5 TABLET, COATED ORAL at 23:51

## 2018-01-21 NOTE — PLAN OF CARE
Problem: Patient Care Overview (Adult)  Goal: Plan of Care Review  Outcome: Ongoing (interventions implemented as appropriate)  Pt tries to ambulated w/o assistance.   01/20/18 0331 01/20/18 1533   Coping/Psychosocial Response Interventions   Plan Of Care Reviewed With --  patient   Patient Care Overview   Progress no change --    Outcome Evaluation   Outcome Summary/Follow up Plan --  No new goals met this tx. Pt continues to demonstrate increased confusion and decreased balance with mobility,. Pt will continue to benefit from skilled therapy services within SNF upon D/C     Goal: Adult Individualization and Mutuality  Outcome: Ongoing (interventions implemented as appropriate)    Goal: Discharge Needs Assessment  Outcome: Ongoing (interventions implemented as appropriate)      Problem: Pneumonia (Adult)  Goal: Signs and Symptoms of Listed Potential Problems Will be Absent or Manageable (Pneumonia)  Outcome: Ongoing (interventions implemented as appropriate)      Problem: Fall Risk (Adult)  Goal: Absence of Falls  Outcome: Ongoing (interventions implemented as appropriate)      Problem: Fluid Volume Deficit (Adult)  Goal: Fluid/Electrolyte Balance  Outcome: Ongoing (interventions implemented as appropriate)    Goal: Comfort/Well Being  Outcome: Ongoing (interventions implemented as appropriate)

## 2018-01-21 NOTE — PROGRESS NOTES
[address] [address]  T - [id number]         PROGRESS NOTE         SUBJECTIVE:   Patient Care Team:  Coral Berry MD as PCP - General  Michele Rivera MD as PCP - Claims Attributed  Michele Rivera MD as Consulting Physician (Cardiology)  Juan C FARRELL MD as Consulting Physician (Neurology)    Chief Complaint:   Chief Compliant: dyspnea    Subjective     Patient is 62 y.o. female presents with dyspnea. Her potassium is staying normal.She is more interactive.      ROS/HISTORY/ CURRENT MEDICATIONS/OBJECTIVE/VS/PE:   Review of Systems:   Review of Systems    History:     Past Medical History:   Diagnosis Date   • Alkaline phosphatase raised    • Astigmatism    • Bilateral pseudophakia    • Coronary arteriosclerosis    • Depressive disorder    • Edema of lower extremity    • Encounter for general adult medical examination with abnormal findings    • Essential hypertension    • Gastroesophageal reflux disease    • H/O bone density study     DXA BONE DENSITY AXIAL    04/22/2016   • H/O screening mammography     SCREENING MAMMOGRAPHY     04/25/2016   • Hx of screening mammography     x3; declined screening, understands risks and benefits and still declines      07/24/2015   • Influenza vaccine administered     INFLUENZA IMMUN ADMIN OR PREV RECV'D   x4       04/01/2016   • Insomnia    • Iron deficiency    • Migraine    • Tobacco dependence     continuous       Past Surgical History:   Procedure Laterality Date   • APPENDECTOMY     • BREAST IMPLANT SURGERY     • CATARACT EXTRACTION WITH INTRAOCULAR LENS IMPLANT  12/17/2003    Phacoemulsification of a cataract with intraocular lens implant of rigt eye, Nixon model SA 60-AT; serial # [id number];20.5 diopter   • CHOLECYSTECTOMY  08/06/2007    Laparoscopic cholecystectomy. Symptomatic gallstones   • COLONOSCOPY  10/15/2013    declined stool cards and colonscopy   • ENDOSCOPY AND COLONOSCOPY  05/22/2014    Internal &  external hemorrhoids found.   • ENDOSCOPY W/ PEG TUBE PLACEMENT  05/22/2014    EGD w/ tube: Normal esopahgus. Gastritis in stomach. Biopsy taken. Normal duodenum. Biopsy taken.   • INJECTION OF MEDICATION  07/24/2013    Inj(s) Tend-Sheath, Ligament, Single   • INJECTION OF MEDICATION  11/17/2014    Kenalog x6   • INJECTION OF MEDICATION  04/01/2016    PNEUMOC VAC/ADMIN/RCVD    • INJECTION OF MEDICATION  12/05/2014    Toradol   • OTHER SURGICAL HISTORY  01/19/2014    Drain/Inject Major Joint   x2   • PAP SMEAR  10/06/2011   • PARTIAL HYSTERECTOMY      Partial hyst    • TONSILLECTOMY     • TUBAL ABDOMINAL LIGATION       Family History   Problem Relation Age of Onset   • Cholelithiasis Mother    • Thyroid cancer Sister    • Thyroid disease Sister      Thyroid disorder   • Graves' disease Sister    • Cholelithiasis Maternal Grandmother    • Thyroid cancer Other      Other 2nd degree relative, thyroid   • Diabetes Other    • Hypertension Other      Social History   Substance Use Topics   • Smoking status: Current Every Day Smoker   • Smokeless tobacco: Never Used      Comment: SMOKED FOR 20>PLUS YRS   • Alcohol use No     Prescriptions Prior to Admission   Medication Sig Dispense Refill Last Dose   • albuterol (PROVENTIL HFA;VENTOLIN HFA) 108 (90 BASE) MCG/ACT inhaler Inhale 2 puffs Every 4 (Four) Hours As Needed for wheezing. 6.7 g 11 1/9/2018 at 2100   • amitriptyline (ELAVIL) 100 MG tablet Take 1 tablet by mouth Every Night. 30 tablet 11 1/9/2018 at 2100   • aspirin 81 MG chewable tablet Chew 1 tablet Daily. 30 tablet 11 1/9/2018 at 0900   • carvedilol (COREG) 3.125 MG tablet TAKE 1 TABLET BY MOUTH TWICE A DAY. 60 tablet 1 1/9/2018 at 2100   • FLUoxetine (PROzac) 20 MG capsule TAKE 3 CAPSULES BY MOUTH ONCE DAILY 90 capsule 1 1/9/2018 at 0900   • losartan (COZAAR) 100 MG tablet TAKE ONE TABLET BY MOUTH ONCE DAILY 90 tablet 0 Past Month at Unknown time   • naproxen (NAPROSYN) 500 MG tablet Take 1 tablet by mouth 2 (Two)  Times a Day. 60 tablet 11 1/9/2018 at 2100   • omeprazole (priLOSEC) 40 MG capsule Take 1 capsule by mouth Daily. 30 capsule 0 1/9/2018 at 0900   • risperiDONE (risperDAL) 1 MG tablet Take 1 tablet by mouth Daily. (Patient taking differently: Take 1 mg by mouth every night at bedtime.) 30 tablet 0 1/9/2018 at 2100   • furosemide (LASIX) 20 MG tablet TAKE 1/2 TABLET BY MOUTH ONCE DAILY AS NEEDED FOR SWELLING. 15 tablet 3 Unknown at Unknown time     Allergies:  Codeine; Penicillins; and Sulfa antibiotics    Current Medications:     Current Facility-Administered Medications   Medication Dose Route Frequency Provider Last Rate Last Dose   • acetaminophen (TYLENOL) tablet 650 mg  650 mg Oral Q4H PRN Coral Berry MD   650 mg at 01/20/18 0430   • aspirin chewable tablet 81 mg  81 mg Oral Daily Coral Berry MD   81 mg at 01/21/18 0907   • bisacodyl (DULCOLAX) EC tablet 5 mg  5 mg Oral Daily PRN Coral Berry MD       • doxycycline (VIBRAMYCIN) 100 mg/250 mL 0.9% NS VTB  100 mg Intravenous Q12H Bridget Ortega MD   100 mg at 01/21/18 0903   • famotidine (PEPCID) tablet 40 mg  40 mg Oral Daily Coral Berry MD   40 mg at 01/21/18 0903   • ferrous sulfate EC tablet 324 mg  324 mg Oral BID With Meals Coral Berry MD   324 mg at 01/21/18 0907   • FLUoxetine (PROzac) capsule 60 mg  60 mg Oral Daily Coral Berry MD   60 mg at 01/21/18 0908   • folic acid (FOLVITE) tablet 1 mg  1 mg Oral Daily Coral Berry MD   1 mg at 01/21/18 0903   • HYDROcodone-acetaminophen (NORCO) 5-325 MG per tablet 1 tablet  1 tablet Oral Q4H PRN Bridget Ortega MD   1 tablet at 01/21/18 1148   • influenza vac split quad (FLUZONE QUADRIVALENT) IM suspension 0.5 mL  0.5 mL Intramuscular During Hospitalization Coral Berry MD       • ipratropium-albuterol (DUO-NEB) nebulizer solution 3 mL  3 mL Nebulization 4x Daily - RT Coral Berry MD   3 mL at 01/20/18 2102   • losartan (COZAAR) tablet 50 mg  50 mg Oral Q24H Coral  Joy Berry MD   50 mg at 01/21/18 0903   • magic mouthwash with nystatin oral supsension 5 mL  5 mL Swish & Swallow 4x Daily Bridget Ortega MD   5 mL at 01/21/18 1148   • metoprolol succinate XL (TOPROL-XL) 24 hr tablet 25 mg  25 mg Oral Q24H Stevenson Brown MD PhD   25 mg at 01/21/18 0909   • nicotine (NICODERM CQ) 21 MG/24HR patch 1 patch  1 patch Transdermal Q24H Coral Berry MD   1 patch at 01/20/18 1511   • ondansetron (ZOFRAN) tablet 4 mg  4 mg Oral Q6H PRN Coral Berry MD   4 mg at 01/20/18 1820   • phenazopyridine (PYRIDIUM) tablet 100 mg  100 mg Oral TID With Meals Ernie Bourgeois MD   100 mg at 01/21/18 1148   • pneumococcal polysaccharide 23-valent (PNEUMOVAX-23) vaccine 0.5 mL  0.5 mL Intramuscular During Hospitalization Coral Berry MD       • potassium chloride (MICRO-K) CR capsule 40 mEq  40 mEq Oral BID With Meals Bridget Ortega MD   40 mEq at 01/21/18 0907   • potassium chloride 10 mEq in 100 mL IVPB  10 mEq Intravenous Q1H PRN Sunny Linn DO       • risperiDONE (risperDAL) tablet 1 mg  1 mg Oral Nightly Coral Berry MD   1 mg at 01/20/18 2059   • sodium chloride 0.9 % flush 1-10 mL  1-10 mL Intravenous PRN Coral Berry MD       • sodium chloride 0.9 % flush 10 mL  10 mL Intravenous PRN Toro Shine MD   10 mL at 01/15/18 2048       Objective     Physical Exam:   Temp:  [96.5 °F (35.8 °C)-98.8 °F (37.1 °C)] 98.2 °F (36.8 °C)  Heart Rate:  [77-89] 89  Resp:  [16-20] 20  BP: (107-152)/(56-71) 152/67    Physical Exam   Constitutional: She appears well-developed and well-nourished.   HENT:   Head: Normocephalic and atraumatic.   Cardiovascular: Normal rate, regular rhythm and normal heart sounds.  Exam reveals no friction rub.    No murmur heard.  Pulmonary/Chest: Effort normal. No respiratory distress. She has no wheezes. She has no rales.   Abdominal: Soft. Bowel sounds are normal.   Skin: Skin is warm.   Vitals reviewed.           Results Review:    Lab Results   Component Value Date    GLUCOSE 86 01/21/2018    BUN 14 01/21/2018    CREATININE 0.72 01/21/2018    EGFRIFNONA 82 01/21/2018    BCR 19.4 01/21/2018    CO2 27.0 01/21/2018    CALCIUM 8.6 01/21/2018    ALBUMIN 2.70 (L) 01/21/2018    LABIL2 1.0 (L) 01/21/2018    AST 31 01/21/2018    ALT 38 01/21/2018         WBC WBC   Date Value Ref Range Status   01/21/2018 10.01 (H) 3.20 - 9.80 10*3/mm3 Final   01/20/2018 10.56 (H) 3.20 - 9.80 10*3/mm3 Final   01/19/2018 8.35 3.20 - 9.80 10*3/mm3 Final      HGB Hemoglobin   Date Value Ref Range Status   01/21/2018 9.1 (L) 12.0 - 15.5 g/dL Final   01/20/2018 8.9 (L) 12.0 - 15.5 g/dL Final   01/19/2018 8.9 (L) 12.0 - 15.5 g/dL Final      HCT Hematocrit   Date Value Ref Range Status   01/21/2018 29.6 (L) 35.0 - 45.0 % Final   01/20/2018 28.3 (L) 35.0 - 45.0 % Final   01/19/2018 27.9 (L) 35.0 - 45.0 % Final      Platlets Platelets   Date Value Ref Range Status   01/21/2018 234 150 - 450 10*3/mm3 Final   01/20/2018 234 150 - 450 10*3/mm3 Final   01/19/2018 219 150 - 450 10*3/mm3 Final          Imaging Results (last 24 hours)     ** No results found for the last 24 hours. **           I reviewed the patient's new clinical results.  I reviewed the patient's new imaging results and agree with the interpretation.     ASSESSMENT/PLAN:   Assessment/Plan   Principal Problem:    Pneumonia of left lower lobe due to Streptococcus pneumoniae  Active Problems:    Tobacco dependence    Iron deficiency anemia    Gastroesophageal reflux disease    Fall with injury    Hypokalemia    Weakness    Acute respiratory failure with hypoxia    Hyponatremia    Cerebral microvascular disease    Pulmonary hypertension    Rheumatic tricuspid valve regurgitation    Rheumatic mitral stenosis    Acute diastolic CHF (congestive heart failure)    Encephalopathy, metabolic    Pulmonary hypertension due to left heart valvular disease    Cor pulmonale, chronic    Folic acid deficiency    Hypoxic  encephalopathy    Oral thrush    Laryngitis, acute    Dysuria    Bacterial cystitis    Closed fracture of multiple ribs of right side    Acute retention of urine      Continue with current treatment.  Will monitor labs.  I discussed the patients findings and my recommendations with patient.      Ernie Bourgeois MD  01/21/18  11:49 AM

## 2018-01-21 NOTE — PLAN OF CARE
Problem: Patient Care Overview (Adult)  Goal: Plan of Care Review  Outcome: Ongoing (interventions implemented as appropriate)   01/21/18 1211   Coping/Psychosocial Response Interventions   Plan Of Care Reviewed With patient   Patient Care Overview   Progress no change   Outcome Evaluation   Outcome Summary/Follow up Plan Pt tolerated tx fair this date. Pt completed an grooming task. No goals met this tx. Continue POC.       Problem: Inpatient Occupational Therapy  Goal: Transfer Training Goal 1 LTG- OT  Outcome: Ongoing (interventions implemented as appropriate)   01/11/18 0856 01/16/18 1457 01/21/18 1211   Transfer Training OT LTG   Transfer Training OT LTG, Date Established 01/11/18 --  --    Transfer Training OT LTG, Time to Achieve by discharge --  --    Transfer Training OT LTG, Activity Type all transfers --  --    Transfer Training OT LTG, [place] Level independent --  --    Transfer Training OT LTG, Date Goal Reviewed --  --  01/21/18   Transfer Training OT LTG, Outcome --  goal ongoing --      Goal: Strength Goal LTG- OT  Outcome: Ongoing (interventions implemented as appropriate)   01/11/18 0856 01/16/18 1457 01/21/18 1211   Strength OT LTG   Strength Goal OT LTG, Date Established 01/11/18 --  --    Strength Goal OT LTG, Time to Achieve by discharge --  --    Strength Goal OT LTG, Measure to Achieve Pt. will complete BUE strengthening exercises all planes and joints to increase BUE strength to 5/5 for ADLs.  --  --    Strength Goal OT LTG, Date Goal Reviewed --  --  01/21/18   Strength Goal OT LTG, Outcome --  goal ongoing --      Goal: ADL Goal LTG- OT  Outcome: Ongoing (interventions implemented as appropriate)   01/11/18 0856 01/16/18 1457 01/21/18 1211   ADL OT LTG   ADL OT LTG, Date Established 01/11/18 --  --    ADL OT LTG, Time to Achieve by discharge --  --    ADL OT LTG, Activity Type ADL skills --  --    ADL OT LTG, [place] Level modified independent --  --    ADL OT LTG, Additional  Goal AE PRN --  --    ADL OT LTG, Date Goal Reviewed --  --  01/21/18   ADL OT LTG, Outcome --  goal ongoing --

## 2018-01-21 NOTE — SIGNIFICANT NOTE
01/21/18 1404   Rehab Treatment   Discipline physical therapy assistant   Treatment Not Performed patient/family declined treatment

## 2018-01-21 NOTE — THERAPY TREATMENT NOTE
Acute Care - Occupational Therapy Treatment Note  [facility]     Patient Name: Michelle Child  : 1955  MRN: [id number]  Today's Date: 2018  Onset of Illness/Injury or Date of Surgery Date: 01/10/18  Date of Referral to OT: 01/10/18  Referring Physician: Dr Berry      Admit Date: 1/10/2018    Visit Dx:     ICD-10-CM ICD-9-CM   1. Hypokalemia E87.6 276.8   2. Pneumonia of left lung due to infectious organism, unspecified part of lung J18.9 486   3. Fall, initial encounter W19.XXXA E888.9   4. Contusion of scalp, initial encounter S00.03XA 920   5. Decreased activities of daily living (ADL) Z78.9 V49.89   6. Impaired physical mobility Z74.09 781.99     Patient Active Problem List   Diagnosis   • Tobacco dependence   • Migraine   • Iron deficiency anemia   • Gastroesophageal reflux disease   • Coronary arteriosclerosis   • Bilateral pseudophakia   • Astigmatism   • Right humeral fracture   • Fall with injury   • Closed fracture of humerus   • Cause of injury, fall   • Closed fracture of proximal end of right humerus with routine healing   • Hypokalemia   • Weakness   • Acute respiratory failure with hypoxia   • Pneumonia of left lower lobe due to Streptococcus pneumoniae   • Hyponatremia   • Cerebral microvascular disease   • Pulmonary hypertension   • Rheumatic tricuspid valve regurgitation   • Rheumatic mitral stenosis   • Acute diastolic CHF (congestive heart failure)   • Encephalopathy, metabolic   • Pulmonary hypertension due to left heart valvular disease   • Cor pulmonale, chronic   • Folic acid deficiency   • Hypoxic encephalopathy   • Oral thrush   • Laryngitis, acute   • Dysuria   • Bacterial cystitis   • Closed fracture of multiple ribs of right side   • Acute retention of urine             Adult Rehabilitation Note       18 0955 18 1418 18 1058    Rehab Assessment/Intervention    Discipline occupational therapy assistant  -CS physical therapy assistant  -CH  occupational therapy assistant  -BB    Document Type therapy note (daily note)  -CS therapy note (daily note)  -CH therapy note (daily note)  -BB    Subjective Information agree to therapy  -CS agree to therapy  -CH agree to therapy  -BB    Patient Effort, Rehab Treatment fair  -CS fair  -CH fair  -BB    Symptoms Noted During/After Treatment   --   confusion  -BB    Precautions/Limitations fall precautions;oxygen therapy device and L/min  -CS fall precautions;oxygen therapy device and L/min  -CH fall precautions;oxygen therapy device and L/min  -BB    Recorded by [CS] KINGSLEY Molina [CH] Nichelle Rahman PTA [BB] JUDE NgoA/L    Vital Signs    Pre Systolic BP Rehab  103  -  -BB    Pre Treatment Diastolic BP  53  -CH 58  -BB    Intra Systolic BP Rehab  89   standing, increased to 91/51 sitting EOB, 107/54 post gait  -CH     Intra Treatment Diastolic BP  52  -CH     Post Systolic BP Rehab 148  -  -CH     Post Treatment Diastolic BP 66  -CS 56  -CH     Pretreatment Heart Rate (beats/min) 82  -CS 81  -CH 73  -BB    Posttreatment Heart Rate (beats/min) 84  -CS 78  -CH     Pre SpO2 (%) 99  -CS 96  -CH 96  -BB    O2 Delivery Pre Treatment supplemental O2  -CS supplemental O2  -CH supplemental O2  -BB    Post SpO2 (%) 99  -CS 97  -CH     O2 Delivery Post Treatment supplemental O2  -CS supplemental O2  -CH     Pre Patient Position Supine  -CS Supine  -CH --   long sitting  -BB    Intra Patient Position Sitting  -CS Sitting  -CH     Post Patient Position Supine  -CS Supine  -CH     Recorded by [CS] KINGSLEY Molina [CH] Nichelle Rahman PTA [BB] JUDE NgoA/L    Pain Assessment    Pain Assessment 0-10  -CS No/denies pain  -CH     Pain Score 5  -CS      Post Pain Score 0  -CS      Pain Location Arm  -CS      Pain Orientation Right;Left  -CS      Recorded by [CS] PAMELLA Molina/DALTON [CH] Nichelle Rahman PTA     Vision Assessment/Intervention    Visual Impairment   WNL  -CH     Recorded by  [CH] Nichelle Rahman PTA     Cognitive Assessment/Intervention    Current Cognitive/Communication Assessment impaired  -CS impaired  -CH impaired  -BB    Orientation Status oriented to;person  -CS oriented to;person  -CH     Follows Commands/Answers Questions 75% of the time;able to follow single-step instructions;needs increased time;needs cueing  -CS 75% of the time;able to follow single-step instructions;needs cueing;needs increased time;needs repetition  -CH 75% of the time  -BB    Personal Safety moderate impairment  -CS moderate impairment;decreased awareness, need for assist;decreased awareness, need for safety  - moderate impairment  -BB    Personal Safety Interventions  fall prevention program maintained;gait belt;muscle strengthening facilitated;nonskid shoes/slippers when out of bed;supervised activity  - fall prevention program maintained;nonskid shoes/slippers when out of bed  -BB    Recorded by [CS] JUDE MolinaA/L [CH] Nichelle Rahman PTA [BB] JUDE NgoA/L    Bed Mobility, Assessment/Treatment    Bed Mobility, Roll Left, [place]  contact guard assist  - contact guard assist  -BB    Bed Mobility, Roll Right, [place]  not tested  - contact guard assist  -BB    Bed Mobility, Scoot/Bridge, [place]  minimum assist (75% patient effort)  -     Bed Mob, Supine to Sit, [place]  minimum assist (75% patient effort)  -     Bed Mob, Sit to Supine, [place]  minimum assist (75% patient effort)  -     Bed Mobility, Safety Issues  decreased use of legs for bridging/pushing;decreased use of arms for pushing/pulling  -     Bed Mobility, Impairments  strength decreased  -     Recorded by  [CH] Nichelle Rahman PTA [BB] JUDE NgoA/L    Transfer Assessment/Treatment    Transfers, Sit-Stand [place]  moderate assist (50% patient effort)  -     Transfers, Stand-Sit [place]  moderate assist (50% patient  effort)  -CH     Transfers, Sit-Stand-Sit, Assist Device  rolling walker  -CH     Toilet Transfer, [place]  minimum assist (75% patient effort)  -CH contact guard assist  -BB    Toilet Transfer, Assistive Device  rolling walker  -CH --   bed pan  -BB    Transfer, Comment  [place] [facility] [place]     Recorded by  [CH] Nichelle Rahman PTA [BB] JUDE NgoA/L    Gait Assessment/Treatment    Gait, [place] Level  minimum assist (75% patient effort);moderate assist (50% patient effort)  -CH     Gait, Assistive Device  rolling walker  -CH     Gait, Distance (Feet)  90  -CH     Gait, Safety Issues  balance decreased during turns;step length decreased;weight-shifting ability decreased;supplemental O2  -CH     Recorded by  [CH] Nichelle Rahman PTA     Stairs Assessment/Treatment    Stairs, [place] Level  not tested  -CH     Recorded by  [CH] Nichelle Rahman PTA     Toileting Assessment/Training    Toileting Assess/Train, Position   supine  -BB    Toileting Assess/Train, Indepen Level   dependent (less than 25% patient effort)  -BB    Recorded by   [BB] JUDE NgoA/L    Grooming Assessment/Training    Grooming Assess/Train, Position long sitting  -CS  long sitting  -BB    Grooming Assess/Train, Indepen Level set up required;contact guard assist  -CS  set up required;contact guard assist  -BB    Grooming Assess/Train, Impairments strength decreased;coordination impaired  -CS  strength decreased;coordination impaired  -BB    Recorded by [CS] JUDE MolinaA/L  [BB] JUDE NgoA/L    Self-Feeding Assessment/Training    Self-Feeding Assess/Train, Position   --   long sitting  -BB    Self-Feeding Assess/Train, [place]   set up required;moderate assist (50% patient effort);minimum assist (75% patient effort)   family requests assistance for pt's meal  -BB    Self-Feeding Assess/Train, Spillage Amount   minimal  -BB    Recorded by   [BB] Bibi Chester,  PAMELLA/L    Balance Skills Training    Sitting-Balance Activities Reaching for objects;Reaching across midline    balloon volleyball  -CS      Recorded by [CS] PAMELLA Molina/L      Therapy Exercises    Bilateral Lower Extremities  AROM:;10 reps;sitting;hip abduction/adduction;hip flexion;LAQ  -CH     Recorded by  [CH] Nichelle Rahman PTA     Positioning and Restraints    Pre-Treatment Position in bed  -CS in bed  -CH in bed  -BB    Post Treatment Position bed  -CS bed  -CH bed  -BB    In Bed supine;call light within reach;exit alarm on  -CS supine;call light within reach;encouraged to call for assist;exit alarm on;side rails up x3;SCD pump applied;heels elevated  - call light within reach;encouraged to call for assist;exit alarm on;with family/caregiver   long sitting  -BB    Recorded by [CS] KINGSLEY Molina [CH] Nichelle Rahman PTA [BB] KINGSLEY Ngo      01/19/18 0845 01/18/18 1327       Rehab Assessment/Intervention    Discipline physical therapy assistant  - occupational therapy assistant  -BB     Document Type therapy note (daily note)  -CH therapy note (daily note)  -BB     Subjective Information agree to therapy  -CH agree to therapy  -BB     Patient Effort, Rehab Treatment fair  -CH fair  -BB     Symptoms Noted During/After Treatment  --   confusion  -BB     Precautions/Limitations fall precautions;oxygen therapy device and L/min  -CH fall precautions;oxygen therapy device and L/min  -BB     Specific Treatment Considerations pt somewhat lethargic this tx with c/o dizziness once standing limiting progression  -CH      Recorded by [CH] Nichelle Rahman PTA [BB] PAMELLA Ngo/L     Vital Signs    Pre Systolic BP Rehab 98   nursing notified and agreeable to continue tx  -CH      Pre Treatment Diastolic BP 46  -CH      Intra Systolic BP Rehab 94  -CH      Intra Treatment Diastolic BP 55  -CH      Post Systolic BP Rehab 89   nursing notified  -CH      Post Treatment  Diastolic BP 50  -CH      Pretreatment Heart Rate (beats/min) 79  -CH 78  -BB     Posttreatment Heart Rate (beats/min) 79  -CH 83  -BB     Pre SpO2 (%) 94  -CH 93  -BB     O2 Delivery Pre Treatment supplemental O2  -CH supplemental O2  -BB     Post SpO2 (%) 92  -CH 91  -BB     O2 Delivery Post Treatment supplemental O2  -CH supplemental O2  -BB     Pre Patient Position Supine  -CH Supine  -BB     Intra Patient Position Sitting  -CH      Post Patient Position Supine  -CH Supine  -BB     Recorded by [CH] Nichelle Rahman PTA [BB] PAMELLA Ngo/L     Pain Assessment    Pain Assessment No/denies pain  - No/denies pain  -BB     Recorded by [CH] Nichelle Rahman PTA [BB] PAMELLA Ngo/L     Vision Assessment/Intervention    Visual Impairment WNL;WFL  -CH      Recorded by [CH] Nichelle Rahman PTA      Cognitive Assessment/Intervention    Current Cognitive/Communication Assessment impaired  -CH impaired  -BB     Orientation Status oriented to;person     - oriented to;person  -BB     Follows Commands/Answers Questions 75% of the time;needs cueing;needs increased time;needs repetition  - 75% of the time  -BB     Personal Safety moderate impairment;decreased awareness, need for assist;decreased awareness, need for safety  - moderate impairment  -BB     Personal Safety Interventions fall prevention program maintained;gait belt;muscle strengthening facilitated;nonskid shoes/slippers when out of bed;supervised activity  - fall prevention program maintained;nonskid shoes/slippers when out of bed  -BB     Recorded by [CH] Nichelle Rahman PTA [BB] PAMELLA Ngo/L     Bed Mobility, Assessment/Treatment    Bed Mobility, Assistive Device bed rails;head of bed elevated  -      Bed Mobility, Roll Left, [place] contact guard assist  -CH      Bed Mobility, Roll Right, [place] contact guard assist  -      Bed Mobility, Scoot/Bridge, [place] minimum assist (75% patient  "effort)  -CH      Bed Mob, Supine to Sit, [place] minimum assist (75% patient effort)  -CH conditional independence  -BB     Bed Mob, Sit to Supine, [place] moderate assist (50% patient effort)  -CH minimum assist (75% patient effort);moderate assist (50% patient effort)  -BB     Bed Mobility, Comment pt sat EOB and performed ther ex with min A for sitting balance due to posterior lean and lateral lean to R  -CH Pt sat EOB 15 min  -BB     Recorded by [CH] Nichelle Rahman PTA [BB] JUDE NgoA/L     Transfer Assessment/Treatment    Transfers, Bed-Chair [place] not tested  -      Transfers, Chair-Bed [place] not tested  -      Transfers, Sit-Stand [place] moderate assist (50% patient effort)  -CH      Transfers, Stand-Sit [place] moderate assist (50% patient effort)  -      Transfers, Sit-Stand-Sit, Assist Device rolling walker  -      Toilet Transfer, [place] not tested  -      Transfer, Comment Pt stood X1 and demonstrates posterior lean posteriorly, pt became dizzy and stated she was going to \"fall out.\"PTA immediately assisted pt to supine and vitals signs assessed  -CH      Recorded by [CH] Nichelle Rahman PTA      Gait Assessment/Treatment    Gait, Comment Deferred this date  -      Recorded by [CH] Nichelle Rahman PTA      Grooming Assessment/Training    Grooming Assess/Train, Position  sitting  -BB     Grooming Assess/Train, Indepen Level  set up required;contact guard assist  -BB     Grooming Assess/Train, Impairments  strength decreased;impaired balance  -BB     Grooming Assess/Train, Comment  --   oral care  -BB     Recorded by  [BB] JUDE NgoA/L     Therapy Exercises    Bilateral Lower Extremities AROM:;15 reps;sitting;ankle pumps/circles;glut sets;hip abduction/adduction;hip flexion;LAQ  -CH      Bilateral Upper Extremity  AROM:;AAROM:;10 reps;elbow flexion/extension;hand pumps;pronation/supination;shoulder " rolls/shrugs;shoulder ER/IR   long sitting, wrist flexion/extension  -BB     Recorded by [CH] Nichelle Rahman PTA [BB] Bibi Chester CAN/L     Positioning and Restraints    Pre-Treatment Position in bed  -CH in bed  -BB     Post Treatment Position bed  -CH bed  -BB     In Bed supine;call light within reach;encouraged to call for assist;exit alarm on;side rails up x2;heels elevated;SCD pump applied  -CH supine;call light within reach;encouraged to call for assist;exit alarm on  -BB     Recorded by [CH] Nichelle Rahman PTA [BB] Bibi Chester CAN/L       User Key  (r) = Recorded By, (t) = Taken By, (c) = Cosigned By    Initials Name Effective Dates     Nichelle Rahman, KIN 10/17/16 -     BB Bibi Chester CAN/L 10/17/16 -     CS Nichelle Herman CAN/L 10/17/16 -                 OT Goals       01/21/18 1211 01/19/18 1352 01/18/18 1521    Transfer Training OT LTG    Transfer Training OT LTG, Date Goal Reviewed 01/21/18  -CS 01/19/18  -BB 01/18/18  -BB    Strength OT LTG    Strength Goal OT LTG, Date Goal Reviewed 01/21/18  -CS 01/19/18  -BB 01/18/18  -BB    ADL OT LTG    ADL OT LTG, Date Goal Reviewed 01/21/18  -CS 01/19/18  -BB 01/18/18  -BB      01/18/18 1141 01/16/18 1457 01/15/18 1020    Transfer Training OT LTG    Transfer Training OT LTG, Date Goal Reviewed 01/18/18  -BB 01/16/18  -LM 01/15/18  -BB    Transfer Training OT LTG, Outcome  goal ongoing  -LM     Strength OT LTG    Strength Goal OT LTG, Date Goal Reviewed 01/18/18  -BB 01/16/18  -LM 01/15/18  -BB    Strength Goal OT LTG, Outcome  goal ongoing  -LM     ADL OT LTG    ADL OT LTG, Date Goal Reviewed 01/18/18  -BB 01/16/18  -LM 01/15/18  -BB    ADL OT LTG, Outcome  goal ongoing  -LM       01/14/18 1529 01/12/18 1236 01/11/18 0856    Transfer Training OT LTG    Transfer Training OT LTG, Date Established   01/11/18  -NN    Transfer Training OT LTG, Time to Achieve   by discharge  -NN    Transfer Training OT LTG, Activity Type   all  transfers  -NN    Transfer Training OT LTG, [place] Level   independent  -NN    Transfer Training OT LTG, Date Goal Reviewed 01/14/18  -LW 01/12/18  -LW     Transfer Training OT LTG, Outcome goal ongoing  -LW goal ongoing  -LW     Strength OT LTG    Strength Goal OT LTG, Date Established   01/11/18  -NN    Strength Goal OT LTG, Time to Achieve   by discharge  -NN    Strength Goal OT LTG, Measure to Achieve   Pt. will complete BUE strengthening exercises all planes and joints to increase BUE strength to 5/5 for ADLs.   -NN    Strength Goal OT LTG, Date Goal Reviewed 01/14/18  -LW 01/12/18  -LW     Strength Goal OT LTG, Outcome goal ongoing  -LW goal ongoing  -LW     ADL OT LTG    ADL OT LTG, Date Established   01/11/18  -NN    ADL OT LTG, Time to Achieve   by discharge  -NN    ADL OT LTG, Activity Type   ADL skills  -NN    ADL OT LTG, [place] Level   modified independent  -NN    ADL OT LTG, Additional Goal   AE PRN  -NN    ADL OT LTG, Date Goal Reviewed 01/14/18  -LW 01/12/18  -LW     ADL OT LTG, Outcome goal ongoing  -LW goal ongoing  -LW       User Key  (r) = Recorded By, (t) = Taken By, (c) = Cosigned By    Initials Name Provider Type    BB Bibi Chester, CAN/L Occupational Therapy Assistant    LM Melyssa Bell, CAN/L Occupational Therapy Assistant    CS Nichelle Herman CAN/L Occupational Therapy Assistant    LW Jaz Garcia, CAN/L Occupational Therapy Assistant    NN Janet Garibay, OTR/L Occupational Therapist          Occupational Therapy Education     Title: PT OT SLP Therapies (Active)     Topic: Occupational Therapy (Active)     Point: ADL training (Active)    Description: Instruct learner(s) on proper safety adaptation and remediation techniques during self care or transfers.   Instruct in proper use of assistive devices.    Learning Progress Summary    Learner Readiness Method Response Comment Documented by Status   Patient Acceptance E,TB,D NR Pt was educated safety awareness  and home safety.  01/21/18 1210 Active    Acceptance E NR  BB 01/19/18 1351 Active    Nonacceptance E NL  BB 01/18/18 1520 Active    Acceptance E NL,NR  BB 01/18/18 1141 Active    Acceptance E VU,NR Pt. and son educated on role of OT, safe t/f, benefit of therapy, upright posture, progression with poc NN 01/17/18 1046 Done    Nonacceptance E NL  BB 01/15/18 1020 Active    Acceptance E VU  LW 01/14/18 1528 Done    Acceptance E VU,NR Pt. educated on role of OT, safe bed mobility, benefit of OOB activity, progression with poc NN 01/13/18 1036 Done    Acceptance E VU  LW 01/12/18 1236 Done    Acceptance E NR Pt. educated on role of OT, safe t/f, calling for assistance, benefit of therapy, correcting posture, progression with poc NN 01/11/18 0854 Active   Family Acceptance E NR  BB 01/19/18 1351 Active    Acceptance E VU,NR Pt. and son educated on role of OT, safe t/f, benefit of therapy, upright posture, progression with poc NN 01/17/18 1046 Done               Point: Home exercise program (Active)    Description: Instruct learner(s) on appropriate technique for monitoring, assisting and/or progressing therapeutic exercises/activities.    Learning Progress Summary    Learner Readiness Method Response Comment Documented by Status   Patient Acceptance E,TB,D NR Pt was educated safety awareness and home safety.  01/21/18 1210 Active    Acceptance E VU,NR Pt. and son educated on role of OT, safe t/f, benefit of therapy, upright posture, progression with poc NN 01/17/18 1046 Done    Acceptance E VU  LW 01/14/18 1528 Done    Acceptance E VU,NR Pt. educated on role of OT, safe bed mobility, benefit of OOB activity, progression with poc NN 01/13/18 1036 Done    Acceptance E VU  LW 01/12/18 1236 Done    Acceptance E NR Pt. educated on role of OT, safe t/f, calling for assistance, benefit of therapy, correcting posture, progression with poc NN 01/11/18 0854 Active   Family Acceptance E VU,NR Pt. and son educated on role of OT,  safe t/f, benefit of therapy, upright posture, progression with poc NN 01/17/18 1046 Done               Point: Body mechanics (Active)    Description: Instruct learner(s) on proper positioning and spine alignment during self-care, functional mobility activities and/or exercises.    Learning Progress Summary    Learner Readiness Method Response Comment Documented by Status   Patient Acceptance E,TB,D NR Pt was educated safety awareness and home safety. CS 01/21/18 1210 Active    Acceptance E NR   01/19/18 1351 Active    Nonacceptance E NL   01/18/18 1520 Active    Acceptance E NL,NR  BB 01/18/18 1141 Active    Acceptance E VU,NR Pt. and son educated on role of OT, safe t/f, benefit of therapy, upright posture, progression with poc  01/17/18 1046 Done    Nonacceptance E NL   01/15/18 1020 Active    Acceptance E VU  LW 01/14/18 1528 Done    Acceptance E VU,NR Pt. educated on role of OT, safe bed mobility, benefit of OOB activity, progression with poc  01/13/18 1036 Done    Acceptance E VU   01/12/18 1236 Done    Acceptance E NR Pt. educated on role of OT, safe t/f, calling for assistance, benefit of therapy, correcting posture, progression with poc  01/11/18 0854 Active   Family Acceptance E NR   01/19/18 1351 Active    Acceptance E VU,NR Pt. and son educated on role of OT, safe t/f, benefit of therapy, upright posture, progression with poc  01/17/18 1046 Done                      User Key     Initials Effective Dates Name Provider Type Discipline     10/17/16 -  PAMELLA Ngo/L Occupational Therapy Assistant OT     10/17/16 -  PAMELLA Molina/DALTON Occupational Therapy Assistant OT     10/17/16 -  PAMELLA Wade/L Occupational Therapy Assistant OT     11/08/17 -  MAIK Sheffield/L Occupational Therapist OT                  OT Recommendation and Plan  Anticipated Discharge Disposition: home with home health, home with assist, home with 24/7 care  Planned Therapy  Interventions: activity intolerance, ADL retraining, balance training, bed mobility training, energy conservation, home exercise program, strengthening, transfer training  Therapy Frequency:  (3-14x/week)  Plan of Care Review  Plan Of Care Reviewed With: patient  Progress: no change  Outcome Summary/Follow up Plan: Pt tolerated tx fair this date. Pt completed an grooming task. No goals met this tx. Continue POC.        Outcome Measures       01/21/18 1200 01/20/18 1418 01/19/18 1058    How much help from another person do you currently need...    Turning from your back to your side while in flat bed without using bedrails?  3  -CH     Moving from lying on back to sitting on the side of a flat bed without bedrails?  3  -CH     Moving to and from a bed to a chair (including a wheelchair)?  3  -CH     Standing up from a chair using your arms (e.g., wheelchair, bedside chair)?  3  -CH     Climbing 3-5 steps with a railing?  1  -CH     To walk in hospital room?  2  -CH     AM-PAC 6 Clicks Score  15  -CH     How much help from another is currently needed...    Putting on and taking off regular lower body clothing? 2  -CS  2  -BB    Bathing (including washing, rinsing, and drying) 2  -CS  2  -BB    Toileting (which includes using toilet bed pan or urinal) 2  -CS  2  -BB    Putting on and taking off regular upper body clothing 2  -CS  2  -BB    Taking care of personal grooming (such as brushing teeth) 2  -CS  2  -BB    Eating meals 2  -CS  2  -BB    Score 12  -CS  12  -BB    Functional Assessment    Outcome Measure Options AM-PAC 6 Clicks Daily Activity (OT)  -CS AM-PAC 6 Clicks Basic Mobility (PT)  -CH       01/19/18 0845 01/18/18 1327       How much help from another person do you currently need...    Turning from your back to your side while in flat bed without using bedrails? 3  -CH      Moving from lying on back to sitting on the side of a flat bed without bedrails? 2  -CH      Moving to and from a bed to a chair  (including a wheelchair)? 2  -CH      Standing up from a chair using your arms (e.g., wheelchair, bedside chair)? 2  -CH      Climbing 3-5 steps with a railing? 1  -CH      To walk in hospital room? 2  -CH      AM-PAC 6 Clicks Score 12  -CH      How much help from another is currently needed...    Putting on and taking off regular lower body clothing?  2  -BB     Bathing (including washing, rinsing, and drying)  2  -BB     Toileting (which includes using toilet bed pan or urinal)  2  -BB     Putting on and taking off regular upper body clothing  3  -BB     Taking care of personal grooming (such as brushing teeth)  3  -BB     Eating meals  3  -BB     Score  15  -BB     Functional Assessment    Outcome Measure Options AM-PAC 6 Clicks Basic Mobility (PT)  -        User Key  (r) = Recorded By, (t) = Taken By, (c) = Cosigned By    Initials Name Provider Type     Nichelle Rahman, PTA Physical Therapy Assistant    BB PAMELLA Ngo/L Occupational Therapy Assistant     KINGSLEY Molina Occupational Therapy Assistant           Time Calculation:         Time Calculation- OT       01/21/18 1213          Time Calculation- OT    OT Start Time 0955  -CS      OT Stop Time 1053  -CS      OT Time Calculation (min) 58 min  -CS      Total Timed Code Minutes- OT 58 minute(s)  -      OT Received On 01/21/18  -        User Key  (r) = Recorded By, (t) = Taken By, (c) = Cosigned By    Initials Name Provider Type     PAMELLA Molina/DALOTN Occupational Therapy Assistant           Therapy Charges for Today     Code Description Service Date Service Provider Modifiers Qty    33796003821  OT SELF CARE/MGMT/TRAIN EA 15 MIN 1/21/2018 PAMELLA Molina/L GO 3    [id number]  OT THERAPEUTIC ACT EA 15 MIN 1/21/2018 KINGSLEY Molina GO 1          OT G-codes  OT Functional Scales Options: AM-PAC 6 Clicks Daily Activity (OT)  Functional Limitation: Self care  Self Care Current Status (): At least  40 percent but less than 60 percent impaired, limited or restricted  Self Care Goal Status (): At least 20 percent but less than 40 percent impaired, limited or restricted    PAMELLA Molina/DALTON  1/21/2018

## 2018-01-22 VITALS
BODY MASS INDEX: 23.01 KG/M2 | OXYGEN SATURATION: 98 % | DIASTOLIC BLOOD PRESSURE: 68 MMHG | SYSTOLIC BLOOD PRESSURE: 112 MMHG | HEIGHT: 64 IN | HEART RATE: 77 BPM | WEIGHT: 134.8 LBS | TEMPERATURE: 98.2 F | RESPIRATION RATE: 16 BRPM

## 2018-01-22 PROBLEM — E87.6 HYPOKALEMIA: Status: RESOLVED | Noted: 2018-01-10 | Resolved: 2018-01-22

## 2018-01-22 LAB
ALBUMIN SERPL-MCNC: 2.4 G/DL (ref 3.4–4.8)
ALBUMIN/GLOB SERPL: 0.9 G/DL (ref 1.1–1.8)
ALP SERPL-CCNC: 107 U/L (ref 38–126)
ALT SERPL W P-5'-P-CCNC: 34 U/L (ref 9–52)
ANION GAP SERPL CALCULATED.3IONS-SCNC: 5 MMOL/L (ref 5–15)
AST SERPL-CCNC: 27 U/L (ref 14–36)
BASOPHILS # BLD AUTO: 0.01 10*3/MM3 (ref 0–0.2)
BASOPHILS NFR BLD AUTO: 0.1 % (ref 0–2)
BILIRUB SERPL-MCNC: 0.3 MG/DL (ref 0.2–1.3)
BUN BLD-MCNC: 11 MG/DL (ref 7–21)
BUN/CREAT SERPL: 16.7 (ref 7–25)
CALCIUM SPEC-SCNC: 8.3 MG/DL (ref 8.4–10.2)
CHLORIDE SERPL-SCNC: 103 MMOL/L (ref 95–110)
CO2 SERPL-SCNC: 25 MMOL/L (ref 22–31)
CREAT BLD-MCNC: 0.66 MG/DL (ref 0.5–1)
CRP SERPL-MCNC: 5.2 MG/DL (ref 0–1)
DEPRECATED RDW RBC AUTO: 55.6 FL (ref 36.4–46.3)
EOSINOPHIL # BLD AUTO: 0.21 10*3/MM3 (ref 0–0.7)
EOSINOPHIL NFR BLD AUTO: 2.3 % (ref 0–7)
ERYTHROCYTE [DISTWIDTH] IN BLOOD BY AUTOMATED COUNT: 17.9 % (ref 11.5–14.5)
GFR SERPL CREATININE-BSD FRML MDRD: 90 ML/MIN/1.73 (ref 45–104)
GLOBULIN UR ELPH-MCNC: 2.7 GM/DL (ref 2.3–3.5)
GLUCOSE BLD-MCNC: 87 MG/DL (ref 60–100)
HCT VFR BLD AUTO: 29.7 % (ref 35–45)
HGB BLD-MCNC: 9 G/DL (ref 12–15.5)
IMM GRANULOCYTES # BLD: 0.03 10*3/MM3 (ref 0–0.02)
IMM GRANULOCYTES NFR BLD: 0.3 % (ref 0–0.5)
LYMPHOCYTES # BLD AUTO: 1.52 10*3/MM3 (ref 0.6–4.2)
LYMPHOCYTES NFR BLD AUTO: 16.8 % (ref 10–50)
MCH RBC QN AUTO: 26.5 PG (ref 26.5–34)
MCHC RBC AUTO-ENTMCNC: 30.3 G/DL (ref 31.4–36)
MCV RBC AUTO: 87.4 FL (ref 80–98)
MONOCYTES # BLD AUTO: 0.89 10*3/MM3 (ref 0–0.9)
MONOCYTES NFR BLD AUTO: 9.8 % (ref 0–12)
NEUTROPHILS # BLD AUTO: 6.38 10*3/MM3 (ref 2–8.6)
NEUTROPHILS NFR BLD AUTO: 70.7 % (ref 37–80)
NRBC BLD MANUAL-RTO: 0 /100 WBC (ref 0–0)
PLATELET # BLD AUTO: 284 10*3/MM3 (ref 150–450)
PMV BLD AUTO: 9 FL (ref 8–12)
POTASSIUM BLD-SCNC: 4.6 MMOL/L (ref 3.5–5.1)
PROT SERPL-MCNC: 5.1 G/DL (ref 6.3–8.6)
RBC # BLD AUTO: 3.4 10*6/MM3 (ref 3.77–5.16)
SODIUM BLD-SCNC: 133 MMOL/L (ref 137–145)
WBC NRBC COR # BLD: 9.04 10*3/MM3 (ref 3.2–9.8)

## 2018-01-22 PROCEDURE — 80053 COMPREHEN METABOLIC PANEL: CPT | Performed by: FAMILY MEDICINE

## 2018-01-22 PROCEDURE — 86140 C-REACTIVE PROTEIN: CPT | Performed by: FAMILY MEDICINE

## 2018-01-22 PROCEDURE — 99239 HOSP IP/OBS DSCHRG MGMT >30: CPT | Performed by: FAMILY MEDICINE

## 2018-01-22 PROCEDURE — 97535 SELF CARE MNGMENT TRAINING: CPT

## 2018-01-22 PROCEDURE — 85025 COMPLETE CBC W/AUTO DIFF WBC: CPT | Performed by: FAMILY MEDICINE

## 2018-01-22 PROCEDURE — 94760 N-INVAS EAR/PLS OXIMETRY 1: CPT

## 2018-01-22 PROCEDURE — 94799 UNLISTED PULMONARY SVC/PX: CPT

## 2018-01-22 PROCEDURE — 97116 GAIT TRAINING THERAPY: CPT

## 2018-01-22 PROCEDURE — 97110 THERAPEUTIC EXERCISES: CPT

## 2018-01-22 RX ORDER — METOPROLOL SUCCINATE 25 MG/1
25 TABLET, EXTENDED RELEASE ORAL
Status: CANCELLED
Start: 2018-01-23

## 2018-01-22 RX ORDER — DOXYCYCLINE HYCLATE 100 MG/1
100 CAPSULE ORAL 2 TIMES DAILY
Status: CANCELLED
Start: 2018-01-22 | End: 2018-01-27

## 2018-01-22 RX ORDER — RISPERIDONE 1 MG/1
1 TABLET ORAL NIGHTLY
Start: 2018-01-22 | End: 2018-03-16 | Stop reason: SDUPTHER

## 2018-01-22 RX ORDER — HYDROCODONE BITARTRATE AND ACETAMINOPHEN 5; 325 MG/1; MG/1
1 TABLET ORAL EVERY 8 HOURS PRN
Qty: 21 TABLET | Refills: 0 | Status: SHIPPED | OUTPATIENT
Start: 2018-01-22 | End: 2018-01-29

## 2018-01-22 RX ORDER — ONDANSETRON 4 MG/1
4 TABLET, FILM COATED ORAL EVERY 6 HOURS PRN
Status: CANCELLED
Start: 2018-01-22

## 2018-01-22 RX ORDER — FOLIC ACID 1 MG/1
1 TABLET ORAL DAILY
Start: 2018-01-23 | End: 2018-03-16 | Stop reason: SDUPTHER

## 2018-01-22 RX ORDER — DOXYCYCLINE HYCLATE 100 MG/1
100 CAPSULE ORAL 2 TIMES DAILY
Qty: 10 CAPSULE | Refills: 0 | Status: SHIPPED | OUTPATIENT
Start: 2018-01-22 | End: 2018-01-27

## 2018-01-22 RX ORDER — NICOTINE 21 MG/24HR
1 PATCH, TRANSDERMAL 24 HOURS TRANSDERMAL EVERY 24 HOURS
Start: 2018-01-22 | End: 2018-02-01 | Stop reason: SDUPTHER

## 2018-01-22 RX ORDER — RISPERIDONE 1 MG/1
1 TABLET ORAL
Status: CANCELLED
Start: 2018-01-22

## 2018-01-22 RX ORDER — ONDANSETRON 4 MG/1
4 TABLET, FILM COATED ORAL EVERY 6 HOURS PRN
Start: 2018-01-22 | End: 2018-09-10 | Stop reason: SDUPTHER

## 2018-01-22 RX ORDER — NICOTINE 21 MG/24HR
1 PATCH, TRANSDERMAL 24 HOURS TRANSDERMAL EVERY 24 HOURS
Status: CANCELLED
Start: 2018-01-22

## 2018-01-22 RX ORDER — ACETAMINOPHEN 325 MG/1
650 TABLET ORAL EVERY 4 HOURS PRN
Start: 2018-01-22 | End: 2018-09-10 | Stop reason: SDUPTHER

## 2018-01-22 RX ORDER — FERROUS SULFATE TAB EC 324 MG (65 MG FE EQUIVALENT) 324 (65 FE) MG
324 TABLET DELAYED RESPONSE ORAL 2 TIMES DAILY WITH MEALS
Qty: 30 TABLET
Start: 2018-01-22 | End: 2018-04-04 | Stop reason: SDUPTHER

## 2018-01-22 RX ORDER — FUROSEMIDE 20 MG/1
20 TABLET ORAL EVERY OTHER DAY
Qty: 15 TABLET | Refills: 3
Start: 2018-01-22 | End: 2018-04-04 | Stop reason: SDUPTHER

## 2018-01-22 RX ORDER — ACETAMINOPHEN 325 MG/1
650 TABLET ORAL EVERY 4 HOURS PRN
Status: CANCELLED
Start: 2018-01-22

## 2018-01-22 RX ORDER — LOSARTAN POTASSIUM 50 MG/1
50 TABLET ORAL
Status: CANCELLED
Start: 2018-01-23

## 2018-01-22 RX ORDER — IPRATROPIUM BROMIDE AND ALBUTEROL SULFATE 2.5; .5 MG/3ML; MG/3ML
3 SOLUTION RESPIRATORY (INHALATION)
Qty: 360 ML
Start: 2018-01-22 | End: 2018-02-01 | Stop reason: ALTCHOICE

## 2018-01-22 RX ORDER — POTASSIUM CHLORIDE 750 MG/1
20 CAPSULE, EXTENDED RELEASE ORAL DAILY
Start: 2018-01-22 | End: 2018-03-16 | Stop reason: SDUPTHER

## 2018-01-22 RX ORDER — BISACODYL 5 MG/1
5 TABLET, DELAYED RELEASE ORAL DAILY PRN
Start: 2018-01-22 | End: 2018-09-10 | Stop reason: SDUPTHER

## 2018-01-22 RX ORDER — BISACODYL 5 MG/1
5 TABLET, DELAYED RELEASE ORAL DAILY PRN
Status: CANCELLED
Start: 2018-01-22

## 2018-01-22 RX ORDER — POTASSIUM CHLORIDE 750 MG/1
20 CAPSULE, EXTENDED RELEASE ORAL DAILY
Status: CANCELLED
Start: 2018-01-22

## 2018-01-22 RX ORDER — METOPROLOL SUCCINATE 25 MG/1
25 TABLET, EXTENDED RELEASE ORAL
Start: 2018-01-23 | End: 2018-03-16 | Stop reason: SDUPTHER

## 2018-01-22 RX ORDER — FOLIC ACID 1 MG/1
1 TABLET ORAL DAILY
Status: CANCELLED
Start: 2018-01-23

## 2018-01-22 RX ORDER — FERROUS SULFATE TAB EC 324 MG (65 MG FE EQUIVALENT) 324 (65 FE) MG
324 TABLET DELAYED RESPONSE ORAL 2 TIMES DAILY WITH MEALS
Qty: 30 TABLET | Status: CANCELLED
Start: 2018-01-22

## 2018-01-22 RX ORDER — HYDROCODONE BITARTRATE AND ACETAMINOPHEN 5; 325 MG/1; MG/1
1 TABLET ORAL EVERY 8 HOURS PRN
Qty: 21 TABLET | Refills: 0 | Status: CANCELLED | OUTPATIENT
Start: 2018-01-22 | End: 2018-01-29

## 2018-01-22 RX ORDER — LOSARTAN POTASSIUM 50 MG/1
50 TABLET ORAL
Start: 2018-01-23 | End: 2018-03-16 | Stop reason: SDUPTHER

## 2018-01-22 RX ORDER — IPRATROPIUM BROMIDE AND ALBUTEROL SULFATE 2.5; .5 MG/3ML; MG/3ML
3 SOLUTION RESPIRATORY (INHALATION)
Qty: 360 ML | Status: CANCELLED
Start: 2018-01-22

## 2018-01-22 RX ADMIN — PHENAZOPYRIDINE HYDROCHLORIDE 100 MG: 100 TABLET ORAL at 08:35

## 2018-01-22 RX ADMIN — PHENAZOPYRIDINE HYDROCHLORIDE 100 MG: 100 TABLET ORAL at 11:42

## 2018-01-22 RX ADMIN — FAMOTIDINE 40 MG: 20 TABLET, FILM COATED ORAL at 08:35

## 2018-01-22 RX ADMIN — LIDOCAINE HYDROCHLORIDE 5 ML: 20 SOLUTION ORAL; TOPICAL at 08:37

## 2018-01-22 RX ADMIN — IPRATROPIUM BROMIDE AND ALBUTEROL SULFATE 3 ML: 2.5; .5 SOLUTION RESPIRATORY (INHALATION) at 11:13

## 2018-01-22 RX ADMIN — FOLIC ACID 1 MG: 1 TABLET ORAL at 08:34

## 2018-01-22 RX ADMIN — DOXYCYCLINE 100 MG: 100 INJECTION, POWDER, LYOPHILIZED, FOR SOLUTION INTRAVENOUS at 10:24

## 2018-01-22 RX ADMIN — FERROUS SULFATE TAB EC 324 MG (65 MG FE EQUIVALENT) 324 MG: 324 (65 FE) TABLET DELAYED RESPONSE at 08:35

## 2018-01-22 RX ADMIN — IPRATROPIUM BROMIDE AND ALBUTEROL SULFATE 3 ML: 2.5; .5 SOLUTION RESPIRATORY (INHALATION) at 15:34

## 2018-01-22 RX ADMIN — NICOTINE 1 PATCH: 21 PATCH TRANSDERMAL at 15:37

## 2018-01-22 RX ADMIN — ASPIRIN 81 MG 81 MG: 81 TABLET ORAL at 08:34

## 2018-01-22 RX ADMIN — IPRATROPIUM BROMIDE AND ALBUTEROL SULFATE 3 ML: 2.5; .5 SOLUTION RESPIRATORY (INHALATION) at 06:57

## 2018-01-22 RX ADMIN — FLUOXETINE 60 MG: 20 CAPSULE ORAL at 08:35

## 2018-01-22 RX ADMIN — POTASSIUM CHLORIDE 20 MEQ: 750 CAPSULE, EXTENDED RELEASE ORAL at 08:35

## 2018-01-22 NOTE — PLAN OF CARE
Problem: Patient Care Overview (Adult)  Goal: Plan of Care Review  Outcome: Ongoing (interventions implemented as appropriate)   01/22/18 1115   Coping/Psychosocial Response Interventions   Plan Of Care Reviewed With patient   Outcome Evaluation   Outcome Summary/Follow up Plan No new goals met this tx. Pt continues to be confused and unsteady with mobility, pt requires Min A for gait and presents with posterior lean. Pt progressing slowly towards unmet goals and will continue to benefit from skilled therapy services within SNF upon D/C     Goal: Discharge Needs Assessment  Outcome: Ongoing (interventions implemented as appropriate)   01/11/18 0855 01/14/18 1821 01/15/18 1305   Discharge Needs Assessment   Concerns To Be Addressed --  no discharge needs identified --    Equipment Needed After Discharge (to be determined) --  --    Discharge Facility/Level Of Care Needs --  --  nursing facility, skilled   Current Discharge Risk --  --  lives alone;other (see comments)  (confusion)   Discharge Disposition still a patient --  --    Current Health   Outpatient/Agency/Support Group Needs homecare agency (specify level of care) --  --    Living Environment   Transportation Available --  --  car;family or friend will provide  (Up until hospitalization, patient was driving)   Self-Care   Equipment Currently Used at Home none  (has a cane at home if needed) --  --        Problem: Inpatient Physical Therapy  Goal: Bed Mobility Goal STG- PT  Outcome: Ongoing (interventions implemented as appropriate)   01/11/18 0855 01/16/18 1253 01/22/18 1115   Bed Mobility PT STG   Bed Mobility PT STG, Date Established --  01/16/18 --    Bed Mobility PT STG, Time to Achieve 2 - 3 days --  --    Bed Mobility PT STG, Activity Type supine to sit/sit to supine --  --    Bed Mobility PT STG, [place] Level independent --  --    Bed Mobility PT STG, Additional Goal HOB down and no rails --  --    Bed Mobility PT STG, Date Goal Reviewed --  --   01/22/18   Bed Mobility PT STG, Outcome --  --  goal ongoing     Goal: Transfer Training Goal 1 STG- PT  Outcome: Ongoing (interventions implemented as appropriate)   01/11/18 0855 01/22/18 1115   Transfer Training PT STG   Transfer Training PT STG, Date Established 01/11/18 --    Transfer Training PT STG, Time to Achieve 2 - 3 days --    Transfer Training PT STG, Activity Type bed to chair /chair to bed;sit to stand/stand to sit --    Transfer Training PT STG, [place] Level supervision required --    Transfer Training PT STG, Assist Device (least restrictive) --    Transfer Training PT STG, Date Goal Reviewed --  01/22/18   Transfer Training PT STG, Outcome --  goal ongoing     Goal: Gait Training Goal LTG- PT  Outcome: Ongoing (interventions implemented as appropriate)   01/11/18 0855 01/22/18 1115   Gait Training PT LTG   Gait Training Goal PT LTG, Date Established 01/11/18 --    Gait Training Goal PT LTG, Time to Achieve by discharge --    Gait Training Goal PT LTG, [place] Level conditional independence --    Gait Training Goal PT LTG, Assist Device (least restrictive assistive device) --    Gait Training Goal PT LTG, Distance to Achieve 200 feet --    Gait Training Goal PT LTG, Date Goal Reviewed --  01/22/18   Gait Training Goal PT LTG, Outcome --  goal ongoing     Goal: Strength Goal LTG- PT  Outcome: Ongoing (interventions implemented as appropriate)   01/11/18 0855 01/22/18 1115   Strength Goal PT LTG   Strength Goal PT LTG, Date Established 01/11/18 --    Strength Goal PT LTG, Time to Achieve 4 days --    Strength Goal PT LTG, Measure to Achieve 20 reps all ex BLE actively sitting/ standing --    Strength Goal PT LTG, Date Goal Reviewed --  01/22/18   Strength Goal PT LTG, Outcome --  goal partially met

## 2018-01-22 NOTE — DISCHARGE SUMMARY
DISCHARGE SUMMARY       Date of Admission: 1/10/2018  Date of Discharge:  1/22/2018  Primary Care Physician: Coral Berry MD    Presenting Problem/History of Present Illness:  Hypokalemia [E87.6]  Fall, initial encounter [W19.XXXA]  Contusion of scalp, initial encounter [S00.03XA]  Pneumonia of left lung due to infectious organism, unspecified part of lung [J18.9]       Final Discharge Diagnoses:  Active Hospital Problems (** Indicates Principal Problem)    Diagnosis Date Noted   • **Pneumonia of left lower lobe due to Streptococcus pneumoniae [J13] 01/11/2018     On Levaquin with falling WBC and no fever.     • Closed fracture of multiple ribs of right side [S22.41XA] 01/19/2018   • Acute retention of urine [R33.8] 01/19/2018   • Bacterial cystitis [N30.80] 01/18/2018   • Oral thrush [B37.0] 01/17/2018   • Laryngitis, acute [J04.0] 01/17/2018   • Dysuria [R30.0] 01/17/2018   • Hypoxic encephalopathy [G93.1] 01/16/2018     Patient still requiring nasal cannula or venturi mask.  Last ABG on NC still showed significant hypoxia     • Rheumatic mitral stenosis [I05.0] 01/15/2018     Cards Consultation     • Acute diastolic CHF (congestive heart failure) [I50.31] 01/15/2018     Lasix  Cards consultation       • Encephalopathy, metabolic [G93.41] 01/15/2018     Replace electrolytes   Replace Folic Acid     • Pulmonary hypertension due to left heart valvular disease [I27.22, I38] 01/15/2018   • Cor pulmonale, chronic [I27.81] 01/15/2018   • Folic acid deficiency [E53.8] 01/15/2018     Replace orally     • Pulmonary hypertension [I27.20] 01/14/2018     Cards appreciated.      • Rheumatic tricuspid valve regurgitation [I07.1] 01/14/2018   • Hyponatremia [E87.1] 01/13/2018     Fluctuating  Monitor closely     • Cerebral microvascular disease [I67.9] 01/13/2018     Per CT head  Patient more confused today with slurring of speech. Have to consider acute CVA.   Will obtain MRI brain.      • Hypokalemia [E87.6]  01/10/2018     Replace Orally and IV  Also replace magnesium       • Weakness [R53.1] 01/10/2018     PT/OT     • Acute respiratory failure with hypoxia [J96.01] 01/10/2018     NC oxygen, steroids, and neb treatements     • Fall with injury [W19.XXXA] 08/15/2017     PT/OT       • Tobacco dependence [F17.200]      Nicotine patch 21mg transdermally     • Gastroesophageal reflux disease [K21.9]      Pepcid     • Iron deficiency anemia [D50.9]      Continue feosol        Resolved Hospital Problems    Diagnosis Date Noted Date Resolved   • Anemia [D64.9] 01/14/2018 01/15/2018     Start iron supplementation.  Obtain iron studies.      • Elevated brain natriuretic peptide (BNP) level [R79.89] 01/14/2018 01/15/2018     IVF's discontinued, Fluid Restrictions, Low Na diet. Echocardiogram ordered. Will start Lasix 20 mg po bid and monitor.     • Elevated troponin [R74.8] 01/14/2018 01/15/2018     Most likely secondary to elevated BNP. No chest pain or anginal equivalent. Will monitor.     • Family dynamics problem [Z63.9] 01/14/2018 01/16/2018     Will discuss with case case management.      • Positive D dimer [R79.89] 01/14/2018 01/15/2018     Most likely elevated due to pneumonia and elevated BNP. No clinical signs of PE.     • Hyperkalemia [E87.5] 01/13/2018 01/14/2018     Decrease KCL and monitor     • Community acquired bacterial pneumonia [J15.9] 01/10/2018 01/11/2018     Left lower lobe  Blood culture, sputum culture, check atypicals.   Levaquin as PCN allergy     • Hyponatremia [E87.1] 01/10/2018 01/11/2018     IV fluids     • Essential hypertension [I10]  01/14/2018     Losartan, coreg     • Depressive disorder [F32.9]  01/14/2018     Prozac, Elavil, and Risperdal         Consults:   Consults     Date and Time Order Name Status Description    1/15/2018 0735 Inpatient Consult to Cardiology Completed     1/10/2018 1209 Family Practice - Faculty (on-call MD unless specified)            Procedures Performed:                  Pertinent Test Results:   Narrative:          [facility]    Patient Name: KAROL DOUGLAS    ORDERING: ANTONY WAITE    ATTENDING: ANTONY WAITE     REFERRING: ANTONY WAITE    -----------------------    EXAM DESCRIPTION: CT ANGIOGRAM CHEST W CONTRAST    CLINICAL HISTORY:  chest pain, shortness of breath, E87.6  Hypokalemia J18.9 Pneumonia, unspecified organism W19.XXXA  Unspecified fall, initial encounter S00.03XA Contusion of scalp,  initial encounter Z78.9 Other specified health status Z74.09  Other reduced mobility       COMPARISON: Chest radiograph 1/19/2018    TECHNIQUE:   Axial images were obtained and multiplanar reconstructions were  made.    This exam was performed according to our departmental dose  optimization program, which includes automated exposure control,  adjustment of the mA and/or KV according to patient size and/or  use of iterative reconstruction technique.  Dose Length Product 182.50  CONTRAST:   79 cc intravenous Isovue 370    FINDINGS:  Diagnostic quality: Adequate .  Pulmonary embolism: No evidence of pulmonary embolism.  Pulmonary arteries:  Normal in caliber.  Lung parenchyma: There is a background of COPD with superimposed  interstitial prominence which is felt to represent edema  superimposing chronic interstitial markings. There is dense  consolidation within both lower lobes within the bases. This may  represent atelectasis and/or consolidating pneumonia. There is  areas of consolidation within the right middle lobe and lingula  at the base. There is otherwise some hazy patchy groundglass  opacities which likely represents edema and/or pneumonitis. There  is small bilateral pleural effusions. No pneumothorax.  Central airways: Patent.  Adenopathy: There is enlarged left superior the mediastinal lymph  nodes along the left lateral margin of the arch measuring up to  1.8 cm, likely reactive. No necrotic or conglomerate adenopathy.  Heart and great vessels: There  is mild cardiac enlargement. No  significant pericardial effusion. There is coronary vascular  calcification. There is vascular calcification involving the  thoracic aorta. No aneurysmal dilation.   Upper abdomen: No acute finding.    Bones: No compression fracture or subluxation. There is evidence  of right fifth-seventh posterior rib fractures which appear  acute-subacute without healing.    Additional findings: Evidence of bilateral breast implants.         Impression:         No CT evidence of pulmonary embolism.    Background of COPD with superimposed interstitial /pulmonary  edema. There is bilateral lower lobe basilar consolidation which  may represent atelectasis and/or pneumonia. Smaller areas of  consolidation present within the right middle lobe and lingula at  the base. There is bilateral pleural effusions.    Likely reactive hilar and mediastinal lymph nodes and no necrotic  or conglomerate adenopathy.    Acute-subacute right fifth-seventh posterior rib fractures.    Electronically signed by:  Karlo Matos MD  1/19/2018 3:44 PM  CST Workstation: [id number]      Study Result   MRI brain without contrast.         CLINICAL INDICATION: Left-sided facial droop. Slurred speech.            COMPARISON: None     PROCEDURE/TECHNIQUE:    MRI of the brain was performed utilizing the standard protocol  without the administration of gadolinium.     FINDINGS: Diffusion-weighted images demonstrate no restricted  diffusion, i.e., no evidence of acute stroke. Involutional,  atrophic changes. Periventricular foci of increased signal  intensity, chronic small vessel ischemic changes.    The gyri and sulci are otherwise unremarkable, bilaterally  symmetric consistent with patient's age.  No mass lesions,  hydrocephalus, midline shift, intracranial hemorrhage, or  abnormal intra- or extra-axial fluid collections are identified.   The brainstem and sellar and parasellar structures are without  abnormalities.  The orbits,  sinuses and mastoid air cells are  unremarkable.  The visualized intracranial vessels show normal  signal void.     IMPRESSION:  CONCLUSION: Involutional, atrophic changes.     Periventricular foci of increased signal intensity, chronic small  vessel ischemic changes.     Otherwise unremarkable MRI of the brain without contrast.         Electronically signed by:  Niles Zaidi MD  1/15/2018 9:54 AM CST  Workstation: [id number]   Imaging   MRI Brain Without Contrast (Order #[id number]) on 1/15/2018 - Imaging Information         Chief Complaint on Day of Discharge: Pneumonia of left lower lobe due to Streptococcus pneumoniae    Hospital Course:  The patient is a 62 y.o. female who presented to [facility] after a fall in the parking lot.  She was found to have pneumonia, hypokalemia, hyponatremia, and acute hypoxic respiratory failure.  She was having altered mental status as well.  She was found to have Strep pneumonia and treated with Levaquin.  She was restarted on some of her medications she hadn't taken for a while, mainly the Elavil and it made her lethargic.  She became more confused and was thought to have had a stroke.  The MRI was negative for acute CVA.  She wasn't eating well and found to have thrush and stomatitis, started on Magic mouthwash with nystatin.  She also had dysuria so repeat urine obtained.  Discovered on 1/17 that she wasn't taking the Elavil so it was tapered down and stopped.  Her mentation slowly improved.  She went into acute urinary retention and a hutchins was anchored with 1200 cc drained immediately.  CTA was ordered on 1/19 due to worsening breathing and she was found to have multiple rib fractures, fluid, pneumonia, but no clots.  She was given Ensure due to poor appetite.  Therapy was working with her and she was felt needed to have more rehab.  She was accepted to [facility] and she was discharged there for further rehab.  Dr. Yeboah will follow her there with an  "attending.  She is advised on smoking cessation.      Condition on Discharge:  Stable  Review of Systems   Constitutional: Positive for activity change, appetite change and fatigue.   HENT: Positive for congestion, mouth sores, sore throat, trouble swallowing and voice change.    Respiratory: Positive for cough and shortness of breath.    Cardiovascular: Negative for chest pain.   Gastrointestinal: Negative for abdominal pain, diarrhea, nausea and vomiting.   Genitourinary: Positive for difficulty urinating and dysuria.   Musculoskeletal: Positive for arthralgias and back pain.   Psychiatric/Behavioral: Positive for confusion.       Physical Exam on Discharge:  /68 (BP Location: Left arm, Patient Position: Sitting)  Pulse 77  Temp 98.2 °F (36.8 °C) (Temporal Artery )   Resp 16  Ht 162.6 cm (64\")  Wt 61.1 kg (134 lb 12.8 oz)  SpO2 95%  BMI 23.14 kg/m2  Physical Exam   Constitutional: She is oriented to person, place, and time. She appears well-developed and well-nourished. No distress.   Eyes: Conjunctivae and EOM are normal. Pupils are equal, round, and reactive to light. Right eye exhibits no discharge. Left eye exhibits no discharge. No scleral icterus.   Neck: No JVD present.   Cardiovascular: Normal rate, regular rhythm and intact distal pulses.  Exam reveals no gallop and no friction rub.    Murmur heard.  2/6 systolic murmur   Pulmonary/Chest: Effort normal. No stridor. No respiratory distress. She has wheezes. She has no rales.   Scattered wheezes and rhonchi   Abdominal: Soft. Bowel sounds are normal. She exhibits no distension. There is no tenderness. There is no rebound and no guarding.   Neurological: She is alert and oriented to person, place, and time.   Skin: She is not diaphoretic.   Nursing note and vitals reviewed.        Discharge Disposition: Nursing Home      Discharge Medications:   Michelle Child   Home Medication Instructions ANDREW:[id number]    Printed on:01/22/18 1207 "   Medication Information                      albuterol (PROVENTIL HFA;VENTOLIN HFA) 108 (90 BASE) MCG/ACT inhaler  Inhale 2 puffs Every 4 (Four) Hours As Needed for wheezing.             amitriptyline (ELAVIL) 100 MG tablet  Take 1 tablet by mouth Every Night.             aspirin 81 MG chewable tablet  Chew 1 tablet Daily.             carvedilol (COREG) 3.125 MG tablet  TAKE 1 TABLET BY MOUTH TWICE A DAY.             FLUoxetine (PROzac) 20 MG capsule  TAKE 3 CAPSULES BY MOUTH ONCE DAILY             furosemide (LASIX) 20 MG tablet  TAKE 1/2 TABLET BY MOUTH ONCE DAILY AS NEEDED FOR SWELLING.             losartan (COZAAR) 100 MG tablet  TAKE ONE TABLET BY MOUTH ONCE DAILY             naproxen (NAPROSYN) 500 MG tablet  Take 1 tablet by mouth 2 (Two) Times a Day.             omeprazole (priLOSEC) 40 MG capsule  Take 1 capsule by mouth Daily.             risperiDONE (risperDAL) 1 MG tablet  Take 1 tablet by mouth Daily.                 Discharge Diet: cardiac    Activity at Discharge: as tolerated, aspiration and fall precautions      Special Instructions: Patient instructed to call MD or return to ED with worsening shortness of breath, chest pain, fever greater than 100.4 degrees F or any other medical concerns patient may have.   Follow-up Appointments:   Follow-up Information     Follow up with Coral Berry MD .    Specialty:  Family Medicine    Contact information:    [address] [place] [address] [address] [place] [address] [phone]          Follow up with Michele Rivera MD .    Specialty:  Cardiology    Contact information:    [address] [place] [address] [address] [place] [address] [phone]          Follow up with [facility] .    Specialties:  Skilled Nursing Facility, Intermediate Care Facility    Contact information:    [address] [place] [id number] [phone]        No future appointments.              This document has been electronically signed by Bridget Ortega MD on January  22, 2018 12:07 PM          This document has been electronically signed by Bridget Ortega MD on January 22, 2018 12:07 PM     Time: 40 min

## 2018-01-22 NOTE — NURSING NOTE
Called report to [facility].  EMS scheduled to arrive at 1400.

## 2018-01-22 NOTE — THERAPY TREATMENT NOTE
Acute Care - Occupational Therapy Treatment Note  [facility] [place]     Patient Name: Michelle Child  : 1955  MRN: [id number]  Today's Date: 2018  Onset of Illness/Injury or Date of Surgery Date: 01/10/18  Date of Referral to OT: 01/10/18  Referring Physician: Dr Berry      Admit Date: 1/10/2018    Visit Dx:     ICD-10-CM ICD-9-CM   1. Hypokalemia E87.6 276.8   2. Pneumonia of left lung due to infectious organism, unspecified part of lung J18.9 486   3. Fall, initial encounter W19.XXXA E888.9   4. Contusion of scalp, initial encounter S00.03XA 920   5. Decreased activities of daily living (ADL) Z78.9 V49.89   6. Impaired physical mobility Z74.09 781.99     Patient Active Problem List   Diagnosis   • Tobacco dependence   • Migraine   • Iron deficiency anemia   • Gastroesophageal reflux disease   • Coronary arteriosclerosis   • Bilateral pseudophakia   • Astigmatism   • Right humeral fracture   • Fall with injury   • Closed fracture of humerus   • Cause of injury, fall   • Closed fracture of proximal end of right humerus with routine healing   • Weakness   • Acute respiratory failure with hypoxia   • Pneumonia of left lower lobe due to Streptococcus pneumoniae   • Hyponatremia   • Cerebral microvascular disease   • Pulmonary hypertension   • Rheumatic tricuspid valve regurgitation   • Rheumatic mitral stenosis   • Acute diastolic CHF (congestive heart failure)   • Encephalopathy, metabolic   • Pulmonary hypertension due to left heart valvular disease   • Cor pulmonale, chronic   • Folic acid deficiency   • Hypoxic encephalopathy   • Oral thrush   • Laryngitis, acute   • Dysuria   • Bacterial cystitis   • Closed fracture of multiple ribs of right side   • Acute retention of urine             Adult Rehabilitation Note       18 0846 18 0720 18 0955    Rehab Assessment/Intervention    Discipline physical therapy assistant  -CH occupational therapy assistant  -BB occupational therapy  assistant  -CS    Document Type therapy note (daily note)  -CH therapy note (daily note)  -BB therapy note (daily note)  -CS    Subjective Information agree to therapy  -CH agree to therapy  -BB agree to therapy  -CS    Patient Effort, Rehab Treatment fair  -CH fair  -BB fair  -CS    Symptoms Noted During/After Treatment  none  -BB     Precautions/Limitations fall precautions;oxygen therapy device and L/min  -CH fall precautions;oxygen therapy device and L/min  -BB fall precautions;oxygen therapy device and L/min  -CS    Recorded by [CH] Nichelle Rahman PTA [BB] JUDE NgoA/DALTON [CS] JUDE MolinaA/L    Vital Signs    Pre Systolic BP Rehab 120  -  -BB     Pre Treatment Diastolic BP 57  -CH 56  -BB     Intra Systolic BP Rehab 101   EOB, post gait 114/57  -CH      Intra Treatment Diastolic BP 53  -CH      Post Systolic BP Rehab 123  -CH  148  -CS    Post Treatment Diastolic BP 57  -CH  66  -CS    Pretreatment Heart Rate (beats/min) 82  -CH 78  -BB 82  -CS    Posttreatment Heart Rate (beats/min) 80  -CH 82  -BB 84  -CS    Pre SpO2 (%) 94  -CH 96  -BB 99  -CS    O2 Delivery Pre Treatment supplemental O2  -CH supplemental O2  -BB supplemental O2  -CS    Post SpO2 (%) 96  -CH 96  -BB 99  -CS    O2 Delivery Post Treatment supplemental O2  -CH supplemental O2  -BB supplemental O2  -CS    Pre Patient Position Supine  -CH Supine  -BB Supine  -CS    Intra Patient Position Sitting  -CH  Sitting  -CS    Post Patient Position Supine  -CH Supine  -BB Supine  -CS    Recorded by [CH] Nichelle Rahman PTA [BB] JUDE NgoA/L [CS] Nihcelle Herman, CAN/DALTON    Pain Assessment    Pain Assessment No/denies pain  -CH No/denies pain  -BB 0-10  -CS    Pain Score 0  -CH  5  -CS    Post Pain Score 0  -CH  0  -CS    Pain Location   Arm  -CS    Pain Orientation   Right;Left  -CS    Recorded by [CH] Nichelle Rahman PTA [BB] JUDE NgoA/L [CS] JUDE MolinaA/DALTON    Vision  Assessment/Intervention    Visual Impairment WNL  -CH      Recorded by [CH] Nichelle Rahman PTA      Cognitive Assessment/Intervention    Current Cognitive/Communication Assessment impaired  -CH impaired  -BB impaired  -CS    Orientation Status oriented to;person  -CH oriented to;person  -BB oriented to;person  -CS    Follows Commands/Answers Questions 75% of the time;able to follow single-step instructions;needs cueing;needs repetition  -CH 75% of the time  -BB 75% of the time;able to follow single-step instructions;needs increased time;needs cueing  -CS    Personal Safety moderate impairment;decreased awareness, need for assist;decreased awareness, need for safety  -CH moderate impairment  -BB moderate impairment  -CS    Personal Safety Interventions fall prevention program maintained;gait belt;muscle strengthening facilitated;nonskid shoes/slippers when out of bed;supervised activity  -CH fall prevention program maintained;gait belt;muscle strengthening facilitated;nonskid shoes/slippers when out of bed;supervised activity  -BB     Recorded by [CH] Nichelle Rahman PTA [BB] PAMELLA Ngo/DALTON [CS] PAMELLA Molina/L    Bed Mobility, Assessment/Treatment    Bed Mobility, Assistive Device head of bed elevated;bed rails  - head of bed elevated;bed rails  -BB     Bed Mobility, Roll Left, [place] supervision required  - supervision required  -BB     Bed Mobility, Roll Right, [place] not tested  -CH      Bed Mobility, Scoot/Bridge, [place] supervision required  -      Bed Mob, Supine to Sit, [place] supervision required  - supervision required  -BB     Bed Mob, Sit to Supine, [place] contact guard assist  - contact guard assist  -BB     Bed Mobility, Safety Issues  decreased use of legs for bridging/pushing;decreased use of arms for pushing/pulling  -BB     Bed Mobility, Impairments  strength decreased  -BB     Bed Mobility, Comment  Pt sat EOB for grooming task with  min A for sitting balance @' to posterior and lateral R lean.  -BB     Recorded by [CH] Nichelle Rahman PTA [BB] JUDE NgoA/L     Transfer Assessment/Treatment    Transfers, Sit-Stand Cord contact guard assist  -CH contact guard assist  -BB     Transfers, Stand-Sit Cord contact guard assist  -CH contact guard assist  -BB     Transfers, Sit-Stand-Sit, Assist Device rolling walker  -CH rolling walker  -BB     Toilet Transfer, Cord not tested  -      Transfer, Comment  Pt static standing  ~ 2min at bedside  -BB     Recorded by [CH] Nichelle Rahman, PTA [BB] Bibi Chester, CAN/L     Gait Assessment/Treatment    Gait, [place] Level minimum assist (75% patient effort)  -      Gait, Assistive Device rolling walker  -      Gait, Distance (Feet) 80  -      Gait, Safety Issues loses balance backward;balance decreased during turns;step length decreased;weight-shifting ability decreased  -CH      Recorded by [CH] Nichelle Rahman PTA      Stairs Assessment/Treatment    Stairs, Cord Level not tested  -CH      Recorded by [CH] Nichelle Rahman PTA      Upper Body Bathing Assessment/Training    UB Bathing Assess/Train Assistive Device  bath mitt  -BB     UB Bathing Assess/Train, Position  long sitting  -BB     UB Bathing Assess/Train, Cord Level  contact guard assist  -BB     UB Bathing Assess/Train, Impairments  strength decreased  -BB     Recorded by  [BB] JUDE NgoA/L     Lower Body Bathing Assessment/Training    LB Bathing Assess/Train Assistive Device  bath mitt  -BB     LB Bathing Assess/Train, Position  long sitting  -BB     LB Bathing Assess/Train, Cord Level  minimum assist (75% patient effort)  -BB     LB Bathing Assess/Train, Impairments  decreased flexibility;strength decreased  -BB     Recorded by  [BB] JUDE NgoA/L     Upper Body Dressing Assessment/Training    UB Dressing Assess/Train, Clothing Type   doffing:;donning:;hospital gown  -BB     UB Dressing Assess/Train, Position  long sitting  -BB     UB Dressing Assess/Train, [place]  contact guard assist  -BB     UB Dressing Assess/Train, Impairments  strength decreased  -BB     Recorded by  [BB] PAMELLA Ngo/L     Lower Body Dressing Assessment/Training    LB Dressing Assess/Train, Clothing Type  doffing:;donning:;socks  -BB     LB Dressing Assess/Train, Position  long sitting  -BB     LB Dressing Assess/Train, [place]  maximum assist (25% patient effort)  -BB     LB Dressing Assess/Train, Impairments  strength decreased  -BB     Recorded by  [BB] PAMELLA Ngo/L     Grooming Assessment/Training    Grooming Assess/Train, Position  edge of bed;sitting  -BB long sitting  -CS    Grooming Assess/Train, Indepen Level  set up required;contact guard assist  -BB set up required;contact guard assist  -CS    Grooming Assess/Train, Impairments  strength decreased;postural control impaired  -BB strength decreased;coordination impaired  -CS    Grooming Assess/Train, Comment  wash face, hands, comb hair  -BB     Recorded by  [BB] PAMELLA Ngo/DALTON [CS] KINGSLEY Molina    Balance Skills Training    Sitting-Balance Activities   Reaching for objects;Reaching across midline    balloon volleyball  -CS    Recorded by   [CS] PAMELLA Molina/L    Therapy Exercises    Bilateral Lower Extremities AROM:;20 reps;sitting;ankle pumps/circles;glut sets;hip abduction/adduction;hip flexion;LAQ  -CH      Recorded by [CH] Nichelle Rahman, KIN      Positioning and Restraints    Pre-Treatment Position in bed  - in bed  -BB in bed  -CS    Post Treatment Position bed  - bed  - bed  -CS    In Bed supine;call light within reach;encouraged to call for assist;exit alarm on;with family/caregiver;side rails up x2;SCD pump applied;heels elevated  - fowlers;call light within reach;encouraged to call for assist;exit alarm on  - supine;call  light within reach;exit alarm on  -CS    Recorded by [] Nichelle Rahman PTA [BB] Bibi Chester, CAN/L [CS] PAMELLA Molina/DALTON      01/20/18 1418          Rehab Assessment/Intervention    Discipline physical therapy assistant  -      Document Type therapy note (daily note)  -      Subjective Information agree to therapy  -      Patient Effort, Rehab Treatment fair  -CH      Precautions/Limitations fall precautions;oxygen therapy device and L/min  -CH      Recorded by [] Nichelle Rahman PTA      Vital Signs    Pre Systolic BP Rehab 103  -CH      Pre Treatment Diastolic BP 53  -CH      Intra Systolic BP Rehab 89   standing, increased to 91/51 sitting EOB, 107/54 post gait  -CH      Intra Treatment Diastolic BP 52  -CH      Post Systolic BP Rehab 107  -CH      Post Treatment Diastolic BP 56  -CH      Pretreatment Heart Rate (beats/min) 81  -CH      Posttreatment Heart Rate (beats/min) 78  -CH      Pre SpO2 (%) 96  -CH      O2 Delivery Pre Treatment supplemental O2  -CH      Post SpO2 (%) 97  -CH      O2 Delivery Post Treatment supplemental O2  -CH      Pre Patient Position Supine  -CH      Intra Patient Position Sitting  -CH      Post Patient Position Supine  -CH      Recorded by [CH] Nichelle Rahman PTA      Pain Assessment    Pain Assessment No/denies pain  -CH      Recorded by [CH] Nichelle Rahman PTA      Vision Assessment/Intervention    Visual Impairment WNL  -CH      Recorded by [] Nichelle Rahman PTA      Cognitive Assessment/Intervention    Current Cognitive/Communication Assessment impaired  -      Orientation Status oriented to;person  -      Follows Commands/Answers Questions 75% of the time;able to follow single-step instructions;needs cueing;needs increased time;needs repetition  -      Personal Safety moderate impairment;decreased awareness, need for assist;decreased awareness, need for safety  -      Personal Safety Interventions fall prevention program  maintained;gait belt;muscle strengthening facilitated;nonskid shoes/slippers when out of bed;supervised activity  -      Recorded by [CH] Nichelle Rahman PTA      Bed Mobility, Assessment/Treatment    Bed Mobility, Roll Left, [place] contact guard assist  -      Bed Mobility, Roll Right, [place] not tested  -      Bed Mobility, Scoot/Bridge, [place] minimum assist (75% patient effort)  -      Bed Mob, Supine to Sit, [place] minimum assist (75% patient effort)  -      Bed Mob, Sit to Supine, [place] minimum assist (75% patient effort)  -      Bed Mobility, Safety Issues decreased use of legs for bridging/pushing;decreased use of arms for pushing/pulling  -      Bed Mobility, Impairments strength decreased  -CH      Recorded by [] Nichelle Rahman PTA      Transfer Assessment/Treatment    Transfers, Sit-Stand [place] moderate assist (50% patient effort)  -      Transfers, Stand-Sit [place] moderate assist (50% patient effort)  -      Transfers, Sit-Stand-Sit, Assist Device rolling walker  -      Toilet Transfer, [place] minimum assist (75% patient effort)  -      Toilet Transfer, Assistive Device rolling walker  -      Transfer, Comment pt t/f'd X2 trials to [facility]  -      Recorded by [CH] Nichelle Rahman PTA      Gait Assessment/Treatment    Gait, [place] Level minimum assist (75% patient effort);moderate assist (50% patient effort)  -      Gait, Assistive Device rolling walker  -      Gait, Distance (Feet) 90  -      Gait, Safety Issues balance decreased during turns;step length decreased;weight-shifting ability decreased;supplemental O2  -      Recorded by [CH] Nichelle Rahman PTA      Stairs Assessment/Treatment    Stairs, [place] Level not tested  -      Recorded by [CH] Nichelle Rahman PTA      Therapy Exercises    Bilateral Lower Extremities AROM:;10 reps;sitting;hip abduction/adduction;hip flexion;LAQ  -CH       Recorded by [CH] Nichelle Rahman PTA      Positioning and Restraints    Pre-Treatment Position in bed  -CH      Post Treatment Position bed  -CH      In Bed supine;call light within reach;encouraged to call for assist;exit alarm on;side rails up x3;SCD pump applied;heels elevated  -CH      Recorded by [CH] Nichelle Rahman PTA        User Key  (r) = Recorded By, (t) = Taken By, (c) = Cosigned By    Initials Name Effective Dates     Nichelle Rahman, KIN 10/17/16 -     BB Bibi Chester, CAN/L 10/17/16 -     CS Nichelle Herman, CAN/L 10/17/16 -                 OT Goals       01/22/18 1415 01/21/18 1211 01/19/18 1352    Transfer Training OT LTG    Transfer Training OT LTG, Date Goal Reviewed 01/22/18  -BB 01/21/18  -CS 01/19/18  -BB    Strength OT LTG    Strength Goal OT LTG, Date Goal Reviewed 01/22/18  -BB 01/21/18  -CS 01/19/18  -BB    ADL OT LTG    ADL OT LTG, Date Goal Reviewed 01/22/18  -BB 01/21/18  -CS 01/19/18  -BB      01/18/18 1521 01/18/18 1141 01/16/18 1457    Transfer Training OT LTG    Transfer Training OT LTG, Date Goal Reviewed 01/18/18  -BB 01/18/18  -BB 01/16/18  -LM    Transfer Training OT LTG, Outcome   goal ongoing  -LM    Strength OT LTG    Strength Goal OT LTG, Date Goal Reviewed 01/18/18  -BB 01/18/18  -BB 01/16/18  -LM    Strength Goal OT LTG, Outcome   goal ongoing  -LM    ADL OT LTG    ADL OT LTG, Date Goal Reviewed 01/18/18  -BB 01/18/18  -BB 01/16/18  -LM    ADL OT LTG, Outcome   goal ongoing  -LM      01/15/18 1020 01/14/18 1529 01/12/18 1236    Transfer Training OT LTG    Transfer Training OT LTG, Date Goal Reviewed 01/15/18  -BB 01/14/18  -LW 01/12/18  -LW    Transfer Training OT LTG, Outcome  goal ongoing  -LW goal ongoing  -LW    Strength OT LTG    Strength Goal OT LTG, Date Goal Reviewed 01/15/18  -BB 01/14/18  -LW 01/12/18  -LW    Strength Goal OT LTG, Outcome  goal ongoing  -LW goal ongoing  -LW    ADL OT LTG    ADL OT LTG, Date Goal Reviewed 01/15/18  -BB 01/14/18   -LW 01/12/18  -LW    ADL OT LTG, Outcome  goal ongoing  -LW goal ongoing  -LW      01/11/18 0856          Transfer Training OT LTG    Transfer Training OT LTG, Date Established 01/11/18  -NN      Transfer Training OT LTG, Time to Achieve by discharge  -NN      Transfer Training OT LTG, Activity Type all transfers  -NN      Transfer Training OT LTG, [place] Level independent  -NN      Strength OT LTG    Strength Goal OT LTG, Date Established 01/11/18  -NN      Strength Goal OT LTG, Time to Achieve by discharge  -NN      Strength Goal OT LTG, Measure to Achieve Pt. will complete BUE strengthening exercises all planes and joints to increase BUE strength to 5/5 for ADLs.   -NN      ADL OT LTG    ADL OT LTG, Date Established 01/11/18  -NN      ADL OT LTG, Time to Achieve by discharge  -NN      ADL OT LTG, Activity Type ADL skills  -NN      ADL OT LTG, [place] Level modified independent  -NN      ADL OT LTG, Additional Goal AE PRN  -NN        User Key  (r) = Recorded By, (t) = Taken By, (c) = Cosigned By    Initials Name Provider Type    BB Bibi Chester, CAN/L Occupational Therapy Assistant    GREGORY Bell CAN/L Occupational Therapy Assistant    CS Nichelle Herman CAN/L Occupational Therapy Assistant    LW Jaz Garcia, CAN/L Occupational Therapy Assistant    NN Janet Garibay, OTR/L Occupational Therapist          Occupational Therapy Education     Title: PT OT SLP Therapies (Active)     Topic: Occupational Therapy (Active)     Point: ADL training (Active)    Description: Instruct learner(s) on proper safety adaptation and remediation techniques during self care or transfers.   Instruct in proper use of assistive devices.    Learning Progress Summary    Learner Readiness Method Response Comment Documented by Status   Patient Nonacceptance E NL  BB 01/22/18 1414 Active    Acceptance E,TB,D NR Pt was educated safety awareness and home safety. CS 01/21/18 1210 Active    Acceptance E NR   BB 01/19/18 1351 Active    Nonacceptance E NL  BB 01/18/18 1520 Active    Acceptance E NL,NR  BB 01/18/18 1141 Active    Acceptance E VU,NR Pt. and son educated on role of OT, safe t/f, benefit of therapy, upright posture, progression with poc NN 01/17/18 1046 Done    Nonacceptance E NL  BB 01/15/18 1020 Active    Acceptance E VU  LW 01/14/18 1528 Done    Acceptance E VU,NR Pt. educated on role of OT, safe bed mobility, benefit of OOB activity, progression with poc NN 01/13/18 1036 Done    Acceptance E VU  LW 01/12/18 1236 Done    Acceptance E NR Pt. educated on role of OT, safe t/f, calling for assistance, benefit of therapy, correcting posture, progression with poc NN 01/11/18 0854 Active   Family Acceptance E NR   01/19/18 1351 Active    Acceptance E VU,NR Pt. and son educated on role of OT, safe t/f, benefit of therapy, upright posture, progression with poc NN 01/17/18 1046 Done               Point: Home exercise program (Resolved)    Description: Instruct learner(s) on appropriate technique for monitoring, assisting and/or progressing therapeutic exercises/activities.    Learning Progress Summary    Learner Readiness Method Response Comment Documented by Status   Patient Acceptance E,TB,D NR Pt was educated safety awareness and home safety.  01/21/18 1210 Active    Acceptance E VU,NR Pt. and son educated on role of OT, safe t/f, benefit of therapy, upright posture, progression with poc NN 01/17/18 1046 Done    Acceptance E VU  LW 01/14/18 1528 Done    Acceptance E VU,NR Pt. educated on role of OT, safe bed mobility, benefit of OOB activity, progression with poc NN 01/13/18 1036 Done    Acceptance E VU  LW 01/12/18 1236 Done    Acceptance E NR Pt. educated on role of OT, safe t/f, calling for assistance, benefit of therapy, correcting posture, progression with poc NN 01/11/18 0854 Active   Family Acceptance E VU,NR Pt. and son educated on role of OT, safe t/f, benefit of therapy, upright posture, progression  with poc  01/17/18 1046 Done               Point: Body mechanics (Active)    Description: Instruct learner(s) on proper positioning and spine alignment during self-care, functional mobility activities and/or exercises.    Learning Progress Summary    Learner Readiness Method Response Comment Documented by Status   Patient Nonacceptance E NL   01/22/18 1414 Active    Acceptance E,TB,D NR Pt was educated safety awareness and home safety. CS 01/21/18 1210 Active    Acceptance E NR   01/19/18 1351 Active    Nonacceptance E NL   01/18/18 1520 Active    Acceptance E NL,NR   01/18/18 1141 Active    Acceptance E VU,NR Pt. and son educated on role of OT, safe t/f, benefit of therapy, upright posture, progression with poc  01/17/18 1046 Done    Nonacceptance E NL   01/15/18 1020 Active    Acceptance E VU   01/14/18 1528 Done    Acceptance E VU,NR Pt. educated on role of OT, safe bed mobility, benefit of OOB activity, progression with poc  01/13/18 1036 Done    Acceptance E VU   01/12/18 1236 Done    Acceptance E NR Pt. educated on role of OT, safe t/f, calling for assistance, benefit of therapy, correcting posture, progression with poc  01/11/18 0854 Active   Family Acceptance E NR   01/19/18 1351 Active    Acceptance E VU,NR Pt. and son educated on role of OT, safe t/f, benefit of therapy, upright posture, progression with poc  01/17/18 1046 Done                      User Key     Initials Effective Dates Name Provider Type Discipline     10/17/16 -  PAMELLA Ngo/L Occupational Therapy Assistant OT     10/17/16 -  PAMELLA Molina/DALTON Occupational Therapy Assistant OT     10/17/16 -  PAMELLA Wade/L Occupational Therapy Assistant OT     11/08/17 -  MAIK Sheffield/L Occupational Therapist OT                  OT Recommendation and Plan  Anticipated Discharge Disposition: home with home health, home with assist, home with 24/7 care  Planned Therapy Interventions:  activity intolerance, ADL retraining, balance training, bed mobility training, energy conservation, home exercise program, strengthening, transfer training  Therapy Frequency:  (3-14x/week)  Plan of Care Review  Plan Of Care Reviewed With: patient  Progress: no change  Outcome Summary/Follow up Plan: No goals met this date. Pt CGA supine-sit EOB. Pt requires assistance, increased time, and mod verbal cues to complete  ADL's. Pt could benefit from continued OT services wthin SNF at time of D/C.         Outcome Measures       01/22/18 0846 01/22/18 0720 01/21/18 1200    How much help from another person do you currently need...    Turning from your back to your side while in flat bed without using bedrails? 3  -CH      Moving from lying on back to sitting on the side of a flat bed without bedrails? 3  -CH      Moving to and from a bed to a chair (including a wheelchair)? 3  -CH      Standing up from a chair using your arms (e.g., wheelchair, bedside chair)? 3  -CH      Climbing 3-5 steps with a railing? 1  -CH      To walk in hospital room? 2  -CH      AM-PAC 6 Clicks Score 15  -CH      How much help from another is currently needed...    Putting on and taking off regular lower body clothing?  2  -BB 2  -CS    Bathing (including washing, rinsing, and drying)  2  -BB 2  -CS    Toileting (which includes using toilet bed pan or urinal)  2  -BB 2  -CS    Putting on and taking off regular upper body clothing  2  -BB 2  -CS    Taking care of personal grooming (such as brushing teeth)  2  -BB 2  -CS    Eating meals  2  -BB 2  -CS    Score  12  -BB 12  -CS    Functional Assessment    Outcome Measure Options AM-PAC 6 Clicks Basic Mobility (PT)  -CH  AM-PAC 6 Clicks Daily Activity (OT)  -CS      01/20/18 1418          How much help from another person do you currently need...    Turning from your back to your side while in flat bed without using bedrails? 3  -CH      Moving from lying on back to sitting on the side of a flat  bed without bedrails? 3  -CH      Moving to and from a bed to a chair (including a wheelchair)? 3  -CH      Standing up from a chair using your arms (e.g., wheelchair, bedside chair)? 3  -CH      Climbing 3-5 steps with a railing? 1  -CH      To walk in hospital room? 2  -CH      AM-PAC 6 Clicks Score 15  -CH      Functional Assessment    Outcome Measure Options AM-PAC 6 Clicks Basic Mobility (PT)  -        User Key  (r) = Recorded By, (t) = Taken By, (c) = Cosigned By    Initials Name Provider Type     Nichelle Rahman, KIN Physical Therapy Assistant    BB JUDE NgoA/L Occupational Therapy Assistant     PAMELLA Molina/DALTON Occupational Therapy Assistant           Time Calculation:         Time Calculation- OT       01/22/18 1420          Time Calculation- OT    OT Start Time 0720  -BB      OT Stop Time 0815  -BB      OT Time Calculation (min) 55 min  -BB      Total Timed Code Minutes- OT 55 minute(s)  -BB      OT Received On 01/22/18  -        User Key  (r) = Recorded By, (t) = Taken By, (c) = Cosigned By    Initials Name Provider Type    BB PAMELLA Ngo/L Occupational Therapy Assistant           Therapy Charges for Today     Code Description Service Date Service Provider Modifiers Qty    [id number] HC OT SELF CARE/MGMT/TRAIN EA 15 MIN 1/22/2018 PAMELLA Ngo/L GO 4          OT G-codes  OT Functional Scales Options: AM-PAC 6 Clicks Daily Activity (OT)  Functional Limitation: Self care  Self Care Current Status (): At least 40 percent but less than 60 percent impaired, limited or restricted  Self Care Goal Status (): At least 20 percent but less than 40 percent impaired, limited or restricted    PAMELLA Ngo/DALTON  1/22/2018

## 2018-01-22 NOTE — PLAN OF CARE
Problem: Patient Care Overview (Adult)  Goal: Plan of Care Review  Outcome: Ongoing (interventions implemented as appropriate)   01/22/18 1415   Coping/Psychosocial Response Interventions   Plan Of Care Reviewed With patient   Patient Care Overview   Progress no change   Outcome Evaluation   Outcome Summary/Follow up Plan No goals met this date. Pt CGA supine-sit EOB. Pt requires assistance, increased time, and mod verbal cues to complete ADL's. Pt could benefit from continued OT services wthin SNF at time of D/C.        Problem: Inpatient Occupational Therapy  Goal: Transfer Training Goal 1 LTG- OT  Outcome: Ongoing (interventions implemented as appropriate)   01/11/18 0856 01/16/18 1457 01/22/18 1415   Transfer Training OT LTG   Transfer Training OT LTG, Date Established 01/11/18 --  --    Transfer Training OT LTG, Time to Achieve by discharge --  --    Transfer Training OT LTG, Activity Type all transfers --  --    Transfer Training OT LTG, [place] Level independent --  --    Transfer Training OT LTG, Date Goal Reviewed --  --  01/22/18   Transfer Training OT LTG, Outcome --  goal ongoing --      Goal: Strength Goal LTG- OT  Outcome: Ongoing (interventions implemented as appropriate)   01/11/18 0856 01/16/18 1457 01/22/18 1415   Strength OT LTG   Strength Goal OT LTG, Date Established 01/11/18 --  --    Strength Goal OT LTG, Time to Achieve by discharge --  --    Strength Goal OT LTG, Measure to Achieve Pt. will complete BUE strengthening exercises all planes and joints to increase BUE strength to 5/5 for ADLs.  --  --    Strength Goal OT LTG, Date Goal Reviewed --  --  01/22/18   Strength Goal OT LTG, Outcome --  goal ongoing --      Goal: ADL Goal LTG- OT  Outcome: Ongoing (interventions implemented as appropriate)   01/11/18 0856 01/16/18 1457 01/22/18 1415   ADL OT LTG   ADL OT LTG, Date Established 01/11/18 --  --    ADL OT LTG, Time to Achieve by discharge --  --    ADL OT LTG, Activity Type ADL skills  --  --    ADL OT LTG, [place] Level modified independent --  --    ADL OT LTG, Additional Goal AE PRN --  --    ADL OT LTG, Date Goal Reviewed --  --  01/22/18   ADL OT LTG, Outcome --  goal ongoing --

## 2018-01-22 NOTE — PLAN OF CARE
Problem: Inpatient Physical Therapy  Goal: Bed Mobility Goal STG- PT  Outcome: Unable to achieve outcome(s) by discharge Date Met: 01/22/18 01/11/18 0855 01/16/18 1253 01/22/18 1115   Bed Mobility PT STG   Bed Mobility PT STG, Date Established --  01/16/18 --    Bed Mobility PT STG, Time to Achieve 2 - 3 days --  --    Bed Mobility PT STG, Activity Type supine to sit/sit to supine --  --    Bed Mobility PT STG, [place] Level independent --  --    Bed Mobility PT STG, Additional Goal HOB down and no rails --  --    Bed Mobility PT STG, Date Goal Reviewed --  --  01/22/18   Bed Mobility PT STG, Outcome --  --  --     01/22/18 1725   Bed Mobility PT STG   Bed Mobility PT STG, Date Established --    Bed Mobility PT STG, Time to Achieve --    Bed Mobility PT STG, Activity Type --    Bed Mobility PT STG, [place] Level --    Bed Mobility PT STG, Additional Goal --    Bed Mobility PT STG, Date Goal Reviewed --    Bed Mobility PT STG, Outcome goal not met     Goal: Transfer Training Goal 1 STG- PT  Outcome: Unable to achieve outcome(s) by discharge Date Met: 01/22/18 01/11/18 0855 01/22/18 1115 01/22/18 1725   Transfer Training PT STG   Transfer Training PT STG, Date Established 01/11/18 --  --    Transfer Training PT STG, Time to Achieve 2 - 3 days --  --    Transfer Training PT STG, Activity Type bed to chair /chair to bed;sit to stand/stand to sit --  --    Transfer Training PT STG, [place] Level supervision required --  --    Transfer Training PT STG, Date Goal Reviewed --  01/22/18 --    Transfer Training PT STG, Outcome --  --  goal not met     Goal: Gait Training Goal LTG- PT  Outcome: Unable to achieve outcome(s) by discharge Date Met: 01/22/18 01/11/18 0855 01/22/18 1115 01/22/18 1725   Gait Training PT LTG   Gait Training Goal PT LTG, Date Established 01/11/18 --  --    Gait Training Goal PT LTG, Time to Achieve by discharge --  --    Gait Training Goal PT LTG, [place] Level conditional  independence --  --    Gait Training Goal PT LTG, Distance to Achieve 200 feet --  --    Gait Training Goal PT LTG, Date Goal Reviewed --  01/22/18 --    Gait Training Goal PT LTG, Outcome --  --  goal not met     Goal: Strength Goal LTG- PT  Outcome: Unable to achieve outcome(s) by discharge Date Met: 01/22/18 01/11/18 0855 01/22/18 1115   Strength Goal PT LTG   Strength Goal PT LTG, Date Established 01/11/18 --    Strength Goal PT LTG, Time to Achieve 4 days --    Strength Goal PT LTG, Measure to Achieve 20 reps all ex BLE actively sitting/ standing --    Strength Goal PT LTG, Date Goal Reviewed --  01/22/18   Strength Goal PT LTG, Outcome --  goal partially met

## 2018-01-22 NOTE — PLAN OF CARE
Problem: Patient Care Overview (Adult)  Goal: Plan of Care Review  Outcome: Ongoing (interventions implemented as appropriate)   01/22/18 0410   Coping/Psychosocial Response Interventions   Plan Of Care Reviewed With patient   Patient Care Overview   Progress no change   Outcome Evaluation   Outcome Summary/Follow up Plan pt resting well this shift, bed alarm on, restart IV related to pt pulling out.       Problem: Pneumonia (Adult)  Goal: Signs and Symptoms of Listed Potential Problems Will be Absent or Manageable (Pneumonia)  Outcome: Ongoing (interventions implemented as appropriate)   01/22/18 0410   Pneumonia   Problems Assessed (Pneumonia) all   Problems Present (Pneumonia) none       Problem: Fall Risk (Adult)  Goal: Absence of Falls  Outcome: Ongoing (interventions implemented as appropriate)   01/22/18 0410   Fall Risk (Adult)   Absence of Falls achieves outcome       Problem: Fluid Volume Deficit (Adult)  Goal: Fluid/Electrolyte Balance  Outcome: Ongoing (interventions implemented as appropriate)   01/22/18 0410   Fluid Volume Deficit (Adult)   Fluid/Electrolyte Balance making progress toward outcome     Goal: Comfort/Well Being  Outcome: Ongoing (interventions implemented as appropriate)   01/22/18 0410   Fluid Volume Deficit (Adult)   Comfort/Well Being making progress toward outcome

## 2018-01-22 NOTE — THERAPY TREATMENT NOTE
Acute Care - Physical Therapy Treatment Note  [facility]     Patient Name: Michelle Child  : 1955  MRN: [id number]  Today's Date: 2018  Onset of Illness/Injury or Date of Surgery Date: 01/10/18  Date of Referral to PT: 01/10/18  Referring Physician: Dr Berry    Admit Date: 1/10/2018    Visit Dx:    ICD-10-CM ICD-9-CM   1. Hypokalemia E87.6 276.8   2. Pneumonia of left lung due to infectious organism, unspecified part of lung J18.9 486   3. Fall, initial encounter W19.XXXA E888.9   4. Contusion of scalp, initial encounter S00.03XA 920   5. Decreased activities of daily living (ADL) Z78.9 V49.89   6. Impaired physical mobility Z74.09 781.99     Patient Active Problem List   Diagnosis   • Tobacco dependence   • Migraine   • Iron deficiency anemia   • Gastroesophageal reflux disease   • Coronary arteriosclerosis   • Bilateral pseudophakia   • Astigmatism   • Right humeral fracture   • Fall with injury   • Closed fracture of humerus   • Cause of injury, fall   • Closed fracture of proximal end of right humerus with routine healing   • Hypokalemia   • Weakness   • Acute respiratory failure with hypoxia   • Pneumonia of left lower lobe due to Streptococcus pneumoniae   • Hyponatremia   • Cerebral microvascular disease   • Pulmonary hypertension   • Rheumatic tricuspid valve regurgitation   • Rheumatic mitral stenosis   • Acute diastolic CHF (congestive heart failure)   • Encephalopathy, metabolic   • Pulmonary hypertension due to left heart valvular disease   • Cor pulmonale, chronic   • Folic acid deficiency   • Hypoxic encephalopathy   • Oral thrush   • Laryngitis, acute   • Dysuria   • Bacterial cystitis   • Closed fracture of multiple ribs of right side   • Acute retention of urine               Adult Rehabilitation Note       18 0846 18 0955 18 1418    Rehab Assessment/Intervention    Discipline physical therapy assistant  -CH occupational therapy assistant  -CS physical  therapy assistant  -CH    Document Type therapy note (daily note)  -CH therapy note (daily note)  -CS therapy note (daily note)  -CH    Subjective Information agree to therapy  -CH agree to therapy  -CS agree to therapy  -CH    Patient Effort, Rehab Treatment fair  -CH fair  -CS fair  -CH    Precautions/Limitations fall precautions;oxygen therapy device and L/min  -CH fall precautions;oxygen therapy device and L/min  -CS fall precautions;oxygen therapy device and L/min  -CH    Recorded by [CH] Nichelle Ramhan PTA [CS] PAMELLA Molina/DALTON [CH] Nichelle Rahman PTA    Vital Signs    Pre Systolic BP Rehab 120  -CH  103  -CH    Pre Treatment Diastolic BP 57  -CH  53  -CH    Intra Systolic BP Rehab 101   EOB, post gait 114/57  -CH  89   standing, increased to 91/51 sitting EOB, 107/54 post gait  -CH    Intra Treatment Diastolic BP 53  -CH  52  -CH    Post Systolic BP Rehab 123  -  -  -CH    Post Treatment Diastolic BP 57  -CH 66  -CS 56  -CH    Pretreatment Heart Rate (beats/min) 82  -CH 82  -CS 81  -CH    Posttreatment Heart Rate (beats/min) 80  -CH 84  -CS 78  -CH    Pre SpO2 (%) 94  -CH 99  -CS 96  -CH    O2 Delivery Pre Treatment supplemental O2  -CH supplemental O2  -CS supplemental O2  -CH    Post SpO2 (%) 96  -CH 99  -CS 97  -CH    O2 Delivery Post Treatment supplemental O2  -CH supplemental O2  -CS supplemental O2  -CH    Pre Patient Position Supine  -CH Supine  -CS Supine  -CH    Intra Patient Position Sitting  -CH Sitting  -CS Sitting  -CH    Post Patient Position Supine  -CH Supine  -CS Supine  -CH    Recorded by [CH] Nichelle Rahman PTA [CS] JUDE MolinaA/L [CH] Nichelle Rahman PTA    Pain Assessment    Pain Assessment No/denies pain  -CH 0-10  -CS No/denies pain  -CH    Pain Score 0  -CH 5  -CS     Post Pain Score 0  -CH 0  -CS     Pain Location  Arm  -CS     Pain Orientation  Right;Left  -CS     Recorded by [CH] Nichelle Rahman PTA [CS] JUDE MolinaA/DALTON [CH] Nichelle  AGUSTIN Rahman PTA    Vision Assessment/Intervention    Visual Impairment WNL  -CH  WNL  -CH    Recorded by [] Nichelle Rahman PTA  [CH] Nichelle Rahman PTA    Cognitive Assessment/Intervention    Current Cognitive/Communication Assessment impaired  -CH impaired  -CS impaired  -CH    Orientation Status oriented to;person  -CH oriented to;person  -CS oriented to;person  -CH    Follows Commands/Answers Questions 75% of the time;able to follow single-step instructions;needs cueing;needs repetition  -CH 75% of the time;able to follow single-step instructions;needs increased time;needs cueing  -CS 75% of the time;able to follow single-step instructions;needs cueing;needs increased time;needs repetition  -CH    Personal Safety moderate impairment;decreased awareness, need for assist;decreased awareness, need for safety  - moderate impairment  -CS moderate impairment;decreased awareness, need for assist;decreased awareness, need for safety  -CH    Personal Safety Interventions fall prevention program maintained;gait belt;muscle strengthening facilitated;nonskid shoes/slippers when out of bed;supervised activity  -  fall prevention program maintained;gait belt;muscle strengthening facilitated;nonskid shoes/slippers when out of bed;supervised activity  -    Recorded by [CH] Nichelle Rahman PTA [CS] KINGSLEY Molina [CH] Nichelle Rahman PTA    Bed Mobility, Assessment/Treatment    Bed Mobility, Assistive Device head of bed elevated;bed rails  -      Bed Mobility, Roll Left, [place] supervision required  -  contact guard assist  -    Bed Mobility, Roll Right, [place] not tested  -  not tested  -    Bed Mobility, Scoot/Bridge, [place] supervision required  -  minimum assist (75% patient effort)  -    Bed Mob, Supine to Sit, [place] supervision required  -  minimum assist (75% patient effort)  -    Bed Mob, Sit to Supine, [place] contact guard assist  -  minimum  assist (75% patient effort)  -CH    Bed Mobility, Safety Issues   decreased use of legs for bridging/pushing;decreased use of arms for pushing/pulling  -CH    Bed Mobility, Impairments   strength decreased  -CH    Recorded by [CH] Nichelle Rahman PTA  [CH] Nichelle Rhaman PTA    Transfer Assessment/Treatment    Transfers, Sit-Stand [place] contact guard assist  -CH  moderate assist (50% patient effort)  -CH    Transfers, Stand-Sit [place] contact guard assist  -CH  moderate assist (50% patient effort)  -CH    Transfers, Sit-Stand-Sit, Assist Device rolling walker  -CH  rolling walker  -CH    Toilet Transfer, [place] not tested  -CH  minimum assist (75% patient effort)  -    Toilet Transfer, Assistive Device   rolling walker  -CH    Transfer, Comment   pt t/f'd X2 trials to [facility] [place] [place]  -CH    Recorded by [CH] Nichelle Rahman PTA  [CH] Nichelle Rahman PTA    Gait Assessment/Treatment    Gait, [place] Level minimum assist (75% patient effort)  -CH  minimum assist (75% patient effort);moderate assist (50% patient effort)  -    Gait, Assistive Device rolling walker  -CH  rolling walker  -CH    Gait, Distance (Feet) 80  -CH  90  -CH    Gait, Safety Issues loses balance backward;balance decreased during turns;step length decreased;weight-shifting ability decreased  -  balance decreased during turns;step length decreased;weight-shifting ability decreased;supplemental O2  -CH    Recorded by [CH] Nichelle Rahman PTA  [CH] Nichelle Rahman PTA    Stairs Assessment/Treatment    Stairs, [place] Level not tested  -CH  not tested  -CH    Recorded by [CH] Nichelle Rahman PTA  [CH] Nichelle Rahman PTA    Grooming Assessment/Training    Grooming Assess/Train, Position  long sitting  -CS     Grooming Assess/Train, [place]ep[place] Level  set up required;contact guard assist  -CS     Grooming Assess/Train, Impairments  strength decreased;coordination impaired  -CS     Recorded by  [CS] Nichelle Herman  CAN/L     Balance Skills Training    Sitting-Balance Activities  Reaching for objects;Reaching across midline    balloon volleyball  -CS     Recorded by  [CS] KINGSLEY Molina     Therapy Exercises    Bilateral Lower Extremities AROM:;20 reps;sitting;ankle pumps/circles;glut sets;hip abduction/adduction;hip flexion;LAQ  -CH  AROM:;10 reps;sitting;hip abduction/adduction;hip flexion;LAQ  -CH    Recorded by [CH] Nichelle Rahman PTA  [] Nichelle Rahman PTA    Positioning and Restraints    Pre-Treatment Position in bed  -CH in bed  -CS in bed  -CH    Post Treatment Position bed  -CH bed  -CS bed  -CH    In Bed supine;call light within reach;encouraged to call for assist;exit alarm on;with family/caregiver;side rails up x2;SCD pump applied;heels elevated  -CH supine;call light within reach;exit alarm on  -CS supine;call light within reach;encouraged to call for assist;exit alarm on;side rails up x3;SCD pump applied;heels elevated  -    Recorded by [CH] Nichelle Rahman PTA [CS] KINGSLEY Molina [CH] Nichelle Rahman PTA      User Key  (r) = Recorded By, (t) = Taken By, (c) = Cosigned By    Initials Name Effective Dates     Nichelle Rahman PTA 10/17/16 -     CS KINGSLEY Molina 10/17/16 -                 IP PT Goals       01/22/18 1115 01/20/18 1533 01/19/18 1011    Bed Mobility PT STG    Bed Mobility PT STG, Date Goal Reviewed 01/22/18  - 01/20/18  - 01/19/18  -CH    Bed Mobility PT STG, Outcome goal ongoing  - goal ongoing  - goal ongoing  -    Transfer Training PT STG    Transfer Training PT STG, Date Goal Reviewed 01/22/18  -CH 01/20/18  -CH 01/19/18  -CH    Transfer Training PT STG, Outcome goal ongoing  - goal ongoing  - goal ongoing  -    Gait Training PT LTG    Gait Training Goal PT LTG, Date Goal Reviewed 01/22/18  - 01/20/18  -CH 01/19/18  -CH    Gait Training Goal PT LTG, Outcome goal ongoing  - goal ongoing  - goal ongoing  -    Strength Goal PT  LTG    Strength Goal PT LTG, Date Goal Reviewed 01/22/18  -CH 01/20/18  -CH 01/19/18  -CH    Strength Goal PT LTG, Outcome goal partially met  -CH goal ongoing  -CH goal ongoing  -CH      01/18/18 1124 01/18/18 1032 01/16/18 1253    Bed Mobility PT STG    Bed Mobility PT STG, Date Established   01/16/18  -SY    Bed Mobility PT STG, Date Goal Reviewed 01/18/18  -SY (r) BM (t) SY (c)  01/16/18  -SY    Bed Mobility PT STG, Outcome goal ongoing  -SY (r) BM (t) SY (c)  goal ongoing  -SY    Transfer Training PT STG    Transfer Training PT STG, Date Goal Reviewed  01/18/18  -SY (r) BM (t) SY (c) 01/16/18  -SY    Transfer Training PT STG, Outcome  goal ongoing  -SY (r) BM (t) SY (c) goal ongoing  -SY    Gait Training PT LTG    Gait Training Goal PT LTG, Date Goal Reviewed  01/18/18  -SY (r) BM (t) SY (c) 01/16/18  -SY    Gait Training Goal PT LTG, Outcome  goal ongoing  -SY (r) BM (t) SY (c) goal ongoing  -SY    Strength Goal PT LTG    Strength Goal PT LTG, Date Goal Reviewed  01/18/18  -SY (r) BM (t) SY (c) 01/16/18  -SY    Strength Goal PT LTG, Outcome  goal ongoing  -SY (r) BM (t) SY (c) goal ongoing  -SY      01/15/18 1223 01/13/18 1018 01/12/18 1103    Bed Mobility PT STG    Bed Mobility PT STG, Date Goal Reviewed 01/15/18  -CH 01/13/18  -CH 01/12/18  -SY    Bed Mobility PT STG, Outcome goal ongoing  -CH goal ongoing  -CH goal ongoing  -SY    Transfer Training PT STG    Transfer Training PT STG, Date Goal Reviewed 01/15/18  -CH 01/13/18  -CH 01/12/18  -SY    Transfer Training PT STG, Outcome goal ongoing  -CH goal ongoing  -CH goal ongoing  -SY    Gait Training PT LTG    Gait Training Goal PT LTG, Date Goal Reviewed 01/15/18  -CH 01/13/18  -CH 01/12/18  -SY    Gait Training Goal PT LTG, Outcome goal ongoing  - goal ongoing  -CH goal ongoing  -SY    Strength Goal PT LTG    Strength Goal PT LTG, Date Goal Reviewed 01/15/18  - 01/13/18  - 01/12/18  -SY    Strength Goal PT LTG, Outcome goal ongoing  - goal  partially met  - goal ongoing  -SY      01/11/18 0855          Bed Mobility PT STG    Bed Mobility PT STG, Date Established 01/11/18  -CB      Bed Mobility PT STG, Time to Achieve 2 - 3 days  -CB      Bed Mobility PT STG, Activity Type supine to sit/sit to supine  -CB      Bed Mobility PT STG, [place] Level independent  -CB      Bed Mobility PT STG, Additional Goal HOB down and no rails  -CB      Transfer Training PT STG    Transfer Training PT STG, Date Established 01/11/18  -CB      Transfer Training PT STG, Time to Achieve 2 - 3 days  -CB      Transfer Training PT STG, Activity Type bed to chair /chair to bed;sit to stand/stand to sit  -CB      Transfer Training PT STG, [place] Level supervision required  -CB      Transfer Training PT STG, Assist Device --   least restrictive  -CB      Gait Training PT LTG    Gait Training Goal PT LTG, Date Established 01/11/18  -CB      Gait Training Goal PT LTG, Time to Achieve by discharge  -CB      Gait Training Goal PT LTG, [place] Level conditional independence  -CB      Gait Training Goal PT LTG, Assist Device --   least restrictive assistive device  -CB      Gait Training Goal PT LTG, Distance to Achieve 200 feet  -CB      Strength Goal PT LTG    Strength Goal PT LTG, Date Established 01/11/18  -CB      Strength Goal PT LTG, Time to Achieve 4 days  -CB      Strength Goal PT LTG, Measure to Achieve 20 reps all ex BLE actively sitting/ standing  -CB        User Key  (r) = Recorded By, (t) = Taken By, (c) = Cosigned By    Initials Name Provider Type    CB Vanesa Ramos, PT Physical Therapist    SY Sadia French, PTA Physical Therapy Assistant    LAURI Rahman, PTA Physical Therapy Assistant    GIL Garg, PT Student PT Student          Physical Therapy Education     Title: PT OT SLP Therapies (Active)     Topic: Physical Therapy (Active)     Point: Mobility training (Active)    Learning Progress Summary    Learner Readiness Method Response  Comment Documented by Status   Patient Acceptance D NR   01/11/18 1129 Active               Point: Precautions (Active)    Learning Progress Summary    Learner Readiness Method Response Comment Documented by Status   Patient Acceptance D NR   01/11/18 1129 Active                      User Key     Initials Effective Dates Name Provider Type Discipline     04/06/17 -  Vanesa Ramos, PT Physical Therapist PT                    PT Recommendation and Plan  Anticipated Equipment Needs At Discharge:  (to be determined)  Anticipated Discharge Disposition: home with home health  Demonstrates Need for Referral to Another Service: home health care  Planned Therapy Interventions: bed mobility training, gait training, home exercise program, strengthening, transfer training  PT Frequency:  (5-14)  Plan of Care Review  Plan Of Care Reviewed With: patient  Outcome Summary/Follow up Plan: No new goals met this tx. Pt continues to be confused and unsteady with mobility, pt requires Min A  for gait and presents with posterior lean. Pt  progressing slowly towards unmet goals and will continue to benefit from skilled therapy services within SNF upon D/C          Outcome Measures       01/22/18 0846 01/21/18 1200 01/20/18 1418    How much help from another person do you currently need...    Turning from your back to your side while in flat bed without using bedrails? 3  -CH  3  -CH    Moving from lying on back to sitting on the side of a flat bed without bedrails? 3  -CH  3  -CH    Moving to and from a bed to a chair (including a wheelchair)? 3  -CH  3  -CH    Standing up from a chair using your arms (e.g., wheelchair, bedside chair)? 3  -CH  3  -CH    Climbing 3-5 steps with a railing? 1  -CH  1  -CH    To walk in hospital room? 2  -CH  2  -CH    AM-PAC 6 Clicks Score 15  -CH  15  -CH    How much help from another is currently needed...    Putting on and taking off regular lower body clothing?  2  -CS     Bathing (including  washing, rinsing, and drying)  2  -CS     Toileting (which includes using toilet bed pan or urinal)  2  -CS     Putting on and taking off regular upper body clothing  2  -CS     Taking care of personal grooming (such as brushing teeth)  2  -CS     Eating meals  2  -CS     Score  12  -CS     Functional Assessment    Outcome Measure Options AM-PAC 6 Clicks Basic Mobility (PT)  - AM-PAC 6 Clicks Daily Activity (OT)  -CS AM-PAC 6 Clicks Basic Mobility (PT)  -      User Key  (r) = Recorded By, (t) = Taken By, (c) = Cosigned By    Initials Name Provider Type    LAURI Rahman PTA Physical Therapy Assistant    KINGSLEY Leal Occupational Therapy Assistant           Time Calculation:         PT Charges       01/22/18 1118          Time Calculation    Start Time 0846  -      Stop Time 0918  -      Time Calculation (min) 32 min  -      PT Received On 01/22/18  -      Time Calculation- PT    Total Timed Code Minutes- PT 32 minute(s)  -        User Key  (r) = Recorded By, (t) = Taken By, (c) = Cosigned By    Initials Name Provider Type    LAURI Rahman PTA Physical Therapy Assistant          Therapy Charges for Today     Code Description Service Date Service Provider Modifiers Qty    98941857904 HC GAIT TRAINING EA 15 MIN 1/22/2018 Nichelle Rahman PTA GP 1     Duration:  16m ( 8:46 AM -  9:02 AM)      [id number] HC PT THER PROC EA 15 MIN 1/22/2018 Nichelle Rahman PTA GP 1     Duration:  16m ( 9:02 AM -  9:18 AM)            PT G-Codes  PT Professional Judgement Used?: Yes  Outcome Measure Options: AM-PAC 6 Clicks Basic Mobility (PT)  Score: 15  Functional Limitation: Mobility: Walking and moving around  Mobility: Walking and Moving Around Current Status (): At least 40 percent but less than 60 percent impaired, limited or restricted  Mobility: Walking and Moving Around Goal Status (): At least 20 percent but less than 40 percent impaired, limited or restricted    Nichelle VELEZ  Lacey, PTA  1/22/2018

## 2018-01-22 NOTE — THERAPY DISCHARGE NOTE
Acute Care - Physical Therapy Discharge Summary  [facility]       Patient Name: Michelle Chidl  : 1955  MRN: [id number]    Today's Date: 2018  Onset of Illness/Injury or Date of Surgery Date: 01/10/18    Date of Referral to PT: 01/10/18  Referring Physician: Dr Berry      Admit Date: 1/10/2018      PT Recommendation and Plan    Visit Dx:    ICD-10-CM ICD-9-CM   1. Hypokalemia E87.6 276.8   2. Pneumonia of left lung due to infectious organism, unspecified part of lung J18.9 486   3. Fall, initial encounter W19.XXXA E888.9   4. Contusion of scalp, initial encounter S00.03XA 920   5. Decreased activities of daily living (ADL) Z78.9 V49.89   6. Impaired physical mobility Z74.09 781.99             Outcome Measures       18 0846 18 0720 18 1200    How much help from another person do you currently need...    Turning from your back to your side while in flat bed without using bedrails? 3  -CH      Moving from lying on back to sitting on the side of a flat bed without bedrails? 3  -CH      Moving to and from a bed to a chair (including a wheelchair)? 3  -CH      Standing up from a chair using your arms (e.g., wheelchair, bedside chair)? 3  -CH      Climbing 3-5 steps with a railing? 1  -CH      To walk in hospital room? 2  -CH      AM-PAC 6 Clicks Score 15  -CH      How much help from another is currently needed...    Putting on and taking off regular lower body clothing?  2  -BB 2  -CS    Bathing (including washing, rinsing, and drying)  2  -BB 2  -CS    Toileting (which includes using toilet bed pan or urinal)  2  -BB 2  -CS    Putting on and taking off regular upper body clothing  2  -BB 2  -CS    Taking care of personal grooming (such as brushing teeth)  2  -BB 2  -CS    Eating meals  2  -BB 2  -CS    Score  12  -BB 12  -CS    Functional Assessment    Outcome Measure Options AM-PAC 6 Clicks Basic Mobility (PT)  -CH  AM-PAC 6 Clicks Daily Activity (OT)  -CS      18 1417           How much help from another person do you currently need...    Turning from your back to your side while in flat bed without using bedrails? 3  -CH      Moving from lying on back to sitting on the side of a flat bed without bedrails? 3  -CH      Moving to and from a bed to a chair (including a wheelchair)? 3  -CH      Standing up from a chair using your arms (e.g., wheelchair, bedside chair)? 3  -CH      Climbing 3-5 steps with a railing? 1  -CH      To walk in hospital room? 2  -CH      AM-PAC 6 Clicks Score 15  -CH      Functional Assessment    Outcome Measure Options AM-PAC 6 Clicks Basic Mobility (PT)  -        User Key  (r) = Recorded By, (t) = Taken By, (c) = Cosigned By    Initials Name Provider Type    LAURI Rahman PTA Physical Therapy Assistant    BB JUDE NgoA/L Occupational Therapy Assistant     PAMELLA Molina/L Occupational Therapy Assistant                PT Charges       01/22/18 1118          Time Calculation    Start Time 0846  -      Stop Time 0918  -      Time Calculation (min) 32 min  -      PT Received On 01/22/18  -      Time Calculation- PT    Total Timed Code Minutes- PT 32 minute(s)  -        User Key  (r) = Recorded By, (t) = Taken By, (c) = Cosigned By    Initials Name Provider Type     Nichelle Rahman PTA Physical Therapy Assistant                  IP PT Goals       01/22/18 1725 01/22/18 1115 01/20/18 1533    Bed Mobility PT STG    Bed Mobility PT STG, Date Goal Reviewed  01/22/18  - 01/20/18  -    Bed Mobility PT STG, Outcome goal not met  -ARANZA goal ongoing  - goal ongoing  -    Transfer Training PT STG    Transfer Training PT STG, Date Goal Reviewed  01/22/18  - 01/20/18  -    Transfer Training PT STG, Outcome goal not met  -ARANZA goal ongoing  - goal ongoing  -    Gait Training PT LTG    Gait Training Goal PT LTG, Date Goal Reviewed  01/22/18  - 01/20/18  -    Gait Training Goal PT LTG, Outcome goal not met  - goal  ongoing  -CH goal ongoing  -CH    Strength Goal PT LTG    Strength Goal PT LTG, Date Goal Reviewed  01/22/18  - 01/20/18  -CH    Strength Goal PT LTG, Outcome  goal partially met  -CH goal ongoing  -CH      01/19/18 1011 01/18/18 1124 01/18/18 1032    Bed Mobility PT STG    Bed Mobility PT STG, Date Goal Reviewed 01/19/18  - 01/18/18  -SY (r) BM (t) SY (c)     Bed Mobility PT STG, Outcome goal ongoing  -CH goal ongoing  -SY (r) BM (t) SY (c)     Transfer Training PT STG    Transfer Training PT STG, Date Goal Reviewed 01/19/18  -  01/18/18  -SY (r) BM (t) SY (c)    Transfer Training PT STG, Outcome goal ongoing  -CH  goal ongoing  -SY (r) BM (t) SY (c)    Gait Training PT LTG    Gait Training Goal PT LTG, Date Goal Reviewed 01/19/18  -  01/18/18  -SY (r) BM (t) SY (c)    Gait Training Goal PT LTG, Outcome goal ongoing  -  goal ongoing  -SY (r) BM (t) SY (c)    Strength Goal PT LTG    Strength Goal PT LTG, Date Goal Reviewed 01/19/18  -  01/18/18  -SY (r) BM (t) SY (c)    Strength Goal PT LTG, Outcome goal ongoing  -CH  goal ongoing  -SY (r) BM (t) SY (c)      01/16/18 1253 01/15/18 1223 01/13/18 1018    Bed Mobility PT STG    Bed Mobility PT STG, Date Established 01/16/18  -SY      Bed Mobility PT STG, Date Goal Reviewed 01/16/18  -SY 01/15/18  - 01/13/18  -CH    Bed Mobility PT STG, Outcome goal ongoing  -SY goal ongoing  - goal ongoing  -CH    Transfer Training PT STG    Transfer Training PT STG, Date Goal Reviewed 01/16/18  -SY 01/15/18  - 01/13/18  -CH    Transfer Training PT STG, Outcome goal ongoing  -SY goal ongoing  - goal ongoing  -CH    Gait Training PT LTG    Gait Training Goal PT LTG, Date Goal Reviewed 01/16/18  -SY 01/15/18  - 01/13/18  -CH    Gait Training Goal PT LTG, Outcome goal ongoing  -SY goal ongoing  -CH goal ongoing  -CH    Strength Goal PT LTG    Strength Goal PT LTG, Date Goal Reviewed 01/16/18  -SY 01/15/18  -CH 01/13/18  -CH    Strength Goal PT LTG, Outcome goal  ongoing  -SY goal ongoing  - goal partially met  -CH      01/12/18 1103 01/11/18 0855       Bed Mobility PT STG    Bed Mobility PT STG, Date Established  01/11/18  -CB     Bed Mobility PT STG, Time to Achieve  2 - 3 days  -CB     Bed Mobility PT STG, Activity Type  supine to sit/sit to supine  -CB     Bed Mobility PT STG, [place] Level  independent  -CB     Bed Mobility PT STG, Additional Goal  HOB down and no rails  -CB     Bed Mobility PT STG, Date Goal Reviewed 01/12/18  -      Bed Mobility PT STG, Outcome goal ongoing  -SY      Transfer Training PT STG    Transfer Training PT STG, Date Established  01/11/18  -CB     Transfer Training PT STG, Time to Achieve  2 - 3 days  -CB     Transfer Training PT STG, Activity Type  bed to chair /chair to bed;sit to stand/stand to sit  -CB     Transfer Training PT STG, [place] Level  supervision required  -CB     Transfer Training PT STG, Assist Device  --   least restrictive  -CB     Transfer Training PT STG, Date Goal Reviewed 01/12/18  -      Transfer Training PT STG, Outcome goal ongoing  -      Gait Training PT LTG    Gait Training Goal PT LTG, Date Established  01/11/18  -CB     Gait Training Goal PT LTG, Time to Achieve  by discharge  -CB     Gait Training Goal PT LTG, [place] Level  conditional independence  -CB     Gait Training Goal PT LTG, Assist Device  --   least restrictive assistive device  -CB     Gait Training Goal PT LTG, Distance to Achieve  200 feet  -CB     Gait Training Goal PT LTG, Date Goal Reviewed 01/12/18  -      Gait Training Goal PT LTG, Outcome goal ongoing  -      Strength Goal PT LTG    Strength Goal PT LTG, Date Established  01/11/18  -CB     Strength Goal PT LTG, Time to Achieve  4 days  -CB     Strength Goal PT LTG, Measure to Achieve  20 reps all ex BLE actively sitting/ standing  -CB     Strength Goal PT LTG, Date Goal Reviewed 01/12/18  -      Strength Goal PT LTG, Outcome goal ongoing  [facility]        User Key  (r)  = Recorded By, (t) = Taken By, (c) = Cosigned By    Initials Name Provider Type    CB Vanesa Ramos, PT Physical Therapist    ARANZA Craven, PT Physical Therapist    SY Sadia French, PTA Physical Therapy Assistant    CH Nichelle Rahman, PTA Physical Therapy Assistant    BM Monse Garg, PT Student PT Student              PT Discharge Summary  Anticipated Discharge Disposition: home with home health  Reason for Discharge: Per MD order, Discharge from facility  Outcomes Achieved: Patient able to partially acheive established goals  Discharge Destination: SNF      Laura Craven, PT   1/22/2018

## 2018-01-23 ENCOUNTER — OFFICE VISIT (OUTPATIENT)
Dept: FAMILY MEDICINE CLINIC | Facility: CLINIC | Age: 63
End: 2018-01-23

## 2018-01-23 DIAGNOSIS — S22.41XD CLOSED FRACTURE OF MULTIPLE RIBS OF RIGHT SIDE WITH ROUTINE HEALING, SUBSEQUENT ENCOUNTER: Primary | ICD-10-CM

## 2018-01-23 DIAGNOSIS — D50.9 IRON DEFICIENCY ANEMIA, UNSPECIFIED IRON DEFICIENCY ANEMIA TYPE: ICD-10-CM

## 2018-01-23 DIAGNOSIS — J18.9 PNEUMONIA DUE TO INFECTIOUS ORGANISM, UNSPECIFIED LATERALITY, UNSPECIFIED PART OF LUNG: ICD-10-CM

## 2018-01-23 DIAGNOSIS — I50.31 ACUTE DIASTOLIC CHF (CONGESTIVE HEART FAILURE) (HCC): ICD-10-CM

## 2018-01-23 DIAGNOSIS — I27.20 PULMONARY HYPERTENSION (HCC): ICD-10-CM

## 2018-01-23 PROCEDURE — 99308 SBSQ NF CARE LOW MDM 20: CPT | Performed by: STUDENT IN AN ORGANIZED HEALTH CARE EDUCATION/TRAINING PROGRAM

## 2018-01-23 NOTE — PLAN OF CARE
Problem: Inpatient Occupational Therapy  Goal: Transfer Training Goal 1 LTG- OT  Outcome: Unable to achieve outcome(s) by discharge Date Met: 01/23/18 01/11/18 0856 01/22/18 1415 01/23/18 0751   Transfer Training OT LTG   Transfer Training OT LTG, Date Established 01/11/18 --  --    Transfer Training OT LTG, Time to Achieve by discharge --  --    Transfer Training OT LTG, Activity Type all transfers --  --    Transfer Training OT LTG, [place] Level independent --  --    Transfer Training OT LTG, Date Goal Reviewed --  01/22/18 --    Transfer Training OT LTG, Outcome --  --  goal not met   Transfer Training OT LTG, Reason Goal Not Met --  --  discharged from facility     Goal: Strength Goal LTG- OT  Outcome: Unable to achieve outcome(s) by discharge Date Met: 01/23/18 01/11/18 0856 01/22/18 1415 01/23/18 0751   Strength OT LTG   Strength Goal OT LTG, Date Established 01/11/18 --  --    Strength Goal OT LTG, Time to Achieve by discharge --  --    Strength Goal OT LTG, Measure to Achieve Pt. will complete BUE strengthening exercises all planes and joints to increase BUE strength to 5/5 for ADLs.  --  --    Strength Goal OT LTG, Date Goal Reviewed --  01/22/18 --    Strength Goal OT LTG, Outcome --  --  goal not met   Strength Goal OT LTG, Reason Goal Not Met --  --  discharged from facility     Goal: ADL Goal LTG- OT  Outcome: Unable to achieve outcome(s) by discharge Date Met: 01/23/18 01/11/18 0856 01/22/18 1415 01/23/18 0751   ADL OT LTG   ADL OT LTG, Date Established 01/11/18 --  --    ADL OT LTG, Time to Achieve by discharge --  --    ADL OT LTG, Activity Type ADL skills --  --    ADL OT LTG, [place] Level modified independent --  --    ADL OT LTG, Additional Goal AE PRN --  --    ADL OT LTG, Date Goal Reviewed --  01/22/18 --    ADL OT LTG, Outcome --  --  goal not met   ADL OT LTG, Reason Goal Not Met --  --  discharged from facility

## 2018-01-23 NOTE — THERAPY DISCHARGE NOTE
Acute Care - Occupational Therapy Discharge Summary  [facility]     Patient Name: Michelle Child  : 1955  MRN: [id number]    Today's Date: 2018  Onset of Illness/Injury or Date of Surgery Date: 01/10/18    Date of Referral to OT: 01/10/18  Referring Physician: Dr Berry      Admit Date: 1/10/2018        OT Recommendation and Plan    Visit Dx:    ICD-10-CM ICD-9-CM   1. Hypokalemia E87.6 276.8   2. Pneumonia of left lung due to infectious organism, unspecified part of lung J18.9 486   3. Fall, initial encounter W19.XXXA E888.9   4. Contusion of scalp, initial encounter S00.03XA 920   5. Decreased activities of daily living (ADL) Z78.9 V49.89   6. Impaired physical mobility Z74.09 781.99                     OT Goals       18 0751 18 1415 18 1211    Transfer Training OT LTG    Transfer Training OT LTG, Date Goal Reviewed  18  -BB 18  -CS    Transfer Training OT LTG, Outcome goal not met  -      Transfer Training OT LTG, Reason Goal Not Met discharged from facility  -      Strength OT LTG    Strength Goal OT LTG, Date Goal Reviewed  18  -BB 18  -CS    Strength Goal OT LTG, Outcome goal not met  -      Strength Goal OT LTG, Reason Goal Not Met discharged from facility  -      ADL OT LTG    ADL OT LTG, Date Goal Reviewed  18  -BB 18  -CS    ADL OT LTG, Outcome goal not met  -      ADL OT LTG, Reason Goal Not Met discharged from facility  -        18 1352 18 1521 18 1141    Transfer Training OT LTG    Transfer Training OT LTG, Date Goal Reviewed 18  -BB 18  -BB 18  -BB    Strength OT LTG    Strength Goal OT LTG, Date Goal Reviewed 18  -BB 18  -BB 18  -BB    ADL OT LTG    ADL OT LTG, Date Goal Reviewed 18  -BB 18  -BB 18  -BB      18 1457 01/15/18 1020 18 1529    Transfer Training OT LTG    Transfer Training OT LTG, Date Goal Reviewed 18  -LM  01/15/18  -BB 01/14/18  -LW    Transfer Training OT LTG, Outcome goal ongoing  -LM  goal ongoing  -LW    Strength OT LTG    Strength Goal OT LTG, Date Goal Reviewed 01/16/18  -LM 01/15/18  -BB 01/14/18  -LW    Strength Goal OT LTG, Outcome goal ongoing  -LM  goal ongoing  -LW    ADL OT LTG    ADL OT LTG, Date Goal Reviewed 01/16/18  -LM 01/15/18  -BB 01/14/18  -LW    ADL OT LTG, Outcome goal ongoing  -LM  goal ongoing  -LW      01/12/18 1236 01/11/18 0856       Transfer Training OT LTG    Transfer Training OT LTG, Date Established  01/11/18  -NN     Transfer Training OT LTG, Time to Achieve  by discharge  -NN     Transfer Training OT LTG, Activity Type  all transfers  -NN     Transfer Training OT LTG, [place] Level  independent  -NN     Transfer Training OT LTG, Date Goal Reviewed 01/12/18  -LW      Transfer Training OT LTG, Outcome goal ongoing  -LW      Strength OT LTG    Strength Goal OT LTG, Date Established  01/11/18  -NN     Strength Goal OT LTG, Time to Achieve  by discharge  -NN     Strength Goal OT LTG, Measure to Achieve  Pt. will complete BUE strengthening exercises all planes and joints to increase BUE strength to 5/5 for ADLs.   -NN     Strength Goal OT LTG, Date Goal Reviewed 01/12/18  -LW      Strength Goal OT LTG, Outcome goal ongoing  -LW      ADL OT LTG    ADL OT LTG, Date Established  01/11/18  -NN     ADL OT LTG, Time to Achieve  by discharge  -NN     ADL OT LTG, Activity Type  ADL skills  -NN     ADL OT LTG, [place] Level  modified independent  -NN     ADL OT LTG, Additional Goal  AE PRN  -NN     ADL OT LTG, Date Goal Reviewed 01/12/18  -LW      ADL OT LTG, Outcome goal ongoing  -LW        User Key  (r) = Recorded By, (t) = Taken By, (c) = Cosigned By    Initials Name Provider Type     Rosangela Hodges, OTR/L Occupational Therapist    BB Bibi Chester, CAN/L Occupational Therapy Assistant    GREGORY Bell, CAN/L Occupational Therapy Assistant    ARAM Herman,  PAMELLA/L Occupational Therapy Assistant    PAMELLA George/L Occupational Therapy Assistant    MAIK Diggs/L Occupational Therapist                Outcome Measures       01/22/18 0846 01/22/18 0720 01/21/18 1200    How much help from another person do you currently need...    Turning from your back to your side while in flat bed without using bedrails? 3  -CH      Moving from lying on back to sitting on the side of a flat bed without bedrails? 3  -CH      Moving to and from a bed to a chair (including a wheelchair)? 3  -CH      Standing up from a chair using your arms (e.g., wheelchair, bedside chair)? 3  -CH      Climbing 3-5 steps with a railing? 1  -CH      To walk in hospital room? 2  -CH      AM-PAC 6 Clicks Score 15  -CH      How much help from another is currently needed...    Putting on and taking off regular lower body clothing?  2  -BB 2  -CS    Bathing (including washing, rinsing, and drying)  2  -BB 2  -CS    Toileting (which includes using toilet bed pan or urinal)  2  -BB 2  -CS    Putting on and taking off regular upper body clothing  2  -BB 2  -CS    Taking care of personal grooming (such as brushing teeth)  2  -BB 2  -CS    Eating meals  2  -BB 2  -CS    Score  12  -BB 12  -CS    Functional Assessment    Outcome Measure Options AM-PAC 6 Clicks Basic Mobility (PT)  -CH  AM-PAC 6 Clicks Daily Activity (OT)  -CS      01/20/18 1418          How much help from another person do you currently need...    Turning from your back to your side while in flat bed without using bedrails? 3  -CH      Moving from lying on back to sitting on the side of a flat bed without bedrails? 3  -CH      Moving to and from a bed to a chair (including a wheelchair)? 3  -CH      Standing up from a chair using your arms (e.g., wheelchair, bedside chair)? 3  -CH      Climbing 3-5 steps with a railing? 1  -CH      To walk in hospital room? 2  -CH      AM-PAC 6 Clicks Score 15  -CH      Functional Assessment     Outcome Measure Options AM-PAC 6 Clicks Basic Mobility (PT)  -        User Key  (r) = Recorded By, (t) = Taken By, (c) = Cosigned By    Initials Name Provider Type     Nichelle Rahman, KIN Physical Therapy Assistant    BB Bibi Chester, CAN/L Occupational Therapy Assistant    ARAM Herman, CAN/L Occupational Therapy Assistant              OT Discharge Summary  Anticipated Discharge Disposition: home with home health, home with 24/7 care  Reason for Discharge: Discharge from facility, Per MD order  Outcomes Achieved: Refer to plan of care for updates on goals achieved  Discharge Destination: SNF      Rosangela Hodges OTR/L  1/23/2018

## 2018-01-24 ENCOUNTER — TELEPHONE (OUTPATIENT)
Dept: FAMILY MEDICINE CLINIC | Facility: CLINIC | Age: 63
End: 2018-01-24

## 2018-01-24 NOTE — TELEPHONE ENCOUNTER
Spoke with NH. Patient was seeing dogs and cats prior to entering [facility] while she had active infection. She is no longer having hallucinations. Have discussed with HANNAH Bear, at [facility]. Have attempted to call Daughter-in-law but the phone went straight to voicemail.        This document has been electronically signed by Coral Berry MD on January 24, 2018 3:05 PM

## 2018-01-24 NOTE — TELEPHONE ENCOUNTER
DR HERNANDEZ PT    PT DAUGHTER IN LAW CALLED [address] DR WAITE ASKED SON IF PT HAS SEEN OR HEAR THINGS AND SON SAID NO BUT DAUGHTER STATES PT IS SEEING ANIMALS AND HEARING THINGS. PLEASE CALL BACK [phone]

## 2018-01-30 ENCOUNTER — OFFICE VISIT (OUTPATIENT)
Dept: CARDIOLOGY | Facility: CLINIC | Age: 63
End: 2018-01-30

## 2018-01-30 ENCOUNTER — TELEPHONE (OUTPATIENT)
Dept: FAMILY MEDICINE CLINIC | Facility: CLINIC | Age: 63
End: 2018-01-30

## 2018-01-30 VITALS
DIASTOLIC BLOOD PRESSURE: 66 MMHG | SYSTOLIC BLOOD PRESSURE: 142 MMHG | BODY MASS INDEX: 23.05 KG/M2 | HEIGHT: 64 IN | HEART RATE: 82 BPM | WEIGHT: 135 LBS

## 2018-01-30 DIAGNOSIS — I05.1 RHEUMATIC MITRAL REGURGITATION: ICD-10-CM

## 2018-01-30 DIAGNOSIS — I05.0 RHEUMATIC MITRAL STENOSIS: ICD-10-CM

## 2018-01-30 DIAGNOSIS — R93.3 ABNORMAL FINDINGS ON DIAGNOSTIC IMAGING OF OTHER PARTS OF DIGESTIVE TRACT: ICD-10-CM

## 2018-01-30 DIAGNOSIS — I36.1 NON-RHEUMATIC TRICUSPID VALVE INSUFFICIENCY: ICD-10-CM

## 2018-01-30 DIAGNOSIS — Z13.220 SCREENING CHOLESTEROL LEVEL: ICD-10-CM

## 2018-01-30 DIAGNOSIS — Q20.8 ABNORMALITY OF RIGHT VENTRICLE OF HEART: ICD-10-CM

## 2018-01-30 DIAGNOSIS — I27.20 PULMONARY HYPERTENSION (HCC): Primary | ICD-10-CM

## 2018-01-30 PROCEDURE — 99215 OFFICE O/P EST HI 40 MIN: CPT | Performed by: INTERNAL MEDICINE

## 2018-01-30 RX ORDER — POTASSIUM CHLORIDE 20MEQ/15ML
20 LIQUID (ML) ORAL DAILY
COMMUNITY
End: 2018-04-04

## 2018-01-30 RX ORDER — ESOMEPRAZOLE MAGNESIUM 40 MG/1
40 CAPSULE, DELAYED RELEASE ORAL
COMMUNITY
End: 2018-02-27

## 2018-01-30 RX ORDER — HYDROCODONE BITARTRATE AND ACETAMINOPHEN 5; 325 MG/1; MG/1
1 TABLET ORAL EVERY 6 HOURS PRN
COMMUNITY
End: 2018-02-27

## 2018-01-30 NOTE — PROGRESS NOTES
Cardiovascular Medicine      Stevenson Brown M.D., Ph.D., [facility]         Dr. Berry  Thank you for asking me to see Michelle Child for PAH.    History of Present Illness  This is a 62 y.o. female with:    1. PAH  2. HTN  3. Moderate MS  4. SVT ablation    PAH  The patient is referred from primary care for an abnormal echocardiogram.  The patient's echocardiogram showed evidence of pulmonary hypertension.  She is an ongoing smoker.  Patient has no history of VTE.  She denies a history of congenital heart disease, CAD, CHF or MI.  Echocardiogram also showed moderate mitral stenosis accompanied by mitral regurgitation. She has noted no dyspnea. Her Oxygen has discontinued.  She has had some mild LE edema.     VHD  Most recent echocardiogram showed moderate mitral stenosis accompanied by mitral regurgitation.    Review of Systems - Review of Systems   Cardiovascular: Positive for leg swelling. Negative for dyspnea on exertion, palpitations, paroxysmal nocturnal dyspnea and syncope.   Respiratory: Positive for cough and shortness of breath. Negative for hemoptysis, sleep disturbances due to breathing, snoring, sputum production and wheezing.         All other systems were reviewed and were negative.    family history includes Cholelithiasis in her maternal grandmother and mother; Diabetes in her other; Graves' disease in her sister; Hypertension in her other; Thyroid cancer in her other and sister; Thyroid disease in her sister.     reports that she has been smoking.  She has never used smokeless tobacco. She reports that she does not drink alcohol or use illicit drugs.    Allergies   Allergen Reactions   • Codeine Nausea And Vomiting   • Penicillins    • Sulfa Antibiotics Nausea And Vomiting         Current Outpatient Prescriptions:   •  acetaminophen (TYLENOL) 325 MG tablet, Take 2 tablets by mouth Every 4 (Four) Hours As Needed for Mild Pain ., Disp: , Rfl:   •  albuterol (PROVENTIL HFA;VENTOLIN HFA) 108 (90  BASE) MCG/ACT inhaler, Inhale 2 puffs Every 4 (Four) Hours As Needed for wheezing., Disp: 6.7 g, Rfl: 11  •  aspirin 81 MG chewable tablet, Chew 1 tablet Daily., Disp: 30 tablet, Rfl: 11  •  bisacodyl (DULCOLAX) 5 MG EC tablet, Take 1 tablet by mouth Daily As Needed for Constipation., Disp: , Rfl:   •  ferrous sulfate 324 (65 Fe) MG tablet delayed-release EC tablet, Take 1 tablet by mouth 2 (Two) Times a Day With Meals., Disp: 30 tablet, Rfl:   •  FLUoxetine (PROzac) 20 MG capsule, TAKE 3 CAPSULES BY MOUTH ONCE DAILY, Disp: 90 capsule, Rfl: 1  •  folic acid (FOLVITE) 1 MG tablet, Take 1 tablet by mouth Daily., Disp: , Rfl:   •  furosemide (LASIX) 20 MG tablet, Take 1 tablet by mouth Every Other Day., Disp: 15 tablet, Rfl: 3  •  ipratropium-albuterol (DUO-NEB) 0.5-2.5 mg/mL nebulizer, Take 3 mL by nebulization 4 (Four) Times a Day., Disp: 360 mL, Rfl:   •  losartan (COZAAR) 50 MG tablet, Take 1 tablet by mouth Daily. Hold for sbp<100, Disp: , Rfl:   •  magic mouthwash with nystatin, Swish and swallow 5 mL 4 (Four) Times a Day for 10 days., Disp: 200 mL, Rfl: 0  •  metoprolol succinate XL (TOPROL-XL) 25 MG 24 hr tablet, Take 1 tablet by mouth Daily. Hold for sbp<100 hr<60, Disp: , Rfl:   •  nicotine (NICODERM CQ) 21 MG/24HR patch, Place 1 patch on the skin Daily., Disp: , Rfl:   •  omeprazole (priLOSEC) 40 MG capsule, Take 1 capsule by mouth Daily., Disp: 30 capsule, Rfl: 0  •  ondansetron (ZOFRAN) 4 MG tablet, Take 1 tablet by mouth Every 6 (Six) Hours As Needed for Nausea or Vomiting., Disp: , Rfl:   •  potassium chloride (MICRO-K) 10 MEQ CR capsule, Take 2 capsules by mouth Daily., Disp: , Rfl:   •  risperiDONE (risperDAL) 1 MG tablet, Take 1 tablet by mouth Every Night., Disp: , Rfl:     Physical Exam:  Vitals:    01/30/18 1451   BP: 142/66   Pulse:      Body mass index is 23.17 kg/(m^2).   GEN: A&O x 4  Body Habitus: well-nourished  HEENT: Head: Normocephalic, no lesions, without obvious abnormality.  Neck /  Thyroid: Supple, no masses, nodes, nodules or enlargement. No arcus senilis, xanthelasma or xanthomas.   JVP: 11 cm of water at 45 degrees              HJR: present                           Carotid:                    Upstroke: easily palpated bilaterally           Volume: Normal.                    Carotid Bruit:  None  Subclavian Bruit: Not present.                     Chest:  Normal                     Excursion: Good                    I:E: Prolonged  Pulmonary: Decreased breath sounds with rhonchi present in the LLL                                                              Precordium:  No palpable heaves or thrusts. P2 is not palpable.   [place]:  normal size and placement              Palpable S4: Not present.   Heart rate: normal  Heart Rhythm: regular                                                                   Heart Sounds: S1: increased intensity                   S2: increased intensity, increased P2  S3: absent                                                             S4: absent  Opening Snap: present                   A2-OS:  Close  Pericardial rub: absent                                           Ejection click: None                                                                                Murmurs: Systolic: 2/6 systolic LSB  Diastolic: 2/6 diastolic at apex. Best appreciated in the left lateral decubitus position  Abdomen: Soft, non-tender, normal bowel sounds; no bruits, organomegaly or masses.  Extremity: no edema, cyanosis  Trophic changes: None                    Pallor on elevation: Absent.                                                                   Rubor on dependency: None  Pulses: Right radial artery has 2+ (normal) pulse and Left radial artery has 2+ (normal) pulse    DATA REVIEWED:             EDWIN, 2014:                Assessment/Plan        TTE:  1. PAH/PVH with cor pulmonale and RV pressure overload pattern. WHO Group 2.3/3; FC: II/III.    RV status: Adversely adapted.   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  She does have significant risk factors for the development of COPD including heavy ongoing smoking.  However, I do not believe her mental status will allow her to participate in PFTs today.  The definitive treatment for this will be intervention of her mitral valve. Her mean gradient is 9 and she has severe pulmonary hypertension.  I suspect that she will require PBMV.  She will need invasive RHC in order to evaluate her PA pressures.   Also, this procedure is not performed at our institution.  After an initial workup she would need to be referred to another institution.  At this point I agree with aggressive diuresis.    A EDWIN was recommended to the patient with MAC local IV sedation. The indications and risks/benefits were discussed. This included risks of inadvertent tracheal intubation, hematoma, oropharyngeal bleeding, esophageal perforation.  The patient agreed to proceeding.  The risks of conscious sedation were also reviewed including allergy, anaphylaxis and hypoventilation requiring mechanical ventilation.  A hand out was also provided to the patient explaining the procedure.  -Agree with diuretic  -Toprol-XL  -Deferred referral to a center for PBMV  -Outpatient PFTs, 6MWT  -BNP  -Pulm referral    2. Moderate MS with PAH and severe TR.   -As above    3. Cardiac Risk Assessment:   -Unknown. Will check lipid profile    4. Tobacco status: is a former smoker     Plan for follow-up: 3 months          This document has been electronically signed by Stevenson Brown MD PhD on January 30, 2018 3:10 PM

## 2018-01-30 NOTE — TELEPHONE ENCOUNTER
DR HERNANDEZ    PATIENT IS IN GREAT DEAL OF PAIN AND HAVING TO TAKE PAIN MEDICATION EVERY DAY AT THE [facility] FOR HER BACK.  DAUGHTER IS CONCERNED IT IS FROM THE FALL SHE HAD.  THEY ARE OUT TODAY FOR HER APPT WITH DR JOEL AND WANT TO KNOW IF YOU THINK SHE MIGHT NEED AN XRAY.  HER DAUGHTER, HUBER, WOULD LIKE A PHONE CALL FROM YOU IF POSSIBLE.  [phone]    THANK YOU

## 2018-01-30 NOTE — PATIENT INSTRUCTIONS
Transesophageal Echocardiogram  Transesophageal echocardiography (EDWIN) is a special type of test that produces images of the heart by using sound waves (echocardiogram). This type of echocardiography can obtain better images of the heart than standard echocardiography. EDWIN is done by passing a flexible tube down the esophagus. The heart is located in front of the esophagus. Because the heart and esophagus are close to one another, your health care provider can take very clear, detailed pictures of the heart via ultrasound waves.  EDWIN may be done:  · If your health care provider needs more information based on standard echocardiography findings.  · If you had a stroke. This might have happened because a clot formed in your heart. EDWIN can visualize different areas of the heart and check for clots.  · To check valve anatomy and function.  · To check for infection on the inside of your heart (endocarditis).  · To evaluate the dividing wall (septum) of the heart and presence of a hole that did not close after birth (patent foramen ovale or atrial septal defect).  · To help diagnose a tear in the wall of the aorta (aortic dissection).  · During cardiac valve surgery. This allows the surgeon to assess the valve repair before closing the chest.  · During a variety of other cardiac procedures to guide positioning of catheters.  · Sometimes before a cardioversion, which is a shock to convert heart rhythm back to normal.  LET YOUR HEALTH CARE PROVIDER KNOW ABOUT:   · Any allergies you have.  · All medicines you are taking, including vitamins, herbs, eye drops, creams, and over-the-counter medicines.  · Previous problems you or members of your family have had with the use of anesthetics.  · Any blood disorders you have.  · Previous surgeries you have had.  · Medical conditions you have.  · Swallowing difficulties.  · An esophageal obstruction.  RISKS AND COMPLICATIONS   Generally, EDWIN is a safe procedure. However, as with any  procedure, complications can occur. Possible complications include an esophageal tear (rupture).  BEFORE THE PROCEDURE   · Do not eat or drink for 6 hours before the procedure or as directed by your health care provider.  · Arrange for someone to drive you home after the procedure. Do not drive yourself home. During the procedure, you will be given medicines that can continue to make you feel drowsy and can impair your reflexes.  · An IV access tube will be started in the arm.  PROCEDURE   · A medicine to help you relax (sedative) will be given through the IV access tube.  · A medicine may be sprayed or gargled to numb the back of the throat.  · Your blood pressure, heart rate, and breathing (vital signs) will be monitored during the procedure.  · The EDWIN probe is a long, flexible tube. The tip of the probe is placed into the back of the mouth, and you will be asked to swallow. This helps to pass the tip of the probe into the esophagus. Once the tip of the probe is in the correct area, your health care provider can take pictures of the heart.  · EDWIN is usually not a painful procedure. You may feel the probe press against the back of the throat. The probe does not enter the trachea and does not affect your breathing.  AFTER THE PROCEDURE   · You will be in bed, resting, until you have fully returned to consciousness.  · When you first awaken, your throat may feel slightly sore and will probably still feel numb. This will improve slowly over time.  · You will not be allowed to eat or drink until it is clear that the numbness has improved.  · Once you have been able to drink, urinate, and sit on the edge of the bed without feeling sick to your stomach (nausea) or dizzy, you may be cleared to go home.  · You should have a friend or family member with you for the next 24 hours after your procedure.  This information is not intended to replace advice given to you by your health care provider. Make sure you discuss any  "questions you have with your health care provider.  Document Released: 03/09/2004 Document Revised: 12/23/2014 Document Reviewed: 06/19/2014  [employer] Interactive Patient Education © 2017 [employer]  Pulmonary Function Tests  Pulmonary function tests (PFTs) measure how well your lungs are working. The tests can help to identify the causes of lung problems. They can also help your health care provider select the best treatment for you. Your health care provider may order pulmonary function for any of the following reasons:  · When an illness involving the lungs is suspected.  · To follow changes in your lung function over time if you are known to have a chronic lung disease.  · For industrial plant workers to examine the effects of being exposed to chemicals over a long period of time.  · To assess lung function prior to surgery or other procedures.  · For people who are smokers.  Your measured lung function will be compared to the expected lung function of someone with healthy lungs who is similar to you in age, gender, size, and other factors.  This is used to determine your \"percent predicted\" lung function, which is how your health care provider knows if your lung function is normal or abnormal. If you have had prior pulmonary function testing performed, your health care provider will also compare your current results with past tests to see if your lung function is better, worse, or staying the same. This can sometimes be useful to see if treatments are working.   LET YOUR HEALTH CARE PROVIDER KNOW ABOUT:  · Any allergies you have.  · All medicines you are taking, including inhaler or nebulizer medicines, vitamins, herbs, eye drops, creams, and over-the-counter medicines.  · Any blood disorders you have.  · Previous surgeries you have had, especially recent eye surgery, abdominal surgery, or chest surgery. These can make performing pulmonary function tests difficult or unsafe.  · Medical conditions you " have.  · Chest pain or heart problems.  · Tuberculosis or respiratory infections, such as pneumonia, a cold, or the flu.  If you think you will have difficulty performing any of the breathing maneuvers, ask your health care provider if you should reschedule the test.  RISKS AND COMPLICATIONS:  Generally, pulmonary function testing is a safe procedure. However, as with any procedure, complications can occur. Possible complications include:  · Lightheadedness due to overbreathing (hyperventilation).  · An asthmatic attack from deep breathing.  BEFORE THE PROCEDURE  · Take medicine as directed by your health care provider. If you take inhaler or nebulizer medicines, ask your health care provider which medicines you should take on the day of your test. Some inhaler medicines may interfere with pulmonary function tests, such as bronchodilator testing, if taken shortly before the test.  · Avoid eating a large meal before your test.  · Do not smoke before your test.  · Wear comfortable clothing which will not interfere with breathing.  PROCEDURE  · You will be given a soft nose clip to wear during the procedure. This is done so that all of your breaths will go through your mouth instead of your nose.  · You will be given a germ-free (sterile) mouthpiece. It will be attached to a spirometer. The spirometer is the machine that measures your breathing.  · You will be instructed to perform various breathing maneuvers. The maneuvers will be done by breathing in (inhaling) and breathing out (exhaling). Depending on what measurements are ordered, you may be asked to repeat the maneuvers several times before the test is completed.  · It is important to follow the instructions exactly to obtain accurate results. Make sure to blow as hard and as fast as you can when you are instructed to do so.  · You may be given a bronchodilator after testing has been performed. A bronchodilator is a medicine which makes the small air passages in  your lungs larger. These medicines usually make it easier to breathe. The tests are then repeated several minutes later after the bronchodilator has taken effect.  · You will be monitored carefully during the procedure for faintness, dizziness, difficulty breathing, or any other problems.  AFTER THE PROCEDURE   · You may resume your usual diet, medicines, and activities as directed by your health care provider.  · Your health care provider will go over your test results with you and determine what treatments may be helpful.  This information is not intended to replace advice given to you by your health care provider. Make sure you discuss any questions you have with your health care provider.  Document Released: 08/10/2005 Document Revised: 10/08/2014 Document Reviewed: 07/17/2014  [employer] Interactive Patient Education © 2017 [employer]  Pulmonary Hypertension  Pulmonary hypertension is high blood pressure within the arteries in your lungs (pulmonary arteries). It is different than having high blood pressure elsewhere in your body, such as blood pressure that is measured with a blood pressure cuff. Pulmonary hypertension makes it harder for blood to flow through the lungs. As a result, the heart must work harder to pump blood through the lungs, and it may be harder for you to breathe. Over time, this can weaken the heart muscle. Pulmonary hypertension is a serious condition and it can be fatal.  What are the causes?  Many different medical conditions can cause pulmonary hypertension. Pulmonary hypertension can be categorized by cause into five groups:  Group 1   Pulmonary hypertension that is caused by abnormal growth of small blood vessels in the lungs (pulmonary arterial hypertension). The abnormal blood vessel growth may have no known cause, or it may be:  · Passed along from a parent (hereditary).  · Caused by another disease, such as a connective tissue disease (including lupus or scleroderma) or  HIV.  · Caused by certain drugs or toxins.  Group 2   Pulmonary hypertension that is caused by weakness of the main chamber of the heart (left ventricle) or heart valve disease.  Group 3   Pulmonary hypertension that is caused by lung disease or low oxygen levels. Causes in this group include:  · Emphysema or chronic obstructive pulmonary disease (COPD).  · Untreated sleep apnea.  · Pulmonary fibrosis.  Group 4   Pulmonary hypertension that is caused by blood clots in the lungs (pulmonary emboli).  Group 5   Other causes of pulmonary hypertension, such as sickle cell anemia, or a mix of multiple causes.  What are the signs or symptoms?  Symptoms of this condition include:  · Shortness of breath. You may notice shortness of breath with:  ¨ Activity, such as walking.  ¨ No activity.  · Tiredness and fatigue.  · Dizziness or fainting.  · Rapid heartbeat or feeling your heart flutter or skip a beat (palpitations).  · Neck vein enlargement.  · Bluish color to your lips and fingertips.  How is this diagnosed?  This condition may be diagnosed by:  · Chest X-ray.  · Arterial blood gases. This test checks the acidity of your blood as well as your blood oxygen and carbon dioxide levels.  · CT scan. This test can provide detailed images of your lungs.  · Pulmonary function test. This test measures how much air your lungs can hold. It also tests how well air moves in and out of your lungs.  · Electrocardiogram (ECG). This test traces the electrical activity of your heart.  · Echocardiogram. This test is used to look at your heart in motion and check how it is functioning.  · Heart catheterization. This test can measure the pressure in your pulmonary artery and the right side of your heart.  · Lung biopsy. This procedure involves checking a sample of lung tissue to find underlying causes.  How is this treated?  There is no cure for pulmonary hypertension, but treatment can help to relieve symptoms and slow the progress of the  condition. Treatment can involve:  · Medicines, such as:  ¨ Blood pressure medicines.  ¨ Medicines to relax (dilate) the pulmonary blood vessels.  ¨ Water pills to get rid of extra fluid (diuretic medicines).  ¨ Blood-thinning medicines.  · Surgery. For severe pulmonary hypertension that does not respond to medical treatment, heart-lung or lung transplant may be needed.  Follow these instructions at home:  · Take medicines only as directed by your health care provider. These include over-the-counter medicines and prescription medicines. Take all medicines exactly as instructed. Do not change or stop medicines without first checking with your health care provider.  · Do not smoke. If you need help quitting, ask your health care provider.  · Eat a healthy diet.  · Limit your salt (sodium) intake to less than 2,300 mg per day.  · Stay as active as possible. Exercise as directed by your health care provider. Talk with your health care provider about what type of exercise is safe for you.  · Avoid high altitudes.  · Avoid hot tubs and saunas.  · Avoid becoming pregnant, if this applies. Talk with your health care provider about safe methods of birth control.  · Keep all follow-up visits as directed by your health care provider. This is important.  Get help right away if:  · You have severe shortness of breath.  · You develop chest pain or pressure in your chest.  · You cough up blood.  · You develop swelling of your feet or legs.  · You have a significant increase in weight within 1-2 days.  This information is not intended to replace advice given to you by your health care provider. Make sure you discuss any questions you have with your health care provider.  Document Released: 10/14/2008 Document Revised: 07/07/2017 Document Reviewed: 06/09/2014  [employer] Interactive Patient Education © 2017 [employer]  Mitral Valve Stenosis  Mitral valve stenosis is a narrowing of the mitral valve. This can limit blood flow between  the upper left chamber (left atrium) and lower left chamber (left ventricle) of the heart. This condition is likely to be diagnosed when your health care provider hears an abnormal sound (heart murmur) while listening to your heart. This condition can range from mild to severe.  What are the causes?  This condition may be caused by:  · Rheumatic fever, which is a complication of a strep infection.  · Buildup of calcium around the valve. This can occur with aging.  · A problem that is present at birth (congenital defect).  · Certain long-term (chronic) diseases.  What are the signs or symptoms?  Symptoms of this condition include:  · Shortness of breath.  · Cough.  · Loud noises when breathing (wheezing).  · Fatigue or decreased energy.  · Fast or irregular heartbeat (palpitations).  · Chest pain.  · Pain in the arm, neck, jaw, or face.  · Swollen feet or ankles.  · Hoarse voice.  · Pink and purple patches of skin on the face.  How is this diagnosed?  This condition is diagnosed based on the results of a physical exam. Your health care provider will check for a heart murmur by listening to your heart. You may also have other tests, including:  · An echocardiogram. This test creates ultrasound images of the heart that allow your health care provider to see how the heart valves work while your heart is beating.  · Cardiac catheterization. This test is used to look at the structure and function of the heart. A thin tube (catheter) is passed through the blood vessels and into the heart. Dye is injected into the blood vessels so the cardiac system can be seen on images that are taken.  · Chest X-ray.  How is this treated?  Treatment for this condition depends on the severity of the condition. It may include:  · Medicines to keep the heart rate regular.  · Medicines to control blood pressure.  · Blood thinners (anticoagulants) to prevent blood clots.  · Antibiotic medicines to prevent infections. Defective heart valves are  more likely to become infected.  · Percutaneous balloon valvuloplasty. This is a procedure in which a catheter is passed through a blood vessel to the heart. A tiny balloon at the tip of the catheter is inflated to open up the narrowed mitral valve.  · Open heart surgery to repair or replace the mitral valve.  Follow these instructions at home:  Lifestyle  · Limit your intake of caffeine and alcohol. Both of these substances can affect your heart's rate and rhythm.  ¨ Limit alcohol intake no more than 1 drink a day for nonpregnant women and 2 drinks a day for men. One drink equals 12 oz of beer, 5 oz of wine, or 1½ oz of hard liquor.  · Do not use any products that contain nicotine or tobacco, such as cigarettes and e-cigarettes. If you need help quitting, ask your health care provider.  · Achieve and maintain a healthy weight.  · Ask your health care provider what kinds of exercise are safe for you.  · Eat a heart-healthy diet that includes plenty of fresh fruits and vegetables, whole grains, low-fat (lean) protein, and low-fat dairy products. Consider working with a diet and nutrition specialist (dietitian) to help you make healthy food choices.  · Limit the amount of salt (sodium) in your diet. Avoid adding salt to foods, and avoid foods that are high in salt, such as:  ¨ Pickles.  ¨ Smoked and cured meats.  ¨ Processed foods.  General instructions  · Take over-the-counter and prescription medicines only as told by your health care provider.  · Before any dental procedures, tell your dentist that you have mitral valve stenosis. You may need antibiotics to prevent a heart infection.  · If you plan to become pregnant, talk with your health care provider first.  · Keep all follow-up visits as told by your health care provider. This is important.  Contact a health care provider if:  · You have a fever.  · You feel more tired than usual when doing physical activity.  · You have a dry cough.  Get help right away  if:  · You have pain or pressure in your chest that does not go away.  · You have difficulty breathing.  · You have palpitations.  · You have a sudden weight gain.  · You have swelling in your feet, ankles, or legs.  · You have trouble staying awake or you faint.  · You feel confused.  These symptoms may represent a serious problem that is an emergency. Do not wait to see if the symptoms will go away. Get medical help right away. Call your local emergency services (911 in the [place] Do not drive yourself to the hospital.   Summary  · Mitral valve stenosis is a narrowing of the mitral valve. This condition can limit blood flow on the left side of your heart.  · Depending on how severe your condition is, you may be treated with medicines, a procedure, or surgery.  · Practice heart-healthy habits to manage this condition. These include limiting alcohol, avoiding nicotine and tobacco, and eating a balanced diet that is low in salt (sodium).  This information is not intended to replace advice given to you by your health care provider. Make sure you discuss any questions you have with your health care provider.  Document Released: 06/07/2011 Document Revised: 09/29/2017 Document Reviewed: 09/29/2017  [employer] Interactive Patient Education © 2017 [employer]

## 2018-01-31 ENCOUNTER — TELEPHONE (OUTPATIENT)
Dept: FAMILY MEDICINE CLINIC | Facility: CLINIC | Age: 63
End: 2018-01-31

## 2018-02-01 ENCOUNTER — OFFICE VISIT (OUTPATIENT)
Dept: PULMONOLOGY | Facility: CLINIC | Age: 63
End: 2018-02-01

## 2018-02-01 ENCOUNTER — PROCEDURE VISIT (OUTPATIENT)
Dept: CARDIOLOGY | Facility: CLINIC | Age: 63
End: 2018-02-01

## 2018-02-01 VITALS
HEIGHT: 64 IN | SYSTOLIC BLOOD PRESSURE: 120 MMHG | WEIGHT: 135 LBS | DIASTOLIC BLOOD PRESSURE: 62 MMHG | HEART RATE: 86 BPM | BODY MASS INDEX: 23.05 KG/M2 | OXYGEN SATURATION: 98 %

## 2018-02-01 DIAGNOSIS — I27.20 PULMONARY HYPERTENSION (HCC): ICD-10-CM

## 2018-02-01 DIAGNOSIS — J44.9 STAGE 3 SEVERE COPD BY GOLD CLASSIFICATION (HCC): ICD-10-CM

## 2018-02-01 DIAGNOSIS — R53.81 PHYSICAL DECONDITIONING: ICD-10-CM

## 2018-02-01 DIAGNOSIS — J18.9 PNEUMONIA OF BOTH LOWER LOBES DUE TO INFECTIOUS ORGANISM: ICD-10-CM

## 2018-02-01 DIAGNOSIS — I27.20 PULMONARY HYPERTENSION (HCC): Primary | ICD-10-CM

## 2018-02-01 DIAGNOSIS — F17.200 TOBACCO USE DISORDER: ICD-10-CM

## 2018-02-01 PROBLEM — J13 PNEUMONIA OF LEFT LOWER LOBE DUE TO STREPTOCOCCUS PNEUMONIAE (HCC): Status: RESOLVED | Noted: 2018-01-11 | Resolved: 2018-02-01

## 2018-02-01 PROCEDURE — 99204 OFFICE O/P NEW MOD 45 MIN: CPT | Performed by: INTERNAL MEDICINE

## 2018-02-01 PROCEDURE — 94618 PULMONARY STRESS TESTING: CPT | Performed by: INTERNAL MEDICINE

## 2018-02-01 PROCEDURE — 94060 EVALUATION OF WHEEZING: CPT | Performed by: INTERNAL MEDICINE

## 2018-02-01 PROCEDURE — 99406 BEHAV CHNG SMOKING 3-10 MIN: CPT | Performed by: INTERNAL MEDICINE

## 2018-02-01 RX ORDER — MELOXICAM 15 MG/1
15 TABLET ORAL DAILY
COMMUNITY
End: 2018-03-16 | Stop reason: SDUPTHER

## 2018-02-01 RX ORDER — BUDESONIDE AND FORMOTEROL FUMARATE DIHYDRATE 160; 4.5 UG/1; UG/1
2 AEROSOL RESPIRATORY (INHALATION)
Qty: 1 INHALER | Refills: 11 | Status: SHIPPED | OUTPATIENT
Start: 2018-02-01 | End: 2018-05-02 | Stop reason: SDUPTHER

## 2018-02-01 RX ORDER — NICOTINE 21 MG/24HR
1 PATCH, TRANSDERMAL 24 HOURS TRANSDERMAL EVERY 24 HOURS
Qty: 28 PATCH | Refills: 3 | Status: SHIPPED | OUTPATIENT
Start: 2018-02-01 | End: 2018-04-04

## 2018-02-01 NOTE — PROGRESS NOTES
Michelle Heidi Danyell  Procedure: 6 Minute Walk Test   2/1/18  Indication:pulmonary hypertension    Pretest: BP:100/58               HR:73               Sa02:93               Dyspnea:0               Fatigue:0.5    Post Test: BP:110/58               HR:85               Sa02:92               Dyspnea:0               Fatigue:1    First 6MWT:yes    Supplemental oxygen during test:no    Stopped before 6 minutes:no    Pauses:none    Results in distance walked:206.46 m    Did individual experience any pain or discomfort:no    I was present for the above test and agree with the data.     NYHA Functional Class IV    Stevenson Brown MD PhD

## 2018-02-01 NOTE — PATIENT INSTRUCTIONS
Consider contacting [facility] [place] at 7-638-QUIT-NOW or [email or web address] for assistance and support with quitting smoking.     Additional resources available at:  https://www.cdc.gov/tobacco/quit_smoking/how_to_quit/resources/index.htm

## 2018-02-01 NOTE — PROGRESS NOTES
Pulmonary Consultation    No ref. provider found,    Thank you for asking me to see Michelle Child for   Chief Complaint   Patient presents with   • Pulmonary Hypertension     ref by Dr. Brown   .    Subjective     History of Present Illness  Michelle Child is a 62 y.o. female with a PMH significant for tobacco use, PH, HTN, and GERD who presents for evaluation of dyspnea and PH. Pt was hospitalized last month for pneumonia and HF. She was seen in consultation earlier this week and he recommended she have a pulmonary evaluation. She has a known valvular disorder which is believed to be causing PH. Pt admits she was told she had COPD several years ago and she was given several different inhalers but she cannot recall the names. Pt stopped using her inhalers as she did not need them. She does have albuterol which she uses a few times a week with benefit. Pt was prescribed duonebs every 4hrs at discharge to rehab. She states she needs a nebulizer for when she goes. Pt denies dyspnea or OQUENDO since she has been in rehab. She still has a cough productive of white sputum, but she does admit to having a chronic cough. Pt denies recent wheeze, chest pain, or fevers. Pt has had ~10lbs wt loss and some mild leg edema. Pt was smoking 1ppd for 50yrs but she has not smoked since she was admitted. She is using the patch but she still has cravings and wants to smoke. Pt worked retail. Her mother may have had COPD, but there is no known lung cancer in the family.    Review of Systems: History obtained from chart review and the patient.  Review of Systems   Constitutional: Positive for unexpected weight change. Negative for fatigue and fever.   HENT: Negative for congestion.    Respiratory: Positive for cough, shortness of breath and wheezing. Negative for chest tightness.    Cardiovascular: Positive for leg swelling. Negative for chest pain.   Gastrointestinal: Positive for abdominal pain.     As described in the HPI.  Otherwise, remainder of ROS (14 systems) were negative.    Patient Active Problem List   Diagnosis   • Tobacco dependence   • Migraine   • Iron deficiency anemia   • Gastroesophageal reflux disease   • Coronary arteriosclerosis   • Bilateral pseudophakia   • Astigmatism   • Right humeral fracture   • Fall with injury   • Closed fracture of humerus   • Cause of injury, fall   • Closed fracture of proximal end of right humerus with routine healing   • Weakness   • Acute respiratory failure with hypoxia   • Hyponatremia   • Cerebral microvascular disease   • Pulmonary hypertension   • Rheumatic tricuspid valve regurgitation   • Rheumatic mitral stenosis   • Acute diastolic CHF (congestive heart failure)   • Encephalopathy, metabolic   • Pulmonary hypertension due to left heart valvular disease   • Cor pulmonale, chronic   • Folic acid deficiency   • Hypoxic encephalopathy   • Oral thrush   • Laryngitis, acute   • Dysuria   • Bacterial cystitis   • Closed fracture of multiple ribs of right side   • Acute retention of urine         Current Outpatient Prescriptions:   •  acetaminophen (TYLENOL) 325 MG tablet, Take 2 tablets by mouth Every 4 (Four) Hours As Needed for Mild Pain . (Patient taking differently: Take  by mouth Every 4 (Four) Hours As Needed for Mild Pain .), Disp: , Rfl:   •  albuterol (PROVENTIL HFA;VENTOLIN HFA) 108 (90 BASE) MCG/ACT inhaler, Inhale 2 puffs Every 4 (Four) Hours As Needed for wheezing., Disp: 6.7 g, Rfl: 11  •  aspirin 81 MG chewable tablet, Chew 1 tablet Daily., Disp: 30 tablet, Rfl: 11  •  bisacodyl (DULCOLAX) 5 MG EC tablet, Take 1 tablet by mouth Daily As Needed for Constipation., Disp: , Rfl:   •  esomeprazole (nexIUM) 40 MG capsule, Take 40 mg by mouth Every Morning Before Breakfast., Disp: , Rfl:   •  ferrous sulfate 324 (65 Fe) MG tablet delayed-release EC tablet, Take 1 tablet by mouth 2 (Two) Times a Day With Meals., Disp: 30 tablet, Rfl:   •  FLUoxetine (PROzac) 20 MG capsule, TAKE  3 CAPSULES BY MOUTH ONCE DAILY, Disp: 90 capsule, Rfl: 1  •  folic acid (FOLVITE) 1 MG tablet, Take 1 tablet by mouth Daily., Disp: , Rfl:   •  furosemide (LASIX) 20 MG tablet, Take 1 tablet by mouth Every Other Day., Disp: 15 tablet, Rfl: 3  •  losartan (COZAAR) 50 MG tablet, Take 1 tablet by mouth Daily. Hold for sbp<100, Disp: , Rfl:   •  magic mouthwash with nystatin, Swish and swallow 5 mL 4 (Four) Times a Day for 10 days., Disp: 200 mL, Rfl: 0  •  meloxicam (MOBIC) 15 MG tablet, Take 15 mg by mouth Daily., Disp: , Rfl:   •  metoprolol succinate XL (TOPROL-XL) 25 MG 24 hr tablet, Take 1 tablet by mouth Daily. Hold for sbp<100 hr<60, Disp: , Rfl:   •  nicotine (NICODERM CQ) 21 MG/24HR patch, Place 1 patch on the skin Daily., Disp: 28 patch, Rfl: 3  •  ondansetron (ZOFRAN) 4 MG tablet, Take 1 tablet by mouth Every 6 (Six) Hours As Needed for Nausea or Vomiting., Disp: , Rfl:   •  potassium chloride (KAYCIEL) 20 MEQ/15ML (10%) solution, Take 20 mEq by mouth Daily., Disp: , Rfl:   •  potassium chloride (MICRO-K) 10 MEQ CR capsule, Take 2 capsules by mouth Daily., Disp: , Rfl:   •  risperiDONE (risperDAL) 1 MG tablet, Take 1 tablet by mouth Every Night., Disp: , Rfl:   •  budesonide-formoterol (SYMBICORT) 160-4.5 MCG/ACT inhaler, Inhale 2 puffs 2 (Two) Times a Day., Disp: 1 inhaler, Rfl: 11  •  HYDROcodone-acetaminophen (NORCO) 5-325 MG per tablet, Take 1 tablet by mouth Every 6 (Six) Hours As Needed., Disp: , Rfl:     Past Medical History:   Diagnosis Date   • Alkaline phosphatase raised    • Astigmatism    • Bilateral pseudophakia    • Coronary arteriosclerosis    • Depressive disorder    • Edema of lower extremity    • Encounter for general adult medical examination with abnormal findings    • Essential hypertension    • Gastroesophageal reflux disease    • H/O bone density study     DXA BONE DENSITY AXIAL    04/22/2016   • H/O screening mammography     SCREENING MAMMOGRAPHY     04/25/2016   • Hx of screening  mammography     x3; declined screening, understands risks and benefits and still declines      07/24/2015   • Influenza vaccine administered     INFLUENZA IMMUN ADMIN OR PREV RECV'D   x4       04/01/2016   • Insomnia    • Iron deficiency    • Migraine    • Tobacco dependence     continuous       Past Surgical History:   Procedure Laterality Date   • APPENDECTOMY     • BREAST IMPLANT SURGERY     • CATARACT EXTRACTION WITH INTRAOCULAR LENS IMPLANT  12/17/2003    Phacoemulsification of a cataract with intraocular lens implant of rigt eye, Nixon model SA 60-AT; serial # [id number];20.5 diopter   • CHOLECYSTECTOMY  08/06/2007    Laparoscopic cholecystectomy. Symptomatic gallstones   • COLONOSCOPY  10/15/2013    declined stool cards and colonscopy   • ENDOSCOPY AND COLONOSCOPY  05/22/2014    Internal & external hemorrhoids found.   • ENDOSCOPY W/ PEG TUBE PLACEMENT  05/22/2014    EGD w/ tube: Normal esopahgus. Gastritis in stomach. Biopsy taken. Normal duodenum. Biopsy taken.   • INJECTION OF MEDICATION  07/24/2013    Inj(s) Tend-Sheath, Ligament, Single   • INJECTION OF MEDICATION  11/17/2014    Kenalog x6   • INJECTION OF MEDICATION  04/01/2016    PNEUMOC VAC/ADMIN/RCVD    • INJECTION OF MEDICATION  12/05/2014    Toradol   • OTHER SURGICAL HISTORY  01/19/2014    Drain/Inject Major Joint   x2   • PAP SMEAR  10/06/2011   • PARTIAL HYSTERECTOMY      Partial hyst    • TONSILLECTOMY     • TUBAL ABDOMINAL LIGATION       Social History     Social History   • Marital status:      Spouse name: N/A   • Number of children: N/A   • Years of education: N/A     Social History Main Topics   • Smoking status: Current Every Day Smoker   • Smokeless tobacco: Never Used      Comment: SMOKED FOR 20>PLUS YRS   • Alcohol use No   • Drug use: No   • Sexual activity: Defer     Other Topics Concern   • None     Social History Narrative     Family History   Problem Relation Age of Onset   • Cholelithiasis Mother    • Thyroid cancer Sister  "   • Thyroid disease Sister      Thyroid disorder   • Graves' disease Sister    • Cholelithiasis Maternal Grandmother    • Thyroid cancer Other      Other 2nd degree relative, thyroid   • Diabetes Other    • Hypertension Other           Objective     Blood pressure 120/62, pulse 86, height 162.6 cm (64\"), weight 61.2 kg (135 lb), SpO2 98 %, not currently breastfeeding.  Physical Exam   Constitutional: She is oriented to person, place, and time. Vital signs are normal. She appears well-developed and well-nourished.   HENT:   Head: Normocephalic and atraumatic.   Nose: Nose normal.   Mouth/Throat: Uvula is midline, oropharynx is clear and moist and mucous membranes are normal. She has dentures.   Mallampati 2   Eyes: Conjunctivae, EOM and lids are normal. Pupils are equal, round, and reactive to light.   Neck: Trachea normal and normal range of motion. No tracheal tenderness present. No thyroid mass present.   Cardiovascular: Normal rate, regular rhythm and normal heart sounds.  PMI is not displaced.  Exam reveals no gallop.    No murmur heard.  Pulmonary/Chest: Effort normal. No respiratory distress. She has decreased breath sounds in the right lower field and the left lower field. She has no wheezes. She has no rhonchi. Chest wall is not dull to percussion. She exhibits no tenderness.   Abdominal: Soft. Normal appearance and bowel sounds are normal. There is no hepatosplenomegaly. There is no tenderness.   Musculoskeletal:   Walks with a walker, no extremity edema       Vascular Status -  Her exam exhibits no right foot edema. Her exam exhibits no left foot edema.  Lymphadenopathy:        Head (right side): No submandibular adenopathy present.        Head (left side): No submandibular adenopathy present.     She has no cervical adenopathy.        Right: No supraclavicular adenopathy present.        Left: No supraclavicular adenopathy present.   Neurological: She is alert and oriented to person, place, and time. Gait " normal.   Skin: Skin is warm and dry. No rash noted. No cyanosis. Nails show no clubbing.   Psychiatric: She has a normal mood and affect. Her speech is normal and behavior is normal. Judgment normal.   Nursing note and vitals reviewed.      PFTs: 2/1/18 (independently reviewed and interpreted by me)  Ratio 61  FVC 1.89/ 59%  FEV1 1.16/ 47%  Poor effort.  Severe obstruction with no bronchodilator response No comparative data available.    Radiology (independently reviewed and interpreted by me): CTPA 1/19/18 showed a PE, emphysema, interstitial prominence likely due to edema, small bilateral pleural effusions with adjacent atelectasis versus basilar infiltrates, mild reactive adenopathy    TTE 1/14/18:  · The study is technically difficult for diagnosis. The quality of the study is limited due to poor acoustic windows.  · Left Ventricle: Left ventricular systolic function is normal. Estimated EF appears to be in the range of 61 - 65%.  · Left ventricular wall thickness is consistent with mild concentric hypertrophy and Grade Ia diastolic dysfunction.  · Right Ventricle: Right ventricular cavity is mildly dilated. Right ventricular wall thickness is consistent with moderate hypertrophy  · Systolic flattening of interventricular septum consistent with right ventricle pressure overload  · There is calcification and thickening of the aortic valve with mild aortic regurgitation.  · Moderate mitral valve stenosis is present with rheumatic changes of the mitral valve apparatus. Mild to moderate mitral regurgitation is present.  · Severe tricuspid valve regurgitation is present with PA systolic pressure of 106mmHG.  · Severe pulmonary hypertension is present.  · There is no evidence of pericardial effusion       Assessment/Plan     Michelle was seen today for pulmonary hypertension.    Diagnoses and all orders for this visit:    Pulmonary hypertension  -     Spirometry With Bronchodilator    Tobacco use disorder  -      nicotine (NICODERM CQ) 21 MG/24HR patch; Place 1 patch on the skin Daily.    Stage 3 severe COPD by GOLD classification  -     budesonide-formoterol (SYMBICORT) 160-4.5 MCG/ACT inhaler; Inhale 2 puffs 2 (Two) Times a Day.    Pneumonia of both lower lobes due to infectious organism  -     XR Chest 2 View; Future    Physical deconditioning         Discussion/ Recommendations:   Spirometry is consistent with severe COPD.  Chest imaging was reviewed and was consistent with volume overload as well as possible pneumonia.  The patient has improved since her recent hospitalization, but I think she would benefit from long-acting bronchodilators.  Also, she has done well and not smoking, but what she discharged home she has a high likelihood of relapse as she continues to have cravings.    -Stop DuoNeb's.  -Start Symbicort twice daily.  Sample provided and instructed on use.  Counseled on possible side effects.  Rinse mouth well after use.  -Continue albuterol as needed for dyspnea or wheeze.  -Counseled on the importance of continuing tobacco cessation.  Recommend she continue using the nicotine patch, but consider other methods to assist with the habit.  Recommended she look into the [facility] program for assistance.  Spent 5 minutes.  -F/u with Dr. Brown for planned EDWIN to assess pulmonary pressures and valvular disorders.  -Would benefit from entrance into pulmonary rehabilitation once she is discharged from the nursing facility.  -Meets criteria for LD CT screening.  Will discuss at the next visit and evaluation of follow-up chest x-ray from recent pneumonia.         Return in about 4 weeks (around 3/1/2018) for Recheck.      Thank you for allowing me to participate in the care of Michelle Child. Please do not hesitate to contact me with any questions.         This document has been electronically signed by Gay Cornejo MD on February 1, 2018 10:35 AM      Dictated using Dragon

## 2018-02-05 ENCOUNTER — TELEPHONE (OUTPATIENT)
Dept: FAMILY MEDICINE CLINIC | Facility: CLINIC | Age: 63
End: 2018-02-05

## 2018-02-05 NOTE — TELEPHONE ENCOUNTER
[place] with [facility] called, and ask that you call him back as soon as can.  Said that pt family is wanting to know if can change the pt potassium from liquid form to pill form and to something smaller than the last that was on, they are also wanting to speak to you about being D/C home/      Please call [place] on his cell [phone] ASAP

## 2018-02-06 ENCOUNTER — TELEPHONE (OUTPATIENT)
Dept: FAMILY MEDICINE CLINIC | Facility: CLINIC | Age: 63
End: 2018-02-06

## 2018-02-06 DIAGNOSIS — S42.291D OTHER CLOSED DISPLACED FRACTURE OF PROXIMAL END OF RIGHT HUMERUS WITH ROUTINE HEALING, SUBSEQUENT ENCOUNTER: Primary | ICD-10-CM

## 2018-02-06 NOTE — TELEPHONE ENCOUNTER
RAYO / BROOK    SECOND CALL PER RAYO, HE IS ASKING FOR A POTASSIUM CHANGE.  ALSO, THE FAMILY HAS REQUESTED TO SEE DR HERNANDEZ BEFORE PATIENT IS DISCHARGED HOME    THANK YOU

## 2018-02-07 ENCOUNTER — OFFICE VISIT (OUTPATIENT)
Dept: ORTHOPEDIC SURGERY | Facility: CLINIC | Age: 63
End: 2018-02-07

## 2018-02-07 VITALS — HEIGHT: 64 IN | BODY MASS INDEX: 21.51 KG/M2 | WEIGHT: 126 LBS

## 2018-02-07 DIAGNOSIS — S42.291G OTHER CLOSED DISPLACED FRACTURE OF PROXIMAL END OF RIGHT HUMERUS WITH DELAYED HEALING, SUBSEQUENT ENCOUNTER: Primary | ICD-10-CM

## 2018-02-07 PROCEDURE — 99213 OFFICE O/P EST LOW 20 MIN: CPT | Performed by: NURSE PRACTITIONER

## 2018-02-07 NOTE — PROGRESS NOTES
The patient is a 62 y.o. female who presents for followup.    Chief Complaint   Patient presents with   • Right Shoulder - Follow-up       HPI: Patient presents to office for follow-up of right humerus fracture.  Initial injury occurred on 7/28/2017 due to a fall.  Patient reports pain in her right arm has improved. No complaints of pain today. She continues to have some limited range of motion in the right arm.  X-rays are repeated today. Patient was admitted to the hospital on 1/10/2018 with pneumonia encephalopathy and is currently residing at [facility] for rehabilitation purposes.  Her son reports that she will likely be discharged from the nursing home this week. It is noted the patient has sustained several falls in the last couple of months.  However, she denies any known reinjury to her right arm/shoulder.    Current Outpatient Prescriptions:   •  acetaminophen (TYLENOL) 325 MG tablet, Take 2 tablets by mouth Every 4 (Four) Hours As Needed for Mild Pain . (Patient taking differently: Take  by mouth Every 4 (Four) Hours As Needed for Mild Pain .), Disp: , Rfl:   •  albuterol (PROVENTIL HFA;VENTOLIN HFA) 108 (90 BASE) MCG/ACT inhaler, Inhale 2 puffs Every 4 (Four) Hours As Needed for wheezing., Disp: 6.7 g, Rfl: 11  •  aspirin 81 MG chewable tablet, Chew 1 tablet Daily., Disp: 30 tablet, Rfl: 11  •  bisacodyl (DULCOLAX) 5 MG EC tablet, Take 1 tablet by mouth Daily As Needed for Constipation., Disp: , Rfl:   •  budesonide-formoterol (SYMBICORT) 160-4.5 MCG/ACT inhaler, Inhale 2 puffs 2 (Two) Times a Day., Disp: 1 inhaler, Rfl: 11  •  esomeprazole (nexIUM) 40 MG capsule, Take 40 mg by mouth Every Morning Before Breakfast., Disp: , Rfl:   •  ferrous sulfate 324 (65 Fe) MG tablet delayed-release EC tablet, Take 1 tablet by mouth 2 (Two) Times a Day With Meals., Disp: 30 tablet, Rfl:   •  FLUoxetine (PROzac) 20 MG capsule, TAKE 3 CAPSULES BY MOUTH ONCE DAILY, Disp: 90 capsule, Rfl: 1  •  folic  "acid (FOLVITE) 1 MG tablet, Take 1 tablet by mouth Daily., Disp: , Rfl:   •  furosemide (LASIX) 20 MG tablet, Take 1 tablet by mouth Every Other Day., Disp: 15 tablet, Rfl: 3  •  HYDROcodone-acetaminophen (NORCO) 5-325 MG per tablet, Take 1 tablet by mouth Every 6 (Six) Hours As Needed., Disp: , Rfl:   •  losartan (COZAAR) 50 MG tablet, Take 1 tablet by mouth Daily. Hold for sbp<100, Disp: , Rfl:   •  meloxicam (MOBIC) 15 MG tablet, Take 15 mg by mouth Daily., Disp: , Rfl:   •  metoprolol succinate XL (TOPROL-XL) 25 MG 24 hr tablet, Take 1 tablet by mouth Daily. Hold for sbp<100 hr<60, Disp: , Rfl:   •  nicotine (NICODERM CQ) 21 MG/24HR patch, Place 1 patch on the skin Daily., Disp: 28 patch, Rfl: 3  •  ondansetron (ZOFRAN) 4 MG tablet, Take 1 tablet by mouth Every 6 (Six) Hours As Needed for Nausea or Vomiting., Disp: , Rfl:   •  potassium chloride (KAYCIEL) 20 MEQ/15ML (10%) solution, Take 20 mEq by mouth Daily., Disp: , Rfl:   •  potassium chloride (MICRO-K) 10 MEQ CR capsule, Take 2 capsules by mouth Daily., Disp: , Rfl:   •  risperiDONE (risperDAL) 1 MG tablet, Take 1 tablet by mouth Every Night., Disp: , Rfl:     Allergies   Allergen Reactions   • Codeine Nausea And Vomiting   • Penicillins    • Sulfa Antibiotics Nausea And Vomiting       ROS:  No fevers or chills.  No nausea or vomiting.     PHYSICAL EXAM:    Vitals:    02/07/18 1011   Weight: 57.2 kg (126 lb)   Height: 162.6 cm (64\")   PainSc: 0-No pain       GAIT:     [x]  Normal  []  Antalgic    Assistive device: [x]  None  []  Walker     []  Crutches  []  Cane     []  Wheelchair  []  Stretcher    Patient is awake and alert, answers questions appropriately, and is in no apparent distress.     Left Shoulder Exam   Left shoulder exam is normal.    Right Shoulder Exam     Tenderness   None    Range of Motion   Active Abduction:                       90  Passive Abduction:                    110  Forward Flexion:                        90    Muscle Strength "   Abduction:            4/5  Supraspinatus:     4/5    Comments:  Limitations with range of motion. No deformity noted. No swelling noted.         Xr Shoulder 2+ View Right    Result Date: 2/7/2018  Narrative: 3 views of the right shoulder reveal a stable, impacted proximal humerus fracture with evidence of healing when compared with previous images from 12/5/2017 and 10/24/2017, although fracture lines remain quite visible and reflect possible nonunion. Alignment remains acceptable.  No other acute radiologic abnormalities are noted at this time. 02/07/18 at 2:41 PM by LUPE Leo     ASSESSMENT:  Diagnoses and all orders for this visit:    Other closed displaced fracture of proximal end of right humerus with delayed healing, subsequent encounter      PLAN: X-rays of right shoulder reviewed and compared with multiple previous images.  There is some evidence of healing noted to the proximal humerus fracture when compared with previous images.  However, the fracture lines are still quite visible and may represent nonunion of her fracture as it has now been over 6 months since her initial injury.  Patient denies any pain.  She continues to have limited range of motion of the right arm above 90°.  Discussed with patient and son recommendations for physical therapy today to improve strength and range of motion in the right arm/shoulder.  This was also recommended at last office visit on 12/5/2017 with a referral to physical therapy ordered at that time.  Apparently the patient was unable to go to physical therapy due to other medical problems. The patient has had numerous medical problems/complications recently including pneumonia and encephalopathy, resulting in her being hospitalized.  She was also apparently having some confusion. She is currently residing at [facility] for rehabilitation purposes.  Discussed with patient and son that we could take advantage of her being in a skilled  facility and do some physical therapy while she is there.  However, patient's son reports that she will likely be discharged this week.  The patient and son state that they would like to wait until the next follow-up in 3 months and see if the healing has improved and will plan to start physical therapy at that time.  Recommend continued activity modification as tolerated and based on her pain with limited use of right arm for heavy lifting, pulling and/or tugging.  Recommend Tylenol or Ibuprofen as needed for pain.  Follow-up in 3 months for recheck and repeat x-rays at that time.    Return in about 3 months (around 5/7/2018) for Recheck.      This document has been electronically signed by LUPE Leo on February 7, 2018 2:56 PM      LUPE Leo

## 2018-02-15 ENCOUNTER — DOCUMENTATION (OUTPATIENT)
Dept: CARDIOLOGY | Facility: HOSPITAL | Age: 63
End: 2018-02-15

## 2018-02-19 PROBLEM — J18.9 PNEUMONIA: Status: ACTIVE | Noted: 2018-01-10

## 2018-02-27 ENCOUNTER — HOSPITAL ENCOUNTER (OUTPATIENT)
Dept: CARDIOLOGY | Facility: HOSPITAL | Age: 63
Discharge: HOME | End: 2018-02-27
Attending: INTERNAL MEDICINE

## 2018-02-27 ENCOUNTER — OFFICE VISIT (OUTPATIENT)
Dept: FAMILY MEDICINE CLINIC | Facility: CLINIC | Age: 63
End: 2018-02-27

## 2018-02-27 VITALS
HEART RATE: 76 BPM | DIASTOLIC BLOOD PRESSURE: 88 MMHG | TEMPERATURE: 98.1 F | OXYGEN SATURATION: 95 % | WEIGHT: 135.6 LBS | RESPIRATION RATE: 18 BRPM | HEIGHT: 63 IN | BODY MASS INDEX: 24.03 KG/M2 | SYSTOLIC BLOOD PRESSURE: 142 MMHG

## 2018-02-27 VITALS
BODY MASS INDEX: 21 KG/M2 | HEART RATE: 71 BPM | SYSTOLIC BLOOD PRESSURE: 130 MMHG | WEIGHT: 123 LBS | HEIGHT: 64 IN | DIASTOLIC BLOOD PRESSURE: 88 MMHG | OXYGEN SATURATION: 98 %

## 2018-02-27 DIAGNOSIS — J44.9 STAGE 3 SEVERE COPD BY GOLD CLASSIFICATION (HCC): ICD-10-CM

## 2018-02-27 DIAGNOSIS — I10 ESSENTIAL HYPERTENSION: Primary | ICD-10-CM

## 2018-02-27 DIAGNOSIS — I50.31 ACUTE DIASTOLIC CHF (CONGESTIVE HEART FAILURE) (HCC): ICD-10-CM

## 2018-02-27 DIAGNOSIS — I27.20 PULMONARY HYPERTENSION (HCC): ICD-10-CM

## 2018-02-27 PROBLEM — S22.49XA CLOSED FRACTURE OF MULTIPLE RIBS: Status: ACTIVE | Noted: 2018-01-19

## 2018-02-27 PROCEDURE — 99213 OFFICE O/P EST LOW 20 MIN: CPT | Performed by: STUDENT IN AN ORGANIZED HEALTH CARE EDUCATION/TRAINING PROGRAM

## 2018-02-27 RX ORDER — FLUOXETINE HYDROCHLORIDE 40 MG/1
CAPSULE ORAL
COMMUNITY
Start: 2018-02-14 | End: 2018-03-16 | Stop reason: SDUPTHER

## 2018-02-27 RX ORDER — RANITIDINE 150 MG/1
TABLET ORAL
COMMUNITY
Start: 2018-02-14 | End: 2018-03-16 | Stop reason: SDUPTHER

## 2018-02-27 NOTE — ASSESSMENT & PLAN NOTE
Left lower lobe  Resolved s/p full course of Levaquin, patient maintaining O2 sats on RA  Levaquin as PCN allergy

## 2018-02-27 NOTE — PROGRESS NOTES
MONTHLY NURSING HOME VISIT    NAME: Michelle Child  : 1955  MRN: [id number]    DATE & TIME SEEN: 2018    PCP: Coral Berry MD    NURSING HOME: [facility]    Chief Complaint: Monthly Nursing Home Visit for 2018    Subjective:     Michelle Child is a 62 y.o. female who is at [facility] for short term rehab after extensive hospitalization. She is doing much better with her breathing. Her weakness has greatly improved. She continues to be cigarette free at this time.     Current Meds:    Current Outpatient Prescriptions:   •  acetaminophen (TYLENOL) 325 MG tablet, Take 2 tablets by mouth Every 4 (Four) Hours As Needed for Mild Pain . (Patient taking differently: Take  by mouth Every 4 (Four) Hours As Needed for Mild Pain .), Disp: , Rfl:   •  albuterol (PROVENTIL HFA;VENTOLIN HFA) 108 (90 BASE) MCG/ACT inhaler, Inhale 2 puffs Every 4 (Four) Hours As Needed for wheezing., Disp: 6.7 g, Rfl: 11  •  aspirin 81 MG chewable tablet, Chew 1 tablet Daily., Disp: 30 tablet, Rfl: 11  •  bisacodyl (DULCOLAX) 5 MG EC tablet, Take 1 tablet by mouth Daily As Needed for Constipation., Disp: , Rfl:   •  budesonide-formoterol (SYMBICORT) 160-4.5 MCG/ACT inhaler, Inhale 2 puffs 2 (Two) Times a Day., Disp: 1 inhaler, Rfl: 11  •  ferrous sulfate 324 (65 Fe) MG tablet delayed-release EC tablet, Take 1 tablet by mouth 2 (Two) Times a Day With Meals., Disp: 30 tablet, Rfl:   •  FLUoxetine (PROzac) 20 MG capsule, TAKE 3 CAPSULES BY MOUTH ONCE DAILY, Disp: 90 capsule, Rfl: 1  •  FLUoxetine (PROzac) 40 MG capsule, , Disp: , Rfl:   •  folic acid (FOLVITE) 1 MG tablet, Take 1 tablet by mouth Daily., Disp: , Rfl:   •  furosemide (LASIX) 20 MG tablet, Take 1 tablet by mouth Every Other Day., Disp: 15 tablet, Rfl: 3  •  losartan (COZAAR) 50 MG tablet, Take 1 tablet by mouth Daily. Hold for sbp<100, Disp: , Rfl:   •  meloxicam (MOBIC) 15 MG tablet, Take 15 mg by mouth Daily., Disp: , Rfl:   •  metoprolol  "succinate XL (TOPROL-XL) 25 MG 24 hr tablet, Take 1 tablet by mouth Daily. Hold for sbp<100 hr<60, Disp: , Rfl:   •  nicotine (NICODERM CQ) 21 MG/24HR patch, Place 1 patch on the skin Daily., Disp: 28 patch, Rfl: 3  •  ondansetron (ZOFRAN) 4 MG tablet, Take 1 tablet by mouth Every 6 (Six) Hours As Needed for Nausea or Vomiting., Disp: , Rfl:   •  potassium chloride (KAYCIEL) 20 MEQ/15ML (10%) solution, Take 20 mEq by mouth Daily., Disp: , Rfl:   •  potassium chloride (MICRO-K) 10 MEQ CR capsule, Take 2 capsules by mouth Daily., Disp: , Rfl:   •  raNITIdine (ZANTAC) 150 MG tablet, , Disp: , Rfl:   •  risperiDONE (risperDAL) 1 MG tablet, Take 1 tablet by mouth Every Night., Disp: , Rfl:     Allergies:  Codeine; Penicillins; and Sulfa antibiotics    Review of Systems:  Review of Systems   Constitutional: Negative for activity change, appetite change, fatigue and fever.   HENT: Negative for ear pain and sore throat.    Eyes: Negative for pain and visual disturbance.   Respiratory: Negative for cough and shortness of breath.    Cardiovascular: Negative for chest pain and palpitations.   Gastrointestinal: Negative for abdominal pain and nausea.   Endocrine: Negative for cold intolerance and heat intolerance.   Genitourinary: Negative for difficulty urinating and dysuria.   Musculoskeletal: Negative for arthralgias and gait problem.   Skin: Negative for color change and rash.   Neurological: Negative for dizziness, weakness and headaches.   Hematological: Negative for adenopathy. Does not bruise/bleed easily.   Psychiatric/Behavioral: Negative for agitation, confusion and sleep disturbance.       Objective:     /88 (BP Location: Right arm, Patient Position: Sitting)  Pulse 76  Temp 98.1 °F (36.7 °C) (Oral)   Resp 18  Ht 160 cm (63\")  Wt 61.5 kg (135 lb 9.6 oz)  SpO2 95% Comment: RA  BMI 24.02 kg/m2  Physical Exam   Constitutional: She is oriented to person, place, and time. She appears well-developed and " well-nourished.   HENT:   Head: Normocephalic and atraumatic.   Right Ear: External ear normal.   Left Ear: External ear normal.   Nose: Nose normal.   Mouth/Throat: Oropharynx is clear and moist.   Eyes: Conjunctivae and EOM are normal.   Neck: Normal range of motion. Neck supple.   Cardiovascular: Normal rate, regular rhythm and normal heart sounds.    Pulmonary/Chest: Effort normal and breath sounds normal.   Abdominal: Soft. Bowel sounds are normal. She exhibits no distension. There is no tenderness.   Musculoskeletal: Normal range of motion.   Neurological: She is alert and oriented to person, place, and time.   Skin: Skin is warm and dry.   Psychiatric: She has a normal mood and affect. Her speech is normal and behavior is normal. Judgment and thought content normal. Cognition and memory are normal.         Assessment/Plan:      62 y.o. female with:  Problem List Items Addressed This Visit        Cardiovascular and Mediastinum    Pulmonary hypertension    Current Assessment & Plan     Cardiology Consult to Dr. Brown         Acute diastolic CHF (congestive heart failure)    Overview                Current Assessment & Plan     Following with Dr. Brown  Lasix 20 mg oral every other day            Respiratory    Pneumonia    Current Assessment & Plan     Left lower lobe  Resolved s/p full course of Levaquin, patient maintaining O2 sats on RA  Levaquin as PCN allergy            Musculoskeletal and Integument    Closed fracture of multiple ribs - Primary    Current Assessment & Plan     Incentive Spirometer            Hematopoietic and Hemostatic    Iron deficiency anemia    Overview     Continue feosol         Current Assessment & Plan     Continue feosol                   I have seen the patient.  I have reviewed the notes, assessments, and/or procedures performed by Dr. Yeboah, I concur with her/his documentation and assessment and plan for Michelle Heidi Danyell.          This document has been  electronically signed by Coral Berry MD on February 27, 2018 4:54 PM

## 2018-03-01 ENCOUNTER — OFFICE VISIT (OUTPATIENT)
Dept: PULMONOLOGY | Facility: CLINIC | Age: 63
End: 2018-03-01

## 2018-03-01 ENCOUNTER — HOSPITAL ENCOUNTER (OUTPATIENT)
Dept: GENERAL RADIOLOGY | Facility: HOSPITAL | Age: 63
Discharge: HOME | End: 2018-03-01
Attending: INTERNAL MEDICINE | Admitting: INTERNAL MEDICINE

## 2018-03-01 VITALS
DIASTOLIC BLOOD PRESSURE: 76 MMHG | WEIGHT: 124.1 LBS | HEIGHT: 64 IN | OXYGEN SATURATION: 96 % | BODY MASS INDEX: 21.19 KG/M2 | HEART RATE: 78 BPM | SYSTOLIC BLOOD PRESSURE: 128 MMHG

## 2018-03-01 DIAGNOSIS — I27.20 PULMONARY HYPERTENSION (HCC): ICD-10-CM

## 2018-03-01 DIAGNOSIS — R53.81 PHYSICAL DECONDITIONING: ICD-10-CM

## 2018-03-01 DIAGNOSIS — Z87.891 PERSONAL HISTORY OF TOBACCO USE, PRESENTING HAZARDS TO HEALTH: ICD-10-CM

## 2018-03-01 DIAGNOSIS — J44.9 STAGE 3 SEVERE COPD BY GOLD CLASSIFICATION (HCC): Primary | ICD-10-CM

## 2018-03-01 DIAGNOSIS — J18.9 PNEUMONIA OF BOTH LOWER LOBES DUE TO INFECTIOUS ORGANISM: ICD-10-CM

## 2018-03-01 DIAGNOSIS — I10 ESSENTIAL HYPERTENSION: ICD-10-CM

## 2018-03-01 PROBLEM — S22.49XA CLOSED FRACTURE OF MULTIPLE RIBS: Status: RESOLVED | Noted: 2018-01-19 | Resolved: 2018-03-01

## 2018-03-01 PROBLEM — G93.1 HYPOXIC ENCEPHALOPATHY (HCC): Status: RESOLVED | Noted: 2018-01-16 | Resolved: 2018-03-01

## 2018-03-01 PROBLEM — J96.01 ACUTE RESPIRATORY FAILURE WITH HYPOXIA (HCC): Status: RESOLVED | Noted: 2018-01-10 | Resolved: 2018-03-01

## 2018-03-01 PROBLEM — J04.0 LARYNGITIS, ACUTE: Status: RESOLVED | Noted: 2018-01-17 | Resolved: 2018-03-01

## 2018-03-01 PROCEDURE — 71046 X-RAY EXAM CHEST 2 VIEWS: CPT

## 2018-03-01 PROCEDURE — G9903 PT SCRN TBCO ID AS NON USER: HCPCS | Performed by: INTERNAL MEDICINE

## 2018-03-01 PROCEDURE — 99214 OFFICE O/P EST MOD 30 MIN: CPT | Performed by: INTERNAL MEDICINE

## 2018-03-01 PROCEDURE — G8418 CALC BMI BLW LOW PARAM F/U: HCPCS | Performed by: INTERNAL MEDICINE

## 2018-03-01 NOTE — PROGRESS NOTES
Pulmonary Office Follow-up    Subjective     Michelle Child is seen today at the office for   Chief Complaint   Patient presents with   • Follow-up     4 week         HPI  Michelle Child is a 62 y.o. female with a PMH significant for severe COPD,  tobacco use, PH, HTN, and GERD who presents for follow-up of COPD and pneumonia.  She was last seen on 2/1/18, at which time I recommended stopping duo nebs and starting Symbicort.  I also counseled her on the importance of complete tobacco cessation and recommended that she have a follow-up chest x-ray in 4 weeks. She reports that her breathing has improved and she has not required albuterol.  She denies cough, wheeze, chest pain, or weight changes.  She is now able to walk without a walker and is still participating in home health physical therapy.  Patient has been able to maintain smoking cessation, but is still using the nicotine patch as it helps with her cravings.  Her daughter got her a double e-cigarette to use to assist with her cessation, and she has not needed to use in the past 4 days.  She does report a sore on her lower gum underneath her dentures.  Patient admits that she leaves her dentures and when she uses the Symbicort, and reports to rinsing well after use.  She denies any recent ED visits or steroid use.  Her blood pressure is been controlled.      Patient Active Problem List   Diagnosis   • Migraine   • Iron deficiency anemia   • Gastroesophageal reflux disease   • Essential hypertension   • Coronary arteriosclerosis   • Bilateral pseudophakia   • Astigmatism   • Right humeral fracture   • Fall with injury   • Closed fracture of humerus   • Cause of injury, fall   • Closed fracture of proximal end of right humerus with routine healing   • Weakness   • Hyponatremia   • Cerebral microvascular disease   • Pulmonary hypertension   • Rheumatic tricuspid valve regurgitation   • Rheumatic mitral stenosis   • Acute diastolic CHF (congestive heart  failure)   • Encephalopathy, metabolic   • Pulmonary hypertension due to left heart valvular disease   • Cor pulmonale, chronic   • Folic acid deficiency   • Oral thrush   • Dysuria   • Bacterial cystitis   • Closed fracture of multiple ribs   • Acute retention of urine   • Stage 3 severe COPD by GOLD classification   • Physical deconditioning   • Personal history of tobacco use, presenting hazards to health       Review of Systems  Review of Systems   Constitutional: Negative for fatigue, fever and unexpected weight change.   HENT: Negative for congestion.    Respiratory: Negative for cough, chest tightness, shortness of breath and wheezing.    Cardiovascular: Negative for chest pain and leg swelling.     As described in the HPI. Otherwise, remainder of ROS (14 systems) were negative.    Medications, Allergies, Social, and Family Histories reviewed as per EMR.    Objective     Vitals:    03/01/18 1111   BP: 128/76   Pulse: 78   SpO2: 96%     Physical Exam   Constitutional: She is oriented to person, place, and time. Vital signs are normal. She appears well-developed and well-nourished.   HENT:   Head: Normocephalic and atraumatic.   Nose: Nose normal.   Mouth/Throat: Uvula is midline, oropharynx is clear and moist and mucous membranes are normal. She has dentures.   Mallampati 2   Eyes: Conjunctivae, EOM and lids are normal. Pupils are equal, round, and reactive to light.   Neck: Trachea normal and normal range of motion. No tracheal tenderness present. No thyroid mass present.   Cardiovascular: Normal rate, regular rhythm and normal heart sounds.  PMI is not displaced.  Exam reveals no gallop.    No murmur heard.  Pulmonary/Chest: Effort normal. No respiratory distress. She has no decreased breath sounds. She has no wheezes. She has no rhonchi. She exhibits no tenderness.   Abdominal: Soft. Normal appearance and bowel sounds are normal. There is no hepatosplenomegaly. There is no tenderness.   Musculoskeletal:    Normal gait, no extremity edema       Vascular Status -  Her exam exhibits no right foot edema. Her exam exhibits no left foot edema.  Lymphadenopathy:        Head (right side): No submandibular adenopathy present.        Head (left side): No submandibular adenopathy present.     She has no cervical adenopathy.        Right: No supraclavicular adenopathy present.        Left: No supraclavicular adenopathy present.   Neurological: She is alert and oriented to person, place, and time. Gait normal.   Skin: Skin is warm and dry. No rash noted. No cyanosis. Nails show no clubbing.   Psychiatric: She has a normal mood and affect. Her speech is normal and behavior is normal. Judgment normal.   Nursing note and vitals reviewed.    IMAGING: CXR 3/1/18 (independently reviewed and interpreted by me) showed hyperinflation, resolution of bilateral small pleural effusions as well as adjacent atelectasis versus consolidation        Assessment/Plan     Michelle was seen today for follow-up.    Diagnoses and all orders for this visit:    Stage 3 severe COPD by GOLD classification    Pulmonary hypertension    Physical deconditioning    Personal history of tobacco use, presenting hazards to health    Essential hypertension         Discussion/ Recommendations:   She is doing much better and is having minimal symptoms requiring albuterol.  Her strength is also improving with ongoing efforts at rehabilitation.  The patient has been able to maintain smoking cessation since her hospitalization with the assistance of the nicotine patch and is committed to continuing.    -Continue Symbicort twice daily and albuterol as needed only.  -Encouraged continued smoking cessation.  Commended that she try going down on the nicotine patch dose to 14 mg and then wean further as tolerated.  -Continue with rehabilitation.  Once she has completed home health physical therapy, recommended that she consider entering into pulmonary rehabilitation.  -Continue  antihypertensives.  -Follow-up with Dr. Brown as scheduled.    Patient's BMI is below normal parameters. Follow-up plan includes:  referral to primary care.        Return in about 3 months (around 6/1/2018) for Recheck.          This document has been electronically signed by Gay Cornejo MD on March 1, 2018 11:33 AM      Dictated using Dragon

## 2018-03-01 NOTE — PROGRESS NOTES
"Chief Complaint   Patient presents with   • Follow-up     was in the hospital and in the nursing home         Subjective:     Michelle Child is a 62 y.o. female who presents for follow up for discharge from nursing home patient states that she is doing well and has been ambulating well at home.  She is currently living in the \"mother-in-law\" sweet at her son & daughter-in-law's house.  She has no acute complaints this week.  She would like to drive again, however, her two son's have concerns about this and fear she might be a danger to herself or others on the road.  We discussed this and she will discuss further with Dr. Berry.    Preventative:  Over the past 2 weeks, have you felt down, depressed, or hopeless?No   Over the past 2 weeks, have you felt little interest or pleasure in doing things?No  Clinical depression screening refused by patient.No     Past Medical Hx:  Past Medical History:   Diagnosis Date   • Alkaline phosphatase raised    • Astigmatism    • Bilateral pseudophakia    • Coronary arteriosclerosis    • Depressive disorder    • Edema of lower extremity    • Encounter for general adult medical examination with abnormal findings    • Essential hypertension    • Gastroesophageal reflux disease    • H/O bone density study     DXA BONE DENSITY AXIAL    04/22/2016   • H/O screening mammography     SCREENING MAMMOGRAPHY     04/25/2016   • Hx of screening mammography     x3; declined screening, understands risks and benefits and still declines      07/24/2015   • Influenza vaccine administered     INFLUENZA IMMUN ADMIN OR PREV RECV'D   x4       04/01/2016   • Insomnia    • Iron deficiency    • Migraine    • Tobacco dependence     continuous         Past Surgical Hx:  Past Surgical History:   Procedure Laterality Date   • APPENDECTOMY     • BREAST IMPLANT SURGERY     • CATARACT EXTRACTION WITH INTRAOCULAR LENS IMPLANT  12/17/2003    Phacoemulsification of a cataract with intraocular lens implant of rigt " eye, Nixon model SA 60-AT; serial # [id number];20.5 diopter   • CHOLECYSTECTOMY  08/06/2007    Laparoscopic cholecystectomy. Symptomatic gallstones   • COLONOSCOPY  10/15/2013    declined stool cards and colonscopy   • ENDOSCOPY AND COLONOSCOPY  05/22/2014    Internal & external hemorrhoids found.   • ENDOSCOPY W/ PEG TUBE PLACEMENT  05/22/2014    EGD w/ tube: Normal esopahgus. Gastritis in stomach. Biopsy taken. Normal duodenum. Biopsy taken.   • INJECTION OF MEDICATION  07/24/2013    Inj(s) Tend-Sheath, Ligament, Single   • INJECTION OF MEDICATION  11/17/2014    Kenalog x6   • INJECTION OF MEDICATION  04/01/2016    PNEUMOC VAC/ADMIN/RCVD    • INJECTION OF MEDICATION  12/05/2014    Toradol   • OTHER SURGICAL HISTORY  01/19/2014    Drain/Inject Major Joint   x2   • PAP SMEAR  10/06/2011   • PARTIAL HYSTERECTOMY      Partial hyst    • TONSILLECTOMY     • TUBAL ABDOMINAL LIGATION         Health Maintenance:  Health Maintenance   Topic Date Due   • ZOSTER VACCINE  11/10/2016   • INFLUENZA VACCINE  08/01/2017   • MEDICARE ANNUAL WELLNESS  04/17/2018   • DXA SCAN  04/22/2018   • MAMMOGRAM  04/25/2018   • COLONOSCOPY  05/22/2024   • TDAP/TD VACCINES (2 - Td) 04/20/2027   • HEPATITIS C SCREENING  Completed   • PAP SMEAR  Excluded       Current Meds:    Current Outpatient Prescriptions:   •  acetaminophen (TYLENOL) 325 MG tablet, Take 2 tablets by mouth Every 4 (Four) Hours As Needed for Mild Pain . (Patient taking differently: Take  by mouth Every 4 (Four) Hours As Needed for Mild Pain .), Disp: , Rfl:   •  albuterol (PROVENTIL HFA;VENTOLIN HFA) 108 (90 BASE) MCG/ACT inhaler, Inhale 2 puffs Every 4 (Four) Hours As Needed for wheezing., Disp: 6.7 g, Rfl: 11  •  aspirin 81 MG chewable tablet, Chew 1 tablet Daily., Disp: 30 tablet, Rfl: 11  •  bisacodyl (DULCOLAX) 5 MG EC tablet, Take 1 tablet by mouth Daily As Needed for Constipation., Disp: , Rfl:   •  budesonide-formoterol (SYMBICORT) 160-4.5 MCG/ACT inhaler, Inhale 2 puffs  2 (Two) Times a Day., Disp: 1 inhaler, Rfl: 11  •  ferrous sulfate 324 (65 Fe) MG tablet delayed-release EC tablet, Take 1 tablet by mouth 2 (Two) Times a Day With Meals., Disp: 30 tablet, Rfl:   •  FLUoxetine (PROzac) 20 MG capsule, TAKE 3 CAPSULES BY MOUTH ONCE DAILY, Disp: 90 capsule, Rfl: 1  •  FLUoxetine (PROzac) 40 MG capsule, , Disp: , Rfl:   •  folic acid (FOLVITE) 1 MG tablet, Take 1 tablet by mouth Daily., Disp: , Rfl:   •  furosemide (LASIX) 20 MG tablet, Take 1 tablet by mouth Every Other Day., Disp: 15 tablet, Rfl: 3  •  losartan (COZAAR) 50 MG tablet, Take 1 tablet by mouth Daily. Hold for sbp<100, Disp: , Rfl:   •  meloxicam (MOBIC) 15 MG tablet, Take 15 mg by mouth Daily., Disp: , Rfl:   •  metoprolol succinate XL (TOPROL-XL) 25 MG 24 hr tablet, Take 1 tablet by mouth Daily. Hold for sbp<100 hr<60, Disp: , Rfl:   •  nicotine (NICODERM CQ) 21 MG/24HR patch, Place 1 patch on the skin Daily., Disp: 28 patch, Rfl: 3  •  ondansetron (ZOFRAN) 4 MG tablet, Take 1 tablet by mouth Every 6 (Six) Hours As Needed for Nausea or Vomiting., Disp: , Rfl:   •  potassium chloride (KAYCIEL) 20 MEQ/15ML (10%) solution, Take 20 mEq by mouth Daily., Disp: , Rfl:   •  potassium chloride (MICRO-K) 10 MEQ CR capsule, Take 2 capsules by mouth Daily., Disp: , Rfl:   •  raNITIdine (ZANTAC) 150 MG tablet, , Disp: , Rfl:   •  risperiDONE (risperDAL) 1 MG tablet, Take 1 tablet by mouth Every Night., Disp: , Rfl:     Allergies:  Codeine; Penicillins; and Sulfa antibiotics    Family Hx:  Family History   Problem Relation Age of Onset   • Cholelithiasis Mother    • Thyroid cancer Sister    • Thyroid disease Sister      Thyroid disorder   • Graves' disease Sister    • Cholelithiasis Maternal Grandmother    • Thyroid cancer Other      Other 2nd degree relative, thyroid   • Diabetes Other    • Hypertension Other         Social History:  Social History     Social History   • Marital status:      Spouse name: N/A   • Number of  "children: N/A   • Years of education: N/A     Occupational History   • Not on file.     Social History Main Topics   • Smoking status: Former Smoker     Quit date: 1/10/2018   • Smokeless tobacco: Never Used      Comment: SMOKED FOR 20>PLUS YRS   • Alcohol use No   • Drug use: No   • Sexual activity: Defer     Other Topics Concern   • Not on file     Social History Narrative       Review of Systems  General:negative for - chills, fatigue, fever, hot flashes, malaise, night sweats, weight gain or weight loss  Psychological: negative for - anxiety, depression, sleep disturbances or suicidal ideation  Ophthalmic: negative for - blurry vision or loss of vision  ENT: negative for - hearing change, nasal congestion or sore throat  Hematological and Lymphatic: negative for - jaundice  Endocrine: negative for - hair pattern changes, skin changes or temperature intolerance  Respiratory: no cough, shortness of breath, or wheezing  Cardiovascular: no chest pain, edema or dyspnea on exertion  Gastrointestinal: no  Nausea/vomiting, abdominal pain, change in bowel habits, or black or bloody stools  Genito-Urinary: no dysuria, trouble voiding, or hematuria  Musculoskeletal: negative for - joint pain or muscle pain  Neurological: negative for - dizziness, headaches, numbness/tingling or seizures  Dermatological: negative for rash and skin lesion changes      Objective:     /88  Pulse 71  Ht 162.6 cm (64\")  Wt 55.8 kg (123 lb)  SpO2 98%  BMI 21.11 kg/m2        General Appearance:    Alert, cooperative, no distress, appears stated age   Head:    Normocephalic, without obvious abnormality, atraumatic   Eyes:    PERRL, conjunctiva/corneas clear, EOM's intact   Ears:    Normal TM's and external ear canals, both ears   Nose:   Nares normal, septum midline, mucosa normal, no drainage     or sinus tenderness   Throat:   Lips, mucosa, and tongue normal; teeth and gums normal   Neck:   Supple, symmetrical, trachea midline, no " adenopathy;     thyroid:  no enlargement/tenderness/nodules; no carotid    bruit   Back:     Symmetric, no curvature, ROM normal, no CVA tenderness   Lungs:     Clear to auscultation bilaterally, respirations unlabored   Chest Wall:    No tenderness or deformity    Heart:    Regular rate and rhythm, S1 and S2 normal, no murmur, rub    or gallop   Abdomen:     Soft, non-tender, bowel sounds active all four quadrants,     no masses, no organomegaly   Extremities:   Extremities normal, atraumatic, no cyanosis or edema   Pulses:   2+ and symmetric all extremities   Skin:   Skin color, texture, turgor normal, no rashes or lesions   Lymph nodes:   Cervical, supraclavicular, and axillary nodes normal   Neurologic:   CNII-XII grossly intact              Assessment/Plan:     1. Essential hypertension    2. Pulmonary hypertension    3. Acute diastolic CHF (congestive heart failure)    4. Stage 3 severe COPD by GOLD classification         Follow-up:     Return if symptoms worsen or fail to improve.    GOALS:  Drive again  Barriers: patient may not be safe to drive.     Preventative:  She is continuing on her nicotine patches as she continues to try to stop smoking.    Smoking cessation counseling was provided.    RISK SCORE: 4     Aldo Yeboah Jr., M.D.  PGY1  Family Practice Resident  [address] [place] [address]  Phone: [phone]  Fax: [phone]      This document has been electronically signed by Aldo Yeboah Jr, MD on March 1, 2018 4:16 PM

## 2018-03-05 NOTE — PROGRESS NOTES
"Michelle Child is a 62 y.o. female who presents for   Chief Complaint   Patient presents with   • Follow-up     was in the hospital and in the nursing home         /88  Pulse 71  Ht 162.6 cm (64\")  Wt 55.8 kg (123 lb)  SpO2 98%  BMI 21.11 kg/m2  Current Outpatient Prescriptions:   •  acetaminophen (TYLENOL) 325 MG tablet, Take 2 tablets by mouth Every 4 (Four) Hours As Needed for Mild Pain . (Patient taking differently: Take  by mouth Every 4 (Four) Hours As Needed for Mild Pain .), Disp: , Rfl:   •  albuterol (PROVENTIL HFA;VENTOLIN HFA) 108 (90 BASE) MCG/ACT inhaler, Inhale 2 puffs Every 4 (Four) Hours As Needed for wheezing., Disp: 6.7 g, Rfl: 11  •  aspirin 81 MG chewable tablet, Chew 1 tablet Daily., Disp: 30 tablet, Rfl: 11  •  bisacodyl (DULCOLAX) 5 MG EC tablet, Take 1 tablet by mouth Daily As Needed for Constipation., Disp: , Rfl:   •  budesonide-formoterol (SYMBICORT) 160-4.5 MCG/ACT inhaler, Inhale 2 puffs 2 (Two) Times a Day., Disp: 1 inhaler, Rfl: 11  •  ferrous sulfate 324 (65 Fe) MG tablet delayed-release EC tablet, Take 1 tablet by mouth 2 (Two) Times a Day With Meals., Disp: 30 tablet, Rfl:   •  FLUoxetine (PROzac) 20 MG capsule, TAKE 3 CAPSULES BY MOUTH ONCE DAILY, Disp: 90 capsule, Rfl: 1  •  FLUoxetine (PROzac) 40 MG capsule, , Disp: , Rfl:   •  folic acid (FOLVITE) 1 MG tablet, Take 1 tablet by mouth Daily., Disp: , Rfl:   •  furosemide (LASIX) 20 MG tablet, Take 1 tablet by mouth Every Other Day., Disp: 15 tablet, Rfl: 3  •  losartan (COZAAR) 50 MG tablet, Take 1 tablet by mouth Daily. Hold for sbp<100, Disp: , Rfl:   •  meloxicam (MOBIC) 15 MG tablet, Take 15 mg by mouth Daily., Disp: , Rfl:   •  metoprolol succinate XL (TOPROL-XL) 25 MG 24 hr tablet, Take 1 tablet by mouth Daily. Hold for sbp<100 hr<60, Disp: , Rfl:   •  nicotine (NICODERM CQ) 21 MG/24HR patch, Place 1 patch on the skin Daily., Disp: 28 patch, Rfl: 3  •  ondansetron (ZOFRAN) 4 MG tablet, Take 1 tablet by mouth " Every 6 (Six) Hours As Needed for Nausea or Vomiting., Disp: , Rfl:   •  potassium chloride (KAYCIEL) 20 MEQ/15ML (10%) solution, Take 20 mEq by mouth Daily., Disp: , Rfl:   •  potassium chloride (MICRO-K) 10 MEQ CR capsule, Take 2 capsules by mouth Daily., Disp: , Rfl:   •  raNITIdine (ZANTAC) 150 MG tablet, , Disp: , Rfl:   •  risperiDONE (risperDAL) 1 MG tablet, Take 1 tablet by mouth Every Night., Disp: , Rfl:   Allergies   Allergen Reactions   • Codeine Nausea And Vomiting   • Penicillins    • Sulfa Antibiotics Nausea And Vomiting       Physical Exam   Constitutional: She appears well-developed and well-nourished.   HENT:   Head: Normocephalic and atraumatic.   Neck: Normal range of motion. Neck supple. Carotid bruit is not present.   Cardiovascular: Normal rate, regular rhythm, normal heart sounds and intact distal pulses.  Exam reveals no gallop and no friction rub.    No murmur heard.      Assessment:     1. Essential hypertension    2. Pulmonary hypertension    3. Acute diastolic CHF (congestive heart failure)    4. Stage 3 severe COPD by GOLD classification       Plan:I have seen this patient and discussed the case with resident and agree with the assessment and plan.  MARY Rebolledo M.D.

## 2018-03-08 ENCOUNTER — TELEPHONE (OUTPATIENT)
Dept: FAMILY MEDICINE CLINIC | Facility: CLINIC | Age: 63
End: 2018-03-08

## 2018-03-08 NOTE — TELEPHONE ENCOUNTER
She will need to see me or Dr. Yeboah before we can determine that.         This document has been electronically signed by Coral Berry MD on March 8, 2018 4:43 PM

## 2018-03-09 NOTE — TELEPHONE ENCOUNTER
Called pt to get her an appt, she stated that when she was here to see Dr Yeboah it was asked and stated that He would have to ask Dr Berry.   Pt stated that is why she called.       Pt asked that someone call after checking on this, please      [phone]

## 2018-03-12 NOTE — TELEPHONE ENCOUNTER
Tried to call pt to let her know that she would have to be seen, and to give her the next available date that Dr Berry had open.   Pt did not answer, so left VM for the pt to call us back

## 2018-03-16 RX ORDER — MELOXICAM 15 MG/1
TABLET ORAL
Qty: 30 TABLET | Refills: 0 | Status: SHIPPED | OUTPATIENT
Start: 2018-03-16 | End: 2018-04-12 | Stop reason: SDUPTHER

## 2018-03-16 RX ORDER — FLUOXETINE HYDROCHLORIDE 40 MG/1
CAPSULE ORAL
Qty: 30 CAPSULE | Refills: 0 | Status: SHIPPED | OUTPATIENT
Start: 2018-03-16 | End: 2018-04-04 | Stop reason: SDUPTHER

## 2018-03-16 RX ORDER — FLUOXETINE HYDROCHLORIDE 20 MG/1
CAPSULE ORAL
Qty: 30 CAPSULE | Refills: 0 | Status: SHIPPED | OUTPATIENT
Start: 2018-03-16 | End: 2018-04-04 | Stop reason: SDUPTHER

## 2018-03-16 RX ORDER — ASPIRIN 81 MG/1
TABLET ORAL
Qty: 30 TABLET | Refills: 0 | Status: SHIPPED | OUTPATIENT
Start: 2018-03-16 | End: 2018-03-16 | Stop reason: SDUPTHER

## 2018-03-16 RX ORDER — ASPIRIN 81 MG/1
TABLET ORAL
Qty: 30 TABLET | Refills: 0 | Status: SHIPPED | OUTPATIENT
Start: 2018-03-16 | End: 2018-04-04 | Stop reason: SDUPTHER

## 2018-03-16 RX ORDER — POTASSIUM CHLORIDE 750 MG/1
CAPSULE, EXTENDED RELEASE ORAL
Qty: 60 CAPSULE | Refills: 0 | Status: SHIPPED | OUTPATIENT
Start: 2018-03-16 | End: 2018-04-04 | Stop reason: SDUPTHER

## 2018-03-16 RX ORDER — FOLIC ACID 1 MG/1
TABLET ORAL
Qty: 30 TABLET | Refills: 0 | Status: SHIPPED | OUTPATIENT
Start: 2018-03-16 | End: 2018-04-19 | Stop reason: SDUPTHER

## 2018-03-16 RX ORDER — LOSARTAN POTASSIUM 50 MG/1
TABLET ORAL
Qty: 30 TABLET | Refills: 0 | Status: SHIPPED | OUTPATIENT
Start: 2018-03-16 | End: 2018-04-12 | Stop reason: SDUPTHER

## 2018-03-16 RX ORDER — RANITIDINE 150 MG/1
TABLET ORAL
Qty: 60 TABLET | Refills: 0 | Status: SHIPPED | OUTPATIENT
Start: 2018-03-16 | End: 2018-04-04 | Stop reason: SDUPTHER

## 2018-03-16 RX ORDER — METOPROLOL SUCCINATE 25 MG/1
TABLET, EXTENDED RELEASE ORAL
Qty: 30 TABLET | Refills: 0 | Status: SHIPPED | OUTPATIENT
Start: 2018-03-16 | End: 2018-04-04 | Stop reason: SDUPTHER

## 2018-03-16 RX ORDER — RISPERIDONE 1 MG/1
TABLET ORAL
Qty: 30 TABLET | Refills: 0 | Status: SHIPPED | OUTPATIENT
Start: 2018-03-16 | End: 2018-04-04 | Stop reason: SDUPTHER

## 2018-03-26 ENCOUNTER — TELEPHONE (OUTPATIENT)
Dept: FAMILY MEDICINE CLINIC | Facility: CLINIC | Age: 63
End: 2018-03-26

## 2018-04-04 ENCOUNTER — OFFICE VISIT (OUTPATIENT)
Dept: FAMILY MEDICINE CLINIC | Facility: CLINIC | Age: 63
End: 2018-04-04

## 2018-04-04 ENCOUNTER — APPOINTMENT (OUTPATIENT)
Dept: LAB | Facility: HOSPITAL | Age: 63
End: 2018-04-04

## 2018-04-04 VITALS
SYSTOLIC BLOOD PRESSURE: 132 MMHG | OXYGEN SATURATION: 98 % | HEIGHT: 64 IN | HEART RATE: 77 BPM | BODY MASS INDEX: 20.66 KG/M2 | WEIGHT: 121 LBS | DIASTOLIC BLOOD PRESSURE: 80 MMHG

## 2018-04-04 DIAGNOSIS — F33.42 RECURRENT MAJOR DEPRESSIVE DISORDER, IN FULL REMISSION (HCC): Primary | ICD-10-CM

## 2018-04-04 DIAGNOSIS — I10 ESSENTIAL HYPERTENSION: ICD-10-CM

## 2018-04-04 DIAGNOSIS — E87.6 HYPOKALEMIA: ICD-10-CM

## 2018-04-04 DIAGNOSIS — F17.201 TOBACCO ABUSE, IN REMISSION: ICD-10-CM

## 2018-04-04 LAB
ANION GAP SERPL CALCULATED.3IONS-SCNC: 14 MMOL/L (ref 5–15)
BUN BLD-MCNC: 10 MG/DL (ref 7–21)
BUN/CREAT SERPL: 11.6 (ref 7–25)
CALCIUM SPEC-SCNC: 9.3 MG/DL (ref 8.4–10.2)
CHLORIDE SERPL-SCNC: 99 MMOL/L (ref 95–110)
CO2 SERPL-SCNC: 24 MMOL/L (ref 22–31)
CREAT BLD-MCNC: 0.86 MG/DL (ref 0.5–1)
GFR SERPL CREATININE-BSD FRML MDRD: 66 ML/MIN/1.73 (ref 45–104)
GLUCOSE BLD-MCNC: 109 MG/DL (ref 60–100)
POTASSIUM BLD-SCNC: 4.1 MMOL/L (ref 3.5–5.1)
SODIUM BLD-SCNC: 137 MMOL/L (ref 137–145)

## 2018-04-04 PROCEDURE — 80048 BASIC METABOLIC PNL TOTAL CA: CPT | Performed by: FAMILY MEDICINE

## 2018-04-04 PROCEDURE — 36415 COLL VENOUS BLD VENIPUNCTURE: CPT | Performed by: FAMILY MEDICINE

## 2018-04-04 PROCEDURE — 99214 OFFICE O/P EST MOD 30 MIN: CPT | Performed by: FAMILY MEDICINE

## 2018-04-04 RX ORDER — FLUOXETINE HYDROCHLORIDE 20 MG/1
20 CAPSULE ORAL DAILY
Qty: 90 CAPSULE | Refills: 3 | Status: SHIPPED | OUTPATIENT
Start: 2018-04-04 | End: 2018-09-10 | Stop reason: SDUPTHER

## 2018-04-04 RX ORDER — FERROUS SULFATE TAB EC 324 MG (65 MG FE EQUIVALENT) 324 (65 FE) MG
324 TABLET DELAYED RESPONSE ORAL 2 TIMES DAILY WITH MEALS
Qty: 180 TABLET | Refills: 3
Start: 2018-04-04 | End: 2019-05-08 | Stop reason: SDUPTHER

## 2018-04-04 RX ORDER — METOPROLOL SUCCINATE 25 MG/1
25 TABLET, EXTENDED RELEASE ORAL DAILY
Qty: 90 TABLET | Refills: 3 | Status: SHIPPED | OUTPATIENT
Start: 2018-04-04 | End: 2018-07-10 | Stop reason: HOSPADM

## 2018-04-04 RX ORDER — RANITIDINE 150 MG/1
150 TABLET ORAL 2 TIMES DAILY
Qty: 180 TABLET | Refills: 3 | Status: SHIPPED | OUTPATIENT
Start: 2018-04-04 | End: 2018-09-10 | Stop reason: SDUPTHER

## 2018-04-04 RX ORDER — FUROSEMIDE 20 MG/1
20 TABLET ORAL EVERY OTHER DAY
Qty: 15 TABLET | Refills: 3
Start: 2018-04-04 | End: 2018-05-02

## 2018-04-04 RX ORDER — RISPERIDONE 1 MG/1
1 TABLET ORAL NIGHTLY
Qty: 901 TABLET | Refills: 3 | Status: SHIPPED | OUTPATIENT
Start: 2018-04-04 | End: 2018-09-10 | Stop reason: SDUPTHER

## 2018-04-04 RX ORDER — ASPIRIN 81 MG/1
81 TABLET, CHEWABLE ORAL DAILY
Qty: 30 TABLET | Refills: 11 | Status: SHIPPED | OUTPATIENT
Start: 2018-04-04 | End: 2019-05-08 | Stop reason: SDUPTHER

## 2018-04-04 RX ORDER — ALBUTEROL SULFATE 90 UG/1
2 AEROSOL, METERED RESPIRATORY (INHALATION) EVERY 4 HOURS PRN
Qty: 6.7 G | Refills: 11 | Status: SHIPPED | OUTPATIENT
Start: 2018-04-04 | End: 2018-09-10 | Stop reason: SDUPTHER

## 2018-04-04 RX ORDER — FLUOXETINE HYDROCHLORIDE 40 MG/1
40 CAPSULE ORAL DAILY
Qty: 901 CAPSULE | Refills: 3 | Status: SHIPPED | OUTPATIENT
Start: 2018-04-04 | End: 2018-09-10 | Stop reason: SDUPTHER

## 2018-04-04 RX ORDER — POTASSIUM CHLORIDE 750 MG/1
10 CAPSULE, EXTENDED RELEASE ORAL 2 TIMES DAILY
Qty: 180 CAPSULE | Refills: 3 | Status: SHIPPED | OUTPATIENT
Start: 2018-04-04 | End: 2018-05-02

## 2018-04-05 PROBLEM — R53.81 PHYSICAL DECONDITIONING: Status: RESOLVED | Noted: 2018-03-01 | Resolved: 2018-04-05

## 2018-04-05 PROBLEM — N30.80 BACTERIAL CYSTITIS: Status: RESOLVED | Noted: 2018-01-18 | Resolved: 2018-04-05

## 2018-04-05 PROBLEM — B37.0 ORAL THRUSH: Status: RESOLVED | Noted: 2018-01-17 | Resolved: 2018-04-05

## 2018-04-05 PROBLEM — S42.201D CLOSED FRACTURE OF PROXIMAL END OF RIGHT HUMERUS WITH ROUTINE HEALING: Status: RESOLVED | Noted: 2017-12-05 | Resolved: 2018-04-05

## 2018-04-05 PROBLEM — R30.0 DYSURIA: Status: RESOLVED | Noted: 2018-01-17 | Resolved: 2018-04-05

## 2018-04-05 PROBLEM — S42.309A CLOSED FRACTURE OF HUMERUS: Status: RESOLVED | Noted: 2017-09-26 | Resolved: 2018-04-05

## 2018-04-05 PROBLEM — F33.42 RECURRENT MAJOR DEPRESSIVE DISORDER, IN FULL REMISSION (HCC): Status: ACTIVE | Noted: 2018-04-05

## 2018-04-05 PROBLEM — E87.1 HYPONATREMIA: Status: RESOLVED | Noted: 2018-01-13 | Resolved: 2018-04-05
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
## 2018-04-05 PROBLEM — G93.41 ENCEPHALOPATHY, METABOLIC: Status: RESOLVED | Noted: 2018-01-15 | Resolved: 2018-04-05

## 2018-04-05 PROBLEM — R33.8 ACUTE RETENTION OF URINE: Status: RESOLVED | Noted: 2018-01-19 | Resolved: 2018-04-05
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
## 2018-04-05 PROBLEM — R53.1 WEAKNESS: Status: RESOLVED | Noted: 2018-01-10 | Resolved: 2018-04-05

## 2018-04-05 PROBLEM — S42.301A RIGHT HUMERAL FRACTURE: Status: RESOLVED | Noted: 2017-08-14 | Resolved: 2018-04-05

## 2018-04-05 PROBLEM — F17.201 TOBACCO ABUSE, IN REMISSION: Status: ACTIVE | Noted: 2018-04-05

## 2018-04-05 NOTE — PROGRESS NOTES
Subjective:     Michelle Child is a 62 y.o. female who presents for follow up post nursing home discharge. She has completely Quit smoking since her hospitalization in January 2018 for pneumonia.  She is no longer needing nicotine patches.  She is no longer using electronic cigarette.  She no longer has cravings for cigarettes.     She is asking today if she can start driving again.  Her last time she drove was in January 2018 when she was admitted to the hospital.  She has completed a course of physical therapy at [facility] as well as home health.  She has been driving around her neighborhood with supervision.  She no longer has fatigue or any dizziness.  She endorses feeling the best she has in a long time.  She is asking if she can stop any of her medications.    Depression:   Depression symptoms are constant. Onset was approximately several years ago. stable since that time.  Concerns:none  Stressors:none      Depression symptoms:    Sleep disturbance and Lack of energy  Alarm symptoms:   none  Manic symptoms:   none  Associated symptoms:   none  Comorbid Conditions:   History of depression    She has no current suicidal and homicidal plan or intent.    Risk Factors/Causes:  Risk factors: previous episode of depression   Family history significant for no psychiatric illness  Possible organic causes contributing are: none    Treatments:  Previous treatment includes Prozac and risperdal . She complains of the following side effects from the treatment: none.    Hypertension  Patient is here for follow-up of elevated blood pressure. She is not exercising and is adherent to a low-salt diet. Blood pressure is well controlled at home. Cardiac symptoms: none. Patient denies chest pain, exertional chest pressure/discomfort and palpitations. Cardiovascular risk factors: hypertension, sedentary lifestyle and smoking/ tobacco exposure. Use of agents associated with hypertension: none. History of target organ  damage: none.  Preventative:  Over the past 2 weeks, have you felt down, depressed, or hopeless?No   Over the past 2 weeks, have you felt little interest or pleasure in doing things?No  Clinical depression screening refused by patient.No     On osteoporosis therapy?prescribed fosamax but quit taking it due to side effects     Past Medical Hx:   Past Medical History:   Diagnosis Date   • Alkaline phosphatase raised    • Astigmatism    • Bilateral pseudophakia    • Coronary arteriosclerosis    • Depressive disorder    • Edema of lower extremity    • Encounter for general adult medical examination with abnormal findings    • Essential hypertension    • Gastroesophageal reflux disease    • H/O bone density study     DXA BONE DENSITY AXIAL    04/22/2016   • H/O screening mammography     SCREENING MAMMOGRAPHY     04/25/2016   • Hx of screening mammography     x3; declined screening, understands risks and benefits and still declines      07/24/2015   • Influenza vaccine administered     INFLUENZA IMMUN ADMIN OR PREV RECV'D   x4       04/01/2016   • Insomnia    • Iron deficiency    • Migraine    • Tobacco dependence     continuous         Past Surgical Hx:  Past Surgical History:   Procedure Laterality Date   • APPENDECTOMY     • BREAST IMPLANT SURGERY     • CATARACT EXTRACTION WITH INTRAOCULAR LENS IMPLANT  12/17/2003    Phacoemulsification of a cataract with intraocular lens implant of rigt eye, Nixon model SA 60-AT; serial # [id number];20.5 diopter   • CHOLECYSTECTOMY  08/06/2007    Laparoscopic cholecystectomy. Symptomatic gallstones   • COLONOSCOPY  10/15/2013    declined stool cards and colonscopy   • ENDOSCOPY AND COLONOSCOPY  05/22/2014    Internal & external hemorrhoids found.   • ENDOSCOPY W/ PEG TUBE PLACEMENT  05/22/2014    EGD w/ tube: Normal esopahgus. Gastritis in stomach. Biopsy taken. Normal duodenum. Biopsy taken.   • INJECTION OF MEDICATION  07/24/2013    Inj(s) Tend-Sheath, Ligament, Single   • INJECTION  OF MEDICATION  11/17/2014    Kenalog x6   • INJECTION OF MEDICATION  04/01/2016    PNEUMOC VAC/ADMIN/RCVD    • INJECTION OF MEDICATION  12/05/2014    Toradol   • OTHER SURGICAL HISTORY  01/19/2014    Drain/Inject Major Joint   x2   • PAP SMEAR  10/06/2011   • PARTIAL HYSTERECTOMY      Partial hyst    • TONSILLECTOMY     • TUBAL ABDOMINAL LIGATION         Health Maintenance:  Health Maintenance   Topic Date Due   • ZOSTER VACCINE  11/10/2016   • MEDICARE ANNUAL WELLNESS  04/17/2018   • DXA SCAN  04/22/2018   • MAMMOGRAM  04/25/2018   • COLONOSCOPY  05/22/2024   • TDAP/TD VACCINES (2 - Td) 04/20/2027   • HEPATITIS C SCREENING  Completed   • INFLUENZA VACCINE  Completed   • PAP SMEAR  Excluded       Current Meds:    Current Outpatient Prescriptions:   •  acetaminophen (TYLENOL) 325 MG tablet, Take 2 tablets by mouth Every 4 (Four) Hours As Needed for Mild Pain . (Patient taking differently: Take  by mouth Every 4 (Four) Hours As Needed for Mild Pain .), Disp: , Rfl:   •  albuterol (PROVENTIL HFA;VENTOLIN HFA) 108 (90 Base) MCG/ACT inhaler, Inhale 2 puffs Every 4 (Four) Hours As Needed for Wheezing., Disp: 6.7 g, Rfl: 11  •  aspirin 81 MG chewable tablet, Chew 1 tablet Daily., Disp: 30 tablet, Rfl: 11  •  bisacodyl (DULCOLAX) 5 MG EC tablet, Take 1 tablet by mouth Daily As Needed for Constipation., Disp: , Rfl:   •  budesonide-formoterol (SYMBICORT) 160-4.5 MCG/ACT inhaler, Inhale 2 puffs 2 (Two) Times a Day., Disp: 1 inhaler, Rfl: 11  •  ferrous sulfate 324 (65 Fe) MG tablet delayed-release EC tablet, Take 1 tablet by mouth 2 (Two) Times a Day With Meals., Disp: 180 tablet, Rfl: 3  •  FLUoxetine (PROzac) 20 MG capsule, Take 1 capsule by mouth Daily., Disp: 90 capsule, Rfl: 3  •  FLUoxetine (PROzac) 40 MG capsule, Take 1 capsule by mouth Daily., Disp: 901 capsule, Rfl: 3  •  folic acid (FOLVITE) 1 MG tablet, TAKE ONE TABLET BY MOUTH ONCE DAILY FOR SUPPLEMENT, Disp: 30 tablet, Rfl: 0  •  furosemide (LASIX) 20 MG tablet,  Take 1 tablet by mouth Every Other Day., Disp: 15 tablet, Rfl: 3  •  losartan (COZAAR) 50 MG tablet, TAKE ONE TABLET BY MOUTH ONCE DAILY FOR HYPERTENSION. HOLD FOR SYSTOLIC BLOOD PRESSURE LESS THAN 100, Disp: 30 tablet, Rfl: 0  •  meloxicam (MOBIC) 15 MG tablet, TAKE ONE TABLET BY MOUTH ONCE DAILY FOR MILD PAIN, Disp: 30 tablet, Rfl: 0  •  metoprolol succinate XL (TOPROL-XL) 25 MG 24 hr tablet, Take 1 tablet by mouth Daily., Disp: 90 tablet, Rfl: 3  •  ondansetron (ZOFRAN) 4 MG tablet, Take 1 tablet by mouth Every 6 (Six) Hours As Needed for Nausea or Vomiting., Disp: , Rfl:   •  potassium chloride (MICRO-K) 10 MEQ CR capsule, Take 1 capsule by mouth 2 (Two) Times a Day., Disp: 180 capsule, Rfl: 3  •  raNITIdine (ZANTAC) 150 MG tablet, Take 1 tablet by mouth 2 (Two) Times a Day., Disp: 180 tablet, Rfl: 3  •  risperiDONE (risperDAL) 1 MG tablet, Take 1 tablet by mouth Every Night., Disp: 901 tablet, Rfl: 3    Allergies:  Codeine; Penicillins; and Sulfa antibiotics    Family Hx:  Family History   Problem Relation Age of Onset   • Cholelithiasis Mother    • Thyroid cancer Sister    • Thyroid disease Sister      Thyroid disorder   • Graves' disease Sister    • Cholelithiasis Maternal Grandmother    • Thyroid cancer Other      Other 2nd degree relative, thyroid   • Diabetes Other    • Hypertension Other         Social History:  Social History     Social History   • Marital status:      Spouse name: N/A   • Number of children: N/A   • Years of education: N/A     Occupational History   • Not on file.     Social History Main Topics   • Smoking status: Former Smoker     Quit date: 1/10/2018   • Smokeless tobacco: Never Used      Comment: SMOKED FOR 20>PLUS YRS   • Alcohol use No   • Drug use: No   • Sexual activity: Defer     Other Topics Concern   • Not on file     Social History Narrative   • No narrative on file       Review of Systems  Review of Systems   Constitutional: Negative.  Negative for fatigue and fever.  "  HENT: Negative.  Negative for ear pain and sore throat.    Eyes: Negative.  Negative for pain and visual disturbance.   Respiratory: Negative.  Negative for cough and shortness of breath.    Cardiovascular: Negative.  Negative for chest pain and palpitations.   Gastrointestinal: Negative for abdominal pain and nausea.   Endocrine: Negative.    Genitourinary: Negative for dysuria.   Musculoskeletal: Negative for arthralgias.   Skin: Negative for rash.   Allergic/Immunologic: Negative.    Neurological: Negative for dizziness, weakness and headaches.       Objective:     /80 (BP Location: Left arm, Cuff Size: Adult)   Pulse 77   Ht 162.6 cm (64.02\")   Wt 54.9 kg (121 lb)   SpO2 98%   BMI 20.76 kg/m²     General:  alert, appears stated age, cooperative and no distress   Oropharynx: lips, mucosa, and tongue normal; teeth and gums normal    Eyes:  conjunctivae/corneas clear. PERRL, EOM's intact.     Ears:  normal TM's and external ear canals both ears   Neck: supple, symmetrical, trachea midline   Thyroid:  deferred   Lung: clear to auscultation bilaterally   Heart:  regular rate and rhythm, S1, S2 normal, no murmur, click, rub or gallop   Abdomen: soft, non-tender; bowel sounds normal; no masses,  no organomegaly   Extremities: extremities normal, atraumatic, no cyanosis or edema   Skin: warm and dry, no hyperpigmentation, vitiligo, or suspicious lesions   Pulses: 2+ and symmetric   Neuro: normal without focal findings, mental status, speech normal, alert and oriented x3        Mental Status Exam:   Hygiene:   good  Cooperation:  Cooperative  Eye Contact:  Good  Affect:  Full range  Speech:  Normal  Thought Progress:  Linear  Thought Content:  Normal    Lab Review  TSH (uIU/ml)   Date Value   09/11/2014 1.75            Assessment:       1. Recurrent major depressive disorder, in full remission    2. Hypokalemia    3. Essential hypertension    4. Tobacco abuse, in remission         Plan:     1.  Rx changes: " none may stop potassium is lab work comes back normal.  2.  Depression Plan: Recommended medication management  3.  Interventions within this session: yes  4.  Follow up plan: Follow up if symptoms persist or worsen and Recheck if not improving   5.  Follow up: 3 months   6. Labwork or leaving today.  7.  Have discussed with patient and daughter-in-law the patient may start driving again around town supervised for now.  Advised caution with restarting abruptly driving on the [place].    Goals     • Other (pt-stated)            Driving again  Barriers to goals: Prolonged illness              Preventative:  Recommended:Shingles  Smoking cessation counseling was provided. Have spent 4 minutes discussing cessation and continuation to be tobacco free.   does not drink  eat more fruits and vegetables, plan meals and have 3 meals a day    RISK SCORE: 3         This document has been electronically signed by Coral Berry MD on April 5, 2018 10:40 AM

## 2018-04-12 RX ORDER — MELOXICAM 15 MG/1
TABLET ORAL
Qty: 30 TABLET | Refills: 0 | Status: SHIPPED | OUTPATIENT
Start: 2018-04-12 | End: 2018-09-10 | Stop reason: SDUPTHER

## 2018-04-12 RX ORDER — RANITIDINE 150 MG/1
TABLET ORAL
Qty: 60 TABLET | Refills: 0 | Status: SHIPPED | OUTPATIENT
Start: 2018-04-12 | End: 2018-05-02 | Stop reason: SDUPTHER

## 2018-04-12 RX ORDER — LOSARTAN POTASSIUM 50 MG/1
TABLET ORAL
Qty: 30 TABLET | Refills: 1 | Status: SHIPPED | OUTPATIENT
Start: 2018-04-12 | End: 2018-05-09 | Stop reason: DRUGHIGH

## 2018-04-19 RX ORDER — FOLIC ACID 1 MG/1
TABLET ORAL
Qty: 30 TABLET | Refills: 2 | Status: SHIPPED | OUTPATIENT
Start: 2018-04-19 | End: 2018-09-10 | Stop reason: SDUPTHER

## 2018-05-02 ENCOUNTER — OFFICE VISIT (OUTPATIENT)
Dept: FAMILY MEDICINE CLINIC | Facility: CLINIC | Age: 63
End: 2018-05-02

## 2018-05-02 VITALS
WEIGHT: 118 LBS | HEIGHT: 64 IN | DIASTOLIC BLOOD PRESSURE: 70 MMHG | BODY MASS INDEX: 20.14 KG/M2 | SYSTOLIC BLOOD PRESSURE: 170 MMHG | HEART RATE: 73 BPM | OXYGEN SATURATION: 94 %

## 2018-05-02 DIAGNOSIS — J44.9 STAGE 3 SEVERE COPD BY GOLD CLASSIFICATION (HCC): ICD-10-CM

## 2018-05-02 DIAGNOSIS — Z12.31 ENCOUNTER FOR SCREENING MAMMOGRAM FOR BREAST CANCER: Primary | ICD-10-CM

## 2018-05-02 DIAGNOSIS — Z78.0 POST-MENOPAUSAL: ICD-10-CM

## 2018-05-02 PROCEDURE — G0439 PPPS, SUBSEQ VISIT: HCPCS | Performed by: FAMILY MEDICINE

## 2018-05-02 RX ORDER — BUDESONIDE AND FORMOTEROL FUMARATE DIHYDRATE 160; 4.5 UG/1; UG/1
AEROSOL RESPIRATORY (INHALATION)
Qty: 1 INHALER | Refills: 5 | Status: SHIPPED | OUTPATIENT
Start: 2018-05-02 | End: 2018-09-10 | Stop reason: SDUPTHER

## 2018-05-02 RX ORDER — PRAVASTATIN SODIUM 40 MG
40 TABLET ORAL NIGHTLY
Qty: 90 TABLET | Refills: 3 | Status: SHIPPED | OUTPATIENT
Start: 2018-05-02 | End: 2018-09-10 | Stop reason: SDUPTHER

## 2018-05-02 NOTE — PROGRESS NOTES
QUICK REFERENCE INFORMATION:  The ABCs of the Annual Wellness Visit    Subsequent Medicare Wellness Visit    HEALTH RISK ASSESSMENT    1955    Recent Hospitalizations:  Recently treated at the following:  [facility].        Current Medical Providers:  Patient Care Team:  Coral Berry MD as PCP - General  Coral Berry MD as PCP - Claims Attributed  Michele Rivera MD as Consulting Physician (Cardiology)  Juan C FARRELL MD as Consulting Physician (Neurology)  LUPE Leo as Nurse Practitioner (Orthopedic Surgery)  Gay Cornejo MD as Consulting Physician (Pulmonary Disease)        Smoking Status:  History   Smoking Status   • Former Smoker   • Quit date: 1/10/2018   Smokeless Tobacco   • Never Used     Comment: SMOKED FOR 20>PLUS YRS       Alcohol Consumption:  History   Alcohol Use No       Depression Screen:   PHQ-2/PHQ-9 Depression Screening 5/2/2018   Little interest or pleasure in doing things 1   Feeling down, depressed, or hopeless 1   Trouble falling or staying asleep, or sleeping too much 3   Feeling tired or having little energy 2   Poor appetite or overeating 1   Feeling bad about yourself - or that you are a failure or have let yourself or your family down 1   Trouble concentrating on things, such as reading the newspaper or watching television 1   Moving or speaking so slowly that other people could have noticed. Or the opposite - being so fidgety or restless that you have been moving around a lot more than usual 0   Thoughts that you would be better off dead, or of hurting yourself in some way 0   Total Score 10   If you checked off any problems, how difficult have these problems made it for you to do your work, take care of things at home, or get along with other people? Somewhat difficult       Health Habits and Functional and Cognitive Screening:  Functional & Cognitive Status 5/2/2018   Do you have difficulty preparing food and eating? No   Do you have  difficulty bathing yourself, getting dressed or grooming yourself? No   Do you have difficulty using the toilet? No   Do you have difficulty moving around from place to place? No   Do you have trouble with steps or getting out of a bed or a chair? No   In the past year have you fallen or experienced a near fall? No   Do you need help using the phone?  No   Are you deaf or do you have serious difficulty hearing?  No   Do you need help with transportation? No   Do you need help shopping? No   Do you need help preparing meals?  No   Do you need help with housework?  No   Do you need help with laundry? No   Do you need help taking your medications? No   Do you need help managing money? No   Do you ever drive or ride in a car without wearing a seat belt? No           Does the patient have evidence of cognitive impairment? No    Aspirin use counseling: Taking ASA appropriately as indicated      Recent Lab Results:  CMP:  Lab Results   Component Value Date    BUN 10 04/04/2018    CREATININE 0.86 04/04/2018    EGFRIFNONA 66 04/04/2018    BCR 11.6 04/04/2018     04/04/2018    K 4.1 04/04/2018    CO2 24.0 04/04/2018    CALCIUM 9.3 04/04/2018    ALBUMIN 2.40 (L) 01/22/2018    LABIL2 0.9 (L) 01/22/2018    BILITOT 0.3 01/22/2018    ALKPHOS 107 01/22/2018    AST 27 01/22/2018    ALT 34 01/22/2018     Lipid Panel:  Lab Results   Component Value Date    TRIG 119 04/05/2016    HDL 77 04/05/2016     HbA1c:       Visual Acuity:   Visual Acuity Screening    Right eye Left eye Both eyes   Without correction: 20/40 20/30 20/30   With correction:          Age-appropriate Screening Schedule:  Refer to the list below for future screening recommendations based on patient's age, sex and/or medical conditions. Orders for these recommended tests are listed in the plan section. The patient has been provided with a written plan.    Health Maintenance   Topic Date Due   • ZOSTER VACCINE  11/10/2016   • DXA SCAN  04/22/2018   • MAMMOGRAM   04/25/2018   • INFLUENZA VACCINE  08/01/2018   • COLONOSCOPY  05/22/2024   • TDAP/TD VACCINES (2 - Td) 04/20/2027   • PAP SMEAR  Excluded        Subjective   History of Present Illness    Michelle Child is a 62 y.o. female who presents for an Subsequent Wellness Visit.    The following portions of the patient's history were reviewed and updated as appropriate: allergies, current medications, past family history, past medical history, past social history, past surgical history and problem list.    Outpatient Medications Prior to Visit   Medication Sig Dispense Refill   • acetaminophen (TYLENOL) 325 MG tablet Take 2 tablets by mouth Every 4 (Four) Hours As Needed for Mild Pain . (Patient taking differently: Take  by mouth Every 4 (Four) Hours As Needed for Mild Pain .)     • albuterol (PROVENTIL HFA;VENTOLIN HFA) 108 (90 Base) MCG/ACT inhaler Inhale 2 puffs Every 4 (Four) Hours As Needed for Wheezing. 6.7 g 11   • aspirin 81 MG chewable tablet Chew 1 tablet Daily. 30 tablet 11   • bisacodyl (DULCOLAX) 5 MG EC tablet Take 1 tablet by mouth Daily As Needed for Constipation.     • ferrous sulfate 324 (65 Fe) MG tablet delayed-release EC tablet Take 1 tablet by mouth 2 (Two) Times a Day With Meals. 180 tablet 3   • FLUoxetine (PROzac) 20 MG capsule Take 1 capsule by mouth Daily. 90 capsule 3   • FLUoxetine (PROzac) 40 MG capsule Take 1 capsule by mouth Daily. 901 capsule 3   • folic acid (FOLVITE) 1 MG tablet TAKE ONE TABLET BY MOUTH ONCE DAILY FOR SUPPLEMENT 30 tablet 2   • meloxicam (MOBIC) 15 MG tablet TAKE ONE TABLET BY MOUTH ONCE DAILY FOR MILD PAIN 30 tablet 0   • metoprolol succinate XL (TOPROL-XL) 25 MG 24 hr tablet Take 1 tablet by mouth Daily. 90 tablet 3   • ondansetron (ZOFRAN) 4 MG tablet Take 1 tablet by mouth Every 6 (Six) Hours As Needed for Nausea or Vomiting.     • raNITIdine (ZANTAC) 150 MG tablet Take 1 tablet by mouth 2 (Two) Times a Day. 180 tablet 3   • risperiDONE (risperDAL) 1 MG tablet Take  1 tablet by mouth Every Night. 901 tablet 3   • SYMBICORT 160-4.5 MCG/ACT inhaler INHALE 2 PUFFS BY MOUTH EVERY MORNING AND AT BEDTIME FOR WHEEZING. RINSE MOUTH AFTER USE. 1 inhaler 5   • furosemide (LASIX) 20 MG tablet Take 1 tablet by mouth Every Other Day. 15 tablet 3   • losartan (COZAAR) 50 MG tablet TAKE ONE TABLET BY MOUTH ONCE DAILY FOR HYPERTENSION. HOLD FOR SYSTOLIC BLOOD PRESSURE LESS THAN 100 30 tablet 1   • potassium chloride (MICRO-K) 10 MEQ CR capsule Take 1 capsule by mouth 2 (Two) Times a Day. 180 capsule 3   • raNITIdine (ZANTAC) 150 MG tablet TAKE ONE TABLET BY MOUTH TWICE A DAY FOR INDIGESTION 60 tablet 0     No facility-administered medications prior to visit.        Patient Active Problem List   Diagnosis   • Migraine   • Iron deficiency anemia   • Gastroesophageal reflux disease   • Essential hypertension   • Coronary arteriosclerosis   • Bilateral pseudophakia   • Astigmatism   • Hypokalemia   • Cerebral microvascular disease   • Pulmonary hypertension   • Rheumatic tricuspid valve regurgitation   • Rheumatic mitral stenosis   • Acute diastolic CHF (congestive heart failure)   • Pulmonary hypertension due to left heart valvular disease   • Cor pulmonale, chronic   • Folic acid deficiency   • Stage 3 severe COPD by GOLD classification   • Personal history of tobacco use, presenting hazards to health   • Tobacco abuse, in remission   • Recurrent major depressive disorder, in full remission       Advance Care Planning:  power of  for healthcare on file    Identification of Risk Factors:  Risk factors include: increased fall risk, depression and polypharmacy.    Review of Systems    Compared to one year ago, the patient feels her physical health is worse.  Compared to one year ago, the patient feels her mental health is the same.    Objective     Physical Exam    Vitals:    05/02/18 1531   BP: 170/70   BP Location: Left arm   Cuff Size: Adult   Pulse: 73   SpO2: 94%   Weight: 53.5 kg (118  "lb)   Height: 162.6 cm (64.02\")   PainSc: 0-No pain       Patient's Body mass index is 20.24 kg/m². BMI is within normal parameters. No follow-up required.      Assessment/Plan   Patient Self-Management and Personalized Health Advice  The patient has been provided with information about: mental health concerns and preventive services including:   · Screening mammography, referral placed, TdaP vaccine, Zostavax vaccine (Herpes Zoster).    Visit Diagnoses:    ICD-10-CM ICD-9-CM   1. Encounter for screening mammogram for breast cancer Z12.31 V76.12   2. Post-menopausal Z78.0 V49.81       Orders Placed This Encounter   Procedures   • DEXA Bone Density Axial     Order Specific Question:   Reason for Exam:     Answer:   screening for osteoporosis   • Mammo Screening Digital Tomosynthesis Bilateral With CAD     Standing Status:   Future     Standing Expiration Date:   5/2/2019     Order Specific Question:   Reason for Exam:     Answer:   screening for breast cancer       Outpatient Encounter Prescriptions as of 5/2/2018   Medication Sig Dispense Refill   • acetaminophen (TYLENOL) 325 MG tablet Take 2 tablets by mouth Every 4 (Four) Hours As Needed for Mild Pain . (Patient taking differently: Take  by mouth Every 4 (Four) Hours As Needed for Mild Pain .)     • albuterol (PROVENTIL HFA;VENTOLIN HFA) 108 (90 Base) MCG/ACT inhaler Inhale 2 puffs Every 4 (Four) Hours As Needed for Wheezing. 6.7 g 11   • aspirin 81 MG chewable tablet Chew 1 tablet Daily. 30 tablet 11   • bisacodyl (DULCOLAX) 5 MG EC tablet Take 1 tablet by mouth Daily As Needed for Constipation.     • ferrous sulfate 324 (65 Fe) MG tablet delayed-release EC tablet Take 1 tablet by mouth 2 (Two) Times a Day With Meals. 180 tablet 3   • FLUoxetine (PROzac) 20 MG capsule Take 1 capsule by mouth Daily. 90 capsule 3   • FLUoxetine (PROzac) 40 MG capsule Take 1 capsule by mouth Daily. 901 capsule 3   • folic acid (FOLVITE) 1 MG tablet TAKE ONE TABLET BY MOUTH ONCE " DAILY FOR SUPPLEMENT 30 tablet 2   • meloxicam (MOBIC) 15 MG tablet TAKE ONE TABLET BY MOUTH ONCE DAILY FOR MILD PAIN 30 tablet 0   • metoprolol succinate XL (TOPROL-XL) 25 MG 24 hr tablet Take 1 tablet by mouth Daily. 90 tablet 3   • ondansetron (ZOFRAN) 4 MG tablet Take 1 tablet by mouth Every 6 (Six) Hours As Needed for Nausea or Vomiting.     • raNITIdine (ZANTAC) 150 MG tablet Take 1 tablet by mouth 2 (Two) Times a Day. 180 tablet 3   • risperiDONE (risperDAL) 1 MG tablet Take 1 tablet by mouth Every Night. 901 tablet 3   • SYMBICORT 160-4.5 MCG/ACT inhaler INHALE 2 PUFFS BY MOUTH EVERY MORNING AND AT BEDTIME FOR WHEEZING. RINSE MOUTH AFTER USE. 1 inhaler 5   • [DISCONTINUED] furosemide (LASIX) 20 MG tablet Take 1 tablet by mouth Every Other Day. 15 tablet 3   • [DISCONTINUED] losartan (COZAAR) 50 MG tablet TAKE ONE TABLET BY MOUTH ONCE DAILY FOR HYPERTENSION. HOLD FOR SYSTOLIC BLOOD PRESSURE LESS THAN 100 30 tablet 1   • [DISCONTINUED] potassium chloride (MICRO-K) 10 MEQ CR capsule Take 1 capsule by mouth 2 (Two) Times a Day. 180 capsule 3   • pravastatin (PRAVACHOL) 40 MG tablet Take 1 tablet by mouth Every Night. 90 tablet 3   • [DISCONTINUED] budesonide-formoterol (SYMBICORT) 160-4.5 MCG/ACT inhaler Inhale 2 puffs 2 (Two) Times a Day. 1 inhaler 11   • [DISCONTINUED] raNITIdine (ZANTAC) 150 MG tablet TAKE ONE TABLET BY MOUTH TWICE A DAY FOR INDIGESTION 60 tablet 0     No facility-administered encounter medications on file as of 5/2/2018.      Goals        Lifestyle    • Increase physical activity (pt-stated)            Barriers to goals: Prolonged hospitalization and illness            Reviewed use of high risk medication in the elderly: yes  Reviewed for potential of harmful drug interactions in the elderly: yes    Follow Up:  Return in about 1 year (around 5/2/2019) for Medicare Wellness, subsequent.     An After Visit Summary and PPPS with all of these plans were given to the patient.  Please refer to  scanned documents for full details of the visit.    Risk Score 5        This document has been electronically signed by Coral Berry MD on May 12, 2018 5:23 PM

## 2018-05-04 DIAGNOSIS — S42.291G OTHER CLOSED DISPLACED FRACTURE OF PROXIMAL END OF RIGHT HUMERUS WITH DELAYED HEALING, SUBSEQUENT ENCOUNTER: Primary | ICD-10-CM

## 2018-05-09 RX ORDER — LOSARTAN POTASSIUM 100 MG/1
100 TABLET ORAL DAILY
Qty: 30 TABLET | Refills: 2 | Status: SHIPPED | OUTPATIENT
Start: 2018-05-09 | End: 2018-06-26

## 2018-05-23 ENCOUNTER — TELEPHONE (OUTPATIENT)
Dept: FAMILY MEDICINE CLINIC | Facility: CLINIC | Age: 63
End: 2018-05-23

## 2018-05-23 RX ORDER — FUROSEMIDE 20 MG/1
TABLET ORAL
Qty: 30 TABLET | Refills: 5 | Status: SHIPPED | OUTPATIENT
Start: 2018-05-23 | End: 2018-09-10 | Stop reason: SDUPTHER

## 2018-06-01 ENCOUNTER — TELEPHONE (OUTPATIENT)
Dept: FAMILY MEDICINE CLINIC | Facility: CLINIC | Age: 63
End: 2018-06-01

## 2018-06-01 NOTE — TELEPHONE ENCOUNTER
DR HERNANDEZ PT CALLED AND IS ASKING FOR A DRS ORDER FOR HOME HEALTH TO COME AND EVALUATE, SAYS SHE CANT HARDLY WALK.

## 2018-06-01 NOTE — TELEPHONE ENCOUNTER
She will have to do outpatient PT for now. You can send referral.         This document has been electronically signed by Coral Berry MD on June 1, 2018 9:48 AM

## 2018-06-05 ENCOUNTER — TRANSCRIBE ORDERS (OUTPATIENT)
Dept: FAMILY MEDICINE CLINIC | Facility: CLINIC | Age: 63
End: 2018-06-05

## 2018-06-05 DIAGNOSIS — R26.9 ABNORMAL GAIT: Primary | ICD-10-CM

## 2018-06-14 ENCOUNTER — HOSPITAL ENCOUNTER (OUTPATIENT)
Dept: PHYSICAL THERAPY | Facility: HOSPITAL | Age: 63
Setting detail: THERAPIES SERIES
Discharge: HOME | End: 2018-06-14

## 2018-06-14 DIAGNOSIS — R26.9 ABNORMAL GAIT: Primary | ICD-10-CM

## 2018-06-14 PROCEDURE — G8979 MOBILITY GOAL STATUS: HCPCS | Performed by: PHYSICAL THERAPIST

## 2018-06-14 PROCEDURE — G8978 MOBILITY CURRENT STATUS: HCPCS | Performed by: PHYSICAL THERAPIST

## 2018-06-14 PROCEDURE — 97110 THERAPEUTIC EXERCISES: CPT | Performed by: PHYSICAL THERAPIST

## 2018-06-14 PROCEDURE — 97162 PT EVAL MOD COMPLEX 30 MIN: CPT | Performed by: PHYSICAL THERAPIST

## 2018-06-14 RX ORDER — LOSARTAN POTASSIUM 50 MG/1
TABLET ORAL
Qty: 90 TABLET | Refills: 0 | Status: SHIPPED | OUTPATIENT
Start: 2018-06-14 | End: 2018-07-10 | Stop reason: HOSPADM

## 2018-06-14 RX ORDER — FERROUS GLUCONATE 240(27)MG
TABLET ORAL
Qty: 60 TABLET | Refills: 0 | Status: SHIPPED | OUTPATIENT
Start: 2018-06-14 | End: 2019-05-08 | Stop reason: SDUPTHER

## 2018-06-14 NOTE — THERAPY EVALUATION
Outpatient Physical Therapy Ortho Initial Evaluation  [facility]     Patient Name: Michelle Child  : 1955  MRN: [id number]  Today's Date: 2018      Visit Date: 2018  Attendance:   Subjective % Improvement: N/A  Recert Date: 18  MD appointment: TBD    Therapy Diagnosis: Gait abnormality, generalized deconditioning with balance deficits    Discussed with patient possible OT eval and patient wished to think about it for UE strength    Patient Active Problem List   Diagnosis   • Migraine   • Iron deficiency anemia   • Gastroesophageal reflux disease   • Essential hypertension   • Coronary arteriosclerosis   • Bilateral pseudophakia   • Astigmatism   • Hypokalemia   • Cerebral microvascular disease   • Pulmonary hypertension   • Rheumatic tricuspid valve regurgitation   • Rheumatic mitral stenosis   • Acute diastolic CHF (congestive heart failure)   • Pulmonary hypertension due to left heart valvular disease   • Cor pulmonale, chronic   • Folic acid deficiency   • Stage 3 severe COPD by GOLD classification   • Personal history of tobacco use, presenting hazards to health   • Tobacco abuse, in remission   • Recurrent major depressive disorder, in full remission        Past Medical History:   Diagnosis Date   • Alkaline phosphatase raised    • Astigmatism    • Bilateral pseudophakia    • Coronary arteriosclerosis    • Depressive disorder    • Edema of lower extremity    • Encounter for general adult medical examination with abnormal findings    • Essential hypertension    • Gastroesophageal reflux disease    • H/O bone density study     DXA BONE DENSITY AXIAL    2016   • H/O screening mammography     SCREENING MAMMOGRAPHY     2016   • Hx of screening mammography     x3; declined screening, understands risks and benefits and still declines      2015   • Influenza vaccine administered     INFLUENZA IMMUN ADMIN OR PREV RECV'D   x4       2016   • Insomnia    •  "Iron deficiency    • Migraine    • Tobacco dependence     continuous          Past Surgical History:   Procedure Laterality Date   • APPENDECTOMY     • AUGMENTATION MAMMAPLASTY     • BREAST IMPLANT SURGERY     • CATARACT EXTRACTION WITH INTRAOCULAR LENS IMPLANT  12/17/2003    Phacoemulsification of a cataract with intraocular lens implant of rigt eye, Nixon model SA 60-AT; serial # [id number];20.5 diopter   • CHOLECYSTECTOMY  08/06/2007    Laparoscopic cholecystectomy. Symptomatic gallstones   • COLONOSCOPY  10/15/2013    declined stool cards and colonscopy   • ENDOSCOPY AND COLONOSCOPY  05/22/2014    Internal & external hemorrhoids found.   • ENDOSCOPY W/ PEG TUBE PLACEMENT  05/22/2014    EGD w/ tube: Normal esopahgus. Gastritis in stomach. Biopsy taken. Normal duodenum. Biopsy taken.   • INJECTION OF MEDICATION  07/24/2013    Inj(s) Tend-Sheath, Ligament, Single   • INJECTION OF MEDICATION  11/17/2014    Kenalog x6   • INJECTION OF MEDICATION  04/01/2016    PNEUMOC VAC/ADMIN/RCVD    • INJECTION OF MEDICATION  12/05/2014    Toradol   • OTHER SURGICAL HISTORY  01/19/2014    Drain/Inject Major Joint   x2   • PAP SMEAR  10/06/2011   • PARTIAL HYSTERECTOMY      Partial hyst    • TONSILLECTOMY     • TUBAL ABDOMINAL LIGATION         Visit Dx:     ICD-10-CM ICD-9-CM   1. Abnormal gait R26.9 781.2             Patient History     Row Name 06/14/18 1115             History    Chief Complaint Balance Problems;Difficulty Walking;Difficulty with daily activities;Muscle weakness;Falls/history of falls  -BB      Brief Description of Current Complaint Present today with complaints of trouble walking and weakness. Reports havine a cane and walker at home but doesnt like using either stating she isnt sure how they help. Present stating she thinks she had a stroke and got real confused. Reports staying in a nursing home for about 3-4 weeks then had home health. Started in January of this year \"i think\". Reports getting pneumonia in " "December and \"i dont remember Fan\".   -BB      Previous treatment for THIS PROBLEM Rehabilitation  -BB      Patient/Caregiver Goals Return to prior level of function;Improve mobility;Improve strength  -BB      Hand Dominance right-handed  -BB      Occupation/sports/leisure activities Retired   -BB         Pain     Pain at Present 0  -BB      Pain at Best 0  -BB      Pain at Worst 0  -BB      Tolerance Time- Standing few minutes   -BB      Tolerance Time- Walking Short distances  -BB         Fall Risk Assessment    Any falls in the past year: Yes  -BB      Number of falls reported in the last 12 months 1  -BB      Factors that contributed to the fall: --   \"i was just dizzy\"  -BB        User Key  (r) = Recorded By, (t) = Taken By, (c) = Cosigned By    Initials Name Provider Type    FINA Lewis PT Physical Therapist                PT Ortho     Row Name 06/14/18 4316       Subjective Comments    Subjective Comments See patient history   -BB       Precautions and Contraindications    Precautions/Limitations fall precautions  -BB    Precautions Check BP PRN  -BB       Subjective Pain    Able to rate subjective pain? yes  -BB    Pre-Treatment Pain Level 0  -BB    Post-Treatment Pain Level 0  -BB       Posture/Observations    Posture/Observations Comments Present with no AD and caregiver. Shuffling gait requiring HHA for balance   -BB       General ROM    GENERAL ROM COMMENTS BLE ROM is WNL for gait   -BB       MMT (Manual Muscle Testing)    Additional Documentation General Assessment (Manual Muscle Testing) (Group)  -BB       General Assessment (Manual Muscle Testing)    General Manual Muscle Testing (MMT) Assessment lower extremity strength deficits identified  -BB       Lower Extremity (Manual Muscle Testing)    Lower Extremity: Manual Muscle Testing (MMT) left hip strength deficit;right hip strength deficit;left knee strength deficit;right knee strength deficit;left ankle strength deficit;right ankle " strength deficit  -BB       Left Hip (Manual Muscle Testing)    Left Hip Manual Muscle Testing (MMT) flexion;abduction;adduction  -BB    MMT: Flexion, Left Hip flexion  -BB    MMT, Gross Movement: Left Hip Flexion (3+/5) fair plus  -BB    MMT: ABduction, Left Hip abduction  -BB    MMT, Gross Movement: Left Hip ABduction (3+/5) fair plus  -BB    MMT, Gross Movement: Left Hip ADduction (3+/5) fair plus  -BB       Right Hip (Manual Muscle Testing)    Right Hip Manual Muscle Testing (MMT) flexion;abduction;adduction  -BB    MMT: Flexion, Right Hip flexion  -BB    MMT, Gross Movement: Right Hip Flexion (3+/5) fair plus  -BB    MMT: ABduction, Right Hip abduction  -BB    MMT, Gross Movement: Right Hip ABduction (3+/5) fair plus  -BB    MMT, Gross Movement: Right Hip ADduction (3+/5) fair plus  -BB       Left Knee (Manual Muscle Testing)    Left Knee Manual Muscle Testing (MMT) extension;flexion  -BB    MMT: Extension, Left Knee extension  -BB    MMT, Gross Movement: Left Knee Extension (3+/5) fair plus  -BB    MMT: Flexion, Left Knee flexion  -BB    MMT, Gross Movement: Left Knee Flexion (3+/5) fair plus  -BB       Right Knee (Manual Muscle Testing)    Right Knee Manual Muscle Testing (MMT) extension;flexion  -BB    MMT: Extension, Right Knee extension  -BB    MMT, Gross Movement: Right Knee Extension (3+/5) fair plus  -BB    MMT: Flexion, Right Knee flexion  -BB    MMT, Gross Movement: Right Knee Flexion (3+/5) fair plus  -BB       Left Ankle/Foot (Manual Muscle Testing)    Left Ankle Manual Muscle Testing (MMT) dorsiflexion  -BB    MMT: Dorsiflexion Left Ankle Muscles dorsiflexion  -BB    MMT, Gross Movement: Left Ankle Dorsiflexion (3+/5) fair plus  -BB       Right Ankle/Foot (Manual Muscle Testing)    Right Ankle Manual Muscle Testing (MMT) dorsiflexion  -BB    MMT: Dorsiflexion, Right Ankle Muscles dorsiflexion  -BB    MMT, Gross Movement: Right Ankle Dorsiflexion (3+/5) fair plus  -BB       Sensation    Sensation  "WNL? WNL  -BB    Light Touch No apparent deficits  -BB       Girth    Girth Measured? --   Bilateral LE edema L>R. No pitting edema noted   -BB       Pathomechanics    Pathomechanics Comments Shuffling steps   -BB       Balance Skills Training    SLS Unsafe   -BB    Rhomberg Sway all planes with HHA   -BB    Sharpened Rhomberg Partially performed- Sway in all planes with HHA  -BB       Transfers    Comment (Transfers) SBA with sit to stand with UE assist. Unable to perform without UE assist.   -BB       Gait/Stairs Assessment/Training    Comment (Gait/Stairs) shuffling steps with and without cane. A noted pause x2 negotiating doorway noticed. Tends to run into items on right with walker.   -BB      User Key  (r) = Recorded By, (t) = Taken By, (c) = Cosigned By    Initials Name Provider Type    FINA Lewis, PT Physical Therapist                      Therapy Education  Education Details: POC, encouraged to use walker for improved safety. TR/HR, LAQ, march sitting, hip add  Given: Fall prevention and home safety  Program: New  How Provided: Verbal (pictures )  Provided to: Patient  Level of Understanding: Verbalized           PT OP Goals     Row Name 06/14/18 1115          PT Short Term Goals    STG Date to Achieve 07/05/18  -BB     STG 1 Independent in HEP   -BB     STG 2 Iso standing balance narrow SEDRICK independent with minimal sway   -BB     STG 3 Decrease cues needed for shuffling gait to less than 50% of the time   -BB        Long Term Goals    LTG Date to Achieve 07/26/18  -BB     LTG 1 Independent in final HEP   -BB     LTG 2 Hip and knee strength grossly 4+/5   -BB     LTG 3 Ambulate safely 250 feet with AAD or no AD  -BB     LTG 4 Complete TUG independent and safely less than 25\"  -BB        Time Calculation    PT Goal Re-Cert Due Date 07/05/18  -BB       User Key  (r) = Recorded By, (t) = Taken By, (c) = Cosigned By    Initials Name Provider Type    FINA Lewis, PT Physical Therapist              "   PT Assessment/Plan     Row Name 06/14/18 1115          PT Assessment    Functional Limitations Impaired gait;Limitations in functional capacity and performance;Decreased safety during functional activities;Impaired locomotion;Limitation in home management;Limitations in community activities  -BB     Impairments Balance;Endurance;Gait;Sensation;Muscle strength;Poor body mechanics;Locomotion;Impaired muscle endurance  -BB     Assessment Comments Patient presents with significant gait abnormalities with weakness and balance deficits. Patient is a high fall risk and has poor gait mechanics with a rolling walker requiring significant cueing. Patient could benefit from skilled PT for balance, gait, endurance, strength as patient is able to tolerate. Patient and caregiver was highly encouraged to use RW to aide in safety and balance with both in agreement. Patient fatigued with reps of 10 with exercise and required cues to avoid compensations. Patient sits with bilateral knees adducted for stability and to perform sit to stands   -BB     Rehab Potential Good  -BB     Patient/caregiver participated in establishment of treatment plan and goals Yes  -BB     Patient would benefit from skilled therapy intervention Yes  -BB        PT Plan    PT Frequency 2x/week  -BB     Predicted Duration of Therapy Intervention (Therapy Eval) 6 weeks  -BB     Planned CPT's? PT EVAL MOD COMPLELITY: 82772;PT THER SUPP EA 15 MIN;PT RE-EVAL: 60766;PT THER PROC EA 15 MIN: 66640;PT THER ACT EA 15 MIN: 66790;PT GAIT TRAINING EA 15 MIN: 41727  -BB     PT Plan Comments Progress strength, gait, balance, endurance as tolerated. Allow breaks as needed.   -BB       User Key  (r) = Recorded By, (t) = Taken By, (c) = Cosigned By    Initials Name Provider Type    BB Cecille Lewis, PT Physical Therapist                  Exercises     Row Name 06/14/18 1118             Subjective Comments    Subjective Comments See patient history   -BB         Subjective  Pain    Able to rate subjective pain? yes  -BB      Pre-Treatment Pain Level 0  -BB      Post-Treatment Pain Level 0  -BB         Exercise 1    Exercise Name 1 Gait with RW   -BB      Additional Comments Cues to take big steps and cues to avoid hitting items on right  -BB         Exercise 2    Exercise Name 2 Sitting TR/HR   -BB      Sets 2 1  -BB      Reps 2 10  -BB      Additional Comments each  -BB         Exercise 3    Exercise Name 3 LAQ   -BB      Sets 3 1  -BB      Reps 3 10  -BB         Exercise 4    Exercise Name 4 Sitting hip flexion   -BB      Sets 4 1  -BB      Reps 4 10  -BB         Exercise 5    Exercise Name 5 Sitting hip add with pillow   -BB      Sets 5 1  -BB      Reps 5 10  -BB        User Key  (r) = Recorded By, (t) = Taken By, (c) = Cosigned By    Initials Name Provider Type    FINA Lewis PT Physical Therapist                                  Time Calculation:         Start Time: 1115  Stop Time: 1157  Time Calculation (min): 42 min  Total Timed Code Minutes- PT: 12 minute(s)     Therapy Charges for Today     Code Description Service Date Service Provider Modifiers Qty    17665074330 HC PT MOBILITY CURRENT 6/14/2018 Cecille Lewis, PT GP, CK 1    58328938310 HC PT MOBILITY PROJECTED 6/14/2018 Cecille Lewis, PT GP, CJ 1    99583429960 HC PT EVAL MOD COMPLEXITY 2 6/14/2018 Cecille Lewis, PT GP 1    07841444964 HC PT THER PROC EA 15 MIN 6/14/2018 Cecille Lewis, PT GP 1          PT G-Codes  PT Professional Judgement Used?: Yes  Functional Limitation: Mobility: Walking and moving around  Mobility: Walking and Moving Around Current Status (): At least 40 percent but less than 60 percent impaired, limited or restricted  Mobility: Walking and Moving Around Goal Status (): At least 20 percent but less than 40 percent impaired, limited or restricted         Cecille Lewis, PT  6/14/2018

## 2018-06-18 ENCOUNTER — DOCUMENTATION (OUTPATIENT)
Dept: PHYSICAL THERAPY | Facility: HOSPITAL | Age: 63
End: 2018-06-18

## 2018-06-21 ENCOUNTER — APPOINTMENT (OUTPATIENT)
Dept: PHYSICAL THERAPY | Facility: HOSPITAL | Age: 63
End: 2018-06-21

## 2018-06-25 ENCOUNTER — APPOINTMENT (OUTPATIENT)
Dept: PHYSICAL THERAPY | Facility: HOSPITAL | Age: 63
End: 2018-06-25

## 2018-06-26 ENCOUNTER — OFFICE VISIT (OUTPATIENT)
Dept: FAMILY MEDICINE CLINIC | Facility: CLINIC | Age: 63
End: 2018-06-26

## 2018-06-26 DIAGNOSIS — I10 ESSENTIAL HYPERTENSION: Primary | ICD-10-CM

## 2018-06-26 DIAGNOSIS — R53.1 WEAKNESS: ICD-10-CM

## 2018-06-26 DIAGNOSIS — M79.89 LOCALIZED SWELLING OF LOWER EXTREMITY: ICD-10-CM

## 2018-06-26 DIAGNOSIS — R29.6 RECURRENT FALLS WHILE WALKING: ICD-10-CM

## 2018-06-26 DIAGNOSIS — F17.201 TOBACCO ABUSE, IN REMISSION: ICD-10-CM

## 2018-06-26 PROCEDURE — 99214 OFFICE O/P EST MOD 30 MIN: CPT | Performed by: FAMILY MEDICINE

## 2018-06-26 RX ORDER — DIAPER,BRIEF,INFANT-TODD,DISP
EACH MISCELLANEOUS 3 TIMES DAILY
Qty: 60 G | Refills: 5 | Status: SHIPPED | OUTPATIENT
Start: 2018-06-26 | End: 2019-08-19

## 2018-06-27 DIAGNOSIS — R60.0 LOWER EXTREMITY EDEMA: Primary | ICD-10-CM

## 2018-06-28 ENCOUNTER — APPOINTMENT (OUTPATIENT)
Dept: PHYSICAL THERAPY | Facility: HOSPITAL | Age: 63
End: 2018-06-28

## 2018-06-28 ENCOUNTER — TELEPHONE (OUTPATIENT)
Dept: FAMILY MEDICINE CLINIC | Facility: CLINIC | Age: 63
End: 2018-06-28

## 2018-06-28 NOTE — TELEPHONE ENCOUNTER
Home health did physical therapy Evaluation on pt today  They are going to be seeing her 2 times per week for the next 4 weeks. They will start this beginning next week.     If you have any questions call Carlos Enrique Abdalla   [phone]     Thanks

## 2018-06-28 NOTE — TELEPHONE ENCOUNTER
Denisha Lynn from [facility] called with a list of Pts medications she is currently taking.    albuterol inhaler   Asprin 81 Mg   ferrate 240 mg twice a day  Prozac 60 mg daily   Folic acid 1 mg daily   Losartan 50 mg   Meloxicam 15 mg daily   Metoprolol succinatel 25 mg   pravastatin 40 mg   Zantac twice a day 150 mg  Risperdal 1 mg   Symbicort inhaler 160-4.52    The family has not picked up the cream that was prescribed yesterday yet.     She stated she got pt a med filler to make sure pt is taking medication correctly.      Contact information :Denisha Lynn   [phone] [facility]

## 2018-06-29 ENCOUNTER — TELEPHONE (OUTPATIENT)
Dept: FAMILY MEDICINE CLINIC | Facility: CLINIC | Age: 63
End: 2018-06-29

## 2018-06-30 VITALS
SYSTOLIC BLOOD PRESSURE: 160 MMHG | HEART RATE: 56 BPM | HEIGHT: 64 IN | DIASTOLIC BLOOD PRESSURE: 78 MMHG | OXYGEN SATURATION: 97 % | WEIGHT: 118 LBS | BODY MASS INDEX: 20.14 KG/M2

## 2018-07-02 ENCOUNTER — HOSPITAL ENCOUNTER (OUTPATIENT)
Facility: HOSPITAL | Age: 63
Setting detail: OBSERVATION
LOS: 1 days | Discharge: NURSING, REHAB OR LONG-TERM CARE | End: 2018-07-10
Attending: EMERGENCY MEDICINE | Admitting: EMERGENCY MEDICINE

## 2018-07-02 ENCOUNTER — TELEPHONE (OUTPATIENT)
Dept: FAMILY MEDICINE CLINIC | Facility: CLINIC | Age: 63
End: 2018-07-02

## 2018-07-02 ENCOUNTER — APPOINTMENT (OUTPATIENT)
Dept: GENERAL RADIOLOGY | Facility: HOSPITAL | Age: 63
End: 2018-07-02

## 2018-07-02 ENCOUNTER — APPOINTMENT (OUTPATIENT)
Dept: CT IMAGING | Facility: HOSPITAL | Age: 63
End: 2018-07-02

## 2018-07-02 DIAGNOSIS — I10 UNCONTROLLED HYPERTENSION: ICD-10-CM

## 2018-07-02 DIAGNOSIS — Z74.09 IMPAIRED MOBILITY AND ADLS: ICD-10-CM

## 2018-07-02 DIAGNOSIS — M79.605 PAIN IN BOTH LOWER EXTREMITIES: Primary | ICD-10-CM

## 2018-07-02 DIAGNOSIS — M79.604 PAIN IN BOTH LOWER EXTREMITIES: Primary | ICD-10-CM

## 2018-07-02 DIAGNOSIS — Z74.09 IMPAIRED PHYSICAL MOBILITY: ICD-10-CM

## 2018-07-02 DIAGNOSIS — R60.0 LOWER EXTREMITY EDEMA: ICD-10-CM

## 2018-07-02 DIAGNOSIS — R60.9 PERIPHERAL EDEMA: Primary | ICD-10-CM

## 2018-07-02 DIAGNOSIS — Z78.9 IMPAIRED MOBILITY AND ADLS: ICD-10-CM

## 2018-07-02 DIAGNOSIS — I27.22 PULMONARY HYPERTENSION DUE TO LEFT HEART DISEASE (HCC): ICD-10-CM

## 2018-07-02 DIAGNOSIS — I50.9 ACUTE ON CHRONIC CONGESTIVE HEART FAILURE, UNSPECIFIED CONGESTIVE HEART FAILURE TYPE: ICD-10-CM

## 2018-07-02 DIAGNOSIS — E87.6 HYPOKALEMIA: ICD-10-CM

## 2018-07-02 DIAGNOSIS — I50.31 ACUTE DIASTOLIC HEART FAILURE (HCC): ICD-10-CM

## 2018-07-02 PROBLEM — M79.89 LOCALIZED SWELLING OF LOWER EXTREMITY: Status: ACTIVE | Noted: 2018-07-02

## 2018-07-02 PROBLEM — G47.00 INSOMNIA: Status: ACTIVE | Noted: 2018-07-02

## 2018-07-02 PROBLEM — R53.1 WEAKNESS: Status: ACTIVE | Noted: 2018-07-02

## 2018-07-02 PROBLEM — R29.6 RECURRENT FALLS WHILE WALKING: Status: ACTIVE | Noted: 2018-07-02

## 2018-07-02 LAB
ALBUMIN SERPL-MCNC: 3.5 G/DL (ref 3.4–4.8)
ALBUMIN/GLOB SERPL: 1.4 G/DL (ref 1.1–1.8)
ALP SERPL-CCNC: 68 U/L (ref 38–126)
ALT SERPL W P-5'-P-CCNC: 26 U/L (ref 9–52)
ANION GAP SERPL CALCULATED.3IONS-SCNC: 11 MMOL/L (ref 5–15)
AST SERPL-CCNC: 27 U/L (ref 14–36)
BACTERIA UR QL AUTO: ABNORMAL /HPF
BASOPHILS # BLD AUTO: 0.02 10*3/MM3 (ref 0–0.2)
BASOPHILS NFR BLD AUTO: 0.3 % (ref 0–2)
BILIRUB SERPL-MCNC: 0.8 MG/DL (ref 0.2–1.3)
BILIRUB UR QL STRIP: NEGATIVE
BUN BLD-MCNC: 17 MG/DL (ref 7–21)
BUN/CREAT SERPL: 22.1 (ref 7–25)
CALCIUM SPEC-SCNC: 8.6 MG/DL (ref 8.4–10.2)
CHLORIDE SERPL-SCNC: 102 MMOL/L (ref 95–110)
CLARITY UR: CLEAR
CO2 SERPL-SCNC: 26 MMOL/L (ref 22–31)
COLOR UR: YELLOW
CREAT BLD-MCNC: 0.77 MG/DL (ref 0.5–1)
DEPRECATED RDW RBC AUTO: 45.7 FL (ref 36.4–46.3)
EOSINOPHIL # BLD AUTO: 0.03 10*3/MM3 (ref 0–0.7)
EOSINOPHIL NFR BLD AUTO: 0.4 % (ref 0–7)
ERYTHROCYTE [DISTWIDTH] IN BLOOD BY AUTOMATED COUNT: 14.3 % (ref 11.5–14.5)
GFR SERPL CREATININE-BSD FRML MDRD: 75 ML/MIN/1.73 (ref 45–104)
GLOBULIN UR ELPH-MCNC: 2.5 GM/DL (ref 2.3–3.5)
GLUCOSE BLD-MCNC: 74 MG/DL (ref 60–100)
GLUCOSE UR STRIP-MCNC: NEGATIVE MG/DL
HCT VFR BLD AUTO: 38.5 % (ref 35–45)
HGB BLD-MCNC: 12.7 G/DL (ref 12–15.5)
HGB UR QL STRIP.AUTO: ABNORMAL
HOLD SPECIMEN: NORMAL
HOLD SPECIMEN: NORMAL
HYALINE CASTS UR QL AUTO: ABNORMAL /LPF
IMM GRANULOCYTES # BLD: 0.02 10*3/MM3 (ref 0–0.02)
IMM GRANULOCYTES NFR BLD: 0.3 % (ref 0–0.5)
INR PPP: 1.02 (ref 0.8–1.2)
KETONES UR QL STRIP: ABNORMAL
LEUKOCYTE ESTERASE UR QL STRIP.AUTO: NEGATIVE
LYMPHOCYTES # BLD AUTO: 1.15 10*3/MM3 (ref 0.6–4.2)
LYMPHOCYTES NFR BLD AUTO: 16.2 % (ref 10–50)
MAGNESIUM SERPL-MCNC: 1.6 MG/DL (ref 1.6–2.3)
MAGNESIUM SERPL-MCNC: 1.8 MG/DL (ref 1.6–2.3)
MCH RBC QN AUTO: 28.8 PG (ref 26.5–34)
MCHC RBC AUTO-ENTMCNC: 33 G/DL (ref 31.4–36)
MCV RBC AUTO: 87.3 FL (ref 80–98)
MONOCYTES # BLD AUTO: 0.46 10*3/MM3 (ref 0–0.9)
MONOCYTES NFR BLD AUTO: 6.5 % (ref 0–12)
NEUTROPHILS # BLD AUTO: 5.43 10*3/MM3 (ref 2–8.6)
NEUTROPHILS NFR BLD AUTO: 76.3 % (ref 37–80)
NITRITE UR QL STRIP: NEGATIVE
NT-PROBNP SERPL-MCNC: 1960 PG/ML (ref 0–900)
PH UR STRIP.AUTO: 6.5 [PH] (ref 5–9)
PLATELET # BLD AUTO: 228 10*3/MM3 (ref 150–450)
PMV BLD AUTO: 10.5 FL (ref 8–12)
POTASSIUM BLD-SCNC: 2.6 MMOL/L (ref 3.5–5.1)
PROT SERPL-MCNC: 6 G/DL (ref 6.3–8.6)
PROT UR QL STRIP: ABNORMAL
PROTHROMBIN TIME: 13.2 SECONDS (ref 11.1–15.3)
RBC # BLD AUTO: 4.41 10*6/MM3 (ref 3.77–5.16)
RBC # UR: ABNORMAL /HPF
REF LAB TEST METHOD: ABNORMAL
SODIUM BLD-SCNC: 139 MMOL/L (ref 137–145)
SP GR UR STRIP: 1.01 (ref 1–1.03)
SQUAMOUS #/AREA URNS HPF: ABNORMAL /HPF
TROPONIN I SERPL-MCNC: 0.02 NG/ML
TROPONIN I SERPL-MCNC: 0.03 NG/ML
UROBILINOGEN UR QL STRIP: ABNORMAL
WBC NRBC COR # BLD: 7.11 10*3/MM3 (ref 3.2–9.8)
WBC UR QL AUTO: ABNORMAL /HPF
WHOLE BLOOD HOLD SPECIMEN: NORMAL
WHOLE BLOOD HOLD SPECIMEN: NORMAL

## 2018-07-02 PROCEDURE — 93010 ELECTROCARDIOGRAM REPORT: CPT | Performed by: INTERNAL MEDICINE

## 2018-07-02 PROCEDURE — 70450 CT HEAD/BRAIN W/O DYE: CPT

## 2018-07-02 PROCEDURE — G0378 HOSPITAL OBSERVATION PER HR: HCPCS

## 2018-07-02 PROCEDURE — 25010000002 FUROSEMIDE PER 20 MG: Performed by: EMERGENCY MEDICINE

## 2018-07-02 PROCEDURE — 94640 AIRWAY INHALATION TREATMENT: CPT

## 2018-07-02 PROCEDURE — 25010000003 POTASSIUM CHLORIDE 10 MEQ/100ML SOLUTION: Performed by: EMERGENCY MEDICINE

## 2018-07-02 PROCEDURE — 71045 X-RAY EXAM CHEST 1 VIEW: CPT

## 2018-07-02 PROCEDURE — 83735 ASSAY OF MAGNESIUM: CPT | Performed by: FAMILY MEDICINE

## 2018-07-02 PROCEDURE — 83735 ASSAY OF MAGNESIUM: CPT | Performed by: EMERGENCY MEDICINE

## 2018-07-02 PROCEDURE — 83880 ASSAY OF NATRIURETIC PEPTIDE: CPT | Performed by: EMERGENCY MEDICINE

## 2018-07-02 PROCEDURE — 84484 ASSAY OF TROPONIN QUANT: CPT | Performed by: EMERGENCY MEDICINE

## 2018-07-02 PROCEDURE — 96365 THER/PROPH/DIAG IV INF INIT: CPT

## 2018-07-02 PROCEDURE — 96375 TX/PRO/DX INJ NEW DRUG ADDON: CPT

## 2018-07-02 PROCEDURE — 25010000002 HYDRALAZINE PER 20 MG: Performed by: EMERGENCY MEDICINE

## 2018-07-02 PROCEDURE — 93005 ELECTROCARDIOGRAM TRACING: CPT | Performed by: EMERGENCY MEDICINE

## 2018-07-02 PROCEDURE — 85610 PROTHROMBIN TIME: CPT | Performed by: EMERGENCY MEDICINE

## 2018-07-02 PROCEDURE — 81001 URINALYSIS AUTO W/SCOPE: CPT | Performed by: EMERGENCY MEDICINE

## 2018-07-02 PROCEDURE — 80053 COMPREHEN METABOLIC PANEL: CPT | Performed by: EMERGENCY MEDICINE

## 2018-07-02 PROCEDURE — 99284 EMERGENCY DEPT VISIT MOD MDM: CPT

## 2018-07-02 PROCEDURE — 85025 COMPLETE CBC W/AUTO DIFF WBC: CPT | Performed by: EMERGENCY MEDICINE

## 2018-07-02 PROCEDURE — 51702 INSERT TEMP BLADDER CATH: CPT

## 2018-07-02 PROCEDURE — 96366 THER/PROPH/DIAG IV INF ADDON: CPT

## 2018-07-02 PROCEDURE — 94760 N-INVAS EAR/PLS OXIMETRY 1: CPT

## 2018-07-02 RX ORDER — METOPROLOL SUCCINATE 25 MG/1
25 TABLET, EXTENDED RELEASE ORAL DAILY
Status: DISCONTINUED | OUTPATIENT
Start: 2018-07-02 | End: 2018-07-07

## 2018-07-02 RX ORDER — FUROSEMIDE 10 MG/ML
40 INJECTION INTRAMUSCULAR; INTRAVENOUS ONCE
Status: COMPLETED | OUTPATIENT
Start: 2018-07-02 | End: 2018-07-02

## 2018-07-02 RX ORDER — FOLIC ACID 1 MG/1
1 TABLET ORAL DAILY
Status: DISCONTINUED | OUTPATIENT
Start: 2018-07-02 | End: 2018-07-10 | Stop reason: HOSPADM

## 2018-07-02 RX ORDER — BUDESONIDE AND FORMOTEROL FUMARATE DIHYDRATE 160; 4.5 UG/1; UG/1
2 AEROSOL RESPIRATORY (INHALATION) 2 TIMES DAILY
Status: DISCONTINUED | OUTPATIENT
Start: 2018-07-02 | End: 2018-07-10 | Stop reason: HOSPADM

## 2018-07-02 RX ORDER — ALBUTEROL SULFATE 90 UG/1
2 AEROSOL, METERED RESPIRATORY (INHALATION) EVERY 4 HOURS PRN
Status: DISCONTINUED | OUTPATIENT
Start: 2018-07-02 | End: 2018-07-02 | Stop reason: ALTCHOICE

## 2018-07-02 RX ORDER — FAMOTIDINE 20 MG/1
20 TABLET, FILM COATED ORAL 2 TIMES DAILY
Status: DISCONTINUED | OUTPATIENT
Start: 2018-07-02 | End: 2018-07-10 | Stop reason: HOSPADM

## 2018-07-02 RX ORDER — POTASSIUM CHLORIDE 7.45 MG/ML
10 INJECTION INTRAVENOUS ONCE
Status: COMPLETED | OUTPATIENT
Start: 2018-07-02 | End: 2018-07-03

## 2018-07-02 RX ORDER — POTASSIUM CHLORIDE 7.45 MG/ML
10 INJECTION INTRAVENOUS ONCE
Status: COMPLETED | OUTPATIENT
Start: 2018-07-02 | End: 2018-07-02

## 2018-07-02 RX ORDER — SODIUM CHLORIDE 0.9 % (FLUSH) 0.9 %
1-10 SYRINGE (ML) INJECTION AS NEEDED
Status: DISCONTINUED | OUTPATIENT
Start: 2018-07-02 | End: 2018-07-10 | Stop reason: HOSPADM

## 2018-07-02 RX ORDER — POTASSIUM CHLORIDE 1.5 G/1.77G
40 POWDER, FOR SOLUTION ORAL ONCE
Status: COMPLETED | OUTPATIENT
Start: 2018-07-02 | End: 2018-07-02

## 2018-07-02 RX ORDER — ATORVASTATIN CALCIUM 10 MG/1
10 TABLET, FILM COATED ORAL DAILY
Status: DISCONTINUED | OUTPATIENT
Start: 2018-07-02 | End: 2018-07-10 | Stop reason: HOSPADM

## 2018-07-02 RX ORDER — BISACODYL 5 MG/1
5 TABLET, DELAYED RELEASE ORAL DAILY PRN
Status: DISCONTINUED | OUTPATIENT
Start: 2018-07-02 | End: 2018-07-10 | Stop reason: HOSPADM

## 2018-07-02 RX ORDER — ALBUTEROL SULFATE 2.5 MG/3ML
2.5 SOLUTION RESPIRATORY (INHALATION) EVERY 6 HOURS PRN
Status: DISCONTINUED | OUTPATIENT
Start: 2018-07-02 | End: 2018-07-10 | Stop reason: HOSPADM

## 2018-07-02 RX ORDER — FERROUS SULFATE TAB EC 324 MG (65 MG FE EQUIVALENT) 324 (65 FE) MG
324 TABLET DELAYED RESPONSE ORAL 2 TIMES DAILY WITH MEALS
Status: DISCONTINUED | OUTPATIENT
Start: 2018-07-02 | End: 2018-07-10 | Stop reason: HOSPADM

## 2018-07-02 RX ORDER — ASPIRIN 81 MG/1
81 TABLET, CHEWABLE ORAL DAILY
Status: DISCONTINUED | OUTPATIENT
Start: 2018-07-02 | End: 2018-07-10 | Stop reason: HOSPADM

## 2018-07-02 RX ORDER — RISPERIDONE 1 MG/1
1 TABLET ORAL NIGHTLY
Status: DISCONTINUED | OUTPATIENT
Start: 2018-07-02 | End: 2018-07-10 | Stop reason: HOSPADM

## 2018-07-02 RX ORDER — LOSARTAN POTASSIUM 50 MG/1
100 TABLET ORAL
Status: DISCONTINUED | OUTPATIENT
Start: 2018-07-02 | End: 2018-07-10 | Stop reason: HOSPADM

## 2018-07-02 RX ORDER — FLUOXETINE HYDROCHLORIDE 20 MG/1
20 CAPSULE ORAL DAILY
Status: DISCONTINUED | OUTPATIENT
Start: 2018-07-02 | End: 2018-07-04 | Stop reason: SDUPTHER

## 2018-07-02 RX ORDER — FLUOXETINE HYDROCHLORIDE 20 MG/1
40 CAPSULE ORAL DAILY
Status: DISCONTINUED | OUTPATIENT
Start: 2018-07-02 | End: 2018-07-04

## 2018-07-02 RX ORDER — HYDRALAZINE HYDROCHLORIDE 20 MG/ML
20 INJECTION INTRAMUSCULAR; INTRAVENOUS ONCE
Status: COMPLETED | OUTPATIENT
Start: 2018-07-02 | End: 2018-07-02

## 2018-07-02 RX ORDER — HYDRALAZINE HYDROCHLORIDE 20 MG/ML
10 INJECTION INTRAMUSCULAR; INTRAVENOUS EVERY 6 HOURS PRN
Status: DISCONTINUED | OUTPATIENT
Start: 2018-07-02 | End: 2018-07-10 | Stop reason: HOSPADM

## 2018-07-02 RX ORDER — FUROSEMIDE 20 MG/1
20 TABLET ORAL DAILY
Status: DISCONTINUED | OUTPATIENT
Start: 2018-07-02 | End: 2018-07-10 | Stop reason: HOSPADM

## 2018-07-02 RX ORDER — SODIUM CHLORIDE 0.9 % (FLUSH) 0.9 %
10 SYRINGE (ML) INJECTION AS NEEDED
Status: DISCONTINUED | OUTPATIENT
Start: 2018-07-02 | End: 2018-07-10 | Stop reason: HOSPADM

## 2018-07-02 RX ADMIN — LOSARTAN POTASSIUM 100 MG: 50 TABLET, FILM COATED ORAL at 21:30

## 2018-07-02 RX ADMIN — FAMOTIDINE 20 MG: 20 TABLET ORAL at 21:30

## 2018-07-02 RX ADMIN — POTASSIUM CHLORIDE 10 MEQ: 7.46 INJECTION, SOLUTION INTRAVENOUS at 21:05

## 2018-07-02 RX ADMIN — HYDRALAZINE HYDROCHLORIDE 20 MG: 20 INJECTION INTRAMUSCULAR; INTRAVENOUS at 15:28

## 2018-07-02 RX ADMIN — POTASSIUM CHLORIDE 40 MEQ: 1.5 POWDER, FOR SOLUTION ORAL at 21:33

## 2018-07-02 RX ADMIN — ATORVASTATIN CALCIUM 10 MG: 10 TABLET, FILM COATED ORAL at 21:30

## 2018-07-02 RX ADMIN — RISPERIDONE 1 MG: 1 TABLET ORAL at 21:30

## 2018-07-02 RX ADMIN — METOPROLOL SUCCINATE 25 MG: 25 TABLET, EXTENDED RELEASE ORAL at 21:31

## 2018-07-02 RX ADMIN — FOLIC ACID 1 MG: 1 TABLET ORAL at 21:31

## 2018-07-02 RX ADMIN — FERROUS SULFATE TAB EC 324 MG (65 MG FE EQUIVALENT) 324 MG: 324 (65 FE) TABLET DELAYED RESPONSE at 21:31

## 2018-07-02 RX ADMIN — FUROSEMIDE 20 MG: 20 TABLET ORAL at 21:31

## 2018-07-02 RX ADMIN — POTASSIUM CHLORIDE 10 MEQ: 7.46 INJECTION, SOLUTION INTRAVENOUS at 22:52

## 2018-07-02 RX ADMIN — FLUOXETINE 20 MG: 20 CAPSULE ORAL at 21:32

## 2018-07-02 RX ADMIN — ASPIRIN 81 MG 81 MG: 81 TABLET ORAL at 21:36

## 2018-07-02 RX ADMIN — BUDESONIDE AND FORMOTEROL FUMARATE DIHYDRATE 2 PUFF: 160; 4.5 AEROSOL RESPIRATORY (INHALATION) at 20:27

## 2018-07-02 RX ADMIN — FUROSEMIDE 40 MG: 10 INJECTION, SOLUTION INTRAMUSCULAR; INTRAVENOUS at 15:30

## 2018-07-02 RX ADMIN — POTASSIUM CHLORIDE 10 MEQ: 7.46 INJECTION, SOLUTION INTRAVENOUS at 16:56

## 2018-07-02 RX ADMIN — FLUOXETINE 40 MG: 20 CAPSULE ORAL at 21:30

## 2018-07-02 NOTE — ED NOTES
6 second cardiac strip printed and placed on patients chart.      Elaine Mendoza RN  07/02/18 0443

## 2018-07-02 NOTE — PROGRESS NOTES
HISTORY AND PHYSICAL  NAME: Michelle Child  : 1955  MRN: [id number]    DATE OF ADMISSION: 18    DATE & TIME SEEN: 18 4:43 PM    PCP: Coral Berry MD    CODE STATUS: Full code    CHIEF COMPLAINT Lower extremity edema    HPI:  Michelle Child is a 62 y.o. female who presents with a one month history of worsening lower extremity edema..     CONCURRENT MEDICAL HISTORY:  Past Medical History:   Diagnosis Date   • Alkaline phosphatase raised    • Astigmatism    • Bilateral pseudophakia    • Coronary arteriosclerosis    • Depressive disorder    • Edema of lower extremity    • Encounter for general adult medical examination with abnormal findings    • Essential hypertension    • Gastroesophageal reflux disease    • H/O bone density study     DXA BONE DENSITY AXIAL    2016   • H/O screening mammography     SCREENING MAMMOGRAPHY     2016   • Hx of screening mammography     x3; declined screening, understands risks and benefits and still declines      2015   • Influenza vaccine administered     INFLUENZA IMMUN ADMIN OR PREV RECV'D   x4       2016   • Insomnia    • Iron deficiency    • Migraine    • Tobacco dependence     continuous         PAST SURGICAL HISTORY:  Past Surgical History:   Procedure Laterality Date   • APPENDECTOMY     • AUGMENTATION MAMMAPLASTY     • BREAST IMPLANT SURGERY     • CATARACT EXTRACTION WITH INTRAOCULAR LENS IMPLANT  2003    Phacoemulsification of a cataract with intraocular lens implant of rigt eye, Nixon model SA 60-AT; serial # [id number];20.5 diopter   • CHOLECYSTECTOMY  2007    Laparoscopic cholecystectomy. Symptomatic gallstones   • COLONOSCOPY  10/15/2013    declined stool cards and colonscopy   • ENDOSCOPY AND COLONOSCOPY  2014    Internal & external hemorrhoids found.   • ENDOSCOPY W/ PEG TUBE PLACEMENT  2014    EGD w/ tube: Normal esopahgus. Gastritis in stomach. Biopsy taken. Normal duodenum. Biopsy  taken.   • INJECTION OF MEDICATION  07/24/2013    Inj(s) Tend-Sheath, Ligament, Single   • INJECTION OF MEDICATION  11/17/2014    Kenalog x6   • INJECTION OF MEDICATION  04/01/2016    PNEUMOC VAC/ADMIN/RCVD    • INJECTION OF MEDICATION  12/05/2014    Toradol   • OTHER SURGICAL HISTORY  01/19/2014    Drain/Inject Major Joint   x2   • PAP SMEAR  10/06/2011   • PARTIAL HYSTERECTOMY      Partial hyst    • TONSILLECTOMY     • TUBAL ABDOMINAL LIGATION         FAMILY HISTORY:  Family History   Problem Relation Age of Onset   • Cholelithiasis Mother    • Thyroid cancer Sister    • Thyroid disease Sister         Thyroid disorder   • Graves' disease Sister    • Cholelithiasis Maternal Grandmother    • Thyroid cancer Other         Other 2nd degree relative, thyroid   • Diabetes Other    • Hypertension Other         SOCIAL HISTORY:  Social History     Social History   • Marital status:      Spouse name: N/A   • Number of children: N/A   • Years of education: N/A     Occupational History   • Not on file.     Social History Main Topics   • Smoking status: Former Smoker     Quit date: 1/10/2018   • Smokeless tobacco: Never Used      Comment: SMOKED FOR 20>PLUS YRS   • Alcohol use No   • Drug use: No   • Sexual activity: Defer     Other Topics Concern   • Not on file     Social History Narrative   • No narrative on file       HOME MEDICATIONS:    Current Facility-Administered Medications:   •  potassium chloride (KLOR-CON) packet 40 mEq, 40 mEq, Oral, Once, Blake Euceda MD  •  potassium chloride 10 mEq in 100 mL IVPB, 10 mEq, Intravenous, Once **AND** potassium chloride 10 mEq in 100 mL IVPB, 10 mEq, Intravenous, Once **AND** potassium chloride 10 mEq in 100 mL IVPB, 10 mEq, Intravenous, Once **AND** potassium chloride 10 mEq in 100 mL IVPB, 10 mEq, Intravenous, Once **AND** potassium chloride 10 mEq in 100 mL IVPB, 10 mEq, Intravenous, Once **AND** potassium chloride 10 mEq in 100 mL IVPB, 10 mEq, Intravenous, Once  **AND** Potassium, , , PRN, Blake Euceda MD  •  sodium chloride 0.9 % flush 10 mL, 10 mL, Intravenous, PRN, Blake Euceda MD    Current Outpatient Prescriptions:   •  acetaminophen (TYLENOL) 325 MG tablet, Take 2 tablets by mouth Every 4 (Four) Hours As Needed for Mild Pain . (Patient taking differently: Take  by mouth Every 4 (Four) Hours As Needed for Mild Pain .), Disp: , Rfl:   •  albuterol (PROVENTIL HFA;VENTOLIN HFA) 108 (90 Base) MCG/ACT inhaler, Inhale 2 puffs Every 4 (Four) Hours As Needed for Wheezing., Disp: 6.7 g, Rfl: 11  •  aspirin 81 MG chewable tablet, Chew 1 tablet Daily., Disp: 30 tablet, Rfl: 11  •  FLUoxetine (PROzac) 20 MG capsule, Take 1 capsule by mouth Daily., Disp: 90 capsule, Rfl: 3  •  FLUoxetine (PROzac) 40 MG capsule, Take 1 capsule by mouth Daily., Disp: 901 capsule, Rfl: 3  •  folic acid (FOLVITE) 1 MG tablet, TAKE ONE TABLET BY MOUTH ONCE DAILY FOR SUPPLEMENT, Disp: 30 tablet, Rfl: 2  •  hydrocortisone 1 % cream, Apply  topically 3 (Three) Times a Day., Disp: 60 g, Rfl: 5  •  losartan (COZAAR) 50 MG tablet, TAKE ONE TABLET BY MOUTH ONCE DAILY FOR HYPERTENSION. HOLD FOR SYSTOLIC BLOOD PRESSURE LESS THAN 100, Disp: 90 tablet, Rfl: 0  •  meloxicam (MOBIC) 15 MG tablet, TAKE ONE TABLET BY MOUTH ONCE DAILY FOR MILD PAIN, Disp: 30 tablet, Rfl: 0  •  metoprolol succinate XL (TOPROL-XL) 25 MG 24 hr tablet, Take 1 tablet by mouth Daily., Disp: 90 tablet, Rfl: 3  •  ondansetron (ZOFRAN) 4 MG tablet, Take 1 tablet by mouth Every 6 (Six) Hours As Needed for Nausea or Vomiting., Disp: , Rfl:   •  pravastatin (PRAVACHOL) 40 MG tablet, Take 1 tablet by mouth Every Night., Disp: 90 tablet, Rfl: 3  •  raNITIdine (ZANTAC) 150 MG tablet, Take 1 tablet by mouth 2 (Two) Times a Day., Disp: 180 tablet, Rfl: 3  •  risperiDONE (risperDAL) 1 MG tablet, Take 1 tablet by mouth Every Night., Disp: 901 tablet, Rfl: 3  •  SYMBICORT 160-4.5 MCG/ACT inhaler, INHALE 2 PUFFS BY MOUTH EVERY MORNING AND AT  BEDTIME FOR WHEEZING. RINSE MOUTH AFTER USE., Disp: 1 inhaler, Rfl: 5  •  bisacodyl (DULCOLAX) 5 MG EC tablet, Take 1 tablet by mouth Daily As Needed for Constipation., Disp: , Rfl:   •  FERATE 240 (27 Fe) MG tablet, TAKE ONE TABLET BY MOUTH TWICE A DAY FOR SUPPLEMENTATION. GIVE WITH MEALS, Disp: 60 tablet, Rfl: 0  •  ferrous sulfate 324 (65 Fe) MG tablet delayed-release EC tablet, Take 1 tablet by mouth 2 (Two) Times a Day With Meals., Disp: 180 tablet, Rfl: 3  •  furosemide (LASIX) 20 MG tablet, One half tablet by mouth once a day, Disp: 30 tablet, Rfl: 5    ALLERGIES:  Codeine; Penicillins; and Sulfa antibiotics    REVIEW OF SYSTEMS  Review of Systems   Constitutional: Positive for activity change and appetite change. Negative for fatigue and fever.   HENT: Negative for ear pain and sore throat.    Eyes: Negative for pain and visual disturbance.   Respiratory: Negative for cough and shortness of breath.    Cardiovascular: Positive for leg swelling. Negative for chest pain and palpitations.   Gastrointestinal: Negative for abdominal pain and nausea.   Endocrine: Negative for cold intolerance and heat intolerance.   Genitourinary: Negative for difficulty urinating and dysuria.   Musculoskeletal: Negative for arthralgias and gait problem.   Skin: Negative for color change and rash.   Neurological: Positive for weakness. Negative for dizziness and headaches.   Hematological: Negative for adenopathy. Does not bruise/bleed easily.   Psychiatric/Behavioral: Negative for agitation, confusion and sleep disturbance.       PHYSICAL EXAM:  Temp:  [98.3 °F (36.8 °C)] 98.3 °F (36.8 °C)  Heart Rate:  [56-80] 72  Resp:  [18] 18  BP: (168-221)/() 168/72  Body mass index is 20.08 kg/m².  Physical Exam   Constitutional: She is oriented to person, place, and time. She appears well-developed and well-nourished. She appears lethargic.   HENT:   Head: Normocephalic and atraumatic.   Right Ear: External ear normal.   Left Ear:  External ear normal.   Nose: Nose normal.   Mouth/Throat: Oropharynx is clear and moist.   Eyes: Conjunctivae and EOM are normal. Pupils are equal, round, and reactive to light. Right eye exhibits no discharge. Left eye exhibits no discharge. No scleral icterus.   Neck: Normal range of motion. Neck supple. No JVD present. No tracheal deviation present. No thyromegaly present.   Cardiovascular: Normal rate, regular rhythm, normal heart sounds and intact distal pulses.  Exam reveals no gallop and no friction rub.    No murmur heard.  Pulmonary/Chest: Effort normal and breath sounds normal. No stridor. No respiratory distress. She has no wheezes. She has no rales. She exhibits no tenderness.   Abdominal: Soft. Bowel sounds are normal. She exhibits no distension and no mass. There is no tenderness. There is no rebound and no guarding. No hernia.   Musculoskeletal: Normal range of motion. She exhibits no edema or deformity.   Lymphadenopathy:     She has no cervical adenopathy.   Neurological: She is oriented to person, place, and time. She appears lethargic. She exhibits normal muscle tone. Coordination normal.   Skin: Skin is warm and dry. No erythema.   Psychiatric: She has a normal mood and affect. Judgment and thought content normal. Her speech is delayed. She is slowed. Cognition and memory are impaired. She exhibits abnormal recent memory and abnormal remote memory.   Nursing note and vitals reviewed.      DIAGNOSTIC DATA:   Lab Results (last 24 hours)     Procedure Component Value Units Date/Time    Troponin [[id number]]  (Normal) Collected:  07/02/18 1532    Specimen:  Blood Updated:  07/02/18 1637     Troponin I 0.027 ng/mL     Comprehensive Metabolic Panel [[id number]]  (Abnormal) Collected:  07/02/18 1532    Specimen:  Blood Updated:  07/02/18 1624     Glucose 74 mg/dL      BUN 17 mg/dL      Creatinine 0.77 mg/dL      Sodium 139 mmol/L      Potassium 2.6 (C) mmol/L      Chloride 102 mmol/L      CO2 26.0 mmol/L       Calcium 8.6 mg/dL      Total Protein 6.0 (L) g/dL      Albumin 3.50 g/dL      ALT (SGPT) 26 U/L      AST (SGOT) 27 U/L      Alkaline Phosphatase 68 U/L      Total Bilirubin 0.8 mg/dL      eGFR Non African Amer 75 mL/min/1.73      Globulin 2.5 gm/dL      A/G Ratio 1.4 g/dL      BUN/Creatinine Ratio 22.1     Anion Gap 11.0 mmol/L     Urinalysis, Microscopic Only - Urine, Clean Catch [[id number]]  (Abnormal) Collected:  07/02/18 1452    Specimen:  Urine from Urine, Clean Catch Updated:  07/02/18 1505     RBC, UA 3-5 (A) /HPF      WBC, UA None Seen /HPF      Bacteria, UA None Seen /HPF      Squamous Epithelial Cells, UA None Seen /HPF      Hyaline Casts, UA 0-2 /LPF      Methodology Automated Microscopy    Urinalysis With Microscopic If Indicated (No Culture) - Urine, Clean Catch [[id number]]  (Abnormal) Collected:  07/02/18 1452    Specimen:  Urine from Urine, Clean Catch Updated:  07/02/18 1503     Color, UA Yellow     Appearance, UA Clear     pH, UA 6.5     Specific Gravity, UA 1.011     Glucose, UA Negative     Ketones, UA Trace (A)     Bilirubin, UA Negative     Blood, UA Trace (A)     Protein,  mg/dL (2+) (A)     Leuk Esterase, UA Negative     Nitrite, UA Negative     Urobilinogen, UA 1.0 E.U./dL    [place] [facility] [[id number]] Collected:  07/02/18 1329    Specimen:  Blood Updated:  07/02/18 1430    Narrative:       The following orders were created for panel order [facility] [facility].  Procedure                               Abnormality         Status                     ---------                               -----------         ------                     Light Blue Top[[id number]]                                   Final result               Green Top (Gel)[[id number]]                                  Final result               Lavender Top[[id number]]                                     Final result               Gold Top - SST[[id number]]                                   Final result                 Please view  results for these tests on the individual orders.    Light Blue Top [[id number]] Collected:  07/02/18 1329    Specimen:  Blood Updated:  07/02/18 1430     Extra Tube hold for add-on     Comment: Auto resulted       Green Top (Gel) [[id number]] Collected:  07/02/18 1329    Specimen:  Blood Updated:  07/02/18 1430     Extra Tube Hold for add-ons.     Comment: Auto resulted.       Lavender Top [[id number]] Collected:  07/02/18 1329    Specimen:  Blood Updated:  07/02/18 1430     Extra Tube hold for add-on     Comment: Auto resulted       Gold Top - SST [[id number]] Collected:  07/02/18 1329    Specimen:  Blood Updated:  07/02/18 1430     Extra Tube Hold for add-ons.     Comment: Auto resulted.       Troponin [[id number]]  (Normal) Collected:  07/02/18 1329    Specimen:  Blood Updated:  07/02/18 1404     Troponin I 0.023 ng/mL     BNP [[id number]]  (Abnormal) Collected:  07/02/18 1329    Specimen:  Blood Updated:  07/02/18 1404     proBNP 1,960.0 (H) pg/mL     Protime-INR [[id number]]  (Normal) Collected:  07/02/18 1329    Specimen:  Blood Updated:  07/02/18 1357     Protime 13.2 Seconds      INR 1.02    Narrative:       Therapeutic range for most indications is 2.0-3.0 INR,  or 2.5-3.5 for mechanical heart valves.    CBC & Differential [[id number]] Collected:  07/02/18 1329    Specimen:  Blood Updated:  07/02/18 1342    Narrative:       The following orders were created for panel order CBC & Differential.  Procedure                               Abnormality         Status                     ---------                               -----------         ------                     CBC Auto Differential[[id number]]        Normal              Final result                 Please view results for these tests on the individual orders.    CBC Auto Differential [[id number]]  (Normal) Collected:  07/02/18 1329    Specimen:  Blood Updated:  07/02/18 1342     WBC 7.11 10*3/mm3      RBC 4.41 10*6/mm3      Hemoglobin 12.7 g/dL      Hematocrit  38.5 %      MCV 87.3 fL      MCH 28.8 pg      MCHC 33.0 g/dL      RDW 14.3 %      RDW-SD 45.7 fl      MPV 10.5 fL      Platelets 228 10*3/mm3      Neutrophil % 76.3 %      Lymphocyte % 16.2 %      Monocyte % 6.5 %      Eosinophil % 0.4 %      Basophil % 0.3 %      Immature Grans % 0.3 %      Neutrophils, Absolute 5.43 10*3/mm3      Lymphocytes, Absolute 1.15 10*3/mm3      Monocytes, Absolute 0.46 10*3/mm3      Eosinophils, Absolute 0.03 10*3/mm3      Basophils, Absolute 0.02 10*3/mm3      Immature Grans, Absolute 0.02 10*3/mm3            Imaging Results (last 24 hours)     Procedure Component Value Units Date/Time    CT Head Without Contrast [[id number]] Collected:  07/02/18 1345     Updated:  07/02/18 1408    Narrative:       .      EXAMINATION:  Computed Tomography      REGION:  Head             INDICATION:  ams    HISTORY:  CORRELATIVE IMAGING:    CT head 1/10/18    TECHNIQUE:  iv contrast:  no    This exam was performed according to the departmental  dose-optimization program which includes automated exposure  control, adjustment of the mA and/or kV according to patient size  and/or use of iterative reconstruction technique.              COMMENTS:                - atrophy:              wnl for age    - cortex:                wnl for age    - deep white mat:  wnl for age    - hemorrhage:       none       - fluid collection: no intra/extra axial fluid collection     - mass / lesion:     no focal parenchymal lesion(s)       - gray/white jxn:   borders preserved         - brain stem:         wnl       - cerebellum:        wnl       - globes / retro:     wnl       - ventricles:          normal size / configuration       - midline shift:      no       - sinuses:              wnl       - mastoids:           wnl        - osseous:             wnl       - misc.:  .       Impression:       CONCLUSION:    1.  Negative examination for acute intracranial pathology.           If signs or symptoms persist beyond reasonable  expectations, a  MRI examination is suggested as is deemed clinically appropriate.                     Electronically signed by:  WERNER Simental MD  7/2/2018 2:07 PM  CDT Workstation: [id number]    XR Chest 1 View [[id number]] Collected:  07/02/18 1328     Updated:  07/02/18 1349    Narrative:         PORTABLE CHEST    HISTORY: Chest pain    Portable AP upright film of the chest was obtained at 1:18 PM.  COMPARISON: 3/1/2018    Chronic obstructive pulmonary disease.  No acute infiltrate.  Old granulomatous disease is present.  Cardiomegaly.  The pulmonary vasculature is not increased.  No pleural effusion.  No pneumothorax.  Healing or healed fractures of the right fifth, sixth and seventh  ribs and the neck of the right humerus.  Surgical clips right upper quadrant of the abdomen.      Impression:       CONCLUSION:  Chronic obstructive pulmonary disease.  No acute infiltrate.  Cardiomegaly.  Healing or healed fractures of the right fifth, sixth and seventh  ribs and the neck of the right humerus.    [id number]    Electronically signed by:  Andres Cornejo MD  7/2/2018 1:48 PM CDT  Workstation: [id number]          EKG: Michael at 59 with no acute ST or T changes    I reviewed the patient's new clinical results.    ASSESSMENT AND PLAN: This is a 62 y.o. female with:    Active Hospital Problems (** Indicates Principal Problem)    Diagnosis Date Noted   • Peripheral edema [R60.9] 07/02/2018     - restart home dose lasix 20mg daily     • Recurrent falls while walking [R29.6] 07/02/2018   • Insomnia [G47.00] 07/02/2018     Continue home risperdal     • Recurrent major depressive disorder, in full remission [F33.42] 04/05/2018   • Stage 3 severe COPD by GOLD classification (CMS/HCC) [J44.9] 03/01/2018   • Hypokalemia [E87.6] 01/10/2018     Started 5h69dyd IV and 40meq PO in ED  Replace and monitor  Check magnesium       • Essential hypertension [I10]      Continue home toprol 25mg daily  Increase home losartan from 50mg to  100mg daily  Lasix 20mg PO daily  PRN hydralazine     • Gastroesophageal reflux disease [K21.9]      Pepcid        Resolved Hospital Problems    Diagnosis Date Noted Date Resolved   No resolved problems to display.       DVT prophylaxis: SCDs/TEDs  Code status: Full code    Expected Length of Stay:2-3 days    I discussed the patients findings and my recommendations with patient, nursing staff and primary care team.     Dr. Linn is the attending on record at time of admission, He is aware of the patient's status and agrees with the above history and physical.          This document has been electronically signed by Sunny Linn DO on July 2, 2018 4:47 PM

## 2018-07-02 NOTE — ED PROVIDER NOTES
Subjective   Patient presents with approximately 1 week of intermittent confusion and weakness increased lower extremity swelling.  Patient has not been completely compliant with her medications that she is having some difficulty ambulating secondary to dizziness and weakness.  Patient notes that when she takes her water pills she has to get up more often and so she stopped those last 48 hours.  Home health noted increased confusion today and discussed with family and the primary care provider at the patient sent to the ED for further evaluation.  Patient denies any, fevers.  Patient is actually oriented at this time.  Patient does have a history of multi-infarct dementia.  Patient notes no dysuria no diarrhea no nausea vomiting chest pain or shortness of breath.            Review of Systems   Constitutional: Negative.  Negative for appetite change, chills and fever.   HENT: Negative.  Negative for congestion.    Eyes: Negative.  Negative for photophobia and visual disturbance.   Respiratory: Negative.  Negative for cough, chest tightness and shortness of breath.    Cardiovascular: Positive for leg swelling. Negative for chest pain and palpitations.   Gastrointestinal: Negative.  Negative for abdominal pain, constipation, diarrhea, nausea and vomiting.   Endocrine: Negative.    Genitourinary: Negative.  Negative for decreased urine volume, dysuria, flank pain and hematuria.   Musculoskeletal: Negative.  Negative for arthralgias, back pain, myalgias, neck pain and neck stiffness.   Skin: Positive for pallor.   Neurological: Positive for dizziness. Negative for syncope, weakness, light-headedness, numbness and headaches.   Psychiatric/Behavioral: Positive for confusion. Negative for suicidal ideas. The patient is not nervous/anxious.    All other systems reviewed and are negative.      Past Medical History:   Diagnosis Date   • Alkaline phosphatase raised    • Astigmatism    • Bilateral pseudophakia    • Coronary  arteriosclerosis    • Depressive disorder    • Edema of lower extremity    • Encounter for general adult medical examination with abnormal findings    • Essential hypertension    • Gastroesophageal reflux disease    • H/O bone density study     DXA BONE DENSITY AXIAL    04/22/2016   • H/O screening mammography     SCREENING MAMMOGRAPHY     04/25/2016   • Hx of screening mammography     x3; declined screening, understands risks and benefits and still declines      07/24/2015   • Influenza vaccine administered     INFLUENZA IMMUN ADMIN OR PREV RECV'D   x4       04/01/2016   • Insomnia    • Iron deficiency    • Migraine    • Tobacco dependence     continuous         Allergies   Allergen Reactions   • Codeine Nausea And Vomiting   • Penicillins    • Sulfa Antibiotics Nausea And Vomiting       Past Surgical History:   Procedure Laterality Date   • APPENDECTOMY     • AUGMENTATION MAMMAPLASTY     • BREAST IMPLANT SURGERY     • CATARACT EXTRACTION WITH INTRAOCULAR LENS IMPLANT  12/17/2003    Phacoemulsification of a cataract with intraocular lens implant of rigt eye, Nixon model SA 60-AT; serial # [id number];20.5 diopter   • CHOLECYSTECTOMY  08/06/2007    Laparoscopic cholecystectomy. Symptomatic gallstones   • COLONOSCOPY  10/15/2013    declined stool cards and colonscopy   • ENDOSCOPY AND COLONOSCOPY  05/22/2014    Internal & external hemorrhoids found.   • ENDOSCOPY W/ PEG TUBE PLACEMENT  05/22/2014    EGD w/ tube: Normal esopahgus. Gastritis in stomach. Biopsy taken. Normal duodenum. Biopsy taken.   • INJECTION OF MEDICATION  07/24/2013    Inj(s) Tend-Sheath, Ligament, Single   • INJECTION OF MEDICATION  11/17/2014    Kenalog x6   • INJECTION OF MEDICATION  04/01/2016    PNEUMOC VAC/ADMIN/RCVD    • INJECTION OF MEDICATION  12/05/2014    Toradol   • OTHER SURGICAL HISTORY  01/19/2014    Drain/Inject Major Joint   x2   • PAP SMEAR  10/06/2011   • PARTIAL HYSTERECTOMY      Partial hyst    • TONSILLECTOMY     • TUBAL  ABDOMINAL LIGATION         Family History   Problem Relation Age of Onset   • Cholelithiasis Mother    • Thyroid cancer Sister    • Thyroid disease Sister         Thyroid disorder   • Graves' disease Sister    • Cholelithiasis Maternal Grandmother    • Thyroid cancer Other         Other 2nd degree relative, thyroid   • Diabetes Other    • Hypertension Other        Social History     Social History   • Marital status:      Social History Main Topics   • Smoking status: Former Smoker     Quit date: 1/10/2018   • Smokeless tobacco: Never Used      Comment: SMOKED FOR 20>PLUS YRS   • Alcohol use No   • Drug use: No   • Sexual activity: Defer     Other Topics Concern   • Not on file           Objective   Physical Exam   Constitutional: She is oriented to person, place, and time. She appears well-developed and well-nourished. No distress.   HENT:   Head: Normocephalic and atraumatic.   Nose: Nose normal.   Mouth/Throat: Oropharynx is clear and moist.   Eyes: Conjunctivae and EOM are normal. No scleral icterus.   Neck: Normal range of motion. Neck supple. No JVD present.   Cardiovascular: Normal rate, regular rhythm, normal heart sounds and intact distal pulses.  Exam reveals no gallop and no friction rub.    No murmur heard.  Pulmonary/Chest: Effort normal. No respiratory distress. She has no wheezes. She has no rales. She exhibits no tenderness.   Abdominal: Soft. She exhibits no distension and no mass. There is no tenderness. There is no rebound and no guarding.   Musculoskeletal: Normal range of motion. She exhibits edema. She exhibits no tenderness or deformity.   2+ pitting edema bilateral lower extremities.   Lymphadenopathy:     She has no cervical adenopathy.   Neurological: She is alert and oriented to person, place, and time. No cranial nerve deficit. She exhibits normal muscle tone.   Patient is somnolent, oriented to person place and time.  No focal neurologic deficits are noted.   Skin: Skin is warm  and dry. No rash noted. She is not diaphoretic. No erythema. There is pallor.   Psychiatric: She has a normal mood and affect. Her behavior is normal. Judgment and thought content normal.   Nursing note and vitals reviewed.      ECG 12 Lead    Date/Time: 7/2/2018 1:39 PM  Performed by: DICK TRUONG  Authorized by: DICK TRUONG   Interpreted by physician  Rhythm: sinus bradycardia  BPM: 58  Comments: LAE, prolonged QT                 ED Course      Labs Reviewed   BNP (IN-HOUSE) - Abnormal; Notable for the following:        Result Value    proBNP 1,960.0 (*)     All other components within normal limits   URINALYSIS W/ MICROSCOPIC IF INDICATED (NO CULTURE) - Abnormal; Notable for the following:     Ketones, UA Trace (*)     Blood, UA Trace (*)     Protein,  mg/dL (2+) (*)     All other components within normal limits   URINALYSIS, MICROSCOPIC ONLY - Abnormal; Notable for the following:     RBC, UA 3-5 (*)     All other components within normal limits   TROPONIN (IN-HOUSE) - Normal   PROTIME-INR - Normal    Narrative:     Therapeutic range for most indications is 2.0-3.0 INR,  or 2.5-3.5 for mechanical heart valves.   CBC WITH AUTO DIFFERENTIAL - Normal   RAINBOW DRAW    Narrative:     The following orders were created for panel order [place] Draw.  Procedure                               Abnormality         Status                     ---------                               -----------         ------                     Light Blue Top[[id number]]                                   Final result               Green Top (Gel)[[id number]]                                  Final result               Lavender Top[[id number]]                                     Final result               Gold Top - SST[[id number]]                                   Final result                 Please view results for these tests on the individual orders.   TROPONIN (IN-HOUSE)   COMPREHENSIVE METABOLIC PANEL   CBC AND DIFFERENTIAL     Narrative:     The following orders were created for panel order CBC & Differential.  Procedure                               Abnormality         Status                     ---------                               -----------         ------                     CBC Auto Differential[[id number]]        Normal              Final result                 Please view results for these tests on the individual orders.   LIGHT BLUE TOP   GREEN TOP   LAVENDER TOP   GOLD TOP - SST       CT Head Without Contrast   Final Result   CONCLUSION:     1.  Negative examination for acute intracranial pathology.             If signs or symptoms persist beyond reasonable expectations, a   MRI examination is suggested as is deemed clinically appropriate.                            Electronically signed by:  WERNER Simental MD  7/2/2018 2:07 PM   CDT Workstation: [id number]      XR Chest 1 View   Final Result   CONCLUSION:   Chronic obstructive pulmonary disease.   No acute infiltrate.   Cardiomegaly.   Healing or healed fractures of the right fifth, sixth and seventh   ribs and the neck of the right humerus.      [id number]      Electronically signed by:  Andres Cornejo MD  7/2/2018 1:48 PM CDT   Workstation: [id number]        Patient with peripheral edema and difficulty ambulating uncontrolled hypertension.  Hydralazine given in the ED with improvement.  We'll observe patient for diuresis with case management consultation to assess patient's ability to be at home alone.            MDM      Final diagnoses:   Peripheral edema   Acute on chronic congestive heart failure, unspecified congestive heart failure type   Uncontrolled hypertension            Blake Euceda MD  07/02/18 [id number]

## 2018-07-02 NOTE — TELEPHONE ENCOUNTER
DR DAVDI PEARL [facility] LEFT A VM ASKING YOU TO CALL HER CONCERNING THIS PATIENT.    [phone]    THANK YOU

## 2018-07-02 NOTE — H&P
HISTORY AND PHYSICAL  NAME: Michelle Child  : 1955  MRN: [id number]    DATE OF ADMISSION: 18    DATE & TIME SEEN: 18 4:53 PM    PCP: Coral Berry MD    CODE STATUS: Full code    CHIEF COMPLAINT bilateral lower extremity edema    HPI:  Michelle Child is a 62 y.o. female with a concurrent history of CAD, 2x stroke with no residual weakness, CHF with EF 61% 18, depression, GERD, HTN, insomnia, COPD who presents with a 1 month history of bilateral lower extremity edema worsening over the last 3 days. Pt reports pressure like pain over her feet. Pt report her sons wife called Dr. Berry who told them to come into the ED to be evaluated. Pt denies chest pain, dyspnea, headache, blurry vision, nausea, vomiting, diarrhea, blood in stool or urine. Pt report that she has not been taking her lasix over these last 3 days. Pt reports she is not taking it because it makes her urinate more and she is concerned about getting up so frequently to go to the bathroom. Pt reports she is concerned she will fall.    In the ED pt was found to have 1+ bilateral lower extremity edema. Labs were pertinent for an elevated BNP of 1960 and hypokalemia at 2.6. Pt was started on [id number] K+ IV runs over 6 hours and given 40meq PO. Pt was found to have an elevated BP in the ED of 221/89 and was given a 20mg IV push of hydralazine and her blood pressure came down to 168/72.    CONCURRENT MEDICAL HISTORY:  Past Medical History:   Diagnosis Date   • Alkaline phosphatase raised    • Astigmatism    • Bilateral pseudophakia    • Coronary arteriosclerosis    • Depressive disorder    • Edema of lower extremity    • Encounter for general adult medical examination with abnormal findings    • Essential hypertension    • Gastroesophageal reflux disease    • H/O bone density study     DXA BONE DENSITY AXIAL    2016   • H/O screening mammography     SCREENING MAMMOGRAPHY     2016   • Hx of screening  mammography     x3; declined screening, understands risks and benefits and still declines      07/24/2015   • Influenza vaccine administered     INFLUENZA IMMUN ADMIN OR PREV RECV'D   x4       04/01/2016   • Insomnia    • Iron deficiency    • Migraine    • Tobacco dependence     continuous         PAST SURGICAL HISTORY:  Past Surgical History:   Procedure Laterality Date   • APPENDECTOMY     • AUGMENTATION MAMMAPLASTY     • BREAST IMPLANT SURGERY     • CATARACT EXTRACTION WITH INTRAOCULAR LENS IMPLANT  12/17/2003    Phacoemulsification of a cataract with intraocular lens implant of rigt eye, Nixon model SA 60-AT; serial # [id number];20.5 diopter   • CHOLECYSTECTOMY  08/06/2007    Laparoscopic cholecystectomy. Symptomatic gallstones   • COLONOSCOPY  10/15/2013    declined stool cards and colonscopy   • ENDOSCOPY AND COLONOSCOPY  05/22/2014    Internal & external hemorrhoids found.   • ENDOSCOPY W/ PEG TUBE PLACEMENT  05/22/2014    EGD w/ tube: Normal esopahgus. Gastritis in stomach. Biopsy taken. Normal duodenum. Biopsy taken.   • INJECTION OF MEDICATION  07/24/2013    Inj(s) Tend-Sheath, Ligament, Single   • INJECTION OF MEDICATION  11/17/2014    Kenalog x6   • INJECTION OF MEDICATION  04/01/2016    PNEUMOC VAC/ADMIN/RCVD    • INJECTION OF MEDICATION  12/05/2014    Toradol   • OTHER SURGICAL HISTORY  01/19/2014    Drain/Inject Major Joint   x2   • PAP SMEAR  10/06/2011   • PARTIAL HYSTERECTOMY      Partial hyst    • TONSILLECTOMY     • TUBAL ABDOMINAL LIGATION         FAMILY HISTORY:  Family History   Problem Relation Age of Onset   • Cholelithiasis Mother    • Thyroid cancer Sister    • Thyroid disease Sister         Thyroid disorder   • Graves' disease Sister    • Cholelithiasis Maternal Grandmother    • Thyroid cancer Other         Other 2nd degree relative, thyroid   • Diabetes Other    • Hypertension Other         SOCIAL HISTORY:  Social History     Social History   • Marital status:      Spouse name:  N/A   • Number of children: N/A   • Years of education: N/A     Occupational History   • Not on file.     Social History Main Topics   • Smoking status: Former Smoker     Quit date: 1/10/2018   • Smokeless tobacco: Never Used      Comment: SMOKED FOR 20>PLUS YRS   • Alcohol use No   • Drug use: No   • Sexual activity: Defer     Other Topics Concern   • Not on file     Social History Narrative   • No narrative on file       HOME MEDICATIONS:    Current Facility-Administered Medications:   •  potassium chloride (KLOR-CON) packet 40 mEq, 40 mEq, Oral, Once, Blake Euceda MD  •  potassium chloride 10 mEq in 100 mL IVPB, 10 mEq, Intravenous, Once **AND** potassium chloride 10 mEq in 100 mL IVPB, 10 mEq, Intravenous, Once **AND** potassium chloride 10 mEq in 100 mL IVPB, 10 mEq, Intravenous, Once **AND** potassium chloride 10 mEq in 100 mL IVPB, 10 mEq, Intravenous, Once **AND** potassium chloride 10 mEq in 100 mL IVPB, 10 mEq, Intravenous, Once **AND** potassium chloride 10 mEq in 100 mL IVPB, 10 mEq, Intravenous, Once **AND** Potassium, , , PRN, Blake Euceda MD  •  sodium chloride 0.9 % flush 10 mL, 10 mL, Intravenous, PRN, Blake Euceda MD    Current Outpatient Prescriptions:   •  acetaminophen (TYLENOL) 325 MG tablet, Take 2 tablets by mouth Every 4 (Four) Hours As Needed for Mild Pain . (Patient taking differently: Take  by mouth Every 4 (Four) Hours As Needed for Mild Pain .), Disp: , Rfl:   •  albuterol (PROVENTIL HFA;VENTOLIN HFA) 108 (90 Base) MCG/ACT inhaler, Inhale 2 puffs Every 4 (Four) Hours As Needed for Wheezing., Disp: 6.7 g, Rfl: 11  •  aspirin 81 MG chewable tablet, Chew 1 tablet Daily., Disp: 30 tablet, Rfl: 11  •  FLUoxetine (PROzac) 20 MG capsule, Take 1 capsule by mouth Daily., Disp: 90 capsule, Rfl: 3  •  FLUoxetine (PROzac) 40 MG capsule, Take 1 capsule by mouth Daily., Disp: 901 capsule, Rfl: 3  •  folic acid (FOLVITE) 1 MG tablet, TAKE ONE TABLET BY MOUTH ONCE DAILY FOR SUPPLEMENT,  Disp: 30 tablet, Rfl: 2  •  hydrocortisone 1 % cream, Apply  topically 3 (Three) Times a Day., Disp: 60 g, Rfl: 5  •  losartan (COZAAR) 50 MG tablet, TAKE ONE TABLET BY MOUTH ONCE DAILY FOR HYPERTENSION. HOLD FOR SYSTOLIC BLOOD PRESSURE LESS THAN 100, Disp: 90 tablet, Rfl: 0  •  meloxicam (MOBIC) 15 MG tablet, TAKE ONE TABLET BY MOUTH ONCE DAILY FOR MILD PAIN, Disp: 30 tablet, Rfl: 0  •  metoprolol succinate XL (TOPROL-XL) 25 MG 24 hr tablet, Take 1 tablet by mouth Daily., Disp: 90 tablet, Rfl: 3  •  ondansetron (ZOFRAN) 4 MG tablet, Take 1 tablet by mouth Every 6 (Six) Hours As Needed for Nausea or Vomiting., Disp: , Rfl:   •  pravastatin (PRAVACHOL) 40 MG tablet, Take 1 tablet by mouth Every Night., Disp: 90 tablet, Rfl: 3  •  raNITIdine (ZANTAC) 150 MG tablet, Take 1 tablet by mouth 2 (Two) Times a Day., Disp: 180 tablet, Rfl: 3  •  risperiDONE (risperDAL) 1 MG tablet, Take 1 tablet by mouth Every Night., Disp: 901 tablet, Rfl: 3  •  SYMBICORT 160-4.5 MCG/ACT inhaler, INHALE 2 PUFFS BY MOUTH EVERY MORNING AND AT BEDTIME FOR WHEEZING. RINSE MOUTH AFTER USE., Disp: 1 inhaler, Rfl: 5  •  bisacodyl (DULCOLAX) 5 MG EC tablet, Take 1 tablet by mouth Daily As Needed for Constipation., Disp: , Rfl:   •  FERATE 240 (27 Fe) MG tablet, TAKE ONE TABLET BY MOUTH TWICE A DAY FOR SUPPLEMENTATION. GIVE WITH MEALS, Disp: 60 tablet, Rfl: 0  •  ferrous sulfate 324 (65 Fe) MG tablet delayed-release EC tablet, Take 1 tablet by mouth 2 (Two) Times a Day With Meals., Disp: 180 tablet, Rfl: 3  •  furosemide (LASIX) 20 MG tablet, One half tablet by mouth once a day, Disp: 30 tablet, Rfl: 5    ALLERGIES:  Codeine; Penicillins; and Sulfa antibiotics    REVIEW OF SYSTEMS  Review of Systems   Constitutional: Positive for fatigue. Negative for chills and fever.   HENT: Negative for congestion and sore throat.    Eyes: Negative for photophobia and visual disturbance.   Respiratory: Negative for cough, shortness of breath and wheezing.     Cardiovascular: Positive for leg swelling. Negative for chest pain and palpitations.   Gastrointestinal: Negative for abdominal pain, diarrhea, nausea and vomiting.   Genitourinary: Negative for dysuria and hematuria.   Musculoskeletal: Negative for neck pain and neck stiffness.   Skin: Negative for rash and wound.   Neurological: Negative for seizures and syncope.   Psychiatric/Behavioral: Negative for agitation and confusion.       PHYSICAL EXAM:  Temp:  [98.3 °F (36.8 °C)] 98.3 °F (36.8 °C)  Heart Rate:  [56-80] 72  Resp:  [18] 18  BP: (168-221)/() 168/72  Body mass index is 20.08 kg/m².  Physical Exam   Constitutional: She is oriented to person, place, and time. She appears well-developed and well-nourished. No distress.   HENT:   Head: Normocephalic and atraumatic.   Right Ear: External ear normal.   Left Ear: External ear normal.   Nose: Nose normal.   Eyes: Conjunctivae are normal. Right eye exhibits no discharge. Left eye exhibits no discharge. No scleral icterus.   Neck: Normal range of motion. Neck supple.   Cardiovascular: Normal rate, regular rhythm and normal heart sounds.    Pulmonary/Chest: Effort normal and breath sounds normal. No respiratory distress. She has no wheezes. She has no rales.   Abdominal: Soft. Bowel sounds are normal. She exhibits no distension. There is no tenderness.   Musculoskeletal: Normal range of motion. She exhibits edema (1+ bilateral lower extremity edema). She exhibits no tenderness.   Neurological: She is alert and oriented to person, place, and time.   Skin: Skin is warm and dry. Capillary refill takes less than 2 seconds. She is not diaphoretic.   Psychiatric: She has a normal mood and affect. Her behavior is normal. Judgment and thought content normal.   Nursing note and vitals reviewed.      DIAGNOSTIC DATA:   Lab Results (last 24 hours)     Procedure Component Value Units Date/Time    Troponin [[id number]]  (Normal) Collected:  07/02/18 1532    Specimen:  Blood  Updated:  07/02/18 1637     Troponin I 0.027 ng/mL     Comprehensive Metabolic Panel [[id number]]  (Abnormal) Collected:  07/02/18 1532    Specimen:  Blood Updated:  07/02/18 1624     Glucose 74 mg/dL      BUN 17 mg/dL      Creatinine 0.77 mg/dL      Sodium 139 mmol/L      Potassium 2.6 (C) mmol/L      Chloride 102 mmol/L      CO2 26.0 mmol/L      Calcium 8.6 mg/dL      Total Protein 6.0 (L) g/dL      Albumin 3.50 g/dL      ALT (SGPT) 26 U/L      AST (SGOT) 27 U/L      Alkaline Phosphatase 68 U/L      Total Bilirubin 0.8 mg/dL      eGFR Non African Amer 75 mL/min/1.73      Globulin 2.5 gm/dL      A/G Ratio 1.4 g/dL      BUN/Creatinine Ratio 22.1     Anion Gap 11.0 mmol/L     Urinalysis, Microscopic Only - Urine, Clean Catch [[id number]]  (Abnormal) Collected:  07/02/18 1452    Specimen:  Urine from Urine, Clean Catch Updated:  07/02/18 1505     RBC, UA 3-5 (A) /HPF      WBC, UA None Seen /HPF      Bacteria, UA None Seen /HPF      Squamous Epithelial Cells, UA None Seen /HPF      Hyaline Casts, UA 0-2 /LPF      Methodology Automated Microscopy    Urinalysis With Microscopic If Indicated (No Culture) - Urine, Clean Catch [[id number]]  (Abnormal) Collected:  07/02/18 1452    Specimen:  Urine from Urine, Clean Catch Updated:  07/02/18 1503     Color, UA Yellow     Appearance, UA Clear     pH, UA 6.5     Specific Gravity, UA 1.011     Glucose, UA Negative     Ketones, UA Trace (A)     Bilirubin, UA Negative     Blood, UA Trace (A)     Protein,  mg/dL (2+) (A)     Leuk Esterase, UA Negative     Nitrite, UA Negative     Urobilinogen, UA 1.0 E.U./dL    [place] Draw [[id number]] Collected:  07/02/18 1329    Specimen:  Blood Updated:  07/02/18 1430    Narrative:       The following orders were created for panel order [place] Draw.  Procedure                               Abnormality         Status                     ---------                               -----------         ------                     Light Blue  Top[[id number]]                                   Final result               Green Top (Gel)[[id number]]                                  Final result               Lavender Top[[id number]]                                     Final result               Gold Top - SST[[id number]]                                   Final result                 Please view results for these tests on the individual orders.    Light Blue Top [[id number]] Collected:  07/02/18 1329    Specimen:  Blood Updated:  07/02/18 1430     Extra Tube hold for add-on     Comment: Auto resulted       Green Top (Gel) [[id number]] Collected:  07/02/18 1329    Specimen:  Blood Updated:  07/02/18 1430     Extra Tube Hold for add-ons.     Comment: Auto resulted.       Lavender Top [[id number]] Collected:  07/02/18 1329    Specimen:  Blood Updated:  07/02/18 1430     Extra Tube hold for add-on     Comment: Auto resulted       Gold Top - SST [[id number]] Collected:  07/02/18 1329    Specimen:  Blood Updated:  07/02/18 1430     Extra Tube Hold for add-ons.     Comment: Auto resulted.       Troponin [[id number]]  (Normal) Collected:  07/02/18 1329    Specimen:  Blood Updated:  07/02/18 1404     Troponin I 0.023 ng/mL     BNP [[id number]]  (Abnormal) Collected:  07/02/18 1329    Specimen:  Blood Updated:  07/02/18 1404     proBNP 1,960.0 (H) pg/mL     Protime-INR [[id number]]  (Normal) Collected:  07/02/18 1329    Specimen:  Blood Updated:  07/02/18 1357     Protime 13.2 Seconds      INR 1.02    Narrative:       Therapeutic range for most indications is 2.0-3.0 INR,  or 2.5-3.5 for mechanical heart valves.    CBC & Differential [[id number]] Collected:  07/02/18 1329    Specimen:  Blood Updated:  07/02/18 1342    Narrative:       The following orders were created for panel order CBC & Differential.  Procedure                               Abnormality         Status                     ---------                               -----------         ------                     CBC  Auto Differential[[id number]]        Normal              Final result                 Please view results for these tests on the individual orders.    CBC Auto Differential [[id number]]  (Normal) Collected:  07/02/18 1329    Specimen:  Blood Updated:  07/02/18 1342     WBC 7.11 10*3/mm3      RBC 4.41 10*6/mm3      Hemoglobin 12.7 g/dL      Hematocrit 38.5 %      MCV 87.3 fL      MCH 28.8 pg      MCHC 33.0 g/dL      RDW 14.3 %      RDW-SD 45.7 fl      MPV 10.5 fL      Platelets 228 10*3/mm3      Neutrophil % 76.3 %      Lymphocyte % 16.2 %      Monocyte % 6.5 %      Eosinophil % 0.4 %      Basophil % 0.3 %      Immature Grans % 0.3 %      Neutrophils, Absolute 5.43 10*3/mm3      Lymphocytes, Absolute 1.15 10*3/mm3      Monocytes, Absolute 0.46 10*3/mm3      Eosinophils, Absolute 0.03 10*3/mm3      Basophils, Absolute 0.02 10*3/mm3      Immature Grans, Absolute 0.02 10*3/mm3            Imaging Results (last 24 hours)     Procedure Component Value Units Date/Time    CT Head Without Contrast [[id number]] Collected:  07/02/18 1345     Updated:  07/02/18 1408    Narrative:       .      EXAMINATION:  Computed Tomography      REGION:  Head             INDICATION:  ams    HISTORY:  CORRELATIVE IMAGING:    CT head 1/10/18    TECHNIQUE:  iv contrast:  no    This exam was performed according to the departmental  dose-optimization program which includes automated exposure  control, adjustment of the mA and/or kV according to patient size  and/or use of iterative reconstruction technique.              COMMENTS:                - atrophy:              wnl for age    - cortex:                wnl for age    - deep white mat:  wnl for age    - hemorrhage:       none       - fluid collection: no intra/extra axial fluid collection     - mass / lesion:     no focal parenchymal lesion(s)       - gray/white jxn:   borders preserved         - brain stem:         wnl       - cerebellum:        wnl       - globes / retro:     wnl       -  ventricles:          normal size / configuration       - midline shift:      no       - sinuses:              wnl       - mastoids:           wnl        - osseous:             wnl       - misc.:  .       Impression:       CONCLUSION:    1.  Negative examination for acute intracranial pathology.           If signs or symptoms persist beyond reasonable expectations, a  MRI examination is suggested as is deemed clinically appropriate.                     Electronically signed by:  WERNER Simental MD  7/2/2018 2:07 PM  CDT Workstation: [id number]    XR Chest 1 View [[id number]] Collected:  07/02/18 1328     Updated:  07/02/18 1349    Narrative:         PORTABLE CHEST    HISTORY: Chest pain    Portable AP upright film of the chest was obtained at 1:18 PM.  COMPARISON: 3/1/2018    Chronic obstructive pulmonary disease.  No acute infiltrate.  Old granulomatous disease is present.  Cardiomegaly.  The pulmonary vasculature is not increased.  No pleural effusion.  No pneumothorax.  Healing or healed fractures of the right fifth, sixth and seventh  ribs and the neck of the right humerus.  Surgical clips right upper quadrant of the abdomen.      Impression:       CONCLUSION:  Chronic obstructive pulmonary disease.  No acute infiltrate.  Cardiomegaly.  Healing or healed fractures of the right fifth, sixth and seventh  ribs and the neck of the right humerus.    [id number]    Electronically signed by:  Andres Cornejo MD  7/2/2018 1:48 PM CDT  Workstation: DOROTA TOWNSEND reviewed the patient's new clinical results.    ASSESSMENT AND PLAN: This is a 62 y.o. female with:    Active Hospital Problems (** Indicates Principal Problem)    Diagnosis Date Noted   • Peripheral edema [R60.9] 07/02/2018     - restart home dose lasix 20mg daily     • Recurrent falls while walking [R29.6] 07/02/2018     PT/OT  Case management consult - SNF placement     • Insomnia [G47.00] 07/02/2018     Continue home risperdal     • Recurrent major depressive  disorder, in full remission [F33.42] 04/05/2018     Continue home prozac     • Stage 3 severe COPD by GOLD classification (CMS/HCC) [J44.9] 03/01/2018     Continue home nebs and symbicort     • Hypokalemia [E87.6] 01/10/2018     Started 3g47qdz IV and 40meq PO in ED  Replace and monitor  Check magnesium       • Essential hypertension [I10]      Continue home toprol 25mg daily  Increase home losartan from 50mg to 100mg daily  Lasix 20mg PO daily  PRN hydralazine     • Gastroesophageal reflux disease [K21.9]      Pepcid        Resolved Hospital Problems    Diagnosis Date Noted Date Resolved   No resolved problems to display.       DVT prophylaxis: SCDs/TEDs  Code status: Full code  TAMERA # [id number], reviewed and consistent with patient reported medications.    Expected Length of Stay: Where: SNF and When:  1-2 days     I discussed the patients findings and my recommendations with patient.     Dr. Linn is the attending on record at time of admission, He is aware of the patient's status and agrees with the above history and physical.    Louie Roblero MD PGY2  [place] [address]  Office: [phone]      This document has been electronically signed by Louie Roblero MD on July 2, 2018 4:53 PM

## 2018-07-03 ENCOUNTER — APPOINTMENT (OUTPATIENT)
Dept: CARDIOLOGY | Facility: HOSPITAL | Age: 63
End: 2018-07-03

## 2018-07-03 LAB
ANION GAP SERPL CALCULATED.3IONS-SCNC: 8 MMOL/L (ref 5–15)
BASOPHILS # BLD AUTO: 0.02 10*3/MM3 (ref 0–0.2)
BASOPHILS NFR BLD AUTO: 0.3 % (ref 0–2)
BH CV ECHO MEAS - ACS: 1.7 CM
BH CV ECHO MEAS - AO ISTHMUS: 2.1 CM
BH CV ECHO MEAS - AO MAX PG (FULL): 11.4 MMHG
BH CV ECHO MEAS - AO MAX PG: 15.1 MMHG
BH CV ECHO MEAS - AO MEAN PG (FULL): 7 MMHG
BH CV ECHO MEAS - AO MEAN PG: 8.6 MMHG
BH CV ECHO MEAS - AO ROOT AREA (BSA CORRECTED): 1.7
BH CV ECHO MEAS - AO ROOT AREA: 6.2 CM^2
BH CV ECHO MEAS - AO ROOT DIAM: 2.8 CM
BH CV ECHO MEAS - AO V2 MAX: 194 CM/SEC
BH CV ECHO MEAS - AO V2 MEAN: 142.7 CM/SEC
BH CV ECHO MEAS - AO V2 VTI: 42.4 CM
BH CV ECHO MEAS - ASC AORTA: 2.7 CM
BH CV ECHO MEAS - AVA(I,A): 1.6 CM^2
BH CV ECHO MEAS - AVA(I,D): 1.6 CM^2
BH CV ECHO MEAS - AVA(V,A): 1.8 CM^2
BH CV ECHO MEAS - AVA(V,D): 1.8 CM^2
BH CV ECHO MEAS - BSA(HAYCOCK): 1.6 M^2
BH CV ECHO MEAS - BSA: 1.6 M^2
BH CV ECHO MEAS - BZI_BMI: 21.8 KILOGRAMS/M^2
BH CV ECHO MEAS - BZI_METRIC_HEIGHT: 163 CM
BH CV ECHO MEAS - BZI_METRIC_WEIGHT: 58 KG
BH CV ECHO MEAS - CONTRAST EF (2CH): 57.2 ML/M^2
BH CV ECHO MEAS - CONTRAST EF 4CH: 69.7 ML/M^2
BH CV ECHO MEAS - EDV(CUBED): 56.9 ML
BH CV ECHO MEAS - EDV(MOD-SP2): 83 ML
BH CV ECHO MEAS - EDV(MOD-SP4): 81.9 ML
BH CV ECHO MEAS - EDV(TEICH): 63.8 ML
BH CV ECHO MEAS - EF(CUBED): 28.5 %
BH CV ECHO MEAS - EF(MOD-SP2): 57.2 %
BH CV ECHO MEAS - EF(MOD-SP4): 69.7 %
BH CV ECHO MEAS - EF(TEICH): 23.5 %
BH CV ECHO MEAS - ESV(CUBED): 40.7 ML
BH CV ECHO MEAS - ESV(MOD-SP2): 35.5 ML
BH CV ECHO MEAS - ESV(MOD-SP4): 24.8 ML
BH CV ECHO MEAS - ESV(TEICH): 48.8 ML
BH CV ECHO MEAS - FS: 10.6 %
BH CV ECHO MEAS - IVS/LVPW: 0.76
BH CV ECHO MEAS - IVSD: 1.1 CM
BH CV ECHO MEAS - LA DIMENSION: 3.3 CM
BH CV ECHO MEAS - LA/AO: 1.2
BH CV ECHO MEAS - LV DIASTOLIC VOL/BSA (35-75): 50.5 ML/M^2
BH CV ECHO MEAS - LV MASS(C)D: 182.7 GRAMS
BH CV ECHO MEAS - LV MASS(C)DI: 112.7 GRAMS/M^2
BH CV ECHO MEAS - LV MAX PG: 3.6 MMHG
BH CV ECHO MEAS - LV MEAN PG: 1.6 MMHG
BH CV ECHO MEAS - LV SYSTOLIC VOL/BSA (12-30): 15.3 ML/M^2
BH CV ECHO MEAS - LV V1 MAX: 95.4 CM/SEC
BH CV ECHO MEAS - LV V1 MEAN: 57.7 CM/SEC
BH CV ECHO MEAS - LV V1 VTI: 18.4 CM
BH CV ECHO MEAS - LVIDD: 3.8 CM
BH CV ECHO MEAS - LVIDS: 3.4 CM
BH CV ECHO MEAS - LVOT AREA: 3.6 CM^2
BH CV ECHO MEAS - LVOT DIAM: 2.2 CM
BH CV ECHO MEAS - LVPWD: 1.5 CM
BH CV ECHO MEAS - MR MAX PG: 68.2 MMHG
BH CV ECHO MEAS - MR MAX VEL: 413 CM/SEC
BH CV ECHO MEAS - MR MEAN PG: 54.9 MMHG
BH CV ECHO MEAS - MR MEAN VEL: 366.1 CM/SEC
BH CV ECHO MEAS - MR VTI: 151.6 CM
BH CV ECHO MEAS - MV AREA (1 DIAM): 7.9 CM^2
BH CV ECHO MEAS - MV DIAM: 3.2 CM
BH CV ECHO MEAS - MV FLOW AREA(1DIAM): 7.9 CM^2
BH CV ECHO MEAS - MV MAX PG: 14.7 MMHG
BH CV ECHO MEAS - MV MEAN PG: 5 MMHG
BH CV ECHO MEAS - MV P1/2T MAX VEL: 192 CM/SEC
BH CV ECHO MEAS - MV V2 MAX: 192 CM/SEC
BH CV ECHO MEAS - MV V2 MEAN: 105.2 CM/SEC
BH CV ECHO MEAS - MV V2 VTI: 62 CM
BH CV ECHO MEAS - MVA P1/2T LCG: 1.1 CM^2
BH CV ECHO MEAS - MVA(VTI): 1.1 CM^2
BH CV ECHO MEAS - PA MAX PG: 2.9 MMHG
BH CV ECHO MEAS - PA V2 MAX: 84.8 CM/SEC
BH CV ECHO MEAS - PI END-D VEL: 62.8 CM/SEC
BH CV ECHO MEAS - RAP SYSTOLE: 5 MMHG
BH CV ECHO MEAS - RF(MV,AO)(1 DIAM): 0.47
BH CV ECHO MEAS - RF(MV,LVOT)(1DIAM): 0.86
BH CV ECHO MEAS - RVDD: 2.6 CM
BH CV ECHO MEAS - RVSP: 50 MMHG
BH CV ECHO MEAS - SI(AO): 162.2 ML/M^2
BH CV ECHO MEAS - SI(CUBED): 10 ML/M^2
BH CV ECHO MEAS - SI(LVOT): 41.3 ML/M^2
BH CV ECHO MEAS - SI(MOD-SP2): 29.3 ML/M^2
BH CV ECHO MEAS - SI(MOD-SP4): 35.2 ML/M^2
BH CV ECHO MEAS - SI(MV 1 DIAM): 304.1 ML/M^2
BH CV ECHO MEAS - SI(TEICH): 9.3 ML/M^2
BH CV ECHO MEAS - SV(AO): 262.8 ML
BH CV ECHO MEAS - SV(CUBED): 16.2 ML
BH CV ECHO MEAS - SV(LVOT): 67 ML
BH CV ECHO MEAS - SV(MOD-SP2): 47.5 ML
BH CV ECHO MEAS - SV(MOD-SP4): 57.1 ML
BH CV ECHO MEAS - SV(MV 1 DIAM): 492.8 ML
BH CV ECHO MEAS - SV(TEICH): 15 ML
BUN BLD-MCNC: 16 MG/DL (ref 7–21)
BUN/CREAT SERPL: 17.6 (ref 7–25)
CALCIUM SPEC-SCNC: 8.7 MG/DL (ref 8.4–10.2)
CHLORIDE SERPL-SCNC: 96 MMOL/L (ref 95–110)
CO2 SERPL-SCNC: 28 MMOL/L (ref 22–31)
CREAT BLD-MCNC: 0.91 MG/DL (ref 0.5–1)
DEPRECATED RDW RBC AUTO: 45.5 FL (ref 36.4–46.3)
EOSINOPHIL # BLD AUTO: 0.07 10*3/MM3 (ref 0–0.7)
EOSINOPHIL NFR BLD AUTO: 1 % (ref 0–7)
ERYTHROCYTE [DISTWIDTH] IN BLOOD BY AUTOMATED COUNT: 14.4 % (ref 11.5–14.5)
GFR SERPL CREATININE-BSD FRML MDRD: 62 ML/MIN/1.73 (ref 45–104)
GLUCOSE BLD-MCNC: 107 MG/DL (ref 60–100)
HCT VFR BLD AUTO: 38.8 % (ref 35–45)
HGB BLD-MCNC: 12.8 G/DL (ref 12–15.5)
IMM GRANULOCYTES # BLD: 0.01 10*3/MM3 (ref 0–0.02)
IMM GRANULOCYTES NFR BLD: 0.1 % (ref 0–0.5)
LYMPHOCYTES # BLD AUTO: 1.13 10*3/MM3 (ref 0.6–4.2)
LYMPHOCYTES NFR BLD AUTO: 15.4 % (ref 10–50)
MAXIMAL PREDICTED HEART RATE: 156 BPM
MCH RBC QN AUTO: 28.7 PG (ref 26.5–34)
MCHC RBC AUTO-ENTMCNC: 33 G/DL (ref 31.4–36)
MCV RBC AUTO: 87 FL (ref 80–98)
MONOCYTES # BLD AUTO: 0.5 10*3/MM3 (ref 0–0.9)
MONOCYTES NFR BLD AUTO: 6.8 % (ref 0–12)
NEUTROPHILS # BLD AUTO: 5.59 10*3/MM3 (ref 2–8.6)
NEUTROPHILS NFR BLD AUTO: 76.4 % (ref 37–80)
PLATELET # BLD AUTO: 239 10*3/MM3 (ref 150–450)
PMV BLD AUTO: 10.3 FL (ref 8–12)
POTASSIUM BLD-SCNC: 3.2 MMOL/L (ref 3.5–5.1)
POTASSIUM BLD-SCNC: 3.2 MMOL/L (ref 3.5–5.1)
POTASSIUM BLD-SCNC: 3.5 MMOL/L (ref 3.5–5.1)
RBC # BLD AUTO: 4.46 10*6/MM3 (ref 3.77–5.16)
SODIUM BLD-SCNC: 132 MMOL/L (ref 137–145)
STRESS TARGET HR: 133 BPM
WBC NRBC COR # BLD: 7.32 10*3/MM3 (ref 3.2–9.8)

## 2018-07-03 PROCEDURE — G8987 SELF CARE CURRENT STATUS: HCPCS

## 2018-07-03 PROCEDURE — 93306 TTE W/DOPPLER COMPLETE: CPT

## 2018-07-03 PROCEDURE — 84132 ASSAY OF SERUM POTASSIUM: CPT | Performed by: STUDENT IN AN ORGANIZED HEALTH CARE EDUCATION/TRAINING PROGRAM

## 2018-07-03 PROCEDURE — 96366 THER/PROPH/DIAG IV INF ADDON: CPT

## 2018-07-03 PROCEDURE — 99219 PR INITIAL OBSERVATION CARE/DAY 50 MINUTES: CPT | Performed by: FAMILY MEDICINE

## 2018-07-03 PROCEDURE — G0378 HOSPITAL OBSERVATION PER HR: HCPCS

## 2018-07-03 PROCEDURE — 97166 OT EVAL MOD COMPLEX 45 MIN: CPT

## 2018-07-03 PROCEDURE — 94760 N-INVAS EAR/PLS OXIMETRY 1: CPT

## 2018-07-03 PROCEDURE — 80048 BASIC METABOLIC PNL TOTAL CA: CPT | Performed by: FAMILY MEDICINE

## 2018-07-03 PROCEDURE — 97530 THERAPEUTIC ACTIVITIES: CPT

## 2018-07-03 PROCEDURE — 85025 COMPLETE CBC W/AUTO DIFF WBC: CPT | Performed by: FAMILY MEDICINE

## 2018-07-03 PROCEDURE — 97535 SELF CARE MNGMENT TRAINING: CPT

## 2018-07-03 PROCEDURE — 25010000003 POTASSIUM CHLORIDE 10 MEQ/100ML SOLUTION: Performed by: EMERGENCY MEDICINE

## 2018-07-03 PROCEDURE — G8988 SELF CARE GOAL STATUS: HCPCS

## 2018-07-03 PROCEDURE — 93306 TTE W/DOPPLER COMPLETE: CPT | Performed by: INTERNAL MEDICINE

## 2018-07-03 PROCEDURE — 99214 OFFICE O/P EST MOD 30 MIN: CPT | Performed by: NURSE PRACTITIONER

## 2018-07-03 PROCEDURE — 94799 UNLISTED PULMONARY SVC/PX: CPT

## 2018-07-03 RX ORDER — POTASSIUM CHLORIDE 750 MG/1
40 CAPSULE, EXTENDED RELEASE ORAL ONCE
Status: COMPLETED | OUTPATIENT
Start: 2018-07-03 | End: 2018-07-03

## 2018-07-03 RX ADMIN — FLUOXETINE 40 MG: 20 CAPSULE ORAL at 09:33

## 2018-07-03 RX ADMIN — POTASSIUM CHLORIDE 10 MEQ: 7.46 INJECTION, SOLUTION INTRAVENOUS at 01:25

## 2018-07-03 RX ADMIN — BUDESONIDE AND FORMOTEROL FUMARATE DIHYDRATE 2 PUFF: 160; 4.5 AEROSOL RESPIRATORY (INHALATION) at 20:48

## 2018-07-03 RX ADMIN — FUROSEMIDE 20 MG: 20 TABLET ORAL at 09:33

## 2018-07-03 RX ADMIN — FERROUS SULFATE TAB EC 324 MG (65 MG FE EQUIVALENT) 324 MG: 324 (65 FE) TABLET DELAYED RESPONSE at 09:32

## 2018-07-03 RX ADMIN — ASPIRIN 81 MG 81 MG: 81 TABLET ORAL at 09:33

## 2018-07-03 RX ADMIN — FERROUS SULFATE TAB EC 324 MG (65 MG FE EQUIVALENT) 324 MG: 324 (65 FE) TABLET DELAYED RESPONSE at 18:06

## 2018-07-03 RX ADMIN — FAMOTIDINE 20 MG: 20 TABLET ORAL at 20:41

## 2018-07-03 RX ADMIN — FLUOXETINE 20 MG: 20 CAPSULE ORAL at 09:32

## 2018-07-03 RX ADMIN — RISPERIDONE 1 MG: 1 TABLET ORAL at 20:41

## 2018-07-03 RX ADMIN — BUDESONIDE AND FORMOTEROL FUMARATE DIHYDRATE 2 PUFF: 160; 4.5 AEROSOL RESPIRATORY (INHALATION) at 07:56

## 2018-07-03 RX ADMIN — POTASSIUM CHLORIDE 40 MEQ: 750 CAPSULE, EXTENDED RELEASE ORAL at 12:07

## 2018-07-03 RX ADMIN — LOSARTAN POTASSIUM 100 MG: 50 TABLET, FILM COATED ORAL at 09:33

## 2018-07-03 RX ADMIN — FAMOTIDINE 20 MG: 20 TABLET ORAL at 09:32

## 2018-07-03 RX ADMIN — FOLIC ACID 1 MG: 1 TABLET ORAL at 09:33

## 2018-07-03 RX ADMIN — ATORVASTATIN CALCIUM 10 MG: 10 TABLET, FILM COATED ORAL at 09:32

## 2018-07-03 RX ADMIN — POTASSIUM CHLORIDE 10 MEQ: 7.46 INJECTION, SOLUTION INTRAVENOUS at 02:58

## 2018-07-03 RX ADMIN — POTASSIUM CHLORIDE 40 MEQ: 750 CAPSULE, EXTENDED RELEASE ORAL at 18:38

## 2018-07-03 RX ADMIN — METOPROLOL SUCCINATE 25 MG: 25 TABLET, EXTENDED RELEASE ORAL at 09:33

## 2018-07-03 RX ADMIN — POTASSIUM CHLORIDE 10 MEQ: 7.46 INJECTION, SOLUTION INTRAVENOUS at 00:12

## 2018-07-03 NOTE — THERAPY EVALUATION
Acute Care - Occupational Therapy Initial Evaluation  [facility]     Patient Name: Michelle Child  : 1955  MRN: [id number]  Today's Date: 7/3/2018  Onset of Illness/Injury or Date of Surgery: 18  Date of Referral to OT: 18  Referring Physician: MD Osbaldo    Admit Date: 2018       ICD-10-CM ICD-9-CM   1. Peripheral edema R60.9 782.3   2. Acute on chronic congestive heart failure, unspecified congestive heart failure type (CMS/HCC) I50.9 428.0   3. Uncontrolled hypertension I10 401.9   4. Hypokalemia E87.6 276.8   5. Impaired mobility and ADLs Z74.09 799.89     Patient Active Problem List   Diagnosis   • Migraine   • Iron deficiency anemia   • Gastroesophageal reflux disease   • Essential hypertension   • Coronary arteriosclerosis   • Bilateral pseudophakia   • Astigmatism   • Hypokalemia   • Cerebral microvascular disease   • Pulmonary hypertension   • Rheumatic tricuspid valve regurgitation   • Rheumatic mitral stenosis   • Acute diastolic CHF (congestive heart failure) (CMS/[facility])   • Pulmonary hypertension due to left heart valvular disease   • Cor pulmonale, chronic (CMS/[facility])   • Folic acid deficiency   • Stage 3 severe COPD by GOLD classification (CMS/[facility])   • Personal history of tobacco use, presenting hazards to health   • Tobacco abuse, in remission   • Recurrent major depressive disorder, in full remission (CMS/HCC)   • Weakness   • Recurrent falls while walking   • Localized swelling of lower extremity   • Peripheral edema   • Insomnia     Past Medical History:   Diagnosis Date   • Alkaline phosphatase raised    • Astigmatism    • Bilateral pseudophakia    • Coronary arteriosclerosis    • Depressive disorder    • Edema of lower extremity    • Encounter for general adult medical examination with abnormal findings    • Essential hypertension    • Gastroesophageal reflux disease    • H/O bone density study     DXA BONE DENSITY AXIAL    2016   • H/O screening mammography      SCREENING MAMMOGRAPHY     04/25/2016   • Hx of screening mammography     x3; declined screening, understands risks and benefits and still declines      07/24/2015   • Influenza vaccine administered     INFLUENZA IMMUN ADMIN OR PREV RECV'D   x4       04/01/2016   • Insomnia    • Iron deficiency    • Migraine    • Tobacco dependence     continuous       Past Surgical History:   Procedure Laterality Date   • APPENDECTOMY     • AUGMENTATION MAMMAPLASTY     • BREAST IMPLANT SURGERY     • CATARACT EXTRACTION WITH INTRAOCULAR LENS IMPLANT  12/17/2003    Phacoemulsification of a cataract with intraocular lens implant of rigt eye, Nixon model SA 60-AT; serial # [id number];20.5 diopter   • CHOLECYSTECTOMY  08/06/2007    Laparoscopic cholecystectomy. Symptomatic gallstones   • COLONOSCOPY  10/15/2013    declined stool cards and colonscopy   • ENDOSCOPY AND COLONOSCOPY  05/22/2014    Internal & external hemorrhoids found.   • ENDOSCOPY W/ PEG TUBE PLACEMENT  05/22/2014    EGD w/ tube: Normal esopahgus. Gastritis in stomach. Biopsy taken. Normal duodenum. Biopsy taken.   • INJECTION OF MEDICATION  07/24/2013    Inj(s) Tend-Sheath, Ligament, Single   • INJECTION OF MEDICATION  11/17/2014    Kenalog x6   • INJECTION OF MEDICATION  04/01/2016    PNEUMOC VAC/ADMIN/RCVD    • INJECTION OF MEDICATION  12/05/2014    Toradol   • OTHER SURGICAL HISTORY  01/19/2014    Drain/Inject Major Joint   x2   • PAP SMEAR  10/06/2011   • PARTIAL HYSTERECTOMY      Partial hyst    • TONSILLECTOMY     • TUBAL ABDOMINAL LIGATION            OT ASSESSMENT FLOWSHEET (last 72 hours)      Occupational Therapy Evaluation     Row Name 07/03/18 0903                   OT Evaluation Time/Intention    Subjective Information no complaints  -MR        Document Type evaluation  -MR        Mode of Treatment individual therapy;occupational therapy  -MR        Total Evaluation Minutes, Occupational Therapy 42  -MR        Patient Effort adequate  -MR        Symptoms  Noted During/After Treatment fatigue  -MR           General Information    Patient Profile Reviewed? yes  -MR        Onset of Illness/Injury or Date of Surgery 07/02/18  -MR        Referring Physician MD Osbaldo  -MR        Patient Observations alert;cooperative;agree to therapy  -MR        General Observations of Patient Fowlers, IV, tele, hutchins  -MR        Prior Level of Function independent:;transfer;ADL's;all household mobility   Dtr-in-law helps with IADL's  -MR        Equipment Currently Used at Home walker, rolling;shower chair;wheelchair  -MR        Pertinent History of Current Functional Problem Pt admitted d/t increased confusion, BLE weakness and BLE edema for the past week and found hypokalemic.   -MR        Existing Precautions/Restrictions fall  -MR        Limitations/Impairments safety/cognitive  -MR        Equipment Issued to Patient gait belt   provided FWW for use while at hospital  -MR        Risks Reviewed patient:;LOB;nausea/vomiting;dizziness;increased discomfort;change in vital signs;increased drainage;lines disloged  -MR        Benefits Reviewed patient:;improve function;increase independence;increase strength;increase balance;decrease pain;decrease risk of DVT;improve skin integrity;increase knowledge  -MR        Barriers to Rehab cognitive status;previous functional deficit  -MR           Relationship/Environment    Primary Source of Support/Comfort child(shilpa)   Son and dtr-in-law  -MR        Lives With alone  -MR        Family Caregiver if Needed child(shilpa), adult   Son  -MR           Resource/Environmental Concerns    Current Living Arrangements home/apartment/condo  -MR           Cognitive Assessment/Interventions    Additional Documentation Cognitive Assessment/Intervention (Group)  -MR           Cognitive Assessment/Intervention- PT/OT    Affect/Mental Status (Cognitive) flat/blunted affect  -MR        Orientation Status (Cognition) oriented x 4  -MR        Follows Commands  (Cognition) follows one step commands;75-90% accuracy;physical/tactile prompts required;verbal cues/prompting required  -MR        Safety Deficit (Cognitive) moderate deficit;at risk behavior observed;awareness of need for assistance;insight into deficits/self awareness  -MR        Personal Safety Interventions fall prevention program maintained;gait belt;muscle strengthening facilitated;nonskid shoes/slippers when out of bed;supervised activity  -MR           Safety Issues, Functional Mobility    Safety Issues Affecting Function (Mobility) ability to follow commands;at risk behavior observed;awareness of need for assistance;insight into deficits/self awareness;positioning of assistive device  -MR        Impairments Affecting Function (Mobility) balance;cognition;coordination;endurance/activity tolerance;motor control;motor planning;shortness of breath;strength  -MR           Bed Mobility Assessment/Treatment    Bed Mobility Assessment/Treatment supine-sit;sit-supine;scooting/bridging  -MR        Scooting/Bridging [place] (Bed Mobility) dependent (less than 25% patient effort);2 person assist   to help pt HOB  -MR        Supine-Sit [place] (Bed Mobility) moderate assist (50% patient effort)  -MR        Sit-Supine [place] (Bed Mobility) moderate assist (50% patient effort)  -MR        Bed Mobility, Safety Issues decreased use of arms for pushing/pulling;decreased use of legs for bridging/pushing;impaired trunk control for bed mobility  -MR        Assistive Device (Bed Mobility) bed rails;draw sheet;head of bed elevated  -MR           Functional Mobility    Functional Mobility- Device rolling walker  -MR        Functional Mobility- Comment Attempted a few side steps to HOB however pt unable at time of eval, she deferred further attempts  -MR           Transfer Assessment/Treatment    Transfer Assessment/Treatment sit-stand transfer;stand-sit transfer  -MR           Sit-Stand Transfer    Sit-Stand  [place] (Transfers) moderate assist (50% patient effort)  -MR        Assistive Device (Sit-Stand Transfers) walker, front-wheeled  -MR           Stand-Sit Transfer    Stand-Sit [place] (Transfers) moderate assist (50% patient effort)  -MR        Assistive Device (Stand-Sit Transfers) walker, front-wheeled  -MR           ADL Assessment/Intervention    BADL Assessment/Intervention lower body dressing;feeding  -MR           Lower Body Dressing Assessment/Training    Lower Body Dressing [place] Level don;socks;maximum assist (25% patient effort)  -MR        Lower Body Dressing Position edge of bed sitting  -MR        Comment (Lower Body Dressing) Pt attempted to don socks however had poor sitting balance and was unable at time of eval  -MR           Self-Feeding Assessment/Training    [place] Level (Feeding) minimum assist (75% patient effort)   morning meds  -MR        Position (Self-Feeding) edge of bed sitting  -MR        Comment (Feeding) Pt required min A to get meds out of cup and to hold water glass d/t BUE weakness  -MR           BADL Safety/Performance    Impairments, BADL Safety/Performance balance;cognition;endurance/activity tolerance;grasp/prehension;coordination;motor control;motor planning;range of motion;shortness of breath;strength;trunk/postural control  -MR        Cognitive Impairments, BADL Safety/Performance awareness, need for assistance;impulsivity;insight into deficits/self awareness  -MR           General ROM    GENERAL ROM COMMENTS THUANE BEETARI grossly WFL; RUE shoulder flexion limited to ~ 75* AROM, distal RUE grossly WFL for AAROM  -MR           MMT (Manual Muscle Testing)    Additional Documentation General Assessment (Manual Muscle Testing) (Group)  -MR           General Assessment (Manual Muscle Testing)    General Manual Muscle Testing (MMT) Assessment upper extremity strength deficits identified  -MR        Comment, General Manual Muscle Testing (MMT) Assessment CONSTANCE  grossly 3+/5, RUE shoulder 3/5, RUE distally grossly 3+/5; poor  strength  -MR           Motor Assessment/Interventions    Additional Documentation Balance (Group)  -MR           Balance    Balance static sitting balance;static standing balance  -MR           Static Sitting Balance    Level of [place] (Unsupported Sitting, Static Balance) minimal assist, 75% patient effort;moderate assist, 50 to 74% patient effort  -MR        Sitting Position (Unsupported Sitting, Static Balance) sitting on edge of bed  -MR        Time Able to Maintain Position (Unsupported Sitting, Static Balance) more than 5 minutes  -MR           Static Standing Balance    Level of [place] (Supported Standing, Static Balance) moderate assist, 50 to 74% patient effort  -MR        Time Able to Maintain Position (Supported Standing, Static Balance) 30 to 45 seconds  -MR        Assistive Device Utilized (Supported Standing, Static Balance) rolling walker  -MR           Sensory Assessment/Intervention    Sensory General Assessment no sensation deficits identified   per pt report BUE light touch intact  -MR        Additional Documentation Vision Assessment/Intervention (Group)  -MR           Vision Assessment/Intervention    Visual Impairment/Limitations corrective lenses for reading  -MR           Positioning and Restraints    Pre-Treatment Position in bed  -MR        Post Treatment Position bed  -MR        In Bed notified nsg;fowlers;call light within reach;encouraged to call for assist;exit alarm on;side rails up x2;heels elevated  -MR           Pain Assessment    Additional Documentation Pain Scale: Numbers Pre/Post-Treatment (Group)  -MR           Pain Scale: Numbers Pre/Post-Treatment    Pain Scale: Numbers, Pretreatment 0/10 - no pain  -MR        Pain Scale: Numbers, Post-Treatment 0/10 - no pain  -MR           Plan of Care Review    Plan of Care Reviewed With patient  -MR           Clinical Impression (OT)    Date of Referral to  OT 07/03/18  -MR        OT Diagnosis impaired mobility and ADL's  -MR        Prognosis (OT Eval) good  -MR        Functional Level at Time of Evaluation (OT Eval) impaired mobility and ADL's  -MR        Patient/Family Goals Statement (OT Eval) Return home  -MR        Criteria for Skilled Therapeutic Interventions Met (OT Eval) yes;treatment indicated  -MR        Rehab Potential (OT Eval) good, to achieve stated therapy goals  -MR        Therapy Frequency (OT Eval) --   5-7 days/wk  -MR        Predicted Duration of Therapy Intervention (Therapy Eval) until d/c or all goals met  -MR        Care Plan Review (OT) evaluation/treatment results reviewed;risks/benefits reviewed;care plan/treatment goals reviewed;current/potential barriers reviewed;patient/other agree to care plan  -MR        Anticipated Discharge Disposition (OT) skilled nursing facility  -MR           Vital Signs    Pre Systolic BP Rehab 123  -MR        Pre Treatment Diastolic BP 58  -MR        Post Systolic BP Rehab 136  -MR        Post Treatment Diastolic BP 60  -MR        Pretreatment Heart Rate (beats/min) 63  -MR        Posttreatment Heart Rate (beats/min) 59  -MR        Pre SpO2 (%) 95  -MR        O2 Delivery Pre Treatment room air  -MR        Post SpO2 (%) 96  -MR        O2 Delivery Post Treatment room air  -MR        Pre Patient Position Supine  -MR        Intra Patient Position Sitting  -MR        Post Patient Position Supine  -MR           Planned OT Interventions    Planned Therapy Interventions (OT Eval) activity tolerance training;adaptive equipment training;BADL retraining;functional balance retraining;IADL retraining;manual therapy/joint mobilization;neuromuscular control/coordination retraining;occupation/activity based interventions;passive ROM/stretching;patient/caregiver education/training;ROM/therapeutic exercise;strengthening exercise;transfer/mobility retraining  -MR           OT Goals    Transfer Goal Selection (OT) transfer, OT goal  1  -MR        Bathing Goal Selection (OT) bathing, OT goal 1  -MR        Dressing Goal Selection (OT) dressing, OT goal 1  -MR        Toileting Goal Selection (OT) --  -MR        Balance Goal Selection (OT) balance, OT goal 1  -MR        Additional Documentation Balance Goal Selection (OT) (Row)  -MR           Transfer Goal 1 (OT)    Activity/Assistive Device (Transfer Goal 1, OT) sit-to-stand/stand-to-sit;bed-to-chair/chair-to-bed;toilet;walker, rolling  -MR        [place] Level/Cues Needed (Transfer Goal 1, OT) contact guard assist  -MR        Time Frame (Transfer Goal 1, OT) long term goal (LTG);by discharge  -MR        Progress/Outcome (Transfer Goal 1, OT) goal not met  -MR           Bathing Goal 1 (OT)    Activity/Assistive Device (Bathing Goal 1, OT) upper body bathing;bath mitt;long-handled sponge  -MR        [place] Level/Cues Needed (Bathing Goal 1, OT) minimum assist (75% or more patient effort)  -MR        Time Frame (Bathing Goal 1, OT) long term goal (LTG);by discharge  -MR        Progress/Outcomes (Bathing Goal 1, OT) goal not met  -MR           Dressing Goal 1 (OT)    Activity/Assistive Device (Dressing Goal 1, OT) upper body dressing  -MR        [place]/Cues Needed (Dressing Goal 1, OT) minimum assist (75% or more patient effort)  -MR        Time Frame (Dressing Goal 1, OT) long term goal (LTG);by discharge  -MR        Progress/Outcome (Dressing Goal 1, OT) goal not met  -MR           Balance Goal 1 (OT)    Activity/Assistive Device (Balance Goal 1, OT) sitting, static   5 minutes EOB to complete UB ADL/functional task  -MR        [place] Level/Cues Needed (Balance Goal 1, OT) contact guard assist  -MR        Time Frame (Balance Goal 1, OT) long term goal (LTG);by discharge  -MR        Progress/Outcomes (Balance Goal 1, OT) goal not met  -MR           Living Environment    Home Accessibility wheelchair accessible;tub/shower is not walk in  -MR          User Key  (r) = Recorded  By, (t) = Taken By, (c) = Cosigned By    Initials Name Effective Dates    MR Janet E ALLYSSA Johnson 04/03/18 -            Occupational Therapy Education     Title: PT OT SLP Therapies (Active)     Topic: Occupational Therapy (Active)     Point: ADL training (Done)     Description: Instruct learner(s) on proper safety adaptation and remediation techniques during self care or transfers.   Instruct in proper use of assistive devices.   Learning Progress Summary     Learner Status Readiness Method Response Comment Documented by    Patient Done Acceptance E,DONALDO SINGH,NR Educated on the role of OT and POC, educated on the benefit of activity and safety with bed mobility, t/f, and functional mobility MR 07/03/18 1127          Point: Precautions (Done)     Description: Instruct learner(s) on prescribed precautions during self-care and functional transfers.   Learning Progress Summary     Learner Status Readiness Method Response Comment Documented by    Patient Done Acceptance E,DONALDO SINGH,NR Educated on the role of OT and POC, educated on the benefit of activity and safety with bed mobility, t/f, and functional mobility MR 07/03/18 1127          Point: Body mechanics (Done)     Description: Instruct learner(s) on proper positioning and spine alignment during self-care, functional mobility activities and/or exercises.   Learning Progress Summary     Learner Status Readiness Method Response Comment Documented by    Patient Done Acceptance E,DONALDO SINGH,NR Educated on the role of OT and POC, educated on the benefit of activity and safety with bed mobility, t/f, and functional mobility MR 07/03/18 1127                      User Key     Initials Effective Dates Name Provider Type Discipline    MR 04/03/18 -  Janet Johnson OT Occupational Therapist OT              Non-skid socks and gait belt in place. Toileting offered. Call light and needs within reach. Pt advised to not get up alone and call the nurse for assistance.  Bed alarm on.        OT Recommendation and Plan  Outcome Summary/Treatment Plan (OT)  Anticipated Discharge Disposition (OT): skilled nursing facility  Planned Therapy Interventions (OT Eval): activity tolerance training, adaptive equipment training, BADL retraining, functional balance retraining, IADL retraining, manual therapy/joint mobilization, neuromuscular control/coordination retraining, occupation/activity based interventions, passive ROM/stretching, patient/caregiver education/training, ROM/therapeutic exercise, strengthening exercise, transfer/mobility retraining  Therapy Frequency (OT Eval):  (5-7 days/wk)  Plan of Care Review  Plan of Care Reviewed With: patient  Plan of Care Reviewed With: patient  Outcome Summary: OT evaluation completed today. Pt presents with decreased activity tolerance and strength limiting her participation in ADL's and functional transfers/mobility. Pt completed bed mobility supine <> sit with mod A, sit <> stand t/f with FWW with mod A, unable to take side steps to HOB this date and required max A for LB dressing task. Pt tolerated sitting EOB for ~ 20 minutes with min/mod A. Pt could benefit from skilled OT services to address decreased strength, balance, transfers, functional mobility, activity tolerance and independence with ADLs. Recommend pt d/c to SNF for cont rehab.           Outcome Measures     Row Name 07/03/18 0903             How much help from another is currently needed...    Putting on and taking off regular lower body clothing? 1  -MR      Bathing (including washing, rinsing, and drying) 2  -MR      Toileting (which includes using toilet bed pan or urinal) 1  -MR      Putting on and taking off regular upper body clothing 2  -MR      Taking care of personal grooming (such as brushing teeth) 2  -MR      Eating meals 3  -MR      Score 11  -MR         Functional Assessment    Outcome Measure Options AM-PAC 6 Clicks Daily Activity (OT)  -MR        User Key  (r) = Recorded By, (t) =  Taken By, (c) = Cosigned By    Initials Name Provider Type     Janet Johnson OT Occupational Therapist          Time Calculation:   OT Start Time: 0903  OT Stop Time: 0945  OT Time Calculation (min): 42 min  Therapy Suggested Charges     Code   Minutes Charges    None           Therapy Charges for Today     Code Description Service Date Service Provider Modifiers Qty    17307702724 HC OT SELFCARE CURRENT 7/3/2018 Janet Johnson OT GO, CL 1    01257085086 HC OT SELFCARE PROJECTED 7/3/2018 Janet Johnson OT GO, CK 1    85282271180 HC OT EVAL MOD COMPLEXITY 1 7/3/2018 Janet Johnson OT GO 1    79687236393 HC OT SELF CARE/MGMT/TRAIN EA 15 MIN 7/3/2018 Janet Johnson OT GO 1    58428834874 HC OT THERAPEUTIC ACT EA 15 MIN 7/3/2018 Janet Johnson OT GO 1          OT G-codes  OT Professional Judgement Used?: Yes  OT Functional Scales Options: AM-PAC 6 Clicks Daily Activity (OT)  Score: 11  Functional Limitation: Self care  Self Care Current Status (): At least 60 percent but less than 80 percent impaired, limited or restricted  Self Care Goal Status (): At least 40 percent but less than 60 percent impaired, limited or restricted    Janet Johnson OT  7/3/2018

## 2018-07-03 NOTE — PLAN OF CARE
Problem: Patient Care Overview  Goal: Plan of Care Review  Outcome: Ongoing (interventions implemented as appropriate)   07/03/18 1704   Coping/Psychosocial   Plan of Care Reviewed With patient   Plan of Care Review   Progress no change   OTHER   Outcome Summary pt is weak. will continue to monitor.      Goal: Individualization and Mutuality  Outcome: Ongoing (interventions implemented as appropriate)    Goal: Discharge Needs Assessment  Outcome: Ongoing (interventions implemented as appropriate)    Goal: Interprofessional Rounds/Family Conf  Outcome: Ongoing (interventions implemented as appropriate)      Problem: Fall Risk (Adult)  Goal: Identify Related Risk Factors and Signs and Symptoms  Outcome: Outcome(s) achieved Date Met: 07/03/18    Goal: Absence of Fall  Outcome: Ongoing (interventions implemented as appropriate)      Problem: Skin Injury Risk (Adult)  Goal: Identify Related Risk Factors and Signs and Symptoms  Outcome: Outcome(s) achieved Date Met: 07/03/18    Goal: Skin Health and Integrity  Outcome: Ongoing (interventions implemented as appropriate)      Problem: Cardiac: Heart Failure (Adult)  Goal: Signs and Symptoms of Listed Potential Problems Will be Absent, Minimized or Managed (Cardiac: Heart Failure)  Outcome: Ongoing (interventions implemented as appropriate)

## 2018-07-03 NOTE — CONSULTS
[facility] Heart Failure Program   LOS: 1 day   Patient Care Team:  Coral Berry MD as PCP - General  Coral Berry MD as PCP - Claims Attributed  Michele Rivera MD as Consulting Physician (Cardiology)  Juan C FARRELL MD as Consulting Physician (Neurology)  LUPE Leo as Nurse Practitioner (Orthopedic Surgery)  Gay Cornejo MD as Consulting Physician (Pulmonary Disease)    Referring Provider: Dr. Roblero    Date of Consult: 07/03/18    Chief Complaint:    Chief Complaint   Patient presents with   • Leg Swelling        Subjective     HPI:  Ms. Child is a 62y old remale patient of Dr. Berry and Dr. Rivera who presented to the ER on 7/2/18 for leg edema. The leg edema had began over the past month and had slowly worsened. She had not been on her Lasix for the past month due to incontinence and weakness (per patient). Lasix was ordered May 28th for pedal edema. On June 28th med rec by [facility] Lasix was absent.   She denies shortness of air or chest pain. On exam today, edema is absent.     Last hospital admission in January, she was very volume overloaded. She went to a nursing home for rehab following that admission. She has since been at home with her son nearby. She has been more forgetful and fallen more in the past. She will be discharging to [facility].     History  Past Medical History:   Diagnosis Date   • Alkaline phosphatase raised    • Astigmatism    • Bilateral pseudophakia    • Coronary arteriosclerosis    • Depressive disorder    • Edema of lower extremity    • Encounter for general adult medical examination with abnormal findings    • Essential hypertension    • Gastroesophageal reflux disease    • H/O bone density study     DXA BONE DENSITY AXIAL    04/22/2016   • H/O screening mammography     SCREENING MAMMOGRAPHY     04/25/2016   • Hx of screening mammography     x3; declined screening, understands risks and benefits and still declines      07/24/2015   • Influenza vaccine  administered     INFLUENZA IMMUN ADMIN OR PREV RECV'D   x4       04/01/2016   • Insomnia    • Iron deficiency    • Migraine    • Tobacco dependence     continuous     , Past Surgical History:   Procedure Laterality Date   • APPENDECTOMY     • AUGMENTATION MAMMAPLASTY     • BREAST IMPLANT SURGERY     • CATARACT EXTRACTION WITH INTRAOCULAR LENS IMPLANT  12/17/2003    Phacoemulsification of a cataract with intraocular lens implant of rigt eye, Nixon model SA 60-AT; serial # [id number];20.5 diopter   • CHOLECYSTECTOMY  08/06/2007    Laparoscopic cholecystectomy. Symptomatic gallstones   • COLONOSCOPY  10/15/2013    declined stool cards and colonscopy   • ENDOSCOPY AND COLONOSCOPY  05/22/2014    Internal & external hemorrhoids found.   • ENDOSCOPY W/ PEG TUBE PLACEMENT  05/22/2014    EGD w/ tube: Normal esopahgus. Gastritis in stomach. Biopsy taken. Normal duodenum. Biopsy taken.   • INJECTION OF MEDICATION  07/24/2013    Inj(s) Tend-Sheath, Ligament, Single   • INJECTION OF MEDICATION  11/17/2014    Kenalog x6   • INJECTION OF MEDICATION  04/01/2016    PNEUMOC VAC/ADMIN/RCVD    • INJECTION OF MEDICATION  12/05/2014    Toradol   • OTHER SURGICAL HISTORY  01/19/2014    Drain/Inject Major Joint   x2   • PAP SMEAR  10/06/2011   • PARTIAL HYSTERECTOMY      Partial hyst    • TONSILLECTOMY     • TUBAL ABDOMINAL LIGATION     , Family History   Problem Relation Age of Onset   • Cholelithiasis Mother    • Thyroid cancer Sister    • Thyroid disease Sister         Thyroid disorder   • Graves' disease Sister    • Cholelithiasis Maternal Grandmother    • Thyroid cancer Other         Other 2nd degree relative, thyroid   • Diabetes Other    • Hypertension Other    , Social History   Substance Use Topics   • Smoking status: Former Smoker     Quit date: 1/10/2018   • Smokeless tobacco: Never Used      Comment: SMOKED FOR 20>PLUS YRS   • Alcohol use No   , ALLERGIES: Codeine; Penicillins; and Sulfa antibiotics    Review of Systems:  "  Review of Systems   Constitutional: Negative for diaphoresis, fatigue, fever and unexpected weight change.   Respiratory: Negative for cough, chest tightness, shortness of breath and wheezing.    Cardiovascular: Negative for chest pain, palpitations and leg swelling.   Gastrointestinal: Negative for abdominal distention and blood in stool.   Genitourinary: Negative for hematuria.   Musculoskeletal: Negative for arthralgias and myalgias.   Skin: Negative for pallor and rash.   Neurological: Negative for dizziness, syncope and weakness.   Hematological: Does not bruise/bleed easily.   Psychiatric/Behavioral: Negative for confusion. The patient is not nervous/anxious.        Objective     Vital Sign Min/Max for last 24 hours  Temp  Min: 98.2 °F (36.8 °C)  Max: 98.9 °F (37.2 °C)   BP  Min: 123/58  Max: 208/136   Pulse  Min: 58  Max: 80   Resp  Min: 16  Max: 18   SpO2  Min: 90 %  Max: 95 %   No Data Recorded   Weight  Min: 55.9 kg (123 lb 4 oz)  Max: 58.3 kg (128 lb 8 oz)     Flowsheet Rows      First Filed Value   Admission Height  162.6 cm (64\") Documented at 07/02/2018 1311   Admission Weight  53.1 kg (117 lb) Documented at 07/02/2018 1311        1    07/02/18  1311 07/02/18  1802 07/03/18  0500   Weight: 53.1 kg (117 lb) 55.9 kg (123 lb 4 oz) 58.3 kg (128 lb 8 oz)     Physical Exam:  Physical Exam   Constitutional: She is oriented to person, place, and time. She appears well-developed and well-nourished. No distress.   HENT:   Head: Normocephalic.   Eyes: Conjunctivae are normal.   Neck: No JVD present.   Cardiovascular: Normal rate, regular rhythm, S1 normal, S2 normal, normal heart sounds and intact distal pulses.  Exam reveals no gallop and no friction rub.    No murmur heard.  Pulmonary/Chest: Effort normal and breath sounds normal. No respiratory distress. She has no wheezes. She has no rales.   Abdominal: Soft. Bowel sounds are normal. She exhibits no distension.   Musculoskeletal: Normal range of motion. She " exhibits no edema.   Neurological: She is alert and oriented to person, place, and time.   Skin: Skin is warm and dry. She is not diaphoretic. No erythema.   Psychiatric: She has a normal mood and affect. Her behavior is normal. Judgment and thought content normal.   Nursing note and vitals reviewed.          Results Review:     Results from last 7 days  Lab Units 07/03/18  0809 07/02/18  1532   SODIUM mmol/L 132* 139   POTASSIUM mmol/L 3.2*  3.2* 2.6*   CHLORIDE mmol/L 96 102   CO2 mmol/L 28.0 26.0   BUN mg/dL 16 17   CREATININE mg/dL 0.91 0.77   CALCIUM mg/dL 8.7 8.6   BILIRUBIN mg/dL  --  0.8   ALK PHOS U/L  --  68   ALT (SGPT) U/L  --  26   AST (SGOT) U/L  --  27   GLUCOSE mg/dL 107* 74       Estimated Creatinine Clearance: 57.5 mL/min (by C-G formula based on SCr of 0.91 mg/dL).      Results from last 7 days  Lab Units 07/02/18  1838 07/02/18  1532   MAGNESIUM mg/dL 1.6 1.8       Results from last 7 days  Lab Units 07/03/18  0824 07/02/18  1329   WBC 10*3/mm3 7.32 7.11   HEMOGLOBIN g/dL 12.8 12.7   PLATELETS 10*3/mm3 239 228     No results found for: HGBA1C    Lab Results   Component Value Date    CKTOTAL 49 01/10/2018    CKMB 1.96 01/10/2018    TROPONINI 0.027 07/02/2018     Lab Results   Component Value Date    PROBNP 1,960.0 (H) 07/02/2018       I/O last 3 completed shifts:  In: 240 [P.O.:240]  Out: 2850 [Urine:2850]    Cardiographics  ECG/EMG Results (last 24 hours)     Procedure Component Value Units Date/Time    ECG 12 Lead [[id number]] Collected:  07/02/18 1339     Updated:  07/02/18 1607    Narrative:       Test Reason : LEG SWELLING  Blood Pressure : **/** mmHG  Vent. Rate : 058 BPM     Atrial Rate : 058 BPM     P-R Int : 142 ms          QRS Dur : 082 ms      QT Int : 524 ms       P-R-T Axes : 079 051 065 degrees     QTc Int : 514 ms    Sinus bradycardia  Possible Left atrial enlargement  Prolonged QT  Abnormal ECG    Confirmed by ASHLEIGH KENT, DARRYL ([id number]),  OSAVLDO CADE ([id number]) on  7/2/2018  3:41:03 PM    Referred By:             Confirmed By:DARRYL SOTELO MD        Results for orders placed during the hospital encounter of 01/10/18   Adult Transthoracic Echo Complete W/ Cont if Necessary Per Protocol    Addendum · The study is technically difficult for diagnosis. The quality of the  study is limited due to poor acoustic windows. · Left Ventricle: Left ventricular systolic function is normal. Estimated  EF appears to be in the range of 61 - 65%. · Left ventricular wall thickness is consistent with mild concentric  hypertrophy and Grade Ia diastolic dysfunction. · Right Ventricle: Right ventricular cavity is mildly dilated. Right  ventricular wall thickness is consistent with moderate hypertrophy · Systolic flattening of interventricular septum consistent with right  ventricle pressure overload · There is calcification and thickening of the aortic valve with mild  aortic regurgitation. · Moderate mitral valve stenosis is present with rheumatic changes of the  mitral valve apparatus. Mild to moderate mitral regurgitation is present. · Severe tricuspid valve regurgitation is present with PA systolic  pressure of 106mmHG. · Severe pulmonary hypertension is present. · There is no evidence of pericardial effusion. · Findings were discussed Dr. Linn on 1/14/2018 at 1340        Stevenson Brown MD PhD 1/15/2018 10:17 AM     · The study is  technically difficult for diagnosis. The quality of the study is limited  due to poor acoustic windows. · Left Ventricle: Left ventricular systolic function is normal. Estimated  EF appears to be in the range of 61 - 65%. · Left ventricular wall thickness is consistent with mild concentric  hypertrophy and Grade Ia diastolic dysfunction. · Right Ventricle: Right ventricular cavity is mildly dilated. Right  ventricular wall thickness is consistent with moderate hypertrophy · Systolic flattening of interventricular septum consistent with right  ventricle pressure  overload · There is calcification and thickening of the aortic valve with mild  aortic regurgitation. · Moderate mitral valve stenosis is present with rheumatic changes of the  mitral valve apparatus. Mild to moderate mitral regurgitation is present. · Severe tricuspid valve regurgitation is present with PA systolic  pressure of 106mmHG. · Severe pulmonary hypertension is present. · There is no evidence of pericardial effusion. · Findings were discussed Dr. Linn on 1/14/2018 at 1340        Stevenson Brown MD PhD 1/15/2018 10:17 AM     · The study is  technically difficult for diagnosis. The quality of the study is limited  due to poor acoustic windows. · Left Ventricle: Left ventricular systolic function is normal. Estimated  EF appears to be in the range of 61 - 65%. · Left ventricular wall thickness is consistent with mild concentric  hypertrophy and Grade Ia diastolic dysfunction. · Right Ventricle: Right ventricular cavity is mildly dilated. Right  ventricular wall thickness is consistent with moderate hypertrophy · Systolic flattening of interventricular septum consistent with right  ventricle pressure overload · There is calcification and thickening of the aortic valve with mild  aortic regurgitation. · Moderate mitral valve stenosis is present with rheumatic changes of the  mitral valve apparatus. Mild to moderate mitral regurgitation is present. · Severe tricuspid valve regurgitation is present with PA systolic  pressure of 106mmHG. · Severe pulmonary hypertension is present. · There is no evidence of pericardial effusion. · Findings were discussed Dr. Linn on 1/14/2018 at 1340 · Left ventricular systolic function is normal. · Left ventricular wall thickness is consistent with mild concentric  hypertrophy. · Left ventricular diastolic dysfunction (grade I a) consistent with  impaired relaxation. · Mild aortic valve regurgitation is present. · There is calcification of the aortic valve. · Moderate  mitral valve stenosis is present · Mild-to-moderate mitral valve regurgitation is present · Severe tricuspid valve regurgitation is present.        Stevenson Brown MD PhD 1/14/2018  1:37 PM          Narrative · The study is technically difficult for diagnosis. The quality of the   study is limited due to poor acoustic windows.  · Left Ventricle: Left ventricular systolic function is normal. Estimated   EF appears to be in the range of 61 - 65%.  · Left ventricular wall thickness is consistent with mild concentric   hypertrophy and Grade Ia diastolic dysfunction.  · Right Ventricle: Right ventricular cavity is mildly dilated. Right   ventricular wall thickness is consistent with moderate hypertrophy  · Systolic flattening of interventricular septum consistent with right   ventricle pressure overload  · There is calcification and thickening of the aortic valve with mild   aortic regurgitation.  · Moderate mitral valve stenosis is present with rheumatic changes of the   mitral valve apparatus. Mild to moderate mitral regurgitation is present.  · Severe tricuspid valve regurgitation is present with PA systolic   pressure of 106mmHG.  · Severe pulmonary hypertension is present.  · There is no evidence of pericardial effusion.          Ct Head Without Contrast    Result Date: 7/2/2018  CONCLUSION:  1.  Negative examination for acute intracranial pathology.       If signs or symptoms persist beyond reasonable expectations, a MRI examination is suggested as is deemed clinically appropriate.        Electronically signed by:  WERNER Simental MD  7/2/2018 2:07 PM CDT Workstation: [id number]    Xr Chest 1 View    Result Date: 7/2/2018  CONCLUSION: Chronic obstructive pulmonary disease. No acute infiltrate. Cardiomegaly. Healing or healed fractures of the right fifth, sixth and seventh ribs and the neck of the right humerus. [id number] Electronically signed by:  Andres Cornejo MD  7/2/2018 1:48 PM CDT Workstation:  [id number]      Intake/Output       07/02/18 0700 - 07/03/18 0659 07/03/18 0700 - 07/04/18 0659    Intake (ml) 240 240    Output (ml) 2850 0    Net (ml) -2610 240    Last Weight  58.3 kg (128 lb 8 oz)  --            Medication Review:   Prescriptions Prior to Admission   Medication Sig Dispense Refill Last Dose   • aspirin 81 MG chewable tablet Chew 1 tablet Daily. 30 tablet 11 7/1/2018 at Unknown time   • ferrous sulfate 324 (65 Fe) MG tablet delayed-release EC tablet Take 1 tablet by mouth 2 (Two) Times a Day With Meals. 180 tablet 3 7/1/2018 at Unknown time   • FLUoxetine (PROzac) 20 MG capsule Take 1 capsule by mouth Daily. 90 capsule 3 7/1/2018 at Unknown time   • FLUoxetine (PROzac) 40 MG capsule Take 1 capsule by mouth Daily. 901 capsule 3 7/1/2018 at Unknown time   • folic acid (FOLVITE) 1 MG tablet TAKE ONE TABLET BY MOUTH ONCE DAILY FOR SUPPLEMENT 30 tablet 2 7/1/2018 at Unknown time   • furosemide (LASIX) 20 MG tablet One half tablet by mouth once a day 30 tablet 5 7/1/2018 at Unknown time   • losartan (COZAAR) 50 MG tablet TAKE ONE TABLET BY MOUTH ONCE DAILY FOR HYPERTENSION. HOLD FOR SYSTOLIC BLOOD PRESSURE LESS THAN 100 90 tablet 0 7/1/2018 at Unknown time   • meloxicam (MOBIC) 15 MG tablet TAKE ONE TABLET BY MOUTH ONCE DAILY FOR MILD PAIN 30 tablet 0 7/1/2018 at Unknown time   • metoprolol succinate XL (TOPROL-XL) 25 MG 24 hr tablet Take 1 tablet by mouth Daily. 90 tablet 3 7/1/2018 at Unknown time   • pravastatin (PRAVACHOL) 40 MG tablet Take 1 tablet by mouth Every Night. 90 tablet 3 7/1/2018 at Unknown time   • raNITIdine (ZANTAC) 150 MG tablet Take 1 tablet by mouth 2 (Two) Times a Day. 180 tablet 3 7/1/2018 at Unknown time   • risperiDONE (risperDAL) 1 MG tablet Take 1 tablet by mouth Every Night. 901 tablet 3 7/1/2018 at Unknown time   • acetaminophen (TYLENOL) 325 MG tablet Take 2 tablets by mouth Every 4 (Four) Hours As Needed for Mild Pain . (Patient taking differently: Take  by mouth  Every 4 (Four) Hours As Needed for Mild Pain .)   Unknown at Unknown time   • albuterol (PROVENTIL HFA;VENTOLIN HFA) 108 (90 Base) MCG/ACT inhaler Inhale 2 puffs Every 4 (Four) Hours As Needed for Wheezing. 6.7 g 11 Unknown at Unknown time   • bisacodyl (DULCOLAX) 5 MG EC tablet Take 1 tablet by mouth Daily As Needed for Constipation.   Unknown at Unknown time   • FERATE 240 (27 Fe) MG tablet TAKE ONE TABLET BY MOUTH TWICE A DAY FOR SUPPLEMENTATION. GIVE WITH MEALS 60 tablet 0 Taking   • hydrocortisone 1 % cream Apply  topically 3 (Three) Times a Day. 60 g 5 Unknown at Unknown time   • ondansetron (ZOFRAN) 4 MG tablet Take 1 tablet by mouth Every 6 (Six) Hours As Needed for Nausea or Vomiting.   Unknown at Unknown time   • SYMBICORT 160-4.5 MCG/ACT inhaler INHALE 2 PUFFS BY MOUTH EVERY MORNING AND AT BEDTIME FOR WHEEZING. RINSE MOUTH AFTER USE. 1 inhaler 5 Unknown at Unknown time       Current Facility-Administered Medications:   •  albuterol (PROVENTIL) nebulizer solution 0.083% 2.5 mg/3mL, 2.5 mg, Nebulization, Q6H PRN, Louie Roblero MD  •  aspirin chewable tablet 81 mg, 81 mg, Oral, Daily, Louie Roblero MD, 81 mg at 07/03/18 0933  •  atorvastatin (LIPITOR) tablet 10 mg, 10 mg, Oral, Daily, Louie Roblero MD, 10 mg at 07/03/18 0932  •  bisacodyl (DULCOLAX) EC tablet 5 mg, 5 mg, Oral, Daily PRN, Louie Roblero MD  •  budesonide-formoterol (SYMBICORT) 160-4.5 MCG/ACT inhaler 2 puff, 2 puff, Inhalation, BID, Louie Roblero MD, 2 puff at 07/03/18 0756  •  famotidine (PEPCID) tablet 20 mg, 20 mg, Oral, BID, Louie Roblero MD, 20 mg at 07/03/18 0932  •  ferrous sulfate EC tablet 324 mg, 324 mg, Oral, BID With Meals, Louie Roblero MD, 324 mg at 07/03/18 0932  •  FLUoxetine (PROzac) capsule 20 mg, 20 mg, Oral, Daily, Louie Roblero MD, 20 mg at 07/03/18 0932  •  FLUoxetine (PROzac) capsule 40 mg, 40 mg, Oral, Daily, Louie Roblero MD, 40  mg at 07/03/18 0933  •  folic acid (FOLVITE) tablet 1 mg, 1 mg, Oral, Daily, Louie Roblero MD, 1 mg at 07/03/18 0933  •  furosemide (LASIX) tablet 20 mg, 20 mg, Oral, Daily, Louie Roblero MD, 20 mg at 07/03/18 0933  •  hydrALAZINE (APRESOLINE) injection 10 mg, 10 mg, Intravenous, Q6H PRN, Louie Roblero MD  •  losartan (COZAAR) tablet 100 mg, 100 mg, Oral, Q24H, Louie Roblero MD, 100 mg at 07/03/18 0933  •  metoprolol succinate XL (TOPROL-XL) 24 hr tablet 25 mg, 25 mg, Oral, Daily, Louie Roblero MD, 25 mg at 07/03/18 0933  •  potassium chloride (MICRO-K) CR capsule 40 mEq, 40 mEq, Oral, Once, Louie Roblero MD  •  risperiDONE (risperDAL) tablet 1 mg, 1 mg, Oral, Nightly, Louie Roblero MD, 1 mg at 07/02/18 2130  •  sodium chloride 0.9 % flush 1-10 mL, 1-10 mL, Intravenous, PRN, Louie Roblero MD  •  sodium chloride 0.9 % flush 10 mL, 10 mL, Intravenous, PRN, Blake Euceda MD    Assessment/Plan     1. PAH/PVH without corpulmonale  WHO Group 2.3/3; FC: II.    RV status: Adversely adapted.  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  She does have significant risk factors for the development of COPD including heavy ongoing smoking.    The definitive treatment for this will be intervention of her mitral valve. Her mean gradient is 9 and she has severe pulmonary hypertension.    I suspect that she will require Percutaneous Balloon Mitral Valvuloplasty.   She will need invasive RHC in order to evaluate her PA pressures.    After an initial workup she would need to be referred to another institution- however- Ms. Child cancelled her testing that Dr. Brown had arranged. I am not sure her mental status is acceptable for the conditions she has. Most importantly, she does NOT have any shortness of breath.   Her functional capacity is reduced due to overall weakness and deconditioning (in addition to  "her medical conditions)     - Diuresed well with 40mg IV lasix yesterday and 20mg PO today.   -Toprol-XL 25mg daily  -BNP elevated, but improved since hospitalization in January when she had cor pulmonale.     2. Moderate MS with PAH and severe TR.   - She did not wish to keep her [facility] appointment to evaluate further.     When I asked Ms. Child about her last visit with Dr. Bronw, her cancelling her [facility], and not keeping her follow ups, she just smiled and said \"well I see Dr. Rivera\". I told her that Dr. Brown specializes in her condition and that is why she had the appointments. She is not interested in pursuing any additional workup.     Will repeat echo and evaluate pressures.     Case discussed with Dr. Brown who is our CHF Clinic director.     Plan for disposition:Where: SNF. No CHF clinic follow up required. She does not wish to follow.          This document has been electronically signed by LUPE Gu on July 3, 2018 2:35 PM            "

## 2018-07-03 NOTE — PLAN OF CARE
Problem: Patient Care Overview  Goal: Plan of Care Review  Outcome: Ongoing (interventions implemented as appropriate)   07/03/18 0098   Coping/Psychosocial   Plan of Care Reviewed With patient   Plan of Care Review   Progress no change   OTHER   Outcome Summary pt received 6 runs of IV KCL last night; will recheck at 0800; no complaints reported at this time; vital signs stable; will continue to monitor

## 2018-07-03 NOTE — DISCHARGE PLACEMENT REQUEST
"Michelle Child (62 y.o. Female)     Date of Birth Social Security Number Address Home Phone MRN    1955  [id number] [facility] [address] [phone] [id number]    Religious Marital Status          Rastafarian        Admission Date Admission Type Admitting Provider Attending Provider Department, Room/Bed    7/2/18 Emergency Louie Roblero MD Farmer, John M Jr., MD [address], [address]    Discharge Date Discharge Disposition Discharge Destination                       Attending Provider:  Aldo Yeboah Jr., MD    Allergies:  Codeine, Penicillins, Sulfa Antibiotics    Isolation:  None   Infection:  None   Code Status:  CPR    Ht:  162.6 cm (64\")   Wt:  58.3 kg (128 lb 8 oz)    Admission Cmt:  None   Principal Problem:  Hypokalemia [E87.6] More...                 Active Insurance as of 7/2/2018     Primary Coverage     Payor Plan Insurance Group Employer/Plan Group    MEDICARE MEDICARE A & B      Payor Plan Address Payor Plan Phone Number Effective From Effective To    [address] [phone] 8/1/2015     [facility] [id number]       Subscriber Name Subscriber Birth Date Member ID       MICHELLE CHILD 1955 [id number]           Secondary Coverage     Payor Plan Insurance Group Employer/Plan Group    [place]      Payor Plan Address Payor Plan Phone Number Effective From Effective To    [address] [phone] 1/10/2018     [facility] [id number]       Subscriber Name Subscriber Birth Date Member ID       MICHELLE CHILD 1955 [id number]                 Emergency Contacts      (Rel.) Home Phone Work Phone Mobile Phone    Remy Angel (Son) [phone] -- [phone]    Bryon Nelson (Son) [phone] -- --    Katarzyna Nelson (Daughter) [phone] -- [phone]            Insurance Information                MEDICARE/MEDICARE A & B Phone: [phone]    Subscriber: FriMichelle maeye " Subscriber#: [id number]    Group#:  Precert#:         [place] Phone: [phone]    Subscriber: Michelle Child Subscriber#: [id number]    Group#:  Precert#:              History & Physical      Louie Roblero MD at 2018  4:06 PM     Attestation signed by Sunny Linn DO at 7/3/2018  9:28 AM          This document has been electronically signed by Sunny Linn DO on July 3, 2018 9:28 AM      I have examined the patient and reviewed the pertinent diagnostic data. I have discussed the case with the Family Medicine Resident and agree with the assessment and plan of care.    Sunny Linn DO                      HISTORY AND PHYSICAL  NAME: Michelle Child  : 1955  MRN: [id number]    DATE OF ADMISSION: 18    DATE & TIME SEEN: 18 4:53 PM    PCP: Coral Berry MD    CODE STATUS: Full code    CHIEF COMPLAINT bilateral lower extremity edema    HPI:  Michelle Child is a 62 y.o. female with a concurrent history of CAD, 2x stroke with no residual weakness, CHF with EF 61% 18, depression, GERD, HTN, insomnia, COPD who presents with a 1 month history of bilateral lower extremity edema worsening over the last 3 days. Pt reports pressure like pain over her feet. Pt report her sons wife called Dr. Berry who told them to come into the ED to be evaluated. Pt denies chest pain, dyspnea, headache, blurry vision, nausea, vomiting, diarrhea, blood in stool or urine. Pt report that she has not been taking her lasix over these last 3 days. Pt reports she is not taking it because it makes her urinate more and she is concerned about getting up so frequently to go to the bathroom. Pt reports she is concerned she will fall.    In the ED pt was found to have 1+ bilateral lower extremity edema. Labs were pertinent for an elevated BNP of 1960 and hypokalemia at 2.6. Pt was started on 0o66otu K+ IV runs over 6 hours and given 40meq PO. Pt was  found to have an elevated BP in the ED of 221/89 and was given a 20mg IV push of hydralazine and her blood pressure came down to 168/72.    CONCURRENT MEDICAL HISTORY:  Past Medical History:   Diagnosis Date   • Alkaline phosphatase raised    • Astigmatism    • Bilateral pseudophakia    • Coronary arteriosclerosis    • Depressive disorder    • Edema of lower extremity    • Encounter for general adult medical examination with abnormal findings    • Essential hypertension    • Gastroesophageal reflux disease    • H/O bone density study     DXA BONE DENSITY AXIAL    04/22/2016   • H/O screening mammography     SCREENING MAMMOGRAPHY     04/25/2016   • Hx of screening mammography     x3; declined screening, understands risks and benefits and still declines      07/24/2015   • Influenza vaccine administered     INFLUENZA IMMUN ADMIN OR PREV RECV'D   x4       04/01/2016   • Insomnia    • Iron deficiency    • Migraine    • Tobacco dependence     continuous         PAST SURGICAL HISTORY:  Past Surgical History:   Procedure Laterality Date   • APPENDECTOMY     • AUGMENTATION MAMMAPLASTY     • BREAST IMPLANT SURGERY     • CATARACT EXTRACTION WITH INTRAOCULAR LENS IMPLANT  12/17/2003    Phacoemulsification of a cataract with intraocular lens implant of rigt eye, Nixon model SA 60-AT; serial # [id number];20.5 diopter   • CHOLECYSTECTOMY  08/06/2007    Laparoscopic cholecystectomy. Symptomatic gallstones   • COLONOSCOPY  10/15/2013    declined stool cards and colonscopy   • ENDOSCOPY AND COLONOSCOPY  05/22/2014    Internal & external hemorrhoids found.   • ENDOSCOPY W/ PEG TUBE PLACEMENT  05/22/2014    EGD w/ tube: Normal esopahgus. Gastritis in stomach. Biopsy taken. Normal duodenum. Biopsy taken.   • INJECTION OF MEDICATION  07/24/2013    Inj(s) Tend-Sheath, Ligament, Single   • INJECTION OF MEDICATION  11/17/2014    Kenalog x6   • INJECTION OF MEDICATION  04/01/2016    PNEUMOC VAC/ADMIN/RCVD    • INJECTION OF MEDICATION   12/05/2014    Toradol   • OTHER SURGICAL HISTORY  01/19/2014    Drain/Inject Major Joint   x2   • PAP SMEAR  10/06/2011   • PARTIAL HYSTERECTOMY      Partial hyst    • TONSILLECTOMY     • TUBAL ABDOMINAL LIGATION         FAMILY HISTORY:  Family History   Problem Relation Age of Onset   • Cholelithiasis Mother    • Thyroid cancer Sister    • Thyroid disease Sister         Thyroid disorder   • Graves' disease Sister    • Cholelithiasis Maternal Grandmother    • Thyroid cancer Other         Other 2nd degree relative, thyroid   • Diabetes Other    • Hypertension Other         SOCIAL HISTORY:  Social History     Social History   • Marital status:      Spouse name: N/A   • Number of children: N/A   • Years of education: N/A     Occupational History   • Not on file.     Social History Main Topics   • Smoking status: Former Smoker     Quit date: 1/10/2018   • Smokeless tobacco: Never Used      Comment: SMOKED FOR 20>PLUS YRS   • Alcohol use No   • Drug use: No   • Sexual activity: Defer     Other Topics Concern   • Not on file     Social History Narrative   • No narrative on file       HOME MEDICATIONS:    Current Facility-Administered Medications:   •  potassium chloride (KLOR-CON) packet 40 mEq, 40 mEq, Oral, Once, Blake Euceda MD  •  potassium chloride 10 mEq in 100 mL IVPB, 10 mEq, Intravenous, Once **AND** potassium chloride 10 mEq in 100 mL IVPB, 10 mEq, Intravenous, Once **AND** potassium chloride 10 mEq in 100 mL IVPB, 10 mEq, Intravenous, Once **AND** potassium chloride 10 mEq in 100 mL IVPB, 10 mEq, Intravenous, Once **AND** potassium chloride 10 mEq in 100 mL IVPB, 10 mEq, Intravenous, Once **AND** potassium chloride 10 mEq in 100 mL IVPB, 10 mEq, Intravenous, Once **AND** Potassium, , , PRN, Blake Euceda MD  •  sodium chloride 0.9 % flush 10 mL, 10 mL, Intravenous, PRN, Blake Euceda MD    Current Outpatient Prescriptions:   •  acetaminophen (TYLENOL) 325 MG tablet, Take 2 tablets by mouth  Every 4 (Four) Hours As Needed for Mild Pain . (Patient taking differently: Take  by mouth Every 4 (Four) Hours As Needed for Mild Pain .), Disp: , Rfl:   •  albuterol (PROVENTIL HFA;VENTOLIN HFA) 108 (90 Base) MCG/ACT inhaler, Inhale 2 puffs Every 4 (Four) Hours As Needed for Wheezing., Disp: 6.7 g, Rfl: 11  •  aspirin 81 MG chewable tablet, Chew 1 tablet Daily., Disp: 30 tablet, Rfl: 11  •  FLUoxetine (PROzac) 20 MG capsule, Take 1 capsule by mouth Daily., Disp: 90 capsule, Rfl: 3  •  FLUoxetine (PROzac) 40 MG capsule, Take 1 capsule by mouth Daily., Disp: 901 capsule, Rfl: 3  •  folic acid (FOLVITE) 1 MG tablet, TAKE ONE TABLET BY MOUTH ONCE DAILY FOR SUPPLEMENT, Disp: 30 tablet, Rfl: 2  •  hydrocortisone 1 % cream, Apply  topically 3 (Three) Times a Day., Disp: 60 g, Rfl: 5  •  losartan (COZAAR) 50 MG tablet, TAKE ONE TABLET BY MOUTH ONCE DAILY FOR HYPERTENSION. HOLD FOR SYSTOLIC BLOOD PRESSURE LESS THAN 100, Disp: 90 tablet, Rfl: 0  •  meloxicam (MOBIC) 15 MG tablet, TAKE ONE TABLET BY MOUTH ONCE DAILY FOR MILD PAIN, Disp: 30 tablet, Rfl: 0  •  metoprolol succinate XL (TOPROL-XL) 25 MG 24 hr tablet, Take 1 tablet by mouth Daily., Disp: 90 tablet, Rfl: 3  •  ondansetron (ZOFRAN) 4 MG tablet, Take 1 tablet by mouth Every 6 (Six) Hours As Needed for Nausea or Vomiting., Disp: , Rfl:   •  pravastatin (PRAVACHOL) 40 MG tablet, Take 1 tablet by mouth Every Night., Disp: 90 tablet, Rfl: 3  •  raNITIdine (ZANTAC) 150 MG tablet, Take 1 tablet by mouth 2 (Two) Times a Day., Disp: 180 tablet, Rfl: 3  •  risperiDONE (risperDAL) 1 MG tablet, Take 1 tablet by mouth Every Night., Disp: 901 tablet, Rfl: 3  •  SYMBICORT 160-4.5 MCG/ACT inhaler, INHALE 2 PUFFS BY MOUTH EVERY MORNING AND AT BEDTIME FOR WHEEZING. RINSE MOUTH AFTER USE., Disp: 1 inhaler, Rfl: 5  •  bisacodyl (DULCOLAX) 5 MG EC tablet, Take 1 tablet by mouth Daily As Needed for Constipation., Disp: , Rfl:   •  FERATE 240 (27 Fe) MG tablet, TAKE ONE TABLET BY MOUTH  TWICE A DAY FOR SUPPLEMENTATION. GIVE WITH MEALS, Disp: 60 tablet, Rfl: 0  •  ferrous sulfate 324 (65 Fe) MG tablet delayed-release EC tablet, Take 1 tablet by mouth 2 (Two) Times a Day With Meals., Disp: 180 tablet, Rfl: 3  •  furosemide (LASIX) 20 MG tablet, One half tablet by mouth once a day, Disp: 30 tablet, Rfl: 5    ALLERGIES:  Codeine; Penicillins; and Sulfa antibiotics    REVIEW OF SYSTEMS  Review of Systems   Constitutional: Positive for fatigue. Negative for chills and fever.   HENT: Negative for congestion and sore throat.    Eyes: Negative for photophobia and visual disturbance.   Respiratory: Negative for cough, shortness of breath and wheezing.    Cardiovascular: Positive for leg swelling. Negative for chest pain and palpitations.   Gastrointestinal: Negative for abdominal pain, diarrhea, nausea and vomiting.   Genitourinary: Negative for dysuria and hematuria.   Musculoskeletal: Negative for neck pain and neck stiffness.   Skin: Negative for rash and wound.   Neurological: Negative for seizures and syncope.   Psychiatric/Behavioral: Negative for agitation and confusion.       PHYSICAL EXAM:  Temp:  [98.3 °F (36.8 °C)] 98.3 °F (36.8 °C)  Heart Rate:  [56-80] 72  Resp:  [18] 18  BP: (168-221)/() 168/72  Body mass index is 20.08 kg/m².  Physical Exam   Constitutional: She is oriented to person, place, and time. She appears well-developed and well-nourished. No distress.   HENT:   Head: Normocephalic and atraumatic.   Right Ear: External ear normal.   Left Ear: External ear normal.   Nose: Nose normal.   Eyes: Conjunctivae are normal. Right eye exhibits no discharge. Left eye exhibits no discharge. No scleral icterus.   Neck: Normal range of motion. Neck supple.   Cardiovascular: Normal rate, regular rhythm and normal heart sounds.    Pulmonary/Chest: Effort normal and breath sounds normal. No respiratory distress. She has no wheezes. She has no rales.   Abdominal: Soft. Bowel sounds are normal.  She exhibits no distension. There is no tenderness.   Musculoskeletal: Normal range of motion. She exhibits edema (1+ bilateral lower extremity edema). She exhibits no tenderness.   Neurological: She is alert and oriented to person, place, and time.   Skin: Skin is warm and dry. Capillary refill takes less than 2 seconds. She is not diaphoretic.   Psychiatric: She has a normal mood and affect. Her behavior is normal. Judgment and thought content normal.   Nursing note and vitals reviewed.      DIAGNOSTIC DATA:   Lab Results (last 24 hours)     Procedure Component Value Units Date/Time    Troponin [[id number]]  (Normal) Collected:  07/02/18 1532    Specimen:  Blood Updated:  07/02/18 1637     Troponin I 0.027 ng/mL     Comprehensive Metabolic Panel [[id number]]  (Abnormal) Collected:  07/02/18 1532    Specimen:  Blood Updated:  07/02/18 1624     Glucose 74 mg/dL      BUN 17 mg/dL      Creatinine 0.77 mg/dL      Sodium 139 mmol/L      Potassium 2.6 (C) mmol/L      Chloride 102 mmol/L      CO2 26.0 mmol/L      Calcium 8.6 mg/dL      Total Protein 6.0 (L) g/dL      Albumin 3.50 g/dL      ALT (SGPT) 26 U/L      AST (SGOT) 27 U/L      Alkaline Phosphatase 68 U/L      Total Bilirubin 0.8 mg/dL      eGFR Non African Amer 75 mL/min/1.73      Globulin 2.5 gm/dL      A/G Ratio 1.4 g/dL      BUN/Creatinine Ratio 22.1     Anion Gap 11.0 mmol/L     Urinalysis, Microscopic Only - Urine, Clean Catch [[id number]]  (Abnormal) Collected:  07/02/18 1452    Specimen:  Urine from Urine, Clean Catch Updated:  07/02/18 1505     RBC, UA 3-5 (A) /HPF      WBC, UA None Seen /HPF      Bacteria, UA None Seen /HPF      Squamous Epithelial Cells, UA None Seen /HPF      Hyaline Casts, UA 0-2 /LPF      Methodology Automated Microscopy    Urinalysis With Microscopic If Indicated (No Culture) - Urine, Clean Catch [[id number]]  (Abnormal) Collected:  07/02/18 1452    Specimen:  Urine from Urine, Clean Catch Updated:  07/02/18 1503     Color, UA Yellow      Appearance, UA Clear     pH, UA 6.5     Specific Gravity, UA 1.011     Glucose, UA Negative     Ketones, UA Trace (A)     Bilirubin, UA Negative     Blood, UA Trace (A)     Protein,  mg/dL (2+) (A)     Leuk Esterase, UA Negative     Nitrite, UA Negative     Urobilinogen, UA 1.0 E.U./dL    [place] Draw [[id number]] Collected:  07/02/18 1329    Specimen:  Blood Updated:  07/02/18 1430    Narrative:       The following orders were created for panel order [place] Draw.  Procedure                               Abnormality         Status                     ---------                               -----------         ------                     Light Blue Top[[id number]]                                   Final result               Green Top (Gel)[[id number]]                                  Final result               Lavender Top[[id number]]                                     Final result               Gold Top - SST[[id number]]                                   Final result                 Please view results for these tests on the individual orders.    Light Blue Top [[id number]] Collected:  07/02/18 1329    Specimen:  Blood Updated:  07/02/18 1430     Extra Tube hold for add-on     Comment: Auto resulted       Green Top (Gel) [[id number]] Collected:  07/02/18 1329    Specimen:  Blood Updated:  07/02/18 1430     Extra Tube Hold for add-ons.     Comment: Auto resulted.       Lavender Top [[id number]] Collected:  07/02/18 1329    Specimen:  Blood Updated:  07/02/18 1430     Extra Tube hold for add-on     Comment: Auto resulted       Gold Top - SST [[id number]] Collected:  07/02/18 1329    Specimen:  Blood Updated:  07/02/18 1430     Extra Tube Hold for add-ons.     Comment: Auto resulted.       Troponin [[id number]]  (Normal) Collected:  07/02/18 1329    Specimen:  Blood Updated:  07/02/18 1404     Troponin I 0.023 ng/mL     BNP [[id number]]  (Abnormal) Collected:  07/02/18 1329    Specimen:  Blood Updated:  07/02/18 1404      proBNP 1,960.0 (H) pg/mL     Protime-INR [[id number]]  (Normal) Collected:  07/02/18 1329    Specimen:  Blood Updated:  07/02/18 1357     Protime 13.2 Seconds      INR 1.02    Narrative:       Therapeutic range for most indications is 2.0-3.0 INR,  or 2.5-3.5 for mechanical heart valves.    CBC & Differential [[id number]] Collected:  07/02/18 1329    Specimen:  Blood Updated:  07/02/18 1342    Narrative:       The following orders were created for panel order CBC & Differential.  Procedure                               Abnormality         Status                     ---------                               -----------         ------                     CBC Auto Differential[[id number]]        Normal              Final result                 Please view results for these tests on the individual orders.    CBC Auto Differential [[id number]]  (Normal) Collected:  07/02/18 1329    Specimen:  Blood Updated:  07/02/18 1342     WBC 7.11 10*3/mm3      RBC 4.41 10*6/mm3      Hemoglobin 12.7 g/dL      Hematocrit 38.5 %      MCV 87.3 fL      MCH 28.8 pg      MCHC 33.0 g/dL      RDW 14.3 %      RDW-SD 45.7 fl      MPV 10.5 fL      Platelets 228 10*3/mm3      Neutrophil % 76.3 %      Lymphocyte % 16.2 %      Monocyte % 6.5 %      Eosinophil % 0.4 %      Basophil % 0.3 %      Immature Grans % 0.3 %      Neutrophils, Absolute 5.43 10*3/mm3      Lymphocytes, Absolute 1.15 10*3/mm3      Monocytes, Absolute 0.46 10*3/mm3      Eosinophils, Absolute 0.03 10*3/mm3      Basophils, Absolute 0.02 10*3/mm3      Immature Grans, Absolute 0.02 10*3/mm3            Imaging Results (last 24 hours)     Procedure Component Value Units Date/Time    CT Head Without Contrast [[id number]] Collected:  07/02/18 1345     Updated:  07/02/18 1408    Narrative:       .      EXAMINATION:  Computed Tomography      REGION:  Head             INDICATION:  ams    HISTORY:  CORRELATIVE IMAGING:    CT head 1/10/18    TECHNIQUE:  iv contrast:  no    This exam was performed  according to the departmental  dose-optimization program which includes automated exposure  control, adjustment of the mA and/or kV according to patient size  and/or use of iterative reconstruction technique.              COMMENTS:                - atrophy:              wnl for age    - cortex:                wnl for age    - deep white mat:  wnl for age    - hemorrhage:       none       - fluid collection: no intra/extra axial fluid collection     - mass / lesion:     no focal parenchymal lesion(s)       - gray/white jxn:   borders preserved         - brain stem:         wnl       - cerebellum:        wnl       - globes / retro:     wnl       - ventricles:          normal size / configuration       - midline shift:      no       - sinuses:              wnl       - mastoids:           wnl        - osseous:             wnl       - misc.:  .       Impression:       CONCLUSION:    1.  Negative examination for acute intracranial pathology.           If signs or symptoms persist beyond reasonable expectations, a  MRI examination is suggested as is deemed clinically appropriate.                     Electronically signed by:  WERNER Simental MD  7/2/2018 2:07 PM  CDT Workstation: [id number]    XR Chest 1 View [[id number]] Collected:  07/02/18 1328     Updated:  07/02/18 1349    Narrative:         PORTABLE CHEST    HISTORY: Chest pain    Portable AP upright film of the chest was obtained at 1:18 PM.  COMPARISON: 3/1/2018    Chronic obstructive pulmonary disease.  No acute infiltrate.  Old granulomatous disease is present.  Cardiomegaly.  The pulmonary vasculature is not increased.  No pleural effusion.  No pneumothorax.  Healing or healed fractures of the right fifth, sixth and seventh  ribs and the neck of the right humerus.  Surgical clips right upper quadrant of the abdomen.      Impression:       CONCLUSION:  Chronic obstructive pulmonary disease.  No acute infiltrate.  Cardiomegaly.  Healing or healed fractures of the  right fifth, sixth and seventh  ribs and the neck of the right humerus.    [id number]    Electronically signed by:  Andres Cornejo MD  7/2/2018 1:48 PM CDT  Workstation: [email or web address]        I reviewed the patient's new clinical results.    ASSESSMENT AND PLAN: This is a 62 y.o. female with:    Active Hospital Problems (** Indicates Principal Problem)    Diagnosis Date Noted   • Peripheral edema [R60.9] 07/02/2018     - restart home dose lasix 20mg daily     • Recurrent falls while walking [R29.6] 07/02/2018     PT/OT  Case management consult - SNF placement     • Insomnia [G47.00] 07/02/2018     Continue home risperdal     • Recurrent major depressive disorder, in full remission [F33.42] 04/05/2018     Continue home prozac     • Stage 3 severe COPD by GOLD classification (CMS/[facility]) [J44.9] 03/01/2018     Continue home nebs and symbicort     • Hypokalemia [E87.6] 01/10/2018     Started 1t51wpd IV and 40meq PO in ED  Replace and monitor  Check magnesium       • Essential hypertension [I10]      Continue home toprol 25mg daily  Increase home losartan from 50mg to 100mg daily  Lasix 20mg PO daily  PRN hydralazine     • Gastroesophageal reflux disease [K21.9]      Pepcid        Resolved Hospital Problems    Diagnosis Date Noted Date Resolved   No resolved problems to display.       DVT prophylaxis: SCDs/TEDs  Code status: Full code  TAMERA # [id number], reviewed and consistent with patient reported medications.    Expected Length of Stay: Where: SNF and When:  1-2 days     I discussed the patients findings and my recommendations with patient.     Dr. Linn is the attending on record at time of admission, He is aware of the patient's status and agrees with the above history and physical.    Louie Roblero MD PGY2  Family Practice Residency  [address], [place] [address]  Office: [phone]      This document has been electronically signed by Louie Roblero MD on July 2, 2018  4:53 PM       Electronically signed by Sunny Linn DO at 7/3/2018  9:28 AM

## 2018-07-03 NOTE — PROGRESS NOTES
FAMILY MEDICINE DAILY PROGRESS NOTE  NAME: Michelle Child  : 1955  MRN: [id number]     LOS: 1 day     PROVIDER OF SERVICE: Sunny Linn DO    Chief Complaint: Hypokalemia    Subjective:     Interval History:  History taken from: patient chart RN  Patient Complaints: None  Patient Denies:  Chest pain    Review of Systems:   Review of Systems   Constitutional: Negative for activity change and chills.   HENT: Negative for congestion and sore throat.    Eyes: Negative for pain and visual disturbance.   Respiratory: Negative for chest tightness and shortness of breath.    Cardiovascular: Negative for chest pain and palpitations.   Gastrointestinal: Negative for diarrhea, nausea and vomiting.   Endocrine: Negative for polyphagia and polyuria.   Genitourinary: Negative for frequency and hematuria.   Musculoskeletal: Positive for myalgias. Negative for arthralgias.   Skin: Negative for color change and pallor.   Neurological: Negative for dizziness and seizures.   Psychiatric/Behavioral: Negative for agitation and behavioral problems.       Objective:     Vital Signs  Temp:  [98.2 °F (36.8 °C)-98.9 °F (37.2 °C)] 98.9 °F (37.2 °C)  Heart Rate:  [56-80] 62  Resp:  [16-18] 18  BP: (123-221)/() 123/58    Physical Exam  Physical Exam   Constitutional: She is oriented to person, place, and time. Vital signs are normal. She appears well-developed and well-nourished. No distress.   HENT:   Head: Normocephalic and atraumatic.   Right Ear: External ear normal.   Left Ear: External ear normal.   Nose: Nose normal.   Mouth/Throat: No oropharyngeal exudate.   Eyes: Conjunctivae and EOM are normal. Pupils are equal, round, and reactive to light. Right eye exhibits no discharge. Left eye exhibits no discharge. No scleral icterus.   Neck: Neck supple. No tracheal deviation present. No thyromegaly present.   Cardiovascular: Normal rate, regular rhythm, normal heart sounds and intact distal pulses.  Exam reveals no  gallop and no friction rub.    No murmur heard.  Pulmonary/Chest: Effort normal and breath sounds normal. No respiratory distress. She has no wheezes. She has no rales.   Abdominal: Soft. Bowel sounds are normal. She exhibits no distension. There is no tenderness.   Musculoskeletal: Normal range of motion. She exhibits tenderness (bilateral lower exremities). She exhibits no edema or deformity.   Neurological: She is alert and oriented to person, place, and time. She has normal reflexes.   Skin: Skin is warm. Capillary refill takes less than 2 seconds. She is not diaphoretic.   Psychiatric: She has a normal mood and affect. Her behavior is normal.       Medication Review    Current Facility-Administered Medications:   •  albuterol (PROVENTIL) nebulizer solution 0.083% 2.5 mg/3mL, 2.5 mg, Nebulization, Q6H PRN, Louie Roblero MD  •  aspirin chewable tablet 81 mg, 81 mg, Oral, Daily, Louie Roblero MD, 81 mg at 07/03/18 0933  •  atorvastatin (LIPITOR) tablet 10 mg, 10 mg, Oral, Daily, Louie Roblero MD, 10 mg at 07/03/18 0932  •  bisacodyl (DULCOLAX) EC tablet 5 mg, 5 mg, Oral, Daily PRN, Louie Roblero MD  •  budesonide-formoterol (SYMBICORT) 160-4.5 MCG/ACT inhaler 2 puff, 2 puff, Inhalation, BID, Louie Roblero MD, 2 puff at 07/03/18 0756  •  famotidine (PEPCID) tablet 20 mg, 20 mg, Oral, BID, Louie Roblero MD, 20 mg at 07/03/18 0932  •  ferrous sulfate EC tablet 324 mg, 324 mg, Oral, BID With Meals, Louie Roblero MD, 324 mg at 07/03/18 0932  •  FLUoxetine (PROzac) capsule 20 mg, 20 mg, Oral, Daily, Louie Roblero MD, 20 mg at 07/03/18 0932  •  FLUoxetine (PROzac) capsule 40 mg, 40 mg, Oral, Daily, Louie Roblero MD, 40 mg at 07/03/18 0933  •  folic acid (FOLVITE) tablet 1 mg, 1 mg, Oral, Daily, Louie Roblero MD, 1 mg at 07/03/18 0933  •  furosemide (LASIX) tablet 20 mg, 20 mg, Oral, Daily, Louie Roblero MD, 20 mg  at 07/03/18 0933  •  hydrALAZINE (APRESOLINE) injection 10 mg, 10 mg, Intravenous, Q6H PRN, Louie Roblero MD  •  losartan (COZAAR) tablet 100 mg, 100 mg, Oral, Q24H, Louie Roblero MD, 100 mg at 07/03/18 0933  •  metoprolol succinate XL (TOPROL-XL) 24 hr tablet 25 mg, 25 mg, Oral, Daily, Louie Roblero MD, 25 mg at 07/03/18 0933  •  risperiDONE (risperDAL) tablet 1 mg, 1 mg, Oral, Nightly, Louie Roblero MD, 1 mg at 07/02/18 2130  •  sodium chloride 0.9 % flush 1-10 mL, 1-10 mL, Intravenous, PRN, Louie Roblero MD  •  sodium chloride 0.9 % flush 10 mL, 10 mL, Intravenous, PRN, Blake Euceda MD     Diagnostic Data    Lab Results (last 24 hours)     Procedure Component Value Units Date/Time    Potassium [[id number]]  (Abnormal) Collected:  07/03/18 0809    Specimen:  Blood Updated:  07/03/18 0847     Potassium 3.2 (L) mmol/L     Basic Metabolic Panel [[id number]]  (Abnormal) Collected:  07/03/18 0809    Specimen:  Blood Updated:  07/03/18 0847     Glucose 107 (H) mg/dL      BUN 16 mg/dL      Creatinine 0.91 mg/dL      Sodium 132 (L) mmol/L      Potassium 3.2 (L) mmol/L      Chloride 96 mmol/L      CO2 28.0 mmol/L      Calcium 8.7 mg/dL      eGFR Non African Amer 62 mL/min/1.73      BUN/Creatinine Ratio 17.6     Anion Gap 8.0 mmol/L     CBC & Differential [[id number]] Collected:  07/03/18 0824    Specimen:  Blood Updated:  07/03/18 0831    Narrative:       The following orders were created for panel order CBC & Differential.  Procedure                               Abnormality         Status                     ---------                               -----------         ------                     CBC Auto Differential[[id number]]        Normal              Final result                 Please view results for these tests on the individual orders.    CBC Auto Differential [[id number]]  (Normal) Collected:  07/03/18 0824    Specimen:  Blood Updated:  07/03/18 0831     WBC 7.32  10*3/mm3      RBC 4.46 10*6/mm3      Hemoglobin 12.8 g/dL      Hematocrit 38.8 %      MCV 87.0 fL      MCH 28.7 pg      MCHC 33.0 g/dL      RDW 14.4 %      RDW-SD 45.5 fl      MPV 10.3 fL      Platelets 239 10*3/mm3      Neutrophil % 76.4 %      Lymphocyte % 15.4 %      Monocyte % 6.8 %      Eosinophil % 1.0 %      Basophil % 0.3 %      Immature Grans % 0.1 %      Neutrophils, Absolute 5.59 10*3/mm3      Lymphocytes, Absolute 1.13 10*3/mm3      Monocytes, Absolute 0.50 10*3/mm3      Eosinophils, Absolute 0.07 10*3/mm3      Basophils, Absolute 0.02 10*3/mm3      Immature Grans, Absolute 0.01 10*3/mm3     Magnesium [[id number]]  (Normal) Collected:  07/02/18 1838    Specimen:  Blood Updated:  07/02/18 1920     Magnesium 1.6 mg/dL     Magnesium [[id number]]  (Normal) Collected:  07/02/18 1532    Specimen:  Blood Updated:  07/02/18 1732     Magnesium 1.8 mg/dL     Troponin [[id number]]  (Normal) Collected:  07/02/18 1532    Specimen:  Blood Updated:  07/02/18 1637     Troponin I 0.027 ng/mL     Comprehensive Metabolic Panel [[id number]]  (Abnormal) Collected:  07/02/18 1532    Specimen:  Blood Updated:  07/02/18 1624     Glucose 74 mg/dL      BUN 17 mg/dL      Creatinine 0.77 mg/dL      Sodium 139 mmol/L      Potassium 2.6 (C) mmol/L      Chloride 102 mmol/L      CO2 26.0 mmol/L      Calcium 8.6 mg/dL      Total Protein 6.0 (L) g/dL      Albumin 3.50 g/dL      ALT (SGPT) 26 U/L      AST (SGOT) 27 U/L      Alkaline Phosphatase 68 U/L      Total Bilirubin 0.8 mg/dL      eGFR Non African Amer 75 mL/min/1.73      Globulin 2.5 gm/dL      A/G Ratio 1.4 g/dL      BUN/Creatinine Ratio 22.1     Anion Gap 11.0 mmol/L     Urinalysis, Microscopic Only - Urine, Clean Catch [[id number]]  (Abnormal) Collected:  07/02/18 1452    Specimen:  Urine from Urine, Clean Catch Updated:  07/02/18 1505     RBC, UA 3-5 (A) /HPF      WBC, UA None Seen /HPF      Bacteria, UA None Seen /HPF      Squamous Epithelial Cells, UA None Seen /HPF      Hyaline  Casts, UA 0-2 /LPF      Methodology Automated Microscopy    Urinalysis With Microscopic If Indicated (No Culture) - Urine, Clean Catch [[id number]]  (Abnormal) Collected:  07/02/18 1452    Specimen:  Urine from Urine, Clean Catch Updated:  07/02/18 1503     Color, UA Yellow     Appearance, UA Clear     pH, UA 6.5     Specific Gravity, UA 1.011     Glucose, UA Negative     Ketones, UA Trace (A)     Bilirubin, UA Negative     Blood, UA Trace (A)     Protein,  mg/dL (2+) (A)     Leuk Esterase, UA Negative     Nitrite, UA Negative     Urobilinogen, UA 1.0 E.U./dL    Sharon Draw [[id number]] Collected:  07/02/18 1329    Specimen:  Blood Updated:  07/02/18 1430    Narrative:       The following orders were created for panel order Sharon Draw.  Procedure                               Abnormality         Status                     ---------                               -----------         ------                     Light Blue Top[[id number]]                                   Final result               Green Top (Gel)[[id number]]                                  Final result               Lavender Top[[id number]]                                     Final result               Gold Top - SST[[id number]]                                   Final result                 Please view results for these tests on the individual orders.    Light Blue Top [[id number]] Collected:  07/02/18 1329    Specimen:  Blood Updated:  07/02/18 1430     Extra Tube hold for add-on     Comment: Auto resulted       Green Top (Gel) [[id number]] Collected:  07/02/18 1329    Specimen:  Blood Updated:  07/02/18 1430     Extra Tube Hold for add-ons.     Comment: Auto resulted.       Lavender Top [[id number]] Collected:  07/02/18 1329    Specimen:  Blood Updated:  07/02/18 1430     Extra Tube hold for add-on     Comment: Auto resulted       Gold Top - SST [[id number]] Collected:  07/02/18 1329    Specimen:  Blood Updated:  07/02/18 1430     Extra Tube Hold for  add-ons.     Comment: Auto resulted.       Troponin [[id number]]  (Normal) Collected:  07/02/18 1329    Specimen:  Blood Updated:  07/02/18 1404     Troponin I 0.023 ng/mL     BNP [[id number]]  (Abnormal) Collected:  07/02/18 1329    Specimen:  Blood Updated:  07/02/18 1404     proBNP 1,960.0 (H) pg/mL     Protime-INR [[id number]]  (Normal) Collected:  07/02/18 1329    Specimen:  Blood Updated:  07/02/18 1357     Protime 13.2 Seconds      INR 1.02    Narrative:       Therapeutic range for most indications is 2.0-3.0 INR,  or 2.5-3.5 for mechanical heart valves.    CBC & Differential [[id number]] Collected:  07/02/18 1329    Specimen:  Blood Updated:  07/02/18 1342    Narrative:       The following orders were created for panel order CBC & Differential.  Procedure                               Abnormality         Status                     ---------                               -----------         ------                     CBC Auto Differential[[id number]]        Normal              Final result                 Please view results for these tests on the individual orders.    CBC Auto Differential [[id number]]  (Normal) Collected:  07/02/18 1329    Specimen:  Blood Updated:  07/02/18 1342     WBC 7.11 10*3/mm3      RBC 4.41 10*6/mm3      Hemoglobin 12.7 g/dL      Hematocrit 38.5 %      MCV 87.3 fL      MCH 28.8 pg      MCHC 33.0 g/dL      RDW 14.3 %      RDW-SD 45.7 fl      MPV 10.5 fL      Platelets 228 10*3/mm3      Neutrophil % 76.3 %      Lymphocyte % 16.2 %      Monocyte % 6.5 %      Eosinophil % 0.4 %      Basophil % 0.3 %      Immature Grans % 0.3 %      Neutrophils, Absolute 5.43 10*3/mm3      Lymphocytes, Absolute 1.15 10*3/mm3      Monocytes, Absolute 0.46 10*3/mm3      Eosinophils, Absolute 0.03 10*3/mm3      Basophils, Absolute 0.02 10*3/mm3      Immature Grans, Absolute 0.02 10*3/mm3             I reviewed the patient's new clinical results.    Assessment/Plan:     Active Hospital Problems (** Indicates  Principal Problem)    Diagnosis Date Noted   • **Hypokalemia [E87.6] 01/10/2018     Started 0j86sni IV and 40meq PO in ED  Replace and monitor  Mag 1.6   Recheck K+ this AM        • Peripheral edema [R60.9] 07/02/2018     - restart home dose lasix 20mg daily     • Recurrent falls while walking [R29.6] 07/02/2018     PT/OT  Case management consult - SNF placement     • Insomnia [G47.00] 07/02/2018     Continue home risperdal     • Recurrent major depressive disorder, in full remission (CMS/[facility]) [F33.42] 04/05/2018     Continue home prozac     • Stage 3 severe COPD by GOLD classification (CMS/[facility]) [J44.9] 03/01/2018     Continue home nebs and symbicort     • Essential hypertension [I10]      Continue home toprol 25mg daily  Increase home losartan from 50mg to 100mg daily  Lasix 20mg PO daily  PRN hydralazine     • Gastroesophageal reflux disease [K21.9]      Pepcid        Resolved Hospital Problems    Diagnosis Date Noted Date Resolved   No resolved problems to display.       Code status is   Code Status and Medical Interventions:   Ordered at: 07/02/18 1645     Level Of Support Discussed With:    Patient     Code Status:    CPR     Medical Interventions (Level of Support Prior to Arrest):    Full       Electrolyte management  BP med adjustment  Home when at baseline.    I have examined the patient and reviewed the pertinent diagnostic data. I have discussed the case with the Family Medicine Resident and agree with the assessment and plan of care.    Sunny Linn DO           This document has been electronically signed by Sunny Linn DO on July 3, 2018 10:26 AM

## 2018-07-03 NOTE — PLAN OF CARE
Problem: Patient Care Overview  Goal: Plan of Care Review  Outcome: Ongoing (interventions implemented as appropriate)   07/03/18 1128   Coping/Psychosocial   Plan of Care Reviewed With patient   OTHER   Outcome Summary OT evaluation completed today. Pt presents with decreased activity tolerance and strength limiting her participation in ADL's and functional transfers/mobility. Pt completed bed mobility supine <> sit with mod A, sit <> stand t/f with FWW with mod A, unable to take side steps to HOB this date and required max A for LB dressing task. Pt tolerated sitting EOB for ~ 20 minutes with min/mod A. Pt could benefit from skilled OT services to address decreased strength, balance, transfers, functional mobility, activity tolerance and independence with ADLs. Recommend pt d/c to SNF for cont rehab.

## 2018-07-03 NOTE — CONSULTS
Met with pt.  Offered choices for snf.  She would prefer to go to [facility].  Will call and submit referral.  At this time, pt meets for obs criteria only.  She does have medicaid as 2nd insurance.  Will check with [facility] to see if pt could go under her medicaid benefits.

## 2018-07-03 NOTE — PROGRESS NOTES
FAMILY MEDICINE DAILY PROGRESS NOTE    NAME: Michelle Child  : 1955  MRN: [id number]      LOS: 1 day     PROVIDER OF SERVICE: Aldo Yeboah Jr, MD    Chief Complaint: Hypokalemia    Subjective:     Interval History:  History taken from: patient chart  Patient Complaints: some minor leg pain that is improving  Patient Denies:  Chest pains, shortness of breath, nausea, vomiting, diarrhea, weakness, palpitations.     Review of Systems:   Review of Systems   Constitutional: Negative for activity change and chills.   HENT: Negative for congestion and sore throat.    Eyes: Negative for pain and visual disturbance.   Respiratory: Negative for chest tightness and shortness of breath.    Cardiovascular: Negative for chest pain and palpitations.   Gastrointestinal: Negative for diarrhea, nausea and vomiting.   Endocrine: Negative for polyphagia and polyuria.   Genitourinary: Negative for frequency and hematuria.   Musculoskeletal: Positive for myalgias. Negative for arthralgias.   Skin: Negative for color change and pallor.   Neurological: Negative for dizziness and seizures.   Psychiatric/Behavioral: Negative for agitation and behavioral problems.       Objective:     Vital Signs  Temp:  [98.2 °F (36.8 °C)-98.7 °F (37.1 °C)] 98.7 °F (37.1 °C)  Heart Rate:  [56-80] 68  Resp:  [16-18] 18  BP: (124-221)/() 165/77  Body mass index is 22.06 kg/m².    Physical Exam  Physical Exam   Constitutional: She is oriented to person, place, and time. Vital signs are normal. She appears well-developed and well-nourished. No distress.   HENT:   Head: Normocephalic and atraumatic.   Right Ear: External ear normal.   Left Ear: External ear normal.   Nose: Nose normal.   Mouth/Throat: No oropharyngeal exudate.   Eyes: Conjunctivae and EOM are normal. Pupils are equal, round, and reactive to light. Right eye exhibits no discharge. Left eye exhibits no discharge. No scleral icterus.   Neck: Neck supple. No tracheal  deviation present. No thyromegaly present.   Cardiovascular: Normal rate, regular rhythm, normal heart sounds and intact distal pulses.  Exam reveals no gallop and no friction rub.    No murmur heard.  Pulmonary/Chest: Effort normal and breath sounds normal. No respiratory distress. She has no wheezes. She has no rales.   Abdominal: Soft. Bowel sounds are normal. She exhibits no distension. There is no tenderness.   Musculoskeletal: Normal range of motion. She exhibits tenderness (bilateral lower exremities). She exhibits no edema or deformity.   Neurological: She is alert and oriented to person, place, and time. She has normal reflexes.   Skin: Skin is warm. Capillary refill takes less than 2 seconds. She is not diaphoretic.   Psychiatric: She has a normal mood and affect. Her behavior is normal.       Medication Review    Current Facility-Administered Medications:   •  albuterol (PROVENTIL) nebulizer solution 0.083% 2.5 mg/3mL, 2.5 mg, Nebulization, Q6H PRN, Louie Roblero MD  •  aspirin chewable tablet 81 mg, 81 mg, Oral, Daily, Louie Roblero MD, 81 mg at 07/02/18 2136  •  atorvastatin (LIPITOR) tablet 10 mg, 10 mg, Oral, Daily, Louie Roblero MD, 10 mg at 07/02/18 2130  •  bisacodyl (DULCOLAX) EC tablet 5 mg, 5 mg, Oral, Daily PRN, Louie Roblero MD  •  budesonide-formoterol (SYMBICORT) 160-4.5 MCG/ACT inhaler 2 puff, 2 puff, Inhalation, BID, Louie Roblero MD, 2 puff at 07/02/18 2027  •  famotidine (PEPCID) tablet 20 mg, 20 mg, Oral, BID, Louie Roblero MD, 20 mg at 07/02/18 2130  •  ferrous sulfate EC tablet 324 mg, 324 mg, Oral, BID With Meals, Louie Roblero MD, 324 mg at 07/02/18 2131  •  FLUoxetine (PROzac) capsule 20 mg, 20 mg, Oral, Daily, Louie Roblero MD, 20 mg at 07/02/18 2132  •  FLUoxetine (PROzac) capsule 40 mg, 40 mg, Oral, Daily, Louie Roblero MD, 40 mg at 07/02/18 2130  •  folic acid (FOLVITE) tablet 1 mg, 1 mg,  Oral, Daily, Louie Roblero MD, 1 mg at 07/02/18 2131  •  furosemide (LASIX) tablet 20 mg, 20 mg, Oral, Daily, Louie Roblero MD, 20 mg at 07/02/18 2131  •  hydrALAZINE (APRESOLINE) injection 10 mg, 10 mg, Intravenous, Q6H PRN, Louie Roblero MD  •  losartan (COZAAR) tablet 100 mg, 100 mg, Oral, Q24H, Louie Roblero MD, 100 mg at 07/02/18 2130  •  metoprolol succinate XL (TOPROL-XL) 24 hr tablet 25 mg, 25 mg, Oral, Daily, Louie Roblero MD, 25 mg at 07/02/18 2131  •  risperiDONE (risperDAL) tablet 1 mg, 1 mg, Oral, Nightly, Louie Roblero MD, 1 mg at 07/02/18 2130  •  sodium chloride 0.9 % flush 1-10 mL, 1-10 mL, Intravenous, PRN, Louie Roblero MD  •  sodium chloride 0.9 % flush 10 mL, 10 mL, Intravenous, PRN, Blake Euceda MD     Diagnostic Data    Lab Results (last 24 hours)     Procedure Component Value Units Date/Time    Magnesium [[id number]]  (Normal) Collected:  07/02/18 1838    Specimen:  Blood Updated:  07/02/18 1920     Magnesium 1.6 mg/dL     Magnesium [[id number]]  (Normal) Collected:  07/02/18 1532    Specimen:  Blood Updated:  07/02/18 1732     Magnesium 1.8 mg/dL     Troponin [[id number]]  (Normal) Collected:  07/02/18 1532    Specimen:  Blood Updated:  07/02/18 1637     Troponin I 0.027 ng/mL     Comprehensive Metabolic Panel [[id number]]  (Abnormal) Collected:  07/02/18 1532    Specimen:  Blood Updated:  07/02/18 1624     Glucose 74 mg/dL      BUN 17 mg/dL      Creatinine 0.77 mg/dL      Sodium 139 mmol/L      Potassium 2.6 (C) mmol/L      Chloride 102 mmol/L      CO2 26.0 mmol/L      Calcium 8.6 mg/dL      Total Protein 6.0 (L) g/dL      Albumin 3.50 g/dL      ALT (SGPT) 26 U/L      AST (SGOT) 27 U/L      Alkaline Phosphatase 68 U/L      Total Bilirubin 0.8 mg/dL      eGFR Non African Amer 75 mL/min/1.73      Globulin 2.5 gm/dL      A/G Ratio 1.4 g/dL      BUN/Creatinine Ratio 22.1     Anion Gap 11.0 mmol/L     Urinalysis, Microscopic  Only - Urine, Clean Catch [[id number]]  (Abnormal) Collected:  07/02/18 1452    Specimen:  Urine from Urine, Clean Catch Updated:  07/02/18 1505     RBC, UA 3-5 (A) /HPF      WBC, UA None Seen /HPF      Bacteria, UA None Seen /HPF      Squamous Epithelial Cells, UA None Seen /HPF      Hyaline Casts, UA 0-2 /LPF      Methodology Automated Microscopy    Urinalysis With Microscopic If Indicated (No Culture) - Urine, Clean Catch [[id number]]  (Abnormal) Collected:  07/02/18 1452    Specimen:  Urine from Urine, Clean Catch Updated:  07/02/18 1503     Color, UA Yellow     Appearance, UA Clear     pH, UA 6.5     Specific Gravity, UA 1.011     Glucose, UA Negative     Ketones, UA Trace (A)     Bilirubin, UA Negative     Blood, UA Trace (A)     Protein,  mg/dL (2+) (A)     Leuk Esterase, UA Negative     Nitrite, UA Negative     Urobilinogen, UA 1.0 E.U./dL    [place] Draw [[id number]] Collected:  07/02/18 1329    Specimen:  Blood Updated:  07/02/18 1430    Narrative:       The following orders were created for panel order [place] Draw.  Procedure                               Abnormality         Status                     ---------                               -----------         ------                     Light Blue Top[[id number]]                                   Final result               Green Top (Gel)[[id number]]                                  Final result               Lavender Top[[id number]]                                     Final result               Gold Top - SST[[id number]]                                   Final result                 Please view results for these tests on the individual orders.    Light Blue Top [[id number]] Collected:  07/02/18 1329    Specimen:  Blood Updated:  07/02/18 1430     Extra Tube hold for add-on     Comment: Auto resulted       Green Top (Gel) [[id number]] Collected:  07/02/18 1329    Specimen:  Blood Updated:  07/02/18 1430     Extra Tube Hold for add-ons.     Comment: Auto  resulted.       Lavender Top [[id number]] Collected:  07/02/18 1329    Specimen:  Blood Updated:  07/02/18 1430     Extra Tube hold for add-on     Comment: Auto resulted       Gold Top - SST [[id number]] Collected:  07/02/18 1329    Specimen:  Blood Updated:  07/02/18 1430     Extra Tube Hold for add-ons.     Comment: Auto resulted.       Troponin [[id number]]  (Normal) Collected:  07/02/18 1329    Specimen:  Blood Updated:  07/02/18 1404     Troponin I 0.023 ng/mL     BNP [[id number]]  (Abnormal) Collected:  07/02/18 1329    Specimen:  Blood Updated:  07/02/18 1404     proBNP 1,960.0 (H) pg/mL     Protime-INR [[id number]]  (Normal) Collected:  07/02/18 1329    Specimen:  Blood Updated:  07/02/18 1357     Protime 13.2 Seconds      INR 1.02    Narrative:       Therapeutic range for most indications is 2.0-3.0 INR,  or 2.5-3.5 for mechanical heart valves.    CBC & Differential [[id number]] Collected:  07/02/18 1329    Specimen:  Blood Updated:  07/02/18 1342    Narrative:       The following orders were created for panel order CBC & Differential.  Procedure                               Abnormality         Status                     ---------                               -----------         ------                     CBC Auto Differential[[id number]]        Normal              Final result                 Please view results for these tests on the individual orders.    CBC Auto Differential [[id number]]  (Normal) Collected:  07/02/18 1329    Specimen:  Blood Updated:  07/02/18 1342     WBC 7.11 10*3/mm3      RBC 4.41 10*6/mm3      Hemoglobin 12.7 g/dL      Hematocrit 38.5 %      MCV 87.3 fL      MCH 28.8 pg      MCHC 33.0 g/dL      RDW 14.3 %      RDW-SD 45.7 fl      MPV 10.5 fL      Platelets 228 10*3/mm3      Neutrophil % 76.3 %      Lymphocyte % 16.2 %      Monocyte % 6.5 %      Eosinophil % 0.4 %      Basophil % 0.3 %      Immature Grans % 0.3 %      Neutrophils, Absolute 5.43 10*3/mm3      Lymphocytes, Absolute 1.15  10*3/mm3      Monocytes, Absolute 0.46 10*3/mm3      Eosinophils, Absolute 0.03 10*3/mm3      Basophils, Absolute 0.02 10*3/mm3      Immature Grans, Absolute 0.02 10*3/mm3             I reviewed the patient's new clinical results.    Assessment/Plan:     Active Hospital Problems (** Indicates Principal Problem)    Diagnosis Date Noted   • **Hypokalemia [E87.6] 01/10/2018     Started 5i71stf IV and 40meq PO in ED  Replace and monitor  Mag 1.6   Recheck K+ this AM        • Peripheral edema [R60.9] 07/02/2018     - restart home dose lasix 20mg daily     • Recurrent falls while walking [R29.6] 07/02/2018     PT/OT  Case management consult - SNF placement     • Insomnia [G47.00] 07/02/2018     Continue home risperdal     • Recurrent major depressive disorder, in full remission (CMS/[facility]) [F33.42] 04/05/2018     Continue home prozac     • Stage 3 severe COPD by GOLD classification (CMS/[facility]) [J44.9] 03/01/2018     Continue home nebs and symbicort     • Essential hypertension [I10]      Continue home toprol 25mg daily  Increase home losartan from 50mg to 100mg daily  Lasix 20mg PO daily  PRN hydralazine     • Gastroesophageal reflux disease [K21.9]      Pepcid        Resolved Hospital Problems    Diagnosis Date Noted Date Resolved   No resolved problems to display.       DVT prophylaxis: SCDs/TEDs  Code status is   Code Status and Medical Interventions:   Ordered at: 07/02/18 1645     Level Of Support Discussed With:    Patient     Code Status:    CPR     Medical Interventions (Level of Support Prior to Arrest):    Full       Plan for disposition:Where: SNF and When:  accepted      Time: 30    Aldo Yeboah Jr., M.D.  PGY1  Family Practice Resident  [address] [place] [address]  Phone: [phone]  Fax: [phone]      This document has been electronically signed by Aldo Yeboah Jr, MD on 07/03/18 7:27 AM

## 2018-07-03 NOTE — SIGNIFICANT NOTE
07/03/18 1504   Rehab Time/Intention   Evaluation Not Performed patient unavailable for evaluation  (Pt in echo this pm and still has venous duplex pending. PT will check back tomorrow.)   Rehab Treatment   Discipline physical therapist   Recommendation   PT - Next Appointment 07/04/18

## 2018-07-03 NOTE — CONSULTS
Adult Nutrition  Assessment    Patient Name:  Michelle Child  YOB: 1955  MRN: [id number]  Admit Date:  7/2/2018    Assessment Date:  7/3/2018    Comments:  Pt is a 62 year old female with hx of CHF and COPD screened at nutrition risk on admission assessment.  Pt indicates good appetite, though she gave limited responses to RD questions.  PO intakes 25-50%.  RD made pt aware of menu options available.  No GI complaints.  Wt down with admit with Lasix.  Plan is for d/c to SNF.  Pt also followed by cardiology but reluctant to follow-up with heart failure clinic.  RD will monitor.          Reason for Assessment     Row Name 07/03/18 1701          Reason for Assessment    Reason For Assessment identified at risk by screening criteria   admission assessment               Nutrition/Diet History     Row Name 07/03/18 1701          Nutrition/Diet History    Typical Food/Fluid Intake Pt gave limited responses to questions but indicates good appetite.               Labs/Tests/Procedures/Meds     Row Name 07/03/18 1701          Labs/Procedures/Meds    Lab Results Reviewed reviewed, pertinent        Medications    Pertinent Medications Reviewed reviewed, pertinent     Pertinent Medications Comments lasix             Physical Findings     Row Name 07/03/18 1702          Physical Findings    Skin edema             Estimated/Assessed Needs     Row Name 07/03/18 1702          Calculation Measurements    Weight Used For Calculations 58.3 kg (128 lb 8.5 oz)        Estimated/Assessed Needs    Additional Documentation KCAL/KG (Group);Dover-St. Jeor Equation (Group);Fluid Requirements (Group);Protein Requirements (Group)        KCAL/KG    14 Kcal/Kg (kcal) 816.2     15 Kcal/Kg (kcal) 874.5     18 Kcal/Kg (kcal) 1049.4     20 Kcal/Kg (kcal) 1166     25 Kcal/Kg (kcal) 1457.5     30 Kcal/Kg (kcal) 1749     35 Kcal/Kg (kcal) 2040.5     40 Kcal/Kg (kcal) 2332     45 Kcal/Kg (kcal) 2623.5     50 Kcal/Kg (kcal) 2915      kcal/kg (Specify) 30        Mont[place]m-St. Jeor Equation    RMR (Montcalm-St. Jeor Equation) 1118        Protein Requirements    Est Protein Requirement Amount (gms/kg) 1.0 gm protein     Estimated Protein Requirements (gms/day) 58.3        Fluid Requirements    Estimated Fluid Requirements (mL/day) 1750     Estimated Fluid Requirement Method RDA Method     RDA Method (mL) 1750     Diaz-Sharon Method (over 20 kg) 2666             Nutrition Prescription Ordered     Row Name 07/03/18 1702          Nutrition Prescription PO    Current PO Diet Regular     Common Modifiers Low Sodium             Evaluation of Received Nutrient/Fluid Intake     Row Name 07/03/18 1703 07/03/18 1702       Calculation Measurements    Weight Used For Calculations  -- 58.3 kg (128 lb 8.5 oz)       PO Evaluation    Number of Meals 3  --    % PO Intake 25-50  --            Evaluation of Prescribed Nutrient/Fluid Intake     Row Name 07/03/18 1702          Calculation Measurements    Weight Used For Calculations 58.3 kg (128 lb 8.5 oz)           Electronically signed by:  Nikkie Davis RD  07/03/18 5:03 PM

## 2018-07-04 LAB
ANION GAP SERPL CALCULATED.3IONS-SCNC: 6 MMOL/L (ref 5–15)
BASOPHILS # BLD AUTO: 0.02 10*3/MM3 (ref 0–0.2)
BASOPHILS NFR BLD AUTO: 0.3 % (ref 0–2)
BUN BLD-MCNC: 19 MG/DL (ref 7–21)
BUN/CREAT SERPL: 18.3 (ref 7–25)
CALCIUM SPEC-SCNC: 8.7 MG/DL (ref 8.4–10.2)
CHLORIDE SERPL-SCNC: 101 MMOL/L (ref 95–110)
CO2 SERPL-SCNC: 28 MMOL/L (ref 22–31)
CREAT BLD-MCNC: 1.04 MG/DL (ref 0.5–1)
DEPRECATED RDW RBC AUTO: 47.2 FL (ref 36.4–46.3)
EOSINOPHIL # BLD AUTO: 0.17 10*3/MM3 (ref 0–0.7)
EOSINOPHIL NFR BLD AUTO: 2.3 % (ref 0–7)
ERYTHROCYTE [DISTWIDTH] IN BLOOD BY AUTOMATED COUNT: 14.6 % (ref 11.5–14.5)
GFR SERPL CREATININE-BSD FRML MDRD: 53 ML/MIN/1.73 (ref 45–104)
GLUCOSE BLD-MCNC: 102 MG/DL (ref 60–100)
HCT VFR BLD AUTO: 39.2 % (ref 35–45)
HGB BLD-MCNC: 12.8 G/DL (ref 12–15.5)
IMM GRANULOCYTES # BLD: 0.01 10*3/MM3 (ref 0–0.02)
IMM GRANULOCYTES NFR BLD: 0.1 % (ref 0–0.5)
LYMPHOCYTES # BLD AUTO: 1.39 10*3/MM3 (ref 0.6–4.2)
LYMPHOCYTES NFR BLD AUTO: 18.8 % (ref 10–50)
MCH RBC QN AUTO: 28.9 PG (ref 26.5–34)
MCHC RBC AUTO-ENTMCNC: 32.7 G/DL (ref 31.4–36)
MCV RBC AUTO: 88.5 FL (ref 80–98)
MONOCYTES # BLD AUTO: 0.61 10*3/MM3 (ref 0–0.9)
MONOCYTES NFR BLD AUTO: 8.2 % (ref 0–12)
NEUTROPHILS # BLD AUTO: 5.21 10*3/MM3 (ref 2–8.6)
NEUTROPHILS NFR BLD AUTO: 70.3 % (ref 37–80)
PLATELET # BLD AUTO: 236 10*3/MM3 (ref 150–450)
PMV BLD AUTO: 10.7 FL (ref 8–12)
POTASSIUM BLD-SCNC: 3.9 MMOL/L (ref 3.5–5.1)
RBC # BLD AUTO: 4.43 10*6/MM3 (ref 3.77–5.16)
SODIUM BLD-SCNC: 135 MMOL/L (ref 137–145)
WBC NRBC COR # BLD: 7.41 10*3/MM3 (ref 3.2–9.8)

## 2018-07-04 PROCEDURE — 94760 N-INVAS EAR/PLS OXIMETRY 1: CPT

## 2018-07-04 PROCEDURE — 97530 THERAPEUTIC ACTIVITIES: CPT

## 2018-07-04 PROCEDURE — G0378 HOSPITAL OBSERVATION PER HR: HCPCS

## 2018-07-04 PROCEDURE — G8978 MOBILITY CURRENT STATUS: HCPCS

## 2018-07-04 PROCEDURE — 85025 COMPLETE CBC W/AUTO DIFF WBC: CPT | Performed by: FAMILY MEDICINE

## 2018-07-04 PROCEDURE — 99225 PR SBSQ OBSERVATION CARE/DAY 25 MINUTES: CPT | Performed by: STUDENT IN AN ORGANIZED HEALTH CARE EDUCATION/TRAINING PROGRAM

## 2018-07-04 PROCEDURE — 97110 THERAPEUTIC EXERCISES: CPT

## 2018-07-04 PROCEDURE — 80048 BASIC METABOLIC PNL TOTAL CA: CPT | Performed by: FAMILY MEDICINE

## 2018-07-04 PROCEDURE — 97162 PT EVAL MOD COMPLEX 30 MIN: CPT

## 2018-07-04 PROCEDURE — G8979 MOBILITY GOAL STATUS: HCPCS

## 2018-07-04 PROCEDURE — 94799 UNLISTED PULMONARY SVC/PX: CPT

## 2018-07-04 RX ORDER — ACETAMINOPHEN 325 MG/1
650 TABLET ORAL EVERY 6 HOURS PRN
Status: DISCONTINUED | OUTPATIENT
Start: 2018-07-04 | End: 2018-07-10 | Stop reason: HOSPADM

## 2018-07-04 RX ORDER — FLUOXETINE HYDROCHLORIDE 20 MG/1
60 CAPSULE ORAL DAILY
Status: DISCONTINUED | OUTPATIENT
Start: 2018-07-05 | End: 2018-07-10 | Stop reason: HOSPADM

## 2018-07-04 RX ORDER — CLONIDINE HYDROCHLORIDE 0.1 MG/1
0.1 TABLET ORAL ONCE
Status: COMPLETED | OUTPATIENT
Start: 2018-07-04 | End: 2018-07-04

## 2018-07-04 RX ORDER — HYDRALAZINE HYDROCHLORIDE 10 MG/1
10 TABLET, FILM COATED ORAL ONCE
Status: COMPLETED | OUTPATIENT
Start: 2018-07-05 | End: 2018-07-05

## 2018-07-04 RX ADMIN — FERROUS SULFATE TAB EC 324 MG (65 MG FE EQUIVALENT) 324 MG: 324 (65 FE) TABLET DELAYED RESPONSE at 18:28

## 2018-07-04 RX ADMIN — ATORVASTATIN CALCIUM 10 MG: 10 TABLET, FILM COATED ORAL at 08:52

## 2018-07-04 RX ADMIN — FLUOXETINE 40 MG: 20 CAPSULE ORAL at 08:51

## 2018-07-04 RX ADMIN — FAMOTIDINE 20 MG: 20 TABLET ORAL at 20:25

## 2018-07-04 RX ADMIN — ACETAMINOPHEN 650 MG: 325 TABLET ORAL at 23:10

## 2018-07-04 RX ADMIN — FLUOXETINE 20 MG: 20 CAPSULE ORAL at 08:52

## 2018-07-04 RX ADMIN — FERROUS SULFATE TAB EC 324 MG (65 MG FE EQUIVALENT) 324 MG: 324 (65 FE) TABLET DELAYED RESPONSE at 08:52

## 2018-07-04 RX ADMIN — CLONIDINE HYDROCHLORIDE 0.1 MG: 0.1 TABLET ORAL at 20:25

## 2018-07-04 RX ADMIN — FAMOTIDINE 20 MG: 20 TABLET ORAL at 08:51

## 2018-07-04 RX ADMIN — RISPERIDONE 1 MG: 1 TABLET ORAL at 20:25

## 2018-07-04 RX ADMIN — FOLIC ACID 1 MG: 1 TABLET ORAL at 08:52

## 2018-07-04 RX ADMIN — ASPIRIN 81 MG 81 MG: 81 TABLET ORAL at 08:52

## 2018-07-04 RX ADMIN — LOSARTAN POTASSIUM 100 MG: 50 TABLET, FILM COATED ORAL at 08:52

## 2018-07-04 RX ADMIN — METOPROLOL SUCCINATE 25 MG: 25 TABLET, EXTENDED RELEASE ORAL at 08:52

## 2018-07-04 RX ADMIN — BUDESONIDE AND FORMOTEROL FUMARATE DIHYDRATE 2 PUFF: 160; 4.5 AEROSOL RESPIRATORY (INHALATION) at 08:34

## 2018-07-04 RX ADMIN — BUDESONIDE AND FORMOTEROL FUMARATE DIHYDRATE 2 PUFF: 160; 4.5 AEROSOL RESPIRATORY (INHALATION) at 20:22

## 2018-07-04 RX ADMIN — FUROSEMIDE 20 MG: 20 TABLET ORAL at 08:52

## 2018-07-04 NOTE — PLAN OF CARE
Problem: Patient Care Overview  Goal: Plan of Care Review  Outcome: Ongoing (interventions implemented as appropriate)   07/04/18 1411   Coping/Psychosocial   Plan of Care Reviewed With patient   Plan of Care Review   Progress no change   OTHER   Outcome Summary Pt completed UE ther ex supine in bed with fair tolerance and required vc's for proper technique

## 2018-07-04 NOTE — THERAPY TREATMENT NOTE
Acute Care - Occupational Therapy - POC update  [facility]     Patient Name: Michelle Child  : 1955  MRN: [id number]  Today's Date: 2018  Onset of Illness/Injury or Date of Surgery: 18  Date of Referral to OT: 18  Referring Physician: JANAK Roblero MD.     Admit Date: 2018       ICD-10-CM ICD-9-CM   1. Peripheral edema R60.9 782.3   2. Acute on chronic congestive heart failure, unspecified congestive heart failure type (CMS/HCC) I50.9 428.0   3. Uncontrolled hypertension I10 401.9   4. Hypokalemia E87.6 276.8   5. Impaired mobility and ADLs Z74.09 799.89     Patient Active Problem List   Diagnosis   • Migraine   • Iron deficiency anemia   • Gastroesophageal reflux disease   • Essential hypertension   • Coronary arteriosclerosis   • Bilateral pseudophakia   • Astigmatism   • Hypokalemia   • Cerebral microvascular disease   • Pulmonary hypertension   • Rheumatic tricuspid valve regurgitation   • Rheumatic mitral stenosis   • Acute diastolic CHF (congestive heart failure) (CMS/HCC)   • Pulmonary hypertension due to left heart valvular disease   • Cor pulmonale, chronic (CMS/HCC)   • Folic acid deficiency   • Stage 3 severe COPD by GOLD classification (CMS/HCC)   • Personal history of tobacco use, presenting hazards to health   • Tobacco abuse, in remission   • Recurrent major depressive disorder, in full remission (CMS/HCC)   • Weakness   • Recurrent falls while walking   • Localized swelling of lower extremity   • Peripheral edema   • Insomnia     Past Medical History:   Diagnosis Date   • Alkaline phosphatase raised    • Astigmatism    • Bilateral pseudophakia    • Coronary arteriosclerosis    • Depressive disorder    • Edema of lower extremity    • Encounter for general adult medical examination with abnormal findings    • Essential hypertension    • Gastroesophageal reflux disease    • H/O bone density study     DXA BONE DENSITY AXIAL    2016   • H/O screening  mammography     SCREENING MAMMOGRAPHY     04/25/2016   • Hx of screening mammography     x3; declined screening, understands risks and benefits and still declines      07/24/2015   • Influenza vaccine administered     INFLUENZA IMMUN ADMIN OR PREV RECV'D   x4       04/01/2016   • Insomnia    • Iron deficiency    • Migraine    • Tobacco dependence     continuous       Past Surgical History:   Procedure Laterality Date   • APPENDECTOMY     • AUGMENTATION MAMMAPLASTY     • BREAST IMPLANT SURGERY     • CATARACT EXTRACTION WITH INTRAOCULAR LENS IMPLANT  12/17/2003    Phacoemulsification of a cataract with intraocular lens implant of rigt eye, Nixon model SA 60-AT; serial # [id number];20.5 diopter   • CHOLECYSTECTOMY  08/06/2007    Laparoscopic cholecystectomy. Symptomatic gallstones   • COLONOSCOPY  10/15/2013    declined stool cards and colonscopy   • ENDOSCOPY AND COLONOSCOPY  05/22/2014    Internal & external hemorrhoids found.   • ENDOSCOPY W/ PEG TUBE PLACEMENT  05/22/2014    EGD w/ tube: Normal esopahgus. Gastritis in stomach. Biopsy taken. Normal duodenum. Biopsy taken.   • INJECTION OF MEDICATION  07/24/2013    Inj(s) Tend-Sheath, Ligament, Single   • INJECTION OF MEDICATION  11/17/2014    Kenalog x6   • INJECTION OF MEDICATION  04/01/2016    PNEUMOC VAC/ADMIN/RCVD    • INJECTION OF MEDICATION  12/05/2014    Toradol   • OTHER SURGICAL HISTORY  01/19/2014    Drain/Inject Major Joint   x2   • PAP SMEAR  10/06/2011   • PARTIAL HYSTERECTOMY      Partial hyst    • TONSILLECTOMY     • TUBAL ABDOMINAL LIGATION         Therapy Treatment    Not a treatment note, new concerns reported for self-feeding added a self-feeding goal to POC this date.              OT Rehab Goals     Row Name 07/04/18 1200             Transfer Goal 1 (OT)    Activity/Assistive Device (Transfer Goal 1, OT) sit-to-stand/stand-to-sit;bed-to-chair/chair-to-bed;toilet;walker, rolling  -MR      [place] Level/Cues Needed (Transfer Goal 1, OT) contact  guard assist  -MR      Time Frame (Transfer Goal 1, OT) long term goal (LTG);by discharge  -MR      Progress/Outcome (Transfer Goal 1, OT) goal not met  -MR         Bathing Goal 1 (OT)    Activity/Assistive Device (Bathing Goal 1, OT) upper body bathing;bath mitt;long-handled sponge  -MR      [place] Level/Cues Needed (Bathing Goal 1, OT) minimum assist (75% or more patient effort)  -MR      Time Frame (Bathing Goal 1, OT) long term goal (LTG);by discharge  -MR      Progress/Outcomes (Bathing Goal 1, OT) goal not met  -MR         Dressing Goal 1 (OT)    Activity/Assistive Device (Dressing Goal 1, OT) upper body dressing  -MR      [place]/Cues Needed (Dressing Goal 1, OT) minimum assist (75% or more patient effort)  -MR      Time Frame (Dressing Goal 1, OT) long term goal (LTG);by discharge  -MR      Progress/Outcome (Dressing Goal 1, OT) goal not met  -MR         Self-Feeding Goal 1 (OT)    Activity/Assistive Device (Self-Feeding Goal 1, OT) self-feeding skills, all  -MR      [place] Level/Cues Needed (Self-Feeding Goal 1, OT) conditional independence  -MR      Time Frame (Self-Feeding Goal 1, OT) long term goal (LTG);by discharge  -MR      Progress/Outcomes (Self-Feeding Goal 1, OT) goal not met  -MR        User Key  (r) = Recorded By, (t) = Taken By, (c) = Cosigned By    Initials Name Provider Type Discipline    MR Janet Johnson OT Occupational Therapist OT        Occupational Therapy Education     Title: PT OT SLP Therapies (Active)     Topic: Occupational Therapy (Active)     Point: ADL training (Done)     Description: Instruct learner(s) on proper safety adaptation and remediation techniques during self care or transfers.   Instruct in proper use of assistive devices.   Learning Progress Summary     Learner Status Readiness Method Response Comment Documented by    Patient Done Acceptance E,TB VU,NR Educated on the role of OT and POC, educated on the benefit of activity and safety with bed  mobility, t/f, and functional mobility MR 07/03/18 1127          Point: Precautions (Done)     Description: Instruct learner(s) on prescribed precautions during self-care and functional transfers.   Learning Progress Summary     Learner Status Readiness Method Response Comment Documented by    Patient Done Acceptance E,DONALDO SINGH,NR Educated on the role of OT and POC, educated on the benefit of activity and safety with bed mobility, t/f, and functional mobility MR 07/03/18 1127          Point: Body mechanics (Done)     Description: Instruct learner(s) on proper positioning and spine alignment during self-care, functional mobility activities and/or exercises.   Learning Progress Summary     Learner Status Readiness Method Response Comment Documented by    Patient Done Acceptance E,DONALDO SINGH,NR Educated on the role of OT and POC, educated on the benefit of activity and safety with bed mobility, t/f, and functional mobility MR 07/03/18 1127                      User Key     Initials Effective Dates Name Provider Type Discipline    MR 04/03/18 -  Janet Johnson, OT Occupational Therapist OT                OT Recommendation and Plan  Outcome Summary/Treatment Plan (OT)  Anticipated Discharge Disposition (OT): skilled nursing facility  Planned Therapy Interventions (OT Eval): activity tolerance training, adaptive equipment training, BADL retraining, functional balance retraining, IADL retraining, manual therapy/joint mobilization, neuromuscular control/coordination retraining, occupation/activity based interventions, passive ROM/stretching, patient/caregiver education/training, ROM/therapeutic exercise, strengthening exercise, transfer/mobility retraining  Therapy Frequency (OT Eval):  (5-7 days/wk)  Plan of Care Review  Plan of Care Reviewed With: patient  Plan of Care Reviewed With: patient  Outcome Summary: OT evaluation completed today. Pt presents with decreased activity tolerance and strength limiting her participation in  ADL's and functional transfers/mobility. Pt completed bed mobility supine <> sit with mod A, sit <> stand t/f with FWW with mod A, unable to take side steps to HOB this date and required max A for LB dressing task. Pt tolerated sitting EOB for ~ 20 minutes with min/mod A. Pt could benefit from skilled OT services to address decreased strength, balance, transfers, functional mobility, activity tolerance and independence with ADLs. Recommend pt d/c to SNF for cont rehab.         Outcome Measures     Row Name 07/03/18 0903             How much help from another is currently needed...    Putting on and taking off regular lower body clothing? 1  -MR      Bathing (including washing, rinsing, and drying) 2  -MR      Toileting (which includes using toilet bed pan or urinal) 1  -MR      Putting on and taking off regular upper body clothing 2  -MR      Taking care of personal grooming (such as brushing teeth) 2  -MR      Eating meals 3  -MR      Score 11  -MR         Functional Assessment    Outcome Measure Options AM-PAC 6 Clicks Daily Activity (OT)  -MR        User Key  (r) = Recorded By, (t) = Taken By, (c) = Cosigned By    Initials Name Provider Type    MR Janet Johnson OT Occupational Therapist           Time Calculation:        Therapy Suggested Charges     Code   Minutes Charges    None           Therapy Charges for Today     Code Description Service Date Service Provider Modifiers Qty    95504951674  OT SELFCARE CURRENT 7/3/2018 ALLYSSA Calderón CL 1    [id number]  OT SELFCARE PROJECTED 7/3/2018 ALLYSSA Calderón CK 1    [id number]  OT EVAL MOD COMPLEXITY 1 7/3/2018 Janet Johnson OT GO 1    [id number]  OT SELF CARE/MGMT/TRAIN EA 15 MIN 7/3/2018 Janet Johnson OT GO 1    [id number]  OT THERAPEUTIC ACT EA 15 MIN 7/3/2018 Janet Johnson OT GO 1          OT G-codes  OT Professional Judgement Used?: Yes  OT Functional Scales Options: AM-PAC 6 Clicks Daily Activity  (OT)  Score: 11  Functional Limitation: Self care  Self Care Current Status (): At least 60 percent but less than 80 percent impaired, limited or restricted  Self Care Goal Status (): At least 40 percent but less than 60 percent impaired, limited or restricted    Janet Johnson, OT  7/4/2018

## 2018-07-04 NOTE — THERAPY EVALUATION
Acute Care - Physical Therapy Initial Evaluation  [facility]     Patient Name: Michelle Child  : 1955  MRN: [id number]  Today's Date: 2018   Onset of Illness/Injury or Date of Surgery: 18     Referring Physician: JANAK Roblero MD.       Admit Date: 2018    Visit Dx:     ICD-10-CM ICD-9-CM   1. Peripheral edema R60.9 782.3   2. Acute on chronic congestive heart failure, unspecified congestive heart failure type (CMS/HCC) I50.9 428.0   3. Uncontrolled hypertension I10 401.9   4. Hypokalemia E87.6 276.8   5. Impaired mobility and ADLs Z74.09 799.89   6. Impaired physical mobility Z74.09 781.99     Patient Active Problem List   Diagnosis   • Migraine   • Iron deficiency anemia   • Gastroesophageal reflux disease   • Essential hypertension   • Coronary arteriosclerosis   • Bilateral pseudophakia   • Astigmatism   • Hypokalemia   • Cerebral microvascular disease   • Pulmonary hypertension   • Rheumatic tricuspid valve regurgitation   • Rheumatic mitral stenosis   • Acute diastolic CHF (congestive heart failure) (CMS/[facility])   • Pulmonary hypertension due to left heart valvular disease   • Cor pulmonale, chronic (CMS/[facility])   • Folic acid deficiency   • Stage 3 severe COPD by GOLD classification (CMS/[facility])   • Personal history of tobacco use, presenting hazards to health   • Tobacco abuse, in remission   • Recurrent major depressive disorder, in full remission (CMS/HCC)   • Weakness   • Recurrent falls while walking   • Localized swelling of lower extremity   • Peripheral edema   • Insomnia     Past Medical History:   Diagnosis Date   • Alkaline phosphatase raised    • Astigmatism    • Bilateral pseudophakia    • Coronary arteriosclerosis    • Depressive disorder    • Edema of lower extremity    • Encounter for general adult medical examination with abnormal findings    • Essential hypertension    • Gastroesophageal reflux disease    • H/O bone density study     DXA BONE DENSITY AXIAL     04/22/2016   • H/O screening mammography     SCREENING MAMMOGRAPHY     04/25/2016   • Hx of screening mammography     x3; declined screening, understands risks and benefits and still declines      07/24/2015   • Influenza vaccine administered     INFLUENZA IMMUN ADMIN OR PREV RECV'D   x4       04/01/2016   • Insomnia    • Iron deficiency    • Migraine    • Tobacco dependence     continuous       Past Surgical History:   Procedure Laterality Date   • APPENDECTOMY     • AUGMENTATION MAMMAPLASTY     • BREAST IMPLANT SURGERY     • CATARACT EXTRACTION WITH INTRAOCULAR LENS IMPLANT  12/17/2003    Phacoemulsification of a cataract with intraocular lens implant of rigt eye, Nixon model SA 60-AT; serial # [id number];20.5 diopter   • CHOLECYSTECTOMY  08/06/2007    Laparoscopic cholecystectomy. Symptomatic gallstones   • COLONOSCOPY  10/15/2013    declined stool cards and colonscopy   • ENDOSCOPY AND COLONOSCOPY  05/22/2014    Internal & external hemorrhoids found.   • ENDOSCOPY W/ PEG TUBE PLACEMENT  05/22/2014    EGD w/ tube: Normal esopahgus. Gastritis in stomach. Biopsy taken. Normal duodenum. Biopsy taken.   • INJECTION OF MEDICATION  07/24/2013    Inj(s) Tend-Sheath, Ligament, Single   • INJECTION OF MEDICATION  11/17/2014    Kenalog x6   • INJECTION OF MEDICATION  04/01/2016    PNEUMOC VAC/ADMIN/RCVD    • INJECTION OF MEDICATION  12/05/2014    Toradol   • OTHER SURGICAL HISTORY  01/19/2014    Drain/Inject Major Joint   x2   • PAP SMEAR  10/06/2011   • PARTIAL HYSTERECTOMY      Partial hyst    • TONSILLECTOMY     • TUBAL ABDOMINAL LIGATION          PT ASSESSMENT (last 12 hours)      Physical Therapy Evaluation     Row Name 07/04/18 5307          PT Evaluation Time/Intention    Subjective Information no complaints  -CZ     Document Type evaluation  -CZ     Mode of Treatment individual therapy;physical therapy  -CZ     Patient Effort good  -CZ     Symptoms Noted During/After Treatment none  -CZ     Row Name 07/04/18 2759           General Information    Patient Profile Reviewed? yes  -CZ     Onset of Illness/Injury or Date of Surgery 07/02/18  -CZ     Referring Physician JANAK Roblero MD.   -CZ     Patient Observations alert;cooperative  -CZ     Patient/Family Observations Son present.  -CZ     General Observations of Patient Seated in bed, RA, catheter, telemetry,  no apparent distress.  -CZ     Prior Level of Function independent:  -CZ     Equipment Currently Used at Home wheelchair;walker, rolling   unable to transfer to/from w/c.  -CZ     Existing Precautions/Restrictions fall  -CZ     Benefits Reviewed patient:;improve function;increase independence;increase strength;increase balance  -CZ     Row Name 07/04/18 0930          Relationship/Environment    Lives With alone  -CZ     Row Name 07/04/18 0930          Resource/Environmental Concerns    Current Living Arrangements home/apartment/condo  -     Row Name 07/04/18 0930          Cognitive Assessment/Intervention- PT/OT    Affect/Mental Status (Cognitive) WFL  -     Orientation Status (Cognition) oriented x 4  -CZ     Follows Commands (Cognition) WFL  -CZ     Row Name 07/04/18 0930          Bed Mobility Assessment/Treatment    Bed Mobility Assessment/Treatment supine-sit-supine  -CZ     Supine-Sit-Supine [place] (Bed Mobility) minimum assist (75% patient effort)  -     Assistive Device (Bed Mobility) bed rails;head of bed elevated  -     Row Name 07/04/18 0930          Transfer Assessment/Treatment    Transfer Assessment/Treatment sit-stand transfer;stand-sit transfer  -CZ     Sit-Stand [place] (Transfers) contact guard  -CZ     Stand-Sit [place] (Transfers) contact guard  -CZ     Row Name 07/04/18 0930          Sit-Stand Transfer    Assistive Device (Sit-Stand Transfers) walker, front-wheeled  -CZ     Row Name 07/04/18 0930          Stand-Sit Transfer    Assistive Device (Stand-Sit Transfers) walker, front-wheeled  -CZ     Row Name 07/04/18 0930           Gait/Stairs Assessment/Training    [place] Level (Gait) minimum assist (75% patient effort)  -CZ     Assistive Device (Gait) walker, front-wheeled  -CZ     Distance in Feet (Gait) 15'x1  -CZ     Comment (Gait/Stairs) Slow janey, decreased foot clearance and step length, poor initiation, limited follow-through.  -CZ     Row Name 07/04/18 0930          General ROM    GENERAL ROM COMMENTS BLEs WFL  -CZ     Row Name 07/04/18 0930          General Assessment (Manual Muscle Testing)    General Manual Muscle Testing (MMT) Assessment lower extremity strength deficits identified  -CZ     Comment, General Manual Muscle Testing (MMT) Assessment BLEs: 3/5 grossly  -CZ     Row Name 07/04/18 0930          Sensory Assessment/Intervention    Sensory General Assessment --  -CZ     Row Name 07/04/18 0930          Vision Assessment/Intervention    Visual Impairment/Limitations corrective lenses for reading  -CZ     Row Name 07/04/18 0930          Light Touch Sensation Assessment    Left Lower Extremity: Light Touch Sensation Assessment intact  -CZ     Right Lower Extremity: Light Touch Sensation Assessment intact  -CZ     Row Name 07/04/18 0930          Pain Assessment    Additional Documentation Pain Scale: Numbers Pre/Post-Treatment (Group)  -CZ     Row Name 07/04/18 0930          Pain Scale: Numbers Pre/Post-Treatment    Pain Scale: Numbers, Pretreatment 0/10 - no pain  -CZ     Pain Scale: Numbers, Post-Treatment 0/10 - no pain  -CZ     Row Name 07/04/18 0930          Physical Therapy Clinical Impression    PT Diagnosis (PT Clinical Impression) impaired physical mobility  -CZ     Prognosis (PT Clinical Impression) good  -CZ     Criteria for Skilled Interventions Met (PT Clinical Impression) yes;treatment indicated  -CZ     Pathology/Pathophysiology Noted (Describe Specifically for Each System) musculoskeletal;cardiovascular  -CZ     Impairments Found (describe specific impairments) aerobic capacity/endurance;gait,  locomotion, and balance;muscle performance  -CZ     Rehab Potential (PT Clinical Summary) good, to achieve stated therapy goals  -CZ     Predicted Duration of Therapy (PT) 1-3 days, plus rehab  -CZ     Row Name 07/04/18 0930          Vital Signs    Pre Systolic BP Rehab 193   182/78 manually  -CZ     Pre Treatment Diastolic BP 89  -CZ     Post Systolic BP Rehab 167  -CZ     Post Treatment Diastolic BP 85  -CZ     Pretreatment Heart Rate (beats/min) 57  -CZ     Posttreatment Heart Rate (beats/min) 58  -CZ     Pre SpO2 (%) 93  -CZ     O2 Delivery Pre Treatment room air  -CZ     Post SpO2 (%) 96  -CZ     O2 Delivery Post Treatment room air  -CZ     Pre Patient Position Supine  -CZ     Post Patient Position Supine  -CZ     Row Name 07/04/18 0930          Physical Therapy Goals    Bed Mobility Goal Selection (PT) bed mobility, PT goal 1  -CZ     Transfer Goal Selection (PT) transfer, PT goal 1  -CZ     Gait Training Goal Selection (PT) gait training, PT goal 1  -CZ     Row Name 07/04/18 0930          Bed Mobility Goal 1 (PT)    Activity/Assistive Device (Bed Mobility Goal 1, PT) sit to supine/supine to sit  -CZ     [place] Level/Cues Needed (Bed Mobility Goal 1, PT) conditional independence  -CZ     Time Frame (Bed Mobility Goal 1, PT) 3 days  -CZ     Barriers (Bed Mobility Goal 1, PT) Difficulty with initiation.  -CZ     Progress/Outcomes (Bed Mobility Goal 1, PT) goal not met  -CZ     Row Name 07/04/18 0930          Transfer Goal 1 (PT)    Activity/Assistive Device (Transfer Goal 1, PT) sit-to-stand/stand-to-sit;bed-to-chair/chair-to-bed  -CZ     [place] Level/Cues Needed (Transfer Goal 1, PT) conditional independence  -CZ     Time Frame (Transfer Goal 1, PT) long term goal (LTG);by discharge  -CZ     Progress/Outcome (Transfer Goal 1, PT) goal not met  -CZ     Row Name 07/04/18 0930          Gait Training Goal 1 (PT)    Activity/Assistive Device (Gait Training Goal 1, PT) walker, rolling  -CZ      [place] Level (Gait Training Goal 1, PT) supervision required  -CZ     Distance (Gait Goal 1, PT) 50'x2.   -CZ     Time Frame (Gait Training Goal 1, PT) long term goal (LTG);by discharge  -CZ     Barriers (Gait Training Goal 1, PT) Difficulty with initiation.  -CZ     Progress/Outcome (Gait Training Goal 1, PT) goal not met  -CZ     Row Name 07/04/18 0930          Positioning and Restraints    Pre-Treatment Position in bed  -CZ     Post Treatment Position bed  -CZ     In Bed supine;call light within reach;encouraged to call for assist;exit alarm on  -CZ     Row Name 07/04/18 0930          Living Environment    Home Accessibility wheelchair accessible  -       User Key  (r) = Recorded By, (t) = Taken By, (c) = Cosigned By    Initials Name Provider Type     Hadley Ramirez, DI Physical Therapist          Physical Therapy Education     Title: PT OT SLP Therapies (Active)     Topic: Physical Therapy (Active)     Point: Mobility training (Active)    Learning Progress Summary     Learner Status Readiness Method Response Comment Documented by    Patient Active Acceptance E NR Patient educated on proper gait mechanics, proper use of walker, proper transfer technique.  07/04/18 1254                      User Key     Initials Effective Dates Name Provider Type Discipline     04/03/18 -  Hadley Ramirez, DI Physical Therapist PT                PT Recommendation and Plan  Anticipated Discharge Disposition (PT): skilled nursing facility  Planned Therapy Interventions (PT Eval): balance training, bed mobility training, gait training, home exercise program, neuromuscular re-education, stair training, strengthening, stretching, transfer training  Therapy Frequency (PT Clinical Impression): daily  Outcome Summary/Treatment Plan (PT)  Anticipated Discharge Disposition (PT): skilled nursing facility  Plan of Care Reviewed With: patient  Outcome Summary: Initial PT evaluation complete.  Patient is alert and cooperative.   Patient requires min Ax1 with bed mobility, CGA with transfers, min Ax1 with gait.  She [id number]'x1 with a FWW, decreased foot clearance, decreased step length, difficulty with initiation, decreased follow-through, slow janey.  Patient would benefit from discharge to SNF for rehab prior to discharge home alone. Continue skilled PT.           Outcome Measures     Row Name 07/04/18 0930 07/03/18 0903          How much help from another person do you currently need...    Turning from your back to your side while in flat bed without using bedrails? 3  -CZ  --     Moving from lying on back to sitting on the side of a flat bed without bedrails? 3  -CZ  --     Moving to and from a bed to a chair (including a wheelchair)? 3  -CZ  --     Standing up from a chair using your arms (e.g., wheelchair, bedside chair)? 3  -CZ  --     Climbing 3-5 steps with a railing? 2  -CZ  --     To walk in hospital room? 3  -CZ  --     AM-PAC 6 Clicks Score 17  -CZ  --        How much help from another is currently needed...    Putting on and taking off regular lower body clothing?  -- 1  -MR     Bathing (including washing, rinsing, and drying)  -- 2  -MR     Toileting (which includes using toilet bed pan or urinal)  -- 1  -MR     Putting on and taking off regular upper body clothing  -- 2  -MR     Taking care of personal grooming (such as brushing teeth)  -- 2  -MR     Eating meals  -- 3  -MR     Score  -- 11  -MR        Functional Assessment    Outcome Measure Options AM-PAC 6 Clicks Basic Mobility (PT)  -CZ AM-PAC 6 Clicks Daily Activity (OT)  -MR       User Key  (r) = Recorded By, (t) = Taken By, (c) = Cosigned By    Initials Name Provider Type    CZ Hadley Ramirez, PT Physical Therapist    MR Janet Johnson, OT Occupational Therapist           Time Calculation:         PT Charges     Row Name 07/04/18 1259             Time Calculation    Start Time 0930  -CZ      Stop Time 1020  -CZ      Time Calculation (min) 50 min  -CZ      PT  Received On 07/04/18  -CZ      PT Goal Re-Cert Due Date 07/17/18  -CZ         Time Calculation- PT    Total Timed Code Minutes- PT 35 minute(s)  -CZ         Timed Charges    88287 - PT Therapeutic Activity Minutes 35  -CZ        User Key  (r) = Recorded By, (t) = Taken By, (c) = Cosigned By    Initials Name Provider Type    CZ Hadley Ramirez, PT Physical Therapist        Therapy Suggested Charges     Code   Minutes Charges    99212 (CPT®) Hc Pt Neuromusc Re Education Ea 15 Min      27881 (CPT®) Hc Pt Ther Proc Ea 15 Min      92251 (CPT®) Hc Gait Training Ea 15 Min      47399 (CPT®) Hc Pt Therapeutic Act Ea 15 Min 35 2    86782 (CPT®) Hc Pt Manual Therapy Ea 15 Min      75188 (CPT®) Hc Pt Iontophoresis Ea 15 Min      25973 (CPT®) Hc Pt Elec Stim Ea-Per 15 Min      71653 (CPT®) Hc Pt Ultrasound Ea 15 Min      13944 (CPT®) Hc Pt Self Care/Mgmt/Train Ea 15 Min      Total  35 2        Therapy Charges for Today     Code Description Service Date Service Provider Modifiers Qty    40299886349 HC PT MOBILITY CURRENT 7/4/2018 Hadley Ramirez, PT GP, CK 1    23933156251 HC PT MOBILITY PROJECTED 7/4/2018 Hadley Ramirez, PT GP, CJ 1    28341264626 HC PT THERAPEUTIC ACT EA 15 MIN 7/4/2018 Hadley Ramirez, PT GP 2    86004108974 HC PT EVAL MOD COMPLEXITY 1 7/4/2018 Hadley Ramirez, PT GP 1          PT G-Codes  PT Professional Judgement Used?: Yes  Outcome Measure Options: AM-PAC 6 Clicks Basic Mobility (PT)  Score: 17  Functional Limitation: Mobility: Walking and moving around  Mobility: Walking and Moving Around Current Status (): At least 40 percent but less than 60 percent impaired, limited or restricted  Mobility: Walking and Moving Around Goal Status (): At least 20 percent but less than 40 percent impaired, limited or restricted      Hadley Ramirez, PT  7/4/2018

## 2018-07-04 NOTE — THERAPY TREATMENT NOTE
Acute Care - Occupational Therapy Treatment Note  [facility]     Patient Name: Michelle Child  : 1955  MRN: [id number]  Today's Date: 2018  Onset of Illness/Injury or Date of Surgery: 18  Date of Referral to OT: 18  Referring Physician: JANAK Roblero MD.     Admit Date: 2018       ICD-10-CM ICD-9-CM   1. Peripheral edema R60.9 782.3   2. Acute on chronic congestive heart failure, unspecified congestive heart failure type (CMS/HCC) I50.9 428.0   3. Uncontrolled hypertension I10 401.9   4. Hypokalemia E87.6 276.8   5. Impaired mobility and ADLs Z74.09 799.89   6. Impaired physical mobility Z74.09 781.99     Patient Active Problem List   Diagnosis   • Migraine   • Iron deficiency anemia   • Gastroesophageal reflux disease   • Essential hypertension   • Coronary arteriosclerosis   • Bilateral pseudophakia   • Astigmatism   • Hypokalemia   • Cerebral microvascular disease   • Pulmonary hypertension   • Rheumatic tricuspid valve regurgitation   • Rheumatic mitral stenosis   • Acute diastolic CHF (congestive heart failure) (CMS/[facility])   • Pulmonary hypertension due to left heart valvular disease   • Cor pulmonale, chronic (CMS/[facility])   • Folic acid deficiency   • Stage 3 severe COPD by GOLD classification (CMS/[facility])   • Personal history of tobacco use, presenting hazards to health   • Tobacco abuse, in remission   • Recurrent major depressive disorder, in full remission (CMS/HCC)   • Weakness   • Recurrent falls while walking   • Localized swelling of lower extremity   • Peripheral edema   • Insomnia     Past Medical History:   Diagnosis Date   • Alkaline phosphatase raised    • Astigmatism    • Bilateral pseudophakia    • Coronary arteriosclerosis    • Depressive disorder    • Edema of lower extremity    • Encounter for general adult medical examination with abnormal findings    • Essential hypertension    • Gastroesophageal reflux disease    • H/O bone density study     DXA BONE DENSITY  AXIAL    04/22/2016   • H/O screening mammography     SCREENING MAMMOGRAPHY     04/25/2016   • Hx of screening mammography     x3; declined screening, understands risks and benefits and still declines      07/24/2015   • Influenza vaccine administered     INFLUENZA IMMUN ADMIN OR PREV RECV'D   x4       04/01/2016   • Insomnia    • Iron deficiency    • Migraine    • Tobacco dependence     continuous       Past Surgical History:   Procedure Laterality Date   • APPENDECTOMY     • AUGMENTATION MAMMAPLASTY     • BREAST IMPLANT SURGERY     • CATARACT EXTRACTION WITH INTRAOCULAR LENS IMPLANT  12/17/2003    Phacoemulsification of a cataract with intraocular lens implant of rigt eye, Nixon model SA 60-AT; serial # [id number];20.5 diopter   • CHOLECYSTECTOMY  08/06/2007    Laparoscopic cholecystectomy. Symptomatic gallstones   • COLONOSCOPY  10/15/2013    declined stool cards and colonscopy   • ENDOSCOPY AND COLONOSCOPY  05/22/2014    Internal & external hemorrhoids found.   • ENDOSCOPY W/ PEG TUBE PLACEMENT  05/22/2014    EGD w/ tube: Normal esopahgus. Gastritis in stomach. Biopsy taken. Normal duodenum. Biopsy taken.   • INJECTION OF MEDICATION  07/24/2013    Inj(s) Tend-Sheath, Ligament, Single   • INJECTION OF MEDICATION  11/17/2014    Kenalog x6   • INJECTION OF MEDICATION  04/01/2016    PNEUMOC VAC/ADMIN/RCVD    • INJECTION OF MEDICATION  12/05/2014    Toradol   • OTHER SURGICAL HISTORY  01/19/2014    Drain/Inject Major Joint   x2   • PAP SMEAR  10/06/2011   • PARTIAL HYSTERECTOMY      Partial hyst    • TONSILLECTOMY     • TUBAL ABDOMINAL LIGATION         Therapy Treatment          Rehabilitation Treatment Summary     Row Name 07/04/18 1345             Treatment Time/Intention    Discipline occupational therapy assistant  -KD      Document Type therapy note (daily note)  -KD      Subjective Information no complaints  -KD      Mode of Treatment occupational therapy  -KD      Patient/Family Observations no family present   -KD      Therapy Frequency (OT Eval) other (see comments)   3-14x/wk  -KD      Patient Effort fair  -KD      Existing Precautions/Restrictions fall  -KD      Equipment Issued to Patient gait belt  -KD      Recorded by [KD] PAMELLA Christiansen/L 07/04/18 1358      Row Name 07/04/18 1345             Vital Signs    Pre Systolic BP Rehab 170  -KD      Pre Treatment Diastolic BP 60  -KD      Pretreatment Heart Rate (beats/min) 65  -KD      Posttreatment Heart Rate (beats/min) 67  -KD      Pre SpO2 (%) 94  -KD      O2 Delivery Pre Treatment room air  -KD      Post SpO2 (%) 94  -KD      O2 Delivery Post Treatment room air  -KD      Pre Patient Position Supine  -KD      Intra Patient Position Supine  -KD      Post Patient Position Supine  -KD      Recorded by [KD] PAMELLA Christiansen/L 07/04/18 1402      Row Name 07/04/18 1345             Cognitive Assessment/Intervention- PT/OT    Affect/Mental Status (Cognitive) WFL  -KD      Orientation Status (Cognition) oriented x 4  -KD      Follows Commands (Cognition) WFL  -KD      Personal Safety Interventions fall prevention program maintained  -KD      Recorded by [KD] PAMELLA Christiansen/L 07/04/18 1402      Row Name 07/04/18 1345             Bed Mobility Assessment/Treatment    Comment (Bed Mobility) pt declined any OOB @ this time  -KD      Recorded by [KD] PAMELLA Christiansen/L 07/04/18 1402      Row Name 07/04/18 1345             Therapeutic Exercise    Upper Extremity Range of Motion (Therapeutic Exercise) shoulder flexion/extension, bilateral;shoulder abduction/adduction, bilateral;shoulder horizontal abduction/adduction, bilateral;shoulder internal/external rotation, bilateral;elbow flexion/extension, bilateral;forearm supination/pronation, bilateral;wrist flexion/extension, bilateral  -KD      Weight/Resistance (Therapeutic Exercise) 2 pounds   No HW on RUE with shld flex/ext  -KD      Exercise Type (Therapeutic Exercise) AROM (active range of motion)  -KD       Position (Therapeutic Exercise) supine  -KD      Sets/Reps (Therapeutic Exercise) 2/20  -KD      Equipment (Therapeutic Exercise) free weight, barbell  -KD      Expected Outcome (Therapeutic Exercise) improve functional tolerance, self-care activity  -KD      Comment (Therapeutic Exercise) Pt reqiored vc's for proper technique  -KD      Recorded by [KD] JUDE ChristiansenA/L 07/04/18 1402      Row Name 07/04/18 1345             Pain Scale: Numbers Pre/Post-Treatment    Pain Scale: Numbers, Pretreatment 0/10 - no pain  -KD      Pain Scale: Numbers, Post-Treatment 0/10 - no pain  -KD      Recorded by [KD] Yolanda Mary CAN/L 07/04/18 1402      Row Name 07/04/18 1345             Outcome Summary/Treatment Plan (OT)    Daily Summary of Progress (OT) progress toward functional goals as expected  -KD      Plan for Continued Treatment (OT) cont ot poc  -KD      Anticipated Discharge Disposition (OT) skilled nursing facility  -KD      Recorded by [KD] Yolanda Mary CAN/L 07/04/18 1402        User Key  (r) = Recorded By, (t) = Taken By, (c) = Cosigned By    Initials Name Effective Dates Discipline    KD Yolanda Mary CAN/L 03/07/18 -  OT                   OT Rehab Goals     Row Name 07/04/18 1345 07/04/18 1200          Transfer Goal 1 (OT)    Activity/Assistive Device (Transfer Goal 1, OT) sit-to-stand/stand-to-sit;bed-to-chair/chair-to-bed;toilet;walker, rolling  -KD sit-to-stand/stand-to-sit;bed-to-chair/chair-to-bed;toilet;walker, rolling  -MR     [place] Level/Cues Needed (Transfer Goal 1, OT) contact guard assist  -KD contact guard assist  -MR     Time Frame (Transfer Goal 1, OT) long term goal (LTG);by discharge  -KD long term goal (LTG);by discharge  -MR     Progress/Outcome (Transfer Goal 1, OT) goal not met  -KD goal not met  -MR        Bathing Goal 1 (OT)    Activity/Assistive Device (Bathing Goal 1, OT) upper body bathing;bath mitt;long-handled sponge  -KD upper body bathing;bath mitt;long-handled  sponge  -MR     [place] Level/Cues Needed (Bathing Goal 1, OT) minimum assist (75% or more patient effort)  -KD minimum assist (75% or more patient effort)  -MR     Time Frame (Bathing Goal 1, OT) long term goal (LTG);by discharge  -KD long term goal (LTG);by discharge  -MR     Progress/Outcomes (Bathing Goal 1, OT) goal not met  -KD goal not met  -MR        Dressing Goal 1 (OT)    Activity/Assistive Device (Dressing Goal 1, OT) upper body dressing  -KD upper body dressing  -MR     [place]/Cues Needed (Dressing Goal 1, OT) minimum assist (75% or more patient effort)  -KD minimum assist (75% or more patient effort)  -MR     Time Frame (Dressing Goal 1, OT) long term goal (LTG);by discharge  -KD long term goal (LTG);by discharge  -MR     Progress/Outcome (Dressing Goal 1, OT) goal not met  -KD goal not met  -MR        Self-Feeding Goal 1 (OT)    Activity/Assistive Device (Self-Feeding Goal 1, OT) self-feeding skills, all  -KD self-feeding skills, all  -MR     [place] Level/Cues Needed (Self-Feeding Goal 1, OT) conditional independence  -KD conditional independence  -MR     Time Frame (Self-Feeding Goal 1, OT) long term goal (LTG);by discharge  -KD long term goal (LTG);by discharge  -MR     Progress/Outcomes (Self-Feeding Goal 1, OT) goal not met  -KD goal not met  -MR       User Key  (r) = Recorded By, (t) = Taken By, (c) = Cosigned By    Initials Name Provider Type Discipline    KD KINGSLEY Christiansen Occupational Therapy Assistant OT    MR Janet Johnson, OT Occupational Therapist OT        Occupational Therapy Education     Title: PT OT SLP Therapies (Active)     Topic: Occupational Therapy (Active)     Point: ADL training (Done)     Description: Instruct learner(s) on proper safety adaptation and remediation techniques during self care or transfers.   Instruct in proper use of assistive devices.   Learning Progress Summary     Learner Status Readiness Method Response Comment Documented by     Patient Done Acceptance E,DONALDO VU,NR Educated on the role of OT and POC, educated on the benefit of activity and safety with bed mobility, t/f, and functional mobility MR 07/03/18 1127          Point: Precautions (Done)     Description: Instruct learner(s) on prescribed precautions during self-care and functional transfers.   Learning Progress Summary     Learner Status Readiness Method Response Comment Documented by    Patient Done Acceptance E,DONALDO SINGH,NR Educated on the role of OT and POC, educated on the benefit of activity and safety with bed mobility, t/f, and functional mobility MR 07/03/18 1127          Point: Body mechanics (Done)     Description: Instruct learner(s) on proper positioning and spine alignment during self-care, functional mobility activities and/or exercises.   Learning Progress Summary     Learner Status Readiness Method Response Comment Documented by    Patient Done Acceptance E,DONALDO SINGH,NR Educated on the role of OT and POC, educated on the benefit of activity and safety with bed mobility, t/f, and functional mobility MR 07/03/18 1127                      User Key     Initials Effective Dates Name Provider Type Discipline    MR 04/03/18 -  Janet Johnson, OT Occupational Therapist OT                OT Recommendation and Plan  Outcome Summary/Treatment Plan (OT)  Daily Summary of Progress (OT): progress toward functional goals as expected  Plan for Continued Treatment (OT): cont ot poc  Anticipated Discharge Disposition (OT): skilled nursing facility  Therapy Frequency (OT Eval): other (see comments) (3-14x/wk)  Daily Summary of Progress (OT): progress toward functional goals as expected  Plan of Care Review  Plan of Care Reviewed With: (P) patient  Plan of Care Reviewed With: (P) patient  Outcome Summary: (P) Pt completed UE ther ex supine in bed with fair tolerance and required vc's for proper technique        Outcome Measures     Row Name 07/04/18 1345 07/04/18 0930 07/03/18 0903       How  much help from another person do you currently need...    Turning from your back to your side while in flat bed without using bedrails?  -- 3  -CZ  --    Moving from lying on back to sitting on the side of a flat bed without bedrails?  -- 3  -CZ  --    Moving to and from a bed to a chair (including a wheelchair)?  -- 3  -CZ  --    Standing up from a chair using your arms (e.g., wheelchair, bedside chair)?  -- 3  -CZ  --    Climbing 3-5 steps with a railing?  -- 2  -CZ  --    To walk in hospital room?  -- 3  -CZ  --    AM-PAC 6 Clicks Score  -- 17  -CZ  --       How much help from another is currently needed...    Putting on and taking off regular lower body clothing? 1  -KD  -- 1  -MR    Bathing (including washing, rinsing, and drying) 2  -KD  -- 2  -MR    Toileting (which includes using toilet bed pan or urinal) 1  -KD  -- 1  -MR    Putting on and taking off regular upper body clothing 2  -KD  -- 2  -MR    Taking care of personal grooming (such as brushing teeth) 2  -KD  -- 2  -MR    Eating meals 3  -KD  -- 3  -MR    Score 11  -KD  -- 11  -MR       Functional Assessment    Outcome Measure Options  -- AM-PAC 6 Clicks Basic Mobility (PT)  -CZ AM-PAC 6 Clicks Daily Activity (OT)  -MR      User Key  (r) = Recorded By, (t) = Taken By, (c) = Cosigned By    Initials Name Provider Type    PAMELLA Knowles/L Occupational Therapy Assistant    PATRICIA Ramirez, PT Physical Therapist    MR Janet Johnson, OT Occupational Therapist           Time Calculation:         Time Calculation- OT     Row Name 07/04/18 1412             Time Calculation- OT    OT Start Time 1345  -KD      OT Stop Time 1408  -KD      OT Time Calculation (min) 23 min  -KD      Total Timed Code Minutes- OT 23 minute(s)  -KD      OT Received On 07/04/18  -KD        User Key  (r) = Recorded By, (t) = Taken By, (c) = Cosigned By    Initials Name Provider Type    PAMELLA Knowles/L Occupational Therapy Assistant           Therapy Suggested  Charges     Code   Minutes Charges    None           Therapy Charges for Today     Code Description Service Date Service Provider Modifiers Qty    29232753395 HC OT THER PROC EA 15 MIN 7/4/2018 PAMELLA Christiansen/L GO 2          OT G-codes  OT Professional Judgement Used?: Yes  OT Functional Scales Options: AM-PAC 6 Clicks Daily Activity (OT)  Score: 11  Functional Limitation: Self care  Self Care Current Status (): At least 60 percent but less than 80 percent impaired, limited or restricted  Self Care Goal Status (): At least 40 percent but less than 60 percent impaired, limited or restricted    PAMELLA Christiansen/DALTON  7/4/2018

## 2018-07-04 NOTE — PLAN OF CARE
Problem: Patient Care Overview  Goal: Plan of Care Review  Outcome: Ongoing (interventions implemented as appropriate)   07/04/18 3933   Coping/Psychosocial   Plan of Care Reviewed With patient   OTHER   Outcome Summary Initial PT evaluation complete. Patient is alert and cooperative. Patient requires min Ax1 with bed mobility, CGA with transfers, min Ax1 with gait. She [id number] with a FWW, decreased foot clearance, decreased step length, difficulty with initiation, decreased follow-through, slow janey. Patient would benefit from discharge to SNF for rehab prior to discharge home alone. Continue skilled PT.

## 2018-07-04 NOTE — PLAN OF CARE
Problem: Patient Care Overview  Goal: Plan of Care Review  Outcome: Ongoing (interventions implemented as appropriate)   07/04/18 0541   Coping/Psychosocial   Plan of Care Reviewed With patient   Plan of Care Review   Progress no change   OTHER   Outcome Summary pt has rested well throughout the shift; no complaints reported; vital signs stable; will continue to monitor

## 2018-07-04 NOTE — PROGRESS NOTES
FAMILY MEDICINE DAILY PROGRESS NOTE    NAME: Michelle Child  : 1955  MRN: [id number]      LOS: 1 day     PROVIDER OF SERVICE: Claudette Machuca MD    Chief Complaint: Hypokalemia    Subjective:     Interval History:  History taken from: patient chart     Patient Complaints: none  Patient Denies:  Chest pains, shortness of breath, nausea, vomiting, diarrhea, muscle pain, weakness, palpitations.     Review of Systems:   Review of Systems   Constitutional: Negative for activity change and chills.   HENT: Negative for congestion and sore throat.    Eyes: Negative for pain and visual disturbance.   Respiratory: Negative for chest tightness and shortness of breath.    Cardiovascular: Negative for chest pain and palpitations.   Gastrointestinal: Negative for diarrhea, nausea and vomiting.   Endocrine: Negative for polyphagia and polyuria.   Genitourinary: Negative for frequency and hematuria.   Musculoskeletal: Negative for arthralgias and myalgias.   Skin: Negative for color change and pallor.   Neurological: Negative for dizziness and seizures.   Psychiatric/Behavioral: Negative for agitation and behavioral problems.       Objective:     Vital Signs  Temp:  [96.2 °F (35.7 °C)-98.8 °F (37.1 °C)] 97.6 °F (36.4 °C)  Heart Rate:  [50-64] 54  Resp:  [16-18] 18  BP: (122-174)/(56-86) 172/64  Body mass index is 21.38 kg/m².    Physical Exam  Physical Exam   Constitutional: She is oriented to person, place, and time. Vital signs are normal. She appears well-developed and well-nourished. No distress.   HENT:   Head: Normocephalic and atraumatic.   Right Ear: External ear normal.   Left Ear: External ear normal.   Nose: Nose normal.   Mouth/Throat: No oropharyngeal exudate.   Eyes: Conjunctivae and EOM are normal. Pupils are equal, round, and reactive to light. Right eye exhibits no discharge. Left eye exhibits no discharge. No scleral icterus.   Neck: Neck supple. No tracheal deviation present. No  thyromegaly present.   Cardiovascular: Normal rate, regular rhythm, normal heart sounds and intact distal pulses.  Exam reveals no gallop and no friction rub.    No murmur heard.  Pulmonary/Chest: Effort normal and breath sounds normal. No respiratory distress. She has no wheezes. She has no rales.   Abdominal: Soft. Bowel sounds are normal. She exhibits no distension. There is no tenderness.   Musculoskeletal: Normal range of motion. She exhibits no edema, tenderness or deformity.   Neurological: She is alert and oriented to person, place, and time. She has normal reflexes.   Skin: Skin is warm. Capillary refill takes less than 2 seconds. She is not diaphoretic.   Psychiatric: She has a normal mood and affect. Her behavior is normal.       Medication Review    Current Facility-Administered Medications:   •  albuterol (PROVENTIL) nebulizer solution 0.083% 2.5 mg/3mL, 2.5 mg, Nebulization, Q6H PRN, Louie Roblero MD  •  aspirin chewable tablet 81 mg, 81 mg, Oral, Daily, Louie Roblero MD, 81 mg at 07/04/18 0852  •  atorvastatin (LIPITOR) tablet 10 mg, 10 mg, Oral, Daily, Louie Roblero MD, 10 mg at 07/04/18 0852  •  bisacodyl (DULCOLAX) EC tablet 5 mg, 5 mg, Oral, Daily PRN, Louie Roblero MD  •  budesonide-formoterol (SYMBICORT) 160-4.5 MCG/ACT inhaler 2 puff, 2 puff, Inhalation, BID, Louie Roblero MD, 2 puff at 07/04/18 0834  •  famotidine (PEPCID) tablet 20 mg, 20 mg, Oral, BID, Louie Roblero MD, 20 mg at 07/04/18 0851  •  ferrous sulfate EC tablet 324 mg, 324 mg, Oral, BID With Meals, Louie Roblero MD, 324 mg at 07/04/18 0852  •  FLUoxetine (PROzac) capsule 20 mg, 20 mg, Oral, Daily, Louie Roblero MD, 20 mg at 07/04/18 0852  •  FLUoxetine (PROzac) capsule 40 mg, 40 mg, Oral, Daily, Louie Roblero MD, 40 mg at 07/04/18 0851  •  folic acid (FOLVITE) tablet 1 mg, 1 mg, Oral, Daily, Louie Roblero MD, 1 mg at 07/04/18  0852  •  furosemide (LASIX) tablet 20 mg, 20 mg, Oral, Daily, Louie Roblero MD, 20 mg at 07/04/18 0852  •  hydrALAZINE (APRESOLINE) injection 10 mg, 10 mg, Intravenous, Q6H PRN, Louie Roblero MD  •  losartan (COZAAR) tablet 100 mg, 100 mg, Oral, Q24H, Louie Roblero MD, 100 mg at 07/04/18 0852  •  metoprolol succinate XL (TOPROL-XL) 24 hr tablet 25 mg, 25 mg, Oral, Daily, Louie Roblero MD, 25 mg at 07/04/18 0852  •  risperiDONE (risperDAL) tablet 1 mg, 1 mg, Oral, Nightly, Louie Roblero MD, 1 mg at 07/03/18 2041  •  sodium chloride 0.9 % flush 1-10 mL, 1-10 mL, Intravenous, PRN, Louie Rbolero MD  •  sodium chloride 0.9 % flush 10 mL, 10 mL, Intravenous, PRN, Blake Euceda MD     Diagnostic Data    Lab Results (last 24 hours)     Procedure Component Value Units Date/Time    Basic Metabolic Panel [[id number]]  (Abnormal) Collected:  07/04/18 0619    Specimen:  Blood Updated:  07/04/18 0710     Glucose 102 (H) mg/dL      BUN 19 mg/dL      Creatinine 1.04 (H) mg/dL      Sodium 135 (L) mmol/L      Potassium 3.9 mmol/L      Chloride 101 mmol/L      CO2 28.0 mmol/L      Calcium 8.7 mg/dL      eGFR Non African Amer 53 mL/min/1.73      BUN/Creatinine Ratio 18.3     Anion Gap 6.0 mmol/L     CBC & Differential [[id number]] Collected:  07/04/18 0619    Specimen:  Blood Updated:  07/04/18 0655    Narrative:       The following orders were created for panel order CBC & Differential.  Procedure                               Abnormality         Status                     ---------                               -----------         ------                     CBC Auto Differential[[id number]]        Abnormal            Final result                 Please view results for these tests on the individual orders.    CBC Auto Differential [[id number]]  (Abnormal) Collected:  07/04/18 0619    Specimen:  Blood Updated:  07/04/18 0655     WBC 7.41 10*3/mm3      RBC 4.43 10*6/mm3       Hemoglobin 12.8 g/dL      Hematocrit 39.2 %      MCV 88.5 fL      MCH 28.9 pg      MCHC 32.7 g/dL      RDW 14.6 (H) %      RDW-SD 47.2 (H) fl      MPV 10.7 fL      Platelets 236 10*3/mm3      Neutrophil % 70.3 %      Lymphocyte % 18.8 %      Monocyte % 8.2 %      Eosinophil % 2.3 %      Basophil % 0.3 %      Immature Grans % 0.1 %      Neutrophils, Absolute 5.21 10*3/mm3      Lymphocytes, Absolute 1.39 10*3/mm3      Monocytes, Absolute 0.61 10*3/mm3      Eosinophils, Absolute 0.17 10*3/mm3      Basophils, Absolute 0.02 10*3/mm3      Immature Grans, Absolute 0.01 10*3/mm3     Potassium [[id number]]  (Normal) Collected:  07/03/18 2156    Specimen:  Blood Updated:  07/03/18 2213     Potassium 3.5 mmol/L           I reviewed the patient's new clinical results.    Assessment/Plan:     Active Hospital Problems (** Indicates Principal Problem)    Diagnosis Date Noted   • **Hypokalemia [E87.6] 01/10/2018     Started 6c15kpo IV and 40meq PO in ED  Mag 1.6   S/p replacement. Will continue to monitor.        • Peripheral edema [R60.9] 07/02/2018     - home dose lasix 20mg daily     • Recurrent falls while walking [R29.6] 07/02/2018     - PT/OT  - Case management consulted for SNF placement. [facility] is not currently accepting new patients. Patient unsure of her second choice SNF. The son would like to know other nursing homes where Dr. Berry sees patients.     • Insomnia [G47.00] 07/02/2018     Continue home risperdal     • Recurrent major depressive disorder, in full remission (CMS/HCC) [F33.42] 04/05/2018     Continue home prozac     • Stage 3 severe COPD by GOLD classification (CMS/[facility]) [J44.9] 03/01/2018     Continue home nebs and symbicort     • Pulmonary hypertension due to left heart valvular disease [I27.22, I38] 01/15/2018   • Rheumatic mitral stenosis [I05.0] 01/15/2018     Cards Consultation     • Rheumatic tricuspid valve regurgitation [I07.1] 01/14/2018   • Essential hypertension [I10]      Continue home toprol  25mg daily  Home losartan increased to 100mg daily  Lasix 20mg PO daily  PRN hydralazine     • Gastroesophageal reflux disease [K21.9]      Pepcid        Resolved Hospital Problems    Diagnosis Date Noted Date Resolved   No resolved problems to display.       DVT prophylaxis: SCDs/TEDs  Code status is   Code Status and Medical Interventions:   Ordered at: 07/02/18 8314     Level Of Support Discussed With:    Patient     Code Status:    CPR     Medical Interventions (Level of Support Prior to Arrest):    Full       Plan for disposition:Where: SNF and When:  accepted      Claudette Machuca M.D. PGY2  [facility]  [address] [place] [address]  Office: [phone]      This document has been electronically signed by Claudette Machuca MD on July 4, 2018 3:21 PM

## 2018-07-04 NOTE — PLAN OF CARE
Problem: Patient Care Overview  Goal: Plan of Care Review  Outcome: Ongoing (interventions implemented as appropriate)   07/04/18 1660   Coping/Psychosocial   Plan of Care Reviewed With patient   Plan of Care Review   Progress improving   OTHER   Outcome Summary pt is feeling better, still weak and working with therapy. will continue to monitor.      Goal: Individualization and Mutuality  Outcome: Ongoing (interventions implemented as appropriate)    Goal: Discharge Needs Assessment  Outcome: Ongoing (interventions implemented as appropriate)      Problem: Fall Risk (Adult)  Goal: Absence of Fall  Outcome: Ongoing (interventions implemented as appropriate)      Problem: Skin Injury Risk (Adult)  Goal: Skin Health and Integrity  Outcome: Ongoing (interventions implemented as appropriate)

## 2018-07-05 LAB
ANION GAP SERPL CALCULATED.3IONS-SCNC: 9 MMOL/L (ref 5–15)
BASOPHILS # BLD AUTO: 0.02 10*3/MM3 (ref 0–0.2)
BASOPHILS NFR BLD AUTO: 0.3 % (ref 0–2)
BUN BLD-MCNC: 19 MG/DL (ref 7–21)
BUN/CREAT SERPL: 20.4 (ref 7–25)
CALCIUM SPEC-SCNC: 8.9 MG/DL (ref 8.4–10.2)
CHLORIDE SERPL-SCNC: 99 MMOL/L (ref 95–110)
CO2 SERPL-SCNC: 27 MMOL/L (ref 22–31)
CREAT BLD-MCNC: 0.93 MG/DL (ref 0.5–1)
DEPRECATED RDW RBC AUTO: 48.6 FL (ref 36.4–46.3)
EOSINOPHIL # BLD AUTO: 0.11 10*3/MM3 (ref 0–0.7)
EOSINOPHIL NFR BLD AUTO: 1.7 % (ref 0–7)
ERYTHROCYTE [DISTWIDTH] IN BLOOD BY AUTOMATED COUNT: 14.6 % (ref 11.5–14.5)
GFR SERPL CREATININE-BSD FRML MDRD: 61 ML/MIN/1.73 (ref 45–104)
GLUCOSE BLD-MCNC: 102 MG/DL (ref 60–100)
HCT VFR BLD AUTO: 39.9 % (ref 35–45)
HGB BLD-MCNC: 13 G/DL (ref 12–15.5)
IMM GRANULOCYTES # BLD: 0.01 10*3/MM3 (ref 0–0.02)
IMM GRANULOCYTES NFR BLD: 0.2 % (ref 0–0.5)
LYMPHOCYTES # BLD AUTO: 1.09 10*3/MM3 (ref 0.6–4.2)
LYMPHOCYTES NFR BLD AUTO: 16.8 % (ref 10–50)
MCH RBC QN AUTO: 29.3 PG (ref 26.5–34)
MCHC RBC AUTO-ENTMCNC: 32.6 G/DL (ref 31.4–36)
MCV RBC AUTO: 90.1 FL (ref 80–98)
MONOCYTES # BLD AUTO: 0.51 10*3/MM3 (ref 0–0.9)
MONOCYTES NFR BLD AUTO: 7.9 % (ref 0–12)
NEUTROPHILS # BLD AUTO: 4.73 10*3/MM3 (ref 2–8.6)
NEUTROPHILS NFR BLD AUTO: 73.1 % (ref 37–80)
PLATELET # BLD AUTO: 225 10*3/MM3 (ref 150–450)
PMV BLD AUTO: 10.3 FL (ref 8–12)
POTASSIUM BLD-SCNC: 3.6 MMOL/L (ref 3.5–5.1)
RBC # BLD AUTO: 4.43 10*6/MM3 (ref 3.77–5.16)
SODIUM BLD-SCNC: 135 MMOL/L (ref 137–145)
WBC NRBC COR # BLD: 6.47 10*3/MM3 (ref 3.2–9.8)

## 2018-07-05 PROCEDURE — 94799 UNLISTED PULMONARY SVC/PX: CPT

## 2018-07-05 PROCEDURE — 97535 SELF CARE MNGMENT TRAINING: CPT

## 2018-07-05 PROCEDURE — 99225 PR SBSQ OBSERVATION CARE/DAY 25 MINUTES: CPT | Performed by: STUDENT IN AN ORGANIZED HEALTH CARE EDUCATION/TRAINING PROGRAM

## 2018-07-05 PROCEDURE — 97116 GAIT TRAINING THERAPY: CPT

## 2018-07-05 PROCEDURE — 80048 BASIC METABOLIC PNL TOTAL CA: CPT | Performed by: FAMILY MEDICINE

## 2018-07-05 PROCEDURE — 85025 COMPLETE CBC W/AUTO DIFF WBC: CPT | Performed by: FAMILY MEDICINE

## 2018-07-05 PROCEDURE — G0378 HOSPITAL OBSERVATION PER HR: HCPCS

## 2018-07-05 PROCEDURE — 97530 THERAPEUTIC ACTIVITIES: CPT

## 2018-07-05 RX ADMIN — FERROUS SULFATE TAB EC 324 MG (65 MG FE EQUIVALENT) 324 MG: 324 (65 FE) TABLET DELAYED RESPONSE at 08:55

## 2018-07-05 RX ADMIN — RISPERIDONE 1 MG: 1 TABLET ORAL at 20:30

## 2018-07-05 RX ADMIN — LOSARTAN POTASSIUM 100 MG: 50 TABLET, FILM COATED ORAL at 08:56

## 2018-07-05 RX ADMIN — FAMOTIDINE 20 MG: 20 TABLET ORAL at 08:55

## 2018-07-05 RX ADMIN — FOLIC ACID 1 MG: 1 TABLET ORAL at 08:56

## 2018-07-05 RX ADMIN — FAMOTIDINE 20 MG: 20 TABLET ORAL at 20:30

## 2018-07-05 RX ADMIN — BUDESONIDE AND FORMOTEROL FUMARATE DIHYDRATE 2 PUFF: 160; 4.5 AEROSOL RESPIRATORY (INHALATION) at 19:47

## 2018-07-05 RX ADMIN — METOPROLOL SUCCINATE 25 MG: 25 TABLET, EXTENDED RELEASE ORAL at 08:56

## 2018-07-05 RX ADMIN — ASPIRIN 81 MG 81 MG: 81 TABLET ORAL at 08:55

## 2018-07-05 RX ADMIN — ATORVASTATIN CALCIUM 10 MG: 10 TABLET, FILM COATED ORAL at 08:56

## 2018-07-05 RX ADMIN — FERROUS SULFATE TAB EC 324 MG (65 MG FE EQUIVALENT) 324 MG: 324 (65 FE) TABLET DELAYED RESPONSE at 18:19

## 2018-07-05 RX ADMIN — FLUOXETINE 60 MG: 20 CAPSULE ORAL at 08:56

## 2018-07-05 RX ADMIN — FUROSEMIDE 20 MG: 20 TABLET ORAL at 08:56

## 2018-07-05 RX ADMIN — HYDRALAZINE HYDROCHLORIDE 10 MG: 10 TABLET, FILM COATED ORAL at 00:27

## 2018-07-05 NOTE — PROGRESS NOTES
FAMILY MEDICINE DAILY PROGRESS NOTE    NAME: Michelle Child  : 1955  MRN: [id number]      LOS: 1 day     PROVIDER OF SERVICE: Aldo Yeboah Jr, MD    Chief Complaint: Hypokalemia    Subjective:     Interval History:  History taken from: patient chart   No acute events overnight.  Patient feels well this morning.  Her potassium has improved.  We are waiting on placement to a nursing home where Dr. Berry will be able to follow her.  BP has been elevated overnight.  PO clonidine & hydralazine were tried, however systolic BP remains in 180s.    Review of Systems: reviewed an unchanged from   Review of Systems   Constitutional: Negative for activity change and chills.   HENT: Positive for hearing loss. Negative for congestion and sore throat.    Eyes: Negative for pain and visual disturbance.   Respiratory: Negative for chest tightness and shortness of breath.    Cardiovascular: Negative for chest pain and palpitations.   Gastrointestinal: Negative for diarrhea, nausea and vomiting.   Endocrine: Negative for polyphagia and polyuria.   Genitourinary: Negative for frequency and hematuria.   Musculoskeletal: Negative for arthralgias and myalgias.   Skin: Negative for color change and pallor.   Neurological: Negative for dizziness and seizures.   Psychiatric/Behavioral: Negative for agitation and behavioral problems.       Objective:     Vital Signs  Temp:  [97 °F (36.1 °C)-98.4 °F (36.9 °C)] 97.5 °F (36.4 °C)  Heart Rate:  [52-64] 54  Resp:  [16-18] 18  BP: (150-182)/(56-78) 182/78  Body mass index is 20.77 kg/m².    Physical Exam  Physical Exam   Constitutional: She is oriented to person, place, and time. Vital signs are normal. She appears well-developed and well-nourished. No distress.   HENT:   Head: Normocephalic and atraumatic.   Right Ear: External ear normal.   Left Ear: External ear normal.   Nose: Nose normal.   Eyes: Conjunctivae and EOM are normal. Pupils are equal, round, and reactive to  light. Right eye exhibits no discharge. Left eye exhibits no discharge.   Neck: Neck supple. No tracheal deviation present. No thyromegaly present.   Cardiovascular: Normal rate, regular rhythm, normal heart sounds and intact distal pulses.  Exam reveals no gallop and no friction rub.    No murmur heard.  Pulmonary/Chest: Effort normal and breath sounds normal. No respiratory distress. She has no wheezes. She has no rales.   Abdominal: Soft. Bowel sounds are normal. She exhibits no distension. There is no tenderness.   Musculoskeletal: Normal range of motion. She exhibits no edema, tenderness or deformity.   Neurological: She is alert and oriented to person, place, and time. She has normal reflexes. No cranial nerve deficit.   Skin: Skin is warm and dry. Capillary refill takes less than 2 seconds. No erythema. No pallor.   Psychiatric: She has a normal mood and affect. Her behavior is normal.       Medication Review    Current Facility-Administered Medications:   •  acetaminophen (TYLENOL) tablet 650 mg, 650 mg, Oral, Q6H PRN, Claudette Machcua MD, 650 mg at 07/04/18 2310  •  albuterol (PROVENTIL) nebulizer solution 0.083% 2.5 mg/3mL, 2.5 mg, Nebulization, Q6H PRN, Louie Roblero MD  •  aspirin chewable tablet 81 mg, 81 mg, Oral, Daily, Louie Roblero MD, 81 mg at 07/04/18 0852  •  atorvastatin (LIPITOR) tablet 10 mg, 10 mg, Oral, Daily, Louie Roblero MD, 10 mg at 07/04/18 0852  •  bisacodyl (DULCOLAX) EC tablet 5 mg, 5 mg, Oral, Daily PRN, Louie Roblero MD  •  budesonide-formoterol (SYMBICORT) 160-4.5 MCG/ACT inhaler 2 puff, 2 puff, Inhalation, BID, Louie Roblero MD, 2 puff at 07/04/18 2022  •  famotidine (PEPCID) tablet 20 mg, 20 mg, Oral, BID, Louie Roblero MD, 20 mg at 07/04/18 2025  •  ferrous sulfate EC tablet 324 mg, 324 mg, Oral, BID With Meals, Louie Roblero MD, 324 mg at 07/04/18 1823  •  FLUoxetine (PROzac) capsule 60 mg, 60 mg,  Oral, Daily, LUPE Garcia  •  folic acid (FOLVITE) tablet 1 mg, 1 mg, Oral, Daily, Louie Roblero MD, 1 mg at 07/04/18 0852  •  furosemide (LASIX) tablet 20 mg, 20 mg, Oral, Daily, Louie Roblero MD, 20 mg at 07/04/18 0852  •  hydrALAZINE (APRESOLINE) injection 10 mg, 10 mg, Intravenous, Q6H PRN, Louie Roblero MD  •  losartan (COZAAR) tablet 100 mg, 100 mg, Oral, Q24H, Louie Roblero MD, 100 mg at 07/04/18 0852  •  metoprolol succinate XL (TOPROL-XL) 24 hr tablet 25 mg, 25 mg, Oral, Daily, Louie Roblero MD, 25 mg at 07/04/18 0852  •  risperiDONE (risperDAL) tablet 1 mg, 1 mg, Oral, Nightly, Louie Roblero MD, 1 mg at 07/04/18 2025  •  sodium chloride 0.9 % flush 1-10 mL, 1-10 mL, Intravenous, PRN, Louie Roblero MD  •  sodium chloride 0.9 % flush 10 mL, 10 mL, Intravenous, PRN, Blake Euceda MD     Diagnostic Data    Lab Results (last 24 hours)     Procedure Component Value Units Date/Time    Basic Metabolic Panel [[id number]]  (Abnormal) Collected:  07/05/18 0559    Specimen:  Blood Updated:  07/05/18 0717     Glucose 102 (H) mg/dL      BUN 19 mg/dL      Creatinine 0.93 mg/dL      Sodium 135 (L) mmol/L      Potassium 3.6 mmol/L      Chloride 99 mmol/L      CO2 27.0 mmol/L      Calcium 8.9 mg/dL      eGFR Non African Amer 61 mL/min/1.73      BUN/Creatinine Ratio 20.4     Anion Gap 9.0 mmol/L     CBC & Differential [[id number]] Collected:  07/05/18 0559    Specimen:  Blood Updated:  07/05/18 0702    Narrative:       The following orders were created for panel order CBC & Differential.  Procedure                               Abnormality         Status                     ---------                               -----------         ------                     CBC Auto Differential[[id number]]        Abnormal            Final result                 Please view results for these tests on the individual orders.    CBC Auto Differential [[id number]]   (Abnormal) Collected:  07/05/18 0559    Specimen:  Blood Updated:  07/05/18 0702     WBC 6.47 10*3/mm3      RBC 4.43 10*6/mm3      Hemoglobin 13.0 g/dL      Hematocrit 39.9 %      MCV 90.1 fL      MCH 29.3 pg      MCHC 32.6 g/dL      RDW 14.6 (H) %      RDW-SD 48.6 (H) fl      MPV 10.3 fL      Platelets 225 10*3/mm3      Neutrophil % 73.1 %      Lymphocyte % 16.8 %      Monocyte % 7.9 %      Eosinophil % 1.7 %      Basophil % 0.3 %      Immature Grans % 0.2 %      Neutrophils, Absolute 4.73 10*3/mm3      Lymphocytes, Absolute 1.09 10*3/mm3      Monocytes, Absolute 0.51 10*3/mm3      Eosinophils, Absolute 0.11 10*3/mm3      Basophils, Absolute 0.02 10*3/mm3      Immature Grans, Absolute 0.01 10*3/mm3           I reviewed the patient's new clinical results.    Assessment/Plan:     Active Hospital Problems (** Indicates Principal Problem)    Diagnosis Date Noted   • **Hypokalemia [E87.6] 01/10/2018     Now resolved   Started 7w78chy IV and 40meq PO in ED  Mag 1.6   S/p replacement. Will continue to monitor.        • Peripheral edema [R60.9] 07/02/2018     - home dose lasix 20mg daily     • Recurrent falls while walking [R29.6] 07/02/2018     - PT/OT  - Case management consulted for SNF placement. [facility] is not currently accepting new patients. Patient unsure of her second choice SNF. The son would like to know other nursing homes where Dr. Berry sees patients.     • Insomnia [G47.00] 07/02/2018     Continue home risperdal     • Recurrent major depressive disorder, in full remission (CMS/HCC) [F33.42] 04/05/2018     Continue home prozac     • Stage 3 severe COPD by GOLD classification (CMS/HCC) [J44.9] 03/01/2018     Continue home nebs and symbicort     • Pulmonary hypertension due to left heart valvular disease [I27.22, I38] 01/15/2018   • Rheumatic mitral stenosis [I05.0] 01/15/2018     Cards Consultation     • Rheumatic tricuspid valve regurgitation [I07.1] 01/14/2018   • Essential hypertension [I10]      Continue  home toprol 25mg daily  Current BP: (!) 182/78  Home losartan increased to 100mg daily  Lasix 20mg PO daily  PRN hydralazine     • Gastroesophageal reflux disease [K21.9]      Pepcid        Resolved Hospital Problems    Diagnosis Date Noted Date Resolved   No resolved problems to display.       DVT prophylaxis: SCDs/TEDs  Code status is   Code Status and Medical Interventions:   Ordered at: 07/02/18 1645     Level Of Support Discussed With:    Patient     Code Status:    CPR     Medical Interventions (Level of Support Prior to Arrest):    Full       Plan for disposition:Where: SNF and When:  accepted    Aldo Yeboah Jr., M.D.  PGY1  Family Practice Resident  [address] [place] [address]  Phone: [phone]  Fax: [phone]      This document has been electronically signed by Aldo Yeboah Jr, MD on July 5, 2018 7:52 AM

## 2018-07-05 NOTE — THERAPY TREATMENT NOTE
Acute Care - Physical Therapy Treatment Note  [facility] [place]     Patient Name: Michelle Child  : 1955  MRN: [id number]  Today's Date: 2018  Onset of Illness/Injury or Date of Surgery: 18     Referring Physician: JANAK Roblero MD.     Admit Date: 2018    Visit Dx:    ICD-10-CM ICD-9-CM   1. Peripheral edema R60.9 782.3   2. Acute on chronic congestive heart failure, unspecified congestive heart failure type (CMS/HCC) I50.9 428.0   3. Uncontrolled hypertension I10 401.9   4. Hypokalemia E87.6 276.8   5. Impaired mobility and ADLs Z74.09 799.89   6. Impaired physical mobility Z74.09 781.99     Patient Active Problem List   Diagnosis   • Migraine   • Iron deficiency anemia   • Gastroesophageal reflux disease   • Essential hypertension   • Coronary arteriosclerosis   • Bilateral pseudophakia   • Astigmatism   • Hypokalemia   • Cerebral microvascular disease   • Pulmonary hypertension   • Rheumatic tricuspid valve regurgitation   • Rheumatic mitral stenosis   • Acute diastolic CHF (congestive heart failure) (CMS/[facility])   • Pulmonary hypertension due to left heart valvular disease   • Cor pulmonale, chronic (CMS/[facility])   • Folic acid deficiency   • Stage 3 severe COPD by GOLD classification (CMS/[facility])   • Personal history of tobacco use, presenting hazards to health   • Tobacco abuse, in remission   • Recurrent major depressive disorder, in full remission (CMS/HCC)   • Weakness   • Recurrent falls while walking   • Localized swelling of lower extremity   • Peripheral edema   • Insomnia       Therapy Treatment          Rehabilitation Treatment Summary     Row Name 18 1052 18 0750          Treatment Time/Intention    Discipline physical therapy assistant  - occupational therapy assistant  -BB     Document Type therapy note (daily note)  - therapy note (daily note)  -BB     Subjective Information no complaints  - no complaints  -BB     Mode of Treatment physical therapy  -  individual therapy;occupational therapy  -BB     Patient/Family Observations no family present  -SM No family present  -BB     Therapy Frequency (PT Clinical Impression) daily  -SM  --     Total Minutes, Occupational Therapy Treatment  -- 84  -BB2     Therapy Frequency (OT Eval)  -- other (see comments)   3-14 tx's/wk  -BB     Patient Effort adequate  -SM fair  -BB     Comment Pt requires increased time for all mobility  -SM  --     Existing Precautions/Restrictions fall  - fall  -BB     Recorded by [SM] Gita Ferguson, PTA 07/05/18 1320 [BB] Bibi Chester, CAN/L 07/05/18 1256  [BB2] Bibi Chester CAN/L 07/05/18 1305     Row Name 07/05/18 1052 07/05/18 0750          Vital Signs    Pre Systolic BP Rehab 143  -  -BB     Pre Treatment Diastolic BP 65  -SM 66  -BB     Post Systolic BP Rehab 138  -SM  --     Post Treatment Diastolic BP 64  -SM  --     Pretreatment Heart Rate (beats/min) 56  -SM 66  -BB     Intratreatment Heart Rate (beats/min) 60  -SM  --     Posttreatment Heart Rate (beats/min) 58  -SM 68  -BB     Pre SpO2 (%) 94  -SM 94  -BB     O2 Delivery Pre Treatment room air  - room air  -BB     Intra SpO2 (%) 93  -SM  --     O2 Delivery Intra Treatment room air  -  --     Post SpO2 (%) 93  -SM 96  -BB     O2 Delivery Post Treatment room air  - room air  -BB     Pre Patient Position Supine  - Supine  -BB     Intra Patient Position Standing  -  --     Post Patient Position Supine  - Supine  -BB     Recorded by [SM] Gita Ferguson, PTA 07/05/18 1320 [BB] Bibi Chester, CAN/L 07/05/18 1256     Row Name 07/05/18 1052 07/05/18 0750          Cognitive Assessment/Intervention- PT/OT    Affect/Mental Status (Cognitive) WFL  - WFL  -BB     Orientation Status (Cognition) oriented x 4  -SM oriented x 3  -BB     Follows Commands (Cognition) verbal cues/prompting required;physical/tactile prompts required;follows one step commands;75-90% accuracy;delayed response/completion  - WFL   -BB     Safety Deficit (Cognitive) mild deficit;problem solving;safety precautions follow-through/compliance;insight into deficits/self awareness  - moderate deficit  -BB     Personal Safety Interventions fall prevention program maintained;gait belt;nonskid shoes/slippers when out of bed;supervised activity  - fall prevention program maintained;gait belt;nonskid shoes/slippers when out of bed;supervised activity  -BB     Recorded by [SM] Gita Ferguson, KIN 07/05/18 1320 [BB] PAMELLA Ngo/L 07/05/18 1256     Row Name 07/05/18 1052             Safety Issues, Functional Mobility    Safety Issues Affecting Function (Mobility) ability to follow commands;positioning of assistive device;problem solving;sequencing abilities;steps too close to assistive device  -      Impairments Affecting Function (Mobility) balance;cognition;coordination;endurance/activity tolerance;motor planning;strength  -SM      Recorded by [SM] Gita Ferguson, KIN 07/05/18 1320      Row Name 07/05/18 1052 07/05/18 0750          Bed Mobility Assessment/Treatment    Bed Mobility Assessment/Treatment supine-sit-supine  -SM  --     Supine-Sit [place] (Bed Mobility) supervision  - moderate assist (50% patient effort)  -BB     Sit-Supine [place] (Bed Mobility) minimum assist (75% patient effort)  - moderate assist (50% patient effort)  -BB     Supine-Sit-Supine [place] (Bed Mobility) minimum assist (75% patient effort)  -  --     Bed Mobility, Safety Issues  -- decreased use of arms for pushing/pulling;decreased use of legs for bridging/pushing;impaired trunk control for bed mobility  -BB     Assistive Device (Bed Mobility) bed rails;head of bed elevated  - bed rails;head of bed elevated  -BB     Recorded by [SM] Gita Ferguson, PTA 07/05/18 1320 [BB] PAMELLA Ngo/L 07/05/18 1256     Row Name 07/05/18 1052             Transfer Assessment/Treatment    Transfer Assessment/Treatment sit-stand  transfer;stand-sit transfer  -SM      Recorded by [SM] Gita Ferguson, PTA 07/05/18 1320      Row Name 07/05/18 1052             Sit-Stand Transfer    Sit-Stand [place] (Transfers) contact guard  -SM      Assistive Device (Sit-Stand Transfers) walker, front-wheeled  -SM      Recorded by [SM] Gita Ferguson, PTA 07/05/18 1320      Row Name 07/05/18 1052             Stand-Sit Transfer    Stand-Sit [place] (Transfers) contact guard  -SM      Assistive Device (Stand-Sit Transfers) walker, front-wheeled  -SM      Recorded by [SM] Gita Ferguson, PTA 07/05/18 1320      Row Name 07/05/18 1052             Gait/Stairs Assessment/Training    [place] Level (Gait) minimum assist (75% patient effort)  -      Assistive Device (Gait) walker, front-wheeled  -SM      Distance in Feet (Gait) 40'  -SM      Deviations/Abnormal Patterns (Gait) festinating/shuffling;janey decreased;gait speed decreased;bilateral deviations;base of support, narrow  -SM      Comment (Gait/Stairs) Pt requires increased time for gait. Pt with very slow janey and short step length with limited follow-through. Pt also required assistance to help manage assistive device. Initally pt would step too close to front before andvancing RW fwd but, once fatigued pt would push walker too far out in front before attempting to advance either LE. frequent verbal cues for sequencing required  -SM      Recorded by [SM] Gita Ferguson, PTA 07/05/18 1320      Row Name 07/05/18 0750             Bathing Assessment/Intervention    Bathing [place] Level lower body;upper body;set up;contact guard assist;maximum assist (25% patient effort);verbal cues   CGA for UB and max Assist for LB  -BB      Bathing Position long sitting  -BB      Recorded by [BB] KINGSLEY Ngo 07/05/18 1256      Row Name 07/05/18 0750             Upper Body Dressing Assessment/Training    Upper Body Dressing [place] Level doff;don;minimum assist (75% patient  effort);verbal cues   hospital gown  -BB      Upper Body Dressing Position long sitting  -BB      Recorded by [BB] PAMELLA Ngo/L 07/05/18 1256      Row Name 07/05/18 0750             Lower Body Dressing Assessment/Training    Lower Body Dressing [place] Level don;socks;maximum assist (25% patient effort)  -BB      Lower Body Dressing Position long sitting  -BB      Recorded by [BB] JUDE NgoA/L 07/05/18 1256      Row Name 07/05/18 0750             Grooming Assessment/Training    [place] Level (Grooming) hair care, combing/brushing;wash face, hands;oral care regimen;set up;supervision  -BB      Grooming Position long sitting  -BB      Recorded by [BB] PAMELLA Ngo/L 07/05/18 1256      Row Name 07/05/18 0750             Self-Feeding Assessment/Training    [place] Level (Feeding) contact guard assist;verbal cues;prepare tray/open items  -BB      Position (Self-Feeding) sitting up in bed  -BB      Recorded by [BB] PAMELLA Ngo/L 07/05/18 1256      Row Name 07/05/18 0750             Static Sitting Balance    Level of [place] (Unsupported Sitting, Static Balance) minimal assist, 75% patient effort  -BB      Sitting Position (Unsupported Sitting, Static Balance) sitting on edge of bed  -BB      Time Able to Maintain Position (Unsupported Sitting, Static Balance) 3 to 4 minutes  -BB      Recorded by [BB] PAMELLA Ngo/L 07/05/18 1256      Row Name 07/05/18 1052 07/05/18 0750          Positioning and Restraints    Pre-Treatment Position in bed  -SM in bed  -BB     Post Treatment Position bed  - bed  -BB     In Bed fowlers;call light within reach;encouraged to call for assist;exit alarm on;notified nsg  -SM call light within reach;encouraged to call for assist;exit alarm on;fowlers  -BB     Recorded by [] Gita Ferguson, PTA 07/05/18 1320 [BB] PAMELLA Ngo/L 07/05/18 1256     Row Name 07/05/18 1052 07/05/18 0750          Pain  Scale: Numbers Pre/Post-Treatment    Pain Scale: Numbers, Pretreatment 0/10 - no pain  -SM 0/10 - no pain  -BB     Pain Scale: Numbers, Post-Treatment 0/10 - no pain  -SM 0/10 - no pain  -BB     Recorded by [SM] Gita Ferguson, hospitals 07/05/18 1320 [BB] JUDE NgoA/L 07/05/18 1256     Row Name 07/05/18 1052 07/05/18 0750          Plan of Care Review    Plan of Care Reviewed With patient  -SM patient  -BB     Recorded by [SM] Gita Ferguson, PTA 07/05/18 1320 [BB] Bibi Chester CAN/L 07/05/18 1256     Row Name 07/05/18 0750             Outcome Summary/Treatment Plan (OT)    Daily Summary of Progress (OT) progress toward functional goals as expected  -BB      Plan for Continued Treatment (OT) continue POC  -BB      Anticipated Discharge Disposition (OT) skilled nursing facility  -BB      Recorded by [BB] JUDE NgoA/L 07/05/18 1256      Row Name 07/05/18 1052             Outcome Summary/Treatment Plan (PT)    Daily Summary of Progress (PT) progress toward functional goals is gradual  -SM      Plan for Continued Treatment (PT) continue  -SM      Anticipated Discharge Disposition (PT) skilled nursing facility  -SM      Recorded by [SM] Gita Ferguson, hospitals 07/05/18 1320        User Key  (r) = Recorded By, (t) = Taken By, (c) = Cosigned By    Initials Name Effective Dates Discipline     Gita Ferguson, PTA 03/07/18 -  PT    BB Bibi Chester CAN/L 03/07/18 -  OT                       PT Rehab Goals     Row Name 07/05/18 1052             Bed Mobility Goal 1 (PT)    Activity/Assistive Device (Bed Mobility Goal 1, PT) sit to supine/supine to sit  -SM      [place] Level/Cues Needed (Bed Mobility Goal 1, PT) conditional independence  -SM      Time Frame (Bed Mobility Goal 1, PT) 3 days  -SM      Barriers (Bed Mobility Goal 1, PT) Difficulty with initiation.  -SM      Progress/Outcomes (Bed Mobility Goal 1, PT) goal not met  -SM         Transfer Goal 1 (PT)     Activity/Assistive Device (Transfer Goal 1, PT) sit-to-stand/stand-to-sit;bed-to-chair/chair-to-bed  -SM      [place] Level/Cues Needed (Transfer Goal 1, PT) conditional independence  -SM      Time Frame (Transfer Goal 1, PT) long term goal (LTG);by discharge  -SM      Progress/Outcome (Transfer Goal 1, PT) goal not met  -SM         Gait Training Goal 1 (PT)    Activity/Assistive Device (Gait Training Goal 1, PT) walker, rolling  -SM      [place] Level (Gait Training Goal 1, PT) supervision required  -SM      Distance (Gait Goal 1, PT) 50'x2.   -SM      Time Frame (Gait Training Goal 1, PT) long term goal (LTG);by discharge  -SM      Barriers (Gait Training Goal 1, PT) Difficulty with initiation.  -SM      Progress/Outcome (Gait Training Goal 1, PT) goal not met  -SM        User Key  (r) = Recorded By, (t) = Taken By, (c) = Cosigned By    Initials Name Provider Type Discipline     Gita Ferguson, PTA Physical Therapy Assistant PT          Physical Therapy Education     Title: PT OT SLP Therapies (Active)     Topic: Physical Therapy (Active)     Point: Mobility training (Active)    Learning Progress Summary     Learner Status Readiness Method Response Comment Documented by    Patient Active Acceptance E NR Patient educated on proper gait mechanics, proper use of walker, proper transfer technique.  07/04/18 1254                      User Key     Initials Effective Dates Name Provider Type Discipline     04/03/18 -  Hadley Ramirez, PT Physical Therapist PT                    PT Recommendation and Plan  Anticipated Discharge Disposition (PT): skilled nursing facility  Therapy Frequency (PT Clinical Impression): daily  Outcome Summary/Treatment Plan (PT)  Daily Summary of Progress (PT): progress toward functional goals is gradual  Plan for Continued Treatment (PT): continue  Anticipated Discharge Disposition (PT): skilled nursing facility  Plan of Care Reviewed With: patient  Progress:  improving  Outcome Summary: Pt requires increaed time for all mobility. Shuffling gait and decreased step length. Pt required min A for sequencing and AD managment. as well as verbal cues for weight shifting. Pt amb ~ 40' with RW. No goals met this date. SNF placement and continued rehab recommended upon discharge.           Outcome Measures     Row Name 07/05/18 1052 07/05/18 0750 07/04/18 1345       How much help from another person do you currently need...    Turning from your back to your side while in flat bed without using bedrails? 3  -SM  --  --    Moving from lying on back to sitting on the side of a flat bed without bedrails? 3  -SM  --  --    Moving to and from a bed to a chair (including a wheelchair)? 3  -SM  --  --    Standing up from a chair using your arms (e.g., wheelchair, bedside chair)? 3  -SM  --  --    Climbing 3-5 steps with a railing? 2  -SM  --  --    To walk in hospital room? 3   increased time also needed  -SM  --  --    AM-PAC 6 Clicks Score 17  -SM  --  --       How much help from another is currently needed...    Putting on and taking off regular lower body clothing?  -- 1  -BB 1  -KD    Bathing (including washing, rinsing, and drying)  -- 2  -BB 2  -KD    Toileting (which includes using toilet bed pan or urinal)  -- 1  -BB 1  -KD    Putting on and taking off regular upper body clothing  -- 2  -BB 2  -KD    Taking care of personal grooming (such as brushing teeth)  -- 3  -BB 2  -KD    Eating meals  -- 3  -BB 3  -KD    Score  -- 12  -BB 11  -KD       Functional Assessment    Outcome Measure Options AM-PAC 6 Clicks Basic Mobility (PT)  -SM  --  --    Row Name 07/04/18 0930 07/03/18 0903          How much help from another person do you currently need...    Turning from your back to your side while in flat bed without using bedrails? 3  -CZ  --     Moving from lying on back to sitting on the side of a flat bed without bedrails? 3  -CZ  --     Moving to and from a bed to a chair (including  a wheelchair)? 3  -CZ  --     Standing up from a chair using your arms (e.g., wheelchair, bedside chair)? 3  -CZ  --     Climbing 3-5 steps with a railing? 2  -CZ  --     To walk in hospital room? 3  -CZ  --     AM-PAC 6 Clicks Score 17  -CZ  --        How much help from another is currently needed...    Putting on and taking off regular lower body clothing?  -- 1  -MR     Bathing (including washing, rinsing, and drying)  -- 2  -MR     Toileting (which includes using toilet bed pan or urinal)  -- 1  -MR     Putting on and taking off regular upper body clothing  -- 2  -MR     Taking care of personal grooming (such as brushing teeth)  -- 2  -MR     Eating meals  -- 3  -MR     Score  -- 11  -MR        Functional Assessment    Outcome Measure Options AM-PAC 6 Clicks Basic Mobility (PT)  -CZ AM-PAC 6 Clicks Daily Activity (OT)  -MR       User Key  (r) = Recorded By, (t) = Taken By, (c) = Cosigned By    Initials Name Provider Type    JUSTINE Ferguson PTA Physical Therapy Assistant    BB Bibi Chester CAN/L Occupational Therapy Assistant    KD Yolanda Mary CAN/L Occupational Therapy Assistant    CZ Hadley Ramirez, PT Physical Therapist    MR Janet Johnson, OT Occupational Therapist           Time Calculation:         PT Charges     Row Name 07/05/18 1322             Time Calculation    Start Time 1052  -      Stop Time 1150  -      Time Calculation (min) 58 min  -      PT Received On 07/05/18  -         Time Calculation- PT    Total Timed Code Minutes- PT 58 minute(s)  -        User Key  (r) = Recorded By, (t) = Taken By, (c) = Cosigned By    Initials Name Provider Type    JUSTINE Ferguson PTA Physical Therapy Assistant        Therapy Suggested Charges     Code   Minutes Charges    67889 (CPT®) Hc Pt Neuromusc Re Education Ea 15 Min      23065 (CPT®) Hc Pt Ther Proc Ea 15 Min      60469 (CPT®) Hc Gait Training Ea 15 Min      50870 (CPT®) Hc Pt Therapeutic Act Ea 15 Min 35 2    59844 (CPT®)  Hc Pt Manual Therapy Ea 15 Min      20632 (CPT®) Hc Pt Iontophoresis Ea 15 Min      92345 (CPT®) Hc Pt Elec Stim Ea-Per 15 Min      41796 (CPT®) Hc Pt Ultrasound Ea 15 Min      65972 (CPT®) Hc Pt Self Care/Mgmt/Train Ea 15 Min      Total  35 2        Therapy Charges for Today     Code Description Service Date Service Provider Modifiers Qty    94867626341 HC GAIT TRAINING EA 15 MIN 7/5/2018 Gita Ferguson PTA GP 3    06179999624 HC PT THERAPEUTIC ACT EA 15 MIN 7/5/2018 Gita Ferguson PTA GP 1          PT G-Codes  PT Professional Judgement Used?: Yes  Outcome Measure Options: AM-PAC 6 Clicks Basic Mobility (PT)  Score: 17  Functional Limitation: Mobility: Walking and moving around  Mobility: Walking and Moving Around Current Status (): At least 40 percent but less than 60 percent impaired, limited or restricted  Mobility: Walking and Moving Around Goal Status (): At least 20 percent but less than 40 percent impaired, limited or restricted    Gita Ferguson PTA  7/5/2018

## 2018-07-05 NOTE — DISCHARGE PLACEMENT REQUEST
"Michelle Child (62 y.o. Female)     Date of Birth Social Security Number Address Home Phone MRN    1955  [address] [facility] [address] [phone] [id number]    Druze Marital Status          Synagogue        Admission Date Admission Type Admitting Provider Attending Provider Department, Room/Bed    7/2/18 Emergency Louie Roblero MD Farmer, John M Jr., MD [address], 426/1    Discharge Date Discharge Disposition Discharge Destination                       Attending Provider:  Aldo Yeboah Jr., MD    Allergies:  Codeine, Penicillins, Sulfa Antibiotics    Isolation:  None   Infection:  None   Code Status:  CPR    Ht:  162.6 cm (64\")   Wt:  54.9 kg (121 lb)    Admission Cmt:  None   Principal Problem:  Hypokalemia [E87.6] More...                 Active Insurance as of 7/2/2018     Primary Coverage     Payor Plan Insurance Group Employer/Plan Group    MEDICARE MEDICARE A & B      Payor Plan Address Payor Plan Phone Number Effective From Effective To    [address] [phone] 8/1/2015     [facility] [address]       Subscriber Name Subscriber Birth Date Member ID       MICHELLE CHILD 1955 [id number]           Secondary Coverage     Payor Plan Insurance Group Employer/Plan Group    [place]      Payor Plan Address Payor Plan Phone Number Effective From Effective To    [address] [phone] 1/10/2018     [facility] [address]       Subscriber Name Subscriber Birth Date Member ID       MICHELLE CHILD 1955 [id number]                 Emergency Contacts      (Rel.) Home Phone Work Phone Mobile Phone    Remy Angel (Son) [phone] -- [phone]    Bryon Nelson (Son) [phone] -- --    Katarzyna Nelson (Daughter) [phone] -- [phone]            Insurance Information                MEDICARE/MEDICARE A & B Phone: [phone]    Subscriber: Michelle Child " Subscriber#: [id number]    Group#:  Precert#:         [place] Phone: [phone]    Subscriber: Michelle Child Subscriber#: [id number]    Group#:  Precert#:              History & Physical      Louie Roblero MD at 2018  4:06 PM     Attestation signed by Sunny Linn DO at 7/3/2018  9:28 AM          This document has been electronically signed by Sunny Linn DO on July 3, 2018 9:28 AM      I have examined the patient and reviewed the pertinent diagnostic data. I have discussed the case with the Family Medicine Resident and agree with the assessment and plan of care.    Sunny Linn DO                      HISTORY AND PHYSICAL  NAME: Michelle Child  : 1955  MRN: [id number]    DATE OF ADMISSION: 18    DATE & TIME SEEN: 18 4:53 PM    PCP: Coral Berry MD    CODE STATUS: Full code    CHIEF COMPLAINT bilateral lower extremity edema    HPI:  Michelle Child is a 62 y.o. female with a concurrent history of CAD, 2x stroke with no residual weakness, CHF with EF 61% 18, depression, GERD, HTN, insomnia, COPD who presents with a 1 month history of bilateral lower extremity edema worsening over the last 3 days. Pt reports pressure like pain over her feet. Pt report her sons wife called Dr. Berry who told them to come into the ED to be evaluated. Pt denies chest pain, dyspnea, headache, blurry vision, nausea, vomiting, diarrhea, blood in stool or urine. Pt report that she has not been taking her lasix over these last 3 days. Pt reports she is not taking it because it makes her urinate more and she is concerned about getting up so frequently to go to the bathroom. Pt reports she is concerned she will fall.    In the ED pt was found to have 1+ bilateral lower extremity edema. Labs were pertinent for an elevated BNP of 1960 and hypokalemia at 2.6. Pt was started on 2v44zed K+ IV runs over 6 hours and given 40meq PO. Pt was  found to have an elevated BP in the ED of 221/89 and was given a 20mg IV push of hydralazine and her blood pressure came down to 168/72.    CONCURRENT MEDICAL HISTORY:  Past Medical History:   Diagnosis Date   • Alkaline phosphatase raised    • Astigmatism    • Bilateral pseudophakia    • Coronary arteriosclerosis    • Depressive disorder    • Edema of lower extremity    • Encounter for general adult medical examination with abnormal findings    • Essential hypertension    • Gastroesophageal reflux disease    • H/O bone density study     DXA BONE DENSITY AXIAL    04/22/2016   • H/O screening mammography     SCREENING MAMMOGRAPHY     04/25/2016   • Hx of screening mammography     x3; declined screening, understands risks and benefits and still declines      07/24/2015   • Influenza vaccine administered     INFLUENZA IMMUN ADMIN OR PREV RECV'D   x4       04/01/2016   • Insomnia    • Iron deficiency    • Migraine    • Tobacco dependence     continuous         PAST SURGICAL HISTORY:  Past Surgical History:   Procedure Laterality Date   • APPENDECTOMY     • AUGMENTATION MAMMAPLASTY     • BREAST IMPLANT SURGERY     • CATARACT EXTRACTION WITH INTRAOCULAR LENS IMPLANT  12/17/2003    Phacoemulsification of a cataract with intraocular lens implant of rigt eye, Nixon model SA 60-AT; serial # [id number];20.5 diopter   • CHOLECYSTECTOMY  08/06/2007    Laparoscopic cholecystectomy. Symptomatic gallstones   • COLONOSCOPY  10/15/2013    declined stool cards and colonscopy   • ENDOSCOPY AND COLONOSCOPY  05/22/2014    Internal & external hemorrhoids found.   • ENDOSCOPY W/ PEG TUBE PLACEMENT  05/22/2014    EGD w/ tube: Normal esopahgus. Gastritis in stomach. Biopsy taken. Normal duodenum. Biopsy taken.   • INJECTION OF MEDICATION  07/24/2013    Inj(s) Tend-Sheath, Ligament, Single   • INJECTION OF MEDICATION  11/17/2014    Kenalog x6   • INJECTION OF MEDICATION  04/01/2016    PNEUMOC VAC/ADMIN/RCVD    • INJECTION OF MEDICATION   12/05/2014    Toradol   • OTHER SURGICAL HISTORY  01/19/2014    Drain/Inject Major Joint   x2   • PAP SMEAR  10/06/2011   • PARTIAL HYSTERECTOMY      Partial hyst    • TONSILLECTOMY     • TUBAL ABDOMINAL LIGATION         FAMILY HISTORY:  Family History   Problem Relation Age of Onset   • Cholelithiasis Mother    • Thyroid cancer Sister    • Thyroid disease Sister         Thyroid disorder   • Graves' disease Sister    • Cholelithiasis Maternal Grandmother    • Thyroid cancer Other         Other 2nd degree relative, thyroid   • Diabetes Other    • Hypertension Other         SOCIAL HISTORY:  Social History     Social History   • Marital status:      Spouse name: N/A   • Number of children: N/A   • Years of education: N/A     Occupational History   • Not on file.     Social History Main Topics   • Smoking status: Former Smoker     Quit date: 1/10/2018   • Smokeless tobacco: Never Used      Comment: SMOKED FOR 20>PLUS YRS   • Alcohol use No   • Drug use: No   • Sexual activity: Defer     Other Topics Concern   • Not on file     Social History Narrative   • No narrative on file       HOME MEDICATIONS:    Current Facility-Administered Medications:   •  potassium chloride (KLOR-CON) packet 40 mEq, 40 mEq, Oral, Once, Blake Euceda MD  •  potassium chloride 10 mEq in 100 mL IVPB, 10 mEq, Intravenous, Once **AND** potassium chloride 10 mEq in 100 mL IVPB, 10 mEq, Intravenous, Once **AND** potassium chloride 10 mEq in 100 mL IVPB, 10 mEq, Intravenous, Once **AND** potassium chloride 10 mEq in 100 mL IVPB, 10 mEq, Intravenous, Once **AND** potassium chloride 10 mEq in 100 mL IVPB, 10 mEq, Intravenous, Once **AND** potassium chloride 10 mEq in 100 mL IVPB, 10 mEq, Intravenous, Once **AND** Potassium, , , PRN, Blake Euceda MD  •  sodium chloride 0.9 % flush 10 mL, 10 mL, Intravenous, PRN, Blake Euceda MD    Current Outpatient Prescriptions:   •  acetaminophen (TYLENOL) 325 MG tablet, Take 2 tablets by mouth  Every 4 (Four) Hours As Needed for Mild Pain . (Patient taking differently: Take  by mouth Every 4 (Four) Hours As Needed for Mild Pain .), Disp: , Rfl:   •  albuterol (PROVENTIL HFA;VENTOLIN HFA) 108 (90 Base) MCG/ACT inhaler, Inhale 2 puffs Every 4 (Four) Hours As Needed for Wheezing., Disp: 6.7 g, Rfl: 11  •  aspirin 81 MG chewable tablet, Chew 1 tablet Daily., Disp: 30 tablet, Rfl: 11  •  FLUoxetine (PROzac) 20 MG capsule, Take 1 capsule by mouth Daily., Disp: 90 capsule, Rfl: 3  •  FLUoxetine (PROzac) 40 MG capsule, Take 1 capsule by mouth Daily., Disp: 901 capsule, Rfl: 3  •  folic acid (FOLVITE) 1 MG tablet, TAKE ONE TABLET BY MOUTH ONCE DAILY FOR SUPPLEMENT, Disp: 30 tablet, Rfl: 2  •  hydrocortisone 1 % cream, Apply  topically 3 (Three) Times a Day., Disp: 60 g, Rfl: 5  •  losartan (COZAAR) 50 MG tablet, TAKE ONE TABLET BY MOUTH ONCE DAILY FOR HYPERTENSION. HOLD FOR SYSTOLIC BLOOD PRESSURE LESS THAN 100, Disp: 90 tablet, Rfl: 0  •  meloxicam (MOBIC) 15 MG tablet, TAKE ONE TABLET BY MOUTH ONCE DAILY FOR MILD PAIN, Disp: 30 tablet, Rfl: 0  •  metoprolol succinate XL (TOPROL-XL) 25 MG 24 hr tablet, Take 1 tablet by mouth Daily., Disp: 90 tablet, Rfl: 3  •  ondansetron (ZOFRAN) 4 MG tablet, Take 1 tablet by mouth Every 6 (Six) Hours As Needed for Nausea or Vomiting., Disp: , Rfl:   •  pravastatin (PRAVACHOL) 40 MG tablet, Take 1 tablet by mouth Every Night., Disp: 90 tablet, Rfl: 3  •  raNITIdine (ZANTAC) 150 MG tablet, Take 1 tablet by mouth 2 (Two) Times a Day., Disp: 180 tablet, Rfl: 3  •  risperiDONE (risperDAL) 1 MG tablet, Take 1 tablet by mouth Every Night., Disp: 901 tablet, Rfl: 3  •  SYMBICORT 160-4.5 MCG/ACT inhaler, INHALE 2 PUFFS BY MOUTH EVERY MORNING AND AT BEDTIME FOR WHEEZING. RINSE MOUTH AFTER USE., Disp: 1 inhaler, Rfl: 5  •  bisacodyl (DULCOLAX) 5 MG EC tablet, Take 1 tablet by mouth Daily As Needed for Constipation., Disp: , Rfl:   •  FERATE 240 (27 Fe) MG tablet, TAKE ONE TABLET BY MOUTH  TWICE A DAY FOR SUPPLEMENTATION. GIVE WITH MEALS, Disp: 60 tablet, Rfl: 0  •  ferrous sulfate 324 (65 Fe) MG tablet delayed-release EC tablet, Take 1 tablet by mouth 2 (Two) Times a Day With Meals., Disp: 180 tablet, Rfl: 3  •  furosemide (LASIX) 20 MG tablet, One half tablet by mouth once a day, Disp: 30 tablet, Rfl: 5    ALLERGIES:  Codeine; Penicillins; and Sulfa antibiotics    REVIEW OF SYSTEMS  Review of Systems   Constitutional: Positive for fatigue. Negative for chills and fever.   HENT: Negative for congestion and sore throat.    Eyes: Negative for photophobia and visual disturbance.   Respiratory: Negative for cough, shortness of breath and wheezing.    Cardiovascular: Positive for leg swelling. Negative for chest pain and palpitations.   Gastrointestinal: Negative for abdominal pain, diarrhea, nausea and vomiting.   Genitourinary: Negative for dysuria and hematuria.   Musculoskeletal: Negative for neck pain and neck stiffness.   Skin: Negative for rash and wound.   Neurological: Negative for seizures and syncope.   Psychiatric/Behavioral: Negative for agitation and confusion.       PHYSICAL EXAM:  Temp:  [98.3 °F (36.8 °C)] 98.3 °F (36.8 °C)  Heart Rate:  [56-80] 72  Resp:  [18] 18  BP: (168-221)/() 168/72  Body mass index is 20.08 kg/m².  Physical Exam   Constitutional: She is oriented to person, place, and time. She appears well-developed and well-nourished. No distress.   HENT:   Head: Normocephalic and atraumatic.   Right Ear: External ear normal.   Left Ear: External ear normal.   Nose: Nose normal.   Eyes: Conjunctivae are normal. Right eye exhibits no discharge. Left eye exhibits no discharge. No scleral icterus.   Neck: Normal range of motion. Neck supple.   Cardiovascular: Normal rate, regular rhythm and normal heart sounds.    Pulmonary/Chest: Effort normal and breath sounds normal. No respiratory distress. She has no wheezes. She has no rales.   Abdominal: Soft. Bowel sounds are normal.  She exhibits no distension. There is no tenderness.   Musculoskeletal: Normal range of motion. She exhibits edema (1+ bilateral lower extremity edema). She exhibits no tenderness.   Neurological: She is alert and oriented to person, place, and time.   Skin: Skin is warm and dry. Capillary refill takes less than 2 seconds. She is not diaphoretic.   Psychiatric: She has a normal mood and affect. Her behavior is normal. Judgment and thought content normal.   Nursing note and vitals reviewed.      DIAGNOSTIC DATA:   Lab Results (last 24 hours)     Procedure Component Value Units Date/Time    Troponin [[id number]]  (Normal) Collected:  07/02/18 1532    Specimen:  Blood Updated:  07/02/18 1637     Troponin I 0.027 ng/mL     Comprehensive Metabolic Panel [[id number]]  (Abnormal) Collected:  07/02/18 1532    Specimen:  Blood Updated:  07/02/18 1624     Glucose 74 mg/dL      BUN 17 mg/dL      Creatinine 0.77 mg/dL      Sodium 139 mmol/L      Potassium 2.6 (C) mmol/L      Chloride 102 mmol/L      CO2 26.0 mmol/L      Calcium 8.6 mg/dL      Total Protein 6.0 (L) g/dL      Albumin 3.50 g/dL      ALT (SGPT) 26 U/L      AST (SGOT) 27 U/L      Alkaline Phosphatase 68 U/L      Total Bilirubin 0.8 mg/dL      eGFR Non African Amer 75 mL/min/1.73      Globulin 2.5 gm/dL      A/G Ratio 1.4 g/dL      BUN/Creatinine Ratio 22.1     Anion Gap 11.0 mmol/L     Urinalysis, Microscopic Only - Urine, Clean Catch [[id number]]  (Abnormal) Collected:  07/02/18 1452    Specimen:  Urine from Urine, Clean Catch Updated:  07/02/18 1505     RBC, UA 3-5 (A) /HPF      WBC, UA None Seen /HPF      Bacteria, UA None Seen /HPF      Squamous Epithelial Cells, UA None Seen /HPF      Hyaline Casts, UA 0-2 /LPF      Methodology Automated Microscopy    Urinalysis With Microscopic If Indicated (No Culture) - Urine, Clean Catch [[id number]]  (Abnormal) Collected:  07/02/18 1452    Specimen:  Urine from Urine, Clean Catch Updated:  07/02/18 1503     Color, UA Yellow      Appearance, UA Clear     pH, UA 6.5     Specific Gravity, UA 1.011     Glucose, UA Negative     Ketones, UA Trace (A)     Bilirubin, UA Negative     Blood, UA Trace (A)     Protein,  mg/dL (2+) (A)     Leuk Esterase, UA Negative     Nitrite, UA Negative     Urobilinogen, UA 1.0 E.U./dL    [place] Draw [[id number]] Collected:  07/02/18 1329    Specimen:  Blood Updated:  07/02/18 1430    Narrative:       The following orders were created for panel order [place] Draw.  Procedure                               Abnormality         Status                     ---------                               -----------         ------                     Light Blue Top[[id number]]                                   Final result               Green Top (Gel)[[id number]]                                  Final result               Lavender Top[[id number]]                                     Final result               Gold Top - SST[[id number]]                                   Final result                 Please view results for these tests on the individual orders.    Light Blue Top [[id number]] Collected:  07/02/18 1329    Specimen:  Blood Updated:  07/02/18 1430     Extra Tube hold for add-on     Comment: Auto resulted       Green Top (Gel) [[id number]] Collected:  07/02/18 1329    Specimen:  Blood Updated:  07/02/18 1430     Extra Tube Hold for add-ons.     Comment: Auto resulted.       Lavender Top [[id number]] Collected:  07/02/18 1329    Specimen:  Blood Updated:  07/02/18 1430     Extra Tube hold for add-on     Comment: Auto resulted       Gold Top - SST [[id number]] Collected:  07/02/18 1329    Specimen:  Blood Updated:  07/02/18 1430     Extra Tube Hold for add-ons.     Comment: Auto resulted.       Troponin [[id number]]  (Normal) Collected:  07/02/18 1329    Specimen:  Blood Updated:  07/02/18 1404     Troponin I 0.023 ng/mL     BNP [[id number]]  (Abnormal) Collected:  07/02/18 1329    Specimen:  Blood Updated:  07/02/18 1404      proBNP 1,960.0 (H) pg/mL     Protime-INR [[id number]]  (Normal) Collected:  07/02/18 1329    Specimen:  Blood Updated:  07/02/18 1357     Protime 13.2 Seconds      INR 1.02    Narrative:       Therapeutic range for most indications is 2.0-3.0 INR,  or 2.5-3.5 for mechanical heart valves.    CBC & Differential [[id number]] Collected:  07/02/18 1329    Specimen:  Blood Updated:  07/02/18 1342    Narrative:       The following orders were created for panel order CBC & Differential.  Procedure                               Abnormality         Status                     ---------                               -----------         ------                     CBC Auto Differential[[id number]]        Normal              Final result                 Please view results for these tests on the individual orders.    CBC Auto Differential [[id number]]  (Normal) Collected:  07/02/18 1329    Specimen:  Blood Updated:  07/02/18 1342     WBC 7.11 10*3/mm3      RBC 4.41 10*6/mm3      Hemoglobin 12.7 g/dL      Hematocrit 38.5 %      MCV 87.3 fL      MCH 28.8 pg      MCHC 33.0 g/dL      RDW 14.3 %      RDW-SD 45.7 fl      MPV 10.5 fL      Platelets 228 10*3/mm3      Neutrophil % 76.3 %      Lymphocyte % 16.2 %      Monocyte % 6.5 %      Eosinophil % 0.4 %      Basophil % 0.3 %      Immature Grans % 0.3 %      Neutrophils, Absolute 5.43 10*3/mm3      Lymphocytes, Absolute 1.15 10*3/mm3      Monocytes, Absolute 0.46 10*3/mm3      Eosinophils, Absolute 0.03 10*3/mm3      Basophils, Absolute 0.02 10*3/mm3      Immature Grans, Absolute 0.02 10*3/mm3            Imaging Results (last 24 hours)     Procedure Component Value Units Date/Time    CT Head Without Contrast [[id number]] Collected:  07/02/18 1345     Updated:  07/02/18 1408    Narrative:       .      EXAMINATION:  Computed Tomography      REGION:  Head             INDICATION:  ams    HISTORY:  CORRELATIVE IMAGING:    CT head 1/10/18    TECHNIQUE:  iv contrast:  no    This exam was performed  according to the departmental  dose-optimization program which includes automated exposure  control, adjustment of the mA and/or kV according to patient size  and/or use of iterative reconstruction technique.              COMMENTS:                - atrophy:              wnl for age    - cortex:                wnl for age    - deep white mat:  wnl for age    - hemorrhage:       none       - fluid collection: no intra/extra axial fluid collection     - mass / lesion:     no focal parenchymal lesion(s)       - gray/white jxn:   borders preserved         - brain stem:         wnl       - cerebellum:        wnl       - globes / retro:     wnl       - ventricles:          normal size / configuration       - midline shift:      no       - sinuses:              wnl       - mastoids:           wnl        - osseous:             wnl       - misc.:  .       Impression:       CONCLUSION:    1.  Negative examination for acute intracranial pathology.           If signs or symptoms persist beyond reasonable expectations, a  MRI examination is suggested as is deemed clinically appropriate.                     Electronically signed by:  WERNER Simental MD  7/2/2018 2:07 PM  CDT Workstation: [id number]    XR Chest 1 View [[id number]] Collected:  07/02/18 1328     Updated:  07/02/18 1349    Narrative:         PORTABLE CHEST    HISTORY: Chest pain    Portable AP upright film of the chest was obtained at 1:18 PM.  COMPARISON: 3/1/2018    Chronic obstructive pulmonary disease.  No acute infiltrate.  Old granulomatous disease is present.  Cardiomegaly.  The pulmonary vasculature is not increased.  No pleural effusion.  No pneumothorax.  Healing or healed fractures of the right fifth, sixth and seventh  ribs and the neck of the right humerus.  Surgical clips right upper quadrant of the abdomen.      Impression:       CONCLUSION:  Chronic obstructive pulmonary disease.  No acute infiltrate.  Cardiomegaly.  Healing or healed fractures of the  right fifth, sixth and seventh  ribs and the neck of the right humerus.    [id number]    Electronically signed by:  Andres Cornejo MD  7/2/2018 1:48 PM CDT  Workstation: [email or web address]        I reviewed the patient's new clinical results.    ASSESSMENT AND PLAN: This is a 62 y.o. female with:    Active Hospital Problems (** Indicates Principal Problem)    Diagnosis Date Noted   • Peripheral edema [R60.9] 07/02/2018     - restart home dose lasix 20mg daily     • Recurrent falls while walking [R29.6] 07/02/2018     PT/OT  Case management consult - SNF placement     • Insomnia [G47.00] 07/02/2018     Continue home risperdal     • Recurrent major depressive disorder, in full remission [F33.42] 04/05/2018     Continue home prozac     • Stage 3 severe COPD by GOLD classification (CMS/[facility]) [J44.9] 03/01/2018     Continue home nebs and symbicort     • Hypokalemia [E87.6] 01/10/2018     Started 4a58ffg IV and 40meq PO in ED  Replace and monitor  Check magnesium       • Essential hypertension [I10]      Continue home toprol 25mg daily  Increase home losartan from 50mg to 100mg daily  Lasix 20mg PO daily  PRN hydralazine     • Gastroesophageal reflux disease [K21.9]      Pepcid        Resolved Hospital Problems    Diagnosis Date Noted Date Resolved   No resolved problems to display.       DVT prophylaxis: SCDs/TEDs  Code status: Full code  TAMERA # [id number], reviewed and consistent with patient reported medications.    Expected Length of Stay: Where: SNF and When:  1-2 days     I discussed the patients findings and my recommendations with patient.     Dr. Linn is the attending on record at time of admission, He is aware of the patient's status and agrees with the above history and physical.    Louie Roblero MD PGY2  Family Practice Residency  [address], [place] [address]  Office: [phone]      This document has been electronically signed by Louie Roblero MD on July 2, 2018  4:53 PM       Electronically signed by Sunny Linn DO at 7/3/2018  9:28 AM

## 2018-07-05 NOTE — PLAN OF CARE
Problem: Patient Care Overview  Goal: Plan of Care Review  Outcome: Ongoing (interventions implemented as appropriate)   07/05/18 0104   Coping/Psychosocial   Plan of Care Reviewed With patient   Plan of Care Review   Progress no change     Goal: Individualization and Mutuality  Outcome: Ongoing (interventions implemented as appropriate)    Goal: Discharge Needs Assessment  Outcome: Ongoing (interventions implemented as appropriate)    Goal: Interprofessional Rounds/Family Conf  Outcome: Ongoing (interventions implemented as appropriate)      Problem: Fall Risk (Adult)  Goal: Absence of Fall  Outcome: Ongoing (interventions implemented as appropriate)      Problem: Skin Injury Risk (Adult)  Goal: Skin Health and Integrity  Outcome: Ongoing (interventions implemented as appropriate)      Problem: Cardiac: Heart Failure (Adult)  Goal: Signs and Symptoms of Listed Potential Problems Will be Absent, Minimized or Managed (Cardiac: Heart Failure)  Outcome: Ongoing (interventions implemented as appropriate)

## 2018-07-05 NOTE — PLAN OF CARE
Problem: Patient Care Overview  Goal: Plan of Care Review  Outcome: Ongoing (interventions implemented as appropriate)   07/05/18 8638   Coping/Psychosocial   Plan of Care Reviewed With patient   Plan of Care Review   Progress no change   OTHER   Outcome Summary Pt completed UB ADL with Min A while sitting up in bed. Pt required CGA for self feeding task. No goals met at this time.

## 2018-07-05 NOTE — THERAPY TREATMENT NOTE
Acute Care - Occupational Therapy Treatment Note  [facility]     Patient Name: Michelle Child  : 1955  MRN: [id number]  Today's Date: 2018  Onset of Illness/Injury or Date of Surgery: 18  Date of Referral to OT: 18  Referring Physician: JANAK Roblero MD.     Admit Date: 2018       ICD-10-CM ICD-9-CM   1. Peripheral edema R60.9 782.3   2. Acute on chronic congestive heart failure, unspecified congestive heart failure type (CMS/HCC) I50.9 428.0   3. Uncontrolled hypertension I10 401.9   4. Hypokalemia E87.6 276.8   5. Impaired mobility and ADLs Z74.09 799.89   6. Impaired physical mobility Z74.09 781.99     Patient Active Problem List   Diagnosis   • Migraine   • Iron deficiency anemia   • Gastroesophageal reflux disease   • Essential hypertension   • Coronary arteriosclerosis   • Bilateral pseudophakia   • Astigmatism   • Hypokalemia   • Cerebral microvascular disease   • Pulmonary hypertension   • Rheumatic tricuspid valve regurgitation   • Rheumatic mitral stenosis   • Acute diastolic CHF (congestive heart failure) (CMS/[facility])   • Pulmonary hypertension due to left heart valvular disease   • Cor pulmonale, chronic (CMS/[facility])   • Folic acid deficiency   • Stage 3 severe COPD by GOLD classification (CMS/[facility])   • Personal history of tobacco use, presenting hazards to health   • Tobacco abuse, in remission   • Recurrent major depressive disorder, in full remission (CMS/HCC)   • Weakness   • Recurrent falls while walking   • Localized swelling of lower extremity   • Peripheral edema   • Insomnia     Past Medical History:   Diagnosis Date   • Alkaline phosphatase raised    • Astigmatism    • Bilateral pseudophakia    • Coronary arteriosclerosis    • Depressive disorder    • Edema of lower extremity    • Encounter for general adult medical examination with abnormal findings    • Essential hypertension    • Gastroesophageal reflux disease    • H/O bone density study     DXA BONE DENSITY  AXIAL    04/22/2016   • H/O screening mammography     SCREENING MAMMOGRAPHY     04/25/2016   • Hx of screening mammography     x3; declined screening, understands risks and benefits and still declines      07/24/2015   • Influenza vaccine administered     INFLUENZA IMMUN ADMIN OR PREV RECV'D   x4       04/01/2016   • Insomnia    • Iron deficiency    • Migraine    • Tobacco dependence     continuous       Past Surgical History:   Procedure Laterality Date   • APPENDECTOMY     • AUGMENTATION MAMMAPLASTY     • BREAST IMPLANT SURGERY     • CATARACT EXTRACTION WITH INTRAOCULAR LENS IMPLANT  12/17/2003    Phacoemulsification of a cataract with intraocular lens implant of rigt eye, Nixon model SA 60-AT; serial # [id number];20.5 diopter   • CHOLECYSTECTOMY  08/06/2007    Laparoscopic cholecystectomy. Symptomatic gallstones   • COLONOSCOPY  10/15/2013    declined stool cards and colonscopy   • ENDOSCOPY AND COLONOSCOPY  05/22/2014    Internal & external hemorrhoids found.   • ENDOSCOPY W/ PEG TUBE PLACEMENT  05/22/2014    EGD w/ tube: Normal esopahgus. Gastritis in stomach. Biopsy taken. Normal duodenum. Biopsy taken.   • INJECTION OF MEDICATION  07/24/2013    Inj(s) Tend-Sheath, Ligament, Single   • INJECTION OF MEDICATION  11/17/2014    Kenalog x6   • INJECTION OF MEDICATION  04/01/2016    PNEUMOC VAC/ADMIN/RCVD    • INJECTION OF MEDICATION  12/05/2014    Toradol   • OTHER SURGICAL HISTORY  01/19/2014    Drain/Inject Major Joint   x2   • PAP SMEAR  10/06/2011   • PARTIAL HYSTERECTOMY      Partial hyst    • TONSILLECTOMY     • TUBAL ABDOMINAL LIGATION         Therapy Treatment          Rehabilitation Treatment Summary     Row Name 07/05/18 5570             Treatment Time/Intention    Discipline occupational therapy assistant  -BB      Document Type therapy note (daily note)  -BB      Subjective Information no complaints  -BB      Mode of Treatment individual therapy;occupational therapy  -BB      Patient/Family Observations  No family present  -BB      Total Minutes, Occupational Therapy Treatment 84  -BB2      Therapy Frequency (OT Eval) other (see comments)   3-14 tx's/wk  -BB      Patient Effort fair  -BB      Existing Precautions/Restrictions fall  -BB      Recorded by [BB] PAMELLA Ngo/L 07/05/18 1256  [BB2] PAMELLA Ngo/L 07/05/18 1305      Row Name 07/05/18 0750             Vital Signs    Pre Systolic BP Rehab 141  -BB      Pre Treatment Diastolic BP 66  -BB      Pretreatment Heart Rate (beats/min) 66  -BB      Posttreatment Heart Rate (beats/min) 68  -BB      Pre SpO2 (%) 94  -BB      O2 Delivery Pre Treatment room air  -BB      Post SpO2 (%) 96  -BB      O2 Delivery Post Treatment room air  -BB      Pre Patient Position Supine  -BB      Post Patient Position Supine  -BB      Recorded by [BB] PAMELLA Ngo/L 07/05/18 1256      Row Name 07/05/18 0750             Cognitive Assessment/Intervention- PT/OT    Affect/Mental Status (Cognitive) WFL  -BB      Orientation Status (Cognition) oriented x 3  -BB      Follows Commands (Cognition) WFL  -BB      Safety Deficit (Cognitive) moderate deficit  -BB      Personal Safety Interventions fall prevention program maintained;gait belt;nonskid shoes/slippers when out of bed;supervised activity  -BB      Recorded by [BB] PAMELLA Ngo/L 07/05/18 1256      Row Name 07/05/18 0750             Bed Mobility Assessment/Treatment    Supine-Sit [place] (Bed Mobility) moderate assist (50% patient effort)  -BB      Sit-Supine [place] (Bed Mobility) moderate assist (50% patient effort)  -BB      Bed Mobility, Safety Issues decreased use of arms for pushing/pulling;decreased use of legs for bridging/pushing;impaired trunk control for bed mobility  -BB      Assistive Device (Bed Mobility) bed rails;head of bed elevated  -BB      Recorded by [BB] PAMELLA Ngo/L 07/05/18 1256      Row Name 07/05/18 0750             Bathing  Assessment/Intervention    Bathing [place] Level lower body;upper body;set up;contact guard assist;maximum assist (25% patient effort);verbal cues   CGA for UB and max Assist for LB  -BB      Bathing Position long sitting  -BB      Recorded by [BB] PAMELLA Ngo/L 07/05/18 1256      Row Name 07/05/18 0750             Upper Body Dressing Assessment/Training    Upper Body Dressing [place] Level doff;don;minimum assist (75% patient effort);verbal cues   hospital gown  -BB      Upper Body Dressing Position long sitting  -BB      Recorded by [BB] PAMELLA Ngo/L 07/05/18 1256      Row Name 07/05/18 0750             Lower Body Dressing Assessment/Training    Lower Body Dressing [place] Level don;socks;maximum assist (25% patient effort)  -BB      Lower Body Dressing Position long sitting  -BB      Recorded by [BB] PAMELLA Ngo/L 07/05/18 1256      Row Name 07/05/18 0750             Grooming Assessment/Training    [place] Level (Grooming) hair care, combing/brushing;wash face, hands;oral care regimen;set up;supervision  -BB      Grooming Position long sitting  -BB      Recorded by [BB] PAMELLA Ngo/L 07/05/18 1256      Row Name 07/05/18 0750             Self-Feeding Assessment/Training    [place] Level (Feeding) contact guard assist;verbal cues;prepare tray/open items  -BB      Position (Self-Feeding) sitting up in bed  -BB      Recorded by [BB] PAMELLA Ngo/L 07/05/18 1256      Row Name 07/05/18 0750             Static Sitting Balance    Level of [place] (Unsupported Sitting, Static Balance) minimal assist, 75% patient effort  -BB      Sitting Position (Unsupported Sitting, Static Balance) sitting on edge of bed  -BB      Time Able to Maintain Position (Unsupported Sitting, Static Balance) 3 to 4 minutes  -BB      Recorded by [BB] PAMELLA Ngo/L 07/05/18 1256      Row Name 07/05/18 0750             Positioning and  Restraints    Pre-Treatment Position in bed  -BB      Post Treatment Position bed  -BB      In Bed call light within reach;encouraged to call for assist;exit alarm on;fowlers  -BB      Recorded by [BB] PAMELLA Ngo/L 07/05/18 1256      Row Name 07/05/18 0750             Pain Scale: Numbers Pre/Post-Treatment    Pain Scale: Numbers, Pretreatment 0/10 - no pain  -BB      Pain Scale: Numbers, Post-Treatment 0/10 - no pain  -BB      Recorded by [BB] PAMELLA Ngo/L 07/05/18 1256      Row Name 07/05/18 0750             Plan of Care Review    Plan of Care Reviewed With patient  -BB      Recorded by [BB] PAMELLA Ngo/L 07/05/18 1256      Row Name 07/05/18 0750             Outcome Summary/Treatment Plan (OT)    Daily Summary of Progress (OT) progress toward functional goals as expected  -BB      Plan for Continued Treatment (OT) continue POC  -BB      Anticipated Discharge Disposition (OT) skilled nursing facility  -BB      Recorded by [BB] PAMELLA Ngo/DALTON 07/05/18 1256        User Key  (r) = Recorded By, (t) = Taken By, (c) = Cosigned By    Initials Name Effective Dates Discipline    BB JUDE NgoA/DALTON 03/07/18 -  OT         Non-skid socks and gait belt in place. Toileting offered. Call light and needs within reach. Pt advised to not get up alone and call the nurse for assistance.  Bed alarm on.             OT Rehab Goals     Row Name 07/05/18 0750             Transfer Goal 1 (OT)    Activity/Assistive Device (Transfer Goal 1, OT) sit-to-stand/stand-to-sit;bed-to-chair/chair-to-bed;toilet;walker, rolling  -BB      [place] Level/Cues Needed (Transfer Goal 1, OT) contact guard assist  -BB      Time Frame (Transfer Goal 1, OT) long term goal (LTG);by discharge  -BB      Progress/Outcome (Transfer Goal 1, OT) goal not met;continuing progress toward goal  -BB         Bathing Goal 1 (OT)    Activity/Assistive Device (Bathing Goal 1, OT) upper body bathing;bath  mitt;long-handled sponge  -BB      [place] Level/Cues Needed (Bathing Goal 1, OT) minimum assist (75% or more patient effort)  -BB      Time Frame (Bathing Goal 1, OT) long term goal (LTG);by discharge  -BB      Progress/Outcomes (Bathing Goal 1, OT) goal not met;continuing progress toward goal  -BB         Dressing Goal 1 (OT)    Activity/Assistive Device (Dressing Goal 1, OT) upper body dressing  -BB      [place]/Cues Needed (Dressing Goal 1, OT) minimum assist (75% or more patient effort)  -BB      Time Frame (Dressing Goal 1, OT) long term goal (LTG);by discharge  -BB      Progress/Outcome (Dressing Goal 1, OT) goal not met;continuing progress toward goal  -BB         Self-Feeding Goal 1 (OT)    Activity/Assistive Device (Self-Feeding Goal 1, OT) self-feeding skills, all  -BB      [place] Level/Cues Needed (Self-Feeding Goal 1, OT) conditional independence  -BB      Time Frame (Self-Feeding Goal 1, OT) long term goal (LTG);by discharge  -BB      Progress/Outcomes (Self-Feeding Goal 1, OT) goal not met;continuing progress toward goal  -BB        User Key  (r) = Recorded By, (t) = Taken By, (c) = Cosigned By    Initials Name Provider Type Discipline    PAMELLA Henderson/L Occupational Therapy Assistant OT        Occupational Therapy Education     Title: PT OT SLP Therapies (Active)     Topic: Occupational Therapy (Active)     Point: ADL training (Active)     Description: Instruct learner(s) on proper safety adaptation and remediation techniques during self care or transfers.   Instruct in proper use of assistive devices.   Learning Progress Summary     Learner Status Readiness Method Response Comment Documented by    Patient Active Acceptance E NR  BB 07/05/18 1258     Done Acceptance E,TB VU,NR Educated on the role of OT and POC, educated on the benefit of activity and safety with bed mobility, t/f, and functional mobility MR 07/03/18 1127          Point: Precautions (Done)      Description: Instruct learner(s) on prescribed precautions during self-care and functional transfers.   Learning Progress Summary     Learner Status Readiness Method Response Comment Documented by    Patient Done Acceptance E,DONLADO SINGH,NR Educated on the role of OT and POC, educated on the benefit of activity and safety with bed mobility, t/f, and functional mobility MR 07/03/18 1127          Point: Body mechanics (Active)     Description: Instruct learner(s) on proper positioning and spine alignment during self-care, functional mobility activities and/or exercises.   Learning Progress Summary     Learner Status Readiness Method Response Comment Documented by    Patient Active Acceptance E NR  BB 07/05/18 1258     Done Acceptance E,KEVEN CRUZ Educated on the role of OT and POC, educated on the benefit of activity and safety with bed mobility, t/f, and functional mobility MR 07/03/18 1127                      User Key     Initials Effective Dates Name Provider Type Discipline     03/07/18 -  PAMELLA Ngo/L Occupational Therapy Assistant OT     04/03/18 -  Janet Johnson, OT Occupational Therapist OT                OT Recommendation and Plan  Outcome Summary/Treatment Plan (OT)  Daily Summary of Progress (OT): progress toward functional goals as expected  Plan for Continued Treatment (OT): continue POC  Anticipated Discharge Disposition (OT): skilled nursing facility  Therapy Frequency (OT Eval): other (see comments) (3-14 tx's/wk)  Daily Summary of Progress (OT): progress toward functional goals as expected  Plan of Care Review  Plan of Care Reviewed With: patient  Plan of Care Reviewed With: patient  Outcome Summary: Pt completed UB ADL with Min A while sitting up in bed. Pt required CGA for  self feeding task. No goals met at this time.        Outcome Measures     Row Name 07/05/18 0750 07/04/18 1345 07/04/18 0930       How much help from another person do you currently need...    Turning from your  back to your side while in flat bed without using bedrails?  --  -- 3  -CZ    Moving from lying on back to sitting on the side of a flat bed without bedrails?  --  -- 3  -CZ    Moving to and from a bed to a chair (including a wheelchair)?  --  -- 3  -CZ    Standing up from a chair using your arms (e.g., wheelchair, bedside chair)?  --  -- 3  -CZ    Climbing 3-5 steps with a railing?  --  -- 2  -CZ    To walk in hospital room?  --  -- 3  -CZ    AM-PAC 6 Clicks Score  --  -- 17  -CZ       How much help from another is currently needed...    Putting on and taking off regular lower body clothing? 1  -BB 1  -KD  --    Bathing (including washing, rinsing, and drying) 2  -BB 2  -KD  --    Toileting (which includes using toilet bed pan or urinal) 1  -BB 1  -KD  --    Putting on and taking off regular upper body clothing 2  -BB 2  -KD  --    Taking care of personal grooming (such as brushing teeth) 3  -BB 2  -KD  --    Eating meals 3  -BB 3  -KD  --    Score 12  -BB 11  -KD  --       Functional Assessment    Outcome Measure Options  --  -- AM-PAC 6 Clicks Basic Mobility (PT)  -CZ    Row Name 07/03/18 0903             How much help from another is currently needed...    Putting on and taking off regular lower body clothing? 1  -MR      Bathing (including washing, rinsing, and drying) 2  -MR      Toileting (which includes using toilet bed pan or urinal) 1  -MR      Putting on and taking off regular upper body clothing 2  -MR      Taking care of personal grooming (such as brushing teeth) 2  -MR      Eating meals 3  -MR      Score 11  -MR         Functional Assessment    Outcome Measure Options AM-PAC 6 Clicks Daily Activity (OT)  -MR        User Key  (r) = Recorded By, (t) = Taken By, (c) = Cosigned By    Initials Name Provider Type    BB PAMELLA Ngo/L Occupational Therapy Assistant    KD JUDE ChristiansenA/L Occupational Therapy Assistant    CZ Hadley Ramirez, PT Physical Therapist    MR Janet Johnson, OT  Occupational Therapist           Time Calculation:         Time Calculation- OT     Row Name 07/05/18 1304             Time Calculation- OT    OT Start Time 0750  -BB      OT Stop Time 0914  -BB      OT Time Calculation (min) 84 min  -      Total Timed Code Minutes- OT 54 minute(s)  -BB      OT Received On 07/05/18  -        User Key  (r) = Recorded By, (t) = Taken By, (c) = Cosigned By    Initials Name Provider Type     PAMELLA Ngo/L Occupational Therapy Assistant           Therapy Suggested Charges     Code   Minutes Charges    None           Therapy Charges for Today     Code Description Service Date Service Provider Modifiers Qty    32858461136 HC OT SELF CARE/MGMT/TRAIN EA 15 MIN 7/5/2018 PAMELLA Ngo/L GO 6          OT G-codes  OT Professional Judgement Used?: Yes  OT Functional Scales Options: AM-PAC 6 Clicks Daily Activity (OT)  Score: 11  Functional Limitation: Self care  Self Care Current Status (): At least 60 percent but less than 80 percent impaired, limited or restricted  Self Care Goal Status (): At least 40 percent but less than 60 percent impaired, limited or restricted    KINGSLEY Ngo  7/5/2018

## 2018-07-05 NOTE — PLAN OF CARE
Problem: Patient Care Overview  Goal: Plan of Care Review  Outcome: Ongoing (interventions implemented as appropriate)   07/05/18 6108   Coping/Psychosocial   Plan of Care Reviewed With patient   Plan of Care Review   Progress improving   OTHER   Outcome Summary Pt requires increaed time for all mobility. Shuffling gait and decreased step length. Pt required min A for sequencing and AD managment. as well as verbal cues for weight shifting. Pt amb ~ 40' with RW. No goals met this date. SNF placement and continued rehab recommended upon discharge.

## 2018-07-05 NOTE — PROGRESS NOTES
FAMILY MEDICINE PROGRESS NOTE  NAME: Michelle Child  : 1955  MRN: [id number]     LOS: 1 day   Code Status and Medical Interventions:   Ordered at: 18 1645     Level Of Support Discussed With:    Patient     Code Status:    CPR     Medical Interventions (Level of Support Prior to Arrest):    Full     PROVIDER OF SERVICE:     Chief Complaint:  Hypokalemia    Subjective     Interval History:  History taken from: patient RN  Subjective: Patient is a 63 yo  female who was admitted with swelling and hypokalemia.  She is feeling better.  She is waiting for nursing home placement for therapy.    Review of Systems:   Review of Systems   Constitutional: Negative for activity change and chills.   HENT: Positive for hearing loss. Negative for congestion and sore throat.    Eyes: Negative for pain and visual disturbance.   Respiratory: Negative for chest tightness and shortness of breath.    Cardiovascular: Negative for chest pain and palpitations.   Gastrointestinal: Negative for diarrhea, nausea and vomiting.   Endocrine: Negative for polyphagia and polyuria.   Genitourinary: Negative for frequency and hematuria.   Musculoskeletal: Negative for arthralgias and myalgias.   Skin: Negative for color change and pallor.   Neurological: Negative for dizziness and seizures.   Psychiatric/Behavioral: Negative for agitation and behavioral problems.       Objective     Vital Signs  Temp:  [97 °F (36.1 °C)-97.9 °F (36.6 °C)] 97.5 °F (36.4 °C)  Heart Rate:  [52-58] 54  Resp:  [18] 18  BP: (140-182)/(56-78) 140/68    Physical Exam  Physical Exam   Constitutional: She is oriented to person, place, and time. She appears well-developed and well-nourished. No distress.   HENT:   Mouth/Throat: No oropharyngeal exudate.   Neck: No JVD present.   Cardiovascular: Normal rate, regular rhythm and intact distal pulses.  Exam reveals no gallop and no friction rub.    Murmur heard.  2/6 systolic   Pulmonary/Chest: No  respiratory distress. She has no wheezes. She has no rales.   Diminished breath sounds in the bases   Abdominal: Soft. Bowel sounds are normal. She exhibits no distension. There is no tenderness. There is no rebound and no guarding.   Musculoskeletal: She exhibits no edema.   Neurological: She is alert and oriented to person, place, and time.   Skin: She is not diaphoretic.   Psychiatric: She has a normal mood and affect. Her behavior is normal.   Nursing note and vitals reviewed.      Medication Review    Current Facility-Administered Medications:   •  acetaminophen (TYLENOL) tablet 650 mg, 650 mg, Oral, Q6H PRN, Ibuarnaldo Machuca MD, 650 mg at 07/04/18 2310  •  albuterol (PROVENTIL) nebulizer solution 0.083% 2.5 mg/3mL, 2.5 mg, Nebulization, Q6H PRN, Louie Roblero MD  •  aspirin chewable tablet 81 mg, 81 mg, Oral, Daily, Louie Roblero MD, 81 mg at 07/05/18 0855  •  atorvastatin (LIPITOR) tablet 10 mg, 10 mg, Oral, Daily, Louie Roblero MD, 10 mg at 07/05/18 0856  •  bisacodyl (DULCOLAX) EC tablet 5 mg, 5 mg, Oral, Daily PRN, Louie Roblero MD  •  budesonide-formoterol (SYMBICORT) 160-4.5 MCG/ACT inhaler 2 puff, 2 puff, Inhalation, BID, Louie Roblero MD, 2 puff at 07/04/18 2022  •  famotidine (PEPCID) tablet 20 mg, 20 mg, Oral, BID, Louie Roblero MD, 20 mg at 07/05/18 0855  •  ferrous sulfate EC tablet 324 mg, 324 mg, Oral, BID With Meals, Louie Roblero MD, 324 mg at 07/05/18 0855  •  FLUoxetine (PROzac) capsule 60 mg, 60 mg, Oral, Daily, LUPE Garcia, 60 mg at 07/05/18 0856  •  folic acid (FOLVITE) tablet 1 mg, 1 mg, Oral, Daily, Louie Roblero MD, 1 mg at 07/05/18 0856  •  furosemide (LASIX) tablet 20 mg, 20 mg, Oral, Daily, Louie Roblero MD, 20 mg at 07/05/18 0856  •  hydrALAZINE (APRESOLINE) injection 10 mg, 10 mg, Intravenous, Q6H PRN, Louie Roblero MD  •  losartan (COZAAR) tablet 100 mg, 100 mg,  Oral, Q24H, Louie Roblero MD, 100 mg at 07/05/18 0856  •  metoprolol succinate XL (TOPROL-XL) 24 hr tablet 25 mg, 25 mg, Oral, Daily, Louie Roblero MD, 25 mg at 07/05/18 0856  •  risperiDONE (risperDAL) tablet 1 mg, 1 mg, Oral, Nightly, Louie Roblero MD, 1 mg at 07/04/18 2025  •  sodium chloride 0.9 % flush 1-10 mL, 1-10 mL, Intravenous, PRN, Louie Roblero MD  •  sodium chloride 0.9 % flush 10 mL, 10 mL, Intravenous, PRN, Blake Euceda MD     Diagnostic Data      I reviewed the patient's new clinical results and imaging.      Assessment/Plan     Active Hospital Problems (** Indicates Principal Problem)    Diagnosis Date Noted   • **Hypokalemia [E87.6] 01/10/2018     Now resolved   Started 0r34stf IV and 40meq PO in ED  Mag 1.6   S/p replacement. Will continue to monitor.        • Peripheral edema [R60.9] 07/02/2018     - home dose lasix 20mg daily     • Recurrent falls while walking [R29.6] 07/02/2018     - PT/OT  - Case management consulted for SNF placement. [facility] is not currently accepting new patients. Patient unsure of her second choice SNF. The son would like to know other nursing homes where Dr. Berry sees patients.     • Insomnia [G47.00] 07/02/2018     Continue home risperdal     • Recurrent major depressive disorder, in full remission (CMS/HCC) [F33.42] 04/05/2018     Continue home prozac     • Stage 3 severe COPD by GOLD classification (CMS/HCC) [J44.9] 03/01/2018     Continue home nebs and symbicort     • Pulmonary hypertension due to left heart valvular disease [I27.22, I38] 01/15/2018   • Rheumatic mitral stenosis [I05.0] 01/15/2018     Cards Consultation     • Rheumatic tricuspid valve regurgitation [I07.1] 01/14/2018   • Essential hypertension [I10]      Continue home toprol 25mg daily  Current BP: (!) 182/78  Home losartan increased to 100mg daily  Lasix 20mg PO daily  PRN hydralazine     • Gastroesophageal reflux disease [K21.9]      Pepcid         Resolved Hospital Problems    Diagnosis Date Noted Date Resolved   No resolved problems to display.         DVT prophylaxis: SCDs/TEDs      Disposition:Nursing Home    I have reviewed the notes, assessments, and/or procedures performed by Dr. Yeboah, I concur with her/his documentation of Michelle Child.        This document has been electronically signed by Bridget Ortega MD on July 5, 2018 1:43 PM

## 2018-07-06 LAB
ANION GAP SERPL CALCULATED.3IONS-SCNC: 8 MMOL/L (ref 5–15)
BASOPHILS # BLD AUTO: 0.02 10*3/MM3 (ref 0–0.2)
BASOPHILS NFR BLD AUTO: 0.3 % (ref 0–2)
BUN BLD-MCNC: 18 MG/DL (ref 7–21)
BUN/CREAT SERPL: 20 (ref 7–25)
CALCIUM SPEC-SCNC: 8.7 MG/DL (ref 8.4–10.2)
CHLORIDE SERPL-SCNC: 98 MMOL/L (ref 95–110)
CO2 SERPL-SCNC: 28 MMOL/L (ref 22–31)
CREAT BLD-MCNC: 0.9 MG/DL (ref 0.5–1)
DEPRECATED RDW RBC AUTO: 47.5 FL (ref 36.4–46.3)
EOSINOPHIL # BLD AUTO: 0.16 10*3/MM3 (ref 0–0.7)
EOSINOPHIL NFR BLD AUTO: 2.4 % (ref 0–7)
ERYTHROCYTE [DISTWIDTH] IN BLOOD BY AUTOMATED COUNT: 14.4 % (ref 11.5–14.5)
GFR SERPL CREATININE-BSD FRML MDRD: 63 ML/MIN/1.73 (ref 45–104)
GLUCOSE BLD-MCNC: 93 MG/DL (ref 60–100)
HCT VFR BLD AUTO: 39 % (ref 35–45)
HGB BLD-MCNC: 12.6 G/DL (ref 12–15.5)
IMM GRANULOCYTES # BLD: 0.02 10*3/MM3 (ref 0–0.02)
IMM GRANULOCYTES NFR BLD: 0.3 % (ref 0–0.5)
LYMPHOCYTES # BLD AUTO: 1.48 10*3/MM3 (ref 0.6–4.2)
LYMPHOCYTES NFR BLD AUTO: 22.4 % (ref 10–50)
MCH RBC QN AUTO: 28.8 PG (ref 26.5–34)
MCHC RBC AUTO-ENTMCNC: 32.3 G/DL (ref 31.4–36)
MCV RBC AUTO: 89 FL (ref 80–98)
MONOCYTES # BLD AUTO: 0.57 10*3/MM3 (ref 0–0.9)
MONOCYTES NFR BLD AUTO: 8.6 % (ref 0–12)
NEUTROPHILS # BLD AUTO: 4.35 10*3/MM3 (ref 2–8.6)
NEUTROPHILS NFR BLD AUTO: 66 % (ref 37–80)
PLATELET # BLD AUTO: 233 10*3/MM3 (ref 150–450)
PMV BLD AUTO: 11.4 FL (ref 8–12)
POTASSIUM BLD-SCNC: 3.5 MMOL/L (ref 3.5–5.1)
RBC # BLD AUTO: 4.38 10*6/MM3 (ref 3.77–5.16)
SODIUM BLD-SCNC: 134 MMOL/L (ref 137–145)
WBC NRBC COR # BLD: 6.6 10*3/MM3 (ref 3.2–9.8)

## 2018-07-06 PROCEDURE — G0378 HOSPITAL OBSERVATION PER HR: HCPCS

## 2018-07-06 PROCEDURE — 97535 SELF CARE MNGMENT TRAINING: CPT

## 2018-07-06 PROCEDURE — 99225 PR SBSQ OBSERVATION CARE/DAY 25 MINUTES: CPT | Performed by: STUDENT IN AN ORGANIZED HEALTH CARE EDUCATION/TRAINING PROGRAM

## 2018-07-06 PROCEDURE — 97110 THERAPEUTIC EXERCISES: CPT

## 2018-07-06 PROCEDURE — 97116 GAIT TRAINING THERAPY: CPT

## 2018-07-06 PROCEDURE — 94799 UNLISTED PULMONARY SVC/PX: CPT

## 2018-07-06 PROCEDURE — 94760 N-INVAS EAR/PLS OXIMETRY 1: CPT

## 2018-07-06 PROCEDURE — 97530 THERAPEUTIC ACTIVITIES: CPT

## 2018-07-06 PROCEDURE — 80048 BASIC METABOLIC PNL TOTAL CA: CPT | Performed by: FAMILY MEDICINE

## 2018-07-06 PROCEDURE — 85025 COMPLETE CBC W/AUTO DIFF WBC: CPT | Performed by: FAMILY MEDICINE

## 2018-07-06 RX ADMIN — FERROUS SULFATE TAB EC 324 MG (65 MG FE EQUIVALENT) 324 MG: 324 (65 FE) TABLET DELAYED RESPONSE at 09:29

## 2018-07-06 RX ADMIN — BUDESONIDE AND FORMOTEROL FUMARATE DIHYDRATE 2 PUFF: 160; 4.5 AEROSOL RESPIRATORY (INHALATION) at 20:07

## 2018-07-06 RX ADMIN — ASPIRIN 81 MG 81 MG: 81 TABLET ORAL at 09:30

## 2018-07-06 RX ADMIN — BUDESONIDE AND FORMOTEROL FUMARATE DIHYDRATE 2 PUFF: 160; 4.5 AEROSOL RESPIRATORY (INHALATION) at 07:48

## 2018-07-06 RX ADMIN — FOLIC ACID 1 MG: 1 TABLET ORAL at 09:30

## 2018-07-06 RX ADMIN — ATORVASTATIN CALCIUM 10 MG: 10 TABLET, FILM COATED ORAL at 09:30

## 2018-07-06 RX ADMIN — FAMOTIDINE 20 MG: 20 TABLET ORAL at 09:30

## 2018-07-06 RX ADMIN — FAMOTIDINE 20 MG: 20 TABLET ORAL at 21:37

## 2018-07-06 RX ADMIN — FUROSEMIDE 20 MG: 20 TABLET ORAL at 09:39

## 2018-07-06 RX ADMIN — LOSARTAN POTASSIUM 100 MG: 50 TABLET, FILM COATED ORAL at 09:40

## 2018-07-06 RX ADMIN — RISPERIDONE 1 MG: 1 TABLET ORAL at 21:37

## 2018-07-06 RX ADMIN — FLUOXETINE 60 MG: 20 CAPSULE ORAL at 09:30

## 2018-07-06 RX ADMIN — FERROUS SULFATE TAB EC 324 MG (65 MG FE EQUIVALENT) 324 MG: 324 (65 FE) TABLET DELAYED RESPONSE at 17:39

## 2018-07-06 NOTE — PROGRESS NOTES
FAMILY MEDICINE DAILY PROGRESS NOTE    NAME: Michelle Child  : 1955  MRN: [id number]      LOS: 1 day     PROVIDER OF SERVICE: Aldo Yeboah Jr, MD    Chief Complaint: Hypokalemia    Subjective:     Interval History:  History taken from: patient chart   No acute events overnight.  Patient feels well this morning.  Her potassium has improved.  We are waiting on placement to a nursing home where Dr. Berry will be able to follow her.  BP has been elevated overnight.  PO clonidine & hydralazine were tried, however systolic BP remains in 180s.    Review of Systems:   Review of Systems   Constitutional: Negative for activity change, chills and fever.   HENT: Positive for hearing loss. Negative for congestion and sore throat.    Eyes: Negative for pain and visual disturbance.   Respiratory: Negative for chest tightness and shortness of breath.    Cardiovascular: Negative for chest pain and palpitations.   Gastrointestinal: Negative for abdominal pain, diarrhea, nausea and vomiting.   Endocrine: Negative for polyphagia and polyuria.   Genitourinary: Negative for frequency and hematuria.   Musculoskeletal: Negative for arthralgias and myalgias.   Skin: Negative for color change and pallor.   Neurological: Negative for dizziness and seizures.   Psychiatric/Behavioral: Negative for agitation and behavioral problems.       Objective:     Vital Signs  Temp:  [96.4 °F (35.8 °C)-98.7 °F (37.1 °C)] 97.1 °F (36.2 °C)  Heart Rate:  [43-58] 50  Resp:  [16-18] 18  BP: (132-140)/(62-68) 132/63  Body mass index is 20.13 kg/m².    Physical Exam:  Physical Exam   Constitutional: She is oriented to person, place, and time. Vital signs are normal. She appears well-developed and well-nourished. No distress.   HENT:   Head: Normocephalic and atraumatic.   Right Ear: External ear normal.   Left Ear: External ear normal.   Nose: Nose normal.   Eyes: Conjunctivae and EOM are normal. Pupils are equal, round, and reactive to light.  Right eye exhibits no discharge. Left eye exhibits no discharge.   Neck: Neck supple. No tracheal deviation present. No thyromegaly present.   Cardiovascular: Normal rate, regular rhythm, normal heart sounds and intact distal pulses.  Exam reveals no gallop and no friction rub.    No murmur heard.  Pulmonary/Chest: Effort normal and breath sounds normal. No respiratory distress. She has no wheezes. She has no rales.   Abdominal: Soft. Bowel sounds are normal. She exhibits no distension. There is no tenderness.   Musculoskeletal: Normal range of motion. She exhibits no edema, tenderness or deformity.   Neurological: She is alert and oriented to person, place, and time. She has normal reflexes. No cranial nerve deficit.   Skin: Skin is warm and dry. Capillary refill takes less than 2 seconds. No erythema. No pallor.   Psychiatric: She has a normal mood and affect. Her behavior is normal.       Medication Review    Current Facility-Administered Medications:   •  acetaminophen (TYLENOL) tablet 650 mg, 650 mg, Oral, Q6H PRN, Claudette Machuca MD, 650 mg at 07/04/18 2310  •  albuterol (PROVENTIL) nebulizer solution 0.083% 2.5 mg/3mL, 2.5 mg, Nebulization, Q6H PRN, Louie Roblero MD  •  aspirin chewable tablet 81 mg, 81 mg, Oral, Daily, Louie Roblero MD, 81 mg at 07/05/18 0855  •  atorvastatin (LIPITOR) tablet 10 mg, 10 mg, Oral, Daily, Louie Roblero MD, 10 mg at 07/05/18 0856  •  bisacodyl (DULCOLAX) EC tablet 5 mg, 5 mg, Oral, Daily PRN, Louie Roblero MD  •  budesonide-formoterol (SYMBICORT) 160-4.5 MCG/ACT inhaler 2 puff, 2 puff, Inhalation, BID, Louie Roblero MD, 2 puff at 07/05/18 1947  •  famotidine (PEPCID) tablet 20 mg, 20 mg, Oral, BID, Louie Roblero MD, 20 mg at 07/05/18 2030  •  ferrous sulfate EC tablet 324 mg, 324 mg, Oral, BID With Meals, Louie Roblero MD, 324 mg at 07/05/18 1819  •  FLUoxetine (PROzac) capsule 60 mg, 60 mg, Oral,  Daily, Catalina Campos, APRN, 60 mg at 07/05/18 0856  •  folic acid (FOLVITE) tablet 1 mg, 1 mg, Oral, Daily, Louie Roblero MD, 1 mg at 07/05/18 0856  •  furosemide (LASIX) tablet 20 mg, 20 mg, Oral, Daily, Louie Roblero MD, 20 mg at 07/05/18 0856  •  hydrALAZINE (APRESOLINE) injection 10 mg, 10 mg, Intravenous, Q6H PRN, Louie Roblero MD  •  losartan (COZAAR) tablet 100 mg, 100 mg, Oral, Q24H, Louie Roblero MD, 100 mg at 07/05/18 0856  •  metoprolol succinate XL (TOPROL-XL) 24 hr tablet 25 mg, 25 mg, Oral, Daily, Louie Roblero MD, 25 mg at 07/05/18 0856  •  risperiDONE (risperDAL) tablet 1 mg, 1 mg, Oral, Nightly, Louie Roblero MD, 1 mg at 07/05/18 2030  •  sodium chloride 0.9 % flush 1-10 mL, 1-10 mL, Intravenous, PRN, Louie Roblero MD  •  sodium chloride 0.9 % flush 10 mL, 10 mL, Intravenous, PRN, Blake Euceda MD     Diagnostic Data    Lab Results (last 24 hours)     Procedure Component Value Units Date/Time    Basic Metabolic Panel [[id number]]  (Abnormal) Collected:  07/06/18 0517    Specimen:  Blood Updated:  07/06/18 0651     Glucose 93 mg/dL      BUN 18 mg/dL      Creatinine 0.90 mg/dL      Sodium 134 (L) mmol/L      Potassium 3.5 mmol/L      Chloride 98 mmol/L      CO2 28.0 mmol/L      Calcium 8.7 mg/dL      eGFR Non African Amer 63 mL/min/1.73      BUN/Creatinine Ratio 20.0     Anion Gap 8.0 mmol/L     CBC & Differential [[id number]] Collected:  07/06/18 0517    Specimen:  Blood Updated:  07/06/18 0630    Narrative:       The following orders were created for panel order CBC & Differential.  Procedure                               Abnormality         Status                     ---------                               -----------         ------                     CBC Auto Differential[[id number]]        Abnormal            Final result                 Please view results for these tests on the individual orders.    CBC Auto Differential  [[id number]]  (Abnormal) Collected:  07/06/18 0517    Specimen:  Blood Updated:  07/06/18 0630     WBC 6.60 10*3/mm3      RBC 4.38 10*6/mm3      Hemoglobin 12.6 g/dL      Hematocrit 39.0 %      MCV 89.0 fL      MCH 28.8 pg      MCHC 32.3 g/dL      RDW 14.4 %      RDW-SD 47.5 (H) fl      MPV 11.4 fL      Platelets 233 10*3/mm3      Neutrophil % 66.0 %      Lymphocyte % 22.4 %      Monocyte % 8.6 %      Eosinophil % 2.4 %      Basophil % 0.3 %      Immature Grans % 0.3 %      Neutrophils, Absolute 4.35 10*3/mm3      Lymphocytes, Absolute 1.48 10*3/mm3      Monocytes, Absolute 0.57 10*3/mm3      Eosinophils, Absolute 0.16 10*3/mm3      Basophils, Absolute 0.02 10*3/mm3      Immature Grans, Absolute 0.02 10*3/mm3           I reviewed the patient's new clinical results.    Assessment/Plan:     Active Hospital Problems (** Indicates Principal Problem)    Diagnosis Date Noted   • **Hypokalemia [E87.6] 01/10/2018     Now resolved   Started 7q17dli IV and 40meq PO in ED  Mag 1.6   S/p replacement. Will continue to monitor.        • Peripheral edema [R60.9] 07/02/2018     - home dose lasix 20mg daily     • Recurrent falls while walking [R29.6] 07/02/2018     - PT/OT  - Case management consulted for SNF placement. [facility] is not currently accepting new patients. Patient unsure of her second choice SNF. The son would like to know other nursing homes where Dr. Berry sees patients.     • Insomnia [G47.00] 07/02/2018     Continue home risperdal     • Recurrent major depressive disorder, in full remission (CMS/HCC) [F33.42] 04/05/2018     Continue home prozac     • Stage 3 severe COPD by GOLD classification (CMS/HCC) [J44.9] 03/01/2018     Continue home nebs and symbicort     • Pulmonary hypertension due to left heart valvular disease [I27.22, I38] 01/15/2018   • Rheumatic mitral stenosis [I05.0] 01/15/2018     Cards Consultation     • Rheumatic tricuspid valve regurgitation [I07.1] 01/14/2018   • Essential hypertension [I10]       Continue home toprol 25mg daily -- patients HR was in 40s while sleeping and 50s this AM will continue to monitor   Current BP: 132/63Home losartan increased to 100mg daily  Lasix 20mg PO daily  PRN hydralazine     • Gastroesophageal reflux disease [K21.9]      Pepcid        Resolved Hospital Problems    Diagnosis Date Noted Date Resolved   No resolved problems to display.       DVT prophylaxis: SCDs/TEDs  Code status is   Code Status and Medical Interventions:   Ordered at: 07/02/18 1645     Level Of Support Discussed With:    Patient     Code Status:    CPR     Medical Interventions (Level of Support Prior to Arrest):    Full       Plan for disposition:Where: SNF and When:  accepted    Aldo Yeboah Jr., M.D.  PGY1  Family Practice Resident  [address] [place] [address]  Phone: [phone]  Fax: [phone]      This document has been electronically signed by Aldo Yeboah Jr, MD on July 6, 2018 7:41 AM

## 2018-07-06 NOTE — NURSING NOTE
MD notified of patients HR of 46. Patient is resting quietly and easily aroused. No c/o voiced at this time by patient. No new orders received at this time. Will continue to monitor

## 2018-07-06 NOTE — PROGRESS NOTES
FAMILY MEDICINE PROGRESS NOTE  NAME: Michelle Child  : 1955  MRN: [id number]     LOS: 1 day   Code Status and Medical Interventions:   Ordered at: 18 1645     Level Of Support Discussed With:    Patient     Code Status:    CPR     Medical Interventions (Level of Support Prior to Arrest):    Full     PROVIDER OF SERVICE:     Chief Complaint:  Hypokalemia    Subjective     Interval History:  History taken from: patient RN  Subjective: Patient is a 63 yo  female who was admitted with swelling and hypokalemia.  She is feeling better.  She is waiting for nursing home placement for therapy.  Her heart rate has been running low but she is asymptomatic.    Review of Systems:   Review of Systems   Constitutional: Negative for activity change and chills.   HENT: Positive for hearing loss. Negative for congestion and sore throat.    Eyes: Negative for pain and visual disturbance.   Respiratory: Negative for chest tightness and shortness of breath.    Cardiovascular: Negative for chest pain and palpitations.   Gastrointestinal: Negative for diarrhea, nausea and vomiting.   Endocrine: Negative for polyphagia and polyuria.   Genitourinary: Negative for frequency and hematuria.   Musculoskeletal: Negative for arthralgias and myalgias.   Skin: Negative for color change and pallor.   Neurological: Negative for dizziness and seizures.   Psychiatric/Behavioral: Negative for agitation and behavioral problems.       Objective     Vital Signs  Temp:  [96.4 °F (35.8 °C)-98.7 °F (37.1 °C)] 97 °F (36.1 °C)  Heart Rate:  [43-65] 51  Resp:  [16-18] 18  BP: (132-147)/(62-66) 147/64    Physical Exam  Physical Exam   Constitutional: She is oriented to person, place, and time. She appears well-developed and well-nourished. No distress.   HENT:   Mouth/Throat: No oropharyngeal exudate.   Neck: No JVD present.   Cardiovascular: Regular rhythm and intact distal pulses.  Exam reveals no gallop and no friction rub.     Murmur heard.  2/6 systolic murmur, sinus bradycardia   Pulmonary/Chest: No respiratory distress. She has no wheezes. She has no rales.   Diminished breath sounds in the bases   Abdominal: Soft. Bowel sounds are normal. She exhibits no distension. There is no tenderness. There is no rebound and no guarding.   Musculoskeletal: She exhibits no edema.   Neurological: She is alert and oriented to person, place, and time.   Skin: She is not diaphoretic.   Psychiatric: She has a normal mood and affect. Her behavior is normal.   Nursing note and vitals reviewed.      Medication Review    Current Facility-Administered Medications:   •  acetaminophen (TYLENOL) tablet 650 mg, 650 mg, Oral, Q6H PRN, Claudette Machuca MD, 650 mg at 07/04/18 2310  •  albuterol (PROVENTIL) nebulizer solution 0.083% 2.5 mg/3mL, 2.5 mg, Nebulization, Q6H PRN, Louie Roblero MD  •  aspirin chewable tablet 81 mg, 81 mg, Oral, Daily, Louie Roblero MD, 81 mg at 07/06/18 0930  •  atorvastatin (LIPITOR) tablet 10 mg, 10 mg, Oral, Daily, Louie Roblero MD, 10 mg at 07/06/18 0930  •  bisacodyl (DULCOLAX) EC tablet 5 mg, 5 mg, Oral, Daily PRN, Louie Roblero MD  •  budesonide-formoterol (SYMBICORT) 160-4.5 MCG/ACT inhaler 2 puff, 2 puff, Inhalation, BID, Louie Roblero MD, 2 puff at 07/06/18 0748  •  famotidine (PEPCID) tablet 20 mg, 20 mg, Oral, BID, Louie Roblero MD, 20 mg at 07/06/18 0930  •  ferrous sulfate EC tablet 324 mg, 324 mg, Oral, BID With Meals, Louie Roblero MD, 324 mg at 07/06/18 0929  •  FLUoxetine (PROzac) capsule 60 mg, 60 mg, Oral, Daily, LUPE Garcia, 60 mg at 07/06/18 0930  •  folic acid (FOLVITE) tablet 1 mg, 1 mg, Oral, Daily, Louie Roblero MD, 1 mg at 07/06/18 0930  •  furosemide (LASIX) tablet 20 mg, 20 mg, Oral, Daily, Louie Roblero MD, 20 mg at 07/06/18 0939  •  hydrALAZINE (APRESOLINE) injection 10 mg, 10 mg, Intravenous, Q6H  PRN, Louie Roblero MD  •  losartan (COZAAR) tablet 100 mg, 100 mg, Oral, Q24H, Louie Roblero MD, 100 mg at 07/06/18 0940  •  metoprolol succinate XL (TOPROL-XL) 24 hr tablet 25 mg, 25 mg, Oral, Daily, Louie Roblero MD, 25 mg at 07/05/18 0856  •  risperiDONE (risperDAL) tablet 1 mg, 1 mg, Oral, Nightly, Louie Roblero MD, 1 mg at 07/05/18 2030  •  sodium chloride 0.9 % flush 1-10 mL, 1-10 mL, Intravenous, PRN, Louie Roblero MD  •  sodium chloride 0.9 % flush 10 mL, 10 mL, Intravenous, PRN, Blake Euceda MD     Diagnostic Data      I reviewed the patient's new clinical results and imaging.      Assessment/Plan     Active Hospital Problems (** Indicates Principal Problem)    Diagnosis Date Noted   • **Hypokalemia [E87.6] 01/10/2018     Now resolved   Started 2k65toy IV and 40meq PO in ED  Mag 1.6   S/p replacement. Will continue to monitor.        • Peripheral edema [R60.9] 07/02/2018     - home dose lasix 20mg daily     • Recurrent falls while walking [R29.6] 07/02/2018     - PT/OT  - Case management consulted for SNF placement. [facility] is not currently accepting new patients. Patient unsure of her second choice SNF. The son would like to know other nursing homes where Dr. Berry sees patients.     • Insomnia [G47.00] 07/02/2018     Continue home risperdal     • Recurrent major depressive disorder, in full remission (CMS/HCC) [F33.42] 04/05/2018     Continue home prozac     • Stage 3 severe COPD by GOLD classification (CMS/HCC) [J44.9] 03/01/2018     Continue home nebs and symbicort     • Pulmonary hypertension due to left heart valvular disease [I27.22, I38] 01/15/2018   • Rheumatic mitral stenosis [I05.0] 01/15/2018     Cards Consultation     • Rheumatic tricuspid valve regurgitation [I07.1] 01/14/2018   • Essential hypertension [I10]      Continue home toprol 25mg daily -- patients HR was in 40s while sleeping and 50s this AM will continue to monitor   Current BP:  132/63Home losartan increased to 100mg daily  Lasix 20mg PO daily  PRN hydralazine     • Gastroesophageal reflux disease [K21.9]      Pepcid        Resolved Hospital Problems    Diagnosis Date Noted Date Resolved   No resolved problems to display.         DVT prophylaxis: SCDs/TEDs      Disposition:Nursing Home    I have reviewed the notes, assessments, and/or procedures performed by Dr. Yeboah, I concur with her/his documentation of Michelle Child.        This document has been electronically signed by Bridget Ortega MD on July 6, 2018 1:39 PM

## 2018-07-06 NOTE — CONSULTS
"Adult Nutrition  Assessment    Patient Name:  Michelle Child  YOB: 1955  MRN: [id number]  Admit Date:  7/2/2018    Assessment Date:  7/6/2018    Comments:  Pt eating 50-75% at meals. Limited answers to RD ?s, but denies any gi distress and says \"everything is fine\". Wt stable. Labs noted. Pt awaiting SNF placement. Will monitor.          Reason for Assessment     Row Name 07/06/18 1058          Reason for Assessment    Reason For Assessment follow-up protocol               Nutrition/Diet History     Row Name 07/06/18 1058          Nutrition/Diet History    Typical Food/Fluid Intake Pt says \"everything is good\". Limited repsonse to additional ?s.             Anthropometrics     Row Name 07/06/18 0506          Anthropometrics    Weight 53.2 kg (117 lb 4.8 oz)             Labs/Tests/Procedures/Meds     Row Name 07/06/18 1059          Labs/Procedures/Meds    Lab Results Reviewed reviewed, pertinent        Medications    Pertinent Medications Reviewed reviewed, pertinent                 Nutrition Prescription Ordered     Row Name 07/06/18 1100          Nutrition Prescription PO    Current PO Diet Regular     Common Modifiers Low Sodium             Evaluation of Received Nutrient/Fluid Intake     Row Name 07/06/18 1100          PO Evaluation    Number of Meals 4     % PO Intake 50% x 3, 75% x 1             Electronically signed by:  Magda Avelar RD  07/06/18 11:01 AM  "

## 2018-07-06 NOTE — PLAN OF CARE
Problem: Patient Care Overview  Goal: Plan of Care Review  Outcome: Ongoing (interventions implemented as appropriate)   07/05/18 2300   Coping/Psychosocial   Plan of Care Reviewed With patient   Plan of Care Review   Progress improving     Goal: Individualization and Mutuality  Outcome: Ongoing (interventions implemented as appropriate)    Goal: Discharge Needs Assessment  Outcome: Ongoing (interventions implemented as appropriate)    Goal: Interprofessional Rounds/Family Conf  Outcome: Ongoing (interventions implemented as appropriate)      Problem: Fall Risk (Adult)  Goal: Absence of Fall  Outcome: Ongoing (interventions implemented as appropriate)      Problem: Skin Injury Risk (Adult)  Goal: Skin Health and Integrity  Outcome: Ongoing (interventions implemented as appropriate)      Problem: Cardiac: Heart Failure (Adult)  Goal: Signs and Symptoms of Listed Potential Problems Will be Absent, Minimized or Managed (Cardiac: Heart Failure)  Outcome: Ongoing (interventions implemented as appropriate)

## 2018-07-06 NOTE — THERAPY TREATMENT NOTE
Acute Care - Occupational Therapy Treatment Note  [facility]     Patient Name: Michelle Child  : 1955  MRN: [id number]  Today's Date: 2018  Onset of Illness/Injury or Date of Surgery: 18  Date of Referral to OT: 18  Referring Physician: JANAK Roblero MD.     Admit Date: 2018       ICD-10-CM ICD-9-CM   1. Peripheral edema R60.9 782.3   2. Acute on chronic congestive heart failure, unspecified congestive heart failure type (CMS/HCC) I50.9 428.0   3. Uncontrolled hypertension I10 401.9   4. Hypokalemia E87.6 276.8   5. Impaired mobility and ADLs Z74.09 799.89   6. Impaired physical mobility Z74.09 781.99     Patient Active Problem List   Diagnosis   • Migraine   • Iron deficiency anemia   • Gastroesophageal reflux disease   • Essential hypertension   • Coronary arteriosclerosis   • Bilateral pseudophakia   • Astigmatism   • Hypokalemia   • Cerebral microvascular disease   • Pulmonary hypertension   • Rheumatic tricuspid valve regurgitation   • Rheumatic mitral stenosis   • Acute diastolic CHF (congestive heart failure) (CMS/[facility])   • Pulmonary hypertension due to left heart valvular disease   • Cor pulmonale, chronic (CMS/[facility])   • Folic acid deficiency   • Stage 3 severe COPD by GOLD classification (CMS/[facility])   • Personal history of tobacco use, presenting hazards to health   • Tobacco abuse, in remission   • Recurrent major depressive disorder, in full remission (CMS/HCC)   • Weakness   • Recurrent falls while walking   • Localized swelling of lower extremity   • Peripheral edema   • Insomnia     Past Medical History:   Diagnosis Date   • Alkaline phosphatase raised    • Astigmatism    • Bilateral pseudophakia    • Coronary arteriosclerosis    • Depressive disorder    • Edema of lower extremity    • Encounter for general adult medical examination with abnormal findings    • Essential hypertension    • Gastroesophageal reflux disease    • H/O bone density study     DXA BONE DENSITY  AXIAL    04/22/2016   • H/O screening mammography     SCREENING MAMMOGRAPHY     04/25/2016   • Hx of screening mammography     x3; declined screening, understands risks and benefits and still declines      07/24/2015   • Influenza vaccine administered     INFLUENZA IMMUN ADMIN OR PREV RECV'D   x4       04/01/2016   • Insomnia    • Iron deficiency    • Migraine    • Tobacco dependence     continuous       Past Surgical History:   Procedure Laterality Date   • APPENDECTOMY     • AUGMENTATION MAMMAPLASTY     • BREAST IMPLANT SURGERY     • CATARACT EXTRACTION WITH INTRAOCULAR LENS IMPLANT  12/17/2003    Phacoemulsification of a cataract with intraocular lens implant of rigt eye, Nixon model SA 60-AT; serial # [id number];20.5 diopter   • CHOLECYSTECTOMY  08/06/2007    Laparoscopic cholecystectomy. Symptomatic gallstones   • COLONOSCOPY  10/15/2013    declined stool cards and colonscopy   • ENDOSCOPY AND COLONOSCOPY  05/22/2014    Internal & external hemorrhoids found.   • ENDOSCOPY W/ PEG TUBE PLACEMENT  05/22/2014    EGD w/ tube: Normal esopahgus. Gastritis in stomach. Biopsy taken. Normal duodenum. Biopsy taken.   • INJECTION OF MEDICATION  07/24/2013    Inj(s) Tend-Sheath, Ligament, Single   • INJECTION OF MEDICATION  11/17/2014    Kenalog x6   • INJECTION OF MEDICATION  04/01/2016    PNEUMOC VAC/ADMIN/RCVD    • INJECTION OF MEDICATION  12/05/2014    Toradol   • OTHER SURGICAL HISTORY  01/19/2014    Drain/Inject Major Joint   x2   • PAP SMEAR  10/06/2011   • PARTIAL HYSTERECTOMY      Partial hyst    • TONSILLECTOMY     • TUBAL ABDOMINAL LIGATION         Therapy Treatment          Rehabilitation Treatment Summary     Row Name 07/06/18 0720             Treatment Time/Intention    Discipline occupational therapy assistant  -BB      Document Type therapy note (daily note)  -BB      Subjective Information no complaints  -BB      Mode of Treatment individual therapy;occupational therapy  -BB      Total Minutes, Occupational  Therapy Treatment 83  -BB      Therapy Frequency (OT Eval) other (see comments)   3-14 tx's/wk  -BB      Patient Effort good  -BB      Existing Precautions/Restrictions fall  -BB      Recorded by [BB] PAMELLA Ngo/L 07/06/18 1126      Row Name 07/06/18 0720             Vital Signs    Pre Systolic BP Rehab 153  -BB      Pre Treatment Diastolic BP 70  -BB      Pretreatment Heart Rate (beats/min) 58  -BB      Posttreatment Heart Rate (beats/min) 63  -BB      Pre SpO2 (%) 96  -BB      O2 Delivery Pre Treatment room air  -BB      Post SpO2 (%) 94  -BB      O2 Delivery Post Treatment room air  -BB      Pre Patient Position Supine  -BB      Post Patient Position --   long sitting  -BB      Recorded by [BB] PAMELLA Ngo/L 07/06/18 1126      Row Name 07/06/18 0720             Upper Body Dressing Assessment/Training    Upper Body Dressing [place] Level doff;don;set up;contact guard assist  -BB      Upper Body Dressing Position long sitting  -BB      Recorded by [BB] PAMELLA Ngo/L 07/06/18 1126      Row Name 07/06/18 0720             Grooming Assessment/Training    [place] Level (Grooming) hair care, combing/brushing;oral care regimen;wash face, hands;set up;supervision  -BB      Grooming Position long sitting  -BB      Recorded by [BB] PAMELLA Ngo/L 07/06/18 1126      Row Name 07/06/18 0720             Self-Feeding Assessment/Training    [place] Level (Feeding) supervision;prepare tray/open items;scoop food and bring to mouth  -BB      Position (Self-Feeding) sitting up in bed  -BB      Recorded by [BB] KINGSLEY Ngo 07/06/18 1126      Row Name 07/06/18 0720             Positioning and Restraints    Pre-Treatment Position in bed  -BB      Post Treatment Position bed  -BB      In Bed fowlers;call light within reach;encouraged to call for assist;exit alarm on  -BB      Recorded by [BB] KINGSLEY Ngo 07/06/18 1126      Row Name 07/06/18  0720             Pain Scale: Numbers Pre/Post-Treatment    Pain Scale: Numbers, Pretreatment 0/10 - no pain  -BB      Pain Scale: Numbers, Post-Treatment 0/10 - no pain  -BB      Recorded by [BB] PAMELLA Ngo/L 07/06/18 1126      Row Name 07/06/18 0720             Plan of Care Review    Plan of Care Reviewed With patient  -BB      Recorded by [BB] PAMELLA Ngo/L 07/06/18 1126      Row Name 07/06/18 0720             Outcome Summary/Treatment Plan (OT)    Daily Summary of Progress (OT) progress toward functional goals as expected  -BB      Plan for Continued Treatment (OT) continue poc  -BB      Anticipated Discharge Disposition (OT) skilled nursing facility  -BB      Recorded by [BB] PAMELLA Ngo/DALTON 07/06/18 1126        User Key  (r) = Recorded By, (t) = Taken By, (c) = Cosigned By    Initials Name Effective Dates Discipline    BB JUDE NgoA/DALTON 03/07/18 -  OT                   OT Rehab Goals     Row Name 07/06/18 0720             Transfer Goal 1 (OT)    Activity/Assistive Device (Transfer Goal 1, OT) sit-to-stand/stand-to-sit;bed-to-chair/chair-to-bed;toilet;walker, rolling  -BB      [place] Level/Cues Needed (Transfer Goal 1, OT) contact guard assist  -BB      Time Frame (Transfer Goal 1, OT) long term goal (LTG);by discharge  -BB      Progress/Outcome (Transfer Goal 1, OT) goal not met;continuing progress toward goal  -BB         Bathing Goal 1 (OT)    Activity/Assistive Device (Bathing Goal 1, OT) upper body bathing;bath mitt;long-handled sponge  -BB      [place] Level/Cues Needed (Bathing Goal 1, OT) minimum assist (75% or more patient effort)  -BB      Time Frame (Bathing Goal 1, OT) long term goal (LTG);by discharge  -BB      Progress/Outcomes (Bathing Goal 1, OT) goal not met;continuing progress toward goal  -BB         Dressing Goal 1 (OT)    Activity/Assistive Device (Dressing Goal 1, OT) upper body dressing  -BB      [place]/Cues Needed  (Dressing Goal 1, OT) minimum assist (75% or more patient effort)  -BB      Time Frame (Dressing Goal 1, OT) long term goal (LTG);by discharge  -BB      Progress/Outcome (Dressing Goal 1, OT) continuing progress toward goal;goal met   1/3  -BB         Self-Feeding Goal 1 (OT)    Activity/Assistive Device (Self-Feeding Goal 1, OT) self-feeding skills, all  -BB      [place] Level/Cues Needed (Self-Feeding Goal 1, OT) conditional independence  -BB      Time Frame (Self-Feeding Goal 1, OT) long term goal (LTG);by discharge  -BB      Progress/Outcomes (Self-Feeding Goal 1, OT) goal not met;continuing progress toward goal  -BB        User Key  (r) = Recorded By, (t) = Taken By, (c) = Cosigned By    Initials Name Provider Type Discipline    PAMELLA Henderson/L Occupational Therapy Assistant OT        Occupational Therapy Education     Title: PT OT SLP Therapies (Active)     Topic: Occupational Therapy (Active)     Point: ADL training (Active)     Description: Instruct learner(s) on proper safety adaptation and remediation techniques during self care or transfers.   Instruct in proper use of assistive devices.   Learning Progress Summary     Learner Status Readiness Method Response Comment Documented by    Patient Active Acceptance E NR  BB 07/06/18 1127     Active Acceptance E NR  BB 07/05/18 1258     Done Acceptance EDONALDO NR Educated on the role of OT and POC, educated on the benefit of activity and safety with bed mobility, t/f, and functional mobility MR 07/03/18 1127          Point: Precautions (Done)     Description: Instruct learner(s) on prescribed precautions during self-care and functional transfers.   Learning Progress Summary     Learner Status Readiness Method Response Comment Documented by    Patient Done Acceptance EDONALDO NR Educated on the role of OT and POC, educated on the benefit of activity and safety with bed mobility, t/f, and functional mobility MR 07/03/18 1127          Point: Body  mechanics (Active)     Description: Instruct learner(s) on proper positioning and spine alignment during self-care, functional mobility activities and/or exercises.   Learning Progress Summary     Learner Status Readiness Method Response Comment Documented by    Patient Active Acceptance E NR  BB 07/05/18 2223     Done Acceptance E,TB VU,NR Educated on the role of OT and POC, educated on the benefit of activity and safety with bed mobility, t/f, and functional mobility MR 07/03/18 1127                      User Key     Initials Effective Dates Name Provider Type Discipline    BB 03/07/18 -  PAMELLA Ngo/L Occupational Therapy Assistant OT    MR 04/03/18 -  Janet Johnson, ALLYSSA Occupational Therapist OT            Non-skid socks and gait belt in place. Toileting offered. Call light and needs within reach. Pt advised to not get up alone and call the nurse for assistance.  Bed alarm on.       OT Recommendation and Plan  Outcome Summary/Treatment Plan (OT)  Daily Summary of Progress (OT): progress toward functional goals as expected  Plan for Continued Treatment (OT): continue poc  Anticipated Discharge Disposition (OT): skilled nursing facility  Therapy Frequency (OT Eval): other (see comments) (3-14 tx's/wk)  Daily Summary of Progress (OT): progress toward functional goals as expected  Plan of Care Review  Plan of Care Reviewed With: patient  Plan of Care Reviewed With: patient  Outcome Summary: Pt required CGA for UB dressing and SBA for self feeding task.. Pt met 1 new goal this tx.        Outcome Measures     Row Name 07/06/18 0720 07/05/18 1052 07/05/18 0750       How much help from another person do you currently need...    Turning from your back to your side while in flat bed without using bedrails?  -- 3  -SM  --    Moving from lying on back to sitting on the side of a flat bed without bedrails?  -- 3  -SM  --    Moving to and from a bed to a chair (including a wheelchair)?  -- 3  -SM  --     Standing up from a chair using your arms (e.g., wheelchair, bedside chair)?  -- 3  -SM  --    Climbing 3-5 steps with a railing?  -- 2  -SM  --    To walk in hospital room?  -- 3   increased time also needed  -SM  --    AM-PAC 6 Clicks Score  -- 17  -SM  --       How much help from another is currently needed...    Putting on and taking off regular lower body clothing? 1  -BB  -- 1  -BB    Bathing (including washing, rinsing, and drying) 2  -BB  -- 2  -BB    Toileting (which includes using toilet bed pan or urinal) 1  -BB  -- 1  -BB    Putting on and taking off regular upper body clothing 2  -BB  -- 2  -BB    Taking care of personal grooming (such as brushing teeth) 3  -BB  -- 3  -BB    Eating meals 3  -BB  -- 3  -BB    Score 12  -BB  -- 12  -BB       Functional Assessment    Outcome Measure Options  -- AM-PAC 6 Clicks Basic Mobility (PT)  -  --    Row Name 07/04/18 1345 07/04/18 0930          How much help from another person do you currently need...    Turning from your back to your side while in flat bed without using bedrails?  -- 3  -CZ     Moving from lying on back to sitting on the side of a flat bed without bedrails?  -- 3  -CZ     Moving to and from a bed to a chair (including a wheelchair)?  -- 3  -CZ     Standing up from a chair using your arms (e.g., wheelchair, bedside chair)?  -- 3  -CZ     Climbing 3-5 steps with a railing?  -- 2  -CZ     To walk in hospital room?  -- 3  -CZ     AM-PAC 6 Clicks Score  -- 17  -CZ        How much help from another is currently needed...    Putting on and taking off regular lower body clothing? 1  -KD  --     Bathing (including washing, rinsing, and drying) 2  -KD  --     Toileting (which includes using toilet bed pan or urinal) 1  -KD  --     Putting on and taking off regular upper body clothing 2  -KD  --     Taking care of personal grooming (such as brushing teeth) 2  -KD  --     Eating meals 3  -KD  --     Score 11  -KD  --        Functional Assessment    Outcome  Measure Options  -- AM-PAC 6 Clicks Basic Mobility (PT)  -CZ       User Key  (r) = Recorded By, (t) = Taken By, (c) = Cosigned By    Initials Name Provider Type    JUSTINE Ferguson, PTA Physical Therapy Assistant    BB Bibi Chester CAN/L Occupational Therapy Assistant    KD Yolanda Mary, CAN/L Occupational Therapy Assistant    CZ Hadley Ramirez, PT Physical Therapist           Time Calculation:         Time Calculation- OT     Row Name 07/06/18 1129             Time Calculation- OT    OT Start Time 0720  -BB      OT Stop Time 0843  -BB      OT Time Calculation (min) 83 min  -      Total Timed Code Minutes- OT 83 minute(s)  -      OT Received On 07/06/18  -        User Key  (r) = Recorded By, (t) = Taken By, (c) = Cosigned By    Initials Name Provider Type    BB Bibi Chester CAN/L Occupational Therapy Assistant           Therapy Suggested Charges     Code   Minutes Charges    None           Therapy Charges for Today     Code Description Service Date Service Provider Modifiers Qty    36403672016 HC OT SELF CARE/MGMT/TRAIN EA 15 MIN 7/5/2018 JUDE NgoA/L GO 6    [id number] HC OT SELF CARE/MGMT/TRAIN EA 15 MIN 7/6/2018 JUDE NgoA/L GO 6          OT G-codes  OT Professional Judgement Used?: Yes  OT Functional Scales Options: AM-PAC 6 Clicks Daily Activity (OT)  Score: 11  Functional Limitation: Self care  Self Care Current Status (): At least 60 percent but less than 80 percent impaired, limited or restricted  Self Care Goal Status (): At least 40 percent but less than 60 percent impaired, limited or restricted    PAMELLA Ngo/DALTON  7/6/2018

## 2018-07-06 NOTE — THERAPY TREATMENT NOTE
Acute Care - Physical Therapy Treatment Note  [facility]     Patient Name: Michelle Child  : 1955  MRN: [id number]  Today's Date: 2018  Onset of Illness/Injury or Date of Surgery: 18     Referring Physician: JANAK Roblero MD.     Admit Date: 2018    Visit Dx:    ICD-10-CM ICD-9-CM   1. Peripheral edema R60.9 782.3   2. Acute on chronic congestive heart failure, unspecified congestive heart failure type (CMS/HCC) I50.9 428.0   3. Uncontrolled hypertension I10 401.9   4. Hypokalemia E87.6 276.8   5. Impaired mobility and ADLs Z74.09 799.89   6. Impaired physical mobility Z74.09 781.99     Patient Active Problem List   Diagnosis   • Migraine   • Iron deficiency anemia   • Gastroesophageal reflux disease   • Essential hypertension   • Coronary arteriosclerosis   • Bilateral pseudophakia   • Astigmatism   • Hypokalemia   • Cerebral microvascular disease   • Pulmonary hypertension   • Rheumatic tricuspid valve regurgitation   • Rheumatic mitral stenosis   • Acute diastolic CHF (congestive heart failure) (CMS/HCC)   • Pulmonary hypertension due to left heart valvular disease   • Cor pulmonale, chronic (CMS/HCC)   • Folic acid deficiency   • Stage 3 severe COPD by GOLD classification (CMS/HCC)   • Personal history of tobacco use, presenting hazards to health   • Tobacco abuse, in remission   • Recurrent major depressive disorder, in full remission (CMS/HCC)   • Weakness   • Recurrent falls while walking   • Localized swelling of lower extremity   • Peripheral edema   • Insomnia       Therapy Treatment          Rehabilitation Treatment Summary     Row Name 18 0943 18 0720          Treatment Time/Intention    Discipline physical therapy assistant  -TW occupational therapy assistant  -BB     Document Type therapy note (daily note)  -TW therapy note (daily note)  -BB     Subjective Information no complaints  -TW no complaints  -BB     Mode of Treatment physical therapy;individual  therapy  -TW individual therapy;occupational therapy  -BB     Total Minutes, Occupational Therapy Treatment  -- 83  -BB     Therapy Frequency (OT Eval)  -- other (see comments)   3-14 tx's/wk  -BB     Patient Effort good  -TW good  -BB     Existing Precautions/Restrictions fall  -TW fall  -BB     Equipment Issued to Patient gait belt  -TW  --     Recorded by [TW] Erlin Renteria, PTA 07/06/18 1422 [BB] PAMELLA Ngo/L 07/06/18 1126     Row Name 07/06/18 0943 07/06/18 0720          Vital Signs    Pre Systolic BP Rehab 143  -  -BB     Pre Treatment Diastolic BP 63  -TW 70  -BB     Post Systolic BP Rehab 144  -TW  --     Post Treatment Diastolic BP 63  -TW  --     Pretreatment Heart Rate (beats/min) 48  -TW 58  -BB     Posttreatment Heart Rate (beats/min) 56  -TW 63  -BB     Pre SpO2 (%) 96  -TW 96  -BB     O2 Delivery Pre Treatment room air  -TW room air  -BB     Post SpO2 (%) 97  -TW 94  -BB     O2 Delivery Post Treatment  -- room air  -BB     Pre Patient Position Supine  -TW Supine  -BB     Intra Patient Position Standing  -TW  --     Post Patient Position Supine  -TW --   long sitting  -BB     Recorded by [TW] Erlin Renteria, PTA 07/06/18 1422 [BB] PAMELLA Ngo/L 07/06/18 1126     Row Name 07/06/18 0943             Cognitive Assessment/Intervention- PT/OT    Affect/Mental Status (Cognitive) WFL  -TW      Orientation Status (Cognition) oriented x 4  -TW      Follows Commands (Cognition) follows one step commands;50-74% accuracy;delayed response/completion;increased processing time needed;physical/tactile prompts required;verbal cues/prompting required  -TW      Personal Safety Interventions fall prevention program maintained;gait belt;nonskid shoes/slippers when out of bed  -TW      Recorded by [TW] Erlin Renteria, PTA 07/06/18 1422      Row Name 07/06/18 0943             Safety Issues, Functional Mobility    Impairments Affecting Function (Mobility)  balance;coordination;endurance/activity tolerance;motor control;motor planning  -TW      Recorded by [TW] Erlin Renteria, PTA 07/06/18 1422      Row Name 07/06/18 0943             Bed Mobility Assessment/Treatment    Supine-Sit [place] (Bed Mobility) supervision  -TW      Sit-Supine [place] (Bed Mobility) not tested  -TW      Assistive Device (Bed Mobility) bed rails;head of bed elevated  -TW      Recorded by [TW] Erlin Renteria, PTA 07/06/18 1422      Row Name 07/06/18 0943             Transfer Assessment/Treatment    Transfer Assessment/Treatment toilet transfer  -TW      Recorded by [TW] Erlin Renteria, PTA 07/06/18 1422      Row Name 07/06/18 0943             Sit-Stand Transfer    Sit-Stand [place] (Transfers) contact guard  -TW      Assistive Device (Sit-Stand Transfers) walker, front-wheeled  -TW      Recorded by [TW] Erlin Renteria, PTA 07/06/18 1422      Row Name 07/06/18 0943             Stand-Sit Transfer    Stand-Sit [place] (Transfers) contact guard  -TW      Assistive Device (Stand-Sit Transfers) walker, front-wheeled  -TW      Recorded by [TW] Erlin Renteria, PTA 07/06/18 1422      Row Name 07/06/18 0943             Toilet Transfer    Type (Toilet Transfer) stand pivot/stand step  -TW      [place] Level (Toilet Transfer) contact guard  -TW      Assistive Device (Toilet Transfer) commode;grab bars/safety frame  -TW      Recorded by [TW] Erlin Renteria, PTA 07/06/18 1422      Row Name 07/06/18 0943             Gait/Stairs Assessment/Training    Gait/Stairs Assessment/Training gait/ambulation assistive device  -TW      [place] Level (Gait) minimum assist (75% patient effort)  -TW      Assistive Device (Gait) walker, front-wheeled  -TW      Distance in Feet (Gait) 16ft x2 trials  -TW      Deviations/Abnormal Patterns (Gait) janey decreased;festinating/shuffling;gait speed decreased  -TW      Comment (Gait/Stairs) Cont to require increase time for  gait.  -TW      Recorded by [TW] Erlin Renteria, KIN 07/06/18 1422      Row Name 07/06/18 0720             Upper Body Dressing Assessment/Training    Upper Body Dressing [place] Level doff;don;set up;contact guard assist  -BB      Upper Body Dressing Position long sitting  -BB      Recorded by [BB] PAMELLA Ngo/L 07/06/18 1126      Row Name 07/06/18 0720             Grooming Assessment/Training    [place] Level (Grooming) hair care, combing/brushing;oral care regimen;wash face, hands;set up;supervision  -BB      Grooming Position long sitting  -BB      Recorded by [BB] PAMELLA Ngo/L 07/06/18 1126      Row Name 07/06/18 0720             Self-Feeding Assessment/Training    [place] Level (Feeding) supervision;prepare tray/open items;scoop food and bring to mouth  -BB      Position (Self-Feeding) sitting up in bed  -BB      Recorded by [BB] PAMELLA Ngo/L 07/06/18 1126      Row Name 07/06/18 0943             Therapeutic Exercise    Lower Extremity (Therapeutic Exercise) LAQ (long arc quad), bilateral;marching while seated;marching while standing  -TW      Lower Extremity Range of Motion (Therapeutic Exercise) hip abduction/adduction, bilateral;ankle dorsiflexion/plantar flexion, bilateral  -TW      Exercise Type (Therapeutic Exercise) AROM (active range of motion)  -TW      Position (Therapeutic Exercise) seated;standing  -TW      Sets/Reps (Therapeutic Exercise) 2/20  -TW      Recorded by [TW] Erlin Renteria PTA 07/06/18 1422      Row Name 07/06/18 0943 07/06/18 0720          Positioning and Restraints    Pre-Treatment Position in bed  -TW in bed  -BB     Post Treatment Position bed  -TW bed  -BB     In Bed supine;call light within reach;encouraged to call for assist;exit alarm on  -TW fowlers;call light within reach;encouraged to call for assist;exit alarm on  -BB     Restraints notified nsg:  -TW  --     Recorded by [TW] Erlin Renteria PTA 07/06/18  1422 [BB] Bibi Chester CAN/L 07/06/18 1126     Row Name 07/06/18 0943 07/06/18 0720          Pain Scale: Numbers Pre/Post-Treatment    Pain Scale: Numbers, Pretreatment 0/10 - no pain  -TW 0/10 - no pain  -BB     Pain Scale: Numbers, Post-Treatment 0/10 - no pain  -TW 0/10 - no pain  -BB     Recorded by [TW] Erlin Renteria, PTA 07/06/18 1422 [BB] JUDE NgoA/L 07/06/18 1126     Row Name 07/06/18 0720             Plan of Care Review    Plan of Care Reviewed With patient  -BB      Recorded by [BB] JUDE NgoA/L 07/06/18 1126      Row Name 07/06/18 0720             Outcome Summary/Treatment Plan (OT)    Daily Summary of Progress (OT) progress toward functional goals as expected  -BB      Plan for Continued Treatment (OT) continue poc  -BB      Anticipated Discharge Disposition (OT) skilled nursing facility  -BB      Recorded by [BB] JUDE NgoA/L 07/06/18 1126      Row Name 07/06/18 0943             Outcome Summary/Treatment Plan (PT)    Daily Summary of Progress (PT) progress toward functional goals is good  -TW      Barriers to Overall Progress (PT) Steppage during transfers and gait.  -TW      Plan for Continued Treatment (PT) Cont  -TW      Anticipated Discharge Disposition (PT) skilled nursing facility  -TW      Recorded by [TW] Erlin Renteria, PTA 07/06/18 1422        User Key  (r) = Recorded By, (t) = Taken By, (c) = Cosigned By    Initials Name Effective Dates Discipline    TW Erlin Renteria, PTA 03/07/18 -  PT    BB Bibi Chester CAN/L 03/07/18 -  OT                       PT Rehab Goals     Row Name 07/06/18 0943             Bed Mobility Goal 1 (PT)    Activity/Assistive Device (Bed Mobility Goal 1, PT) sit to supine/supine to sit  -TW      [place] Level/Cues Needed (Bed Mobility Goal 1, PT) conditional independence  -TW      Time Frame (Bed Mobility Goal 1, PT) 3 days  -TW      Barriers (Bed Mobility Goal 1, PT) Difficulty with initiation.   -TW      Progress/Outcomes (Bed Mobility Goal 1, PT) goal not met  -TW         Transfer Goal 1 (PT)    Activity/Assistive Device (Transfer Goal 1, PT) sit-to-stand/stand-to-sit;bed-to-chair/chair-to-bed  -TW      [place] Level/Cues Needed (Transfer Goal 1, PT) conditional independence  -TW      Time Frame (Transfer Goal 1, PT) long term goal (LTG);by discharge  -TW      Progress/Outcome (Transfer Goal 1, PT) goal not met  -TW         Gait Training Goal 1 (PT)    Activity/Assistive Device (Gait Training Goal 1, PT) walker, rolling  -TW      [place] Level (Gait Training Goal 1, PT) supervision required  -TW      Distance (Gait Goal 1, PT) 50'x2.   -TW      Time Frame (Gait Training Goal 1, PT) long term goal (LTG);by discharge  -TW      Barriers (Gait Training Goal 1, PT) Difficulty with initiation.  -TW      Progress/Outcome (Gait Training Goal 1, PT) goal not met  -TW        User Key  (r) = Recorded By, (t) = Taken By, (c) = Cosigned By    Initials Name Provider Type Discipline     Erlin Renteria PTA Physical Therapy Assistant PT          Physical Therapy Education     Title: PT OT SLP Therapies (Active)     Topic: Physical Therapy (Active)     Point: Mobility training (Active)    Learning Progress Summary     Learner Status Readiness Method Response Comment Documented by    Patient Active Acceptance E NR Patient educated on proper gait mechanics, proper use of walker, proper transfer technique.  07/04/18 1254                      User Key     Initials Effective Dates Name Provider Type Discipline     04/03/18 -  Hadley Ramirez, PT Physical Therapist PT                    PT Recommendation and Plan  Anticipated Discharge Disposition (PT): skilled nursing facility  Outcome Summary/Treatment Plan (PT)  Daily Summary of Progress (PT): progress toward functional goals is good  Barriers to Overall Progress (PT): Steppage during transfers and gait.  Plan for Continued Treatment (PT):  Cont  Anticipated Discharge Disposition (PT): skilled nursing facility  Plan of Care Reviewed With: patient, son, family  Progress: no change  Outcome Summary: Pt cont to have shuffling gait and hesitates during gait which increases gait time with minimum distance. Pt seems very steady when she is on her feet but unable to move her LE's normally without consistant VC's to do so.          Outcome Measures     Row Name 07/06/18 0943 07/06/18 0720 07/05/18 1052       How much help from another person do you currently need...    Turning from your back to your side while in flat bed without using bedrails? 3  -TW  -- 3  -SM    Moving from lying on back to sitting on the side of a flat bed without bedrails? 3  -TW  -- 3  -SM    Moving to and from a bed to a chair (including a wheelchair)? 3  -TW  -- 3  -SM    Standing up from a chair using your arms (e.g., wheelchair, bedside chair)? 3  -TW  -- 3  -SM    Climbing 3-5 steps with a railing? 2  -TW  -- 2  -SM    To walk in hospital room? 3  -TW  -- 3   increased time also needed  -SM    AM-PAC 6 Clicks Score 17  -TW  -- 17  -SM       How much help from another is currently needed...    Putting on and taking off regular lower body clothing?  -- 1  -BB  --    Bathing (including washing, rinsing, and drying)  -- 2  -BB  --    Toileting (which includes using toilet bed pan or urinal)  -- 1  -BB  --    Putting on and taking off regular upper body clothing  -- 2  -BB  --    Taking care of personal grooming (such as brushing teeth)  -- 3  -BB  --    Eating meals  -- 3  -BB  --    Score  -- 12  -BB  --       Functional Assessment    Outcome Measure Options AM-PAC 6 Clicks Basic Mobility (PT)  -TW  -- AM-PAC 6 Clicks Basic Mobility (PT)  -SM    Row Name 07/05/18 0750 07/04/18 1345 07/04/18 0930       How much help from another person do you currently need...    Turning from your back to your side while in flat bed without using bedrails?  --  -- 3  -CZ    Moving from lying on back to  sitting on the side of a flat bed without bedrails?  --  -- 3  -CZ    Moving to and from a bed to a chair (including a wheelchair)?  --  -- 3  -CZ    Standing up from a chair using your arms (e.g., wheelchair, bedside chair)?  --  -- 3  -CZ    Climbing 3-5 steps with a railing?  --  -- 2  -CZ    To walk in hospital room?  --  -- 3  -CZ    AM-PAC 6 Clicks Score  --  -- 17  -CZ       How much help from another is currently needed...    Putting on and taking off regular lower body clothing? 1  -BB 1  -KD  --    Bathing (including washing, rinsing, and drying) 2  -BB 2  -KD  --    Toileting (which includes using toilet bed pan or urinal) 1  -BB 1  -KD  --    Putting on and taking off regular upper body clothing 2  -BB 2  -KD  --    Taking care of personal grooming (such as brushing teeth) 3  -BB 2  -KD  --    Eating meals 3  -BB 3  -KD  --    Score 12  -BB 11  -KD  --       Functional Assessment    Outcome Measure Options  --  -- AM-PAC 6 Clicks Basic Mobility (PT)  -CZ      User Key  (r) = Recorded By, (t) = Taken By, (c) = Cosigned By    Initials Name Provider Type     Gita Ferguson, PTA Physical Therapy Assistant    TW Erlin Renteria, KIN Physical Therapy Assistant    BB Bibi Chester, CAN/L Occupational Therapy Assistant    KD Yolanda Mary CAN/L Occupational Therapy Assistant    CZ Hadley Ramirez, PT Physical Therapist           Time Calculation:         PT Charges     Row Name 07/06/18 1424             Time Calculation    Start Time 0943  -TW      Stop Time 1045  -TW      Time Calculation (min) 62 min  -TW      PT Received On 07/06/18  -TW      PT Goal Re-Cert Due Date 07/17/18  -TW         Time Calculation- PT    Total Timed Code Minutes- PT 62 minute(s)  -TW        User Key  (r) = Recorded By, (t) = Taken By, (c) = Cosigned By    Initials Name Provider Type     Erlin Renteria, KIN Physical Therapy Assistant        Therapy Suggested Charges     Code   Minutes Charges    96189 (CPT®) Hc Pt  Neuromusc Re Education Ea 15 Min      02681 (CPT®) Hc Pt Ther Proc Ea 15 Min      08877 (CPT®) Hc Gait Training Ea 15 Min      59702 (CPT®) Hc Pt Therapeutic Act Ea 15 Min 35 2    36873 (CPT®) Hc Pt Manual Therapy Ea 15 Min      02275 (CPT®) Hc Pt Iontophoresis Ea 15 Min      05653 (CPT®) Hc Pt Elec Stim Ea-Per 15 Min      08874 (CPT®) Hc Pt Ultrasound Ea 15 Min      18619 (CPT®) Hc Pt Self Care/Mgmt/Train Ea 15 Min      Total  35 2        Therapy Charges for Today     Code Description Service Date Service Provider Modifiers Qty    85870882900 HC GAIT TRAINING EA 15 MIN 7/6/2018 Erlin Renteria, KIN GP 2    82440626352 HC PT THERAPEUTIC ACT EA 15 MIN 7/6/2018 Erlin Renteria PTA GP 1    97683695311 HC PT THER PROC EA 15 MIN 7/6/2018 Erlin Renteria PTA GP 1          PT G-Codes  PT Professional Judgement Used?: Yes  Outcome Measure Options: AM-PAC 6 Clicks Basic Mobility (PT)  Score: 17  Functional Limitation: Mobility: Walking and moving around  Mobility: Walking and Moving Around Current Status (): At least 40 percent but less than 60 percent impaired, limited or restricted  Mobility: Walking and Moving Around Goal Status (): At least 20 percent but less than 40 percent impaired, limited or restricted    Erlin Renteria PTA  7/6/2018

## 2018-07-06 NOTE — PLAN OF CARE
Problem: Patient Care Overview  Goal: Plan of Care Review  Outcome: Ongoing (interventions implemented as appropriate)   07/06/18 1128   Coping/Psychosocial   Plan of Care Reviewed With patient   Plan of Care Review   Progress improving   OTHER   Outcome Summary Pt required CGA for UB dressing and SBA for self feeding task.. Pt met 1 new goal this tx.

## 2018-07-06 NOTE — PLAN OF CARE
Problem: Patient Care Overview  Goal: Plan of Care Review  Outcome: Ongoing (interventions implemented as appropriate)   07/06/18 3600   Coping/Psychosocial   Plan of Care Reviewed With patient;son;family   Plan of Care Review   Progress no change   OTHER   Outcome Summary Pt cont to have shuffling gait and hesitates during gait which increases gait time with minimum distance. Pt seems very steady when she is on her feet but unable to move her LE's normally without consistant VC's to do so.

## 2018-07-07 LAB
ANION GAP SERPL CALCULATED.3IONS-SCNC: 7 MMOL/L (ref 5–15)
BASOPHILS # BLD AUTO: 0.02 10*3/MM3 (ref 0–0.2)
BASOPHILS NFR BLD AUTO: 0.3 % (ref 0–2)
BUN BLD-MCNC: 21 MG/DL (ref 7–21)
BUN/CREAT SERPL: 21.9 (ref 7–25)
CALCIUM SPEC-SCNC: 9 MG/DL (ref 8.4–10.2)
CHLORIDE SERPL-SCNC: 98 MMOL/L (ref 95–110)
CO2 SERPL-SCNC: 31 MMOL/L (ref 22–31)
CREAT BLD-MCNC: 0.96 MG/DL (ref 0.5–1)
DEPRECATED RDW RBC AUTO: 47 FL (ref 36.4–46.3)
EOSINOPHIL # BLD AUTO: 0.13 10*3/MM3 (ref 0–0.7)
EOSINOPHIL NFR BLD AUTO: 1.8 % (ref 0–7)
ERYTHROCYTE [DISTWIDTH] IN BLOOD BY AUTOMATED COUNT: 14.3 % (ref 11.5–14.5)
GFR SERPL CREATININE-BSD FRML MDRD: 59 ML/MIN/1.73 (ref 45–104)
GLUCOSE BLD-MCNC: 95 MG/DL (ref 60–100)
HCT VFR BLD AUTO: 39.7 % (ref 35–45)
HGB BLD-MCNC: 13 G/DL (ref 12–15.5)
IMM GRANULOCYTES # BLD: 0.02 10*3/MM3 (ref 0–0.02)
IMM GRANULOCYTES NFR BLD: 0.3 % (ref 0–0.5)
LYMPHOCYTES # BLD AUTO: 1.39 10*3/MM3 (ref 0.6–4.2)
LYMPHOCYTES NFR BLD AUTO: 19.3 % (ref 10–50)
MCH RBC QN AUTO: 29.2 PG (ref 26.5–34)
MCHC RBC AUTO-ENTMCNC: 32.7 G/DL (ref 31.4–36)
MCV RBC AUTO: 89.2 FL (ref 80–98)
MONOCYTES # BLD AUTO: 0.54 10*3/MM3 (ref 0–0.9)
MONOCYTES NFR BLD AUTO: 7.5 % (ref 0–12)
NEUTROPHILS # BLD AUTO: 5.11 10*3/MM3 (ref 2–8.6)
NEUTROPHILS NFR BLD AUTO: 70.8 % (ref 37–80)
PLATELET # BLD AUTO: 218 10*3/MM3 (ref 150–450)
PMV BLD AUTO: 10.2 FL (ref 8–12)
POTASSIUM BLD-SCNC: 3.7 MMOL/L (ref 3.5–5.1)
RBC # BLD AUTO: 4.45 10*6/MM3 (ref 3.77–5.16)
SODIUM BLD-SCNC: 136 MMOL/L (ref 137–145)
WBC NRBC COR # BLD: 7.21 10*3/MM3 (ref 3.2–9.8)

## 2018-07-07 PROCEDURE — 97116 GAIT TRAINING THERAPY: CPT

## 2018-07-07 PROCEDURE — 94799 UNLISTED PULMONARY SVC/PX: CPT

## 2018-07-07 PROCEDURE — G0378 HOSPITAL OBSERVATION PER HR: HCPCS

## 2018-07-07 PROCEDURE — 85025 COMPLETE CBC W/AUTO DIFF WBC: CPT | Performed by: FAMILY MEDICINE

## 2018-07-07 PROCEDURE — 80048 BASIC METABOLIC PNL TOTAL CA: CPT | Performed by: FAMILY MEDICINE

## 2018-07-07 PROCEDURE — 99225 PR SBSQ OBSERVATION CARE/DAY 25 MINUTES: CPT | Performed by: STUDENT IN AN ORGANIZED HEALTH CARE EDUCATION/TRAINING PROGRAM

## 2018-07-07 PROCEDURE — 97535 SELF CARE MNGMENT TRAINING: CPT

## 2018-07-07 PROCEDURE — 94760 N-INVAS EAR/PLS OXIMETRY 1: CPT

## 2018-07-07 PROCEDURE — 97530 THERAPEUTIC ACTIVITIES: CPT

## 2018-07-07 RX ADMIN — BUDESONIDE AND FORMOTEROL FUMARATE DIHYDRATE 2 PUFF: 160; 4.5 AEROSOL RESPIRATORY (INHALATION) at 08:35

## 2018-07-07 RX ADMIN — FOLIC ACID 1 MG: 1 TABLET ORAL at 10:10

## 2018-07-07 RX ADMIN — FAMOTIDINE 20 MG: 20 TABLET ORAL at 21:29

## 2018-07-07 RX ADMIN — FERROUS SULFATE TAB EC 324 MG (65 MG FE EQUIVALENT) 324 MG: 324 (65 FE) TABLET DELAYED RESPONSE at 10:11

## 2018-07-07 RX ADMIN — FAMOTIDINE 20 MG: 20 TABLET ORAL at 10:11

## 2018-07-07 RX ADMIN — RISPERIDONE 1 MG: 1 TABLET ORAL at 21:29

## 2018-07-07 RX ADMIN — FUROSEMIDE 20 MG: 20 TABLET ORAL at 10:11

## 2018-07-07 RX ADMIN — LOSARTAN POTASSIUM 100 MG: 50 TABLET, FILM COATED ORAL at 10:10

## 2018-07-07 RX ADMIN — BUDESONIDE AND FORMOTEROL FUMARATE DIHYDRATE 2 PUFF: 160; 4.5 AEROSOL RESPIRATORY (INHALATION) at 19:45

## 2018-07-07 RX ADMIN — ATORVASTATIN CALCIUM 10 MG: 10 TABLET, FILM COATED ORAL at 10:10

## 2018-07-07 RX ADMIN — FERROUS SULFATE TAB EC 324 MG (65 MG FE EQUIVALENT) 324 MG: 324 (65 FE) TABLET DELAYED RESPONSE at 18:03

## 2018-07-07 RX ADMIN — FLUOXETINE 60 MG: 20 CAPSULE ORAL at 10:11

## 2018-07-07 RX ADMIN — ASPIRIN 81 MG 81 MG: 81 TABLET ORAL at 10:10

## 2018-07-07 NOTE — PROGRESS NOTES
FAMILY MEDICINE PROGRESS NOTE  NAME: Michelle Child  : 1955  MRN: [id number]     LOS: 1 day   Code Status and Medical Interventions:   Ordered at: 18 1645     Level Of Support Discussed With:    Patient     Code Status:    CPR     Medical Interventions (Level of Support Prior to Arrest):    Full     PROVIDER OF SERVICE:     Chief Complaint:  Hypokalemia    Subjective     Interval History:  History taken from: patient RN  Subjective: Patient is a 61 yo  female who was admitted with swelling and hypokalemia.  She is feeling better.  She is waiting for nursing home placement for therapy.    Review of Systems:   Review of Systems   Constitutional: Negative for activity change and chills.   HENT: Positive for hearing loss. Negative for congestion and sore throat.    Eyes: Negative for pain and visual disturbance.   Respiratory: Negative for chest tightness and shortness of breath.    Cardiovascular: Negative for chest pain and palpitations.   Gastrointestinal: Negative for diarrhea, nausea and vomiting.   Endocrine: Negative for polyphagia and polyuria.   Genitourinary: Negative for frequency and hematuria.   Musculoskeletal: Negative for arthralgias and myalgias.   Skin: Negative for color change and pallor.   Neurological: Negative for dizziness and seizures.   Psychiatric/Behavioral: Negative for agitation and behavioral problems.       Objective     Vital Signs  Temp:  [96.5 °F (35.8 °C)-98 °F (36.7 °C)] 97.7 °F (36.5 °C)  Heart Rate:  [47-58] 54  Resp:  [18] 18  BP: (125-158)/(60-65) 138/65    Physical Exam  Physical Exam   Constitutional: She is oriented to person, place, and time. She appears well-developed and well-nourished. No distress.   HENT:   Mouth/Throat: No oropharyngeal exudate.   Neck: No JVD present.   Cardiovascular: Regular rhythm and intact distal pulses.  Exam reveals no gallop and no friction rub.    Murmur heard.  2/6 systolic murmur, sinus bradycardia    Pulmonary/Chest: No respiratory distress. She has no wheezes. She has no rales.   Diminished breath sounds in the bases   Abdominal: Soft. Bowel sounds are normal. She exhibits no distension. There is no tenderness. There is no rebound and no guarding.   Musculoskeletal: She exhibits no edema.   Neurological: She is alert and oriented to person, place, and time.   Skin: She is not diaphoretic.   Psychiatric: She has a normal mood and affect. Her behavior is normal.   Nursing note and vitals reviewed.      Medication Review    Current Facility-Administered Medications:   •  acetaminophen (TYLENOL) tablet 650 mg, 650 mg, Oral, Q6H PRN, Claudette Machuca MD, 650 mg at 07/04/18 2310  •  albuterol (PROVENTIL) nebulizer solution 0.083% 2.5 mg/3mL, 2.5 mg, Nebulization, Q6H PRN, Louie Roblero MD  •  aspirin chewable tablet 81 mg, 81 mg, Oral, Daily, Louie Roblero MD, 81 mg at 07/07/18 1010  •  atorvastatin (LIPITOR) tablet 10 mg, 10 mg, Oral, Daily, Louie Roblero MD, 10 mg at 07/07/18 1010  •  bisacodyl (DULCOLAX) EC tablet 5 mg, 5 mg, Oral, Daily PRN, Louie Roblero MD  •  budesonide-formoterol (SYMBICORT) 160-4.5 MCG/ACT inhaler 2 puff, 2 puff, Inhalation, BID, Louie Roblero MD, 2 puff at 07/07/18 0835  •  famotidine (PEPCID) tablet 20 mg, 20 mg, Oral, BID, Louie Roblero MD, 20 mg at 07/07/18 1011  •  ferrous sulfate EC tablet 324 mg, 324 mg, Oral, BID With Meals, Louie Roblero MD, 324 mg at 07/07/18 1011  •  FLUoxetine (PROzac) capsule 60 mg, 60 mg, Oral, Daily, LUPE Garcia, 60 mg at 07/07/18 1011  •  folic acid (FOLVITE) tablet 1 mg, 1 mg, Oral, Daily, Louie Roblero MD, 1 mg at 07/07/18 1010  •  furosemide (LASIX) tablet 20 mg, 20 mg, Oral, Daily, Louie Roblero MD, 20 mg at 07/07/18 1011  •  hydrALAZINE (APRESOLINE) injection 10 mg, 10 mg, Intravenous, Q6H PRN, Louie Roblero MD  •  losartan (COZAAR)  tablet 100 mg, 100 mg, Oral, Q24H, Louie Roblero MD, 100 mg at 07/07/18 1010  •  risperiDONE (risperDAL) tablet 1 mg, 1 mg, Oral, Nightly, Louie Rbolero MD, 1 mg at 07/06/18 2137  •  sodium chloride 0.9 % flush 1-10 mL, 1-10 mL, Intravenous, PRN, Louie Roblero MD  •  sodium chloride 0.9 % flush 10 mL, 10 mL, Intravenous, PRN, Blake Euceda MD     Diagnostic Data      I reviewed the patient's new clinical results and imaging.      Assessment/Plan     Active Hospital Problems (** Indicates Principal Problem)    Diagnosis Date Noted   • **Hypokalemia [E87.6] 01/10/2018     Now resolved   Started 2c50vbo IV and 40meq PO in ED  Mag 1.6   S/p replacement. Will continue to monitor.        • Peripheral edema [R60.9] 07/02/2018     - home dose lasix 20mg daily     • Recurrent falls while walking [R29.6] 07/02/2018     - PT/OT  - Case management consulted for SNF placement. [facility] is not currently accepting new patients. Patient unsure of her second choice SNF. The son would like to know other nursing homes where Dr. Berry sees patients.     • Insomnia [G47.00] 07/02/2018     Continue home risperdal     • Recurrent major depressive disorder, in full remission (CMS/HCC) [F33.42] 04/05/2018     Continue home prozac     • Stage 3 severe COPD by GOLD classification (CMS/HCC) [J44.9] 03/01/2018     Continue home nebs and symbicort     • Pulmonary hypertension due to left heart valvular disease [I27.22, I38] 01/15/2018   • Rheumatic mitral stenosis [I05.0] 01/15/2018     Cards Consultation     • Rheumatic tricuspid valve regurgitation [I07.1] 01/14/2018   • Essential hypertension [I10]      Continue home toprol 25mg daily -- patients HR was in 40s while sleeping and 50s this AM will continue to monitor   Current BP: 132/63Home losartan increased to 100mg daily  Lasix 20mg PO daily  PRN hydralazine     • Gastroesophageal reflux disease [K21.9]      Pepcid        Resolved Hospital Problems     Diagnosis Date Noted Date Resolved   No resolved problems to display.         DVT prophylaxis: SCDs/TEDs      Disposition:Nursing Home    I have reviewed the notes, assessments, and/or procedures performed by Dr. Delgado, I concur with her/his documentation of Michelle Child.        This document has been electronically signed by Bridget Ortega MD on July 7, 2018 4:27 PM

## 2018-07-07 NOTE — THERAPY TREATMENT NOTE
Acute Care - Physical Therapy Treatment Note  [facility]     Patient Name: Michelle Child  : 1955  MRN: [id number]  Today's Date: 2018  Onset of Illness/Injury or Date of Surgery: 18     Referring Physician: JANAK Roblero MD.     Admit Date: 2018    Visit Dx:    ICD-10-CM ICD-9-CM   1. Peripheral edema R60.9 782.3   2. Acute on chronic congestive heart failure, unspecified congestive heart failure type (CMS/HCC) I50.9 428.0   3. Uncontrolled hypertension I10 401.9   4. Hypokalemia E87.6 276.8   5. Impaired mobility and ADLs Z74.09 799.89   6. Impaired physical mobility Z74.09 781.99     Patient Active Problem List   Diagnosis   • Migraine   • Iron deficiency anemia   • Gastroesophageal reflux disease   • Essential hypertension   • Coronary arteriosclerosis   • Bilateral pseudophakia   • Astigmatism   • Hypokalemia   • Cerebral microvascular disease   • Pulmonary hypertension   • Rheumatic tricuspid valve regurgitation   • Rheumatic mitral stenosis   • Acute diastolic CHF (congestive heart failure) (CMS/HCC)   • Pulmonary hypertension due to left heart valvular disease   • Cor pulmonale, chronic (CMS/HCC)   • Folic acid deficiency   • Stage 3 severe COPD by GOLD classification (CMS/HCC)   • Personal history of tobacco use, presenting hazards to health   • Tobacco abuse, in remission   • Recurrent major depressive disorder, in full remission (CMS/HCC)   • Weakness   • Recurrent falls while walking   • Localized swelling of lower extremity   • Peripheral edema   • Insomnia       Therapy Treatment          Rehabilitation Treatment Summary     Row Name 18 1427 18 1356          Treatment Time/Intention    Discipline occupational therapy assistant  -VAL physical therapy assistant  -EM     Document Type therapy note (daily note)  -VAL therapy note (daily note)  -EM     Subjective Information no complaints  -VAL no complaints  -EM     Mode of Treatment individual therapy;occupational  therapy  -LJ physical therapy  -EM     Patient/Family Observations --   no family present  -LJ  --     Therapy Frequency (PT Clinical Impression)  -- daily  -EM     Total Minutes, Occupational Therapy Treatment 43  -LJ  --     Therapy Frequency (OT Eval) --   5-7 x/wk  -LJ  --     Patient Effort good  -LJ good  -EM     Comment  -- Pt requires increased time for all mobility  -EM     Existing Precautions/Restrictions fall  -LJ fall  -EM     Patient Response to Treatment --   pt. agreeable to tx; completed adl  -LJ  --     Recorded by [LJ] PAMELLA Sprague/DALTON 07/07/18 1522 [EM] Khang Cornejo, PTA 07/07/18 1529     Row Name 07/07/18 1427 07/07/18 1356          Vital Signs    Pre Systolic BP Rehab  -- 137  -EM     Pre Treatment Diastolic BP  -- 62  -EM     Pretreatment Heart Rate (beats/min) 56  -LJ 61  -EM     Posttreatment Heart Rate (beats/min) 54  -LJ  --     Pre SpO2 (%) 98  -LJ 93  -EM     O2 Delivery Pre Treatment room air  -LJ room air  -EM     Post SpO2 (%) 96  -LJ  --     O2 Delivery Post Treatment room air  -LJ  --     Pre Patient Position --   reclined in bedside chair  -LJ  --     Intra Patient Position Sitting  -LJ Supine  -EM     Post Patient Position --   reclined in bedside chair  -LJ  --     Recorded by [LJ] PAMELLA Sprague/DALTON 07/07/18 1522 [EM] Khang Cornejo, PTA 07/07/18 1529     Row Name 07/07/18 1427 07/07/18 1356          Cognitive Assessment/Intervention- PT/OT    Affect/Mental Status (Cognitive) WFL  -LJ WFL  -EM     Orientation Status (Cognition) oriented x 4  -LJ oriented x 4  -EM     Follows Commands (Cognition) follows one step commands;50-74% accuracy;delayed response/completion;increased processing time needed;physical/tactile prompts required;verbal cues/prompting required  -LJ follows one step commands;50-74% accuracy;delayed response/completion;increased processing time needed;physical/tactile prompts required;verbal cues/prompting required  -EM     Safety Deficit  (Cognitive)  -- mild deficit  -EM     Personal Safety Interventions fall prevention program maintained;gait belt;muscle strengthening facilitated;nonskid shoes/slippers when out of bed;supervised activity  -LJ gait belt;fall prevention program maintained;nonskid shoes/slippers when out of bed;supervised activity  -EM     Recorded by [LJ] JUDE SpragueA/L 07/07/18 1522 [EM] Khang Cornejo, PTA 07/07/18 1529     Row Name 07/07/18 1356             Bed Mobility Assessment/Treatment    Supine-Sit [place] (Bed Mobility) minimum assist (75% patient effort);moderate assist (50% patient effort)  -EM      Recorded by [EM] Khang Cornejo, PTA 07/07/18 1529      Row Name 07/07/18 1427 07/07/18 1356          Sit-Stand Transfer    Sit-Stand [place] (Transfers) contact guard  - minimum assist (75% patient effort)  -EM     Assistive Device (Sit-Stand Transfers) walker, front-wheeled  - walker, front-wheeled  -EM     Recorded by [LJ] JUDE SpragueA/L 07/07/18 1522 [EM] Khang Cornejo, PTA 07/07/18 1529     Row Name 07/07/18 1427 07/07/18 1356          Stand-Sit Transfer    Stand-Sit [place] (Transfers) contact guard  - minimum assist (75% patient effort)  -EM     Assistive Device (Stand-Sit Transfers) walker, front-wheeled  -LJ walker, front-wheeled  -EM     Recorded by [LJ] JUDE SpragueA/L 07/07/18 1522 [EM] Khang Cornejo, PTA 07/07/18 1529     Row Name 07/07/18 1356             Gait/Stairs Assessment/Training    [place] Level (Gait) minimum assist (75% patient effort)  -EM      Assistive Device (Gait) walker, front-wheeled  -EM      Distance in Feet (Gait) 22' followed with chair.   -EM      Pattern (Gait) step-to  -EM      Deviations/Abnormal Patterns (Gait) janey decreased;festinating/shuffling;ataxic   Significant shuffling  -EM      Comment (Gait/Stairs) vc's required for big steps.   -EM      Recorded by [EM] Khang Cornejo, PTA 07/07/18 1529      Row Name 07/07/18 1425              ADL Assessment/Intervention    71280 - OT Self Care/Mgmt Minutes 43  -LJ      Recorded by [LJ] JUED SpragueA/L 07/07/18 1522      Row Name 07/07/18 1427             Bathing Assessment/Intervention    Bathing [place] Level bathing skills;upper body;set up;lower body;contact guard assist;perineal area  -LJ      Recorded by [LJ] JUDE SpragueA/L 07/07/18 1522      Row Name 07/07/18 1427             Upper Body Dressing Assessment/Training    Upper Body Dressing [place] Level upper body dressing skills;doff;don;contact guard assist  -LJ      Upper Body Dressing Position supported sitting  -LJ      Recorded by [LJ] JUDE SpragueA/L 07/07/18 1522      Row Name 07/07/18 1427             Lower Body Dressing Assessment/Training    Lower Body Dressing [place] Level doff;don;socks;contact guard assist  -LJ      Lower Body Dressing Position supported sitting  -LJ      Recorded by [LJ] JUDE SpragueA/L 07/07/18 1522      Row Name 07/07/18 1427             Grooming Assessment/Training    [place] Level (Grooming) grooming skills;hair care, combing/brushing;wash face, hands;set up  -LJ      Grooming Position supported sitting  -LJ      Recorded by [LJ] JUDE SpragueA/L 07/07/18 1522      Row Name 07/07/18 1356             Positioning and Restraints    Pre-Treatment Position in bed  -EM      Post Treatment Position chair  -EM      In Chair reclined;sitting;call light within reach;encouraged to call for assist;exit alarm on  -EM      Recorded by [EM] Khang Cornejo, [facility] 07/07/18 1529      Row Name 07/07/18 1427 07/07/18 1356          Pain Scale: Numbers Pre/Post-Treatment    Pain Scale: Numbers, Pretreatment 0/10 - no pain  - 0/10 - no pain  -EM     Pain Scale: Numbers, Post-Treatment 0/10 - no pain  -LJ 0/10 - no pain  -EM     Recorded by [LJ] JUDE SpragueA/L 07/07/18 1522 [EM] Khang Cornejo, PTA 07/07/18 1529     Row Name 07/07/18 1427              Outcome Summary/Treatment Plan (OT)    Daily Summary of Progress (OT) progress toward functional goals is good  -VAL      Plan for Continued Treatment (OT) --   continue OT per poc  -LJ      Recorded by [LJ] PAMELLA Sprague/L 07/07/18 1522      Row Name 07/07/18 1356             Outcome Summary/Treatment Plan (PT)    Plan for Continued Treatment (PT) Continue to work on gt quality and bed mobility.  -EM      Anticipated Discharge Disposition (PT) skilled nursing facility  -EM      Recorded by [EM] Khang Cornejo, PTA 07/07/18 1529        User Key  (r) = Recorded By, (t) = Taken By, (c) = Cosigned By    Initials Name Effective Dates Discipline    EM Khang Cornejo, PTA 08/11/15 -  PT    LJ PAMELLA Sprague/DALTON 05/22/18 -  OT                       PT Rehab Goals     Row Name 07/07/18 1500             Bed Mobility Goal 1 (PT)    Activity/Assistive Device (Bed Mobility Goal 1, PT) sit to supine/supine to sit  -EM      [place] Level/Cues Needed (Bed Mobility Goal 1, PT) conditional independence  -EM      Time Frame (Bed Mobility Goal 1, PT) 3 days  -EM      Barriers (Bed Mobility Goal 1, PT) Difficulty with initiation.  -EM      Progress/Outcomes (Bed Mobility Goal 1, PT) goal not met  -EM         Transfer Goal 1 (PT)    Activity/Assistive Device (Transfer Goal 1, PT) sit-to-stand/stand-to-sit;bed-to-chair/chair-to-bed  -EM      [place] Level/Cues Needed (Transfer Goal 1, PT) conditional independence  -EM      Time Frame (Transfer Goal 1, PT) long term goal (LTG);by discharge  -EM      Progress/Outcome (Transfer Goal 1, PT) goal not met  -EM         Gait Training Goal 1 (PT)    Activity/Assistive Device (Gait Training Goal 1, PT) walker, rolling  -EM      [place] Level (Gait Training Goal 1, PT) supervision required  -EM      Distance (Gait Goal 1, PT) 50'x2.   -EM      Time Frame (Gait Training Goal 1, PT) long term goal (LTG);by discharge  -EM      Barriers (Gait Training Goal 1, PT) Difficulty  with initiation.  -EM      Progress/Outcome (Gait Training Goal 1, PT) goal not met  -EM        User Key  (r) = Recorded By, (t) = Taken By, (c) = Cosigned By    Initials Name Provider Type Discipline    QUEENIE Cornejo, PTA Physical Therapy Assistant PT          Physical Therapy Education     Title: PT OT SLP Therapies (Active)     Topic: Physical Therapy (Active)     Point: Mobility training (Active)    Learning Progress Summary     Learner Status Readiness Method Response Comment Documented by    Patient Active Acceptance E NR Patient educated on proper gait mechanics, proper use of walker, proper transfer technique.  07/04/18 1254                      User Key     Initials Effective Dates Name Provider Type Discipline     04/03/18 -  Hadley Ramirez, PT Physical Therapist PT                    PT Recommendation and Plan  Anticipated Discharge Disposition (PT): skilled nursing facility  Therapy Frequency (PT Clinical Impression): daily  Outcome Summary/Treatment Plan (PT)  Plan for Continued Treatment (PT): Continue to work on gt quality and bed mobility.  Anticipated Discharge Disposition (PT): skilled nursing facility  Outcome Summary: Pt ariel tx well, but no goals met. Pt t/f sup-sit with min/mod Ax1, sit-stand-sit w/ min Ax1. Pt amb 22' with [facility] with min Ax1 w/ vc's for big steps. Pt has difficulty with gt due to severe shuffeling of feet.           Outcome Measures     Row Name 07/07/18 1427 07/07/18 1356 07/06/18 0943       How much help from another person do you currently need...    Turning from your back to your side while in flat bed without using bedrails?  -- 3  -EM 3  -TW    Moving from lying on back to sitting on the side of a flat bed without bedrails?  -- 3  -EM 3  -TW    Moving to and from a bed to a chair (including a wheelchair)?  -- 3  -EM 3  -TW    Standing up from a chair using your arms (e.g., wheelchair, bedside chair)?  -- 3  -EM 3  -TW    Climbing 3-5 steps with a railing?  -- 2   -EM 2  -TW    To walk in hospital room?  -- 3  -EM 3  -TW    AM-PAC 6 Clicks Score  -- 17 -EM 17 -TW       How much help from another is currently needed...    Putting on and taking off regular lower body clothing? 3  -LJ  --  --    Bathing (including washing, rinsing, and drying) 3  -LJ  --  --    Toileting (which includes using toilet bed pan or urinal) 3  -LJ  --  --    Putting on and taking off regular upper body clothing 3  -LJ  --  --    Taking care of personal grooming (such as brushing teeth) 3  -LJ  --  --    Eating meals 3  -LJ  --  --    Score 18 -LJ  --  --       Functional Assessment    Outcome Measure Options  -- AM-PAC 6 Clicks Basic Mobility (PT)  -[facility] 6 Clicks Basic Mobility (PT)  -    Row Name 07/06/18 0720 07/05/18 1052 07/05/18 0750       How much help from another person do you currently need...    Turning from your back to your side while in flat bed without using bedrails?  -- 3  -SM  --    Moving from lying on back to sitting on the side of a flat bed without bedrails?  -- 3  -SM  --    Moving to and from a bed to a chair (including a wheelchair)?  -- 3  -SM  --    Standing up from a chair using your arms (e.g., wheelchair, bedside chair)?  -- 3  -SM  --    Climbing 3-5 steps with a railing?  -- 2  -SM  --    To walk in hospital room?  -- 3   increased time also needed  -SM  --    AM-PAC 6 Clicks Score  -- 17  -SM  --       How much help from another is currently needed...    Putting on and taking off regular lower body clothing? 1  -BB  -- 1  -BB    Bathing (including washing, rinsing, and drying) 2  -BB  -- 2  -BB    Toileting (which includes using toilet bed pan or urinal) 1  -BB  -- 1  -BB    Putting on and taking off regular upper body clothing 2  -BB  -- 2  -BB    Taking care of personal grooming (such as brushing teeth) 3  -BB  -- 3  -BB    Eating meals 3  -BB  -- 3  -BB    Score 12  -BB  -- 12  -BB       Functional Assessment    Outcome Measure Options  -- AM-PAC 6 Clicks  Basic Mobility (PT)  -  --      User Key  (r) = Recorded By, (t) = Taken By, (c) = Cosigned By    Initials Name Provider Type    EM Khang Cornejo PTA Physical Therapy Assistant     Gita Ferguson, PTA Physical Therapy Assistant    TW Erlin Renteria, PTA Physical Therapy Assistant    BB Bibi Chester, CAN/L Occupational Therapy Assistant    VAL Chavarria, CAN/L Occupational Therapy Assistant           Time Calculation:         PT Charges     Row Name 07/07/18 1533             Time Calculation    Start Time 1356  -EM      Stop Time 1420  -EM      Time Calculation (min) 24 min  -EM         Time Calculation- PT    Total Timed Code Minutes- PT 24 minute(s)  -EM        User Key  (r) = Recorded By, (t) = Taken By, (c) = Cosigned By    Initials Name Provider Type     Khang Cornejo PTA Physical Therapy Assistant        Therapy Suggested Charges     Code   Minutes Charges    50498 (CPT®) Hc Pt Neuromusc Re Education Ea 15 Min      97595 (CPT®) Hc Pt Ther Proc Ea 15 Min      52923 (CPT®) Hc Gait Training Ea 15 Min      21638 (CPT®) Hc Pt Therapeutic Act Ea 15 Min 35 2    86500 (CPT®) Hc Pt Manual Therapy Ea 15 Min      33619 (CPT®) Hc Pt Iontophoresis Ea 15 Min      25032 (CPT®) Hc Pt Elec Stim Ea-Per 15 Min      14352 (CPT®) Hc Pt Ultrasound Ea 15 Min      12838 (CPT®) Hc Pt Self Care/Mgmt/Train Ea 15 Min      Total  35 2        Therapy Charges for Today     Code Description Service Date Service Provider Modifiers Qty    36253625294 HC PT THERAPEUTIC ACT EA 15 MIN 7/7/2018 Khang Cornejo PTA GP 1    54791685808 HC GAIT TRAINING EA 15 MIN 7/7/2018 Khang Cornejo PTA GP 1          PT G-Codes  PT Professional Judgement Used?: Yes  Outcome Measure Options: AM-PAC 6 Clicks Basic Mobility (PT)  Score: 17  Functional Limitation: Mobility: Walking and moving around  Mobility: Walking and Moving Around Current Status (): At least 40 percent but less than 60 percent impaired, limited or  restricted  Mobility: Walking and Moving Around Goal Status (): At least 20 percent but less than 40 percent impaired, limited or restricted    Khang Cornejo, PTA  7/7/2018

## 2018-07-07 NOTE — PLAN OF CARE
Problem: Patient Care Overview  Goal: Plan of Care Review   07/07/18 0412   Plan of Care Review   Progress no change   OTHER   Outcome Summary Pt rested well overnight; pt bradycardic at times, other vital signs stable; monitoring pt      Goal: Individualization and Mutuality  Outcome: Ongoing (interventions implemented as appropriate)    Goal: Discharge Needs Assessment  Outcome: Ongoing (interventions implemented as appropriate)      Problem: Fall Risk (Adult)  Goal: Absence of Fall  Outcome: Ongoing (interventions implemented as appropriate)      Problem: Skin Injury Risk (Adult)  Goal: Skin Health and Integrity  Outcome: Ongoing (interventions implemented as appropriate)      Problem: Cardiac: Heart Failure (Adult)  Goal: Signs and Symptoms of Listed Potential Problems Will be Absent, Minimized or Managed (Cardiac: Heart Failure)  Outcome: Ongoing (interventions implemented as appropriate)

## 2018-07-07 NOTE — THERAPY TREATMENT NOTE
Acute Care - Occupational Therapy Treatment Note  [facility]     Patient Name: Michelle Child  : 1955  MRN: [id number]  Today's Date: 2018  Onset of Illness/Injury or Date of Surgery: 18  Date of Referral to OT: 18  Referring Physician: JANAK Roblero MD.     Admit Date: 2018       ICD-10-CM ICD-9-CM   1. Peripheral edema R60.9 782.3   2. Acute on chronic congestive heart failure, unspecified congestive heart failure type (CMS/HCC) I50.9 428.0   3. Uncontrolled hypertension I10 401.9   4. Hypokalemia E87.6 276.8   5. Impaired mobility and ADLs Z74.09 799.89   6. Impaired physical mobility Z74.09 781.99     Patient Active Problem List   Diagnosis   • Migraine   • Iron deficiency anemia   • Gastroesophageal reflux disease   • Essential hypertension   • Coronary arteriosclerosis   • Bilateral pseudophakia   • Astigmatism   • Hypokalemia   • Cerebral microvascular disease   • Pulmonary hypertension   • Rheumatic tricuspid valve regurgitation   • Rheumatic mitral stenosis   • Acute diastolic CHF (congestive heart failure) (CMS/[facility])   • Pulmonary hypertension due to left heart valvular disease   • Cor pulmonale, chronic (CMS/[facility])   • Folic acid deficiency   • Stage 3 severe COPD by GOLD classification (CMS/[facility])   • Personal history of tobacco use, presenting hazards to health   • Tobacco abuse, in remission   • Recurrent major depressive disorder, in full remission (CMS/HCC)   • Weakness   • Recurrent falls while walking   • Localized swelling of lower extremity   • Peripheral edema   • Insomnia     Past Medical History:   Diagnosis Date   • Alkaline phosphatase raised    • Astigmatism    • Bilateral pseudophakia    • Coronary arteriosclerosis    • Depressive disorder    • Edema of lower extremity    • Encounter for general adult medical examination with abnormal findings    • Essential hypertension    • Gastroesophageal reflux disease    • H/O bone density study     DXA BONE DENSITY  AXIAL    04/22/2016   • H/O screening mammography     SCREENING MAMMOGRAPHY     04/25/2016   • Hx of screening mammography     x3; declined screening, understands risks and benefits and still declines      07/24/2015   • Influenza vaccine administered     INFLUENZA IMMUN ADMIN OR PREV RECV'D   x4       04/01/2016   • Insomnia    • Iron deficiency    • Migraine    • Tobacco dependence     continuous       Past Surgical History:   Procedure Laterality Date   • APPENDECTOMY     • AUGMENTATION MAMMAPLASTY     • BREAST IMPLANT SURGERY     • CATARACT EXTRACTION WITH INTRAOCULAR LENS IMPLANT  12/17/2003    Phacoemulsification of a cataract with intraocular lens implant of rigt eye, Nixon model SA 60-AT; serial # [id number];20.5 diopter   • CHOLECYSTECTOMY  08/06/2007    Laparoscopic cholecystectomy. Symptomatic gallstones   • COLONOSCOPY  10/15/2013    declined stool cards and colonscopy   • ENDOSCOPY AND COLONOSCOPY  05/22/2014    Internal & external hemorrhoids found.   • ENDOSCOPY W/ PEG TUBE PLACEMENT  05/22/2014    EGD w/ tube: Normal esopahgus. Gastritis in stomach. Biopsy taken. Normal duodenum. Biopsy taken.   • INJECTION OF MEDICATION  07/24/2013    Inj(s) Tend-Sheath, Ligament, Single   • INJECTION OF MEDICATION  11/17/2014    Kenalog x6   • INJECTION OF MEDICATION  04/01/2016    PNEUMOC VAC/ADMIN/RCVD    • INJECTION OF MEDICATION  12/05/2014    Toradol   • OTHER SURGICAL HISTORY  01/19/2014    Drain/Inject Major Joint   x2   • PAP SMEAR  10/06/2011   • PARTIAL HYSTERECTOMY      Partial hyst    • TONSILLECTOMY     • TUBAL ABDOMINAL LIGATION         Therapy Treatment          Rehabilitation Treatment Summary     Row Name 07/07/18 1427             Treatment Time/Intention    Discipline occupational therapy assistant  -VAL      Document Type therapy note (daily note)  -VAL      Subjective Information no complaints  -VAL      Mode of Treatment individual therapy;occupational therapy  -VAL      Patient/Family Observations  --   no family present  -LJ      Total Minutes, Occupational Therapy Treatment 43  -LJ      Therapy Frequency (OT Eval) --   5-7 x/wk  -LJ      Patient Effort good  -LJ      Existing Precautions/Restrictions fall  -LJ      Patient Response to Treatment --   pt. agreeable to tx; completed adl  -      Recorded by [LJ] PAMELLA Sprague/L 07/07/18 1522      Row Name 07/07/18 1427             Vital Signs    Pretreatment Heart Rate (beats/min) 56  -LJ      Posttreatment Heart Rate (beats/min) 54  -LJ      Pre SpO2 (%) 98  -LJ      O2 Delivery Pre Treatment room air  -LJ      Post SpO2 (%) 96  -LJ      O2 Delivery Post Treatment room air  -LJ      Pre Patient Position --   reclined in bedside chair  -LJ      Intra Patient Position Sitting  -LJ      Post Patient Position --   reclined in bedside chair  -LJ      Recorded by [VAL] PAMELLA Sprague/L 07/07/18 1522      Row Name 07/07/18 1427             Cognitive Assessment/Intervention- PT/OT    Affect/Mental Status (Cognitive) WFL  -      Orientation Status (Cognition) oriented x 4  -LJ      Follows Commands (Cognition) follows one step commands;50-74% accuracy;delayed response/completion;increased processing time needed;physical/tactile prompts required;verbal cues/prompting required  -      Personal Safety Interventions fall prevention program maintained;gait belt;muscle strengthening facilitated;nonskid shoes/slippers when out of bed;supervised activity  -      Recorded by [LJ] PAMELLA Sprague/L 07/07/18 1522      Row Name 07/07/18 1427             Sit-Stand Transfer    Sit-Stand [place] (Transfers) contact guard  -VAL      Assistive Device (Sit-Stand Transfers) walker, front-wheeled  -VAL      Recorded by [LJ] PAMELLA Sprague/L 07/07/18 1522      Row Name 07/07/18 1427             Stand-Sit Transfer    Stand-Sit [place] (Transfers) contact guard  -VAL      Assistive Device (Stand-Sit Transfers) walker, front-wheeled  -VAL       Recorded by [LJ] PAMELLA Sprague/L 07/07/18 1522      Row Name 07/07/18 1427             ADL Assessment/Intervention    52997 - OT Self Care/Mgmt Minutes 43  -LJ      Recorded by [LJ] JUDE SpragueA/L 07/07/18 1522      Row Name 07/07/18 1427             Bathing Assessment/Intervention    Bathing [place] Level bathing skills;upper body;set up;lower body;contact guard assist;perineal area  -LJ      Recorded by [LJ] PAMELLA Sprague/L 07/07/18 1522      Row Name 07/07/18 1427             Upper Body Dressing Assessment/Training    Upper Body Dressing [place] Level upper body dressing skills;doff;don;contact guard assist  -LJ      Upper Body Dressing Position supported sitting  -LJ      Recorded by [LJ] JUDE SpragueA/L 07/07/18 1522      Row Name 07/07/18 1427             Lower Body Dressing Assessment/Training    Lower Body Dressing [place] Level doff;don;socks;contact guard assist  -LJ      Lower Body Dressing Position supported sitting  -LJ      Recorded by [LJ] PAMELLA Sprague/L 07/07/18 1522      Row Name 07/07/18 1427             Grooming Assessment/Training    [place] Level (Grooming) grooming skills;hair care, combing/brushing;wash face, hands;set up  -LJ      Grooming Position supported sitting  -LJ      Recorded by [LJ] PAMELLA Sprague/L 07/07/18 1522      Row Name 07/07/18 1427             Pain Scale: Numbers Pre/Post-Treatment    Pain Scale: Numbers, Pretreatment 0/10 - no pain  -      Pain Scale: Numbers, Post-Treatment 0/10 - no pain  -LJ      Recorded by [LJ] PAMELLA Sprague/L 07/07/18 1522      Row Name 07/07/18 1427             Outcome Summary/Treatment Plan (OT)    Daily Summary of Progress (OT) progress toward functional goals is good  -      Plan for Continued Treatment (OT) --   continue OT per poc  -LJ      Recorded by [LJ] PAMELLA Sprague/DALTON 07/07/18 1522        User Key  (r) = Recorded By, (t) = Taken By, (c)  = Cosigned By    Initials Name Effective Dates Discipline    KINGSLEY Hicks 05/22/18 -  OT                   OT Rehab Goals     Row Name 07/07/18 1427             Transfer Goal 1 (OT)    Activity/Assistive Device (Transfer Goal 1, OT) sit-to-stand/stand-to-sit;bed-to-chair/chair-to-bed;toilet;walker, rolling  -LJ      [place] Level/Cues Needed (Transfer Goal 1, OT) contact guard assist  -LJ      Time Frame (Transfer Goal 1, OT) long term goal (LTG);by discharge  -LJ      Progress/Outcome (Transfer Goal 1, OT) goal not met;continuing progress toward goal  -LJ         Bathing Goal 1 (OT)    Activity/Assistive Device (Bathing Goal 1, OT) upper body bathing;bath mitt;long-handled sponge  -LJ      [place] Level/Cues Needed (Bathing Goal 1, OT) minimum assist (75% or more patient effort)  -LJ      Time Frame (Bathing Goal 1, OT) long term goal (LTG);by discharge  -LJ      Progress/Outcomes (Bathing Goal 1, OT) goal not met;continuing progress toward goal  -LJ         Dressing Goal 1 (OT)    Activity/Assistive Device (Dressing Goal 1, OT) upper body dressing  -LJ      [place]/Cues Needed (Dressing Goal 1, OT) minimum assist (75% or more patient effort)  -LJ      Time Frame (Dressing Goal 1, OT) long term goal (LTG);by discharge  -LJ      Progress/Outcome (Dressing Goal 1, OT) continuing progress toward goal;goal met  -LJ         Self-Feeding Goal 1 (OT)    Activity/Assistive Device (Self-Feeding Goal 1, OT) self-feeding skills, all  -LJ      [place] Level/Cues Needed (Self-Feeding Goal 1, OT) conditional independence  -LJ      Time Frame (Self-Feeding Goal 1, OT) long term goal (LTG);by discharge  -LJ      Progress/Outcomes (Self-Feeding Goal 1, OT) goal not met;continuing progress toward goal  -LJ        User Key  (r) = Recorded By, (t) = Taken By, (c) = Cosigned By    Initials Name Provider Type Discipline    PAMELLA Hicks/L Occupational Therapy Assistant OT         Occupational Therapy Education     Title: PT OT SLP Therapies (Active)     Topic: Occupational Therapy (Active)     Point: ADL training (Done)     Description: Instruct learner(s) on proper safety adaptation and remediation techniques during self care or transfers.   Instruct in proper use of assistive devices.   Learning Progress Summary     Learner Status Readiness Method Response Comment Documented by    Patient Done Acceptance E,DONALDO,KEVEN GARCIA  LJ 07/07/18 1526     Active Acceptance E NR   07/06/18 1127     Active Acceptance E NR   07/05/18 1258     Done Acceptance E,DONALDO SINGHNR Educated on the role of OT and POC, educated on the benefit of activity and safety with bed mobility, t/f, and functional mobility  07/03/18 1127          Point: Precautions (Done)     Description: Instruct learner(s) on prescribed precautions during self-care and functional transfers.   Learning Progress Summary     Learner Status Readiness Method Response Comment Documented by    Patient Done Acceptance ZACH,KEVEN CRUZ Educated on the role of OT and POC, educated on the benefit of activity and safety with bed mobility, t/f, and functional mobility  07/03/18 1127          Point: Body mechanics (Active)     Description: Instruct learner(s) on proper positioning and spine alignment during self-care, functional mobility activities and/or exercises.   Learning Progress Summary     Learner Status Readiness Method Response Comment Documented by    Patient Active Acceptance E NR   07/05/18 1258     Done Acceptance DONALDO SERRANO NR Educated on the role of OT and POC, educated on the benefit of activity and safety with bed mobility, t/f, and functional mobility  07/03/18 1127                      User Key     Initials Effective Dates Name Provider Type Discipline     03/07/18 -  KINGSLEY Ngo Occupational Therapy Assistant OT     04/03/18 -  Janet Johnson OT Occupational Therapist OT     05/22/18 -  Melyssa Chavarria  PAMELLA/L Occupational Therapy Assistant OT                OT Recommendation and Plan  Outcome Summary/Treatment Plan (OT)  Daily Summary of Progress (OT): progress toward functional goals is good  Plan for Continued Treatment (OT):  (continue OT per poc)  Therapy Frequency (OT Eval):  (5-7 x/wk)  Daily Summary of Progress (OT): progress toward functional goals is good  Plan of Care Review  Plan of Care Reviewed With: patient  Plan of Care Reviewed With: patient  Outcome Summary: Pt. agreeable to tx this date with tx focus on TB bathing, dressing and grooming tasks; pt. reuqired CG assist with UB Bathing and dressing, (S) with simple grooming; CG with sit-stand, stand-sit transfers; stood x 3 minutes during bathing with RW, demonstrating (F) stand balance. Pt. progressing well; no c/o pain during session. Continue OT        Outcome Measures     Row Name 07/07/18 1427 07/06/18 0943 07/06/18 0720       How much help from another person do you currently need...    Turning from your back to your side while in flat bed without using bedrails?  -- 3  -TW  --    Moving from lying on back to sitting on the side of a flat bed without bedrails?  -- 3  -TW  --    Moving to and from a bed to a chair (including a wheelchair)?  -- 3  -TW  --    Standing up from a chair using your arms (e.g., wheelchair, bedside chair)?  -- 3  -TW  --    Climbing 3-5 steps with a railing?  -- 2  -TW  --    To walk in hospital room?  -- 3  -TW  --    AM-PAC 6 Clicks Score  -- 17  -TW  --       How much help from another is currently needed...    Putting on and taking off regular lower body clothing? 3  -LJ  -- 1  -BB    Bathing (including washing, rinsing, and drying) 3  -LJ  -- 2  -BB    Toileting (which includes using toilet bed pan or urinal) 3  -LJ  -- 1  -BB    Putting on and taking off regular upper body clothing 3  -LJ  -- 2  -BB    Taking care of personal grooming (such as brushing teeth) 3  -LJ  -- 3  -BB    Eating meals 3  -LJ  -- 3  -BB     Score 18  -LJ  -- 12  -BB       Functional Assessment    Outcome Measure Options  -- AM-PAC 6 Clicks Basic Mobility (PT)  -  --    Row Name 07/05/18 1052 07/05/18 0750          How much help from another person do you currently need...    Turning from your back to your side while in flat bed without using bedrails? 3  -SM  --     Moving from lying on back to sitting on the side of a flat bed without bedrails? 3  -SM  --     Moving to and from a bed to a chair (including a wheelchair)? 3  -SM  --     Standing up from a chair using your arms (e.g., wheelchair, bedside chair)? 3  -SM  --     Climbing 3-5 steps with a railing? 2  -SM  --     To walk in hospital room? 3   increased time also needed  -SM  --     AM-PAC 6 Clicks Score 17  -SM  --        How much help from another is currently needed...    Putting on and taking off regular lower body clothing?  -- 1  -BB     Bathing (including washing, rinsing, and drying)  -- 2  -BB     Toileting (which includes using toilet bed pan or urinal)  -- 1  -BB     Putting on and taking off regular upper body clothing  -- 2  -BB     Taking care of personal grooming (such as brushing teeth)  -- 3  -BB     Eating meals  -- 3  -BB     Score  -- 12  -BB        Functional Assessment    Outcome Measure Options AM-PAC 6 Clicks Basic Mobility (PT)  -  --       User Key  (r) = Recorded By, (t) = Taken By, (c) = Cosigned By    Initials Name Provider Type     Gita Ferguson PTA Physical Therapy Assistant    TW Erlin Renteria PTA Physical Therapy Assistant    BB Bibi Chester CAN/L Occupational Therapy Assistant    VAL Chavarria, CAN/L Occupational Therapy Assistant           Time Calculation:         Time Calculation- OT     Row Name 07/07/18 1527 07/07/18 1427          Time Calculation- OT    OT Start Time 1427 -LJ  --     OT Stop Time 1510 -LJ  --     OT Time Calculation (min) 43 min  -LJ  --     OT Received On 07/07/18  -LJ  --        Timed Charges    58044  - OT Self Care/Mgmt Minutes 43  -LJ 43 -LJ       User Key  (r) = Recorded By, (t) = Taken By, (c) = Cosigned By    Initials Name Provider Type     KINGSLEY Sprague Occupational Therapy Assistant           Therapy Suggested Charges     Code   Minutes Charges    27199 (CPT®) Hc Ot Neuromusc Re Education Ea 15 Min      62100 (CPT®) Hc Ot Ther Proc Ea 15 Min      19088 (CPT®) Hc Gait Training Ea 15 Min      28968 (CPT®) Hc Ot Therapeutic Act Ea 15 Min      38746 (CPT®) Hc Ot Manual Therapy Ea 15 Min      69714 (CPT®) Hc Ot Iontophoresis Ea 15 Min      63497 (CPT®) Hc Ot Elec Stim Ea-Per 15 Min      05926 (CPT®) Hc Ot Ultrasound Ea 15 Min      40782 (CPT®) Hc Ot Self Care/Mgmt/Train Ea 15 Min 86 6    Total  86 6        Therapy Charges for Today     Code Description Service Date Service Provider Modifiers Qty    [id number] HC OT SELF CARE/MGMT/TRAIN EA 15 MIN 7/7/2018 PAMELLA Sprague/L GO 3          OT G-codes  OT Professional Judgement Used?: Yes  OT Functional Scales Options: AM-PAC 6 Clicks Daily Activity (OT)  Score: 11  Functional Limitation: Self care  Self Care Current Status (): At least 60 percent but less than 80 percent impaired, limited or restricted  Self Care Goal Status (): At least 40 percent but less than 60 percent impaired, limited or restricted    KINGSLEY Sprague  7/7/2018

## 2018-07-07 NOTE — PROGRESS NOTES
FAMILY MEDICINE DAILY PROGRESS NOTE  NAME: Michelle Child  : 1955  MRN: [id number]      LOS: 1 day     PROVIDER OF SERVICE: Jenise Delgado MD    Chief Complaint: Hypokalemia    Subjective:     Interval History:  History taken from: patient chart  Patient did well overnight. BP has been stable. She required no medications overnight. Awaiting nursing home placement. Dr. Berry will follow in the nursing home.     Review of Systems:   Review of Systems   Constitutional: Negative for activity change and chills.   HENT: Positive for hearing loss. Negative for congestion and sore throat.    Eyes: Negative for pain and visual disturbance.   Respiratory: Negative for chest tightness and shortness of breath.    Cardiovascular: Negative for chest pain and palpitations.   Gastrointestinal: Negative for diarrhea, nausea and vomiting.   Endocrine: Negative for polyphagia and polyuria.   Genitourinary: Negative for frequency and hematuria.   Musculoskeletal: Negative for arthralgias and myalgias.   Skin: Negative for color change and pallor.   Neurological: Negative for dizziness and seizures.   Psychiatric/Behavioral: Negative for agitation and behavioral problems.       Objective:     Vital Signs  Temp:  [96.5 °F (35.8 °C)-98 °F (36.7 °C)] 96.5 °F (35.8 °C)  Heart Rate:  [47-58] 57  Resp:  [18] 18  BP: (125-158)/(60-62) 131/62  Body mass index is 20.08 kg/m².    Physical Exam  Physical Exam   Constitutional: She is oriented to person, place, and time. She appears well-developed and well-nourished. No distress.   HENT:   Mouth/Throat: No oropharyngeal exudate.   Neck: No JVD present.   Cardiovascular: Regular rhythm and intact distal pulses.  Exam reveals no gallop and no friction rub.    Murmur heard.  2/6 systolic murmur, sinus bradycardia   Pulmonary/Chest: No respiratory distress. She has no wheezes. She has no rales.   Diminished breath sounds in the bases   Abdominal: Soft. Bowel sounds are normal.  She exhibits no distension. There is no tenderness. There is no rebound and no guarding.   Musculoskeletal: She exhibits no edema.   Neurological: She is alert and oriented to person, place, and time.   Skin: She is not diaphoretic.   Psychiatric: She has a normal mood and affect. Her behavior is normal.   Nursing note and vitals reviewed.      Medication Review    Current Facility-Administered Medications:   •  acetaminophen (TYLENOL) tablet 650 mg, 650 mg, Oral, Q6H PRN, Claudette Machuca MD, 650 mg at 07/04/18 2310  •  albuterol (PROVENTIL) nebulizer solution 0.083% 2.5 mg/3mL, 2.5 mg, Nebulization, Q6H PRN, Louie Roblero MD  •  aspirin chewable tablet 81 mg, 81 mg, Oral, Daily, Louie Roblero MD, 81 mg at 07/07/18 1010  •  atorvastatin (LIPITOR) tablet 10 mg, 10 mg, Oral, Daily, Louie Roblero MD, 10 mg at 07/07/18 1010  •  bisacodyl (DULCOLAX) EC tablet 5 mg, 5 mg, Oral, Daily PRN, Louie Roblero MD  •  budesonide-formoterol (SYMBICORT) 160-4.5 MCG/ACT inhaler 2 puff, 2 puff, Inhalation, BID, Louie Roblero MD, 2 puff at 07/07/18 0835  •  famotidine (PEPCID) tablet 20 mg, 20 mg, Oral, BID, Louie Roblero MD, 20 mg at 07/07/18 1011  •  ferrous sulfate EC tablet 324 mg, 324 mg, Oral, BID With Meals, Louie Roblero MD, 324 mg at 07/07/18 1011  •  FLUoxetine (PROzac) capsule 60 mg, 60 mg, Oral, Daily, LUPE Garcia, 60 mg at 07/07/18 1011  •  folic acid (FOLVITE) tablet 1 mg, 1 mg, Oral, Daily, Louie Roblero MD, 1 mg at 07/07/18 1010  •  furosemide (LASIX) tablet 20 mg, 20 mg, Oral, Daily, Louie Roblero MD, 20 mg at 07/07/18 1011  •  hydrALAZINE (APRESOLINE) injection 10 mg, 10 mg, Intravenous, Q6H PRN, Louie Roblero MD  •  losartan (COZAAR) tablet 100 mg, 100 mg, Oral, Q24H, Louie Roblero MD, 100 mg at 07/07/18 1010  •  metoprolol succinate XL (TOPROL-XL) 24 hr tablet 25 mg, 25 mg, Oral, Daily,  Louie Roblero MD, 25 mg at 07/05/18 0856  •  risperiDONE (risperDAL) tablet 1 mg, 1 mg, Oral, Nightly, Louie Roblero MD, 1 mg at 07/06/18 2137  •  sodium chloride 0.9 % flush 1-10 mL, 1-10 mL, Intravenous, PRN, Louie Roblero MD  •  sodium chloride 0.9 % flush 10 mL, 10 mL, Intravenous, PRN, Blake Euceda MD     Diagnostic Data    Lab Results (last 24 hours)     Procedure Component Value Units Date/Time    Basic Metabolic Panel [[id number]]  (Abnormal) Collected:  07/07/18 0543    Specimen:  Blood Updated:  07/07/18 0620     Glucose 95 mg/dL      BUN 21 mg/dL      Creatinine 0.96 mg/dL      Sodium 136 (L) mmol/L      Potassium 3.7 mmol/L      Chloride 98 mmol/L      CO2 31.0 mmol/L      Calcium 9.0 mg/dL      eGFR Non African Amer 59 mL/min/1.73      BUN/Creatinine Ratio 21.9     Anion Gap 7.0 mmol/L     CBC & Differential [[id number]] Collected:  07/07/18 0543    Specimen:  Blood Updated:  07/07/18 0603    Narrative:       The following orders were created for panel order CBC & Differential.  Procedure                               Abnormality         Status                     ---------                               -----------         ------                     CBC Auto Differential[[id number]]        Abnormal            Final result                 Please view results for these tests on the individual orders.    CBC Auto Differential [[id number]]  (Abnormal) Collected:  07/07/18 0543    Specimen:  Blood Updated:  07/07/18 0603     WBC 7.21 10*3/mm3      RBC 4.45 10*6/mm3      Hemoglobin 13.0 g/dL      Hematocrit 39.7 %      MCV 89.2 fL      MCH 29.2 pg      MCHC 32.7 g/dL      RDW 14.3 %      RDW-SD 47.0 (H) fl      MPV 10.2 fL      Platelets 218 10*3/mm3      Neutrophil % 70.8 %      Lymphocyte % 19.3 %      Monocyte % 7.5 %      Eosinophil % 1.8 %      Basophil % 0.3 %      Immature Grans % 0.3 %      Neutrophils, Absolute 5.11 10*3/mm3      Lymphocytes, Absolute 1.39 10*3/mm3       Monocytes, Absolute 0.54 10*3/mm3      Eosinophils, Absolute 0.13 10*3/mm3      Basophils, Absolute 0.02 10*3/mm3      Immature Grans, Absolute 0.02 10*3/mm3             I reviewed the patient's new clinical results.    Assessment/Plan:     Active Hospital Problems (** Indicates Principal Problem)    Diagnosis Date Noted   • **Hypokalemia [E87.6] 01/10/2018     Now resolved   Started [id number] IV and 40meq PO in ED  Mag 1.6   S/p replacement. Will continue to monitor.        • Peripheral edema [R60.9] 07/02/2018     - home dose lasix 20mg daily     • Recurrent falls while walking [R29.6] 07/02/2018     - PT/OT  - Case management consulted for SNF placement. [facility] is not currently accepting new patients. Patient unsure of her second choice SNF. The son would like to know other nursing homes where Dr. Berry sees patients.     • Insomnia [G47.00] 07/02/2018     Continue home risperdal     • Recurrent major depressive disorder, in full remission (CMS/[facility]) [F33.42] 04/05/2018     Continue home prozac     • Stage 3 severe COPD by GOLD classification (CMS/[facility]) [J44.9] 03/01/2018     Continue home nebs and symbicort     • Pulmonary hypertension due to left heart valvular disease [I27.22, I38] 01/15/2018   • Rheumatic mitral stenosis [I05.0] 01/15/2018     Cards Consultation     • Rheumatic tricuspid valve regurgitation [I07.1] 01/14/2018   • Essential hypertension [I10]      Continue home toprol 25mg daily -- patients HR was in 40s while sleeping and 50s this AM will continue to monitor   Current BP: 132/63Home losartan increased to 100mg daily  Lasix 20mg PO daily  PRN hydralazine     • Gastroesophageal reflux disease [K21.9]      Pepcid        Resolved Hospital Problems    Diagnosis Date Noted Date Resolved   No resolved problems to display.       DVT prophylaxis: SCD/TEDS  Code status is   Code Status and Medical Interventions:   Ordered at: 07/02/18 9174     Level Of Support Discussed With:    Patient     Code Status:     CPR     Medical Interventions (Level of Support Prior to Arrest):    Full       Plan for disposition:nursing home, most likely next week      Time: 15 min        This document has been electronically signed by Jenise Delgado MD on July 7, 2018 10:12 AM

## 2018-07-07 NOTE — PLAN OF CARE
Problem: Patient Care Overview  Goal: Plan of Care Review  Outcome: Ongoing (interventions implemented as appropriate)   07/07/18 1405   Coping/Psychosocial   Plan of Care Reviewed With patient   Plan of Care Review   Progress no change   OTHER   Outcome Summary Pt has had no complaints this shift; metoprolol held this AM r/t bradycardia; will continue to monitor      Goal: Individualization and Mutuality  Outcome: Ongoing (interventions implemented as appropriate)    Goal: Discharge Needs Assessment  Outcome: Ongoing (interventions implemented as appropriate)    Goal: Interprofessional Rounds/Family Conf  Outcome: Ongoing (interventions implemented as appropriate)      Problem: Fall Risk (Adult)  Goal: Absence of Fall  Outcome: Ongoing (interventions implemented as appropriate)      Problem: Skin Injury Risk (Adult)  Goal: Skin Health and Integrity  Outcome: Ongoing (interventions implemented as appropriate)      Problem: Cardiac: Heart Failure (Adult)  Goal: Signs and Symptoms of Listed Potential Problems Will be Absent, Minimized or Managed (Cardiac: Heart Failure)  Outcome: Ongoing (interventions implemented as appropriate)

## 2018-07-07 NOTE — PLAN OF CARE
Problem: Patient Care Overview  Goal: Plan of Care Review  Outcome: Ongoing (interventions implemented as appropriate)   07/07/18 8034   Coping/Psychosocial   Plan of Care Reviewed With patient   Plan of Care Review   Progress improving   OTHER   Outcome Summary Pt. agreeable to tx this date with tx focus on TB bathing, dressing and grooming tasks; pt. reuqired CG assist with UB Bathing and dressing, (S) with simple grooming; CG with sit-stand, stand-sit transfers; stood x 3 minutes during bathing with RW, demonstrating (F) stand balance. Pt. progressing well; no c/o pain during session. Continue OT

## 2018-07-08 LAB
ANION GAP SERPL CALCULATED.3IONS-SCNC: 12 MMOL/L (ref 5–15)
BASOPHILS # BLD AUTO: 0.02 10*3/MM3 (ref 0–0.2)
BASOPHILS NFR BLD AUTO: 0.3 % (ref 0–2)
BUN BLD-MCNC: 18 MG/DL (ref 7–21)
BUN/CREAT SERPL: 22.5 (ref 7–25)
CALCIUM SPEC-SCNC: 9.2 MG/DL (ref 8.4–10.2)
CHLORIDE SERPL-SCNC: 100 MMOL/L (ref 95–110)
CO2 SERPL-SCNC: 27 MMOL/L (ref 22–31)
CREAT BLD-MCNC: 0.8 MG/DL (ref 0.5–1)
DEPRECATED RDW RBC AUTO: 46.2 FL (ref 36.4–46.3)
EOSINOPHIL # BLD AUTO: 0.15 10*3/MM3 (ref 0–0.7)
EOSINOPHIL NFR BLD AUTO: 2.2 % (ref 0–7)
ERYTHROCYTE [DISTWIDTH] IN BLOOD BY AUTOMATED COUNT: 14.2 % (ref 11.5–14.5)
GFR SERPL CREATININE-BSD FRML MDRD: 72 ML/MIN/1.73 (ref 45–104)
GLUCOSE BLD-MCNC: 90 MG/DL (ref 60–100)
HCT VFR BLD AUTO: 40.9 % (ref 35–45)
HGB BLD-MCNC: 13.4 G/DL (ref 12–15.5)
IMM GRANULOCYTES # BLD: 0.01 10*3/MM3 (ref 0–0.02)
IMM GRANULOCYTES NFR BLD: 0.1 % (ref 0–0.5)
LYMPHOCYTES # BLD AUTO: 1.18 10*3/MM3 (ref 0.6–4.2)
LYMPHOCYTES NFR BLD AUTO: 17.2 % (ref 10–50)
MCH RBC QN AUTO: 28.9 PG (ref 26.5–34)
MCHC RBC AUTO-ENTMCNC: 32.8 G/DL (ref 31.4–36)
MCV RBC AUTO: 88.1 FL (ref 80–98)
MONOCYTES # BLD AUTO: 0.54 10*3/MM3 (ref 0–0.9)
MONOCYTES NFR BLD AUTO: 7.9 % (ref 0–12)
NEUTROPHILS # BLD AUTO: 4.97 10*3/MM3 (ref 2–8.6)
NEUTROPHILS NFR BLD AUTO: 72.3 % (ref 37–80)
PLATELET # BLD AUTO: 213 10*3/MM3 (ref 150–450)
PMV BLD AUTO: 10.6 FL (ref 8–12)
POTASSIUM BLD-SCNC: 3.4 MMOL/L (ref 3.5–5.1)
RBC # BLD AUTO: 4.64 10*6/MM3 (ref 3.77–5.16)
SODIUM BLD-SCNC: 139 MMOL/L (ref 137–145)
WBC NRBC COR # BLD: 6.87 10*3/MM3 (ref 3.2–9.8)

## 2018-07-08 PROCEDURE — 97110 THERAPEUTIC EXERCISES: CPT

## 2018-07-08 PROCEDURE — 80048 BASIC METABOLIC PNL TOTAL CA: CPT | Performed by: FAMILY MEDICINE

## 2018-07-08 PROCEDURE — 97116 GAIT TRAINING THERAPY: CPT

## 2018-07-08 PROCEDURE — 96376 TX/PRO/DX INJ SAME DRUG ADON: CPT

## 2018-07-08 PROCEDURE — 97530 THERAPEUTIC ACTIVITIES: CPT

## 2018-07-08 PROCEDURE — 85025 COMPLETE CBC W/AUTO DIFF WBC: CPT | Performed by: FAMILY MEDICINE

## 2018-07-08 PROCEDURE — 94799 UNLISTED PULMONARY SVC/PX: CPT

## 2018-07-08 PROCEDURE — 99225 PR SBSQ OBSERVATION CARE/DAY 25 MINUTES: CPT | Performed by: FAMILY MEDICINE

## 2018-07-08 PROCEDURE — 94760 N-INVAS EAR/PLS OXIMETRY 1: CPT

## 2018-07-08 PROCEDURE — 25010000002 HYDRALAZINE PER 20 MG: Performed by: FAMILY MEDICINE

## 2018-07-08 PROCEDURE — G0378 HOSPITAL OBSERVATION PER HR: HCPCS

## 2018-07-08 PROCEDURE — 97535 SELF CARE MNGMENT TRAINING: CPT

## 2018-07-08 RX ORDER — POTASSIUM CHLORIDE 750 MG/1
20 CAPSULE, EXTENDED RELEASE ORAL DAILY
Status: DISCONTINUED | OUTPATIENT
Start: 2018-07-08 | End: 2018-07-10 | Stop reason: HOSPADM

## 2018-07-08 RX ADMIN — FERROUS SULFATE TAB EC 324 MG (65 MG FE EQUIVALENT) 324 MG: 324 (65 FE) TABLET DELAYED RESPONSE at 09:32

## 2018-07-08 RX ADMIN — Medication 10 MG: at 04:44

## 2018-07-08 RX ADMIN — FOLIC ACID 1 MG: 1 TABLET ORAL at 09:32

## 2018-07-08 RX ADMIN — BUDESONIDE AND FORMOTEROL FUMARATE DIHYDRATE 2 PUFF: 160; 4.5 AEROSOL RESPIRATORY (INHALATION) at 07:51

## 2018-07-08 RX ADMIN — BUDESONIDE AND FORMOTEROL FUMARATE DIHYDRATE 2 PUFF: 160; 4.5 AEROSOL RESPIRATORY (INHALATION) at 19:43

## 2018-07-08 RX ADMIN — LOSARTAN POTASSIUM 100 MG: 50 TABLET, FILM COATED ORAL at 09:32

## 2018-07-08 RX ADMIN — ATORVASTATIN CALCIUM 10 MG: 10 TABLET, FILM COATED ORAL at 09:32

## 2018-07-08 RX ADMIN — RISPERIDONE 1 MG: 1 TABLET ORAL at 20:31

## 2018-07-08 RX ADMIN — FERROUS SULFATE TAB EC 324 MG (65 MG FE EQUIVALENT) 324 MG: 324 (65 FE) TABLET DELAYED RESPONSE at 18:03

## 2018-07-08 RX ADMIN — POTASSIUM CHLORIDE 20 MEQ: 750 CAPSULE, EXTENDED RELEASE ORAL at 09:38

## 2018-07-08 RX ADMIN — FUROSEMIDE 20 MG: 20 TABLET ORAL at 09:32

## 2018-07-08 RX ADMIN — FLUOXETINE 60 MG: 20 CAPSULE ORAL at 09:32

## 2018-07-08 RX ADMIN — FAMOTIDINE 20 MG: 20 TABLET ORAL at 09:32

## 2018-07-08 RX ADMIN — FAMOTIDINE 20 MG: 20 TABLET ORAL at 20:31

## 2018-07-08 RX ADMIN — ASPIRIN 81 MG 81 MG: 81 TABLET ORAL at 09:32

## 2018-07-08 NOTE — PROGRESS NOTES
FAMILY MEDICINE PROGRESS NOTE  NAME: Michelle Child  : 1955  MRN: [id number]     LOS: 1 day   Code Status and Medical Interventions:   Ordered at: 18 1645     Level Of Support Discussed With:    Patient     Code Status:    CPR     Medical Interventions (Level of Support Prior to Arrest):    Full     PROVIDER OF SERVICE:     Chief Complaint:  Hypokalemia    Subjective     Interval History:  History taken from: patient RN  Subjective: Patient is a 63 yo  female who was admitted with swelling and hypokalemia.  She denies any complaints.  She is waiting for nursing home placement for therapy.    Review of Systems:   Review of Systems   Constitutional: Negative for activity change and chills.   HENT: Positive for hearing loss. Negative for congestion and sore throat.    Eyes: Negative for pain and visual disturbance.   Respiratory: Negative for chest tightness and shortness of breath.    Cardiovascular: Negative for chest pain and palpitations.   Gastrointestinal: Negative for diarrhea, nausea and vomiting.   Endocrine: Negative for polyphagia and polyuria.   Genitourinary: Negative for frequency and hematuria.   Musculoskeletal: Negative for arthralgias and myalgias.   Skin: Negative for color change and pallor.   Neurological: Negative for dizziness and seizures.   Psychiatric/Behavioral: Negative for agitation and behavioral problems.       Objective     Vital Signs  Temp:  [96.6 °F (35.9 °C)-97.2 °F (36.2 °C)] 96.6 °F (35.9 °C)  Heart Rate:  [50-75] 54  Resp:  [16-18] 18  BP: (138-175)/(58-71) 139/60    Physical Exam  Physical Exam   Constitutional: She is oriented to person, place, and time. She appears well-developed and well-nourished. No distress.   HENT:   Mouth/Throat: No oropharyngeal exudate.   Neck: No JVD present.   Cardiovascular: Regular rhythm and intact distal pulses.  Exam reveals no gallop and no friction rub.    Murmur heard.  2/6 systolic murmur, sinus bradycardia    Pulmonary/Chest: No respiratory distress. She has no wheezes. She has no rales.   Diminished breath sounds in the bases   Abdominal: Soft. Bowel sounds are normal. She exhibits no distension. There is no tenderness. There is no rebound and no guarding.   Musculoskeletal: She exhibits no edema.   Neurological: She is alert and oriented to person, place, and time.   Skin: She is not diaphoretic.   Psychiatric: She has a normal mood and affect. Her behavior is normal.   Nursing note and vitals reviewed.      Medication Review    Current Facility-Administered Medications:   •  acetaminophen (TYLENOL) tablet 650 mg, 650 mg, Oral, Q6H PRN, Claudette Machuca MD, 650 mg at 07/04/18 2310  •  albuterol (PROVENTIL) nebulizer solution 0.083% 2.5 mg/3mL, 2.5 mg, Nebulization, Q6H PRN, Louie Roblero MD  •  aspirin chewable tablet 81 mg, 81 mg, Oral, Daily, Louie Roblero MD, 81 mg at 07/08/18 0932  •  atorvastatin (LIPITOR) tablet 10 mg, 10 mg, Oral, Daily, Louie Roblero MD, 10 mg at 07/08/18 0932  •  bisacodyl (DULCOLAX) EC tablet 5 mg, 5 mg, Oral, Daily PRN, Louie Roblero MD  •  budesonide-formoterol (SYMBICORT) 160-4.5 MCG/ACT inhaler 2 puff, 2 puff, Inhalation, BID, Louie Roblero MD, 2 puff at 07/08/18 0751  •  famotidine (PEPCID) tablet 20 mg, 20 mg, Oral, BID, Louie Roblero MD, 20 mg at 07/08/18 0932  •  ferrous sulfate EC tablet 324 mg, 324 mg, Oral, BID With Meals, Louie Roblero MD, 324 mg at 07/08/18 0932  •  FLUoxetine (PROzac) capsule 60 mg, 60 mg, Oral, Daily, LUPE Garcia, 60 mg at 07/08/18 0932  •  folic acid (FOLVITE) tablet 1 mg, 1 mg, Oral, Daily, Louie Roblero MD, 1 mg at 07/08/18 0932  •  furosemide (LASIX) tablet 20 mg, 20 mg, Oral, Daily, Louie Roblero MD, 20 mg at 07/08/18 0932  •  hydrALAZINE (APRESOLINE) injection 10 mg, 10 mg, Intravenous, Q6H PRN, Louie Roblero MD, 10 mg at 07/08/18  0444  •  losartan (COZAAR) tablet 100 mg, 100 mg, Oral, Q24H, Louie Roblero MD, 100 mg at 07/08/18 0932  •  potassium chloride (MICRO-K) CR capsule 20 mEq, 20 mEq, Oral, Daily, Bridget Ortega MD, 20 mEq at 07/08/18 0938  •  risperiDONE (risperDAL) tablet 1 mg, 1 mg, Oral, Nightly, Louie Roblero MD, 1 mg at 07/07/18 2129  •  sodium chloride 0.9 % flush 1-10 mL, 1-10 mL, Intravenous, PRN, Louie Roblero MD  •  sodium chloride 0.9 % flush 10 mL, 10 mL, Intravenous, PRN, Blake Euceda MD     Diagnostic Data      I reviewed the patient's new clinical results and imaging.      Assessment/Plan     Active Hospital Problems (** Indicates Principal Problem)    Diagnosis Date Noted   • **Hypokalemia [E87.6] 01/10/2018     Monitor and replace       • Peripheral edema [R60.9] 07/02/2018     - home dose lasix 20mg daily     • Recurrent falls while walking [R29.6] 07/02/2018     - PT/OT  - Case management consulted for SNF placement. [facility] is not currently accepting new patients. Currently looking at [facility] at [facility].     • Insomnia [G47.00] 07/02/2018     Continue home risperdal     • Recurrent major depressive disorder, in full remission (CMS/[facility]) [F33.42] 04/05/2018     Continue home prozac     • Stage 3 severe COPD by GOLD classification (CMS/[facility]) [J44.9] 03/01/2018     Continue home nebs and symbicort     • Pulmonary hypertension due to left heart valvular disease [I27.22, I38] 01/15/2018   • Rheumatic mitral stenosis [I05.0] 01/15/2018     Cards Consultation     • Rheumatic tricuspid valve regurgitation [I07.1] 01/14/2018   • Essential hypertension [I10]      DC home toprol 25mg daily secondary to bradycardia   Current BP: 132/63Home losartan increased to 100mg daily  Lasix 20mg PO daily  PRN hydralazine     • Gastroesophageal reflux disease [K21.9]      Pepcid        Resolved Hospital Problems    Diagnosis Date Noted Date Resolved   No resolved problems to display.         DVT  prophylaxis: SCDs/TEDs      Disposition:Nursing Home    I have reviewed the notes, assessments, and/or procedures performed by Dr. Roblero, I concur with her/his documentation of Michelle Child.        This document has been electronically signed by Bridget Ortega MD on July 8, 2018 5:27 PM

## 2018-07-08 NOTE — PLAN OF CARE
Problem: Patient Care Overview  Goal: Plan of Care Review  Outcome: Ongoing (interventions implemented as appropriate)   07/08/18 0515   Coping/Psychosocial   Plan of Care Reviewed With patient   Plan of Care Review   Progress improving   OTHER   Outcome Summary pt working well with ot pt addressed and met/with cont toward all goals this date

## 2018-07-08 NOTE — PLAN OF CARE
Problem: Patient Care Overview  Goal: Plan of Care Review  Outcome: Ongoing (interventions implemented as appropriate)   07/08/18 1313 07/08/18 1521   Coping/Psychosocial   Plan of Care Reviewed With patient --    Plan of Care Review   Progress no change --    OTHER   Outcome Summary --  Pt ariel tx well with improved gt quality with max vc's. No LOB noted throughout tx. No goals met this tx. Pt t/f sit-stand-sit with CGAx1. Amb 36' with FWRW with CGAxMin Ax1 to progress wx fwd. Pt req max vc's to take big steps rather that shuffling feet. Pt will benefit from rehab to home upon discharge at this time.

## 2018-07-08 NOTE — PROGRESS NOTES
FAMILY MEDICINE DAILY PROGRESS NOTE  NAME: Michelle Child  : 1955  MRN: [id number]      LOS: 1 day     PROVIDER OF SERVICE: Louie Roblero MD    Chief Complaint: Hypokalemia    Subjective:     Interval History:  History taken from: patient chart  Pt seen and examined in AM in NAD. Pt reports she is doing well. Pt denies any new complaints. Pt denies chest pain, dyspnea, nausea, vomiting or diarrhea. Currently awaiting SNF placement.    Review of Systems:   Review of Systems   Constitutional: Negative for activity change and chills.   HENT: Positive for hearing loss. Negative for congestion and sore throat.    Eyes: Negative for pain and visual disturbance.   Respiratory: Negative for chest tightness and shortness of breath.    Cardiovascular: Negative for chest pain and palpitations.   Gastrointestinal: Negative for diarrhea, nausea and vomiting.   Endocrine: Negative for polyphagia and polyuria.   Genitourinary: Negative for frequency and hematuria.   Musculoskeletal: Negative for arthralgias and myalgias.   Skin: Negative for color change and pallor.   Neurological: Negative for dizziness and seizures.   Psychiatric/Behavioral: Negative for agitation and behavioral problems.       Objective:     Vital Signs  Temp:  [96.5 °F (35.8 °C)-97.7 °F (36.5 °C)] 97.2 °F (36.2 °C)  Heart Rate:  [50-75] 75  Resp:  [16-18] 18  BP: (131-175)/(58-71) 150/68  Body mass index is 19.96 kg/m².    Physical Exam  Physical Exam   Constitutional: She is oriented to person, place, and time. She appears well-developed and well-nourished. No distress.   HENT:   Mouth/Throat: No oropharyngeal exudate.   Neck: No JVD present.   Cardiovascular: Normal rate, regular rhythm and intact distal pulses.    Pulmonary/Chest: Effort normal and breath sounds normal. No respiratory distress. She has no wheezes. She has no rales.   Abdominal: Soft. Bowel sounds are normal. She exhibits no distension. There is no tenderness.  There is no rebound and no guarding.   Musculoskeletal: She exhibits no edema.   Neurological: She is alert and oriented to person, place, and time.   Skin: She is not diaphoretic.   Psychiatric: She has a normal mood and affect. Her behavior is normal.   Nursing note and vitals reviewed.      Medication Review    Current Facility-Administered Medications:   •  acetaminophen (TYLENOL) tablet 650 mg, 650 mg, Oral, Q6H PRN, Claudette Machuca MD, 650 mg at 07/04/18 2310  •  albuterol (PROVENTIL) nebulizer solution 0.083% 2.5 mg/3mL, 2.5 mg, Nebulization, Q6H PRN, Louie Roblero MD  •  aspirin chewable tablet 81 mg, 81 mg, Oral, Daily, Louie Roblero MD, 81 mg at 07/07/18 1010  •  atorvastatin (LIPITOR) tablet 10 mg, 10 mg, Oral, Daily, Louie Roblero MD, 10 mg at 07/07/18 1010  •  bisacodyl (DULCOLAX) EC tablet 5 mg, 5 mg, Oral, Daily PRN, Louie Roblero MD  •  budesonide-formoterol (SYMBICORT) 160-4.5 MCG/ACT inhaler 2 puff, 2 puff, Inhalation, BID, Louie Roblero MD, 2 puff at 07/08/18 0751  •  famotidine (PEPCID) tablet 20 mg, 20 mg, Oral, BID, Louie Roblero MD, 20 mg at 07/07/18 2129  •  ferrous sulfate EC tablet 324 mg, 324 mg, Oral, BID With Meals, Louie Roblero MD, 324 mg at 07/07/18 1803  •  FLUoxetine (PROzac) capsule 60 mg, 60 mg, Oral, Daily, LUPE Garcia, 60 mg at 07/07/18 1011  •  folic acid (FOLVITE) tablet 1 mg, 1 mg, Oral, Daily, Louie Roblero MD, 1 mg at 07/07/18 1010  •  furosemide (LASIX) tablet 20 mg, 20 mg, Oral, Daily, Louie Roblero MD, 20 mg at 07/07/18 1011  •  hydrALAZINE (APRESOLINE) injection 10 mg, 10 mg, Intravenous, Q6H PRN, Louie Roblero MD, 10 mg at 07/08/18 0444  •  losartan (COZAAR) tablet 100 mg, 100 mg, Oral, Q24H, Louie Roblero MD, 100 mg at 07/07/18 1010  •  risperiDONE (risperDAL) tablet 1 mg, 1 mg, Oral, Nightly, Louie Roblero MD, 1 mg at 07/07/18  2129  •  sodium chloride 0.9 % flush 1-10 mL, 1-10 mL, Intravenous, PRN, Louie Roblero MD  •  sodium chloride 0.9 % flush 10 mL, 10 mL, Intravenous, PRN, Blake Euceda MD     Diagnostic Data    Lab Results (last 24 hours)     Procedure Component Value Units Date/Time    CBC & Differential [[id number]] Collected:  07/08/18 0606    Specimen:  Blood Updated:  07/08/18 0727    Narrative:       The following orders were created for panel order CBC & Differential.  Procedure                               Abnormality         Status                     ---------                               -----------         ------                     CBC Auto Differential[[id number]]        Normal              Final result                 Please view results for these tests on the individual orders.    CBC Auto Differential [[id number]]  (Normal) Collected:  07/08/18 0606    Specimen:  Blood Updated:  07/08/18 0727     WBC 6.87 10*3/mm3      RBC 4.64 10*6/mm3      Hemoglobin 13.4 g/dL      Hematocrit 40.9 %      MCV 88.1 fL      MCH 28.9 pg      MCHC 32.8 g/dL      RDW 14.2 %      RDW-SD 46.2 fl      MPV 10.6 fL      Platelets 213 10*3/mm3      Neutrophil % 72.3 %      Lymphocyte % 17.2 %      Monocyte % 7.9 %      Eosinophil % 2.2 %      Basophil % 0.3 %      Immature Grans % 0.1 %      Neutrophils, Absolute 4.97 10*3/mm3      Lymphocytes, Absolute 1.18 10*3/mm3      Monocytes, Absolute 0.54 10*3/mm3      Eosinophils, Absolute 0.15 10*3/mm3      Basophils, Absolute 0.02 10*3/mm3      Immature Grans, Absolute 0.01 10*3/mm3     Basic Metabolic Panel [[id number]] Collected:  07/08/18 0606    Specimen:  Blood Updated:  07/08/18 0714            I reviewed the patient's new clinical results.    Assessment/Plan:     Active Hospital Problems (** Indicates Principal Problem)    Diagnosis Date Noted   • **Hypokalemia [E87.6] 01/10/2018     Monitor and replace       • Peripheral edema [R60.9] 07/02/2018     - home dose lasix 20mg daily      • Recurrent falls while walking [R29.6] 07/02/2018     - PT/OT  - Case management consulted for SNF placement. [facility] is not currently accepting new patients. Currently looking at placement at [facility].     • Insomnia [G47.00] 07/02/2018     Continue home risperdal     • Recurrent major depressive disorder, in full remission (CMS/[facility]) [F33.42] 04/05/2018     Continue home prozac     • Stage 3 severe COPD by GOLD classification (CMS/[facility]) [J44.9] 03/01/2018     Continue home nebs and symbicort     • Pulmonary hypertension due to left heart valvular disease [I27.22, I38] 01/15/2018   • Rheumatic mitral stenosis [I05.0] 01/15/2018     Cards Consultation     • Rheumatic tricuspid valve regurgitation [I07.1] 01/14/2018   • Essential hypertension [I10]      DC home toprol 25mg daily secondary to bradycardia   Current BP: 132/63Home losartan increased to 100mg daily  Lasix 20mg PO daily  PRN hydralazine     • Gastroesophageal reflux disease [K21.9]      Pepcid        Resolved Hospital Problems    Diagnosis Date Noted Date Resolved   No resolved problems to display.       DVT prophylaxis: SCD/TEDS  Code status is   Code Status and Medical Interventions:   Ordered at: 07/02/18 1645     Level Of Support Discussed With:    Patient     Code Status:    CPR     Medical Interventions (Level of Support Prior to Arrest):    Full       Plan for disposition:nursing home, most likely next week      Time: 15 min    Louie Roblero MD PGY3  [place] [address]  Office: [phone]      This document has been electronically signed by Louie Roblero MD on July 8, 2018 7:55 AM

## 2018-07-08 NOTE — PLAN OF CARE
Problem: Patient Care Overview  Goal: Plan of Care Review  Outcome: Ongoing (interventions implemented as appropriate)   07/08/18 2377   Plan of Care Review   Progress improving   OTHER   Outcome Summary Pt resting well, voiced no complaints; makes needs known; vital signs stable     Goal: Individualization and Mutuality  Outcome: Ongoing (interventions implemented as appropriate)    Goal: Discharge Needs Assessment  Outcome: Ongoing (interventions implemented as appropriate)      Problem: Fall Risk (Adult)  Goal: Absence of Fall  Outcome: Ongoing (interventions implemented as appropriate)      Problem: Skin Injury Risk (Adult)  Goal: Skin Health and Integrity  Outcome: Ongoing (interventions implemented as appropriate)      Problem: Cardiac: Heart Failure (Adult)  Goal: Signs and Symptoms of Listed Potential Problems Will be Absent, Minimized or Managed (Cardiac: Heart Failure)  Outcome: Ongoing (interventions implemented as appropriate)

## 2018-07-08 NOTE — PLAN OF CARE
Problem: Patient Care Overview  Goal: Plan of Care Review  Outcome: Ongoing (interventions implemented as appropriate)   07/08/18 1313   Coping/Psychosocial   Plan of Care Reviewed With patient   Plan of Care Review   Progress no change   OTHER   Outcome Summary Pt has had no complaints this shift, v/s stable, will continue to monitor      Goal: Individualization and Mutuality  Outcome: Ongoing (interventions implemented as appropriate)    Goal: Discharge Needs Assessment  Outcome: Ongoing (interventions implemented as appropriate)    Goal: Interprofessional Rounds/Family Conf  Outcome: Ongoing (interventions implemented as appropriate)      Problem: Fall Risk (Adult)  Goal: Absence of Fall  Outcome: Ongoing (interventions implemented as appropriate)      Problem: Skin Injury Risk (Adult)  Goal: Skin Health and Integrity  Outcome: Ongoing (interventions implemented as appropriate)      Problem: Cardiac: Heart Failure (Adult)  Goal: Signs and Symptoms of Listed Potential Problems Will be Absent, Minimized or Managed (Cardiac: Heart Failure)  Outcome: Ongoing (interventions implemented as appropriate)

## 2018-07-08 NOTE — THERAPY TREATMENT NOTE
Inpatient Rehabilitation - Occupational Therapy Treatment Note  [facility]     Patient Name: Michelle Child  : 1955  MRN: [id number]  Today's Date: 2018  Onset of Illness/Injury or Date of Surgery: 18  Date of Referral to OT: 18  Referring Physician: JANAK Roblero MD.     Admit Date: 2018       ICD-10-CM ICD-9-CM   1. Peripheral edema R60.9 782.3   2. Acute on chronic congestive heart failure, unspecified congestive heart failure type (CMS/HCC) I50.9 428.0   3. Uncontrolled hypertension I10 401.9   4. Hypokalemia E87.6 276.8   5. Impaired mobility and ADLs Z74.09 799.89   6. Impaired physical mobility Z74.09 781.99     Patient Active Problem List   Diagnosis   • Migraine   • Iron deficiency anemia   • Gastroesophageal reflux disease   • Essential hypertension   • Coronary arteriosclerosis   • Bilateral pseudophakia   • Astigmatism   • Hypokalemia   • Cerebral microvascular disease   • Pulmonary hypertension   • Rheumatic tricuspid valve regurgitation   • Rheumatic mitral stenosis   • Acute diastolic CHF (congestive heart failure) (CMS/[facility])   • Pulmonary hypertension due to left heart valvular disease   • Cor pulmonale, chronic (CMS/[facility])   • Folic acid deficiency   • Stage 3 severe COPD by GOLD classification (CMS/[facility])   • Personal history of tobacco use, presenting hazards to health   • Tobacco abuse, in remission   • Recurrent major depressive disorder, in full remission (CMS/HCC)   • Weakness   • Recurrent falls while walking   • Localized swelling of lower extremity   • Peripheral edema   • Insomnia     Past Medical History:   Diagnosis Date   • Alkaline phosphatase raised    • Astigmatism    • Bilateral pseudophakia    • Coronary arteriosclerosis    • Depressive disorder    • Edema of lower extremity    • Encounter for general adult medical examination with abnormal findings    • Essential hypertension    • Gastroesophageal reflux disease    • H/O bone density study     DXA  BONE DENSITY AXIAL    04/22/2016   • H/O screening mammography     SCREENING MAMMOGRAPHY     04/25/2016   • Hx of screening mammography     x3; declined screening, understands risks and benefits and still declines      07/24/2015   • Influenza vaccine administered     INFLUENZA IMMUN ADMIN OR PREV RECV'D   x4       04/01/2016   • Insomnia    • Iron deficiency    • Migraine    • Tobacco dependence     continuous       Past Surgical History:   Procedure Laterality Date   • APPENDECTOMY     • AUGMENTATION MAMMAPLASTY     • BREAST IMPLANT SURGERY     • CATARACT EXTRACTION WITH INTRAOCULAR LENS IMPLANT  12/17/2003    Phacoemulsification of a cataract with intraocular lens implant of rigt eye, Nixon model SA 60-AT; serial # [id number];20.5 diopter   • CHOLECYSTECTOMY  08/06/2007    Laparoscopic cholecystectomy. Symptomatic gallstones   • COLONOSCOPY  10/15/2013    declined stool cards and colonscopy   • ENDOSCOPY AND COLONOSCOPY  05/22/2014    Internal & external hemorrhoids found.   • ENDOSCOPY W/ PEG TUBE PLACEMENT  05/22/2014    EGD w/ tube: Normal esopahgus. Gastritis in stomach. Biopsy taken. Normal duodenum. Biopsy taken.   • INJECTION OF MEDICATION  07/24/2013    Inj(s) Tend-Sheath, Ligament, Single   • INJECTION OF MEDICATION  11/17/2014    Kenalog x6   • INJECTION OF MEDICATION  04/01/2016    PNEUMOC VAC/ADMIN/RCVD    • INJECTION OF MEDICATION  12/05/2014    Toradol   • OTHER SURGICAL HISTORY  01/19/2014    Drain/Inject Major Joint   x2   • PAP SMEAR  10/06/2011   • PARTIAL HYSTERECTOMY      Partial hyst    • TONSILLECTOMY     • TUBAL ABDOMINAL LIGATION         Therapy Treatment          Rehabilitation Treatment Summary     Row Name 07/08/18 1424 07/08/18 1115          Treatment Time/Intention    Discipline physical therapy assistant  -EM occupational therapy assistant  -LM     Document Type therapy note (daily note)  -EM therapy note (daily note)  -LM     Subjective Information  -- no complaints  -LM     Mode of  Treatment physical therapy  -EM occupational therapy  -LM     Therapy Frequency (PT Clinical Impression) daily  -EM daily  -LM     Patient Effort good  -EM good  -LM     Comment Pt states she feels much better after receiving a bath.   -EM  --     Existing Precautions/Restrictions fall  -EM fall  -LM     Recorded by [EM] Khang Cornejo, KIN 07/08/18 1520 [LM] Melyssa Bell CAN/L 07/08/18 1735     Row Name 07/08/18 1424 07/08/18 1115          Vital Signs    Pre Systolic BP Rehab 123  -EM  --     Pre Treatment Diastolic BP 56  -EM  --     Intra Systolic BP Rehab  -- 146  -LM     Intra Treatment Diastolic BP  -- 62  -LM     Pretreatment Heart Rate (beats/min) 63  -EM  --     Pre SpO2 (%) 97  -EM 97  -LM     O2 Delivery Pre Treatment room air  -EM  --     Intra SpO2 (%)  -- 98  -LM     Post SpO2 (%)  -- 97  -LM     Intra Patient Position Sitting  -EM  --     Recorded by [EM] Khang Cornejo, KIN 07/08/18 1520 [LM] Melyssa Bell CAN/L 07/08/18 1735     Row Name 07/08/18 1424 07/08/18 1115          Cognitive Assessment/Intervention- PT/OT    Affect/Mental Status (Cognitive) WFL  -EM WFL  -LM     Orientation Status (Cognition) oriented x 4  -EM oriented x 3  -LM     Follows Commands (Cognition) follows one step commands;50-74% accuracy;delayed response/completion;increased processing time needed;physical/tactile prompts required;verbal cues/prompting required  -EM follows two step commands;follows three step commands  -LM     Recorded by [EM] Khang Cornejo, KIN 07/08/18 1520 [LM] Melyssa Bell CAN/L 07/08/18 1735     Row Name 07/08/18 1424             Cognitive Assessment Intervention- SLP    Orientation Status (Cognition) person;place;situation  -EM      Recorded by [EM] Khang Cornejo, KIN 07/08/18 1520      Row Name 07/08/18 1424 07/08/18 1115          Sit-Stand Transfer    Sit-Stand [place] (Transfers) contact guard  -EM contact guard  -LM     Assistive Device (Sit-Stand Transfers) walker,  front-wheeled  -EM walker, front-wheeled  -LM     Recorded by [EM] Khang Cornejo, PTA 07/08/18 1520 [LM] Melyssa Bell CAN/L 07/08/18 1735     Row Name 07/08/18 1424 07/08/18 1115          Stand-Sit Transfer    Stand-Sit [place] (Transfers) contact guard  -EM contact guard  -LM     Assistive Device (Stand-Sit Transfers) walker, front-wheeled  -EM walker, front-wheeled  -LM     Recorded by [EM] Khang Cornejo, PTA 07/08/18 1520 [LM] Melyssa Bell CAN/L 07/08/18 1735     Row Name 07/08/18 1115             Shower Transfer    Type (Shower Transfer) sit-stand;stand-sit  -LM      [place] Level (Shower Transfer) minimum assist (75% patient effort)  -LM      Assistive Device (Shower Transfer) --   nursing/md verbalized ok to remove telem for shower   -LM      Recorded by [LM] Melyssa Bell CAN/L 07/08/18 1735      Row Name 07/08/18 1424             Gait/Stairs Assessment/Training    [place] Level (Gait) minimum assist (75% patient effort);contact guard   min Ax1 to progress walker forward  -EM      Assistive Device (Gait) walker, front-wheeled  -EM      Distance in Feet (Gait) 36' followed with recliner. Focus on full steps with step to pattern.  -EM      Pattern (Gait) step-to  -EM      Deviations/Abnormal Patterns (Gait) janey decreased;festinating/shuffling;ataxic   Significant shuffling  -EM      Comment (Gait/Stairs) Focus on taking large steps with a step to gt pattern. Constant vc's required. Pt son states shuffling began ~1 mo ago.   -EM      Recorded by [EM] Khang Cornejo, PTA 07/08/18 1520      Row Name 07/08/18 1115             Bathing Assessment/Intervention    Bathing [place] Level bathing skills;lower body;upper body;set up  -LM      Recorded by [LM] JUDE WongA/L 07/08/18 1735      Row Name 07/08/18 1115             Upper Body Dressing Assessment/Training    Upper Body Dressing [place] Level upper body dressing skills;doff;don;set up  -LM      Recorded  by [LM] Melyssa Bell CAN/L 07/08/18 1735      Row Name 07/08/18 1115             Lower Body Dressing Assessment/Training    Lower Body Dressing [place] Level lower body dressing skills;doff;don;set up;socks;contact guard assist   cca 2* to standing   -LM      Recorded by [LM] Melyssa Bell CAN/L 07/08/18 1735      Row Name 07/08/18 1115             Grooming Assessment/Training    [place] Level (Grooming) grooming skills;independent  -LM      Recorded by [LM] Melyssa Bell CAN/L 07/08/18 1735      Row Name 07/08/18 1115             Self-Feeding Assessment/Training    [place] Level (Feeding) feeding skills;independent  -LM      Recorded by [LM] JUDE WongA/L 07/08/18 1739      Row Name 07/08/18 1115             Toileting Assessment/Training    [place] Level (Toileting) toileting skills;contact guard assist  -LM      Recorded by [LM] JUDE oWngA/L 07/08/18 1735      Row Name 07/08/18 1115             Therapeutic Exercise    Upper Extremity (Therapeutic Exercise) bicep curl, bilateral   2 lb wt 20 reps   -LM      Recorded by [LM] Melyssa Bell CAN/L 07/08/18 1735      Row Name 07/08/18 1115             Balance    Balance static sitting balance;static standing balance;dynamic sitting balance;dynamic standing balance  -LM      Recorded by [LM] JUDE WongA/L 07/08/18 1735      Row Name 07/08/18 1115             Static Sitting Balance    Level of [place] (Unsupported Sitting, Static Balance) independent  -LM      Recorded by [LM] Melyssa Bell CAN/L 07/08/18 1735      Row Name 07/08/18 1115             Dynamic Sitting Balance    Level of [place], Reaches Outside Midline (Sitting, Dynamic Balance) supervision  -LM      Recorded by [LM] Melyssa Bell CAN/L 07/08/18 1735      Row Name 07/08/18 1115             Static Standing Balance    Level of [place] (Supported Standing, Static Balance) contact guard assist  -LM       Recorded by [LM] JUDE WongA/L 07/08/18 1735      Row Name 07/08/18 1115             Dynamic Standing Balance    Level of [place], Reaches Outside Midline (Standing, Dynamic Balance) minimal assist, 75% patient effort  -LM      Time Able to Maintain Position, Reaches Outside Midline (Standing, Dynamic Balance) 4 to 5 minutes   perf wt shifting act in standing at eob hand held assist  -LM      Recorded by [LM] JUDE WongA/L 07/08/18 1735      Row Name 07/08/18 1424 07/08/18 1115          Positioning and Restraints    Pre-Treatment Position in bed   EOB w/ nsg.  -EM in bed  -LM     Post Treatment Position chair  -EM bed  -LM     In Chair call light within reach;encouraged to call for assist;with family/caregiver  -EM  --     Recorded by [EM] Khang Cornejo, PTA 07/08/18 1520 [LM] PAMELLA Wong/L 07/08/18 1735     Row Name 07/08/18 1424             Pain Scale: Numbers Pre/Post-Treatment    Pain Scale: Numbers, Pretreatment 0/10 - no pain  -EM      Pain Scale: Numbers, Post-Treatment 0/10 - no pain  -EM      Recorded by [EM] Khang Cornejo, PTA 07/08/18 1520      Row Name 07/08/18 1115             Outcome Summary/Treatment Plan (OT)    Daily Summary of Progress (OT) progress toward functional goals as expected  -LM      Recorded by [LM] PAMELLA Wong/L 07/08/18 1735      Row Name 07/08/18 1424             Outcome Summary/Treatment Plan (PT)    Daily Summary of Progress (PT) progress toward functional goals as expected  -EM      Barriers to Overall Progress (PT) shuffeling gt pattern.   -EM      Plan for Continued Treatment (PT) Cont to work on bed mobility and gt quality.   -EM      Anticipated Discharge Disposition (PT) skilled nursing facility  -EM      Recorded by [EM] Khang Cornejo, PTA 07/08/18 1520        User Key  (r) = Recorded By, (t) = Taken By, (c) = Cosigned By    Initials Name Effective Dates Discipline    EM Khang Cornejo, PTA 08/11/15 -  PT    LM Melyssa RAMIREZ  Bell, CAN/L 03/07/18 -  OT                   OT Rehab Goals     Row Name 07/08/18 1735             Transfer Goal 1 (OT)    Activity/Assistive Device (Transfer Goal 1, OT) sit-to-stand/stand-to-sit;bed-to-chair/chair-to-bed;toilet;walker, rolling  -LM      [place] Level/Cues Needed (Transfer Goal 1, OT) contact guard assist  -LM      Time Frame (Transfer Goal 1, OT) long term goal (LTG);by discharge  -LM      Progress/Outcome (Transfer Goal 1, OT) continuing progress toward goal;goal partially met  -LM         Bathing Goal 1 (OT)    Activity/Assistive Device (Bathing Goal 1, OT) upper body bathing;bath mitt;long-handled sponge  -LM      [place] Level/Cues Needed (Bathing Goal 1, OT) minimum assist (75% or more patient effort)  -LM      Time Frame (Bathing Goal 1, OT) long term goal (LTG);by discharge  -LM      Progress/Outcomes (Bathing Goal 1, OT) continuing progress toward goal;goal met  -LM         Dressing Goal 1 (OT)    Activity/Assistive Device (Dressing Goal 1, OT) upper body dressing  -LM      [place]/Cues Needed (Dressing Goal 1, OT) minimum assist (75% or more patient effort)  -LM      Time Frame (Dressing Goal 1, OT) long term goal (LTG);by discharge  -LM      Progress/Outcome (Dressing Goal 1, OT) goal met;continuing progress toward goal  -LM         Self-Feeding Goal 1 (OT)    Activity/Assistive Device (Self-Feeding Goal 1, OT) self-feeding skills, all  -LM      [place] Level/Cues Needed (Self-Feeding Goal 1, OT) conditional independence  -LM      Time Frame (Self-Feeding Goal 1, OT) long term goal (LTG);by discharge  -LM      Progress/Outcomes (Self-Feeding Goal 1, OT) continuing progress toward goal;goal met  -LM        User Key  (r) = Recorded By, (t) = Taken By, (c) = Cosigned By    Initials Name Provider Type Discipline    LM KINGSLEY Wong Occupational Therapy Assistant OT        Occupational Therapy Education     Title: PT OT SLP Therapies (Active)     Topic:  Occupational Therapy (Done)     Point: ADL training (Done)     Description: Instruct learner(s) on proper safety adaptation and remediation techniques during self care or transfers.   Instruct in proper use of assistive devices.   Learning Progress Summary     Learner Status Readiness Method Response Comment Documented by    Patient Done Acceptance E NR,FRANCISCO  LM 07/08/18 1738     Done Acceptance E,TB,D KEVEN SINGH  LJ 07/07/18 1526     Active Acceptance E NR  BB 07/06/18 1127     Active Acceptance E NR  BB 07/05/18 1258     Done Acceptance E,KEVEN CRUZ Educated on the role of OT and POC, educated on the benefit of activity and safety with bed mobility, t/f, and functional mobility MR 07/03/18 1127          Point: Home exercise program (Done)     Description: Instruct learner(s) on appropriate technique for monitoring, assisting and/or progressing therapeutic exercises/activities.   Learning Progress Summary     Learner Status Readiness Method Response Comment Documented by    Patient Done Acceptance E NR,FRANCISCO   07/08/18 1738          Point: Precautions (Done)     Description: Instruct learner(s) on prescribed precautions during self-care and functional transfers.   Learning Progress Summary     Learner Status Readiness Method Response Comment Documented by    Patient Done Acceptance E FRANCISCO BACA  LM 07/08/18 1738     Done Acceptance EDONALDO NR Educated on the role of OT and POC, educated on the benefit of activity and safety with bed mobility, t/f, and functional mobility MR 07/03/18 1127          Point: Body mechanics (Done)     Description: Instruct learner(s) on proper positioning and spine alignment during self-care, functional mobility activities and/or exercises.   Learning Progress Summary     Learner Status Readiness Method Response Comment Documented by    Patient Done Acceptance E NR,FRANCISCO   07/08/18 1738     Active Acceptance E NR  BB 07/05/18 1258     Done Acceptance DONALDO SERRANO NR Educated on the role of OT and POC,  educated on the benefit of activity and safety with bed mobility, t/f, and functional mobility MR 07/03/18 1127                      User Key     Initials Effective Dates Name Provider Type Discipline    BB 03/07/18 -  PAMELLA Ngo/L Occupational Therapy Assistant OT    LM 03/07/18 -  KINGSLEY Wong Occupational Therapy Assistant OT    MR 04/03/18 -  Janet Johnson, OT Occupational Therapist OT    LJ 05/22/18 -  KINGSLEY Sprague Occupational Therapy Assistant OT                OT Recommendation and Plan  Outcome Summary/Treatment Plan (OT)  Daily Summary of Progress (OT): progress toward functional goals as expected  Daily Summary of Progress (OT): progress toward functional goals as expected  Plan of Care Review  Plan of Care Reviewed With: patient  Plan of Care Reviewed With: patient  Outcome Summary: pt working well with ot pt addressed and met/with cont toward all goals this date        Outcome Measures     Row Name 07/08/18 1700 07/08/18 1424 07/07/18 1427       How much help from another person do you currently need...    Turning from your back to your side while in flat bed without using bedrails?  -- 3  -EM  --    Moving from lying on back to sitting on the side of a flat bed without bedrails?  -- 3  -EM  --    Moving to and from a bed to a chair (including a wheelchair)?  -- 3  -EM  --    Standing up from a chair using your arms (e.g., wheelchair, bedside chair)?  -- 3  -EM  --    Climbing 3-5 steps with a railing?  -- 3  -EM  --    To walk in hospital room?  -- 3  -EM  --    AM-PAC 6 Clicks Score  -- 18  -EM  --       How much help from another is currently needed...    Putting on and taking off regular lower body clothing? 3  -LM  -- 3  -LJ    Bathing (including washing, rinsing, and drying) 3  -LM  -- 3  -LJ    Toileting (which includes using toilet bed pan or urinal) 3  -LM  -- 3  -LJ    Putting on and taking off regular upper body clothing 3  -LM  -- 3  -LJ    Taking  care of personal grooming (such as brushing teeth) 3  -LM  -- 3  -LJ    Eating meals 3  -LM  -- 3  -LJ    Score 18  -LM  -- 18  -LJ       Functional Assessment    Outcome Measure Options  -- AM-PAC 6 Clicks Basic Mobility (PT)  -EM  --    Row Name 07/07/18 1356 07/06/18 0943 07/06/18 0720       How much help from another person do you currently need...    Turning from your back to your side while in flat bed without using bedrails? 3  -EM 3  -TW  --    Moving from lying on back to sitting on the side of a flat bed without bedrails? 3  -EM 3  -TW  --    Moving to and from a bed to a chair (including a wheelchair)? 3  -EM 3  -TW  --    Standing up from a chair using your arms (e.g., wheelchair, bedside chair)? 3  -EM 3  -TW  --    Climbing 3-5 steps with a railing? 2  -EM 2  -TW  --    To walk in hospital room? 3  -EM 3  -TW  --    AM-PAC 6 Clicks Score 17  -EM 17  -TW  --       How much help from another is currently needed...    Putting on and taking off regular lower body clothing?  --  -- 1  -BB    Bathing (including washing, rinsing, and drying)  --  -- 2  -BB    Toileting (which includes using toilet bed pan or urinal)  --  -- 1  -BB    Putting on and taking off regular upper body clothing  --  -- 2  -BB    Taking care of personal grooming (such as brushing teeth)  --  -- 3  -BB    Eating meals  --  -- 3  -BB    Score  --  -- 12  -BB       Functional Assessment    Outcome Measure Options AM-PAC 6 Clicks Basic Mobility (PT)  -EM AM-PAC 6 Clicks Basic Mobility (PT)  -TW  --      User Key  (r) = Recorded By, (t) = Taken By, (c) = Cosigned By    Initials Name Provider Type    EM Khang Cornejo, PTA Physical Therapy Assistant    TW Erlin Renteria, KIN Physical Therapy Assistant    BB Bibi Chester, CAN/L Occupational Therapy Assistant    GREGORY Bell, CAN/L Occupational Therapy Assistant    VAL Chavarria, CAN/L Occupational Therapy Assistant           Time Calculation:         Time  Calculation- OT     Row Name 07/08/18 1725             Time Calculation- OT    OT Start Time 1100  -LM      OT Stop Time 1235  -LM      OT Time Calculation (min) 95 min  -LM      Total Timed Code Minutes- OT 95 minute(s)  -LM      OT Received On 07/08/18  -LM        User Key  (r) = Recorded By, (t) = Taken By, (c) = Cosigned By    Initials Name Provider Type    LM PAMELLA Wong/L Occupational Therapy Assistant           Therapy Suggested Charges     Code   Minutes Charges    09305 (CPT®) Hc Ot Neuromusc Re Education Ea 15 Min      23838 (CPT®) Hc Ot Ther Proc Ea 15 Min      82653 (CPT®) Hc Gait Training Ea 15 Min      84297 (CPT®) Hc Ot Therapeutic Act Ea 15 Min      08891 (CPT®) Hc Ot Manual Therapy Ea 15 Min      61150 (CPT®) Hc Ot Iontophoresis Ea 15 Min      79194 (CPT®) Hc Ot Elec Stim Ea-Per 15 Min      61269 (CPT®) Hc Ot Ultrasound Ea 15 Min      56144 (CPT®) Hc Ot Self Care/Mgmt/Train Ea 15 Min 86 6    Total  86 6        Therapy Charges for Today     Code Description Service Date Service Provider Modifiers Qty    57074782056 HC OT SELF CARE/MGMT/TRAIN EA 15 MIN 7/8/2018 JUDE WongA/L GO 4    24181918639 HC OT THERAPEUTIC ACT EA 15 MIN 7/8/2018 PAMELLA Wong/L GO 1    75938708602 HC OT THER PROC EA 15 MIN 7/8/2018 JUDE WongA/L GO 1    62708926588 HC OT THER SUPP EA 15 MIN 7/8/2018 PAMELLA Wong/L GO 1          OT G-codes  OT Professional Judgement Used?: Yes  OT Functional Scales Options: AM-PAC 6 Clicks Daily Activity (OT)  Score: 11  Functional Limitation: Self care  Self Care Current Status (): At least 60 percent but less than 80 percent impaired, limited or restricted  Self Care Goal Status (): At least 40 percent but less than 60 percent impaired, limited or restricted    PAMELLA Wong/DALTON  7/8/2018

## 2018-07-08 NOTE — THERAPY TREATMENT NOTE
Acute Care - Physical Therapy Treatment Note  [facility]     Patient Name: Michelle Child  : 1955  MRN: [id number]  Today's Date: 2018  Onset of Illness/Injury or Date of Surgery: 18     Referring Physician: JANAK Roblero MD.     Admit Date: 2018    Visit Dx:    ICD-10-CM ICD-9-CM   1. Peripheral edema R60.9 782.3   2. Acute on chronic congestive heart failure, unspecified congestive heart failure type (CMS/HCC) I50.9 428.0   3. Uncontrolled hypertension I10 401.9   4. Hypokalemia E87.6 276.8   5. Impaired mobility and ADLs Z74.09 799.89   6. Impaired physical mobility Z74.09 781.99     Patient Active Problem List   Diagnosis   • Migraine   • Iron deficiency anemia   • Gastroesophageal reflux disease   • Essential hypertension   • Coronary arteriosclerosis   • Bilateral pseudophakia   • Astigmatism   • Hypokalemia   • Cerebral microvascular disease   • Pulmonary hypertension   • Rheumatic tricuspid valve regurgitation   • Rheumatic mitral stenosis   • Acute diastolic CHF (congestive heart failure) (CMS/HCC)   • Pulmonary hypertension due to left heart valvular disease   • Cor pulmonale, chronic (CMS/HCC)   • Folic acid deficiency   • Stage 3 severe COPD by GOLD classification (CMS/HCC)   • Personal history of tobacco use, presenting hazards to health   • Tobacco abuse, in remission   • Recurrent major depressive disorder, in full remission (CMS/HCC)   • Weakness   • Recurrent falls while walking   • Localized swelling of lower extremity   • Peripheral edema   • Insomnia       Therapy Treatment          Rehabilitation Treatment Summary     Row Name 18 1424             Treatment Time/Intention    Discipline physical therapy assistant  -EM      Document Type therapy note (daily note)  -EM      Mode of Treatment physical therapy  -EM      Therapy Frequency (PT Clinical Impression) daily  -EM      Patient Effort good  -EM      Comment Pt states she feels much better after receiving  a bath.   -EM      Existing Precautions/Restrictions fall  -EM      Recorded by [EM] Khang Cornejo, [facility] 07/08/18 1520      Row Name 07/08/18 1424             Vital Signs    Pre Systolic BP Rehab 123  -EM      Pre Treatment Diastolic BP 56  -EM      Pretreatment Heart Rate (beats/min) 63  -EM      Pre SpO2 (%) 97  -EM      O2 Delivery Pre Treatment room air  -EM      Intra Patient Position Sitting  -EM      Recorded by [EM] Khang Cornejo, [facility] 07/08/18 1520      Row Name 07/08/18 1424             Cognitive Assessment/Intervention- PT/OT    Affect/Mental Status (Cognitive) WFL  -EM      Orientation Status (Cognition) oriented x 4  -EM      Follows Commands (Cognition) follows one step commands;50-74% accuracy;delayed response/completion;increased processing time needed;physical/tactile prompts required;verbal cues/prompting required  -EM      Recorded by [EM] Khang Cornejo, [facility] 07/08/18 1520      Row Name 07/08/18 1424             Cognitive Assessment Intervention- SLP    Orientation Status (Cognition) person;place;situation  -EM      Recorded by [EM] Khang Cornejo, [facility] 07/08/18 1520      Row Name 07/08/18 1424             Sit-Stand Transfer    Sit-Stand [place] (Transfers) contact guard  -EM      Assistive Device (Sit-Stand Transfers) walker, front-wheeled  -EM      Recorded by [EM] Khang Cornejo, [facility] 07/08/18 1520      Row Name 07/08/18 1424             Stand-Sit Transfer    Stand-Sit [place] (Transfers) contact guard  -EM      Assistive Device (Stand-Sit Transfers) walker, front-wheeled  -EM      Recorded by [EM] Khang Cornejo, [facility] 07/08/18 1520      Row Name 07/08/18 1424             Gait/Stairs Assessment/Training    [place] Level (Gait) minimum assist (75% patient effort);contact guard   min Ax1 to progress walker forward  -EM      Assistive Device (Gait) walker, front-wheeled  -EM      Distance in Feet (Gait) 36' followed with recliner. Focus on full steps with step to pattern.  -EM      Pattern  (Gait) step-to  -EM      Deviations/Abnormal Patterns (Gait) janey decreased;festinating/shuffling;ataxic   Significant shuffling  -EM      Comment (Gait/Stairs) Focus on taking large steps with a step to gt pattern. Constant vc's required. Pt son states shuffling began ~1 mo ago.   -EM      Recorded by [EM] Khang Cornejo, [facility] 07/08/18 1520      Row Name 07/08/18 1424             Positioning and Restraints    Pre-Treatment Position in bed   EOB w/ nsg.  -EM      Post Treatment Position chair  -EM      In Chair call light within reach;encouraged to call for assist;with family/caregiver  -EM      Recorded by [EM] Khang Cornejo, [facility] 07/08/18 1520      Row Name 07/08/18 1424             Pain Scale: Numbers Pre/Post-Treatment    Pain Scale: Numbers, Pretreatment 0/10 - no pain  -EM      Pain Scale: Numbers, Post-Treatment 0/10 - no pain  -EM      Recorded by [EM] Khang Cornejo, [facility] 07/08/18 1520      Row Name 07/08/18 1424             Outcome Summary/Treatment Plan (PT)    Daily Summary of Progress (PT) progress toward functional goals as expected  -EM      Barriers to Overall Progress (PT) shuffeling gt pattern.   -EM      Plan for Continued Treatment (PT) Cont to work on bed mobility and gt quality.   -EM      Anticipated Discharge Disposition (PT) skilled nursing facility  -EM      Recorded by [EM] Khang Cornejo, [facility] 07/08/18 1520        User Key  (r) = Recorded By, (t) = Taken By, (c) = Cosigned By    Initials Name Effective Dates Discipline    EM Khang Cornejo, [facility] 08/11/15 -  PT                       PT Rehab Goals     Row Name 07/08/18 1424             Bed Mobility Goal 1 (PT)    Activity/Assistive Device (Bed Mobility Goal 1, PT) sit to supine/supine to sit  -EM      [place] Level/Cues Needed (Bed Mobility Goal 1, PT) conditional independence  -EM      Time Frame (Bed Mobility Goal 1, PT) 3 days  -EM      Barriers (Bed Mobility Goal 1, PT) Difficulty with initiation.  -EM      Progress/Outcomes (Bed  Mobility Goal 1, PT) goal not met  -EM         Transfer Goal 1 (PT)    Activity/Assistive Device (Transfer Goal 1, PT) sit-to-stand/stand-to-sit;bed-to-chair/chair-to-bed  -EM      [place] Level/Cues Needed (Transfer Goal 1, PT) conditional independence  -EM      Time Frame (Transfer Goal 1, PT) long term goal (LTG);by discharge  -EM      Progress/Outcome (Transfer Goal 1, PT) goal not met  -EM         Gait Training Goal 1 (PT)    Activity/Assistive Device (Gait Training Goal 1, PT) walker, rolling  -EM      [place] Level (Gait Training Goal 1, PT) supervision required  -EM      Distance (Gait Goal 1, PT) 50'x2.   -EM      Time Frame (Gait Training Goal 1, PT) long term goal (LTG);by discharge  -EM      Barriers (Gait Training Goal 1, PT) Difficulty with initiation.  -EM      Progress/Outcome (Gait Training Goal 1, PT) goal not met  -EM        User Key  (r) = Recorded By, (t) = Taken By, (c) = Cosigned By    Initials Name Provider Type Discipline    EM Khang Cornejo, PTA Physical Therapy Assistant PT          Physical Therapy Education     Title: PT OT SLP Therapies (Active)     Topic: Physical Therapy (Active)     Point: Mobility training (Active)    Learning Progress Summary     Learner Status Readiness Method Response Comment Documented by    Patient Active Acceptance E NR Patient educated on proper gait mechanics, proper use of walker, proper transfer technique.  07/04/18 1254                      User Key     Initials Effective Dates Name Provider Type Discipline     04/03/18 -  Hadley Ramirez, PT Physical Therapist PT                    PT Recommendation and Plan  Anticipated Discharge Disposition (PT): skilled nursing facility  Therapy Frequency (PT Clinical Impression): daily  Outcome Summary/Treatment Plan (PT)  Daily Summary of Progress (PT): progress toward functional goals as expected  Barriers to Overall Progress (PT): shuffeling gt pattern.   Plan for Continued Treatment (PT): Cont to  work on bed mobility and gt quality.   Anticipated Discharge Disposition (PT): skilled nursing facility  Outcome Summary: Pt ariel tx well with improved gt quality with max vc's. No LOB noted throughout tx. No goals met this tx. Pt t/f sit-stand-sit with CGAx1. Amb 36' with FWRW with CGAxMin Ax1 to progress wx fwd. Pt req max vc's to take big steps rather that shuffling feet. Pt will benefit from rehab to home upon discharge at this time.           Outcome Measures     Row Name 07/07/18 1427 07/07/18 1356 07/06/18 0943       How much help from another person do you currently need...    Turning from your back to your side while in flat bed without using bedrails?  -- 3  -EM 3  -TW    Moving from lying on back to sitting on the side of a flat bed without bedrails?  -- 3  -EM 3  -TW    Moving to and from a bed to a chair (including a wheelchair)?  -- 3  -EM 3  -TW    Standing up from a chair using your arms (e.g., wheelchair, bedside chair)?  -- 3  -EM 3  -TW    Climbing 3-5 steps with a railing?  -- 2  -EM 2  -TW    To walk in hospital room?  -- 3  -EM 3  -TW    AM-PAC 6 Clicks Score  -- 17  -EM 17  -TW       How much help from another is currently needed...    Putting on and taking off regular lower body clothing? 3  -LJ  --  --    Bathing (including washing, rinsing, and drying) 3  -LJ  --  --    Toileting (which includes using toilet bed pan or urinal) 3  -LJ  --  --    Putting on and taking off regular upper body clothing 3  -LJ  --  --    Taking care of personal grooming (such as brushing teeth) 3  -LJ  --  --    Eating meals 3  -LJ  --  --    Score 18  -LJ  --  --       Functional Assessment    Outcome Measure Options  -- AM-PAC 6 Clicks Basic Mobility (PT)  -EM AM-PAC 6 Clicks Basic Mobility (PT)  -TW    Row Name 07/06/18 0720             How much help from another is currently needed...    Putting on and taking off regular lower body clothing? 1  -BB      Bathing (including washing, rinsing, and drying) 2  -BB       Toileting (which includes using toilet bed pan or urinal) 1  -BB      Putting on and taking off regular upper body clothing 2  -BB      Taking care of personal grooming (such as brushing teeth) 3  -BB      Eating meals 3  -BB      Score 12  -BB        User Key  (r) = Recorded By, (t) = Taken By, (c) = Cosigned By    Initials Name Provider Type    EM Khang Cornejo PTA Physical Therapy Assistant    TW Erlin Renteria PTA Physical Therapy Assistant    BB Bibi Chester CAN/L Occupational Therapy Assistant    VAL Chavarria CAN/L Occupational Therapy Assistant           Time Calculation:         PT Charges     Row Name 07/08/18 1520             Time Calculation    Start Time 1424  -EM      Stop Time 1440  -EM      Time Calculation (min) 16 min  -EM         Time Calculation- PT    Total Timed Code Minutes- PT 16 minute(s)  -EM        User Key  (r) = Recorded By, (t) = Taken By, (c) = Cosigned By    Initials Name Provider Type     Khang Cornejo PTA Physical Therapy Assistant        Therapy Suggested Charges     Code   Minutes Charges    90051 (CPT®) Hc Pt Neuromusc Re Education Ea 15 Min      81862 (CPT®) Hc Pt Ther Proc Ea 15 Min      05339 (CPT®) Hc Gait Training Ea 15 Min      48677 (CPT®) Hc Pt Therapeutic Act Ea 15 Min 35 2    79298 (CPT®) Hc Pt Manual Therapy Ea 15 Min      40693 (CPT®) Hc Pt Iontophoresis Ea 15 Min      28717 (CPT®) Hc Pt Elec Stim Ea-Per 15 Min      74364 (CPT®) Hc Pt Ultrasound Ea 15 Min      41843 (CPT®) Hc Pt Self Care/Mgmt/Train Ea 15 Min      Total  35 2        Therapy Charges for Today     Code Description Service Date Service Provider Modifiers Qty    70646914674 HC PT THERAPEUTIC ACT EA 15 MIN 7/7/2018 Khang Cornejo PTA GP 1    40361923330 HC GAIT TRAINING EA 15 MIN 7/7/2018 Khang Cornejo PTA GP 1    46408819435 HC GAIT TRAINING EA 15 MIN 7/8/2018 Khang Cornejo, KIN GP 1          PT G-Codes  PT Professional Judgement Used?: Yes  Outcome Measure Options: AM-PAC 6  Clicks Basic Mobility (PT)  Score: 17  Functional Limitation: Mobility: Walking and moving around  Mobility: Walking and Moving Around Current Status (): At least 40 percent but less than 60 percent impaired, limited or restricted  Mobility: Walking and Moving Around Goal Status (): At least 20 percent but less than 40 percent impaired, limited or restricted    Khang Cornejo, PTA  7/8/2018

## 2018-07-09 LAB
ANION GAP SERPL CALCULATED.3IONS-SCNC: 9 MMOL/L (ref 5–15)
BASOPHILS # BLD AUTO: 0.03 10*3/MM3 (ref 0–0.2)
BASOPHILS NFR BLD AUTO: 0.5 % (ref 0–2)
BUN BLD-MCNC: 21 MG/DL (ref 7–21)
BUN/CREAT SERPL: 21.9 (ref 7–25)
CALCIUM SPEC-SCNC: 9 MG/DL (ref 8.4–10.2)
CHLORIDE SERPL-SCNC: 99 MMOL/L (ref 95–110)
CO2 SERPL-SCNC: 27 MMOL/L (ref 22–31)
CREAT BLD-MCNC: 0.96 MG/DL (ref 0.5–1)
DEPRECATED RDW RBC AUTO: 47.4 FL (ref 36.4–46.3)
EOSINOPHIL # BLD AUTO: 0.13 10*3/MM3 (ref 0–0.7)
EOSINOPHIL NFR BLD AUTO: 2 % (ref 0–7)
ERYTHROCYTE [DISTWIDTH] IN BLOOD BY AUTOMATED COUNT: 14.4 % (ref 11.5–14.5)
GFR SERPL CREATININE-BSD FRML MDRD: 59 ML/MIN/1.73 (ref 45–104)
GLUCOSE BLD-MCNC: 91 MG/DL (ref 60–100)
HCT VFR BLD AUTO: 39.6 % (ref 35–45)
HGB BLD-MCNC: 12.8 G/DL (ref 12–15.5)
IMM GRANULOCYTES # BLD: 0.01 10*3/MM3 (ref 0–0.02)
IMM GRANULOCYTES NFR BLD: 0.2 % (ref 0–0.5)
LYMPHOCYTES # BLD AUTO: 1.07 10*3/MM3 (ref 0.6–4.2)
LYMPHOCYTES NFR BLD AUTO: 16.6 % (ref 10–50)
MCH RBC QN AUTO: 29.1 PG (ref 26.5–34)
MCHC RBC AUTO-ENTMCNC: 32.3 G/DL (ref 31.4–36)
MCV RBC AUTO: 90 FL (ref 80–98)
MONOCYTES # BLD AUTO: 0.56 10*3/MM3 (ref 0–0.9)
MONOCYTES NFR BLD AUTO: 8.7 % (ref 0–12)
NEUTROPHILS # BLD AUTO: 4.65 10*3/MM3 (ref 2–8.6)
NEUTROPHILS NFR BLD AUTO: 72 % (ref 37–80)
PLATELET # BLD AUTO: 226 10*3/MM3 (ref 150–450)
PMV BLD AUTO: 10.4 FL (ref 8–12)
POTASSIUM BLD-SCNC: 3.8 MMOL/L (ref 3.5–5.1)
RBC # BLD AUTO: 4.4 10*6/MM3 (ref 3.77–5.16)
SODIUM BLD-SCNC: 135 MMOL/L (ref 137–145)
WBC NRBC COR # BLD: 6.45 10*3/MM3 (ref 3.2–9.8)

## 2018-07-09 PROCEDURE — 99225 PR SBSQ OBSERVATION CARE/DAY 25 MINUTES: CPT | Performed by: STUDENT IN AN ORGANIZED HEALTH CARE EDUCATION/TRAINING PROGRAM

## 2018-07-09 PROCEDURE — G0378 HOSPITAL OBSERVATION PER HR: HCPCS

## 2018-07-09 PROCEDURE — 97530 THERAPEUTIC ACTIVITIES: CPT

## 2018-07-09 PROCEDURE — 80048 BASIC METABOLIC PNL TOTAL CA: CPT | Performed by: FAMILY MEDICINE

## 2018-07-09 PROCEDURE — 94760 N-INVAS EAR/PLS OXIMETRY 1: CPT

## 2018-07-09 PROCEDURE — 97116 GAIT TRAINING THERAPY: CPT

## 2018-07-09 PROCEDURE — 94799 UNLISTED PULMONARY SVC/PX: CPT

## 2018-07-09 PROCEDURE — 85025 COMPLETE CBC W/AUTO DIFF WBC: CPT | Performed by: FAMILY MEDICINE

## 2018-07-09 PROCEDURE — 97110 THERAPEUTIC EXERCISES: CPT

## 2018-07-09 PROCEDURE — 97535 SELF CARE MNGMENT TRAINING: CPT

## 2018-07-09 RX ADMIN — FUROSEMIDE 20 MG: 20 TABLET ORAL at 09:12

## 2018-07-09 RX ADMIN — ATORVASTATIN CALCIUM 10 MG: 10 TABLET, FILM COATED ORAL at 09:13

## 2018-07-09 RX ADMIN — FOLIC ACID 1 MG: 1 TABLET ORAL at 09:12

## 2018-07-09 RX ADMIN — FLUOXETINE 60 MG: 20 CAPSULE ORAL at 09:12

## 2018-07-09 RX ADMIN — BUDESONIDE AND FORMOTEROL FUMARATE DIHYDRATE 2 PUFF: 160; 4.5 AEROSOL RESPIRATORY (INHALATION) at 08:04

## 2018-07-09 RX ADMIN — RISPERIDONE 1 MG: 1 TABLET ORAL at 20:15

## 2018-07-09 RX ADMIN — ASPIRIN 81 MG 81 MG: 81 TABLET ORAL at 09:12

## 2018-07-09 RX ADMIN — FERROUS SULFATE TAB EC 324 MG (65 MG FE EQUIVALENT) 324 MG: 324 (65 FE) TABLET DELAYED RESPONSE at 09:11

## 2018-07-09 RX ADMIN — FERROUS SULFATE TAB EC 324 MG (65 MG FE EQUIVALENT) 324 MG: 324 (65 FE) TABLET DELAYED RESPONSE at 17:57

## 2018-07-09 RX ADMIN — FAMOTIDINE 20 MG: 20 TABLET ORAL at 09:12

## 2018-07-09 RX ADMIN — LOSARTAN POTASSIUM 100 MG: 50 TABLET, FILM COATED ORAL at 09:12

## 2018-07-09 RX ADMIN — BUDESONIDE AND FORMOTEROL FUMARATE DIHYDRATE 2 PUFF: 160; 4.5 AEROSOL RESPIRATORY (INHALATION) at 20:02

## 2018-07-09 RX ADMIN — POTASSIUM CHLORIDE 20 MEQ: 750 CAPSULE, EXTENDED RELEASE ORAL at 09:11

## 2018-07-09 RX ADMIN — FAMOTIDINE 20 MG: 20 TABLET ORAL at 20:15

## 2018-07-09 NOTE — PLAN OF CARE
Problem: Patient Care Overview  Goal: Plan of Care Review  Outcome: Revised   07/09/18 0958   Coping/Psychosocial   Plan of Care Reviewed With patient   OTHER   Outcome Summary PT treatment: Patient is alert and cooperative. Patient requires SPV with bed mobility, CGA with tranfers and gait. Patient ambulates 75'x1 with FWW. She requires near continuous cues to increase foot clearance and step length secondary to fear of falling and decreased SLS ability. Used marching activitity to facilitate improved step length and foot clearance, requries tactile cues to weightshift further toward stance limb to successfully raise contralateral limb. Recommend SNF placement for further rehab prior to discharge home. Will likely still need 24 hour supervision. Goals updated. Continue skilled PT.

## 2018-07-09 NOTE — PLAN OF CARE
Problem: Patient Care Overview  Goal: Plan of Care Review  Outcome: Ongoing (interventions implemented as appropriate)   07/09/18 1144   Coping/Psychosocial   Plan of Care Reviewed With patient   Plan of Care Review   Progress improving   OTHER   Outcome Summary Pt CGA for bed-toilet t/f using a FWW. Pt Indep with grooming and feeding tasks. Pt would likely benefit from rehab in SNF prior to D/C home.

## 2018-07-09 NOTE — THERAPY TREATMENT NOTE
Acute Care - Physical Therapy Treatment Note  [facility]     Patient Name: Michelle Child  : 1955  MRN: [id number]  Today's Date: 2018  Onset of Illness/Injury or Date of Surgery: 18     Referring Physician: JANAK Roblero MD.     Admit Date: 2018    Visit Dx:    ICD-10-CM ICD-9-CM   1. Peripheral edema R60.9 782.3   2. Acute on chronic congestive heart failure, unspecified congestive heart failure type (CMS/HCC) I50.9 428.0   3. Uncontrolled hypertension I10 401.9   4. Hypokalemia E87.6 276.8   5. Impaired mobility and ADLs Z74.09 799.89   6. Impaired physical mobility Z74.09 781.99     Patient Active Problem List   Diagnosis   • Migraine   • Iron deficiency anemia   • Gastroesophageal reflux disease   • Essential hypertension   • Coronary arteriosclerosis   • Bilateral pseudophakia   • Astigmatism   • Hypokalemia   • Cerebral microvascular disease   • Pulmonary hypertension   • Rheumatic tricuspid valve regurgitation   • Rheumatic mitral stenosis   • Acute diastolic CHF (congestive heart failure) (CMS/HCC)   • Pulmonary hypertension due to left heart valvular disease   • Cor pulmonale, chronic (CMS/HCC)   • Folic acid deficiency   • Stage 3 severe COPD by GOLD classification (CMS/HCC)   • Personal history of tobacco use, presenting hazards to health   • Tobacco abuse, in remission   • Recurrent major depressive disorder, in full remission (CMS/HCC)   • Weakness   • Recurrent falls while walking   • Localized swelling of lower extremity   • Peripheral edema   • Insomnia       Therapy Treatment          Rehabilitation Treatment Summary     Row Name 18 0958             Treatment Time/Intention    Discipline physical therapist  -CZ      Document Type therapy note (daily note)  -CZ      Subjective Information no complaints  -CZ      Mode of Treatment individual therapy  -CZ2      Patient/Family Observations Supine in bed, RA, no apparent distress.  -CZ3      Care Plan Review care  plan/treatment goals reviewed  -CZ2      Total Minutes, Physical Therapy Treatment 40  -CZ2      Therapy Frequency (PT Clinical Impression) daily  -CZ3      Recorded by [CZ] Hadley Ramirez, PT 07/09/18 1008  [CZ2] Hadley Ramirez, PT 07/09/18 1109  [CZ3] Hadley Ramirez, PT 07/09/18 1038      Row Name 07/09/18 0958             Vital Signs    Pre Systolic BP Rehab 130  -CZ      Pre Treatment Diastolic BP 58  -CZ      Post Systolic BP Rehab 142  -CZ2      Post Treatment Diastolic BP 64  -CZ2      Pretreatment Heart Rate (beats/min) 62  -CZ      Posttreatment Heart Rate (beats/min) 62  -CZ2      Pre SpO2 (%) 96  -CZ      O2 Delivery Pre Treatment room air  -CZ      Post SpO2 (%) 96  -CZ2      O2 Delivery Post Treatment room air  -CZ2      Pre Patient Position Supine  -CZ2      Post Patient Position Supine  -CZ2      Recorded by [CZ] Hadley Ramirez, PT 07/09/18 1008  [CZ2] Hadley Ramirez, PT 07/09/18 1038      Row Name 07/09/18 0958             Cognitive Assessment/Intervention- PT/OT    Affect/Mental Status (Cognitive) WFL  -CZ      Follows Commands (Cognition) WFL  -CZ2      Safety Deficit (Cognitive) insight into deficits/self awareness  -CZ2      Personal Safety Interventions fall prevention program maintained;gait belt;nonskid shoes/slippers when out of bed  -CZ2      Recorded by [CZ] Hadley Ramirez, PT 07/09/18 1038  [CZ2] Hadley Ramirez, PT 07/09/18 1109      Row Name 07/09/18 0958             Bed Mobility Assessment/Treatment    Scooting/Bridging [place] (Bed Mobility) supervision   cues for hand and foot placement.  -CZ      Supine-Sit-Supine [place] (Bed Mobility) supervision  -CZ2      Comment (Bed Mobility) Requires multiple attempts to complete transfer, but no physical assist.   -CZ      Recorded by [CZ] Hadley Ramirez, PT 07/09/18 1122  [CZ2] Hadley Ramirez, PT 07/09/18 1038      Row Name 07/09/18 0958             Transfer Assessment/Treatment    Comment (Transfers) Cues to avoid  posterior LOB.  -CZ      Recorded by [CZ] Hadlye Ramirez, PT 07/09/18 1038      Row Name 07/09/18 0958             Sit-Stand Transfer    Sit-Stand [place] (Transfers) contact guard  -CZ      Assistive Device (Sit-Stand Transfers) walker, front-wheeled  -CZ      Recorded by [CZ] Hadley Ramirez, PT 07/09/18 1038      Row Name 07/09/18 0958             Stand-Sit Transfer    Stand-Sit [place] (Transfers) contact guard  -CZ      Assistive Device (Stand-Sit Transfers) walker, front-wheeled  -CZ      Recorded by [CZ] Hadley Ramirez, PT 07/09/18 1038      Row Name 07/09/18 0958             Gait/Stairs Assessment/Training    Gait/Stairs Assessment/Training gait deviations  -CZ      [place] Level (Gait) contact guard  -CZ      Assistive Device (Gait) walker, front-wheeled  -CZ      Distance in Feet (Gait) 75'x1  -CZ      Comment (Gait/Stairs) Requires nearly constant cues to increase step length and foot clearance. Used marching activity to improve foot clearance and SLS ability.   -CZ      Recorded by [CZ] Hadley Ramirez, PT 07/09/18 1038      Row Name 07/09/18 0958             Pain Scale: Numbers Pre/Post-Treatment    Pain Scale: Numbers, Pretreatment 0/10 - no pain  -CZ      Pain Scale: Numbers, Post-Treatment 0/10 - no pain  -CZ      Recorded by [CZ] Hadley Ramirez, PT 07/09/18 1038      Row Name 07/09/18 0958             Plan of Care Review    Plan of Care Reviewed With patient  -CZ      Recorded by [CZ] Hadley Ramirez, PT 07/09/18 1109      Row Name 07/09/18 0958             Outcome Summary/Treatment Plan (PT)    Daily Summary of Progress (PT) progress toward functional goals as expected  -CZ      Barriers to Overall Progress (PT) Parkinsonian-like gait pattern.  -CZ      Plan for Continued Treatment (PT) Improve SLS ability for improved gait mechanics.   -CZ      Anticipated Discharge Disposition (PT) skilled nursing facility  -CZ      Recorded by [CZ] Hadley Ramirez, PT 07/09/18 1038        User  Key  (r) = Recorded By, (t) = Taken By, (c) = Cosigned By    Initials Name Effective Dates Discipline    CZ Hadley Ramirez, PT 04/03/18 -  PT                       PT Rehab Goals     Row Name 07/09/18 0958             Bed Mobility Goal 1 (PT)    Activity/Assistive Device (Bed Mobility Goal 1, PT) sit to supine/supine to sit  -CZ      [place] Level/Cues Needed (Bed Mobility Goal 1, PT) conditional independence  -CZ      Time Frame (Bed Mobility Goal 1, PT) short term goal (STG);5 days  -CZ      Progress/Outcomes (Bed Mobility Goal 1, PT) goal not met;goal revised this date  -CZ         Transfer Goal 1 (PT)    Activity/Assistive Device (Transfer Goal 1, PT) sit-to-stand/stand-to-sit;bed-to-chair/chair-to-bed  -CZ      [place] Level/Cues Needed (Transfer Goal 1, PT) supervision required  -CZ      Time Frame (Transfer Goal 1, PT) long term goal (LTG);by discharge  -CZ      Barriers (Transfers Goal 1, PT) Posterior LOB.  -CZ      Progress/Outcome (Transfer Goal 1, PT) goal not met;goal revised this date  -CZ         Gait Training Goal 1 (PT)    Activity/Assistive Device (Gait Training Goal 1, PT) walker, rolling  -CZ      [place] Level (Gait Training Goal 1, PT) supervision required  -CZ      Distance (Gait Goal 1, PT) 100'x1  -CZ      Time Frame (Gait Training Goal 1, PT) long term goal (LTG);by discharge  -CZ      Barriers (Gait Training Goal 1, PT) Decreased step length and foot clearance.   -CZ      Progress/Outcome (Gait Training Goal 1, PT) goal not met;goal revised this date;goal partially met  -CZ        User Key  (r) = Recorded By, (t) = Taken By, (c) = Cosigned By    Initials Name Provider Type Discipline    CZ Hadley Ramirez, PT Physical Therapist PT          Physical Therapy Education     Title: PT OT SLP Therapies (Active)     Topic: Physical Therapy (Active)     Point: Mobility training (Active)    Learning Progress Summary     Learner Status Readiness Method Response Comment Documented by     Patient Active Acceptance E NR Patient educated on proper gait mechanics, proper use of walker, proper transfer technique.  07/04/18 1254          Point: Body mechanics (Active)    Learning Progress Summary     Learner Status Readiness Method Response Comment Documented by    Patient Active Acceptance E,D NR Educated on proper gait mechanics and strategies to improve step length and foot clearance.  07/09/18 1111                      User Key     Initials Effective Dates Name Provider Type Discipline     04/03/18 -  Hadley Ramirez, PT Physical Therapist PT                    PT Recommendation and Plan  Anticipated Discharge Disposition (PT): skilled nursing facility  Planned Therapy Interventions (PT Eval): balance training, bed mobility training, gait training, home exercise program, neuromuscular re-education, stair training, strengthening, stretching, transfer training  Therapy Frequency (PT Clinical Impression): daily  Outcome Summary/Treatment Plan (PT)  Daily Summary of Progress (PT): progress toward functional goals as expected  Barriers to Overall Progress (PT): Parkinsonian-like gait pattern.  Plan for Continued Treatment (PT): Improve SLS ability for improved gait mechanics.   Anticipated Discharge Disposition (PT): skilled nursing facility  Plan of Care Reviewed With: patient  Outcome Summary: PT treatment: Patient is alert and cooperative.  Patient requires SPV with bed mobility, CGA with tranfers and gait.  Patient ambulates 75'x1 with FWW.  She requires near continuous cues to increase foot clearance and step length secondary to fear of falling and decreased SLS ability.  Used marching activitity to facilitate improved step length and foot clearance, requries tactile cues to weightshift further toward stance limb to successfully raise contralateral limb.  Recommend SNF placement for further rehab prior to discharge home.  Will likely still need 24 hour supervision. Goals updated. Continue  skilled PT           Outcome Measures     Row Name 07/09/18 0958 07/08/18 1700 07/08/18 1424       How much help from another person do you currently need...    Turning from your back to your side while in flat bed without using bedrails? 3  -CZ  -- 3  -EM    Moving from lying on back to sitting on the side of a flat bed without bedrails? 3  -CZ  -- 3  -EM    Moving to and from a bed to a chair (including a wheelchair)? 3  -CZ  -- 3  -EM    Standing up from a chair using your arms (e.g., wheelchair, bedside chair)? 3  -CZ  -- 3  -EM    Climbing 3-5 steps with a railing? 3  -CZ  -- 3  -EM    To walk in hospital room? 3  -CZ  -- 3  -EM    AM-PAC 6 Clicks Score 18  -CZ  -- 18  -EM       How much help from another is currently needed...    Putting on and taking off regular lower body clothing?  -- 3  -LM  --    Bathing (including washing, rinsing, and drying)  -- 3  -LM  --    Toileting (which includes using toilet bed pan or urinal)  -- 3  -LM  --    Putting on and taking off regular upper body clothing  -- 3  -LM  --    Taking care of personal grooming (such as brushing teeth)  -- 3  -LM  --    Eating meals  -- 3  -LM  --    Score  -- 18  -LM  --       Functional Assessment    Outcome Measure Options AM-PAC 6 Clicks Basic Mobility (PT)  -CZ  -- [facility] 6 Clicks Basic Mobility (PT)  -EM    Row Name 07/07/18 1427 07/07/18 1356          How much help from another person do you currently need...    Turning from your back to your side while in flat bed without using bedrails?  -- 3  -EM     Moving from lying on back to sitting on the side of a flat bed without bedrails?  -- 3  -EM     Moving to and from a bed to a chair (including a wheelchair)?  -- 3  -EM     Standing up from a chair using your arms (e.g., wheelchair, bedside chair)?  -- 3  -EM     Climbing 3-5 steps with a railing?  -- 2  -EM     To walk in hospital room?  -- 3  -EM     AM-PAC 6 Clicks Score  -- 17  -EM        How much help from another is currently  needed...    Putting on and taking off regular lower body clothing? 3  -LJ  --     Bathing (including washing, rinsing, and drying) 3  -LJ  --     Toileting (which includes using toilet bed pan or urinal) 3  -LJ  --     Putting on and taking off regular upper body clothing 3  -LJ  --     Taking care of personal grooming (such as brushing teeth) 3  -LJ  --     Eating meals 3  -LJ  --     Score 18  -LJ  --        Functional Assessment    Outcome Measure Options  -- AM-PAC 6 Clicks Basic Mobility (PT)  -EM       User Key  (r) = Recorded By, (t) = Taken By, (c) = Cosigned By    Initials Name Provider Type    EM Khang Cornejo, PTA Physical Therapy Assistant    LM Melyssa Bell, CAN/L Occupational Therapy Assistant    CZ Hadley Ramirez, PT Physical Therapist    LJ Melyssa Chavarria, PAMELLA/L Occupational Therapy Assistant           Time Calculation:         PT Charges     Row Name 07/09/18 1118             Time Calculation    Start Time 0958  -CZ      Stop Time 1038  -CZ      Time Calculation (min) 40 min  -CZ      PT Received On 07/09/18  -CZ         Time Calculation- PT    Total Timed Code Minutes- PT 40 minute(s)  -CZ         Timed Charges    86609 - Gait Training Minutes  25  -CZ      54386 - PT Therapeutic Activity Minutes 15  -CZ        User Key  (r) = Recorded By, (t) = Taken By, (c) = Cosigned By    Initials Name Provider Type    CZ Hadley Ramirez, PT Physical Therapist        Therapy Suggested Charges     Code   Minutes Charges    51407 (CPT®) Hc Pt Neuromusc Re Education Ea 15 Min      13699 (CPT®) Hc Pt Ther Proc Ea 15 Min      38506 (CPT®) Hc Gait Training Ea 15 Min 25 2    53440 (CPT®) Hc Pt Therapeutic Act Ea 15 Min 15 1    38921 (CPT®) Hc Pt Manual Therapy Ea 15 Min      42222 (CPT®) Hc Pt Iontophoresis Ea 15 Min      50177 (CPT®) Hc Pt Elec Stim Ea-Per 15 Min      12830 (CPT®) Hc Pt Ultrasound Ea 15 Min      15671 (CPT®) Hc Pt Self Care/Mgmt/Train Ea 15 Min      Total  40 3        Therapy Charges for  Today     Code Description Service Date Service Provider Modifiers Qty    21727307554 HC GAIT TRAINING EA 15 MIN 7/9/2018 Hadley Ramirez, PT GP 2    48885684196 HC PT THERAPEUTIC ACT EA 15 MIN 7/9/2018 Hadley Ramirez, PT GP 1          PT G-Codes  PT Professional Judgement Used?: Yes  Outcome Measure Options: AM-PAC 6 Clicks Basic Mobility (PT)  Score: 17  Functional Limitation: Mobility: Walking and moving around  Mobility: Walking and Moving Around Current Status (): At least 40 percent but less than 60 percent impaired, limited or restricted  Mobility: Walking and Moving Around Goal Status (): At least 20 percent but less than 40 percent impaired, limited or restricted    Hadley Ramirez, PT  7/9/2018

## 2018-07-09 NOTE — THERAPY TREATMENT NOTE
Acute Care - Occupational Therapy Treatment Note  [facility]     Patient Name: Michelle Child  : 1955  MRN: [id number]  Today's Date: 2018  Onset of Illness/Injury or Date of Surgery: 18  Date of Referral to OT: 18  Referring Physician: JANAK Roblero MD.     Admit Date: 2018       ICD-10-CM ICD-9-CM   1. Peripheral edema R60.9 782.3   2. Acute on chronic congestive heart failure, unspecified congestive heart failure type (CMS/HCC) I50.9 428.0   3. Uncontrolled hypertension I10 401.9   4. Hypokalemia E87.6 276.8   5. Impaired mobility and ADLs Z74.09 799.89   6. Impaired physical mobility Z74.09 781.99     Patient Active Problem List   Diagnosis   • Migraine   • Iron deficiency anemia   • Gastroesophageal reflux disease   • Essential hypertension   • Coronary arteriosclerosis   • Bilateral pseudophakia   • Astigmatism   • Hypokalemia   • Cerebral microvascular disease   • Pulmonary hypertension   • Rheumatic tricuspid valve regurgitation   • Rheumatic mitral stenosis   • Acute diastolic CHF (congestive heart failure) (CMS/Formerly [facility])   • Pulmonary hypertension due to left heart valvular disease   • Cor pulmonale, chronic (CMS/[facility]ly [facility])   • Folic acid deficiency   • Stage 3 severe COPD by GOLD classification (CMS/Formerly [facility])   • Personal history of tobacco use, presenting hazards to health   • Tobacco abuse, in remission   • Recurrent major depressive disorder, in full remission (CMS/HCC)   • Weakness   • Recurrent falls while walking   • Localized swelling of lower extremity   • Peripheral edema   • Insomnia     Past Medical History:   Diagnosis Date   • Alkaline phosphatase raised    • Astigmatism    • Bilateral pseudophakia    • Coronary arteriosclerosis    • Depressive disorder    • Edema of lower extremity    • Encounter for general adult medical examination with abnormal findings    • Essential hypertension    • Gastroesophageal reflux disease    • H/O bone density study     DXA BONE DENSITY  AXIAL    04/22/2016   • H/O screening mammography     SCREENING MAMMOGRAPHY     04/25/2016   • Hx of screening mammography     x3; declined screening, understands risks and benefits and still declines      07/24/2015   • Influenza vaccine administered     INFLUENZA IMMUN ADMIN OR PREV RECV'D   x4       04/01/2016   • Insomnia    • Iron deficiency    • Migraine    • Tobacco dependence     continuous       Past Surgical History:   Procedure Laterality Date   • APPENDECTOMY     • AUGMENTATION MAMMAPLASTY     • BREAST IMPLANT SURGERY     • CATARACT EXTRACTION WITH INTRAOCULAR LENS IMPLANT  12/17/2003    Phacoemulsification of a cataract with intraocular lens implant of rigt eye, Nixon model SA 60-AT; serial # [id number];20.5 diopter   • CHOLECYSTECTOMY  08/06/2007    Laparoscopic cholecystectomy. Symptomatic gallstones   • COLONOSCOPY  10/15/2013    declined stool cards and colonscopy   • ENDOSCOPY AND COLONOSCOPY  05/22/2014    Internal & external hemorrhoids found.   • ENDOSCOPY W/ PEG TUBE PLACEMENT  05/22/2014    EGD w/ tube: Normal esopahgus. Gastritis in stomach. Biopsy taken. Normal duodenum. Biopsy taken.   • INJECTION OF MEDICATION  07/24/2013    Inj(s) Tend-Sheath, Ligament, Single   • INJECTION OF MEDICATION  11/17/2014    Kenalog x6   • INJECTION OF MEDICATION  04/01/2016    PNEUMOC VAC/ADMIN/RCVD    • INJECTION OF MEDICATION  12/05/2014    Toradol   • OTHER SURGICAL HISTORY  01/19/2014    Drain/Inject Major Joint   x2   • PAP SMEAR  10/06/2011   • PARTIAL HYSTERECTOMY      Partial hyst    • TONSILLECTOMY     • TUBAL ABDOMINAL LIGATION         Therapy Treatment          Rehabilitation Treatment Summary     Row Name 07/09/18 0958 07/09/18 0735          Treatment Time/Intention    Discipline physical therapist  -CZ occupational therapy assistant  -BB     Document Type therapy note (daily note)  -CZ therapy note (daily note)  -BB     Subjective Information no complaints  -CZ no complaints  -BB     Mode of  Treatment individual therapy  -CZ2 individual therapy;occupational therapy  -BB     Patient/Family Observations Supine in bed, RA, no apparent distress.  -CZ3  --     Care Plan Review care plan/treatment goals reviewed  -CZ2  --     Total Minutes, Physical Therapy Treatment 40  -CZ2  --     Therapy Frequency (PT Clinical Impression) daily  -CZ3  --     Total Minutes, Occupational Therapy Treatment  -- 70  -BB     Therapy Frequency (OT Eval)  -- other (see comments)   3-14 tx's/wk  -BB     Patient Effort  -- good  -BB     Existing Precautions/Restrictions  -- fall  -BB     Recorded by [CZ] Hadley Ramirez, PT 07/09/18 1008  [CZ2] Hadley Ramirez, PT 07/09/18 1109  [CZ3] Hadley Ramirez, PT 07/09/18 1038 [BB] JUDE NgoA/L 07/09/18 1142     Row Name 07/09/18 0958 07/09/18 0735          Vital Signs    Pre Systolic BP Rehab 130  -  -BB     Pre Treatment Diastolic BP 58  -CZ 70  -BB     Post Systolic BP Rehab 142  -CZ2  --     Post Treatment Diastolic BP 64  -CZ2  --     Pretreatment Heart Rate (beats/min) 62  -CZ 58  -BB     Posttreatment Heart Rate (beats/min) 62  -CZ2 63  -BB     Pre SpO2 (%) 96  -CZ 98  -BB     O2 Delivery Pre Treatment room air  -CZ room air  -BB     Post SpO2 (%) 96  -CZ2 96  -BB     O2 Delivery Post Treatment room air  -CZ2 room air  -BB     Pre Patient Position Supine  -CZ2 Supine  -BB     Post Patient Position Supine  -CZ2 --   long sitting  -BB     Recorded by [CZ] Hadley Ramirez, PT 07/09/18 1008  [CZ2] Hadley Ramirez, PT 07/09/18 1038 [BB] Bibi Chester CAN/L 07/09/18 1142     Row Name 07/09/18 0958 07/09/18 0735          Cognitive Assessment/Intervention- PT/OT    Affect/Mental Status (Cognitive) WFL  -CZ WFL  -BB     Orientation Status (Cognition)  -- oriented x 4  -BB     Follows Commands (Cognition) WFL  -CZ2 WFL  -BB     Safety Deficit (Cognitive) insight into deficits/self awareness  -CZ2  --     Personal Safety Interventions fall prevention program  maintained;gait belt;nonskid shoes/slippers when out of bed  -CZ2 fall prevention program maintained;gait belt;nonskid shoes/slippers when out of bed;supervised activity  -BB     Recorded by [CZ] Hadley Ramirez, PT 07/09/18 1038  [CZ2] Hadley Ramirez, PT 07/09/18 1109 [BB] Bibi Chester, CAN/L 07/09/18 1142     Row Name 07/09/18 0958 07/09/18 0735          Bed Mobility Assessment/Treatment    Scooting/Bridging [place] (Bed Mobility) supervision   cues for hand and foot placement.  -CZ  --     Supine-Sit-Supine [place] (Bed Mobility) supervision  -CZ2 supervision  -BB     Assistive Device (Bed Mobility)  -- bed rails;head of bed elevated  -BB     Comment (Bed Mobility) Requires multiple attempts to complete transfer, but no physical assist.   -CZ Pt requires increased time and effort for supine-sit t/f  -BB     Recorded by [CZ] Hadley Ramirez, PT 07/09/18 1122  [CZ2] Hadley Ramirez, PT 07/09/18 1038 [BB] Bibi Chester, CAN/L 07/09/18 1142     Row Name 07/09/18 0958             Transfer Assessment/Treatment    Comment (Transfers) Cues to avoid posterior LOB.  -CZ      Recorded by [CZ] Hadley Ramirez, PT 07/09/18 1038      Row Name 07/09/18 0958 07/09/18 0735          Sit-Stand Transfer    Sit-Stand [place] (Transfers) contact guard  -CZ contact guard  -BB     Assistive Device (Sit-Stand Transfers) walker, front-wheeled  -CZ walker, front-wheeled  -BB     Recorded by [CZ] Hadley Ramirez, PT 07/09/18 1038 [BB] Bibi Chester, CAN/L 07/09/18 1142     Row Name 07/09/18 0958 07/09/18 0735          Stand-Sit Transfer    Stand-Sit [place] (Transfers) contact guard  -CZ contact guard  -BB     Assistive Device (Stand-Sit Transfers) walker, front-wheeled  -CZ walker, front-wheeled  -BB     Recorded by [CZ] Hadley Ramirez, PT 07/09/18 1038 [BB] Bibi Chester CAN/L 07/09/18 1142     Row Name 07/09/18 0735             Toilet Transfer    Type (Toilet Transfer) sit-stand;stand-sit   -BB      [place] Level (Toilet Transfer) contact guard  -BB      Assistive Device (Toilet Transfer) commode;grab bars/safety frame  -BB      Recorded by [BB] PAMELLA Ngo/L 07/09/18 1142      Row Name 07/09/18 0958             Gait/Stairs Assessment/Training    Gait/Stairs Assessment/Training gait deviations  -CZ      [place] Level (Gait) contact guard  -CZ      Assistive Device (Gait) walker, front-wheeled  -CZ      Distance in Feet (Gait) 75'x1  -CZ      Comment (Gait/Stairs) Requires nearly constant cues to increase step length and foot clearance. Used marching activity to improve foot clearance and SLS ability.   -CZ      Recorded by [CZ] Hadley Ramirez, PT 07/09/18 1038      Row Name 07/09/18 0735             Bathing Assessment/Intervention    Bathing [place] Level lower body  -BB      Bathing Position edge of bed sitting  -BB      Recorded by [BB] PAMELLA Ngo/L 07/09/18 1142      Row Name 07/09/18 0735             Lower Body Dressing Assessment/Training    Lower Body Dressing [place] Level lower body dressing skills;doff;pants/bottoms;socks;contact guard assist  -BB      Lower Body Dressing Position edge of bed sitting  -BB      Recorded by [BB] PAMELLA Ngo/L 07/09/18 1142      Row Name 07/09/18 0735             Grooming Assessment/Training    [place] Level (Grooming) hair care, combing/brushing;oral care regimen;wash face, hands;set up;independent  -BB      Grooming Position edge of bed sitting  -BB      Recorded by [BB] PAMELLA Ngo/L 07/09/18 1142      Row Name 07/09/18 0735             Self-Feeding Assessment/Training    [place] Level (Feeding) feeding skills;independent  -BB      Position (Self-Feeding) sitting up in bed  -BB      Recorded by [BB] PMAELLA Ngo/L 07/09/18 1142      Row Name 07/09/18 0735             Toileting Assessment/Training    [place] Level (Toileting) toileting skills;contact guard  assist  -BB      Recorded by [BB] JUDE NgoA/L 07/09/18 1142      Row Name 07/09/18 0735             Positioning and Restraints    Pre-Treatment Position in bed  -BB      Post Treatment Position bed  -BB      In Bed fowlers;call light within reach;encouraged to call for assist;exit alarm on  -BB      Recorded by [BB] Bibi Chester, CAN/L 07/09/18 1142      Row Name 07/09/18 0958 07/09/18 0735          Pain Scale: Numbers Pre/Post-Treatment    Pain Scale: Numbers, Pretreatment 0/10 - no pain  -CZ 0/10 - no pain  -BB     Pain Scale: Numbers, Post-Treatment 0/10 - no pain  -CZ 0/10 - no pain  -BB     Recorded by [CZ] Hadley Ramirez, PT 07/09/18 1038 [BB] Bibi Chester CAN/L 07/09/18 1142     Row Name 07/09/18 0958 07/09/18 0735          Plan of Care Review    Plan of Care Reviewed With patient  -CZ patient  -BB     Recorded by [CZ] Hadley Ramirez, PT 07/09/18 1109 [BB] Bibi Chester CAN/L 07/09/18 1142     Row Name 07/09/18 0735             Outcome Summary/Treatment Plan (OT)    Daily Summary of Progress (OT) progress toward functional goals as expected  -BB      Plan for Continued Treatment (OT) continue POC  -BB      Anticipated Discharge Disposition (OT) skilled nursing facility  -BB      Recorded by [BB] JUDE NgoA/L 07/09/18 1142      Row Name 07/09/18 0958             Outcome Summary/Treatment Plan (PT)    Daily Summary of Progress (PT) progress toward functional goals as expected  -CZ      Barriers to Overall Progress (PT) Parkinsonian-like gait pattern.  -CZ      Plan for Continued Treatment (PT) Improve SLS ability for improved gait mechanics.   -CZ      Anticipated Discharge Disposition (PT) skilled nursing facility  -CZ      Recorded by [CZ] Hadley Ramirez, PT 07/09/18 1038        User Key  (r) = Recorded By, (t) = Taken By, (c) = Cosigned By    Initials Name Effective Dates Discipline    BB Bibi Chester CAN/L 03/07/18 -  OT    CZ Hadley Ramirez, PT  04/03/18 -  PT                   OT Rehab Goals     Row Name 07/09/18 0735             Transfer Goal 1 (OT)    Activity/Assistive Device (Transfer Goal 1, OT) sit-to-stand/stand-to-sit;bed-to-chair/chair-to-bed;toilet;walker, rolling  -BB      [place] Level/Cues Needed (Transfer Goal 1, OT) contact guard assist  -BB      Time Frame (Transfer Goal 1, OT) long term goal (LTG);by discharge  -BB      Progress/Outcome (Transfer Goal 1, OT) continuing progress toward goal;goal partially met  -BB         Bathing Goal 1 (OT)    Activity/Assistive Device (Bathing Goal 1, OT) upper body bathing;bath mitt;long-handled sponge  -BB      [place] Level/Cues Needed (Bathing Goal 1, OT) minimum assist (75% or more patient effort)  -BB      Time Frame (Bathing Goal 1, OT) long term goal (LTG);by discharge  -BB      Progress/Outcomes (Bathing Goal 1, OT) continuing progress toward goal;goal met  -BB         Dressing Goal 1 (OT)    Activity/Assistive Device (Dressing Goal 1, OT) upper body dressing  -BB      [place]/Cues Needed (Dressing Goal 1, OT) minimum assist (75% or more patient effort)  -BB      Time Frame (Dressing Goal 1, OT) long term goal (LTG);by discharge  -BB      Progress/Outcome (Dressing Goal 1, OT) goal met;continuing progress toward goal  -BB         Self-Feeding Goal 1 (OT)    Activity/Assistive Device (Self-Feeding Goal 1, OT) self-feeding skills, all  -BB      [place] Level/Cues Needed (Self-Feeding Goal 1, OT) conditional independence  -BB      Time Frame (Self-Feeding Goal 1, OT) long term goal (LTG);by discharge  -BB      Progress/Outcomes (Self-Feeding Goal 1, OT) continuing progress toward goal;goal met  -BB        User Key  (r) = Recorded By, (t) = Taken By, (c) = Cosigned By    Initials Name Provider Type Discipline    BB PAMELLA Ngo/L Occupational Therapy Assistant OT        Occupational Therapy Education     Title: PT OT SLP Therapies (Active)     Topic: Occupational  Therapy (Active)     Point: ADL training (Active)     Description: Instruct learner(s) on proper safety adaptation and remediation techniques during self care or transfers.   Instruct in proper use of assistive devices.   Learning Progress Summary     Learner Status Readiness Method Response Comment Documented by    Patient Active Acceptance E NR  BB 07/09/18 1143     Done Acceptance E NRFRANCISCO   07/08/18 1738     Done Acceptance ETB,D KEVEN SINGH   07/07/18 1526     Active Acceptance E NR  BB 07/06/18 1127     Active Acceptance E NR  BB 07/05/18 1258     Done Acceptance E,TB FRANCISCONR Educated on the role of OT and POC, educated on the benefit of activity and safety with bed mobility, t/f, and functional mobility MR 07/03/18 1127          Point: Home exercise program (Done)     Description: Instruct learner(s) on appropriate technique for monitoring, assisting and/or progressing therapeutic exercises/activities.   Learning Progress Summary     Learner Status Readiness Method Response Comment Documented by    Patient Done Acceptance E NRFRANCISCO   07/08/18 1738          Point: Precautions (Done)     Description: Instruct learner(s) on prescribed precautions during self-care and functional transfers.   Learning Progress Summary     Learner Status Readiness Method Response Comment Documented by    Patient Done Acceptance E NRFRANCISCO  LM 07/08/18 1738     Done Acceptance EDONALDO NR Educated on the role of OT and POC, educated on the benefit of activity and safety with bed mobility, t/f, and functional mobility MR 07/03/18 1127          Point: Body mechanics (Active)     Description: Instruct learner(s) on proper positioning and spine alignment during self-care, functional mobility activities and/or exercises.   Learning Progress Summary     Learner Status Readiness Method Response Comment Documented by    Patient Active Acceptance E NR  BB 07/09/18 1143     Done Acceptance E NRFRANCISCO   07/08/18 1738     Active Acceptance E NR  BB  07/05/18 1258     Done Acceptance E,TB VU,NR Educated on the role of OT and POC, educated on the benefit of activity and safety with bed mobility, t/f, and functional mobility MR 07/03/18 1127                      User Key     Initials Effective Dates Name Provider Type Discipline    BB 03/07/18 -  PAMELLA Ngo/L Occupational Therapy Assistant OT    LM 03/07/18 -  PAMELLA Wong/L Occupational Therapy Assistant OT    MR 04/03/18 -  Janet Johnson OT Occupational Therapist OT    LJ 05/22/18 -  PAMELLA Sprague/L Occupational Therapy Assistant OT                OT Recommendation and Plan  Outcome Summary/Treatment Plan (OT)  Daily Summary of Progress (OT): progress toward functional goals as expected  Plan for Continued Treatment (OT): continue POC  Anticipated Discharge Disposition (OT): skilled nursing facility  Therapy Frequency (OT Eval): other (see comments) (3-14 tx's/wk)  Daily Summary of Progress (OT): progress toward functional goals as expected  Plan of Care Review  Plan of Care Reviewed With: patient  Plan of Care Reviewed With: patient  Outcome Summary: Pt CGA for bed-toilet t/f using a FWW. Pt Indep with grooming and feeding tasks. Pt would likely benefit from rehab in SNF prior to D/C home.        Outcome Measures     Row Name 07/09/18 0958 07/09/18 0735 07/08/18 1700       How much help from another person do you currently need...    Turning from your back to your side while in flat bed without using bedrails? 3  -CZ  --  --    Moving from lying on back to sitting on the side of a flat bed without bedrails? 3  -CZ  --  --    Moving to and from a bed to a chair (including a wheelchair)? 3  -CZ  --  --    Standing up from a chair using your arms (e.g., wheelchair, bedside chair)? 3  -CZ  --  --    Climbing 3-5 steps with a railing? 3  -CZ  --  --    To walk in hospital room? 3  -CZ  --  --    AM-PAC 6 Clicks Score 18  -CZ  --  --       How much help from another is  currently needed...    Putting on and taking off regular lower body clothing?  -- 3  -BB 3  -LM    Bathing (including washing, rinsing, and drying)  -- 3  -BB 3  -LM    Toileting (which includes using toilet bed pan or urinal)  -- 3  -BB 3  -LM    Putting on and taking off regular upper body clothing  -- 3  -BB 3  -LM    Taking care of personal grooming (such as brushing teeth)  -- 4  -BB 3  -LM    Eating meals  -- 4  -BB 3  -LM    Score  -- 20  -BB 18  -LM       Functional Assessment    Outcome Measure Options AM-PAC 6 Clicks Basic Mobility (PT)  -CZ  --  --    Row Name 07/08/18 1424 07/07/18 1427 07/07/18 6196       How much help from another person do you currently need...    Turning from your back to your side while in flat bed without using bedrails? 3  -EM  -- 3  -EM    Moving from lying on back to sitting on the side of a flat bed without bedrails? 3  -EM  -- 3  -EM    Moving to and from a bed to a chair (including a wheelchair)? 3  -EM  -- 3  -EM    Standing up from a chair using your arms (e.g., wheelchair, bedside chair)? 3  -EM  -- 3  -EM    Climbing 3-5 steps with a railing? 3  -EM  -- 2  -EM    To walk in hospital room? 3  -EM  -- 3  -EM    AM-PAC 6 Clicks Score 18  -EM  -- 17  -EM       How much help from another is currently needed...    Putting on and taking off regular lower body clothing?  -- 3  -LJ  --    Bathing (including washing, rinsing, and drying)  -- 3  -LJ  --    Toileting (which includes using toilet bed pan or urinal)  -- 3  -LJ  --    Putting on and taking off regular upper body clothing  -- 3  -LJ  --    Taking care of personal grooming (such as brushing teeth)  -- 3  -LJ  --    Eating meals  -- 3  -LJ  --    Score  -- 18  -LJ  --       Functional Assessment    Outcome Measure Options AM-PAC 6 Clicks Basic Mobility (PT)  -EM  -- AM-PAC 6 Clicks Basic Mobility (PT)  -EM      User Key  (r) = Recorded By, (t) = Taken By, (c) = Cosigned By    Initials Name Provider Type    QUEENIE RAMIREZ  Clemente, PTA Physical Therapy Assistant    BB PAMELLA Ngo/L Occupational Therapy Assistant    PAMELLA Mobley/L Occupational Therapy Assistant    CZ Hadley Ramirez, PT Physical Therapist    PAMELLA Hicks/L Occupational Therapy Assistant         Non-skid socks and gait belt in place. Toileting offered. Call light and needs within reach. Pt advised to not get up alone and call the nurse for assistance.  Bed alarm on.     Time Calculation:         Time Calculation- OT     Row Name 07/09/18 1147 07/09/18 1118          Time Calculation- OT    OT Start Time 0735 -BB  --     OT Stop Time 0845 -BB  --     OT Time Calculation (min) 70 min  -BB  --     Total Timed Code Minutes- OT 70 minute(s)  -BB  --     OT Received On 07/09/18 -BB  --        Timed Charges    37453 - Gait Training Minutes   -- 25  -CZ       User Key  (r) = Recorded By, (t) = Taken By, (c) = Cosigned By    Initials Name Provider Type    BB PAMELLA Ngo/L Occupational Therapy Assistant    CZ Hadley Ramirez, PT Physical Therapist           Therapy Suggested Charges     Code   Minutes Charges    70098 (CPT®) Hc Ot Neuromusc Re Education Ea 15 Min      38297 (CPT®) Hc Ot Ther Proc Ea 15 Min      27769 (CPT®) Hc Gait Training Ea 15 Min      90049 (CPT®) Hc Ot Therapeutic Act Ea 15 Min      31360 (CPT®) Hc Ot Manual Therapy Ea 15 Min      58253 (CPT®) Hc Ot Iontophoresis Ea 15 Min      00082 (CPT®) Hc Ot Elec Stim Ea-Per 15 Min      49767 (CPT®) Hc Ot Ultrasound Ea 15 Min      76239 (CPT®) Hc Ot Self Care/Mgmt/Train Ea 15 Min 86 6    Total  86 6        Therapy Charges for Today     Code Description Service Date Service Provider Modifiers Qty    [id number] HC OT SELF CARE/MGMT/TRAIN EA 15 MIN 7/9/2018 KINGSLEY Ngo GO 5          OT G-codes  OT Professional Judgement Used?: Yes  OT Functional Scales Options: AM-PAC 6 Clicks Daily Activity (OT)  Score: 11  Functional Limitation: Self care  Self Care  Current Status (): At least 60 percent but less than 80 percent impaired, limited or restricted  Self Care Goal Status (): At least 40 percent but less than 60 percent impaired, limited or restricted    PAMELLA Ngo/DALTON  7/9/2018

## 2018-07-09 NOTE — PROGRESS NOTES
FAMILY MEDICINE PROGRESS NOTE  NAME: Michelle Child  : 1955  MRN: [id number]     LOS: 1 day   Code Status and Medical Interventions:   Ordered at: 18 1645     Level Of Support Discussed With:    Patient     Code Status:    CPR     Medical Interventions (Level of Support Prior to Arrest):    Full     PROVIDER OF SERVICE:     Chief Complaint:  Hypokalemia    Subjective     Interval History:  History taken from: patient RN  Subjective: Patient is a 61 yo  female who was admitted with swelling and hypokalemia.  She denies any complaints.  She is waiting for nursing home placement for therapy.    Review of Systems:   Review of Systems   Constitutional: Negative for activity change and chills.   HENT: Positive for hearing loss. Negative for congestion and sore throat.    Eyes: Negative for pain and visual disturbance.   Respiratory: Negative for chest tightness and shortness of breath.    Cardiovascular: Negative for chest pain and palpitations.   Gastrointestinal: Negative for diarrhea, nausea and vomiting.   Endocrine: Negative for polyphagia and polyuria.   Genitourinary: Negative for frequency and hematuria.   Musculoskeletal: Negative for arthralgias and myalgias.   Skin: Negative for color change and pallor.   Neurological: Negative for dizziness and seizures.   Psychiatric/Behavioral: Negative for agitation and behavioral problems.       Objective     Vital Signs  Temp:  [96.6 °F (35.9 °C)-97.8 °F (36.6 °C)] 97.8 °F (36.6 °C)  Heart Rate:  [54-83] 67  Resp:  [16-20] 16  BP: (128-152)/(56-70) 128/56    Physical Exam  Physical Exam   Constitutional: She is oriented to person, place, and time. She appears well-developed and well-nourished. No distress.   HENT:   Mouth/Throat: No oropharyngeal exudate.   Neck: No JVD present.   Cardiovascular: Normal rate, regular rhythm and intact distal pulses.  Exam reveals no gallop and no friction rub.    Murmur heard.  2/6 systolic murmur    Pulmonary/Chest: No respiratory distress. She has no wheezes. She has no rales.   Diminished breath sounds in the bases   Abdominal: Soft. Bowel sounds are normal. She exhibits no distension. There is no tenderness. There is no rebound and no guarding.   Musculoskeletal: She exhibits no edema.   Neurological: She is alert and oriented to person, place, and time.   Skin: She is not diaphoretic.   Psychiatric: She has a normal mood and affect. Her behavior is normal.   Nursing note and vitals reviewed.      Medication Review    Current Facility-Administered Medications:   •  acetaminophen (TYLENOL) tablet 650 mg, 650 mg, Oral, Q6H PRN, Claudette Machuca MD, 650 mg at 07/04/18 2310  •  albuterol (PROVENTIL) nebulizer solution 0.083% 2.5 mg/3mL, 2.5 mg, Nebulization, Q6H PRN, Louie Roblero MD  •  aspirin chewable tablet 81 mg, 81 mg, Oral, Daily, Louie Roblero MD, 81 mg at 07/09/18 0912  •  atorvastatin (LIPITOR) tablet 10 mg, 10 mg, Oral, Daily, Louie Roblero MD, 10 mg at 07/09/18 0913  •  bisacodyl (DULCOLAX) EC tablet 5 mg, 5 mg, Oral, Daily PRN, Louie Roblero MD  •  budesonide-formoterol (SYMBICORT) 160-4.5 MCG/ACT inhaler 2 puff, 2 puff, Inhalation, BID, Louie Roblero MD, 2 puff at 07/09/18 0804  •  famotidine (PEPCID) tablet 20 mg, 20 mg, Oral, BID, Louie Roblero MD, 20 mg at 07/09/18 0912  •  ferrous sulfate EC tablet 324 mg, 324 mg, Oral, BID With Meals, Louie Roblero MD, 324 mg at 07/09/18 0911  •  FLUoxetine (PROzac) capsule 60 mg, 60 mg, Oral, Daily, LUPE Garcia, 60 mg at 07/09/18 0912  •  folic acid (FOLVITE) tablet 1 mg, 1 mg, Oral, Daily, Louie Roblero MD, 1 mg at 07/09/18 0912  •  furosemide (LASIX) tablet 20 mg, 20 mg, Oral, Daily, Louie Roblero MD, 20 mg at 07/09/18 0912  •  hydrALAZINE (APRESOLINE) injection 10 mg, 10 mg, Intravenous, Q6H PRN, Louie Roblero MD, 10 mg at 07/08/18  0444  •  losartan (COZAAR) tablet 100 mg, 100 mg, Oral, Q24H, Louie Roblero MD, 100 mg at 07/09/18 0912  •  potassium chloride (MICRO-K) CR capsule 20 mEq, 20 mEq, Oral, Daily, Bridget Ortega MD, 20 mEq at 07/09/18 0911  •  risperiDONE (risperDAL) tablet 1 mg, 1 mg, Oral, Nightly, Louie Roblero MD, 1 mg at 07/08/18 2031  •  sodium chloride 0.9 % flush 1-10 mL, 1-10 mL, Intravenous, PRN, Louie Roblero MD  •  sodium chloride 0.9 % flush 10 mL, 10 mL, Intravenous, PRN, Blake Euceda MD     Diagnostic Data      I reviewed the patient's new clinical results and imaging.      Assessment/Plan     Active Hospital Problems (** Indicates Principal Problem)    Diagnosis Date Noted   • **Hypokalemia [E87.6] 01/10/2018     Monitor and replace       • Peripheral edema [R60.9] 07/02/2018     - home dose lasix 20mg daily     • Recurrent falls while walking [R29.6] 07/02/2018     - PT/OT  - Case management consulted for SNF placement. [facility] is not currently accepting new patients. Currently looking at placement at [facility].     • Insomnia [G47.00] 07/02/2018     Continue home risperdal     • Recurrent major depressive disorder, in full remission (CMS/[facility]) [F33.42] 04/05/2018     Continue home prozac     • Stage 3 severe COPD by GOLD classification (CMS/[facility]) [J44.9] 03/01/2018     Continue home nebs and symbicort     • Pulmonary hypertension due to left heart valvular disease [I27.22, I38] 01/15/2018   • Rheumatic mitral stenosis [I05.0] 01/15/2018     Cards Consultation     • Rheumatic tricuspid valve regurgitation [I07.1] 01/14/2018   • Essential hypertension [I10]      DC home toprol 25mg daily secondary to bradycardia   Current BP: 132/63Home losartan increased to 100mg daily  Lasix 20mg PO daily  PRN hydralazine     • Gastroesophageal reflux disease [K21.9]      Pepcid        Resolved Hospital Problems    Diagnosis Date Noted Date Resolved   No resolved problems to display.         DVT  prophylaxis: SCDs/TEDs      Disposition:Nursing Home    I have reviewed the notes, assessments, and/or procedures performed by Dr. Yeboah, I concur with her/his documentation of Michelle Child.        This document has been electronically signed by Bridget Otrega MD on July 9, 2018 11:51 AM

## 2018-07-09 NOTE — PROGRESS NOTES
FAMILY MEDICINE DAILY PROGRESS NOTE  NAME: Michelle Child  : 1955  MRN: [id number]      LOS: 1 day     PROVIDER OF SERVICE: Aldo Yeboah Jr, MD    Chief Complaint: Hypokalemia    Subjective:     Interval History:  History taken from: patient chart   No acute overnights.  Patient states she is doing well this AM.  No new complaints.  Awaiting placement to [facility], referral submitted on .  Denies CP, SOB, N/V/D.    Review of Systems: (reviewed & unchanged from )  Review of Systems   Constitutional: Negative for activity change and chills.   HENT: Positive for hearing loss. Negative for congestion and sore throat.    Eyes: Negative for pain and visual disturbance.   Respiratory: Negative for chest tightness and shortness of breath.    Cardiovascular: Negative for chest pain and palpitations.   Gastrointestinal: Negative for diarrhea, nausea and vomiting.   Endocrine: Negative for polyphagia and polyuria.   Genitourinary: Negative for frequency and hematuria.   Musculoskeletal: Negative for arthralgias and myalgias.   Skin: Negative for color change and pallor.   Neurological: Negative for dizziness and seizures.   Psychiatric/Behavioral: Negative for agitation and behavioral problems.       Objective:     Vital Signs  Temp:  [96.6 °F (35.9 °C)-97 °F (36.1 °C)] 97 °F (36.1 °C)  Heart Rate:  [54-83] 59  Resp:  [18-20] 18  BP: (138-152)/(60-70) 152/70  Body mass index is 19.72 kg/m².    Physical Exam (examined & unchanged from )  Physical Exam   Constitutional: She is oriented to person, place, and time. She appears well-developed and well-nourished. No distress.   HENT:   Mouth/Throat: No oropharyngeal exudate.   Neck: No JVD present.   Cardiovascular: Normal rate, regular rhythm and intact distal pulses.    Pulmonary/Chest: Effort normal and breath sounds normal. No respiratory distress. She has no wheezes. She has no rales.   Abdominal: Soft. Bowel sounds are normal. She exhibits no distension.  There is no tenderness. There is no rebound and no guarding.   Musculoskeletal: She exhibits no edema.   Neurological: She is alert and oriented to person, place, and time.   Skin: She is not diaphoretic.   Psychiatric: She has a normal mood and affect. Her behavior is normal.   Nursing note and vitals reviewed.      Medication Review    Current Facility-Administered Medications:   •  acetaminophen (TYLENOL) tablet 650 mg, 650 mg, Oral, Q6H PRN, Claudette Machuca MD, 650 mg at 07/04/18 2310  •  albuterol (PROVENTIL) nebulizer solution 0.083% 2.5 mg/3mL, 2.5 mg, Nebulization, Q6H PRN, Louie Roblero MD  •  aspirin chewable tablet 81 mg, 81 mg, Oral, Daily, Louie Roblero MD, 81 mg at 07/08/18 0932  •  atorvastatin (LIPITOR) tablet 10 mg, 10 mg, Oral, Daily, Louie Roblero MD, 10 mg at 07/08/18 0932  •  bisacodyl (DULCOLAX) EC tablet 5 mg, 5 mg, Oral, Daily PRN, Louie Roblero MD  •  budesonide-formoterol (SYMBICORT) 160-4.5 MCG/ACT inhaler 2 puff, 2 puff, Inhalation, BID, Louie Roblero MD, 2 puff at 07/08/18 1943  •  famotidine (PEPCID) tablet 20 mg, 20 mg, Oral, BID, Louie Roblero MD, 20 mg at 07/08/18 2031  •  ferrous sulfate EC tablet 324 mg, 324 mg, Oral, BID With Meals, Louie Roblero MD, 324 mg at 07/08/18 1803  •  FLUoxetine (PROzac) capsule 60 mg, 60 mg, Oral, Daily, LUPE Garcia, 60 mg at 07/08/18 0932  •  folic acid (FOLVITE) tablet 1 mg, 1 mg, Oral, Daily, Louie Roblero MD, 1 mg at 07/08/18 0932  •  furosemide (LASIX) tablet 20 mg, 20 mg, Oral, Daily, Louie Roblero MD, 20 mg at 07/08/18 0932  •  hydrALAZINE (APRESOLINE) injection 10 mg, 10 mg, Intravenous, Q6H PRN, Louie Roblero MD, 10 mg at 07/08/18 0444  •  losartan (COZAAR) tablet 100 mg, 100 mg, Oral, Q24H, Louie Roblero MD, 100 mg at 07/08/18 0932  •  potassium chloride (MICRO-K) CR capsule 20 mEq, 20 mEq, Oral, Daily, Bridget H  MD Shannon, 20 mEq at 07/08/18 0938  •  risperiDONE (risperDAL) tablet 1 mg, 1 mg, Oral, Nightly, Louie Roblero MD, 1 mg at 07/08/18 2031  •  sodium chloride 0.9 % flush 1-10 mL, 1-10 mL, Intravenous, PRN, Louie Roblero MD  •  sodium chloride 0.9 % flush 10 mL, 10 mL, Intravenous, PRN, Blake Euceda MD     Diagnostic Data    Lab Results (last 24 hours)     Procedure Component Value Units Date/Time    CBC & Differential [[id number]] Collected:  07/09/18 0629    Specimen:  Blood Updated:  07/09/18 0642    Narrative:       The following orders were created for panel order CBC & Differential.  Procedure                               Abnormality         Status                     ---------                               -----------         ------                     CBC Auto Differential[[id number]]                            In process                   Please view results for these tests on the individual orders.    CBC Auto Differential [[id number]] Collected:  07/09/18 0629    Specimen:  Blood Updated:  07/09/18 0642    Basic Metabolic Panel [[id number]] Collected:  07/09/18 0628    Specimen:  Blood Updated:  07/09/18 0642    Basic Metabolic Panel [[id number]]  (Abnormal) Collected:  07/08/18 0606    Specimen:  Blood Updated:  07/08/18 0757     Glucose 90 mg/dL      BUN 18 mg/dL      Creatinine 0.80 mg/dL      Sodium 139 mmol/L      Potassium 3.4 (L) mmol/L      Chloride 100 mmol/L      CO2 27.0 mmol/L      Calcium 9.2 mg/dL      eGFR Non African Amer 72 mL/min/1.73      BUN/Creatinine Ratio 22.5     Anion Gap 12.0 mmol/L     CBC & Differential [[id number]] Collected:  07/08/18 0606    Specimen:  Blood Updated:  07/08/18 0727    Narrative:       The following orders were created for panel order CBC & Differential.  Procedure                               Abnormality         Status                     ---------                               -----------         ------                     CBC Auto  Differential[[id number]]        Normal              Final result                 Please view results for these tests on the individual orders.    CBC Auto Differential [[id number]]  (Normal) Collected:  07/08/18 0606    Specimen:  Blood Updated:  07/08/18 0727     WBC 6.87 10*3/mm3      RBC 4.64 10*6/mm3      Hemoglobin 13.4 g/dL      Hematocrit 40.9 %      MCV 88.1 fL      MCH 28.9 pg      MCHC 32.8 g/dL      RDW 14.2 %      RDW-SD 46.2 fl      MPV 10.6 fL      Platelets 213 10*3/mm3      Neutrophil % 72.3 %      Lymphocyte % 17.2 %      Monocyte % 7.9 %      Eosinophil % 2.2 %      Basophil % 0.3 %      Immature Grans % 0.1 %      Neutrophils, Absolute 4.97 10*3/mm3      Lymphocytes, Absolute 1.18 10*3/mm3      Monocytes, Absolute 0.54 10*3/mm3      Eosinophils, Absolute 0.15 10*3/mm3      Basophils, Absolute 0.02 10*3/mm3      Immature Grans, Absolute 0.01 10*3/mm3             I reviewed the patient's new clinical results.    Assessment/Plan:     Active Hospital Problems (** Indicates Principal Problem)    Diagnosis Date Noted   • **Hypokalemia [E87.6] 01/10/2018     Monitor and replace       • Peripheral edema [R60.9] 07/02/2018     - home dose lasix 20mg daily     • Recurrent falls while walking [R29.6] 07/02/2018     - PT/OT  - Case management consulted for SNF placement. [facility] is not currently accepting new patients. Currently looking at placement at [facility].     • Insomnia [G47.00] 07/02/2018     Continue home risperdal     • Recurrent major depressive disorder, in full remission (CMS/HCC) [F33.42] 04/05/2018     Continue home prozac     • Stage 3 severe COPD by GOLD classification (CMS/HCC) [J44.9] 03/01/2018     Continue home nebs and symbicort     • Pulmonary hypertension due to left heart valvular disease [I27.22, I38] 01/15/2018   • Rheumatic mitral stenosis [I05.0] 01/15/2018     Cards Consultation     • Rheumatic tricuspid valve regurgitation [I07.1] 01/14/2018   • Essential hypertension  [I10]      DC home toprol 25mg daily secondary to bradycardia   Current BP: 132/63Home losartan increased to 100mg daily  Lasix 20mg PO daily  PRN hydralazine     • Gastroesophageal reflux disease [K21.9]      Pepcid        Resolved Hospital Problems    Diagnosis Date Noted Date Resolved   No resolved problems to display.       DVT prophylaxis: SCD/TEDS  Code status is   Code Status and Medical Interventions:   Ordered at: 07/02/18 1645     Level Of Support Discussed With:    Patient     Code Status:    CPR     Medical Interventions (Level of Support Prior to Arrest):    Full       Plan for disposition:nursing home, today or tomorrow      Time: 20 Min    Aldo Yeboah Jr., M.D.  PGY1  Family Practice Resident  [address] [place] [address]  Phone: [phone]  Fax: [phone]      This document has been electronically signed by Aldo Yeboah Jr, MD on 07/09/18 6:47 AM

## 2018-07-09 NOTE — PLAN OF CARE
Problem: Patient Care Overview  Goal: Plan of Care Review  Outcome: Ongoing (interventions implemented as appropriate)   07/09/18 5786   Coping/Psychosocial   Plan of Care Reviewed With patient   Plan of Care Review   Progress no change   OTHER   Outcome Summary Patient has no complaints today. Did work with therapy. Will continue to monitor.       Problem: Skin Injury Risk (Adult)  Goal: Skin Health and Integrity  Outcome: Ongoing (interventions implemented as appropriate)      Problem: Cardiac: Heart Failure (Adult)  Goal: Signs and Symptoms of Listed Potential Problems Will be Absent, Minimized or Managed (Cardiac: Heart Failure)  Outcome: Ongoing (interventions implemented as appropriate)

## 2018-07-09 NOTE — PLAN OF CARE
Problem: Patient Care Overview  Goal: Plan of Care Review  Outcome: Ongoing (interventions implemented as appropriate)   07/09/18 0151   Coping/Psychosocial   Plan of Care Reviewed With patient   OTHER   Outcome Summary Continues to work well with therapy staff. Referrall has been sent to [facility]. No falls this shift. No change in status. Patient involved in plan of care.      Goal: Individualization and Mutuality  Outcome: Ongoing (interventions implemented as appropriate)    Goal: Discharge Needs Assessment  Outcome: Ongoing (interventions implemented as appropriate)    Goal: Interprofessional Rounds/Family Conf  Outcome: Ongoing (interventions implemented as appropriate)      Problem: Fall Risk (Adult)  Goal: Absence of Fall  Outcome: Outcome(s) achieved Date Met: 07/09/18      Problem: Skin Injury Risk (Adult)  Goal: Skin Health and Integrity  Outcome: Ongoing (interventions implemented as appropriate)

## 2018-07-10 ENCOUNTER — NURSING HOME (OUTPATIENT)
Dept: FAMILY MEDICINE CLINIC | Facility: CLINIC | Age: 63
End: 2018-07-10

## 2018-07-10 ENCOUNTER — TELEPHONE (OUTPATIENT)
Dept: FAMILY MEDICINE CLINIC | Facility: CLINIC | Age: 63
End: 2018-07-10

## 2018-07-10 VITALS
BODY MASS INDEX: 19.89 KG/M2 | HEIGHT: 64 IN | HEART RATE: 62 BPM | TEMPERATURE: 98.7 F | OXYGEN SATURATION: 96 % | WEIGHT: 116.5 LBS | RESPIRATION RATE: 16 BRPM | SYSTOLIC BLOOD PRESSURE: 150 MMHG | DIASTOLIC BLOOD PRESSURE: 66 MMHG

## 2018-07-10 DIAGNOSIS — I38 PULMONARY HYPERTENSION DUE TO LEFT HEART VALVULAR DISEASE (HCC): Chronic | ICD-10-CM

## 2018-07-10 DIAGNOSIS — I50.31 ACUTE DIASTOLIC CHF (CONGESTIVE HEART FAILURE) (HCC): ICD-10-CM

## 2018-07-10 DIAGNOSIS — I10 ESSENTIAL HYPERTENSION: Primary | ICD-10-CM

## 2018-07-10 DIAGNOSIS — I27.22 PULMONARY HYPERTENSION DUE TO LEFT HEART VALVULAR DISEASE (HCC): Chronic | ICD-10-CM

## 2018-07-10 DIAGNOSIS — I27.20 PULMONARY HYPERTENSION (HCC): ICD-10-CM

## 2018-07-10 DIAGNOSIS — R53.1 WEAKNESS: ICD-10-CM

## 2018-07-10 LAB
ANION GAP SERPL CALCULATED.3IONS-SCNC: 8 MMOL/L (ref 5–15)
BASOPHILS # BLD AUTO: 0.03 10*3/MM3 (ref 0–0.2)
BASOPHILS NFR BLD AUTO: 0.5 % (ref 0–2)
BUN BLD-MCNC: 22 MG/DL (ref 7–21)
BUN/CREAT SERPL: 24.7 (ref 7–25)
CALCIUM SPEC-SCNC: 9 MG/DL (ref 8.4–10.2)
CHLORIDE SERPL-SCNC: 101 MMOL/L (ref 95–110)
CO2 SERPL-SCNC: 27 MMOL/L (ref 22–31)
CREAT BLD-MCNC: 0.89 MG/DL (ref 0.5–1)
DEPRECATED RDW RBC AUTO: 46.3 FL (ref 36.4–46.3)
EOSINOPHIL # BLD AUTO: 0.13 10*3/MM3 (ref 0–0.7)
EOSINOPHIL NFR BLD AUTO: 2.1 % (ref 0–7)
ERYTHROCYTE [DISTWIDTH] IN BLOOD BY AUTOMATED COUNT: 14.1 % (ref 11.5–14.5)
GFR SERPL CREATININE-BSD FRML MDRD: 64 ML/MIN/1.73 (ref 45–104)
GLUCOSE BLD-MCNC: 95 MG/DL (ref 60–100)
HCT VFR BLD AUTO: 38.4 % (ref 35–45)
HGB BLD-MCNC: 12.5 G/DL (ref 12–15.5)
IMM GRANULOCYTES # BLD: 0.02 10*3/MM3 (ref 0–0.02)
IMM GRANULOCYTES NFR BLD: 0.3 % (ref 0–0.5)
LYMPHOCYTES # BLD AUTO: 1.35 10*3/MM3 (ref 0.6–4.2)
LYMPHOCYTES NFR BLD AUTO: 21.6 % (ref 10–50)
MCH RBC QN AUTO: 29 PG (ref 26.5–34)
MCHC RBC AUTO-ENTMCNC: 32.6 G/DL (ref 31.4–36)
MCV RBC AUTO: 89.1 FL (ref 80–98)
MONOCYTES # BLD AUTO: 0.53 10*3/MM3 (ref 0–0.9)
MONOCYTES NFR BLD AUTO: 8.5 % (ref 0–12)
NEUTROPHILS # BLD AUTO: 4.19 10*3/MM3 (ref 2–8.6)
NEUTROPHILS NFR BLD AUTO: 67 % (ref 37–80)
PLATELET # BLD AUTO: 215 10*3/MM3 (ref 150–450)
PMV BLD AUTO: 10.1 FL (ref 8–12)
POTASSIUM BLD-SCNC: 4.1 MMOL/L (ref 3.5–5.1)
RBC # BLD AUTO: 4.31 10*6/MM3 (ref 3.77–5.16)
SODIUM BLD-SCNC: 136 MMOL/L (ref 137–145)
WBC NRBC COR # BLD: 6.25 10*3/MM3 (ref 3.2–9.8)

## 2018-07-10 PROCEDURE — 99308 SBSQ NF CARE LOW MDM 20: CPT | Performed by: STUDENT IN AN ORGANIZED HEALTH CARE EDUCATION/TRAINING PROGRAM

## 2018-07-10 PROCEDURE — 97535 SELF CARE MNGMENT TRAINING: CPT

## 2018-07-10 PROCEDURE — 80048 BASIC METABOLIC PNL TOTAL CA: CPT | Performed by: FAMILY MEDICINE

## 2018-07-10 PROCEDURE — 85025 COMPLETE CBC W/AUTO DIFF WBC: CPT | Performed by: FAMILY MEDICINE

## 2018-07-10 PROCEDURE — G0378 HOSPITAL OBSERVATION PER HR: HCPCS

## 2018-07-10 PROCEDURE — 99225 PR SBSQ OBSERVATION CARE/DAY 25 MINUTES: CPT | Performed by: STUDENT IN AN ORGANIZED HEALTH CARE EDUCATION/TRAINING PROGRAM

## 2018-07-10 PROCEDURE — 97110 THERAPEUTIC EXERCISES: CPT

## 2018-07-10 PROCEDURE — 97530 THERAPEUTIC ACTIVITIES: CPT

## 2018-07-10 RX ORDER — POTASSIUM CHLORIDE 750 MG/1
20 CAPSULE, EXTENDED RELEASE ORAL DAILY
Start: 2018-07-11 | End: 2019-08-19

## 2018-07-10 RX ORDER — LOSARTAN POTASSIUM 100 MG/1
100 TABLET ORAL
Start: 2018-07-11 | End: 2018-09-10 | Stop reason: SDUPTHER

## 2018-07-10 RX ADMIN — POTASSIUM CHLORIDE 20 MEQ: 750 CAPSULE, EXTENDED RELEASE ORAL at 08:21

## 2018-07-10 RX ADMIN — FLUOXETINE 60 MG: 20 CAPSULE ORAL at 08:21

## 2018-07-10 RX ADMIN — FOLIC ACID 1 MG: 1 TABLET ORAL at 08:21

## 2018-07-10 RX ADMIN — FAMOTIDINE 20 MG: 20 TABLET ORAL at 08:21

## 2018-07-10 RX ADMIN — LOSARTAN POTASSIUM 100 MG: 50 TABLET, FILM COATED ORAL at 08:21

## 2018-07-10 RX ADMIN — ASPIRIN 81 MG 81 MG: 81 TABLET ORAL at 08:21

## 2018-07-10 RX ADMIN — FERROUS SULFATE TAB EC 324 MG (65 MG FE EQUIVALENT) 324 MG: 324 (65 FE) TABLET DELAYED RESPONSE at 08:21

## 2018-07-10 RX ADMIN — ATORVASTATIN CALCIUM 10 MG: 10 TABLET, FILM COATED ORAL at 08:21

## 2018-07-10 NOTE — PLAN OF CARE
Problem: Patient Care Overview  Goal: Plan of Care Review  Outcome: Ongoing (interventions implemented as appropriate)   07/10/18 2519   Coping/Psychosocial   Plan of Care Reviewed With patient   Plan of Care Review   Progress improving   OTHER   Outcome Summary Pt is CGA for all t/f's in room (bed<>.chair, bed<>toilet). Pt completed UB ADL SBA and LB ADL CGA.. Pt states she may be leaving today.

## 2018-07-10 NOTE — DISCHARGE SUMMARY
DISCHARGE SUMMARY    PATIENT NAME: Michelle Child       PHYSICIAN: Aldo Yeboah Jr, MD  : 1955  MRN: [id number]    ADMITTED: 2018     DISCHARGED: 18    ADMISSION DIAGNOSES:  Active Hospital Problems    Diagnosis Date Noted   • **Hypokalemia [E87.6] 01/10/2018   • Peripheral edema [R60.9] 2018   • Recurrent falls while walking [R29.6] 2018   • Insomnia [G47.00] 2018   • Recurrent major depressive disorder, in full remission (CMS/HCC) [F33.42] 2018   • Stage 3 severe COPD by GOLD classification (CMS/HCC) [J44.9] 2018   • Pulmonary hypertension due to left heart valvular disease [I27.22, I38] 01/15/2018   • Rheumatic mitral stenosis [I05.0] 01/15/2018   • Rheumatic tricuspid valve regurgitation [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       SERVICE: Family Medicine.  Attending: Dr. Oretga  Resident: Aldo Yeboah Jr, MD    CONSULTS:   Consult Orders (all)     Start     Ordered    18 1036  Inpatient Heart Failure Navigator  Consult  Once     Provider:  (Not yet assigned)    07/03/18 1035    07/02/18 1952  Inpatient Nutrition Consult  Once     Provider:  (Not yet assigned)    07/02/18 1952 07/02/18 1812  Inpatient Case Management  Consult  Once     Provider:  (Not yet assigned)    07/02/18 1811          PROCEDURES:   None    HISTORY OF PRESENT ILLNESS:   Michelle Child is a 62 y.o. female with a concurrent history of CAD, 2x stroke with no residual weakness, CHF with EF 61% 1/14/18, depression, GERD, HTN, insomnia, COPD who presents with a 1 month history of bilateral lower extremity edema worsening over the last 3 days. Pt reports pressure like pain over her feet. Pt report her sons wife called Dr. Berry who told them to come into the ED to be evaluated. Pt denies chest pain, dyspnea, headache, blurry vision, nausea, vomiting, diarrhea, blood in stool or urine. Pt report that she has not been taking her lasix over these last 3 days. Pt reports she is not taking it because it makes her urinate more and she is concerned about getting up so frequently to go to the bathroom. Pt reports she is concerned she will fall.     In the ED pt was found to have 1+ bilateral lower extremity edema. Labs were pertinent for an elevated BNP of 1960 and hypokalemia at 2.6. Pt was started on [id number] K+ IV runs over 6 hours and given 40meq PO. Pt was found to have an elevated BP in the ED of 221/89 and was given a 20mg IV push of hydralazine and her blood pressure came down to 168/72.    DIAGNOSTIC DATA:   Lab Results (last 24 hours)     Procedure Component Value Units Date/Time    Basic Metabolic Panel [[id number]]  (Abnormal) Collected:  07/10/18 0532    Specimen:  Blood Updated:  07/10/18 0638     Glucose 95 mg/dL      BUN 22 (H) mg/dL      Creatinine 0.89 mg/dL      Sodium 136 (L) mmol/L      Potassium 4.1 mmol/L      Chloride 101 mmol/L      CO2 27.0 mmol/L      Calcium 9.0 mg/dL      eGFR Non African Amer 64 mL/min/1.73       BUN/Creatinine Ratio 24.7     Anion Gap 8.0 mmol/L     CBC & Differential [[id number]] Collected:  07/10/18 0532    Specimen:  Blood Updated:  07/10/18 0601    Narrative:       The following orders were created for panel order CBC & Differential.  Procedure                               Abnormality         Status                     ---------                               -----------         ------                     CBC Auto Differential[[id number]]        Normal              Final result                 Please view results for these tests on the individual orders.    CBC Auto Differential [[id number]]  (Normal) Collected:  07/10/18 0532    Specimen:  Blood Updated:  07/10/18 0601     WBC 6.25 10*3/mm3      RBC 4.31 10*6/mm3      Hemoglobin 12.5 g/dL      Hematocrit 38.4 %      MCV 89.1 fL      MCH 29.0 pg      MCHC 32.6 g/dL      RDW 14.1 %      RDW-SD 46.3 fl      MPV 10.1 fL      Platelets 215 10*3/mm3      Neutrophil % 67.0 %      Lymphocyte % 21.6 %      Monocyte % 8.5 %      Eosinophil % 2.1 %      Basophil % 0.5 %      Immature Grans % 0.3 %      Neutrophils, Absolute 4.19 10*3/mm3      Lymphocytes, Absolute 1.35 10*3/mm3      Monocytes, Absolute 0.53 10*3/mm3      Eosinophils, Absolute 0.13 10*3/mm3      Basophils, Absolute 0.03 10*3/mm3      Immature Grans, Absolute 0.02 10*3/mm3         Imaging Results (last 24 hours)     ** No results found for the last 24 hours. **        HOSPITAL COURSE:  Patient presented to [facility] ED c/o lower extremity edema & weakness and subsequently admitted for hypokalemia, with a potassium of 2.6, and brought to the floors.  3 days prior to presenting to the ED patient had stopped taking her lasix due to nocturia & fear of falling when getting up to go to the bathroom at night.   Patient's home dose of lasix was restarted in-patient as well as her home medications for insomnia, depression, COPD, & essential HTN.  By hospital day 2 the patient's edema & weakness had improved  significantly after restarting her lasix, however due to deconditioning it was determined the patient should be discharged to a SNF for therapy & conditioning prior to returning home.  She had previously been admitted to [facility], however, [facility] was not able to admit her upon this hospital discharge.  She then decided on [facility], which was able to take her after necessary paper work was completed.  Patient is stable for discharge to SNF for reconditioning where she will be followed by myself & Dr. Berry.    DISCHARGE CONDITION:   Medically Stable    DISPOSITION:  Skilled Nursing Facility (DC - External)    DISCHARGE MEDICATIONS     Discharge Medications      New Medications      Instructions Start Date   potassium chloride 10 MEQ CR capsule  Commonly known as:  MICRO-K   20 mEq, Oral, Daily         Changes to Medications      Instructions Start Date   acetaminophen 325 MG tablet  Commonly known as:  TYLENOL  What changed:  how much to take   650 mg, Oral, Every 4 Hours PRN      losartan 100 MG tablet  Commonly known as:  COZAAR  What changed:  · medication strength  · See the new instructions.   100 mg, Oral, Every 24 Hours Scheduled         Continue These Medications      Instructions Start Date   albuterol 108 (90 Base) MCG/ACT inhaler  Commonly known as:  PROVENTIL HFA;VENTOLIN HFA   2 puffs, Inhalation, Every 4 Hours PRN      aspirin 81 MG chewable tablet   81 mg, Oral, Daily      bisacodyl 5 MG EC tablet  Commonly known as:  DULCOLAX   5 mg, Oral, Daily PRN      FERATE 240 (27 FE) MG tablet  Generic drug:  ferrous gluconate   TAKE ONE TABLET BY MOUTH TWICE A DAY FOR SUPPLEMENTATION. GIVE WITH MEALS      ferrous sulfate 324 (65 Fe) MG tablet delayed-release EC tablet   324 mg, Oral, 2 Times Daily With Meals      FLUoxetine 20 MG capsule  Commonly known as:  PROzac   20 mg, Oral, Daily      FLUoxetine 40 MG capsule  Commonly known as:  PROzac   40 mg, Oral, Daily      folic acid 1 MG  tablet  Commonly known as:  FOLVITE   TAKE ONE TABLET BY MOUTH ONCE DAILY FOR SUPPLEMENT      furosemide 20 MG tablet  Commonly known as:  LASIX   One half tablet by mouth once a day      hydrocortisone 1 % cream   Topical, 3 Times Daily      meloxicam 15 MG tablet  Commonly known as:  MOBIC   TAKE ONE TABLET BY MOUTH ONCE DAILY FOR MILD PAIN      ondansetron 4 MG tablet  Commonly known as:  ZOFRAN   4 mg, Oral, Every 6 Hours PRN      pravastatin 40 MG tablet  Commonly known as:  PRAVACHOL   40 mg, Oral, Nightly      raNITIdine 150 MG tablet  Commonly known as:  ZANTAC   150 mg, Oral, 2 Times Daily      risperiDONE 1 MG tablet  Commonly known as:  risperDAL   1 mg, Oral, Nightly      SYMBICORT 160-4.5 MCG/ACT inhaler  Generic drug:  budesonide-formoterol   INHALE 2 PUFFS BY MOUTH EVERY MORNING AND AT BEDTIME FOR WHEEZING. RINSE MOUTH AFTER USE.         Stop These Medications    metoprolol succinate XL 25 MG 24 hr tablet  Commonly known as:  TOPROL-XL        ASK your doctor about these medications      Instructions Start Date   SHINGRIX 50 MCG reconstituted suspension  Generic drug:  Zoster Vac Recomb Adjuvanted   50 mcg, Intramuscular, Once             INSTRUCTIONS:  Activity: As tolerated  Diet: Regular   Special instructions: Patient instructed to call MD or return to ED with worsening shortness of breath, chest pain, fever greater than 100.4 degrees F or any other medical concerns..    FOLLOW UP:   Additional Instructions for the Follow-ups that You Need to Schedule     Discharge Follow-up with PCP    As directed      Currently Documented PCP:  Coral Berry MD  PCP Phone Number:  [phone]    Follow Up Details:  Dr. Berry & Dr. Yeboah will follow up at Nursing Home            Contact information for follow-up providers     Coral Berry MD Follow up.    Specialty:  Family Medicine  Contact information:  [address] [place] [address] [phone]             Coral Berry MD Follow up.     Specialty:  Family Medicine  Why:  Dr. Berry & Dr. Yeboah will follow up at Nursing Home  Contact information:  [address]  [facility] [address] [phone]                   Contact information for after-discharge care     Destination     [facility] Follow up.    Specialty:  Skilled Nursing Facility  Contact information:  [address]  [facility] [address] [phone]                             PENDING TEST RESULTS AT DISCHARGE      Time: Discharge 37 min    Dr. Ortega is the attending at time of discharge, She is aware of the patient's status and agrees with the above discharge summary.    Aldo Yeboah Jr., M.D.  PGY1  Family Practice Resident  [address] [place] [address]  Phone: [phone]  Fax: [phone]      This document has been electronically signed by Aldo Yeboah Jr, MD on 07/12/18 8:32 PM

## 2018-07-10 NOTE — THERAPY DISCHARGE NOTE
Acute Care - Physical Therapy Discharge Summary  [facility]       Patient Name: Michelle Child  : 1955  MRN: [id number]    Today's Date: 7/10/2018  Onset of Illness/Injury or Date of Surgery: 18       Referring Physician: JANAK Roblero MD.       Admit Date: 2018      PT Recommendation and Plan    Visit Dx:    ICD-10-CM ICD-9-CM   1. Peripheral edema R60.9 782.3   2. Acute on chronic congestive heart failure, unspecified congestive heart failure type (CMS/HCC) I50.9 428.0   3. Uncontrolled hypertension I10 401.9   4. Hypokalemia E87.6 276.8   5. Impaired mobility and ADLs Z74.09 799.89   6. Impaired physical mobility Z74.09 781.99             Outcome Measures     Row Name 07/10/18 1107 07/10/18 0729 18 0958       How much help from another person do you currently need...    Turning from your back to your side while in flat bed without using bedrails? 3  -SY  -- 3  -CZ    Moving from lying on back to sitting on the side of a flat bed without bedrails? 3  -SY  -- 3  -CZ    Moving to and from a bed to a chair (including a wheelchair)? 3  -SY  -- 3  -CZ    Standing up from a chair using your arms (e.g., wheelchair, bedside chair)? 3  -SY  -- 3  -CZ    Climbing 3-5 steps with a railing? 3  -SY  -- 3  -CZ    To walk in hospital room? 3  -SY  -- 3  -CZ    AM-PAC 6 Clicks Score 18  -SY  -- 18  -CZ       How much help from another is currently needed...    Putting on and taking off regular lower body clothing?  -- 3  -BB  --    Bathing (including washing, rinsing, and drying)  -- 3  -BB  --    Toileting (which includes using toilet bed pan or urinal)  -- 3  -BB  --    Putting on and taking off regular upper body clothing  -- 3  -BB  --    Taking care of personal grooming (such as brushing teeth)  -- 4  -BB  --    Eating meals  -- 4  -BB  --    Score  -- 20  -BB  --       Functional Assessment    Outcome Measure Options  --  -- AM-PAC 6 Clicks Basic Mobility (PT)  -CZ    Row Name 18  0735 07/08/18 1700 07/08/18 1424       How much help from another person do you currently need...    Turning from your back to your side while in flat bed without using bedrails?  --  -- 3  -EM    Moving from lying on back to sitting on the side of a flat bed without bedrails?  --  -- 3  -EM    Moving to and from a bed to a chair (including a wheelchair)?  --  -- 3  -EM    Standing up from a chair using your arms (e.g., wheelchair, bedside chair)?  --  -- 3  -EM    Climbing 3-5 steps with a railing?  --  -- 3  -EM    To walk in hospital room?  --  -- 3  -EM    AM-PAC 6 Clicks Score  --  -- 18  -EM       How much help from another is currently needed...    Putting on and taking off regular lower body clothing? 3  -BB 3  -LM  --    Bathing (including washing, rinsing, and drying) 3  -BB 3  -LM  --    Toileting (which includes using toilet bed pan or urinal) 3  -BB 3  -LM  --    Putting on and taking off regular upper body clothing 3  -BB 3  -LM  --    Taking care of personal grooming (such as brushing teeth) 4  -BB 3  -LM  --    Eating meals 4  -BB 3  -LM  --    Score 20  -BB 18  -LM  --       Functional Assessment    Outcome Measure Options  --  -- AM-PAC 6 Clicks Basic Mobility (PT)  -EM      User Key  (r) = Recorded By, (t) = Taken By, (c) = Cosigned By    Initials Name Provider Type    EM Khang Cornejo, PTA Physical Therapy Assistant    SY French, PTA Physical Therapy Assistant    BB Bibi Chester CAN/L Occupational Therapy Assistant    GREGORY Bell, CAN/L Occupational Therapy Assistant    CZ Hadley Ramirez, PT Physical Therapist                PT Charges     Row Name 07/10/18 1141             Time Calculation    Start Time 1108  -SY      Stop Time 1135  -SY      Time Calculation (min) 27 min  -SY         Time Calculation- PT    Total Timed Code Minutes- PT 27 minute(s)  -        User Key  (r) = Recorded By, (t) = Taken By, (c) = Cosigned By    Initials Name Provider Type    SY ALMANZA  KIN French Physical Therapy Assistant        Therapy Suggested Charges     Code   Minutes Charges    33926 (CPT®) Hc Pt Neuromusc Re Education Ea 15 Min      74011 (CPT®) Hc Pt Ther Proc Ea 15 Min      69247 (CPT®) Hc Gait Training Ea 15 Min 25 2    70115 (CPT®) Hc Pt Therapeutic Act Ea 15 Min 15 1    47758 (CPT®) Hc Pt Manual Therapy Ea 15 Min      33693 (CPT®) Hc Pt Iontophoresis Ea 15 Min      60051 (CPT®) Hc Pt Elec Stim Ea-Per 15 Min      68924 (CPT®) Hc Pt Ultrasound Ea 15 Min      35662 (CPT®) Hc Pt Self Care/Mgmt/Train Ea 15 Min      Total  40 3                PT Rehab Goals     Row Name 07/10/18 1107             Bed Mobility Goal 1 (PT)    Activity/Assistive Device (Bed Mobility Goal 1, PT) sit to supine/supine to sit  -SY      [place] Level/Cues Needed (Bed Mobility Goal 1, PT) conditional independence  -SY      Time Frame (Bed Mobility Goal 1, PT) short term goal (STG);5 days  -SY      Progress/Outcomes (Bed Mobility Goal 1, PT) goal partially met  -SY         Transfer Goal 1 (PT)    Activity/Assistive Device (Transfer Goal 1, PT) sit-to-stand/stand-to-sit;bed-to-chair/chair-to-bed  -SY      [place] Level/Cues Needed (Transfer Goal 1, PT) supervision required  -SY      Time Frame (Transfer Goal 1, PT) long term goal (LTG);by discharge  -SY      Barriers (Transfers Goal 1, PT) Posterior LOB.  -SY      Progress/Outcome (Transfer Goal 1, PT) goal partially met  -SY         Gait Training Goal 1 (PT)    Activity/Assistive Device (Gait Training Goal 1, PT) walker, rolling  -SY      [place] Level (Gait Training Goal 1, PT) supervision required  -SY      Distance (Gait Goal 1, PT) 100'x1  -SY      Time Frame (Gait Training Goal 1, PT) long term goal (LTG);by discharge  -SY      Barriers (Gait Training Goal 1, PT) Decreased step length and foot clearance.   -SY      Progress/Outcome (Gait Training Goal 1, PT) goal not met;goal revised this date;goal partially met  -SY        User Key  (r) =  Recorded By, (t) = Taken By, (c) = Cosigned By    Initials Name Provider Type Discipline    SY French, PTA Physical Therapy Assistant PT          Therapy Charges for Today     Code Description Service Date Service Provider Modifiers Qty    82077002378 HC GAIT TRAINING EA 15 MIN 7/9/2018 Hadley Ramirez, PT GP 2    51102674945 HC PT THERAPEUTIC ACT EA 15 MIN 7/9/2018 Hadley Ramirez, PT GP 1          PT Discharge Summary  Anticipated Discharge Disposition (PT): skilled nursing facility  Reason for Discharge: Per MD order, Discharge from facility  Outcomes Achieved: Patient able to partially acheive established goals  Discharge Destination: SNF      Hadley Ramirez, PT   7/10/2018

## 2018-07-10 NOTE — PROGRESS NOTES
FAMILY MEDICINE DAILY PROGRESS NOTE  NAME: Michelle Child  : 1955  MRN: [id number]      LOS: 1 day     PROVIDER OF SERVICE: Aldo Yeboah Jr, MD    Chief Complaint: Hypokalemia    Subjective:     Interval History:  History taken from: patient chart   No acute events overnight.  Patient continues to do well this AM.  Awaiting placement to [facility], referral submitted on .  Family has taken financials to [facility] on .  Denies CP, SOB, N/V/D.  No complaints today.     Review of Systems: (reviewed & unchanged from )  Review of Systems   Constitutional: Negative for activity change and chills.   HENT: Positive for hearing loss. Negative for congestion and sore throat.    Eyes: Negative for pain and visual disturbance.   Respiratory: Negative for chest tightness and shortness of breath.    Cardiovascular: Negative for chest pain and palpitations.   Gastrointestinal: Negative for diarrhea, nausea and vomiting.   Endocrine: Negative for polyphagia and polyuria.   Genitourinary: Negative for frequency and hematuria.   Musculoskeletal: Negative for arthralgias and myalgias.   Skin: Negative for color change and pallor.   Neurological: Negative for dizziness and seizures.   Psychiatric/Behavioral: Negative for agitation and behavioral problems.       Objective:     Vital Signs  Temp:  [97.1 °F (36.2 °C)-98.1 °F (36.7 °C)] 98.1 °F (36.7 °C)  Heart Rate:  [57-72] 60  Resp:  [16-18] 18  BP: (126-143)/(56-66) 126/58  Body mass index is 20 kg/m².    Physical Exam (examined & unchanged from )  Physical Exam   Constitutional: She is oriented to person, place, and time. She appears well-developed and well-nourished. No distress.   HENT:   Mouth/Throat: No oropharyngeal exudate.   Neck: No JVD present.   Cardiovascular: Normal rate, regular rhythm and intact distal pulses.    Pulmonary/Chest: Effort normal and breath sounds normal. No respiratory distress. She has no wheezes. She has no rales.   Abdominal: Soft.  Bowel sounds are normal. She exhibits no distension. There is no tenderness. There is no rebound and no guarding.   Musculoskeletal: She exhibits no edema.   Neurological: She is alert and oriented to person, place, and time.   Skin: She is not diaphoretic.   Psychiatric: She has a normal mood and affect. Her behavior is normal.   Nursing note and vitals reviewed.      Medication Review    Current Facility-Administered Medications:   •  acetaminophen (TYLENOL) tablet 650 mg, 650 mg, Oral, Q6H PRN, Claudette Machuca MD, 650 mg at 07/04/18 2310  •  albuterol (PROVENTIL) nebulizer solution 0.083% 2.5 mg/3mL, 2.5 mg, Nebulization, Q6H PRN, Louie Roblero MD  •  aspirin chewable tablet 81 mg, 81 mg, Oral, Daily, Louie Roblero MD, 81 mg at 07/09/18 0912  •  atorvastatin (LIPITOR) tablet 10 mg, 10 mg, Oral, Daily, Louie Roblero MD, 10 mg at 07/09/18 0913  •  bisacodyl (DULCOLAX) EC tablet 5 mg, 5 mg, Oral, Daily PRN, Louie Roblero MD  •  budesonide-formoterol (SYMBICORT) 160-4.5 MCG/ACT inhaler 2 puff, 2 puff, Inhalation, BID, Louie Roblero MD, 2 puff at 07/09/18 2002  •  famotidine (PEPCID) tablet 20 mg, 20 mg, Oral, BID, Louie Roblero MD, 20 mg at 07/09/18 2015  •  ferrous sulfate EC tablet 324 mg, 324 mg, Oral, BID With Meals, Louie Roblero MD, 324 mg at 07/09/18 1757  •  FLUoxetine (PROzac) capsule 60 mg, 60 mg, Oral, Daily, LUPE Garcia, 60 mg at 07/09/18 0912  •  folic acid (FOLVITE) tablet 1 mg, 1 mg, Oral, Daily, Louie Roblero MD, 1 mg at 07/09/18 0912  •  furosemide (LASIX) tablet 20 mg, 20 mg, Oral, Daily, Louie Roblero MD, 20 mg at 07/09/18 0912  •  hydrALAZINE (APRESOLINE) injection 10 mg, 10 mg, Intravenous, Q6H PRN, Louie Roblero MD, 10 mg at 07/08/18 0444  •  losartan (COZAAR) tablet 100 mg, 100 mg, Oral, Q24H, Louie Roblero MD, 100 mg at 07/09/18 0912  •  potassium chloride (MICRO-K)  CR capsule 20 mEq, 20 mEq, Oral, Daily, Bridget Ortega MD, 20 mEq at 07/09/18 0911  •  risperiDONE (risperDAL) tablet 1 mg, 1 mg, Oral, Nightly, Louie Roblero MD, 1 mg at 07/09/18 2015  •  sodium chloride 0.9 % flush 1-10 mL, 1-10 mL, Intravenous, PRN, oLuie Roblero MD  •  sodium chloride 0.9 % flush 10 mL, 10 mL, Intravenous, PRN, Blake Euceda MD     Diagnostic Data    Lab Results (last 24 hours)     Procedure Component Value Units Date/Time    Basic Metabolic Panel [[id number]]  (Abnormal) Collected:  07/10/18 0532    Specimen:  Blood Updated:  07/10/18 0638     Glucose 95 mg/dL      BUN 22 (H) mg/dL      Creatinine 0.89 mg/dL      Sodium 136 (L) mmol/L      Potassium 4.1 mmol/L      Chloride 101 mmol/L      CO2 27.0 mmol/L      Calcium 9.0 mg/dL      eGFR Non African Amer 64 mL/min/1.73      BUN/Creatinine Ratio 24.7     Anion Gap 8.0 mmol/L     CBC & Differential [[id number]] Collected:  07/10/18 0532    Specimen:  Blood Updated:  07/10/18 0601    Narrative:       The following orders were created for panel order CBC & Differential.  Procedure                               Abnormality         Status                     ---------                               -----------         ------                     CBC Auto Differential[[id number]]        Normal              Final result                 Please view results for these tests on the individual orders.    CBC Auto Differential [[id number]]  (Normal) Collected:  07/10/18 0532    Specimen:  Blood Updated:  07/10/18 0601     WBC 6.25 10*3/mm3      RBC 4.31 10*6/mm3      Hemoglobin 12.5 g/dL      Hematocrit 38.4 %      MCV 89.1 fL      MCH 29.0 pg      MCHC 32.6 g/dL      RDW 14.1 %      RDW-SD 46.3 fl      MPV 10.1 fL      Platelets 215 10*3/mm3      Neutrophil % 67.0 %      Lymphocyte % 21.6 %      Monocyte % 8.5 %      Eosinophil % 2.1 %      Basophil % 0.5 %      Immature Grans % 0.3 %      Neutrophils, Absolute 4.19 10*3/mm3       Lymphocytes, Absolute 1.35 10*3/mm3      Monocytes, Absolute 0.53 10*3/mm3      Eosinophils, Absolute 0.13 10*3/mm3      Basophils, Absolute 0.03 10*3/mm3      Immature Grans, Absolute 0.02 10*3/mm3             I reviewed the patient's new clinical results.    Assessment/Plan:         DVT prophylaxis: SCD/TEDS  Code status is   Code Status and Medical Interventions:   Ordered at: 07/02/18 1645     Level Of Support Discussed With:    Patient     Code Status:    CPR     Medical Interventions (Level of Support Prior to Arrest):    Full       Plan for disposition:nursing home, today or tomorrow      Time: 20 Min    Aldo Yeboah Jr., M.D.  PGY1  Family Practice Resident  [address] [place] [address]  Phone: [phone]  Fax: [phone]      This document has been electronically signed by Aldo Yeboah Jr, MD on 07/09/18 6:47 AM

## 2018-07-10 NOTE — THERAPY TREATMENT NOTE
Acute Care - Physical Therapy Treatment Note  [facility]     Patient Name: Michelle Child  : 1955  MRN: [id number]  Today's Date: 7/10/2018  Onset of Illness/Injury or Date of Surgery: 18     Referring Physician: JANAK Roblero MD.     Admit Date: 2018    Visit Dx:    ICD-10-CM ICD-9-CM   1. Peripheral edema R60.9 782.3   2. Acute on chronic congestive heart failure, unspecified congestive heart failure type (CMS/HCC) I50.9 428.0   3. Uncontrolled hypertension I10 401.9   4. Hypokalemia E87.6 276.8   5. Impaired mobility and ADLs Z74.09 799.89   6. Impaired physical mobility Z74.09 781.99     Patient Active Problem List   Diagnosis   • Migraine   • Iron deficiency anemia   • Gastroesophageal reflux disease   • Essential hypertension   • Coronary arteriosclerosis   • Bilateral pseudophakia   • Astigmatism   • Hypokalemia   • Cerebral microvascular disease   • Pulmonary hypertension   • Rheumatic tricuspid valve regurgitation   • Rheumatic mitral stenosis   • Acute diastolic CHF (congestive heart failure) (CMS/HCC)   • Pulmonary hypertension due to left heart valvular disease   • Cor pulmonale, chronic (CMS/HCC)   • Folic acid deficiency   • Stage 3 severe COPD by GOLD classification (CMS/HCC)   • Personal history of tobacco use, presenting hazards to health   • Tobacco abuse, in remission   • Recurrent major depressive disorder, in full remission (CMS/HCC)   • Weakness   • Recurrent falls while walking   • Localized swelling of lower extremity   • Peripheral edema   • Insomnia       Therapy Treatment          Rehabilitation Treatment Summary     Row Name 07/10/18 1107 07/10/18 0729          Treatment Time/Intention    Discipline physical therapy assistant  -SY occupational therapy assistant  -BB     Document Type therapy note (daily note)  -SY therapy note (daily note)  -BB     Subjective Information no complaints  -SY no complaints  -BB     Mode of Treatment physical therapy  -SY  individual therapy;occupational therapy  -BB     Therapy Frequency (PT Clinical Impression) daily  -SY  --     Total Minutes, Occupational Therapy Treatment  -- 86  -BB     Therapy Frequency (OT Eval)  -- other (see comments)   3-14 tx's/wk  -BB     Patient Effort adequate  -SY good  -BB     Existing Precautions/Restrictions  -- fall  -BB     Recorded by [SY] Sadia French, \[facility]\"" 07/10/18 1125 [BB] PAMELLA Ngo/L 07/10/18 1138     Row Name 07/10/18 1107 07/10/18 0729          Vital Signs    Pre Systolic BP Rehab 137  -  -BB     Pre Treatment Diastolic BP 60  -SY 63  -BB     Pretreatment Heart Rate (beats/min) 63  -SY 66  -BB     Posttreatment Heart Rate (beats/min) 70  -KC2 68  -BB     Pre SpO2 (%) 96  -SY 97  -BB     O2 Delivery Pre Treatment room air  -SY room air  -BB     Post SpO2 (%) 95  -KC2 96  -BB     O2 Delivery Post Treatment room air  -KC2 room air  -BB     Pre Patient Position Supine  -SY  --     Intra Patient Position Sitting  -SY  --     Post Patient Position Sitting  -SY  --     Recorded by [SY] Sadia French, \[facility]\"" 07/10/18 1125  [KC2] Sadia French, \[facility]\"" 07/10/18 1137 [BB] PAMELLA Ngo/L 07/10/18 1139     Row Name 07/10/18 1107             Cognitive Assessment/Intervention- PT/OT    Affect/Mental Status (Cognitive) WNL;confused  -SY      Orientation Status (Cognition) oriented to;person  -SY      Follows Commands (Cognition) WNL;WFL  -SY      Recorded by [SY] Sadia French, \[facility]\"" 07/10/18 1125      Row Name 07/10/18 1107             Cognitive Assessment Intervention- SLP    Cognitive Function (Cognition) WNL;WFL  -SY      Recorded by [SY] Sadia French, \[facility]\"" 07/10/18 1125      Row Name 07/10/18 1107             Bed Mobility Assessment/Treatment    Bed Mobility Assessment/Treatment rolling right;scooting/bridging;supine-sit;sit-supine  -SY      Rolling Right [place] (Bed Mobility) independent  -SY      Scooting/Bridging [place] (Bed Mobility) independent  -SY       Supine-Sit [place] (Bed Mobility) independent  -SY      Sit-Supine [place] (Bed Mobility) not tested  -SY      Recorded by [SY] Sadia French, PTA 07/10/18 1125      Row Name 07/10/18 1107             Functional Mobility    Functional Mobility- Ind. Level supervision required  -SY      Recorded by [SY] Sadia French, PTA 07/10/18 1125      Row Name 07/10/18 1107             Transfer Assessment/Treatment    Transfer Assessment/Treatment sit-stand transfer;stand-sit transfer;bed-chair transfer  -SY      Recorded by [SY] Sadia French, PTA 07/10/18 1125      Row Name 07/10/18 1107             Bed-Chair Transfer    Bed-Chair [place] (Transfers) contact guard;supervision  -SY      Recorded by [SY] Sadia French, [facility] 07/10/18 1125      Row Name 07/10/18 1107             Chair-Bed Transfer    Chair-Bed [place] (Transfers) not tested  -SY      Recorded by [SY] Sadia French, [facility] 07/10/18 1125      Row Name 07/10/18 1107             Sit-Stand Transfer    Sit-Stand [place] (Transfers) contact guard  -SY      Recorded by [SY] Sadia French, PTA 07/10/18 1125      Row Name 07/10/18 1107             Stand-Sit Transfer    Stand-Sit [place] (Transfers) contact guard  -SY      Recorded by [SY] Sadia French, [facility] 07/10/18 1125      Row Name 07/10/18 1107             Gait/Stairs Assessment/Training    Gait/Stairs Assessment/Training gait/ambulation assistive device  -SY      [place] Level (Gait) contact guard;verbal cues;nonverbal cues (demo/gesture)  -SY      Assistive Device (Gait) walker, front-wheeled  -SY      Distance in Feet (Gait) 15  -SY      Pattern (Gait) step-to  -SY      Deviations/Abnormal Patterns (Gait) janey decreased  -SY      Recorded by [SY] Sadia French, [facility] 07/10/18 1125      Row Name 07/10/18 1107             Therapeutic Exercise    Exercise Type (Therapeutic Exercise) AROM (active range of motion)  -SY      Position (Therapeutic Exercise) seated;standing  -SY       Sets/Reps (Therapeutic Exercise) 25x  -SY      Recorded by [SY] Sadia French, PTA 07/10/18 1125      Row Name 07/10/18 1107             Static Sitting Balance    Level of [place] (Unsupported Sitting, Static Balance) independent  -SY      Sitting Position (Unsupported Sitting, Static Balance) sitting on edge of bed  -SY      Recorded by [SY] Sadia French, PTA 07/10/18 1125      Row Name 07/10/18 1107             Dynamic Sitting Balance    Level of [place], Reaches Outside Midline (Sitting, Dynamic Balance) supervision  -SY      Sitting Position, Reaches Outside Midline (Sitting, Dynamic Balance) sitting on edge of bed  -SY      Recorded by [SY] Sadia French, PTA 07/10/18 1125      Row Name 07/10/18 1107             Positioning and Restraints    Pre-Treatment Position in bed  -SY      Post Treatment Position chair  -SY      In Bed fowlers  -SY      In Chair call light within reach  -SY      Recorded by [SY] Sadia French, PTA 07/10/18 1125      Row Name 07/10/18 1107             Pain Scale: Numbers Pre/Post-Treatment    Pain Scale: Numbers, Pretreatment 0/10 - no pain  -SY      Pain Scale: Numbers, Post-Treatment 0/10 - no pain  -SY      Recorded by [SY] Sadia French, PTA 07/10/18 1125      Row Name 07/10/18 1107             Outcome Summary/Treatment Plan (PT)    Daily Summary of Progress (PT) progress toward functional goals is good  -SY      Anticipated Discharge Disposition (PT) skilled nursing facility  -SY      Recorded by [SY] Sadia French, PTA 07/10/18 1125        User Key  (r) = Recorded By, (t) = Taken By, (c) = Cosigned By    Initials Name Effective Dates Discipline    SY Sadia French, PTA 03/07/18 -  PT    BB PAMELLA Ngo/DALTON 03/07/18 -  OT                       PT Rehab Goals     Row Name 07/10/18 1107             Bed Mobility Goal 1 (PT)    Activity/Assistive Device (Bed Mobility Goal 1, PT) sit to supine/supine to sit  -SY      [place] Level/Cues Needed (Bed  Mobility Goal 1, PT) conditional independence  -SY      Time Frame (Bed Mobility Goal 1, PT) short term goal (STG);5 days  -SY      Progress/Outcomes (Bed Mobility Goal 1, PT) goal partially met  -SY         Transfer Goal 1 (PT)    Activity/Assistive Device (Transfer Goal 1, PT) sit-to-stand/stand-to-sit;bed-to-chair/chair-to-bed  -SY      [place] Level/Cues Needed (Transfer Goal 1, PT) supervision required  -SY      Time Frame (Transfer Goal 1, PT) long term goal (LTG);by discharge  -SY      Barriers (Transfers Goal 1, PT) Posterior LOB.  -SY      Progress/Outcome (Transfer Goal 1, PT) goal partially met  -SY         Gait Training Goal 1 (PT)    Activity/Assistive Device (Gait Training Goal 1, PT) walker, rolling  -SY      [place] Level (Gait Training Goal 1, PT) supervision required  -SY      Distance (Gait Goal 1, PT) 100'x1  -SY      Time Frame (Gait Training Goal 1, PT) long term goal (LTG);by discharge  -SY      Barriers (Gait Training Goal 1, PT) Decreased step length and foot clearance.   -SY      Progress/Outcome (Gait Training Goal 1, PT) goal not met;goal revised this date;goal partially met  -SY        User Key  (r) = Recorded By, (t) = Taken By, (c) = Cosigned By    Initials Name Provider Type Discipline    SY French, PTA Physical Therapy Assistant PT          Physical Therapy Education     Title: PT OT SLP Therapies (Active)     Topic: Physical Therapy (Active)     Point: Mobility training (Active)    Learning Progress Summary     Learner Status Readiness Method Response Comment Documented by    Patient Active Acceptance E NR Patient educated on proper gait mechanics, proper use of walker, proper transfer technique.  07/04/18 1254          Point: Body mechanics (Active)    Learning Progress Summary     Learner Status Readiness Method Response Comment Documented by    Patient Active Acceptance E,D NR Educated on proper gait mechanics and strategies to improve step length and foot  clearance.  07/09/18 1111                      User Key     Initials Effective Dates Name Provider Type Discipline    CZ 04/03/18 -  Hadley Ramirez, PT Physical Therapist PT                    PT Recommendation and Plan  Anticipated Discharge Disposition (PT): skilled nursing facility  Therapy Frequency (PT Clinical Impression): daily  Outcome Summary/Treatment Plan (PT)  Daily Summary of Progress (PT): progress toward functional goals is good  Anticipated Discharge Disposition (PT): skilled nursing facility  Plan of Care Reviewed With: patient  Progress: improving  Outcome Summary: pt demonstrated fair strength with t/f and short distance gait is has partially met 2 goals and is progressing towards others.  Pt is still requiring constant  vc for correct tech.  [facility] is pending for rehab to home          Outcome Measures     Row Name 07/10/18 1107 07/09/18 0958 07/09/18 0735       How much help from another person do you currently need...    Turning from your back to your side while in flat bed without using bedrails? 3  -SY 3  -CZ  --    Moving from lying on back to sitting on the side of a flat bed without bedrails? 3  -SY 3  -CZ  --    Moving to and from a bed to a chair (including a wheelchair)? 3  -SY 3  -CZ  --    Standing up from a chair using your arms (e.g., wheelchair, bedside chair)? 3  -SY 3  -CZ  --    Climbing 3-5 steps with a railing? 3  -SY 3  -CZ  --    To walk in hospital room? 3  -SY 3  -CZ  --    AM-PAC 6 Clicks Score 18  -SY 18  -CZ  --       How much help from another is currently needed...    Putting on and taking off regular lower body clothing?  --  -- 3  -BB    Bathing (including washing, rinsing, and drying)  --  -- 3  -BB    Toileting (which includes using toilet bed pan or urinal)  --  -- 3  -BB    Putting on and taking off regular upper body clothing  --  -- 3  -BB    Taking care of personal grooming (such as brushing teeth)  --  -- 4  -BB    Eating meals  --  -- 4  -BB    Score  --   -- 20  -BB       Functional Assessment    Outcome Measure Options  -- AM-PAC 6 Clicks Basic Mobility (PT)  -CZ  --    Row Name 07/08/18 1700 07/08/18 1424 07/07/18 1427       How much help from another person do you currently need...    Turning from your back to your side while in flat bed without using bedrails?  -- 3  -EM  --    Moving from lying on back to sitting on the side of a flat bed without bedrails?  -- 3  -EM  --    Moving to and from a bed to a chair (including a wheelchair)?  -- 3  -EM  --    Standing up from a chair using your arms (e.g., wheelchair, bedside chair)?  -- 3  -EM  --    Climbing 3-5 steps with a railing?  -- 3  -EM  --    To walk in hospital room?  -- 3  -EM  --    AM-PAC 6 Clicks Score  -- 18  -EM  --       How much help from another is currently needed...    Putting on and taking off regular lower body clothing? 3  -LM  -- 3  -LJ    Bathing (including washing, rinsing, and drying) 3  -LM  -- 3  -LJ    Toileting (which includes using toilet bed pan or urinal) 3  -LM  -- 3  -LJ    Putting on and taking off regular upper body clothing 3  -LM  -- 3  -LJ    Taking care of personal grooming (such as brushing teeth) 3  -LM  -- 3  -LJ    Eating meals 3  -LM  -- 3  -LJ    Score 18  -LM  -- 18  -LJ       Functional Assessment    Outcome Measure Options  -- AM-PAC 6 Clicks Basic Mobility (PT)  -EM  --    Row Name 07/07/18 1356             How much help from another person do you currently need...    Turning from your back to your side while in flat bed without using bedrails? 3  -EM      Moving from lying on back to sitting on the side of a flat bed without bedrails? 3  -EM      Moving to and from a bed to a chair (including a wheelchair)? 3  -EM      Standing up from a chair using your arms (e.g., wheelchair, bedside chair)? 3  -EM      Climbing 3-5 steps with a railing? 2  -EM      To walk in hospital room? 3  -EM      AM-PAC 6 Clicks Score 17  -EM         Functional Assessment    Outcome  Measure Options AM-PAC 6 Clicks Basic Mobility (PT)  -EM        User Key  (r) = Recorded By, (t) = Taken By, (c) = Cosigned By    Initials Name Provider Type    EM Khang Cornejo, PTA Physical Therapy Assistant    SY French, KIN Physical Therapy Assistant    BB Bibi Chester, CAN/L Occupational Therapy Assistant    LM Melyssa Bell, CAN/L Occupational Therapy Assistant    CZ Hadley Ramirez, PT Physical Therapist    VAL Chavarria, CAN/L Occupational Therapy Assistant           Time Calculation:         PT Charges     Row Name 07/10/18 1141             Time Calculation    Start Time 1108  -SY      Stop Time 1135  -SY      Time Calculation (min) 27 min  -SY         Time Calculation- PT    Total Timed Code Minutes- PT 27 minute(s)  -SY        User Key  (r) = Recorded By, (t) = Taken By, (c) = Cosigned By    Initials Name Provider Type    SY French PTA Physical Therapy Assistant        Therapy Suggested Charges     Code   Minutes Charges    77293 (CPT®) Hc Pt Neuromusc Re Education Ea 15 Min      17543 (CPT®) Hc Pt Ther Proc Ea 15 Min      11504 (CPT®) Hc Gait Training Ea 15 Min 25 2    36165 (CPT®) Hc Pt Therapeutic Act Ea 15 Min 15 1    74530 (CPT®) Hc Pt Manual Therapy Ea 15 Min      21728 (CPT®) Hc Pt Iontophoresis Ea 15 Min      41472 (CPT®) Hc Pt Elec Stim Ea-Per 15 Min      95628 (CPT®) Hc Pt Ultrasound Ea 15 Min      91761 (CPT®) Hc Pt Self Care/Mgmt/Train Ea 15 Min      Total  40 3        Therapy Charges for Today     Code Description Service Date Service Provider Modifiers Qty    27384517412 HC PT THER PROC EA 15 MIN 7/10/2018 Sadia French PTA GP 1    86979492128 HC PT THERAPEUTIC ACT EA 15 MIN 7/10/2018 Sadia French PTA GP 1          PT G-Codes  PT Professional Judgement Used?: Yes  Outcome Measure Options: AM-PAC 6 Clicks Basic Mobility (PT)  Score: 17  Functional Limitation: Mobility: Walking and moving around  Mobility: Walking and Moving Around Current Status  (): At least 40 percent but less than 60 percent impaired, limited or restricted  Mobility: Walking and Moving Around Goal Status (): At least 20 percent but less than 40 percent impaired, limited or restricted    Sadia French, PTA  7/10/2018

## 2018-07-10 NOTE — THERAPY DISCHARGE NOTE
Acute Care - Occupational Therapy Discharge Summary  [facility]     Patient Name: Michelle Child  : 1955  MRN: [id number]    Today's Date: 7/10/2018  Onset of Illness/Injury or Date of Surgery: 18    Date of Referral to OT: 18  Referring Physician: JANAK Roblero MD.       Admit Date: 2018        OT Recommendation and Plan    Visit Dx:    ICD-10-CM ICD-9-CM   1. Peripheral edema R60.9 782.3   2. Acute on chronic congestive heart failure, unspecified congestive heart failure type (CMS/[facility]) I50.9 428.0   3. Uncontrolled hypertension I10 401.9   4. Hypokalemia E87.6 276.8   5. Impaired mobility and ADLs Z74.09 799.89   6. Impaired physical mobility Z74.09 781.99               Time Calculation- OT     Row Name 07/10/18 1152             Time Calculation- OT    OT Start Time 0729  -BB      OT Stop Time 0855  -BB      OT Time Calculation (min) 86 min  -BB      Total Timed Code Minutes- OT 85 minute(s)  -BB      OT Received On 07/10/18  -BB        User Key  (r) = Recorded By, (t) = Taken By, (c) = Cosigned By    Initials Name Provider Type    BB PAMELLA Ngo/L Occupational Therapy Assistant                  OT Rehab Goals     Row Name 07/10/18 0729             Transfer Goal 1 (OT)    Activity/Assistive Device (Transfer Goal 1, OT) sit-to-stand/stand-to-sit;bed-to-chair/chair-to-bed;toilet;walker, rolling  -BB      [place] Level/Cues Needed (Transfer Goal 1, OT) contact guard assist  -BB      Time Frame (Transfer Goal 1, OT) long term goal (LTG);by discharge  -BB      Progress/Outcome (Transfer Goal 1, OT) continuing progress toward goal;goal met  -BB         Bathing Goal 1 (OT)    Activity/Assistive Device (Bathing Goal 1, OT) upper body bathing;bath mitt;long-handled sponge  -BB      [place] Level/Cues Needed (Bathing Goal 1, OT) minimum assist (75% or more patient effort)  -BB      Time Frame (Bathing Goal 1, OT) long term goal (LTG);by discharge  -BB       Progress/Outcomes (Bathing Goal 1, OT) continuing progress toward goal;goal met  -BB         Dressing Goal 1 (OT)    Activity/Assistive Device (Dressing Goal 1, OT) upper body dressing  -BB      [place]/Cues Needed (Dressing Goal 1, OT) minimum assist (75% or more patient effort)  -BB      Time Frame (Dressing Goal 1, OT) long term goal (LTG);by discharge  -BB      Progress/Outcome (Dressing Goal 1, OT) goal met;continuing progress toward goal  -BB         Self-Feeding Goal 1 (OT)    Activity/Assistive Device (Self-Feeding Goal 1, OT) self-feeding skills, all  -BB      [place] Level/Cues Needed (Self-Feeding Goal 1, OT) conditional independence  -BB      Time Frame (Self-Feeding Goal 1, OT) long term goal (LTG);by discharge  -BB      Progress/Outcomes (Self-Feeding Goal 1, OT) continuing progress toward goal;goal met  -BB        User Key  (r) = Recorded By, (t) = Taken By, (c) = Cosigned By    Initials Name Provider Type Discipline    BB PAMELLA Ngo/L Occupational Therapy Assistant OT                Outcome Measures     Row Name 07/10/18 1107 07/10/18 0729 07/09/18 0958       How much help from another person do you currently need...    Turning from your back to your side while in flat bed without using bedrails? 3  -SY  -- 3  -CZ    Moving from lying on back to sitting on the side of a flat bed without bedrails? 3  -SY  -- 3  -CZ    Moving to and from a bed to a chair (including a wheelchair)? 3  -SY  -- 3  -CZ    Standing up from a chair using your arms (e.g., wheelchair, bedside chair)? 3  -SY  -- 3  -CZ    Climbing 3-5 steps with a railing? 3  -SY  -- 3  -CZ    To walk in hospital room? 3  -SY  -- 3  -CZ    AM-PAC 6 Clicks Score 18  -SY  -- 18  -CZ       How much help from another is currently needed...    Putting on and taking off regular lower body clothing?  -- 3  -BB  --    Bathing (including washing, rinsing, and drying)  -- 3  -BB  --    Toileting (which includes using toilet bed pan  or urinal)  -- 3  -BB  --    Putting on and taking off regular upper body clothing  -- 3  -BB  --    Taking care of personal grooming (such as brushing teeth)  -- 4  -BB  --    Eating meals  -- 4  -BB  --    Score  -- 20  -BB  --       Functional Assessment    Outcome Measure Options  --  -- AM-PAC 6 Clicks Basic Mobility (PT)  -CZ    Row Name 07/09/18 0735 07/08/18 1700 07/08/18 1424       How much help from another person do you currently need...    Turning from your back to your side while in flat bed without using bedrails?  --  -- 3  -EM    Moving from lying on back to sitting on the side of a flat bed without bedrails?  --  -- 3  -EM    Moving to and from a bed to a chair (including a wheelchair)?  --  -- 3  -EM    Standing up from a chair using your arms (e.g., wheelchair, bedside chair)?  --  -- 3  -EM    Climbing 3-5 steps with a railing?  --  -- 3  -EM    To walk in hospital room?  --  -- 3  -EM    AM-PAC 6 Clicks Score  --  -- 18  -EM       How much help from another is currently needed...    Putting on and taking off regular lower body clothing? 3  -BB 3  -LM  --    Bathing (including washing, rinsing, and drying) 3  -BB 3  -LM  --    Toileting (which includes using toilet bed pan or urinal) 3  -BB 3  -LM  --    Putting on and taking off regular upper body clothing 3  -BB 3  -LM  --    Taking care of personal grooming (such as brushing teeth) 4  -BB 3  -LM  --    Eating meals 4  -BB 3  -LM  --    Score 20  -BB 18  -LM  --       Functional Assessment    Outcome Measure Options  --  -- AM-PAC 6 Clicks Basic Mobility (PT)  -EM      User Key  (r) = Recorded By, (t) = Taken By, (c) = Cosigned By    Initials Name Provider Type    EM Khang Cornejo, PTA Physical Therapy Assistant    SY French, PTA Physical Therapy Assistant    BB Bibi Chester, CAN/L Occupational Therapy Assistant    LM Melyssa Bell, CAN/L Occupational Therapy Assistant    CZ Hadley Ramirez, PT Physical Therapist           Therapy Suggested Charges     Code   Minutes Charges    07495 (CPT®) Hc Ot Neuromusc Re Education Ea 15 Min      08288 (CPT®) Hc Ot Ther Proc Ea 15 Min      35164 (CPT®) Hc Ot Therapeutic Act Ea 15 Min      01431 (CPT®) Hc Ot Manual Therapy Ea 15 Min      71243 (CPT®) Hc Ot Iontophoresis Ea 15 Min      34553 (CPT®) Hc Ot Elec Stim Ea-Per 15 Min      01452 (CPT®) Hc Ot Ultrasound Ea 15 Min      13214 (CPT®) Hc Ot Self Care/Mgmt/Train Ea 15 Min 86 6    Total  86 6              OT Discharge Summary  Anticipated Discharge Disposition (OT): skilled nursing facility  Reason for Discharge: Discharge from facility, Per MD order  Outcomes Achieved: Patient able to partially acheive established goals  Discharge Destination: SNF      MAIK Corado/DALTON  7/10/2018

## 2018-07-10 NOTE — PLAN OF CARE
Problem: Patient Care Overview  Goal: Plan of Care Review  Outcome: Ongoing (interventions implemented as appropriate)   07/10/18 0317   Coping/Psychosocial   Plan of Care Reviewed With patient   Plan of Care Review   Progress no change   OTHER   Outcome Summary No complaints at this time. VS stable. Will continue to monitor       Problem: Skin Injury Risk (Adult)  Goal: Skin Health and Integrity  Outcome: Ongoing (interventions implemented as appropriate)      Problem: Cardiac: Heart Failure (Adult)  Goal: Signs and Symptoms of Listed Potential Problems Will be Absent, Minimized or Managed (Cardiac: Heart Failure)  Outcome: Ongoing (interventions implemented as appropriate)

## 2018-07-10 NOTE — PROGRESS NOTES
FAMILY MEDICINE PROGRESS NOTE  NAME: Michelle Child  : 1955  MRN: [id number]     LOS: 1 day   Code Status and Medical Interventions:   Ordered at: 18 1645     Level Of Support Discussed With:    Patient     Code Status:    CPR     Medical Interventions (Level of Support Prior to Arrest):    Full     PROVIDER OF SERVICE:     Chief Complaint:  Hypokalemia    Subjective     Interval History:  History taken from: patient RN  Subjective: Patient is a 63 yo  female who was admitted with swelling and hypokalemia.  She denies any complaints.  She is waiting for nursing home placement for therapy hopefully today.    Review of Systems:   Review of Systems   Constitutional: Negative for activity change and chills.   HENT: Positive for hearing loss. Negative for congestion and sore throat.    Eyes: Negative for pain and visual disturbance.   Respiratory: Negative for chest tightness and shortness of breath.    Cardiovascular: Negative for chest pain and palpitations.   Gastrointestinal: Negative for diarrhea, nausea and vomiting.   Endocrine: Negative for polyphagia and polyuria.   Genitourinary: Negative for frequency and hematuria.   Musculoskeletal: Negative for arthralgias and myalgias.   Skin: Negative for color change and pallor.   Neurological: Negative for dizziness and seizures.   Psychiatric/Behavioral: Negative for agitation and behavioral problems.       Objective     Vital Signs  Temp:  [97.1 °F (36.2 °C)-98.7 °F (37.1 °C)] 98.7 °F (37.1 °C)  Heart Rate:  [57-65] 62  Resp:  [16-18] 16  BP: (126-150)/(58-66) 150/66    Physical Exam  Physical Exam   Constitutional: She is oriented to person, place, and time. She appears well-developed and well-nourished. No distress.   HENT:   Mouth/Throat: No oropharyngeal exudate.   Neck: No JVD present.   Cardiovascular: Normal rate, regular rhythm and intact distal pulses.  Exam reveals no gallop and no friction rub.    Murmur heard.  2/6 systolic  murmur   Pulmonary/Chest: No respiratory distress. She has no wheezes. She has no rales.   Diminished breath sounds in the bases   Abdominal: Soft. Bowel sounds are normal. She exhibits no distension. There is no tenderness. There is no rebound and no guarding.   Musculoskeletal: She exhibits no edema.   Neurological: She is alert and oriented to person, place, and time.   Skin: She is not diaphoretic.   Psychiatric: She has a normal mood and affect. Her behavior is normal.   Nursing note and vitals reviewed.      Medication Review    Current Facility-Administered Medications:   •  acetaminophen (TYLENOL) tablet 650 mg, 650 mg, Oral, Q6H PRN, Claudette Machuca MD, 650 mg at 07/04/18 2310  •  albuterol (PROVENTIL) nebulizer solution 0.083% 2.5 mg/3mL, 2.5 mg, Nebulization, Q6H PRN, Louie Roblero MD  •  aspirin chewable tablet 81 mg, 81 mg, Oral, Daily, Louie Roblero MD, 81 mg at 07/10/18 0821  •  atorvastatin (LIPITOR) tablet 10 mg, 10 mg, Oral, Daily, Louie Roblero MD, 10 mg at 07/10/18 0821  •  bisacodyl (DULCOLAX) EC tablet 5 mg, 5 mg, Oral, Daily PRN, Louie Roblero MD  •  budesonide-formoterol (SYMBICORT) 160-4.5 MCG/ACT inhaler 2 puff, 2 puff, Inhalation, BID, Louie Roblero MD, 2 puff at 07/09/18 2002  •  famotidine (PEPCID) tablet 20 mg, 20 mg, Oral, BID, Luoie Roblero MD, 20 mg at 07/10/18 0821  •  ferrous sulfate EC tablet 324 mg, 324 mg, Oral, BID With Meals, Louie Roblero MD, 324 mg at 07/10/18 0821  •  FLUoxetine (PROzac) capsule 60 mg, 60 mg, Oral, Daily, LUPE Garcia, 60 mg at 07/10/18 0821  •  folic acid (FOLVITE) tablet 1 mg, 1 mg, Oral, Daily, Louie Roblero MD, 1 mg at 07/10/18 0821  •  furosemide (LASIX) tablet 20 mg, 20 mg, Oral, Daily, Louie Roblero MD, 20 mg at 07/09/18 0912  •  hydrALAZINE (APRESOLINE) injection 10 mg, 10 mg, Intravenous, Q6H PRN, Louie Roblero MD, 10 mg at  07/08/18 0444  •  losartan (COZAAR) tablet 100 mg, 100 mg, Oral, Q24H, Louie Roblero MD, 100 mg at 07/10/18 0821  •  potassium chloride (MICRO-K) CR capsule 20 mEq, 20 mEq, Oral, Daily, Bridget Ortega MD, 20 mEq at 07/10/18 0821  •  risperiDONE (risperDAL) tablet 1 mg, 1 mg, Oral, Nightly, Louie Roblero MD, 1 mg at 07/09/18 2015  •  sodium chloride 0.9 % flush 1-10 mL, 1-10 mL, Intravenous, PRN, Louie Roblero MD  •  sodium chloride 0.9 % flush 10 mL, 10 mL, Intravenous, PRN, Blake Euceda MD     Diagnostic Data      I reviewed the patient's new clinical results and imaging.      Assessment/Plan     Active Hospital Problems    Diagnosis Date Noted   • **Hypokalemia [E87.6] 01/10/2018     Monitor and replace       • Peripheral edema [R60.9] 07/02/2018     - home dose lasix 20mg daily     • Recurrent falls while walking [R29.6] 07/02/2018     - PT/OT  - Case management consulted for SNF placement. [facility] is not currently accepting new patients. Currently looking at [facility] at [facility].     • Insomnia [G47.00] 07/02/2018     Continue home risperdal     • Recurrent major depressive disorder, in full remission (CMS/HCC) [F33.42] 04/05/2018     Continue home prozac     • Stage 3 severe COPD by GOLD classification (CMS/HCC) [J44.9] 03/01/2018     Continue home nebs and symbicort     • Pulmonary hypertension due to left heart valvular disease [I27.22, I38] 01/15/2018   • Rheumatic mitral stenosis [I05.0] 01/15/2018     Cards Consultation     • Rheumatic tricuspid valve regurgitation [I07.1] 01/14/2018   • Essential hypertension [I10]      DC home toprol 25mg daily secondary to bradycardia   Current BP: 132/63Home losartan increased to 100mg daily  Lasix 20mg PO daily  PRN hydralazine     • Gastroesophageal reflux disease [K21.9]      Pepcid           DVT prophylaxis: SCDs/TEDs      Disposition:Nursing Home    I have reviewed the notes, assessments, and/or procedures performed by  Dr. Yeboah, I concur with her/his documentation of Michelle Child.        This document has been electronically signed by Bridget Ortega MD on July 10, 2018 2:28 PM

## 2018-07-10 NOTE — THERAPY TREATMENT NOTE
Acute Care - Occupational Therapy Treatment Note  [facility]     Patient Name: Michelle Child  : 1955  MRN: [id number]  Today's Date: 7/10/2018  Onset of Illness/Injury or Date of Surgery: 18  Date of Referral to OT: 18  Referring Physician: JANAK Roblero MD.     Admit Date: 2018       ICD-10-CM ICD-9-CM   1. Peripheral edema R60.9 782.3   2. Acute on chronic congestive heart failure, unspecified congestive heart failure type (CMS/HCC) I50.9 428.0   3. Uncontrolled hypertension I10 401.9   4. Hypokalemia E87.6 276.8   5. Impaired mobility and ADLs Z74.09 799.89   6. Impaired physical mobility Z74.09 781.99     Patient Active Problem List   Diagnosis   • Migraine   • Iron deficiency anemia   • Gastroesophageal reflux disease   • Essential hypertension   • Coronary arteriosclerosis   • Bilateral pseudophakia   • Astigmatism   • Hypokalemia   • Cerebral microvascular disease   • Pulmonary hypertension   • Rheumatic tricuspid valve regurgitation   • Rheumatic mitral stenosis   • Acute diastolic CHF (congestive heart failure) (CMS/[facility])   • Pulmonary hypertension due to left heart valvular disease   • Cor pulmonale, chronic (CMS/[facility])   • Folic acid deficiency   • Stage 3 severe COPD by GOLD classification (CMS/[facility])   • Personal history of tobacco use, presenting hazards to health   • Tobacco abuse, in remission   • Recurrent major depressive disorder, in full remission (CMS/HCC)   • Weakness   • Recurrent falls while walking   • Localized swelling of lower extremity   • Peripheral edema   • Insomnia     Past Medical History:   Diagnosis Date   • Alkaline phosphatase raised    • Astigmatism    • Bilateral pseudophakia    • Coronary arteriosclerosis    • Depressive disorder    • Edema of lower extremity    • Encounter for general adult medical examination with abnormal findings    • Essential hypertension    • Gastroesophageal reflux disease    • H/O bone density study     DXA BONE DENSITY  AXIAL    04/22/2016   • H/O screening mammography     SCREENING MAMMOGRAPHY     04/25/2016   • Hx of screening mammography     x3; declined screening, understands risks and benefits and still declines      07/24/2015   • Influenza vaccine administered     INFLUENZA IMMUN ADMIN OR PREV RECV'D   x4       04/01/2016   • Insomnia    • Iron deficiency    • Migraine    • Tobacco dependence     continuous       Past Surgical History:   Procedure Laterality Date   • APPENDECTOMY     • AUGMENTATION MAMMAPLASTY     • BREAST IMPLANT SURGERY     • CATARACT EXTRACTION WITH INTRAOCULAR LENS IMPLANT  12/17/2003    Phacoemulsification of a cataract with intraocular lens implant of rigt eye, Nixon model SA 60-AT; serial # [id number];20.5 diopter   • CHOLECYSTECTOMY  08/06/2007    Laparoscopic cholecystectomy. Symptomatic gallstones   • COLONOSCOPY  10/15/2013    declined stool cards and colonscopy   • ENDOSCOPY AND COLONOSCOPY  05/22/2014    Internal & external hemorrhoids found.   • ENDOSCOPY W/ PEG TUBE PLACEMENT  05/22/2014    EGD w/ tube: Normal esopahgus. Gastritis in stomach. Biopsy taken. Normal duodenum. Biopsy taken.   • INJECTION OF MEDICATION  07/24/2013    Inj(s) Tend-Sheath, Ligament, Single   • INJECTION OF MEDICATION  11/17/2014    Kenalog x6   • INJECTION OF MEDICATION  04/01/2016    PNEUMOC VAC/ADMIN/RCVD    • INJECTION OF MEDICATION  12/05/2014    Toradol   • OTHER SURGICAL HISTORY  01/19/2014    Drain/Inject Major Joint   x2   • PAP SMEAR  10/06/2011   • PARTIAL HYSTERECTOMY      Partial hyst    • TONSILLECTOMY     • TUBAL ABDOMINAL LIGATION         Therapy Treatment          Rehabilitation Treatment Summary     Row Name 07/10/18 1107 07/10/18 0729          Treatment Time/Intention    Discipline physical therapy assistant  -SY occupational therapy assistant  -BB     Document Type therapy note (daily note)  -SY therapy note (daily note)  -BB     Subjective Information no complaints  -SY no complaints  -BB     Mode  of Treatment physical therapy  -SY individual therapy;occupational therapy  -BB     Therapy Frequency (PT Clinical Impression) daily  -SY  --     Total Minutes, Occupational Therapy Treatment  -- 86  -BB     Therapy Frequency (OT Eval)  -- other (see comments)   3-14 tx's/wk  -BB     Patient Effort adequate  -SY good  -BB     Existing Precautions/Restrictions  -- fall  -BB     Recorded by [SY] Sadia rFench, PTA 07/10/18 1125 [BB] JUDE NgoA/L 07/10/18 1138     Row Name 07/10/18 1107 07/10/18 0729          Vital Signs    Pre Systolic BP Rehab 137  -  -BB     Pre Treatment Diastolic BP 60  -SY 63  -BB     Pretreatment Heart Rate (beats/min) 63  -SY 66  -BB     Posttreatment Heart Rate (beats/min) 70  -KC2 68  -BB     Pre SpO2 (%) 96  -SY 97  -BB     O2 Delivery Pre Treatment room air  -SY room air  -BB     Post SpO2 (%) 95  -KC2 96  -BB     O2 Delivery Post Treatment room air  -KC2 room air  -BB     Pre Patient Position Supine  -SY  --     Intra Patient Position Sitting  -SY  --     Post Patient Position Sitting  -SY  --     Recorded by [SY] Sadia French, [facility] 07/10/18 1125  [KC2] Sadia French, [facility] 07/10/18 1137 [BB] PAMELLA Ngo/L 07/10/18 1139     Row Name 07/10/18 1107 07/10/18 0729          Cognitive Assessment/Intervention- PT/OT    Affect/Mental Status (Cognitive) WNL;confused  -SY confused  -BB     Orientation Status (Cognition) oriented to;person  -SY oriented to;person  -BB     Follows Commands (Cognition) WNL;WFL  -SY  --     Personal Safety Interventions  -- fall prevention program maintained;gait belt;nonskid shoes/slippers when out of bed;supervised activity  -BB     Recorded by [SY] Sadia French, PTA 07/10/18 1125 [BB] PAMELLA Ngo/L 07/10/18 1145     Row Name 07/10/18 1107             Cognitive Assessment Intervention- SLP    Cognitive Function (Cognition) WNL;WFL  -SY      Recorded by [SY] Sadia French, PTA 07/10/18 1125      Row Name 07/10/18 1107  07/10/18 0729          Bed Mobility Assessment/Treatment    Bed Mobility Assessment/Treatment rolling right;scooting/bridging;supine-sit;sit-supine  -SY  --     Rolling Right [place] (Bed Mobility) independent  -SY  --     Scooting/Bridging [place] (Bed Mobility) independent  -SY  --     Supine-Sit [place] (Bed Mobility) independent  -SY independent  -BB     Sit-Supine [place] (Bed Mobility) not tested  -SY supervision  -BB     Assistive Device (Bed Mobility)  -- bed rails;head of bed elevated  -BB     Recorded by [SY] Sadia French, \[facility] [place] [facility]\"" 07/10/18 1125 [BB] Bibi Chester, CAN/L 07/10/18 1145     Row Name 07/10/18 1107             Functional Mobility    Functional Mobility- Ind. Level supervision required  -SY      Recorded by [SY] Sadia French, PTA 07/10/18 1125      Row Name 07/10/18 1107             Transfer Assessment/Treatment    Transfer Assessment/Treatment sit-stand transfer;stand-sit transfer;bed-chair transfer  -SY      Recorded by [SY] Sadia French, \[facility] [place] [facility]\"" 07/10/18 1125      Row Name 07/10/18 1107 07/10/18 0729          Bed-Chair Transfer    Bed-Chair [place] (Transfers) contact guard;supervision  -SY contact guard  -BB     Assistive Device (Bed-Chair Transfers)  -- walker, front-wheeled  -BB     Recorded by [SY] Sadia French, \[facility] [place] [facility]\"" 07/10/18 1125 [BB] JUDE NgoA/L 07/10/18 1147     Row Name 07/10/18 1107 07/10/18 0729          Chair-Bed Transfer    Chair-Bed [place] (Transfers) not tested  -SY contact guard  -BB     Assistive Device (Chair-Bed Transfers)  -- walker, front-wheeled  -BB     Recorded by [SY] Sadia French, PTA 07/10/18 1125 [BB] Bibi Chester CAN/L 07/10/18 1147     Row Name 07/10/18 1107 07/10/18 0729          Sit-Stand Transfer    Sit-Stand [place] (Transfers) contact guard  -SY contact guard  -BB     Assistive Device (Sit-Stand Transfers)  -- walker, front-wheeled  -BB     Recorded by [SY] Sadia French, \[facility] [place] [facility]\"" 07/10/18 1125  [BB] Bibi Chester CAN/L 07/10/18 1145     Row Name 07/10/18 1107 07/10/18 0729          Stand-Sit Transfer    Stand-Sit [place] (Transfers) contact guard  -SY contact guard  -BB     Assistive Device (Stand-Sit Transfers)  -- walker, front-wheeled  -BB     Recorded by [SY] Sadia French, PTA 07/10/18 1125 [BB] Bibi Chester CAN/L 07/10/18 1145     Row Name 07/10/18 0729             Toilet Transfer    Type (Toilet Transfer) sit-stand;stand-sit  -BB      [place] Level (Toilet Transfer) contact guard  -BB      Assistive Device (Toilet Transfer) commode;grab bars/safety frame  -BB      Recorded by [BB] Bibi Chester CAN/L 07/10/18 1145      Row Name 07/10/18 1107             Gait/Stairs Assessment/Training    Gait/Stairs Assessment/Training gait/ambulation assistive device  -SY      [place] Level (Gait) contact guard;verbal cues;nonverbal cues (demo/gesture)  -SY      Assistive Device (Gait) walker, front-wheeled  -SY      Distance in Feet (Gait) 15  -SY      Pattern (Gait) step-to  -SY      Deviations/Abnormal Patterns (Gait) janey decreased  -SY      Recorded by [SY] Sadia French, PTA 07/10/18 1125      Row Name 07/10/18 0729             Bathing Assessment/Intervention    Bathing [place] Level lower body;upper body;set up;supervision;contact guard assist   SBA UB and CGA for LB  -BB      Bathing Position edge of bed sitting  -BB      Recorded by [BB] JUDE NgoA/L 07/10/18 1145      Row Name 07/10/18 0729             Upper Body Dressing Assessment/Training    Upper Body Dressing [place] Level doff;don;set up;supervision   [facility] gown  -BB      Upper Body Dressing Position edge of bed sitting  -BB      Recorded by [BB] JUDE NgoA/L 07/10/18 1145      Row Name 07/10/18 0729             Lower Body Dressing Assessment/Training    Lower Body Dressing [place] Level doff;don;undergarment;socks;pants/bottoms;contact guard assist  -BB       Lower Body Dressing Position edge of bed sitting  -BB      Recorded by [BB] JUDE NgoA/L 07/10/18 1145      Row Name 07/10/18 0729             Grooming Assessment/Training    [place] Level (Grooming) hair care, combing/brushing;oral care regimen;wash face, hands;independent  -BB      Grooming Position edge of bed sitting  -BB      Recorded by [BB] JUDE NgoA/L 07/10/18 1145      Row Name 07/10/18 0729             Self-Feeding Assessment/Training    [place] Level (Feeding) prepare tray/open items;independent;scoop food and bring to mouth;liquids to mouth  -BB      Position (Self-Feeding) sitting up in bed  -BB      Recorded by [BB] JUDE NgoA/L 07/10/18 1145      Row Name 07/10/18 0729             Toileting Assessment/Training    [place] Level (Toileting) toileting skills;contact guard assist;adjust/manage clothing;perform perineal hygiene  -BB      Toileting Position unsupported sitting  -BB      Recorded by [BB] JUDE NgoA/L 07/10/18 1145      Row Name 07/10/18 1107             Therapeutic Exercise    Exercise Type (Therapeutic Exercise) AROM (active range of motion)  -SY      Position (Therapeutic Exercise) seated;standing  -SY      Sets/Reps (Therapeutic Exercise) 25x  -SY      Recorded by [SY] Sadia French, PTA 07/10/18 1125      Row Name 07/10/18 1107             Static Sitting Balance    Level of [place] (Unsupported Sitting, Static Balance) independent  -SY      Sitting Position (Unsupported Sitting, Static Balance) sitting on edge of bed  -SY      Recorded by [SY] Sadia French, PTA 07/10/18 1125      Row Name 07/10/18 1107             Dynamic Sitting Balance    Level of [place], Reaches Outside Midline (Sitting, Dynamic Balance) supervision  -SY      Sitting Position, Reaches Outside Midline (Sitting, Dynamic Balance) sitting on edge of bed  -SY      Recorded by [SY] Sadia French, PTA 07/10/18 1125      Row Name 07/10/18  1107 07/10/18 0729          Positioning and Restraints    Pre-Treatment Position in bed  -SY in bed  -BB     Post Treatment Position chair  -SY bed  -BB     In Bed fowlers  -SY  --     In Chair call light within reach  -SY  --     Recorded by [SY] Sadia French, KIN 07/10/18 1125 [BB] PAMELLA Ngo/L 07/10/18 1145     Row Name 07/10/18 1107 07/10/18 0729          Pain Scale: Numbers Pre/Post-Treatment    Pain Scale: Numbers, Pretreatment 0/10 - no pain  -SY 0/10 - no pain  -BB     Pain Scale: Numbers, Post-Treatment 0/10 - no pain  -SY 0/10 - no pain  -BB     Recorded by [SY] Sadia French, KIN 07/10/18 1125 [BB] PAMELLA Ngo/L 07/10/18 1145     Row Name 07/10/18 0729             Plan of Care Review    Plan of Care Reviewed With patient  -BB      Recorded by [BB] PAMELLA Ngo/L 07/10/18 1145      Row Name 07/10/18 0729             Outcome Summary/Treatment Plan (OT)    Daily Summary of Progress (OT) progress toward functional goals as expected  -BB      Plan for Continued Treatment (OT) continue POC  -BB      Anticipated Discharge Disposition (OT) [facility] nursing [facility]  -BB      Recorded by [BB] PAMELLA Ngo/L 07/10/18 1145      Row Name 07/10/18 1107             Outcome Summary/Treatment Plan (PT)    Daily Summary of Progress (PT) progress toward functional goals is good  -SY      Anticipated Discharge Disposition (PT) [facility] nursing [facility]  -SY      Recorded by [SY] Sadia French, KIN 07/10/18 1125        User Key  (r) = Recorded By, (t) = Taken By, (c) = Cosigned By    Initials Name Effective Dates Discipline    YS Sadia French, KIN 03/07/18 -  PT    BB PAMELLA Ngo/DALTON 03/07/18 -  OT         Non-skid socks and gait belt in place. Toileting offered. Call light and needs within reach. Pt advised to not get up alone and call the nurse for assistance.  Bed alarm on.             OT Rehab Goals     Row Name 07/10/18 0729             Transfer Goal 1  (OT)    Activity/Assistive Device (Transfer Goal 1, OT) sit-to-stand/stand-to-sit;bed-to-chair/chair-to-bed;toilet;walker, rolling  -BB      [place] Level/Cues Needed (Transfer Goal 1, OT) contact guard assist  -BB      Time Frame (Transfer Goal 1, OT) long term goal (LTG);by discharge  -BB      Progress/Outcome (Transfer Goal 1, OT) continuing progress toward goal;goal met  -BB         Bathing Goal 1 (OT)    Activity/Assistive Device (Bathing Goal 1, OT) upper body bathing;bath mitt;long-handled sponge  -BB      [place] Level/Cues Needed (Bathing Goal 1, OT) minimum assist (75% or more patient effort)  -BB      Time Frame (Bathing Goal 1, OT) long term goal (LTG);by discharge  -BB      Progress/Outcomes (Bathing Goal 1, OT) continuing progress toward goal;goal met  -BB         Dressing Goal 1 (OT)    Activity/Assistive Device (Dressing Goal 1, OT) upper body dressing  -BB      [place]/Cues Needed (Dressing Goal 1, OT) minimum assist (75% or more patient effort)  -BB      Time Frame (Dressing Goal 1, OT) long term goal (LTG);by discharge  -BB      Progress/Outcome (Dressing Goal 1, OT) goal met;continuing progress toward goal  -BB         Self-Feeding Goal 1 (OT)    Activity/Assistive Device (Self-Feeding Goal 1, OT) self-feeding skills, all  -BB      [place] Level/Cues Needed (Self-Feeding Goal 1, OT) conditional independence  -BB      Time Frame (Self-Feeding Goal 1, OT) long term goal (LTG);by discharge  -BB      Progress/Outcomes (Self-Feeding Goal 1, OT) continuing progress toward goal;goal met  -BB        User Key  (r) = Recorded By, (t) = Taken By, (c) = Cosigned By    Initials Name Provider Type Discipline    BB PAMELLA Ngo/L Occupational Therapy Assistant OT        Occupational Therapy Education     Title: PT OT SLP Therapies (Active)     Topic: Occupational Therapy (Active)     Point: ADL training (Active)     Description: Instruct learner(s) on proper safety adaptation and  remediation techniques during self care or transfers.   Instruct in proper use of assistive devices.   Learning Progress Summary     Learner Status Readiness Method Response Comment Documented by    Patient Active Acceptance E NR  BB 07/10/18 1148     Active Acceptance E NR  BB 07/09/18 1143     Done Acceptance E FRANCISCO BACA   07/08/18 1738     Done Acceptance ZACHTBCARLOS NR LJ 07/07/18 1526     Active Acceptance E NR  BB 07/06/18 1127     Active Acceptance E NR  BB 07/05/18 1258     Done Acceptance ETB KEVEN SINGH Educated on the role of OT and POC, educated on the benefit of activity and safety with bed mobility, t/f, and functional mobility MR 07/03/18 1127          Point: Home exercise program (Done)     Description: Instruct learner(s) on appropriate technique for monitoring, assisting and/or progressing therapeutic exercises/activities.   Learning Progress Summary     Learner Status Readiness Method Response Comment Documented by    Patient Done Acceptance E FRANCISCO BACA   07/08/18 1738          Point: Precautions (Done)     Description: Instruct learner(s) on prescribed precautions during self-care and functional transfers.   Learning Progress Summary     Learner Status Readiness Method Response Comment Documented by    Patient Done Acceptance E FRANCISCO BACA  LM 07/08/18 1738     Done Acceptance EDONALDO NR Educated on the role of OT and POC, educated on the benefit of activity and safety with bed mobility, t/f, and functional mobility MR 07/03/18 1127          Point: Body mechanics (Active)     Description: Instruct learner(s) on proper positioning and spine alignment during self-care, functional mobility activities and/or exercises.   Learning Progress Summary     Learner Status Readiness Method Response Comment Documented by    Patient Active Acceptance E NR  BB 07/10/18 1148     Active Acceptance E NR  BB 07/09/18 1143     Done Acceptance E FRANCISCO BACA   07/08/18 1738     Active Acceptance E NR   07/05/18 1258     Done Acceptance  E,TB VU,NR Educated on the role of OT and POC, educated on the benefit of activity and safety with bed mobility, t/f, and functional mobility MR 07/03/18 1127                      User Key     Initials Effective Dates Name Provider Type Discipline    BB 03/07/18 -  Bibi Chester, CAN/L Occupational Therapy Assistant OT    LM 03/07/18 -  JUDE WongA/L Occupational Therapy Assistant OT    MR 04/03/18 -  Janet Johnson OT Occupational Therapist OT    LJ 05/22/18 -  JUDE SpragueA/L Occupational Therapy Assistant OT                OT Recommendation and Plan  Outcome Summary/Treatment Plan (OT)  Daily Summary of Progress (OT): progress toward functional goals as expected  Plan for Continued Treatment (OT): continue POC  Anticipated Discharge Disposition (OT): skilled nursing facility  Therapy Frequency (OT Eval): other (see comments) (3-14 tx's/wk)  Daily Summary of Progress (OT): progress toward functional goals as expected  Plan of Care Review  Plan of Care Reviewed With: patient  Plan of Care Reviewed With: patient  Outcome Summary: Pt is CGA for all t/f's in room (bed<>.chair, bed<>toilet). Pt completed UB ADL SBA and LB ADL CGA.. Pt states she may be leaving today.        Outcome Measures     Row Name 07/10/18 1107 07/10/18 0729 07/09/18 0958       How much help from another person do you currently need...    Turning from your back to your side while in flat bed without using bedrails? 3  -SY  -- 3  -CZ    Moving from lying on back to sitting on the side of a flat bed without bedrails? 3  -SY  -- 3  -CZ    Moving to and from a bed to a chair (including a wheelchair)? 3  -SY  -- 3  -CZ    Standing up from a chair using your arms (e.g., wheelchair, bedside chair)? 3  -SY  -- 3  -CZ    Climbing 3-5 steps with a railing? 3  -SY  -- 3  -CZ    To walk in hospital room? 3  -SY  -- 3  -CZ    AM-PAC 6 Clicks Score 18  -SY  -- 18  -CZ       How much help from another is currently needed...     Putting on and taking off regular lower body clothing?  -- 3  -BB  --    Bathing (including washing, rinsing, and drying)  -- 3  -BB  --    Toileting (which includes using toilet bed pan or urinal)  -- 3  -BB  --    Putting on and taking off regular upper body clothing  -- 3  -BB  --    Taking care of personal grooming (such as brushing teeth)  -- 4  -BB  --    Eating meals  -- 4  -BB  --    Score  -- 20  -BB  --       Functional Assessment    Outcome Measure Options  --  -- AM-PAC 6 Clicks Basic Mobility (PT)  -CZ    Row Name 07/09/18 0735 07/08/18 1700 07/08/18 1424       How much help from another person do you currently need...    Turning from your back to your side while in flat bed without using bedrails?  --  -- 3  -EM    Moving from lying on back to sitting on the side of a flat bed without bedrails?  --  -- 3  -EM    Moving to and from a bed to a chair (including a wheelchair)?  --  -- 3  -EM    Standing up from a chair using your arms (e.g., wheelchair, bedside chair)?  --  -- 3  -EM    Climbing 3-5 steps with a railing?  --  -- 3  -EM    To walk in hospital room?  --  -- 3  -EM    AM-PAC 6 Clicks Score  --  -- 18  -EM       How much help from another is currently needed...    Putting on and taking off regular lower body clothing? 3  -BB 3  -LM  --    Bathing (including washing, rinsing, and drying) 3  -BB 3  -LM  --    Toileting (which includes using toilet bed pan or urinal) 3  -BB 3  -LM  --    Putting on and taking off regular upper body clothing 3  -BB 3  -LM  --    Taking care of personal grooming (such as brushing teeth) 4  -BB 3  -LM  --    Eating meals 4  -BB 3  -LM  --    Score 20  -BB 18  -LM  --       Functional Assessment    Outcome Measure Options  --  -- AM-PAC 6 Clicks Basic Mobility (PT)  -EM    Row Name 07/07/18 1427 07/07/18 1356          How much help from another person do you currently need...    Turning from your back to your side while in flat bed without using bedrails?  -- 3  -EM      Moving from lying on back to sitting on the side of a flat bed without bedrails?  -- 3  -EM     Moving to and from a bed to a chair (including a wheelchair)?  -- 3  -EM     Standing up from a chair using your arms (e.g., wheelchair, bedside chair)?  -- 3  -EM     Climbing 3-5 steps with a railing?  -- 2  -EM     To walk in hospital room?  -- 3  -EM     AM-PAC 6 Clicks Score  -- 17  -EM        How much help from another is currently needed...    Putting on and taking off regular lower body clothing? 3  -LJ  --     Bathing (including washing, rinsing, and drying) 3  -LJ  --     Toileting (which includes using toilet bed pan or urinal) 3  -LJ  --     Putting on and taking off regular upper body clothing 3  -LJ  --     Taking care of personal grooming (such as brushing teeth) 3  -LJ  --     Eating meals 3  -LJ  --     Score 18  -LJ  --        Functional Assessment    Outcome Measure Options  -- AM-PAC 6 Clicks Basic Mobility (PT)  -EM       User Key  (r) = Recorded By, (t) = Taken By, (c) = Cosigned By    Initials Name Provider Type    EM Khang Cornejo, PTA Physical Therapy Assistant    SY Sadia French, PTA Physical Therapy Assistant    BB Bibi Chester, CAN/L Occupational Therapy Assistant    LM Melyssa Bell, CAN/L Occupational Therapy Assistant    CZ Hadley Ramirez, PT Physical Therapist    LJ Melyssa Chavarria, CAN/L Occupational Therapy Assistant           Time Calculation:         Time Calculation- OT     Row Name 07/10/18 1152             Time Calculation- OT    OT Start Time 0729  -BB      OT Stop Time 0855  -BB      OT Time Calculation (min) 86 min  -BB      Total Timed Code Minutes- OT 85 minute(s)  -BB      OT Received On 07/10/18  -BB        User Key  (r) = Recorded By, (t) = Taken By, (c) = Cosigned By    Initials Name Provider Type    BB PAMELLA Ngo/L Occupational Therapy Assistant           Therapy Suggested Charges     Code   Minutes Charges    17154 (CPT®)  Ot Neuromusc  Re Education Ea 15 Min      84217 (CPT®) Hc Ot Ther Proc Ea 15 Min      86155 (CPT®) Hc Ot Therapeutic Act Ea 15 Min      04235 (CPT®) Hc Ot Manual Therapy Ea 15 Min      37511 (CPT®) Hc Ot Iontophoresis Ea 15 Min      12587 (CPT®) Hc Ot Elec Stim Ea-Per 15 Min      34482 (CPT®) Hc Ot Ultrasound Ea 15 Min      25954 (CPT®) Hc Ot Self Care/Mgmt/Train Ea 15 Min 86 6    Total  86 6        Therapy Charges for Today     Code Description Service Date Service Provider Modifiers Qty    10656978211 HC OT SELF CARE/MGMT/TRAIN EA 15 MIN 7/9/2018 JUDE NgoA/L GO 5    57430640154 HC OT THER PROC EA 15 MIN 7/9/2018 JUDE NgoA/L GO 2    [id number] HC OT SELF CARE/MGMT/TRAIN EA 15 MIN 7/10/2018 Bibi Chester CAN/L GO 6          OT G-codes  OT Professional Judgement Used?: Yes  OT Functional Scales Options: AM-PAC 6 Clicks Daily Activity (OT)  Score: 11  Functional Limitation: Self care  Self Care Current Status (): At least 60 percent but less than 80 percent impaired, limited or restricted  Self Care Goal Status (): At least 40 percent but less than 60 percent impaired, limited or restricted    PAMELLA Ngo/DALTON  7/10/2018

## 2018-07-10 NOTE — PLAN OF CARE
Problem: Patient Care Overview  Goal: Plan of Care Review  Outcome: Ongoing (interventions implemented as appropriate)   07/10/18 1019   Coping/Psychosocial   Plan of Care Reviewed With patient   Plan of Care Review   Progress no change   OTHER   Outcome Summary Patient has no complaints at this time. Has been working with therapy. Vitals stable. Will continue to monitor.       Problem: Skin Injury Risk (Adult)  Goal: Skin Health and Integrity  Outcome: Ongoing (interventions implemented as appropriate)      Problem: Cardiac: Heart Failure (Adult)  Goal: Signs and Symptoms of Listed Potential Problems Will be Absent, Minimized or Managed (Cardiac: Heart Failure)  Outcome: Ongoing (interventions implemented as appropriate)

## 2018-07-10 NOTE — TELEPHONE ENCOUNTER
Alvin called and said pt is needing a psych diagnosis done or she is going to be taking off of the Risperdal   1 mg.    Contact number is [phone]    Thanks

## 2018-07-10 NOTE — PLAN OF CARE
Problem: Patient Care Overview  Goal: Plan of Care Review  Outcome: Ongoing (interventions implemented as appropriate)   07/10/18 1358   Coping/Psychosocial   Plan of Care Reviewed With patient   Plan of Care Review   Progress improving   OTHER   Outcome Summary pt demonstrated fair strength with t/f and short distance gait.  Pt has partially met 2 goals and is progressing towards others. Pt is still requiring constant vc for correct tech. NHP is pending for rehab to home

## 2018-07-11 ENCOUNTER — EPISODE CHANGES (OUTPATIENT)
Dept: CASE MANAGEMENT | Facility: OTHER | Age: 63
End: 2018-07-11

## 2018-08-14 ENCOUNTER — TELEPHONE (OUTPATIENT)
Dept: FAMILY MEDICINE CLINIC | Facility: CLINIC | Age: 63
End: 2018-08-14

## 2018-08-17 ENCOUNTER — TELEPHONE (OUTPATIENT)
Dept: FAMILY MEDICINE CLINIC | Facility: CLINIC | Age: 63
End: 2018-08-17

## 2018-08-17 NOTE — TELEPHONE ENCOUNTER
PATIENT'S DAUGHTER CALLED TO CLARIFY PATIENT'S MEDICATIONS.    WHEN SHOULD SHE START THE LOSARTAN 100 MG BACK    SHOULD SHE BE TAKING THE METPROLOL    PLEASE CALL HER AND LET HER KNOW  [phone] EXT 3  [phone]    THANK YOU

## 2018-08-23 ENCOUNTER — OFFICE VISIT (OUTPATIENT)
Dept: FAMILY MEDICINE CLINIC | Facility: CLINIC | Age: 63
End: 2018-08-23

## 2018-08-23 VITALS
HEART RATE: 62 BPM | BODY MASS INDEX: 19.92 KG/M2 | HEIGHT: 64 IN | DIASTOLIC BLOOD PRESSURE: 82 MMHG | SYSTOLIC BLOOD PRESSURE: 128 MMHG | WEIGHT: 116.7 LBS | OXYGEN SATURATION: 99 %

## 2018-08-23 DIAGNOSIS — I10 ESSENTIAL HYPERTENSION: Primary | ICD-10-CM

## 2018-08-23 PROCEDURE — 99213 OFFICE O/P EST LOW 20 MIN: CPT | Performed by: STUDENT IN AN ORGANIZED HEALTH CARE EDUCATION/TRAINING PROGRAM

## 2018-08-29 NOTE — PROGRESS NOTES
[facility]  PROGRESS NOTE  Subjective   Chief Complaint   Patient presents with   • nursing home     follow up       Subjective:     Michelle Child is a 62 y.o. female who presents for follow up for after being discharged from the nursing home.  Patient had some questions about which HTN medication she should be taking which was addressed at our visit today.  Since being home from the nursing home patient states that she is feeling well and able to move around the house independently.  She worked well with PT & OT at the nursing home.  She can complete all of her ADLs without help around the house.  Her son is with her today at the appointment and confirms this .  She does not feel depressed.  She feels safe at home.  She has not had any recent falls since leaving the nursing home.     I have reviewed the patient's medical history in detail; there are no changes to the history as noted in the electronic medical record.    Past Medical Hx:  Past Medical History:   Diagnosis Date   • Alkaline phosphatase raised    • Astigmatism    • Bilateral pseudophakia    • Coronary arteriosclerosis    • Depressive disorder    • Edema of lower extremity    • Encounter for general adult medical examination with abnormal findings    • Essential hypertension    • Gastroesophageal reflux disease    • H/O bone density study     DXA BONE DENSITY AXIAL    04/22/2016   • H/O screening mammography     SCREENING MAMMOGRAPHY     04/25/2016   • Hx of screening mammography     x3; declined screening, understands risks and benefits and still declines      07/24/2015   • Influenza vaccine administered     INFLUENZA IMMUN ADMIN OR PREV RECV'D   x4       04/01/2016   • Insomnia    • Iron deficiency    • Migraine    • Tobacco dependence     continuous         Past Surgical Hx:  Past Surgical History:   Procedure Laterality Date   • APPENDECTOMY     • AUGMENTATION MAMMAPLASTY     • BREAST IMPLANT SURGERY     • CATARACT  EXTRACTION WITH INTRAOCULAR LENS IMPLANT  12/17/2003    Phacoemulsification of a cataract with intraocular lens implant of rigt eye, Nixon model SA 60-AT; serial # [id number];20.5 diopter   • CHOLECYSTECTOMY  08/06/2007    Laparoscopic cholecystectomy. Symptomatic gallstones   • COLONOSCOPY  10/15/2013    declined stool cards and colonscopy   • ENDOSCOPY AND COLONOSCOPY  05/22/2014    Internal & external hemorrhoids found.   • ENDOSCOPY W/ PEG TUBE PLACEMENT  05/22/2014    EGD w/ tube: Normal esopahgus. Gastritis in stomach. Biopsy taken. Normal duodenum. Biopsy taken.   • INJECTION OF MEDICATION  07/24/2013    Inj(s) Tend-Sheath, Ligament, Single   • INJECTION OF MEDICATION  11/17/2014    Kenalog x6   • INJECTION OF MEDICATION  04/01/2016    PNEUMOC VAC/ADMIN/RCVD    • INJECTION OF MEDICATION  12/05/2014    Toradol   • OTHER SURGICAL HISTORY  01/19/2014    Drain/Inject Major Joint   x2   • PAP SMEAR  10/06/2011   • PARTIAL HYSTERECTOMY      Partial hyst    • TONSILLECTOMY     • TUBAL ABDOMINAL LIGATION         Health Maintenance:  Health Maintenance   Topic Date Due   • DXA SCAN  04/22/2018   • ZOSTER VACCINE (2 of 2) 06/27/2018   • INFLUENZA VACCINE  08/01/2018   • MEDICARE ANNUAL WELLNESS  05/02/2019   • MAMMOGRAM  05/15/2020   • COLONOSCOPY  05/22/2024   • TDAP/TD VACCINES (2 - Td) 04/20/2027   • HEPATITIS C SCREENING  Completed   • PAP SMEAR  Excluded       Current Meds:    Current Outpatient Prescriptions:   •  acetaminophen (TYLENOL) 325 MG tablet, Take 2 tablets by mouth Every 4 (Four) Hours As Needed for Mild Pain . (Patient taking differently: Take  by mouth Every 4 (Four) Hours As Needed for Mild Pain .), Disp: , Rfl:   •  albuterol (PROVENTIL HFA;VENTOLIN HFA) 108 (90 Base) MCG/ACT inhaler, Inhale 2 puffs Every 4 (Four) Hours As Needed for Wheezing., Disp: 6.7 g, Rfl: 11  •  aspirin 81 MG chewable tablet, Chew 1 tablet Daily., Disp: 30 tablet, Rfl: 11  •  bisacodyl (DULCOLAX) 5 MG EC tablet, Take 1 tablet  by mouth Daily As Needed for Constipation., Disp: , Rfl:   •  FERATE 240 (27 Fe) MG tablet, TAKE ONE TABLET BY MOUTH TWICE A DAY FOR SUPPLEMENTATION. GIVE WITH MEALS, Disp: 60 tablet, Rfl: 0  •  ferrous sulfate 324 (65 Fe) MG tablet delayed-release EC tablet, Take 1 tablet by mouth 2 (Two) Times a Day With Meals., Disp: 180 tablet, Rfl: 3  •  FLUoxetine (PROzac) 20 MG capsule, Take 1 capsule by mouth Daily., Disp: 90 capsule, Rfl: 3  •  FLUoxetine (PROzac) 40 MG capsule, Take 1 capsule by mouth Daily., Disp: 901 capsule, Rfl: 3  •  folic acid (FOLVITE) 1 MG tablet, TAKE ONE TABLET BY MOUTH ONCE DAILY FOR SUPPLEMENT, Disp: 30 tablet, Rfl: 2  •  furosemide (LASIX) 20 MG tablet, One half tablet by mouth once a day, Disp: 30 tablet, Rfl: 5  •  hydrocortisone 1 % cream, Apply  topically 3 (Three) Times a Day., Disp: 60 g, Rfl: 5  •  losartan (COZAAR) 100 MG tablet, Take 1 tablet by mouth Daily., Disp: , Rfl:   •  meloxicam (MOBIC) 15 MG tablet, TAKE ONE TABLET BY MOUTH ONCE DAILY FOR MILD PAIN, Disp: 30 tablet, Rfl: 0  •  ondansetron (ZOFRAN) 4 MG tablet, Take 1 tablet by mouth Every 6 (Six) Hours As Needed for Nausea or Vomiting., Disp: , Rfl:   •  potassium chloride (MICRO-K) 10 MEQ CR capsule, Take 2 capsules by mouth Daily., Disp: , Rfl:   •  pravastatin (PRAVACHOL) 40 MG tablet, Take 1 tablet by mouth Every Night., Disp: 90 tablet, Rfl: 3  •  raNITIdine (ZANTAC) 150 MG tablet, Take 1 tablet by mouth 2 (Two) Times a Day., Disp: 180 tablet, Rfl: 3  •  risperiDONE (risperDAL) 1 MG tablet, Take 1 tablet by mouth Every Night., Disp: 901 tablet, Rfl: 3  •  SHINGRIX 50 MCG reconstituted suspension, Inject 50 mcg into the shoulder, thigh, or buttocks 1 (One) Time., Disp: , Rfl:   •  SYMBICORT 160-4.5 MCG/ACT inhaler, INHALE 2 PUFFS BY MOUTH EVERY MORNING AND AT BEDTIME FOR WHEEZING. RINSE MOUTH AFTER USE., Disp: 1 inhaler, Rfl: 5    Allergies:  Codeine; Penicillins; and Sulfa antibiotics    Family Hx:  Family History    Problem Relation Age of Onset   • Cholelithiasis Mother    • Thyroid cancer Sister    • Thyroid disease Sister         Thyroid disorder   • Graves' disease Sister    • Cholelithiasis Maternal Grandmother    • Thyroid cancer Other         Other 2nd degree relative, thyroid   • Diabetes Other    • Hypertension Other         Social History:  Social History     Social History   • Marital status:      Spouse name: N/A   • Number of children: N/A   • Years of education: N/A     Occupational History   • Not on file.     Social History Main Topics   • Smoking status: Former Smoker     Quit date: 1/10/2018   • Smokeless tobacco: Never Used      Comment: SMOKED FOR 20>PLUS YRS   • Alcohol use No   • Drug use: No   • Sexual activity: Defer     Other Topics Concern   • Not on file     Social History Narrative   • No narrative on file       Review of Systems  Review of Systems  General:negative for - chills, fatigue, fever, hot flashes, malaise, night sweats, weight gain or weight loss  Psychological: negative for - anxiety, depression, sleep disturbances or suicidal ideation  Ophthalmic: negative for - blurry vision or loss of vision  ENT: negative for - hearing change, nasal congestion or sore throat  Hematological and Lymphatic: negative for - jaundice  Endocrine: negative for - hair pattern changes, skin changes or temperature intolerance  Respiratory: no cough, shortness of breath, or wheezing  Cardiovascular: no chest pain, edema or dyspnea on exertion  Gastrointestinal: no  Nausea/vomiting, abdominal pain, change in bowel habits, or black or bloody stools  Genito-Urinary: no dysuria, trouble voiding, or hematuria  Musculoskeletal: negative for - joint pain or muscle pain  Neurological: negative for - dizziness, headaches, numbness/tingling or seizures  Dermatological: negative for rash and skin lesion changes      Objective   Objective:     Vitals:    08/23/18 1036   BP: 128/82   Pulse: 62   SpO2: 99%   Weight:  "52.9 kg (116 lb 11.2 oz)   Height: 162.6 cm (64\")       Physical Exam  General Appearance:    Alert, cooperative, no distress, appears stated age   Head:    Normocephalic, without obvious abnormality, atraumatic   Eyes:    PERRL, conjunctiva/corneas clear, EOM's intact   Ears:    Normal external ear canals, both ears   Nose:   Nares normal, septum midline, mucosa normal, no drainage     or sinus tenderness   Throat:   Lips, mucosa, and tongue normal; teeth and gums normal   Neck:   Supple, symmetrical, trachea midline, no adenopathy   Back:     Symmetric, no curvature, ROM normal   Lungs:     Clear to auscultation bilaterally, respirations unlabored   Chest Wall:    No tenderness or deformity    Heart:    Regular rate and rhythm, S1 and S2 normal, no murmur, rub    or gallop   Abdomen:     Soft, non-tender, bowel sounds active all four quadrants,     no masses, no organomegaly   Extremities:   Extremities normal, atraumatic, no cyanosis or edema   Pulses:   2+ and symmetric all extremities   Skin:   Skin color, texture, turgor normal, no rashes or lesions   Lymph nodes:   Cervical, supraclavicular nodes normal   Neurologic:   CNII-XII grossly intact         Lab Results (most recent)     None                  Assessment/Plan   Assessment/Plan:     No diagnosis found.  Problem List Items Addressed This Visit     None          There are no diagnoses linked to this encounter.    No orders of the defined types were placed in this encounter.    No orders of the defined types were placed in this encounter.            Follow-up:     Return in about 3 months (around 11/23/2018) for Recheck.    GOALS:  Goals     • Increase physical activity (pt-stated)            Barriers to goals: Prolonged hospitalization and illness            Preventative:  Health Maintenance   Topic Date Due   • DXA SCAN  04/22/2018   • ZOSTER VACCINE (2 of 2) 06/27/2018   • INFLUENZA VACCINE  08/01/2018   • MEDICARE ANNUAL WELLNESS  05/02/2019   • " MAMMOGRAM  05/15/2020   • COLONOSCOPY  05/22/2024   • TDAP/TD VACCINES (2 - Td) 04/20/2027   • HEPATITIS C SCREENING  Completed   • PAP SMEAR  Excluded       She  reports that she quit smoking about 7 months ago. She has never used smokeless tobacco.    She  reports that she does not drink alcohol.    She  reports that she does not use drugs.    Patient's Body mass index is 20.03 kg/m². BMI is within normal parameters. No follow-up required.         RISK SCORE: 4    Aldo Yeboah Jr., M.D.  PGY2  Family Practice Resident  [address] [place] [address]  Phone: [phone]  Fax: [phone]      This document has been electronically signed by Aldo Yeboah Jr, MD on 08/29/18 6:55 AM

## 2018-08-29 NOTE — PROGRESS NOTES
I have reviewed the notes, assessments, and/or procedures performed. I concur with her/his documentation of Michelle Child.     Sunny Linn, DO

## 2018-09-10 DIAGNOSIS — J44.9 STAGE 3 SEVERE COPD BY GOLD CLASSIFICATION (HCC): ICD-10-CM

## 2018-09-10 RX ORDER — BISACODYL 5 MG/1
TABLET, COATED ORAL
Qty: 30 TABLET | Refills: 3 | Status: SHIPPED | OUTPATIENT
Start: 2018-09-10 | End: 2019-08-19

## 2018-09-10 RX ORDER — FLUOXETINE HYDROCHLORIDE 20 MG/1
CAPSULE ORAL
Qty: 30 CAPSULE | Refills: 3 | Status: SHIPPED | OUTPATIENT
Start: 2018-09-10 | End: 2018-12-24 | Stop reason: SDUPTHER

## 2018-09-10 RX ORDER — FLUOXETINE HYDROCHLORIDE 40 MG/1
CAPSULE ORAL
Qty: 30 CAPSULE | Refills: 3 | Status: SHIPPED | OUTPATIENT
Start: 2018-09-10 | End: 2019-01-02 | Stop reason: SDUPTHER

## 2018-09-10 RX ORDER — RISPERIDONE 1 MG/1
TABLET ORAL
Qty: 30 TABLET | Refills: 3 | Status: SHIPPED | OUTPATIENT
Start: 2018-09-10 | End: 2018-12-24 | Stop reason: SDUPTHER

## 2018-09-10 RX ORDER — ASPIRIN 81 MG/1
TABLET, DELAYED RELEASE ORAL
Qty: 30 TABLET | Refills: 3 | Status: SHIPPED | OUTPATIENT
Start: 2018-09-10 | End: 2019-05-08 | Stop reason: SDUPTHER

## 2018-09-10 RX ORDER — FUROSEMIDE 20 MG/1
TABLET ORAL
Qty: 15 TABLET | Refills: 3 | Status: SHIPPED | OUTPATIENT
Start: 2018-09-10 | End: 2018-12-24 | Stop reason: SDUPTHER

## 2018-09-10 RX ORDER — ONDANSETRON 4 MG/1
TABLET, FILM COATED ORAL
Qty: 30 TABLET | Refills: 3 | Status: SHIPPED | OUTPATIENT
Start: 2018-09-10 | End: 2019-08-19

## 2018-09-10 RX ORDER — BUDESONIDE AND FORMOTEROL FUMARATE DIHYDRATE 160; 4.5 UG/1; UG/1
AEROSOL RESPIRATORY (INHALATION)
Qty: 1 INHALER | Refills: 3 | Status: SHIPPED | OUTPATIENT
Start: 2018-09-10 | End: 2019-01-24 | Stop reason: SDUPTHER

## 2018-09-10 RX ORDER — FOLIC ACID 1 MG/1
TABLET ORAL
Qty: 30 TABLET | Refills: 3 | Status: SHIPPED | OUTPATIENT
Start: 2018-09-10 | End: 2018-12-24 | Stop reason: SDUPTHER

## 2018-09-10 RX ORDER — ACETAMINOPHEN 325 MG/1
TABLET ORAL
Qty: 1 BOTTLE | Refills: 3 | Status: SHIPPED | OUTPATIENT
Start: 2018-09-10 | End: 2019-08-19

## 2018-09-10 RX ORDER — ALBUTEROL SULFATE 90 UG/1
AEROSOL, METERED RESPIRATORY (INHALATION)
Qty: 1 INHALER | Refills: 3 | Status: SHIPPED | OUTPATIENT
Start: 2018-09-10 | End: 2019-05-08

## 2018-09-10 RX ORDER — POTASSIUM CHLORIDE 750 MG/1
TABLET, FILM COATED, EXTENDED RELEASE ORAL
Qty: 60 TABLET | Refills: 3 | Status: SHIPPED | OUTPATIENT
Start: 2018-09-10 | End: 2019-01-24 | Stop reason: SDUPTHER

## 2018-09-10 RX ORDER — RANITIDINE 150 MG/1
TABLET ORAL
Qty: 60 TABLET | Refills: 3 | Status: SHIPPED | OUTPATIENT
Start: 2018-09-10 | End: 2019-01-24 | Stop reason: SDUPTHER

## 2018-09-10 RX ORDER — LOSARTAN POTASSIUM 100 MG/1
TABLET ORAL
Qty: 30 TABLET | Refills: 3 | Status: SHIPPED | OUTPATIENT
Start: 2018-09-10 | End: 2018-12-24 | Stop reason: SDUPTHER

## 2018-09-10 RX ORDER — PRAVASTATIN SODIUM 40 MG
TABLET ORAL
Qty: 30 TABLET | Refills: 3 | Status: SHIPPED | OUTPATIENT
Start: 2018-09-10 | End: 2018-10-02 | Stop reason: SDUPTHER

## 2018-09-10 RX ORDER — MELOXICAM 15 MG/1
TABLET ORAL
Qty: 30 TABLET | Refills: 3 | Status: SHIPPED | OUTPATIENT
Start: 2018-09-10 | End: 2019-01-24 | Stop reason: SDUPTHER

## 2018-09-10 RX ORDER — FERROUS SULFATE 325(65) MG
TABLET ORAL
Qty: 60 TABLET | Refills: 3 | Status: SHIPPED | OUTPATIENT
Start: 2018-09-10 | End: 2019-01-02 | Stop reason: SDUPTHER

## 2018-09-28 ENCOUNTER — EPISODE CHANGES (OUTPATIENT)
Dept: CASE MANAGEMENT | Facility: OTHER | Age: 63
End: 2018-09-28

## 2018-10-02 RX ORDER — PRAVASTATIN SODIUM 40 MG
40 TABLET ORAL NIGHTLY
Qty: 90 TABLET | Refills: 3 | Status: SHIPPED | OUTPATIENT
Start: 2018-10-02 | End: 2019-05-28 | Stop reason: SDUPTHER

## 2018-10-03 ENCOUNTER — PATIENT OUTREACH (OUTPATIENT)
Dept: CASE MANAGEMENT | Facility: OTHER | Age: 63
End: 2018-10-03

## 2018-10-03 NOTE — OUTREACH NOTE
10/3/18 Pt stated she is still followed by home care. Agreeable to call back from Care Advisor in a few weeks after home care completion.

## 2018-10-11 ENCOUNTER — EPISODE CHANGES (OUTPATIENT)
Dept: CASE MANAGEMENT | Facility: OTHER | Age: 63
End: 2018-10-11

## 2018-11-25 VITALS
HEART RATE: 78 BPM | SYSTOLIC BLOOD PRESSURE: 138 MMHG | RESPIRATION RATE: 16 BRPM | DIASTOLIC BLOOD PRESSURE: 89 MMHG | OXYGEN SATURATION: 98 %

## 2018-12-24 RX ORDER — FUROSEMIDE 20 MG/1
TABLET ORAL
Qty: 15 TABLET | Refills: 0 | Status: SHIPPED | OUTPATIENT
Start: 2018-12-24 | End: 2019-01-24 | Stop reason: SDUPTHER

## 2018-12-24 RX ORDER — FOLIC ACID 1 MG/1
TABLET ORAL
Qty: 30 TABLET | Refills: 0 | Status: SHIPPED | OUTPATIENT
Start: 2018-12-24 | End: 2019-01-24 | Stop reason: SDUPTHER

## 2018-12-24 RX ORDER — FLUOXETINE HYDROCHLORIDE 20 MG/1
CAPSULE ORAL
Qty: 30 CAPSULE | Refills: 0 | Status: SHIPPED | OUTPATIENT
Start: 2018-12-24 | End: 2019-01-24 | Stop reason: SDUPTHER

## 2018-12-24 RX ORDER — LOSARTAN POTASSIUM 100 MG/1
TABLET ORAL
Qty: 30 TABLET | Refills: 0 | Status: SHIPPED | OUTPATIENT
Start: 2018-12-24 | End: 2019-01-24 | Stop reason: SDUPTHER

## 2018-12-24 RX ORDER — RISPERIDONE 1 MG/1
TABLET ORAL
Qty: 30 TABLET | Refills: 0 | Status: SHIPPED | OUTPATIENT
Start: 2018-12-24 | End: 2019-01-24 | Stop reason: SDUPTHER

## 2019-01-02 RX ORDER — PNV NO.95/FERROUS FUM/FOLIC AC 28MG-0.8MG
TABLET ORAL
Qty: 60 TABLET | Refills: 0 | Status: SHIPPED | OUTPATIENT
Start: 2019-01-02 | End: 2019-01-24 | Stop reason: SDUPTHER

## 2019-01-02 RX ORDER — FLUOXETINE HYDROCHLORIDE 40 MG/1
CAPSULE ORAL
Qty: 30 CAPSULE | Refills: 3 | Status: SHIPPED | OUTPATIENT
Start: 2019-01-02 | End: 2019-05-08 | Stop reason: SDUPTHER

## 2019-01-24 DIAGNOSIS — J44.9 STAGE 3 SEVERE COPD BY GOLD CLASSIFICATION (HCC): ICD-10-CM

## 2019-01-24 RX ORDER — BUDESONIDE AND FORMOTEROL FUMARATE DIHYDRATE 160; 4.5 UG/1; UG/1
AEROSOL RESPIRATORY (INHALATION)
Qty: 1 INHALER | Refills: 0 | Status: SHIPPED | OUTPATIENT
Start: 2019-01-24 | End: 2019-03-05 | Stop reason: SDUPTHER

## 2019-01-24 RX ORDER — FLUOXETINE HYDROCHLORIDE 20 MG/1
CAPSULE ORAL
Qty: 30 CAPSULE | Refills: 0 | Status: SHIPPED | OUTPATIENT
Start: 2019-01-24 | End: 2019-03-05 | Stop reason: SDUPTHER

## 2019-01-24 RX ORDER — FOLIC ACID 1 MG/1
TABLET ORAL
Qty: 30 TABLET | Refills: 0 | Status: SHIPPED | OUTPATIENT
Start: 2019-01-24 | End: 2019-03-05 | Stop reason: SDUPTHER

## 2019-01-24 RX ORDER — POTASSIUM CHLORIDE 750 MG/1
TABLET, FILM COATED, EXTENDED RELEASE ORAL
Qty: 60 TABLET | Refills: 3 | Status: SHIPPED | OUTPATIENT
Start: 2019-01-24 | End: 2019-05-08 | Stop reason: SDUPTHER

## 2019-01-24 RX ORDER — FUROSEMIDE 20 MG/1
TABLET ORAL
Qty: 15 TABLET | Refills: 0 | Status: SHIPPED | OUTPATIENT
Start: 2019-01-24 | End: 2019-03-05 | Stop reason: SDUPTHER

## 2019-01-24 RX ORDER — MELOXICAM 15 MG/1
TABLET ORAL
Qty: 30 TABLET | Refills: 3 | Status: SHIPPED | OUTPATIENT
Start: 2019-01-24 | End: 2019-06-05 | Stop reason: SDUPTHER

## 2019-01-24 RX ORDER — FERROUS SULFATE 325(65) MG
TABLET ORAL
Qty: 60 TABLET | Refills: 0 | Status: SHIPPED | OUTPATIENT
Start: 2019-01-24 | End: 2019-03-05 | Stop reason: SDUPTHER

## 2019-01-24 RX ORDER — RISPERIDONE 1 MG/1
TABLET ORAL
Qty: 30 TABLET | Refills: 0 | Status: SHIPPED | OUTPATIENT
Start: 2019-01-24 | End: 2019-03-05 | Stop reason: SDUPTHER

## 2019-01-24 RX ORDER — ASPIRIN 81 MG/1
TABLET, DELAYED RELEASE ORAL
Qty: 30 TABLET | Refills: 0 | Status: SHIPPED | OUTPATIENT
Start: 2019-01-24 | End: 2019-03-05 | Stop reason: SDUPTHER

## 2019-01-24 RX ORDER — LOSARTAN POTASSIUM 100 MG/1
TABLET ORAL
Qty: 30 TABLET | Refills: 0 | Status: SHIPPED | OUTPATIENT
Start: 2019-01-24 | End: 2019-03-05 | Stop reason: SDUPTHER

## 2019-01-24 RX ORDER — RANITIDINE 150 MG/1
TABLET ORAL
Qty: 60 TABLET | Refills: 3 | Status: SHIPPED | OUTPATIENT
Start: 2019-01-24 | End: 2019-05-28 | Stop reason: SDUPTHER

## 2019-03-05 DIAGNOSIS — J44.9 STAGE 3 SEVERE COPD BY GOLD CLASSIFICATION (HCC): ICD-10-CM

## 2019-03-05 RX ORDER — FLUOXETINE HYDROCHLORIDE 20 MG/1
CAPSULE ORAL
Qty: 30 CAPSULE | Refills: 0 | Status: SHIPPED | OUTPATIENT
Start: 2019-03-05 | End: 2019-03-27 | Stop reason: SDUPTHER

## 2019-03-05 RX ORDER — FOLIC ACID 1 MG/1
TABLET ORAL
Qty: 30 TABLET | Refills: 0 | Status: SHIPPED | OUTPATIENT
Start: 2019-03-05 | End: 2019-05-08 | Stop reason: SDUPTHER

## 2019-03-05 RX ORDER — ASPIRIN 81 MG/1
TABLET, DELAYED RELEASE ORAL
Qty: 30 TABLET | Refills: 0 | Status: SHIPPED | OUTPATIENT
Start: 2019-03-05 | End: 2019-03-27 | Stop reason: SDUPTHER

## 2019-03-05 RX ORDER — RISPERIDONE 1 MG/1
TABLET ORAL
Qty: 30 TABLET | Refills: 0 | Status: SHIPPED | OUTPATIENT
Start: 2019-03-05 | End: 2019-03-27 | Stop reason: SDUPTHER

## 2019-03-05 RX ORDER — BUDESONIDE AND FORMOTEROL FUMARATE DIHYDRATE 160; 4.5 UG/1; UG/1
AEROSOL RESPIRATORY (INHALATION)
Qty: 1 INHALER | Refills: 1 | Status: SHIPPED | OUTPATIENT
Start: 2019-03-05 | End: 2019-05-08 | Stop reason: SDUPTHER

## 2019-03-05 RX ORDER — FUROSEMIDE 20 MG/1
TABLET ORAL
Qty: 15 TABLET | Refills: 0 | Status: SHIPPED | OUTPATIENT
Start: 2019-03-05 | End: 2019-03-27 | Stop reason: SDUPTHER

## 2019-03-05 RX ORDER — PNV NO.95/FERROUS FUM/FOLIC AC 28MG-0.8MG
TABLET ORAL
Qty: 60 EACH | Refills: 1 | Status: SHIPPED | OUTPATIENT
Start: 2019-03-05 | End: 2019-05-08 | Stop reason: SDUPTHER

## 2019-03-05 RX ORDER — LOSARTAN POTASSIUM 100 MG/1
TABLET ORAL
Qty: 30 TABLET | Refills: 0 | Status: SHIPPED | OUTPATIENT
Start: 2019-03-05 | End: 2019-03-27 | Stop reason: SDUPTHER

## 2019-03-22 NOTE — PLAN OF CARE
Patient removed own PIV. Refusing placement of new PIV. Will notify MD.   Problem: Patient Care Overview  Goal: Plan of Care Review  Outcome: Ongoing (interventions implemented as appropriate)   07/07/18 1524 07/07/18 1530   Coping/Psychosocial   Plan of Care Reviewed With patient --    Plan of Care Review   Progress improving --    OTHER   Outcome Summary --  Pt ariel tx well, but no goals met. Pt t/f sup-sit with min/mod Ax1, sit-stand-sit w/ min Ax1. Pt amb 22' with [facility] with min Ax1 w/ vc's for big steps. Pt has difficulty with gt due to severe shuffeling of feet.

## 2019-03-27 RX ORDER — FUROSEMIDE 20 MG/1
TABLET ORAL
Qty: 15 TABLET | Refills: 0 | Status: SHIPPED | OUTPATIENT
Start: 2019-03-27 | End: 2019-05-08 | Stop reason: SDUPTHER

## 2019-03-27 RX ORDER — FLUOXETINE HYDROCHLORIDE 20 MG/1
CAPSULE ORAL
Qty: 30 CAPSULE | Refills: 0 | Status: SHIPPED | OUTPATIENT
Start: 2019-03-27 | End: 2019-05-08 | Stop reason: SDUPTHER

## 2019-03-27 RX ORDER — ASPIRIN 81 MG/1
TABLET, DELAYED RELEASE ORAL
Qty: 30 TABLET | Refills: 0 | Status: SHIPPED | OUTPATIENT
Start: 2019-03-27 | End: 2019-05-08 | Stop reason: SDUPTHER

## 2019-03-27 RX ORDER — LOSARTAN POTASSIUM 100 MG/1
TABLET ORAL
Qty: 30 TABLET | Refills: 0 | Status: SHIPPED | OUTPATIENT
Start: 2019-03-27 | End: 2019-05-08 | Stop reason: SDUPTHER

## 2019-03-27 RX ORDER — RISPERIDONE 1 MG/1
TABLET ORAL
Qty: 30 TABLET | Refills: 0 | Status: SHIPPED | OUTPATIENT
Start: 2019-03-27 | End: 2019-05-08 | Stop reason: SDUPTHER

## 2019-05-06 DIAGNOSIS — J44.9 STAGE 3 SEVERE COPD BY GOLD CLASSIFICATION (HCC): ICD-10-CM

## 2019-05-06 RX ORDER — FUROSEMIDE 20 MG/1
TABLET ORAL
Qty: 15 TABLET | Refills: 0 | Status: CANCELLED | OUTPATIENT
Start: 2019-05-06

## 2019-05-06 RX ORDER — RISPERIDONE 1 MG/1
TABLET ORAL
Qty: 30 TABLET | Refills: 0 | Status: CANCELLED | OUTPATIENT
Start: 2019-05-06

## 2019-05-06 RX ORDER — FLUOXETINE HYDROCHLORIDE 20 MG/1
CAPSULE ORAL
Qty: 30 CAPSULE | Refills: 0 | Status: CANCELLED | OUTPATIENT
Start: 2019-05-06

## 2019-05-06 RX ORDER — FLUOXETINE HYDROCHLORIDE 40 MG/1
CAPSULE ORAL
Qty: 30 CAPSULE | Refills: 3 | Status: CANCELLED | OUTPATIENT
Start: 2019-05-06

## 2019-05-06 RX ORDER — BUDESONIDE AND FORMOTEROL FUMARATE DIHYDRATE 160; 4.5 UG/1; UG/1
AEROSOL RESPIRATORY (INHALATION)
Status: CANCELLED | OUTPATIENT
Start: 2019-05-06

## 2019-05-06 RX ORDER — LOSARTAN POTASSIUM 100 MG/1
TABLET ORAL
Qty: 30 TABLET | Refills: 0 | Status: CANCELLED | OUTPATIENT
Start: 2019-05-06

## 2019-05-08 ENCOUNTER — APPOINTMENT (OUTPATIENT)
Dept: LAB | Facility: HOSPITAL | Age: 64
End: 2019-05-08

## 2019-05-08 ENCOUNTER — TELEPHONE (OUTPATIENT)
Dept: FAMILY MEDICINE CLINIC | Facility: CLINIC | Age: 64
End: 2019-05-08

## 2019-05-08 ENCOUNTER — OFFICE VISIT (OUTPATIENT)
Dept: FAMILY MEDICINE CLINIC | Facility: CLINIC | Age: 64
End: 2019-05-08

## 2019-05-08 VITALS
SYSTOLIC BLOOD PRESSURE: 150 MMHG | HEART RATE: 82 BPM | WEIGHT: 135 LBS | OXYGEN SATURATION: 96 % | BODY MASS INDEX: 23.05 KG/M2 | HEIGHT: 64 IN | DIASTOLIC BLOOD PRESSURE: 70 MMHG

## 2019-05-08 DIAGNOSIS — D50.9 IRON DEFICIENCY ANEMIA, UNSPECIFIED IRON DEFICIENCY ANEMIA TYPE: ICD-10-CM

## 2019-05-08 DIAGNOSIS — I27.20 PULMONARY HYPERTENSION (HCC): ICD-10-CM

## 2019-05-08 DIAGNOSIS — Z78.0 POST-MENOPAUSAL: ICD-10-CM

## 2019-05-08 DIAGNOSIS — I05.0 RHEUMATIC MITRAL STENOSIS: ICD-10-CM

## 2019-05-08 DIAGNOSIS — F17.201 TOBACCO ABUSE, IN REMISSION: ICD-10-CM

## 2019-05-08 DIAGNOSIS — Z23 NEED FOR PNEUMOCOCCAL VACCINE: ICD-10-CM

## 2019-05-08 DIAGNOSIS — J44.9 STAGE 3 SEVERE COPD BY GOLD CLASSIFICATION (HCC): ICD-10-CM

## 2019-05-08 DIAGNOSIS — Z13.220 ENCOUNTER FOR SCREENING FOR LIPOID DISORDERS: ICD-10-CM

## 2019-05-08 DIAGNOSIS — I10 ESSENTIAL HYPERTENSION: Primary | ICD-10-CM

## 2019-05-08 LAB
ALBUMIN SERPL-MCNC: 4.6 G/DL (ref 3.5–5.2)
ALBUMIN/GLOB SERPL: 1.8 G/DL
ALP SERPL-CCNC: 100 U/L (ref 39–117)
ALT SERPL W P-5'-P-CCNC: 14 U/L (ref 1–33)
ANION GAP SERPL CALCULATED.3IONS-SCNC: 13.2 MMOL/L
AST SERPL-CCNC: 22 U/L (ref 1–32)
BILIRUB SERPL-MCNC: 0.5 MG/DL (ref 0.2–1.2)
BUN BLD-MCNC: 12 MG/DL (ref 8–23)
BUN/CREAT SERPL: 10.9 (ref 7–25)
CALCIUM SPEC-SCNC: 9.3 MG/DL (ref 8.6–10.5)
CHLORIDE SERPL-SCNC: 96 MMOL/L (ref 98–107)
CHOLEST SERPL-MCNC: 152 MG/DL (ref 0–200)
CO2 SERPL-SCNC: 24.8 MMOL/L (ref 22–29)
CREAT BLD-MCNC: 1.1 MG/DL (ref 0.57–1)
DEPRECATED RDW RBC AUTO: 44.4 FL (ref 37–54)
ERYTHROCYTE [DISTWIDTH] IN BLOOD BY AUTOMATED COUNT: 12.8 % (ref 12.3–15.4)
GFR SERPL CREATININE-BSD FRML MDRD: 50 ML/MIN/1.73
GLOBULIN UR ELPH-MCNC: 2.5 GM/DL
GLUCOSE BLD-MCNC: 91 MG/DL (ref 65–99)
HCT VFR BLD AUTO: 37.9 % (ref 34–46.6)
HDLC SERPL-MCNC: 101 MG/DL (ref 40–60)
HGB BLD-MCNC: 12.1 G/DL (ref 12–15.9)
LDLC SERPL CALC-MCNC: 37 MG/DL (ref 0–100)
LDLC/HDLC SERPL: 0.37 {RATIO}
MCH RBC QN AUTO: 30.2 PG (ref 26.6–33)
MCHC RBC AUTO-ENTMCNC: 31.9 G/DL (ref 31.5–35.7)
MCV RBC AUTO: 94.5 FL (ref 79–97)
PLATELET # BLD AUTO: 231 10*3/MM3 (ref 140–450)
PMV BLD AUTO: 11.5 FL (ref 6–12)
POTASSIUM BLD-SCNC: 3.6 MMOL/L (ref 3.5–5.2)
PROT SERPL-MCNC: 7.1 G/DL (ref 6–8.5)
RBC # BLD AUTO: 4.01 10*6/MM3 (ref 3.77–5.28)
SODIUM BLD-SCNC: 134 MMOL/L (ref 136–145)
TRIGL SERPL-MCNC: 69 MG/DL (ref 0–150)
VLDLC SERPL-MCNC: 13.8 MG/DL (ref 5–40)
WBC NRBC COR # BLD: 10.48 10*3/MM3 (ref 3.4–10.8)

## 2019-05-08 PROCEDURE — 99214 OFFICE O/P EST MOD 30 MIN: CPT | Performed by: FAMILY MEDICINE

## 2019-05-08 PROCEDURE — 85027 COMPLETE CBC AUTOMATED: CPT | Performed by: FAMILY MEDICINE

## 2019-05-08 PROCEDURE — 80053 COMPREHEN METABOLIC PANEL: CPT | Performed by: FAMILY MEDICINE

## 2019-05-08 PROCEDURE — 36415 COLL VENOUS BLD VENIPUNCTURE: CPT | Performed by: FAMILY MEDICINE

## 2019-05-08 PROCEDURE — 80061 LIPID PANEL: CPT | Performed by: FAMILY MEDICINE

## 2019-05-08 PROCEDURE — 90670 PCV13 VACCINE IM: CPT | Performed by: FAMILY MEDICINE

## 2019-05-08 PROCEDURE — 82570 ASSAY OF URINE CREATININE: CPT | Performed by: FAMILY MEDICINE

## 2019-05-08 PROCEDURE — 82043 UR ALBUMIN QUANTITATIVE: CPT | Performed by: FAMILY MEDICINE

## 2019-05-08 PROCEDURE — G0009 ADMIN PNEUMOCOCCAL VACCINE: HCPCS | Performed by: FAMILY MEDICINE

## 2019-05-08 RX ORDER — RISPERIDONE 1 MG/1
1 TABLET ORAL
Qty: 90 TABLET | Refills: 3 | Status: SHIPPED | OUTPATIENT
Start: 2019-05-08 | End: 2019-08-19 | Stop reason: SDUPTHER

## 2019-05-08 RX ORDER — FOLIC ACID 1 MG/1
TABLET ORAL
Qty: 30 TABLET | Refills: 2 | OUTPATIENT
Start: 2019-05-08

## 2019-05-08 RX ORDER — FLUOXETINE HYDROCHLORIDE 20 MG/1
20 CAPSULE ORAL DAILY
Qty: 90 CAPSULE | Refills: 3 | Status: SHIPPED | OUTPATIENT
Start: 2019-05-08 | End: 2019-08-19 | Stop reason: SDUPTHER

## 2019-05-08 RX ORDER — ASPIRIN 81 MG/1
TABLET, DELAYED RELEASE ORAL
Qty: 30 TABLET | Refills: 0 | OUTPATIENT
Start: 2019-05-08

## 2019-05-08 RX ORDER — FERROUS SULFATE TAB EC 324 MG (65 MG FE EQUIVALENT) 324 (65 FE) MG
324 TABLET DELAYED RESPONSE ORAL 2 TIMES DAILY WITH MEALS
Qty: 180 TABLET | Refills: 3
Start: 2019-05-08 | End: 2019-08-19 | Stop reason: SDUPTHER

## 2019-05-08 RX ORDER — LOSARTAN POTASSIUM 100 MG/1
100 TABLET ORAL DAILY
Qty: 90 TABLET | Refills: 3 | Status: SHIPPED | OUTPATIENT
Start: 2019-05-08 | End: 2019-06-14 | Stop reason: SDUPTHER

## 2019-05-08 RX ORDER — FERROUS SULFATE TAB EC 324 MG (65 MG FE EQUIVALENT) 324 (65 FE) MG
324 TABLET DELAYED RESPONSE ORAL 2 TIMES DAILY WITH MEALS
Qty: 180 TABLET | Refills: 3
Start: 2019-05-08 | End: 2019-05-08 | Stop reason: SDUPTHER

## 2019-05-08 RX ORDER — FLUOXETINE HYDROCHLORIDE 40 MG/1
40 CAPSULE ORAL DAILY
Qty: 90 CAPSULE | Refills: 3 | Status: SHIPPED | OUTPATIENT
Start: 2019-05-08 | End: 2019-08-19 | Stop reason: SDUPTHER

## 2019-05-08 RX ORDER — BUDESONIDE AND FORMOTEROL FUMARATE DIHYDRATE 160; 4.5 UG/1; UG/1
2 AEROSOL RESPIRATORY (INHALATION)
Qty: 1 INHALER | Refills: 6 | Status: SHIPPED | OUTPATIENT
Start: 2019-05-08 | End: 2019-06-14 | Stop reason: SDUPTHER

## 2019-05-08 RX ORDER — PNV NO.95/FERROUS FUM/FOLIC AC 28MG-0.8MG
TABLET ORAL
OUTPATIENT
Start: 2019-05-08

## 2019-05-08 RX ORDER — FUROSEMIDE 20 MG/1
10 TABLET ORAL DAILY
Qty: 45 TABLET | Refills: 3 | Status: SHIPPED | OUTPATIENT
Start: 2019-05-08 | End: 2019-08-19 | Stop reason: SDUPTHER

## 2019-05-08 RX ORDER — ASPIRIN 81 MG/1
81 TABLET, CHEWABLE ORAL DAILY
Qty: 30 TABLET | Refills: 11 | Status: SHIPPED | OUTPATIENT
Start: 2019-05-08 | End: 2019-08-19

## 2019-05-08 RX ORDER — FOLIC ACID 1 MG/1
1 TABLET ORAL DAILY
Qty: 90 TABLET | Refills: 3 | Status: SHIPPED | OUTPATIENT
Start: 2019-05-08 | End: 2019-08-19 | Stop reason: SDUPTHER

## 2019-05-08 NOTE — TELEPHONE ENCOUNTER
Fadumo with [facility] called and said that Dr. Berry sent in 8 prescriptions today but Ferrous sulfate for some reason did not come through. She asked that it be resent there.       Please call Fadumo at [phone] with any questions, thanks!

## 2019-05-09 LAB
ALBUMIN UR-MCNC: 1.7 MG/L
CREAT UR-MCNC: 64.3 MG/DL
MICROALBUMIN/CREAT UR: 26.4 MG/G

## 2019-05-12 PROBLEM — R60.9 PERIPHERAL EDEMA: Status: RESOLVED | Noted: 2018-07-02 | Resolved: 2019-05-12

## 2019-05-12 PROBLEM — R53.1 WEAKNESS: Status: RESOLVED | Noted: 2018-07-02 | Resolved: 2019-05-12

## 2019-05-12 PROBLEM — G47.00 INSOMNIA: Status: RESOLVED | Noted: 2018-07-02 | Resolved: 2019-05-12

## 2019-05-12 PROBLEM — R29.6 RECURRENT FALLS WHILE WALKING: Status: RESOLVED | Noted: 2018-07-02 | Resolved: 2019-05-12

## 2019-05-12 PROBLEM — M79.89 LOCALIZED SWELLING OF LOWER EXTREMITY: Status: RESOLVED | Noted: 2018-07-02 | Resolved: 2019-05-12

## 2019-05-12 PROBLEM — I50.31 ACUTE DIASTOLIC CHF (CONGESTIVE HEART FAILURE) (HCC): Status: RESOLVED | Noted: 2018-01-15 | Resolved: 2019-05-12

## 2019-05-12 NOTE — PROGRESS NOTES
Subjective:     Michelle Child is a 63 y.o. female   Hypertension  Patient is here for follow-up of elevated blood pressure. She is not exercising and is adherent to a low-salt diet. Blood pressure is well controlled at home. Cardiac symptoms: none. Patient denies chest pain, exertional chest pressure/discomfort and palpitations. Cardiovascular risk factors: hypertension, sedentary lifestyle and smoking/ tobacco exposure. Use of agents associated with hypertension: none. History of target organ damage: none. She ran out of her medication a few days ago. She is getting them pill packed which seems to help her with compliance of her medication.    COPD  Patient complains of no symptoms at this time since she stopped smoking about 16 months ago. Symptoms began several years ago. Patient uses 1 pillows at night. Patient currently is not on home oxygen therapy.. Respiratory history: frequent episodes of bronchitis and COPD. She continues to be compliant with her inhalers. She is out walking around her yard now without shortness of breath.     Patient does have mitral valve prolapse with moderate regurgitation and pulmonary hypertension.  Her last echocardiogram was in July 2018.  She has not followed up with cardiology since that time.   Past Medical Hx:   Past Medical History:   Diagnosis Date   • Alkaline phosphatase raised    • Astigmatism    • Bilateral pseudophakia    • Coronary arteriosclerosis    • Depressive disorder    • Edema of lower extremity    • Encounter for general adult medical examination with abnormal findings    • Essential hypertension    • Gastroesophageal reflux disease    • H/O bone density study     DXA BONE DENSITY AXIAL    04/22/2016   • H/O screening mammography     SCREENING MAMMOGRAPHY     04/25/2016   • Hx of screening mammography     x3; declined screening, understands risks and benefits and still declines      07/24/2015   • Influenza vaccine administered     INFLUENZA IMMUN ADMIN  OR PREV RECV'D   x4       04/01/2016   • Insomnia    • Iron deficiency    • Migraine    • Tobacco dependence     continuous         Past Surgical Hx:  Past Surgical History:   Procedure Laterality Date   • APPENDECTOMY     • AUGMENTATION MAMMAPLASTY     • BREAST IMPLANT SURGERY     • CATARACT EXTRACTION WITH INTRAOCULAR LENS IMPLANT  12/17/2003    Phacoemulsification of a cataract with intraocular lens implant of rigt eye, Nixon model SA 60-AT; serial # [id number];20.5 diopter   • CHOLECYSTECTOMY  08/06/2007    Laparoscopic cholecystectomy. Symptomatic gallstones   • COLONOSCOPY  10/15/2013    declined stool cards and colonscopy   • ENDOSCOPY AND COLONOSCOPY  05/22/2014    Internal & external hemorrhoids found.   • ENDOSCOPY W/ PEG TUBE PLACEMENT  05/22/2014    EGD w/ tube: Normal esopahgus. Gastritis in stomach. Biopsy taken. Normal duodenum. Biopsy taken.   • INJECTION OF MEDICATION  07/24/2013    Inj(s) Tend-Sheath, Ligament, Single   • INJECTION OF MEDICATION  11/17/2014    Kenalog x6   • INJECTION OF MEDICATION  04/01/2016    PNEUMOC VAC/ADMIN/RCVD    • INJECTION OF MEDICATION  12/05/2014    Toradol   • OTHER SURGICAL HISTORY  01/19/2014    Drain/Inject Major Joint   x2   • PAP SMEAR  10/06/2011   • PARTIAL HYSTERECTOMY      Partial hyst    • TONSILLECTOMY     • TUBAL ABDOMINAL LIGATION         Health Maintenance:  Health Maintenance   Topic Date Due   • DXA SCAN  04/22/2018   • ZOSTER VACCINE (2 of 2) 06/27/2018   • MEDICARE ANNUAL WELLNESS  05/02/2019   • INFLUENZA VACCINE  08/01/2019   • PNEUMOCOCCAL VACCINES (65+ LOW/MEDIUM RISK) (2 of 2 - PPSV23) 05/08/2020   • MAMMOGRAM  05/15/2020   • COLONOSCOPY  05/22/2024   • TDAP/TD VACCINES (2 - Td) 04/20/2027   • HEPATITIS C SCREENING  Completed   • PAP SMEAR  Discontinued       Current Meds:    Current Outpatient Medications:   •  aspirin 81 MG chewable tablet, Chew 1 tablet Daily., Disp: 30 tablet, Rfl: 11  •  FLUoxetine (PROzac) 20 MG capsule, Take 1 capsule by  mouth Daily. for depression, Disp: 90 capsule, Rfl: 3  •  FLUoxetine (PROzac) 40 MG capsule, Take 1 capsule by mouth Daily. for depression, Disp: 90 capsule, Rfl: 3  •  folic acid (FOLVITE) 1 MG tablet, Take 1 tablet by mouth Daily., Disp: 90 tablet, Rfl: 3  •  furosemide (LASIX) 20 MG tablet, Take 0.5 tablets by mouth Daily. for fluid retention, Disp: 45 tablet, Rfl: 3  •  HM LAXATIVE 5 MG EC tablet, TAKE ONE TABLET BY MOUTH ONCE DAILY AS NEEDED FOR CONSTIPATION, Disp: 30 tablet, Rfl: 3  •  HM PAIN RELIEVER 325 MG tablet, TAKE TWO TABLETS BY MOUTH EVERY 4 HOURS AS NEEDED FOR MILD PAIN, Disp: 1 bottle, Rfl: 3  •  losartan (COZAAR) 100 MG tablet, Take 1 tablet by mouth Daily. For heart, Disp: 90 tablet, Rfl: 3  •  meloxicam (MOBIC) 15 MG tablet, TAKE ONE TABLET BY MOUTH ONCE DAILY FOR MILD PAIN, Disp: 30 tablet, Rfl: 3  •  ondansetron (ZOFRAN) 4 MG tablet, TAKE ONE TABLET BY MOUTH EVERY 6 HOURS AS NEEDED FOR NAUSEA OR VOMITING, Disp: 30 tablet, Rfl: 3  •  pravastatin (PRAVACHOL) 40 MG tablet, Take 1 tablet by mouth Every Night., Disp: 90 tablet, Rfl: 3  •  raNITIdine (ZANTAC) 150 MG tablet, TAKE ONE TABLET BY MOUTH TWICE A DAY FOR GERD, Disp: 60 tablet, Rfl: 3  •  risperiDONE (risperDAL) 1 MG tablet, Take 1 tablet by mouth every night at bedtime. for insomnia, Disp: 90 tablet, Rfl: 3  •  budesonide-formoterol (SYMBICORT) 160-4.5 MCG/ACT inhaler, Inhale 2 puffs 2 (Two) Times a Day., Disp: 1 inhaler, Rfl: 6  •  ferrous sulfate 324 (65 Fe) MG tablet delayed-release EC tablet, Take 1 tablet by mouth 2 (Two) Times a Day With Meals., Disp: 180 tablet, Rfl: 3  •  hydrocortisone 1 % cream, Apply  topically 3 (Three) Times a Day., Disp: 60 g, Rfl: 5  •  potassium chloride (MICRO-K) 10 MEQ CR capsule, Take 2 capsules by mouth Daily., Disp: , Rfl:     Allergies:  Codeine; Penicillins; and Sulfa antibiotics    Family Hx:  Family History   Problem Relation Age of Onset   • Cholelithiasis Mother    • Thyroid cancer Sister    •  "Thyroid disease Sister         Thyroid disorder   • Graves' disease Sister    • Cholelithiasis Maternal Grandmother    • Thyroid cancer Other         Other 2nd degree relative, thyroid   • Diabetes Other    • Hypertension Other         Social History:  Social History     Socioeconomic History   • Marital status:      Spouse name: Not on file   • Number of children: Not on file   • Years of education: Not on file   • Highest education level: Not on file   Tobacco Use   • Smoking status: Former Smoker     Last attempt to quit: 1/10/2018     Years since quittin.3   • Smokeless tobacco: Never Used   • Tobacco comment: SMOKED FOR 20>PLUS YRS   Substance and Sexual Activity   • Alcohol use: No   • Drug use: No   • Sexual activity: Defer       Review of Systems  Review of Systems   Constitutional: Negative for fatigue and fever.   HENT: Negative.  Negative for ear pain and sore throat.    Eyes: Negative.  Negative for pain and visual disturbance.   Respiratory: Negative.  Negative for cough and shortness of breath.    Cardiovascular: Negative for chest pain and palpitations.   Gastrointestinal: Negative for abdominal pain and nausea.   Endocrine: Negative.    Genitourinary: Negative for dysuria.   Musculoskeletal: Negative for arthralgias.   Skin: Negative for rash.   Allergic/Immunologic: Negative.    Neurological: Negative for dizziness and headaches.       Objective:     /70 (BP Location: Left arm, Patient Position: Sitting, Cuff Size: Adult)   Pulse 82   Ht 163.6 cm (64.39\")   Wt 61.2 kg (135 lb)   SpO2 96%   BMI 22.89 kg/m²       Physical Exam   Constitutional: She is oriented to person, place, and time. She appears well-developed and well-nourished.   HENT:   Head: Normocephalic and atraumatic.   Right Ear: Tympanic membrane, external ear and ear canal normal. Decreased hearing is noted.   Left Ear: Tympanic membrane, external ear and ear canal normal. Decreased hearing is noted.   Nose: Nose " normal.   Mouth/Throat: Oropharynx is clear and moist.   Eyes: Conjunctivae and EOM are normal. Pupils are equal, round, and reactive to light.   Neck: Normal range of motion. Neck supple.   Cardiovascular: Normal rate and regular rhythm.   Murmur heard.   Systolic murmur is present.  Pulmonary/Chest: Effort normal and breath sounds normal.   Abdominal: Soft. Bowel sounds are normal. She exhibits no distension. There is no tenderness.   Musculoskeletal: Normal range of motion.   Neurological: She is alert and oriented to person, place, and time.   Skin: Skin is warm and dry.   Psychiatric: She has a normal mood and affect. Her speech is normal and behavior is normal. Judgment and thought content normal. Cognition and memory are normal.          Assessment:       1. Essential hypertension    2. Stage 3 severe COPD by GOLD classification (CMS/[facility])    3. Iron deficiency anemia, unspecified iron deficiency anemia type    4. Encounter for screening for lipoid disorders    5. Need for pneumococcal vaccine    6. Tobacco abuse, in remission    7. Rheumatic mitral stenosis    8. Pulmonary hypertension (CMS/[facility])    9. Post-menopausal         Plan:     1.  Rx changes: none  2.  Depression Plan: Recommended medication management  3.  Interventions within this session: yes  4.  Follow up plan: Follow up if symptoms persist or worsen and Recheck if not improving   5.  Follow up: 3 months for medicare wellness viist.   6. Will need repeat echocardiogram in July 2019. Ordered today  7. Labwork before leaving today.   8. Dexa scan ordered  9. Prevnar vaccination today.   Goals        Lifestyle    • Increase physical activity (pt-stated)      Barriers to goals: Prolonged hospitalization and illness              Preventative:  Patient's Body mass index is 22.89 kg/m². BMI is within normal parameters. No follow-up required.  Recommended:Prevnar and Shingrix  Patient has continued with smoking cessation for over a year now.     does not  drink  eat more fruits and vegetables, plan meals and have 3 meals a day    RISK SCORE: 3       This document has been electronically signed by Coral Berry MD on May 12, 2019 1:40 PM

## 2019-05-28 RX ORDER — PRAVASTATIN SODIUM 40 MG
40 TABLET ORAL NIGHTLY
Qty: 90 TABLET | Refills: 3 | Status: SHIPPED | OUTPATIENT
Start: 2019-05-28 | End: 2019-08-19 | Stop reason: SDUPTHER

## 2019-05-28 RX ORDER — RANITIDINE 150 MG/1
150 TABLET ORAL 2 TIMES DAILY
Qty: 180 TABLET | Refills: 3 | Status: SHIPPED | OUTPATIENT
Start: 2019-05-28 | End: 2019-08-19 | Stop reason: SDUPTHER

## 2019-06-05 RX ORDER — MELOXICAM 15 MG/1
TABLET ORAL
Qty: 30 TABLET | Refills: 3 | Status: SHIPPED | OUTPATIENT
Start: 2019-06-05 | End: 2019-08-19

## 2019-06-05 RX ORDER — POTASSIUM CHLORIDE 750 MG/1
TABLET, FILM COATED, EXTENDED RELEASE ORAL
Qty: 60 TABLET | Refills: 3 | Status: SHIPPED | OUTPATIENT
Start: 2019-06-05 | End: 2019-08-19 | Stop reason: SDUPTHER

## 2019-06-14 DIAGNOSIS — J44.9 STAGE 3 SEVERE COPD BY GOLD CLASSIFICATION (HCC): ICD-10-CM

## 2019-06-14 RX ORDER — BUDESONIDE AND FORMOTEROL FUMARATE DIHYDRATE 160; 4.5 UG/1; UG/1
2 AEROSOL RESPIRATORY (INHALATION)
Qty: 3 INHALER | Refills: 3 | Status: SHIPPED | OUTPATIENT
Start: 2019-06-14 | End: 2019-08-19 | Stop reason: SDUPTHER

## 2019-06-14 RX ORDER — LOSARTAN POTASSIUM 100 MG/1
100 TABLET ORAL DAILY
Qty: 90 TABLET | Refills: 3 | Status: SHIPPED | OUTPATIENT
Start: 2019-06-14 | End: 2019-08-19 | Stop reason: SDUPTHER

## 2019-07-08 RX ORDER — PNV NO.95/FERROUS FUM/FOLIC AC 28MG-0.8MG
TABLET ORAL
Qty: 60 TABLET | Refills: 2 | Status: SHIPPED | OUTPATIENT
Start: 2019-07-08 | End: 2019-10-07 | Stop reason: SDUPTHER

## 2019-08-19 ENCOUNTER — OFFICE VISIT (OUTPATIENT)
Dept: FAMILY MEDICINE CLINIC | Facility: CLINIC | Age: 64
End: 2019-08-19

## 2019-08-19 VITALS
SYSTOLIC BLOOD PRESSURE: 156 MMHG | DIASTOLIC BLOOD PRESSURE: 62 MMHG | RESPIRATION RATE: 20 BRPM | WEIGHT: 134.9 LBS | HEIGHT: 64 IN | OXYGEN SATURATION: 95 % | TEMPERATURE: 97.2 F | HEART RATE: 68 BPM | BODY MASS INDEX: 23.03 KG/M2

## 2019-08-19 DIAGNOSIS — Z00.00 MEDICARE ANNUAL WELLNESS VISIT, SUBSEQUENT: Primary | ICD-10-CM

## 2019-08-19 DIAGNOSIS — J44.9 STAGE 3 SEVERE COPD BY GOLD CLASSIFICATION (HCC): ICD-10-CM

## 2019-08-19 PROCEDURE — G0439 PPPS, SUBSEQ VISIT: HCPCS | Performed by: FAMILY MEDICINE

## 2019-08-19 RX ORDER — LOSARTAN POTASSIUM 100 MG/1
100 TABLET ORAL DAILY
Qty: 90 TABLET | Refills: 3 | Status: SHIPPED | OUTPATIENT
Start: 2019-08-19 | End: 2020-03-10 | Stop reason: SDUPTHER

## 2019-08-19 RX ORDER — FUROSEMIDE 20 MG/1
10 TABLET ORAL DAILY
Qty: 45 TABLET | Refills: 3 | Status: SHIPPED | OUTPATIENT
Start: 2019-08-19 | End: 2020-01-01

## 2019-08-19 RX ORDER — ALBUTEROL SULFATE 90 UG/1
2 AEROSOL, METERED RESPIRATORY (INHALATION) EVERY 4 HOURS PRN
Qty: 8 G | Refills: 4 | Status: SHIPPED | OUTPATIENT
Start: 2019-08-19 | End: 2020-01-01

## 2019-08-19 RX ORDER — FLUOXETINE HYDROCHLORIDE 20 MG/1
20 CAPSULE ORAL DAILY
Qty: 90 CAPSULE | Refills: 3 | Status: SHIPPED | OUTPATIENT
Start: 2019-08-19 | End: 2020-01-01

## 2019-08-19 RX ORDER — PRAVASTATIN SODIUM 40 MG
40 TABLET ORAL NIGHTLY
Qty: 90 TABLET | Refills: 3 | Status: SHIPPED | OUTPATIENT
Start: 2019-08-19 | End: 2020-03-10 | Stop reason: SDUPTHER

## 2019-08-19 RX ORDER — RISPERIDONE 1 MG/1
1 TABLET ORAL
Qty: 90 TABLET | Refills: 3 | Status: SHIPPED | OUTPATIENT
Start: 2019-08-19 | End: 2020-01-01

## 2019-08-19 RX ORDER — ACETAMINOPHEN 325 MG/1
650 TABLET ORAL EVERY 4 HOURS PRN
COMMUNITY
End: 2021-01-01 | Stop reason: HOSPADM

## 2019-08-19 RX ORDER — RANITIDINE 150 MG/1
150 TABLET ORAL 2 TIMES DAILY
Qty: 180 TABLET | Refills: 3 | Status: SHIPPED | OUTPATIENT
Start: 2019-08-19 | End: 2020-01-29 | Stop reason: CLARIF

## 2019-08-19 RX ORDER — FOLIC ACID 1 MG/1
1 TABLET ORAL DAILY
Qty: 90 TABLET | Refills: 3 | Status: SHIPPED | OUTPATIENT
Start: 2019-08-19 | End: 2020-01-01

## 2019-08-19 RX ORDER — ONDANSETRON 4 MG/1
4 TABLET, FILM COATED ORAL EVERY 6 HOURS PRN
Qty: 30 TABLET | Refills: 3 | Status: SHIPPED | OUTPATIENT
Start: 2019-08-19 | End: 2021-01-01

## 2019-08-19 RX ORDER — ACETAMINOPHEN 325 MG/1
TABLET ORAL
Qty: 1 BOTTLE | Refills: 3 | Status: CANCELLED | OUTPATIENT
Start: 2019-08-19

## 2019-08-19 RX ORDER — FLUOXETINE HYDROCHLORIDE 40 MG/1
40 CAPSULE ORAL DAILY
Qty: 90 CAPSULE | Refills: 3 | Status: SHIPPED | OUTPATIENT
Start: 2019-08-19 | End: 2020-01-01

## 2019-08-19 RX ORDER — POTASSIUM CHLORIDE 750 MG/1
10 TABLET, FILM COATED, EXTENDED RELEASE ORAL 2 TIMES DAILY
Qty: 60 TABLET | Refills: 3 | Status: SHIPPED | OUTPATIENT
Start: 2019-08-19 | End: 2020-01-29 | Stop reason: SDUPTHER

## 2019-08-19 RX ORDER — ASPIRIN 81 MG/1
81 TABLET, CHEWABLE ORAL DAILY
Qty: 30 TABLET | Refills: 11 | Status: SHIPPED | OUTPATIENT
Start: 2019-08-19 | End: 2020-01-01 | Stop reason: SDUPTHER

## 2019-08-19 RX ORDER — ASPIRIN 81 MG/1
81 TABLET ORAL DAILY
COMMUNITY
End: 2019-08-19 | Stop reason: SDUPTHER

## 2019-08-19 RX ORDER — BUDESONIDE AND FORMOTEROL FUMARATE DIHYDRATE 160; 4.5 UG/1; UG/1
2 AEROSOL RESPIRATORY (INHALATION)
Qty: 3 INHALER | Refills: 3 | Status: SHIPPED | OUTPATIENT
Start: 2019-08-19 | End: 2019-12-10 | Stop reason: SDUPTHER

## 2019-08-19 NOTE — PROGRESS NOTES
QUICK REFERENCE INFORMATION:  The ABCs of the Annual Wellness Visit    Subsequent Medicare Wellness Visit     HEALTH RISK ASSESSMENT    : 1955    Recent Hospitalizations:  No hospitalization(s) within the last year..        Current Medical Providers:  Patient Care Team:  Coral Berry MD as PCP - General  NimsCoral MD as PCP - Claims Attributed  Michele Rivera MD as Consulting Physician (Cardiology)  Juan C Coffman MD as Consulting Physician (Neurology)  Lora Garcia APRN as Nurse Practitioner (Orthopedic Surgery)  Gay Cornejo MD as Consulting Physician (Pulmonary Disease)        Smoking Status:  Social History     Tobacco Use   Smoking Status Former Smoker   • Packs/day: 1.00   • Years: 39.00   • Pack years: 39.00   • Last attempt to quit: 1/10/2018   • Years since quittin.6   Smokeless Tobacco Never Used   Tobacco Comment    SMOKED FOR 20>PLUS YRS       Alcohol Consumption:  Social History     Substance and Sexual Activity   Alcohol Use No       Depression Screen:   PHQ-2/PHQ-9 Depression Screening 2019   Little interest or pleasure in doing things 0   Feeling down, depressed, or hopeless 0   Trouble falling or staying asleep, or sleeping too much 0   Feeling tired or having little energy 0   Poor appetite or overeating 0   Feeling bad about yourself - or that you are a failure or have let yourself or your family down 0   Trouble concentrating on things, such as reading the newspaper or watching television 0   Moving or speaking so slowly that other people could have noticed. Or the opposite - being so fidgety or restless that you have been moving around a lot more than usual 0   Thoughts that you would be better off dead, or of hurting yourself in some way 0   Total Score 0   If you checked off any problems, how difficult have these problems made it for you to do your work, take care of things at home, or get along with other people? Not difficult at all       Health  Habits and Functional and Cognitive Screening:  Functional & Cognitive Status 8/19/2019   Do you have difficulty preparing food and eating? -   Do you have difficulty bathing yourself, getting dressed or grooming yourself? -   Do you have difficulty using the toilet? -   Do you have difficulty moving around from place to place? -   Do you have trouble with steps or getting out of a bed or a chair? -   Current Diet Limited Junk Food   Dental Exam Unknown   Eye Exam Unknown   Exercise (times per week) -   Current Exercise Activities Include None   Do you need help using the phone?  -   Are you deaf or do you have serious difficulty hearing?  -   Do you need help with transportation? -   Do you need help shopping? -   Do you need help preparing meals?  -   Do you need help with housework?  -   Do you need help with laundry? -   Do you need help taking your medications? -   Do you need help managing money? -   Do you ever drive or ride in a car without wearing a seat belt? -           Does the patient have evidence of cognitive impairment? No    Asiprin use counseling: Taking ASA appropriately as indicated      Recent Lab Results:          Lab Results   Component Value Date    CHOL 152 05/08/2019    TRIG 69 05/08/2019     (H) 05/08/2019    VLDL 13.8 05/08/2019    LDLHDL 0.37 05/08/2019           Age-appropriate Screening Schedule:  Refer to the list below for future screening recommendations based on patient's age, sex and/or medical conditions. Orders for these recommended tests are listed in the plan section. The patient has been provided with a written plan.    Health Maintenance   Topic Date Due   • DXA SCAN  04/22/2018   • ZOSTER VACCINE (2 of 2) 06/27/2018   • INFLUENZA VACCINE  08/01/2019   • PNEUMOCOCCAL VACCINES (65+ LOW/MEDIUM RISK) (2 of 2 - PPSV23) 05/08/2020   • MAMMOGRAM  05/15/2020   • COLONOSCOPY  05/22/2024   • TDAP/TD VACCINES (2 - Td) 04/20/2027   • PAP SMEAR  Discontinued        Subjective    History of Present Illness    Michelle Child is a 63 y.o. female who presents for an Annual Wellness Visit.    The following portions of the patient's history were reviewed and updated as appropriate: allergies, current medications, past family history, past medical history, past social history, past surgical history and problem list.    Outpatient Medications Prior to Visit   Medication Sig Dispense Refill   • acetaminophen (TYLENOL) 325 MG tablet Take 650 mg by mouth Every 4 (Four) Hours As Needed for Mild Pain .     • Ferrous Sulfate (IRON) 325 (65 Fe) MG tablet TAKE ONE TABLET BY MOUTH TWICE A DAY WITH MEALS FOR SUPPLEMENTATION 60 tablet 2   • aspirin 81 MG EC tablet Take 81 mg by mouth Daily.     • budesonide-formoterol (SYMBICORT) 160-4.5 MCG/ACT inhaler Inhale 2 puffs 2 (Two) Times a Day. 3 inhaler 3   • FLUoxetine (PROzac) 20 MG capsule Take 1 capsule by mouth Daily. for depression 90 capsule 3   • FLUoxetine (PROzac) 40 MG capsule Take 1 capsule by mouth Daily. for depression 90 capsule 3   • folic acid (FOLVITE) 1 MG tablet Take 1 tablet by mouth Daily. 90 tablet 3   • furosemide (LASIX) 20 MG tablet Take 0.5 tablets by mouth Daily. for fluid retention 45 tablet 3   • HM PAIN RELIEVER 325 MG tablet TAKE TWO TABLETS BY MOUTH EVERY 4 HOURS AS NEEDED FOR MILD PAIN 1 bottle 3   • losartan (COZAAR) 100 MG tablet Take 1 tablet by mouth Daily. For heart 90 tablet 3   • potassium chloride (K-DUR) 10 MEQ CR tablet TAKE TWO TABLETS BY MOUTH ONCE DAILY FOR SUPPLEMENT 60 tablet 3   • potassium chloride (MICRO-K) 10 MEQ CR capsule Take 2 capsules by mouth Daily.     • pravastatin (PRAVACHOL) 40 MG tablet Take 1 tablet by mouth Every Night. 90 tablet 3   • raNITIdine (ZANTAC) 150 MG tablet Take 1 tablet by mouth 2 (Two) Times a Day. 180 tablet 3   • risperiDONE (risperDAL) 1 MG tablet Take 1 tablet by mouth every night at bedtime. for insomnia 90 tablet 3   • aspirin 81 MG chewable tablet Chew 1 tablet Daily. 30  tablet 11   • ferrous sulfate 324 (65 Fe) MG tablet delayed-release EC tablet Take 1 tablet by mouth 2 (Two) Times a Day With Meals. 180 tablet 3   • HM LAXATIVE 5 MG EC tablet TAKE ONE TABLET BY MOUTH ONCE DAILY AS NEEDED FOR CONSTIPATION 30 tablet 3   • hydrocortisone 1 % cream Apply  topically 3 (Three) Times a Day. 60 g 5   • meloxicam (MOBIC) 15 MG tablet TAKE ONE TABLET BY MOUTH ONCE DAILY FOR MILD PAIN 30 tablet 3   • ondansetron (ZOFRAN) 4 MG tablet TAKE ONE TABLET BY MOUTH EVERY 6 HOURS AS NEEDED FOR NAUSEA OR VOMITING 30 tablet 3     No facility-administered medications prior to visit.        Patient Active Problem List   Diagnosis   • Migraine   • Iron deficiency anemia   • Gastroesophageal reflux disease   • Essential hypertension   • Coronary arteriosclerosis   • Bilateral pseudophakia   • Astigmatism   • Hypokalemia   • Cerebral microvascular disease   • Pulmonary hypertension (CMS/HCC)   • Rheumatic tricuspid valve regurgitation   • Rheumatic mitral stenosis   • Pulmonary hypertension due to left heart valvular disease (CMS/HCC)   • Cor pulmonale, chronic (CMS/HCC)   • Folic acid deficiency   • Stage 3 severe COPD by GOLD classification (CMS/HCC)   • Personal history of tobacco use, presenting hazards to health   • Tobacco abuse, in remission   • Recurrent major depressive disorder, in full remission (CMS/HCC)       Advance Care Planning:  Patient has an advance directive - a copy has been provided and is visible in patient header    Identification of Risk Factors:  Risk factors include: Fall Risk  Polypharmacy.    Review of Systems    Compared to one year ago, the patient feels her physical health is the same.  Compared to one year ago, the patient feels her mental health is the same.    Objective     Physical Exam    Vitals:    08/19/19 1016   BP: 156/62   BP Location: Left arm   Patient Position: Sitting   Cuff Size: Adult   Pulse: 68   Resp: 20   Temp: 97.2 °F (36.2 °C)   TempSrc: Temporal   SpO2:  "95%   Weight: 61.2 kg (134 lb 14.4 oz)   Height: 162.6 cm (64\")   PainSc: 0-No pain       Patient's Body mass index is 23.16 kg/m². BMI is within normal parameters. No follow-up required..      Assessment/Plan   Patient Self-Management and Personalized Health Advice  The patient has been provided with information about: diet, exercise and fall prevention and preventive services including:   · Annual Wellness Visit (AWV)  · Lung Cancer Screening Counseling and Annual Screening for Lung Cancer with Low Dose Computed Tomography (LDCT); (use of smartset for low dose CT for lung cancer screening for documentation and orders)  · Screening Mammography .  Patient has declined low dose CT lung and DEXA scan   Visit Diagnoses:    ICD-10-CM ICD-9-CM   1. Medicare annual wellness visit, subsequent Z00.00 V70.0   2. Stage 3 severe COPD by GOLD classification (CMS/[facility]) J44.9 496       No orders of the defined types were placed in this encounter.      Outpatient Encounter Medications as of 8/19/2019   Medication Sig Dispense Refill   • acetaminophen (TYLENOL) 325 MG tablet Take 650 mg by mouth Every 4 (Four) Hours As Needed for Mild Pain .     • aspirin 81 MG chewable tablet Chew 1 tablet Daily. 30 tablet 11   • budesonide-formoterol (SYMBICORT) 160-4.5 MCG/ACT inhaler Inhale 2 puffs 2 (Two) Times a Day. 3 inhaler 3   • Ferrous Sulfate (IRON) 325 (65 Fe) MG tablet TAKE ONE TABLET BY MOUTH TWICE A DAY WITH MEALS FOR SUPPLEMENTATION 60 tablet 2   • FLUoxetine (PROzac) 20 MG capsule Take 1 capsule by mouth Daily. for depression 90 capsule 3   • FLUoxetine (PROzac) 40 MG capsule Take 1 capsule by mouth Daily. for depression 90 capsule 3   • folic acid (FOLVITE) 1 MG tablet Take 1 tablet by mouth Daily. 90 tablet 3   • furosemide (LASIX) 20 MG tablet Take 0.5 tablets by mouth Daily. for fluid retention 45 tablet 3   • losartan (COZAAR) 100 MG tablet Take 1 tablet by mouth Daily. For heart 90 tablet 3   • ondansetron (ZOFRAN) 4 MG " tablet Take 1 tablet by mouth Every 6 (Six) Hours As Needed for Nausea or Vomiting. 30 tablet 3   • potassium chloride (K-DUR) 10 MEQ CR tablet Take 1 tablet by mouth 2 (Two) Times a Day. 60 tablet 3   • pravastatin (PRAVACHOL) 40 MG tablet Take 1 tablet by mouth Every Night. 90 tablet 3   • raNITIdine (ZANTAC) 150 MG tablet Take 1 tablet by mouth 2 (Two) Times a Day. 180 tablet 3   • risperiDONE (risperDAL) 1 MG tablet Take 1 tablet by mouth every night at bedtime. for insomnia 90 tablet 3   • [DISCONTINUED] aspirin 81 MG EC tablet Take 81 mg by mouth Daily.     • [DISCONTINUED] budesonide-formoterol (SYMBICORT) 160-4.5 MCG/ACT inhaler Inhale 2 puffs 2 (Two) Times a Day. 3 inhaler 3   • [DISCONTINUED] FLUoxetine (PROzac) 20 MG capsule Take 1 capsule by mouth Daily. for depression 90 capsule 3   • [DISCONTINUED] FLUoxetine (PROzac) 40 MG capsule Take 1 capsule by mouth Daily. for depression 90 capsule 3   • [DISCONTINUED] folic acid (FOLVITE) 1 MG tablet Take 1 tablet by mouth Daily. 90 tablet 3   • [DISCONTINUED] furosemide (LASIX) 20 MG tablet Take 0.5 tablets by mouth Daily. for fluid retention 45 tablet 3   • [DISCONTINUED] HM PAIN RELIEVER 325 MG tablet TAKE TWO TABLETS BY MOUTH EVERY 4 HOURS AS NEEDED FOR MILD PAIN 1 bottle 3   • [DISCONTINUED] losartan (COZAAR) 100 MG tablet Take 1 tablet by mouth Daily. For heart 90 tablet 3   • [DISCONTINUED] potassium chloride (K-DUR) 10 MEQ CR tablet TAKE TWO TABLETS BY MOUTH ONCE DAILY FOR SUPPLEMENT 60 tablet 3   • [DISCONTINUED] potassium chloride (MICRO-K) 10 MEQ CR capsule Take 2 capsules by mouth Daily.     • [DISCONTINUED] pravastatin (PRAVACHOL) 40 MG tablet Take 1 tablet by mouth Every Night. 90 tablet 3   • [DISCONTINUED] raNITIdine (ZANTAC) 150 MG tablet Take 1 tablet by mouth 2 (Two) Times a Day. 180 tablet 3   • [DISCONTINUED] risperiDONE (risperDAL) 1 MG tablet Take 1 tablet by mouth every night at bedtime. for insomnia 90 tablet 3   • albuterol sulfate HFA  108 (90 Base) MCG/ACT inhaler Inhale 2 puffs Every 4 (Four) Hours As Needed for Wheezing. 8 g 4   • [DISCONTINUED] aspirin 81 MG chewable tablet Chew 1 tablet Daily. 30 tablet 11   • [DISCONTINUED] ferrous sulfate 324 (65 Fe) MG tablet delayed-release EC tablet Take 1 tablet by mouth 2 (Two) Times a Day With Meals. 180 tablet 3   • [DISCONTINUED] HM LAXATIVE 5 MG EC tablet TAKE ONE TABLET BY MOUTH ONCE DAILY AS NEEDED FOR CONSTIPATION 30 tablet 3   • [DISCONTINUED] hydrocortisone 1 % cream Apply  topically 3 (Three) Times a Day. 60 g 5   • [DISCONTINUED] meloxicam (MOBIC) 15 MG tablet TAKE ONE TABLET BY MOUTH ONCE DAILY FOR MILD PAIN 30 tablet 3   • [DISCONTINUED] ondansetron (ZOFRAN) 4 MG tablet TAKE ONE TABLET BY MOUTH EVERY 6 HOURS AS NEEDED FOR NAUSEA OR VOMITING 30 tablet 3     No facility-administered encounter medications on file as of 8/19/2019.      Goals        Exercise    • Exercise 150 minutes per week (moderate activity)      Increase activity      • Exercise 150 minutes per week (moderate activity)      Pt said she will walk more          Lifestyle    • Increase physical activity (pt-stated)      Barriers to goals: Prolonged hospitalization and illness          Risk Score 3  Reviewed use of high risk medication in the elderly: yes  Reviewed for potential of harmful drug interactions in the elderly: yes    Follow Up:  Return in about 1 year (around 8/19/2020) for Medicare Wellness.     An After Visit Summary and PPPS with all of these plans were given to the patient.            This document has been electronically signed by Coral Berry MD on August 22, 2019 8:34 PM

## 2019-08-19 NOTE — PROGRESS NOTES
Pharmacy Medication Reconciliation  Patient: Michelle Child   Sex: female  : 1955  Primary Care Physician: Coral Berry MD   Outpatient Pharmacy: [facility]      Reconciled Medication List  Prior to Admission medications    Medication Sig Start Date End Date Taking? Authorizing Provider   acetaminophen (TYLENOL) 325 MG tablet Take 650 mg by mouth Every 4 (Four) Hours As Needed for Mild Pain .   Yes Mele Garcia MD   aspirin 81 MG EC tablet Take 81 mg by mouth Daily.   Yes Mele Garcia MD   budesonide-formoterol (SYMBICORT) 160-4.5 MCG/ACT inhaler Inhale 2 puffs 2 (Two) Times a Day. 19  Yes Coral Berry MD   Ferrous Sulfate (IRON) 325 (65 Fe) MG tablet TAKE ONE TABLET BY MOUTH TWICE A DAY WITH MEALS FOR SUPPLEMENTATION 19  Yes Coral Berry MD   FLUoxetine (PROzac) 20 MG capsule Take 1 capsule by mouth Daily. for depression 19  Yes Coral Berry MD   FLUoxetine (PROzac) 40 MG capsule Take 1 capsule by mouth Daily. for depression 19  Yes Coral Berry MD   folic acid (FOLVITE) 1 MG tablet Take 1 tablet by mouth Daily. 19  Yes Coral Berry MD   furosemide (LASIX) 20 MG tablet Take 0.5 tablets by mouth Daily. for fluid retention 19  Yes Coral Berry MD   losartan (COZAAR) 100 MG tablet Take 1 tablet by mouth Daily. For heart 19  Yes Coral Berry MD   potassium chloride (K-DUR) 10 MEQ CR tablet TAKE TWO TABLETS BY MOUTH ONCE DAILY FOR SUPPLEMENT 19  Yes Coral Berry MD   pravastatin (PRAVACHOL) 40 MG tablet Take 1 tablet by mouth Every Night. 19  Yes Coral Berry MD   raNITIdine (ZANTAC) 150 MG tablet Take 1 tablet by mouth 2 (Two) Times a Day. 19  Yes Coral Berry MD   risperiDONE (risperDAL) 1 MG tablet Take 1 tablet by mouth every night at bedtime. for insomnia 19  Yes Coral Berry MD               DISCONTINUED MEDS  HM PAIN RELIEVER 325 MG tablet               TAKE  "TWO TABLETS BY MOUTH EVERY 4 HOURS AS NEEDED FOR MILD PAIN               9/10/18               8/19/19               Yes               Coral Berry MD   potassium chloride (MICRO-K) 10 MEQ CR capsule Take 2 capsules by mouth Daily. 18 Yes Louie Roblero MD   aspirin 81 MG chewable tablet Chew 1 tablet Daily. 19  Coral Berry MD   ferrous sulfate 324 (65 Fe) MG tablet delayed-release EC tablet Take 1 tablet by mouth 2 (Two) Times a Day With Meals. 19  Coral Berry MD   HM LAXATIVE 5 MG EC tablet TAKE ONE TABLET BY MOUTH ONCE DAILY AS NEEDED FOR CONSTIPATION 9/10/18 8/19/19  Coral Berry MD   hydrocortisone 1 % cream Apply  topically 3 (Three) Times a Day. 18  Coral Berry MD   meloxicam (MOBIC) 15 MG tablet TAKE ONE TABLET BY MOUTH ONCE DAILY FOR MILD PAIN 19  Coral Berry MD   ondansetron (ZOFRAN) 4 MG tablet TAKE ONE TABLET BY MOUTH EVERY 6 HOURS AS NEEDED FOR NAUSEA OR VOMITING 9/10/18 8/19/19  Coral Berry MD         Allergies  Allergies   Allergen Reactions   • Codeine Nausea And Vomiting   • Penicillins Nausea And Vomiting   • Sulfa Antibiotics Nausea And Vomiting         Number of medications added - 0  Number of medications removed - 6  Number of clarifications - 2  Allergy changes - 1 - added reaction of N/V to penicillins      Summary/Recommendations  Changed chewable aspirin to enteric coated    Patient reports she takes both Fluoxetine 20 mg and 40 mg daily    Reports Symbicort is controlling her symptoms - does not have albuterol inhaler on med list, asked patient about this - stated she \"may\" have an albuterol inhaler at home that is likely  - patient reports she has no episodes of shortness of breath while taking Symbicort - recommend new albuterol inhaler       Madhuri Kerr RPH  19 10:45 AM     "

## 2019-10-07 RX ORDER — PNV NO.95/FERROUS FUM/FOLIC AC 28MG-0.8MG
TABLET ORAL
Qty: 60 TABLET | Refills: 2 | Status: SHIPPED | OUTPATIENT
Start: 2019-10-07 | End: 2020-01-10

## 2019-10-18 NOTE — THERAPY TREATMENT NOTE
Acute Care - Occupational Therapy Treatment Note  [facility]     Patient Name: Michelle Child  : 1955  MRN: [id number]  Today's Date: 2018  Onset of Illness/Injury or Date of Surgery: 18  Date of Referral to OT: 18  Referring Physician: JANAK Roblero MD.     Admit Date: 2018       ICD-10-CM ICD-9-CM   1. Peripheral edema R60.9 782.3   2. Acute on chronic congestive heart failure, unspecified congestive heart failure type (CMS/HCC) I50.9 428.0   3. Uncontrolled hypertension I10 401.9   4. Hypokalemia E87.6 276.8   5. Impaired mobility and ADLs Z74.09 799.89   6. Impaired physical mobility Z74.09 781.99     Patient Active Problem List   Diagnosis   • Migraine   • Iron deficiency anemia   • Gastroesophageal reflux disease   • Essential hypertension   • Coronary arteriosclerosis   • Bilateral pseudophakia   • Astigmatism   • Hypokalemia   • Cerebral microvascular disease   • Pulmonary hypertension   • Rheumatic tricuspid valve regurgitation   • Rheumatic mitral stenosis   • Acute diastolic CHF (congestive heart failure) (CMS/Formerly [facility])   • Pulmonary hypertension due to left heart valvular disease   • Cor pulmonale, chronic (CMS/[facility]ly [facility])   • Folic acid deficiency   • Stage 3 severe COPD by GOLD classification (CMS/Formerly [facility])   • Personal history of tobacco use, presenting hazards to health   • Tobacco abuse, in remission   • Recurrent major depressive disorder, in full remission (CMS/HCC)   • Weakness   • Recurrent falls while walking   • Localized swelling of lower extremity   • Peripheral edema   • Insomnia     Past Medical History:   Diagnosis Date   • Alkaline phosphatase raised    • Astigmatism    • Bilateral pseudophakia    • Coronary arteriosclerosis    • Depressive disorder    • Edema of lower extremity    • Encounter for general adult medical examination with abnormal findings    • Essential hypertension    • Gastroesophageal reflux disease    • H/O bone density study     DXA BONE DENSITY  AXIAL    04/22/2016   • H/O screening mammography     SCREENING MAMMOGRAPHY     04/25/2016   • Hx of screening mammography     x3; declined screening, understands risks and benefits and still declines      07/24/2015   • Influenza vaccine administered     INFLUENZA IMMUN ADMIN OR PREV RECV'D   x4       04/01/2016   • Insomnia    • Iron deficiency    • Migraine    • Tobacco dependence     continuous       Past Surgical History:   Procedure Laterality Date   • APPENDECTOMY     • AUGMENTATION MAMMAPLASTY     • BREAST IMPLANT SURGERY     • CATARACT EXTRACTION WITH INTRAOCULAR LENS IMPLANT  12/17/2003    Phacoemulsification of a cataract with intraocular lens implant of rigt eye, Nixon model SA 60-AT; serial # [id number];20.5 diopter   • CHOLECYSTECTOMY  08/06/2007    Laparoscopic cholecystectomy. Symptomatic gallstones   • COLONOSCOPY  10/15/2013    declined stool cards and colonscopy   • ENDOSCOPY AND COLONOSCOPY  05/22/2014    Internal & external hemorrhoids found.   • ENDOSCOPY W/ PEG TUBE PLACEMENT  05/22/2014    EGD w/ tube: Normal esopahgus. Gastritis in stomach. Biopsy taken. Normal duodenum. Biopsy taken.   • INJECTION OF MEDICATION  07/24/2013    Inj(s) Tend-Sheath, Ligament, Single   • INJECTION OF MEDICATION  11/17/2014    Kenalog x6   • INJECTION OF MEDICATION  04/01/2016    PNEUMOC VAC/ADMIN/RCVD    • INJECTION OF MEDICATION  12/05/2014    Toradol   • OTHER SURGICAL HISTORY  01/19/2014    Drain/Inject Major Joint   x2   • PAP SMEAR  10/06/2011   • PARTIAL HYSTERECTOMY      Partial hyst    • TONSILLECTOMY     • TUBAL ABDOMINAL LIGATION         Therapy Treatment          Rehabilitation Treatment Summary     Row Name 07/09/18 1106 07/09/18 0958 07/09/18 0735       Treatment Time/Intention    Discipline occupational therapy assistant  -FINA physical therapist  -CZ occupational therapy assistant  -FINA    Document Type therapy note (daily note)  -FINA therapy note (daily note)  -PATRICIA therapy note (daily note)  -FINA     Subjective Information no complaints  -BB no complaints  -CZ no complaints  -BB    Mode of Treatment individual therapy;occupational therapy  -BB individual therapy  -CZ2 individual therapy;occupational therapy  -BB    Patient/Family Observations  -- Supine in bed, RA, no apparent distress.  -CZ3  --    Care Plan Review  -- care plan/treatment goals reviewed  -CZ2  --    Total Minutes, Physical Therapy Treatment  -- 40  -CZ2  --    Therapy Frequency (PT Clinical Impression)  -- daily  -CZ3  --    Total Minutes, Occupational Therapy Treatment 24  -BB  -- 70  -BB    Therapy Frequency (OT Eval) other (see comments)   3-14 TX'S//WK  -BB  -- other (see comments)   3-14 tx's/wk  -BB    Patient Effort good  -BB  -- good  -BB    Existing Precautions/Restrictions fall  -BB  -- fall  -BB    Recorded by [BB] PAMELLA Ngo/L 07/09/18 1432 [CZ] Hadley Ramirez, PT 07/09/18 1008  [CZ2] Hadley Ramirez, PT 07/09/18 1109  [CZ3] Hadley Ramirez, PT 07/09/18 1038 [BB] Bibi Chester CAN/L 07/09/18 1142    Row Name 07/09/18 1106 07/09/18 0958 07/09/18 0735       Vital Signs    Pre Systolic BP Rehab 136  -  -  -BB    Pre Treatment Diastolic BP 63  -BB 58  -CZ 70  -BB    Post Systolic BP Rehab  -- 142  -CZ2  --    Post Treatment Diastolic BP  -- 64  -CZ2  --    Pretreatment Heart Rate (beats/min) 63  -BB 62  -CZ 58  -BB    Intratreatment Heart Rate (beats/min) 60  -BB  --  --    Posttreatment Heart Rate (beats/min) 58  -BB 62  -CZ2 63  -BB    Pre SpO2 (%) 98  -BB 96  -CZ 98  -BB    O2 Delivery Pre Treatment room air  -BB room air  -CZ room air  -BB    Intra SpO2 (%) 97  -BB  --  --    O2 Delivery Intra Treatment room air  -BB  --  --    Post SpO2 (%) 97  -BB 96  -CZ2 96  -BB    O2 Delivery Post Treatment room air  -BB room air  -CZ2 room air  -BB    Pre Patient Position Supine  -BB Supine  -CZ2 Supine  -BB    Intra Patient Position Sitting  -BB  --  --    Post Patient Position Supine  -BB Supine  -CZ2 --    long sitting  -BB    Recorded by [BB] Bibi Chester CAN/L 07/09/18 1432 [CZ] Hadley Ramirez, PT 07/09/18 1008  [CZ2] Hadley Ramirez, PT 07/09/18 1038 [BB] Bibi Chester, CAN/L 07/09/18 1142    Row Name 07/09/18 1106 07/09/18 0958 07/09/18 0735       Cognitive Assessment/Intervention- PT/OT    Affect/Mental Status (Cognitive)  -- WFL  -CZ WFL  -BB    Orientation Status (Cognition) oriented x 4  -BB  -- oriented x 4  -BB    Follows Commands (Cognition)  -- WFL  -CZ2 WFL  -BB    Safety Deficit (Cognitive)  -- insight into deficits/self awareness  -CZ2  --    Personal Safety Interventions fall prevention program maintained;gait belt;muscle strengthening facilitated;nonskid shoes/slippers when out of bed  -BB fall prevention program maintained;gait belt;nonskid shoes/slippers when out of bed  -CZ2 fall prevention program maintained;gait belt;nonskid shoes/slippers when out of bed;supervised activity  -BB    Recorded by [BB] Bibi Chester, CAN/L 07/09/18 1432 [CZ] Hadley Ramirez, PT 07/09/18 1038  [CZ2] Hadley Ramirez, PT 07/09/18 1109 [BB] Bibi Chester, CAN/L 07/09/18 1142    Row Name 07/09/18 1106 07/09/18 0958 07/09/18 0735       Bed Mobility Assessment/Treatment    Scooting/Bridging [place] (Bed Mobility)  -- supervision   cues for hand and foot placement.  -CZ  --    Supine-Sit-Supine [place] (Bed Mobility) supervision  -BB supervision  -CZ2 supervision  -BB    Assistive Device (Bed Mobility)  --  -- bed rails;head of bed elevated  -BB    Comment (Bed Mobility)  -- Requires multiple attempts to complete transfer, but no physical assist.   -CZ Pt requires increased time and effort for supine-sit t/f  -BB    Recorded by [BB] Bibi Chester CAN/L 07/09/18 1432 [CZ] Hadley Ramirez, PT 07/09/18 1122  [CZ2] Hadley Ramirez, PT 07/09/18 1038 [BB] Bibi Chester, CAN/L 07/09/18 1142    Row Name 07/09/18 0958             Transfer Assessment/Treatment    Comment (Transfers)  Cues to avoid posterior LOB.  -CZ      Recorded by [CZ] Hadley Ramirez, PT 07/09/18 1038      Row Name 07/09/18 0958 07/09/18 0735          Sit-Stand Transfer    Sit-Stand [place] (Transfers) contact guard  -CZ contact guard  -BB     Assistive Device (Sit-Stand Transfers) walker, front-wheeled  -CZ walker, front-wheeled  -BB     Recorded by [CZ] Hadley Ramirez, PT 07/09/18 1038 [BB] Bibi Chester CAN/L 07/09/18 1142     Row Name 07/09/18 0958 07/09/18 0735          Stand-Sit Transfer    Stand-Sit [place] (Transfers) contact guard  -CZ contact guard  -BB     Assistive Device (Stand-Sit Transfers) walker, front-wheeled  -CZ walker, front-wheeled  -BB     Recorded by [CZ] Hadley Ramirez, PT 07/09/18 1038 [BB] Bibi Chester CAN/L 07/09/18 1142     Row Name 07/09/18 0735             Toilet Transfer    Type (Toilet Transfer) sit-stand;stand-sit  -BB      [place] Level (Toilet Transfer) contact guard  -BB      Assistive Device (Toilet Transfer) commode;grab bars/safety frame  -BB      Recorded by [BB] JUDE NgoA/L 07/09/18 1142      Row Name 07/09/18 0958             Gait/Stairs Assessment/Training    Gait/Stairs Assessment/Training gait deviations  -CZ      [place] Level (Gait) contact guard  -CZ      Assistive Device (Gait) walker, front-wheeled  -CZ      Distance in Feet (Gait) 75'x1  -CZ      Comment (Gait/Stairs) Requires nearly constant cues to increase step length and foot clearance. Used marching activity to improve foot clearance and SLS ability.   -CZ      Recorded by [CZ] Hadley Ramirez, PT 07/09/18 1038      Row Name 07/09/18 0735             Bathing Assessment/Intervention    Bathing [place] Level lower body  -BB      Bathing Position edge of bed sitting  -BB      Recorded by [BB] JUDE NgoA/L 07/09/18 1142      Row Name 07/09/18 0735             Lower Body Dressing Assessment/Training    Lower Body Dressing [place] Level lower body  dressing skills;doff;pants/bottoms;socks;contact guard assist  -BB      Lower Body Dressing Position edge of bed sitting  -BB      Recorded by [BB] PAMELLA Ngo/L 07/09/18 1142      Row Name 07/09/18 0735             Grooming Assessment/Training    [place] Level (Grooming) hair care, combing/brushing;oral care regimen;wash face, hands;set up;independent  -BB      Grooming Position edge of bed sitting  -BB      Recorded by [BB] PAMELLA Ngo/L 07/09/18 1142      Row Name 07/09/18 0735             Self-Feeding Assessment/Training    [place] Level (Feeding) feeding skills;independent  -BB      Position (Self-Feeding) sitting up in bed  -BB      Recorded by [BB] PAMELLA Ngo/L 07/09/18 1142      Row Name 07/09/18 0735             Toileting Assessment/Training    [place] Level (Toileting) toileting skills;contact guard assist  -BB      Recorded by [BB] PAMELLA Ngo/L 07/09/18 1142      Row Name 07/09/18 1106             Therapeutic Exercise    Upper Extremity Range of Motion (Therapeutic Exercise) shoulder flexion/extension, bilateral;elbow flexion/extension, bilateral;shoulder internal/external rotation, left;forearm supination/pronation, bilateral;wrist flexion/extension, bilateral;shoulder abduction/adduction, bilateral  -BB      Exercise Type (Therapeutic Exercise) AROM (active range of motion)  -BB      Sets/Reps (Therapeutic Exercise) x25  -BB      Expected Outcome (Therapeutic Exercise) improve functional tolerance, self-care activity  -BB      Comment (Therapeutic Exercise) Pt requires demo for technique  -BB      Recorded by [BB] PAMELLA Ngo/L 07/09/18 1432      Row Name 07/09/18 1106             Dynamic Sitting Balance    Level of [place], Reaches Outside Midline (Sitting, Dynamic Balance) supervision  -BB      Recorded by [BB] PAMELLA Ngo/L 07/09/18 1432      Row Name 07/09/18 1106 07/09/18 0735          Positioning  and Restraints    Pre-Treatment Position in bed  -BB in bed  -BB     Post Treatment Position bed  -BB bed  -BB     In Bed fowlers;call light within reach;encouraged to call for assist;exit alarm on  -BB fowlers;call light within reach;encouraged to call for assist;exit alarm on  -BB     Recorded by [BB] Bibi Chester, CAN/L 07/09/18 1432 [BB] Bibi Chester CAN/L 07/09/18 1142     Row Name 07/09/18 1106 07/09/18 0958 07/09/18 0735       Pain Scale: Numbers Pre/Post-Treatment    Pain Scale: Numbers, Pretreatment 0/10 - no pain  -BB 0/10 - no pain  -CZ 0/10 - no pain  -BB    Pain Scale: Numbers, Post-Treatment 0/10 - no pain  -BB 0/10 - no pain  -CZ 0/10 - no pain  -BB    Recorded by [BB] Bibi Chester CAN/L 07/09/18 1432 [CZ] Hadley Ramirez, PT 07/09/18 1038 [BB] Bibi Chester, CAN/L 07/09/18 1142    Row Name 07/09/18 1106 07/09/18 0958 07/09/18 0735       Plan of Care Review    Plan of Care Reviewed With patient  -BB patient  -CZ patient  -BB    Recorded by [BB] Bibi Chester CAN/L 07/09/18 1432 [CZ] Hadley Ramirez, PT 07/09/18 1109 [BB] Bibi Chester CAN/L 07/09/18 1142    Row Name 07/09/18 1106 07/09/18 0735          Outcome Summary/Treatment Plan (OT)    Daily Summary of Progress (OT) progress toward functional goals as expected  -BB progress toward functional goals as expected  -BB     Plan for Continued Treatment (OT) continue POC  -BB continue POC  -BB     Anticipated Discharge Disposition (OT) skilled nursing facility  -BB skilled nursing facility  -BB     Recorded by [BB] Bibi Chester CAN/L 07/09/18 1432 [BB] Bibi Chester CAN/L 07/09/18 1142     Row Name 07/09/18 0958             Outcome Summary/Treatment Plan (PT)    Daily Summary of Progress (PT) progress toward functional goals as expected  -CZ      Barriers to Overall Progress (PT) Parkinsonian-like gait pattern.  -CZ      Plan for Continued Treatment (PT) Improve SLS ability for improved gait  mechanics.   -CZ      Anticipated Discharge Disposition (PT) skilled nursing facility  -CZ      Recorded by [CZ] Hadley QUIGLEY Ramirez, PT 07/09/18 1038        User Key  (r) = Recorded By, (t) = Taken By, (c) = Cosigned By    Initials Name Effective Dates Discipline    BB Bibi QUIGLEY Chester, CAN/L 03/07/18 -  OT    CZ Hadley Ramirez, PT 04/03/18 -  PT                   OT Rehab Goals     Row Name 07/09/18 1106 07/09/18 0735          Transfer Goal 1 (OT)    Activity/Assistive Device (Transfer Goal 1, OT) sit-to-stand/stand-to-sit;bed-to-chair/chair-to-bed;toilet;walker, rolling  -BB sit-to-stand/stand-to-sit;bed-to-chair/chair-to-bed;toilet;walker, rolling  -BB     [place] Level/Cues Needed (Transfer Goal 1, OT) contact guard assist  -BB contact guard assist  -BB     Time Frame (Transfer Goal 1, OT) long term goal (LTG);by discharge  -BB long term goal (LTG);by discharge  -BB     Progress/Outcome (Transfer Goal 1, OT) continuing progress toward goal;goal partially met  -BB continuing progress toward goal;goal partially met  -BB        Bathing Goal 1 (OT)    Activity/Assistive Device (Bathing Goal 1, OT) upper body bathing;bath mitt;long-handled sponge  -BB upper body bathing;bath mitt;long-handled sponge  -BB     [place] Level/Cues Needed (Bathing Goal 1, OT) minimum assist (75% or more patient effort)  -BB minimum assist (75% or more patient effort)  -BB     Time Frame (Bathing Goal 1, OT) long term goal (LTG);by discharge  -BB long term goal (LTG);by discharge  -BB     Progress/Outcomes (Bathing Goal 1, OT) continuing progress toward goal;goal met  -BB continuing progress toward goal;goal met  -BB        Dressing Goal 1 (OT)    Activity/Assistive Device (Dressing Goal 1, OT) upper body dressing  -BB upper body dressing  -BB     [place]/Cues Needed (Dressing Goal 1, OT) minimum assist (75% or more patient effort)  -BB minimum assist (75% or more patient effort)  -BB     Time Frame (Dressing Goal 1, OT)  long term goal (LTG);by discharge  -BB long term goal (LTG);by discharge  -BB     Progress/Outcome (Dressing Goal 1, OT) goal met;continuing progress toward goal  -BB goal met;continuing progress toward goal  -BB        Self-Feeding Goal 1 (OT)    Activity/Assistive Device (Self-Feeding Goal 1, OT) self-feeding skills, all  -BB self-feeding skills, all  -BB     [place] Level/Cues Needed (Self-Feeding Goal 1, OT) conditional independence  -BB conditional independence  -BB     Time Frame (Self-Feeding Goal 1, OT) long term goal (LTG);by discharge  -BB long term goal (LTG);by discharge  -BB     Progress/Outcomes (Self-Feeding Goal 1, OT) continuing progress toward goal;goal met  -BB continuing progress toward goal;goal met  -BB       User Key  (r) = Recorded By, (t) = Taken By, (c) = Cosigned By    Initials Name Provider Type Discipline    PAMELLA Henderson/L Occupational Therapy Assistant OT        Occupational Therapy Education     Title: PT OT SLP Therapies (Active)     Topic: Occupational Therapy (Active)     Point: ADL training (Active)     Description: Instruct learner(s) on proper safety adaptation and remediation techniques during self care or transfers.   Instruct in proper use of assistive devices.   Learning Progress Summary     Learner Status Readiness Method Response Comment Documented by    Patient Active Acceptance E NR  BB 07/09/18 1143     Done Acceptance E FRANCISCO BACA  LM 07/08/18 1738     Done Acceptance E,TB,D KEVEN SINGH  LJ 07/07/18 1526     Active Acceptance E NR  BB 07/06/18 1127     Active Acceptance E NR  BB 07/05/18 1258     Done Acceptance EDONALDO NR Educated on the role of OT and POC, educated on the benefit of activity and safety with bed mobility, t/f, and functional mobility MR 07/03/18 1127          Point: Home exercise program (Done)     Description: Instruct learner(s) on appropriate technique for monitoring, assisting and/or progressing therapeutic exercises/activities.   Learning  Progress Summary     Learner Status Readiness Method Response Comment Documented by    Patient Done Acceptance E NR,VU  LM 07/08/18 1738          Point: Precautions (Done)     Description: Instruct learner(s) on prescribed precautions during self-care and functional transfers.   Learning Progress Summary     Learner Status Readiness Method Response Comment Documented by    Patient Done Acceptance E NR,FRANCISCO   07/08/18 1738     Done Acceptance E,TB FRANCISCONR Educated on the role of OT and POC, educated on the benefit of activity and safety with bed mobility, t/f, and functional mobility  07/03/18 1127          Point: Body mechanics (Active)     Description: Instruct learner(s) on proper positioning and spine alignment during self-care, functional mobility activities and/or exercises.   Learning Progress Summary     Learner Status Readiness Method Response Comment Documented by    Patient Active Acceptance E NR  BB 07/09/18 1143     Done Acceptance E NR,FRANCISCO   07/08/18 1738     Active Acceptance E NR   07/05/18 1258     Done Acceptance EDONALDONR Educated on the role of OT and POC, educated on the benefit of activity and safety with bed mobility, t/f, and functional mobility  07/03/18 1127                      User Key     Initials Effective Dates Name Provider Type Discipline     03/07/18 -  PAMELLA Ngo/L Occupational Therapy Assistant OT     03/07/18 -  PAMELLA Wong/L Occupational Therapy Assistant OT    MR 04/03/18 -  Janet Johnson OT Occupational Therapist OT     05/22/18 -  PAMELLA Sprague/L Occupational Therapy Assistant OT              Non-skid socks and gait belt in place. Toileting offered. Call light and needs within reach. Pt advised to not get up alone and call the nurse for assistance.  Bed alarm on.     OT Recommendation and Plan  Outcome Summary/Treatment Plan (OT)  Daily Summary of Progress (OT): progress toward functional goals as expected  Plan for  Continued Treatment (OT): continue POC  Anticipated Discharge Disposition (OT): skilled nursing facility  Therapy Frequency (OT Eval): other (see comments) (3-14 TX'S//WK)  Daily Summary of Progress (OT): progress toward functional goals as expected  Plan of Care Review  Plan of Care Reviewed With: patient  Plan of Care Reviewed With: patient  Outcome Summary: Pt CGA for bed-toilet t/f using a FWW. Pt Indep with grooming and feeding tasks. Pt would likely benefit from rehab in SNF prior to D/C home.        Outcome Measures     Row Name 07/09/18 0958 07/09/18 0735 07/08/18 1700       How much help from another person do you currently need...    Turning from your back to your side while in flat bed without using bedrails? 3  -CZ  --  --    Moving from lying on back to sitting on the side of a flat bed without bedrails? 3  -CZ  --  --    Moving to and from a bed to a chair (including a wheelchair)? 3  -CZ  --  --    Standing up from a chair using your arms (e.g., wheelchair, bedside chair)? 3  -CZ  --  --    Climbing 3-5 steps with a railing? 3  -CZ  --  --    To walk in hospital room? 3  -CZ  --  --    AM-PAC 6 Clicks Score 18  -CZ  --  --       How much help from another is currently needed...    Putting on and taking off regular lower body clothing?  -- 3  -BB 3  -LM    Bathing (including washing, rinsing, and drying)  -- 3  -BB 3  -LM    Toileting (which includes using toilet bed pan or urinal)  -- 3  -BB 3  -LM    Putting on and taking off regular upper body clothing  -- 3  -BB 3  -LM    Taking care of personal grooming (such as brushing teeth)  -- 4  -BB 3  -LM    Eating meals  -- 4  -BB 3  -LM    Score  -- 20  -BB 18  -LM       Functional Assessment    Outcome Measure Options AM-PAC 6 Clicks Basic Mobility (PT)  -CZ  --  --    Row Name 07/08/18 1424 07/07/18 1427 07/07/18 1356       How much help from another person do you currently need...    Turning from your back to your side while in flat bed without using  bedrails? 3  -EM  -- 3  -EM    Moving from lying on back to sitting on the side of a flat bed without bedrails? 3  -EM  -- 3  -EM    Moving to and from a bed to a chair (including a wheelchair)? 3  -EM  -- 3  -EM    Standing up from a chair using your arms (e.g., wheelchair, bedside chair)? 3  -EM  -- 3  -EM    Climbing 3-5 steps with a railing? 3  -EM  -- 2  -EM    To walk in hospital room? 3  -EM  -- 3  -EM    AM-PAC 6 Clicks Score 18  -EM  -- 17  -EM       How much help from another is currently needed...    Putting on and taking off regular lower body clothing?  -- 3  -LJ  --    Bathing (including washing, rinsing, and drying)  -- 3  -LJ  --    Toileting (which includes using toilet bed pan or urinal)  -- 3  -LJ  --    Putting on and taking off regular upper body clothing  -- 3  -LJ  --    Taking care of personal grooming (such as brushing teeth)  -- 3  -LJ  --    Eating meals  -- 3  -LJ  --    Score  -- 18  -LJ  --       Functional Assessment    Outcome Measure Options AM-PAC 6 Clicks Basic Mobility (PT)  -EM  -- AM-PAC 6 Clicks Basic Mobility (PT)  -EM      User Key  (r) = Recorded By, (t) = Taken By, (c) = Cosigned By    Initials Name Provider Type    EM Khang Cornejo, PTA Physical Therapy Assistant    BB Bibi Chester CAN/L Occupational Therapy Assistant    GREGORY Bell CAN/L Occupational Therapy Assistant    CZ Hadley Ramirez, PT Physical Therapist    LJ Melyssa Chavarria, CAN/L Occupational Therapy Assistant           Time Calculation:         Time Calculation- OT     Row Name 07/09/18 1433 07/09/18 1147 07/09/18 1118       Time Calculation- OT    OT Start Time 1106  -BB 0735  -BB  --    OT Stop Time 1130  -BB 0845  -BB  --    OT Time Calculation (min) 24 min  -BB 70 min  -BB  --    Total Timed Code Minutes- OT 24 minute(s)  -BB 70 minute(s)  -BB  --    OT Received On 07/09/18  -BB 07/09/18  -BB  --       Timed Charges    61352 - Gait Training Minutes   --  -- 25  -CZ      User Key  (r)  = Recorded By, (t) = Taken By, (c) = Cosigned By    Initials Name Provider Type    BB PAMELLA Ngo/L Occupational Therapy Assistant    CZ Hadley Ramirez, PT Physical Therapist           Therapy Suggested Charges     Code   Minutes Charges    88278 (CPT®) Hc Ot Neuromusc Re Education Ea 15 Min      83030 (CPT®) Hc Ot Ther Proc Ea 15 Min      13582 (CPT®) Hc Gait Training Ea 15 Min      41211 (CPT®) Hc Ot Therapeutic Act Ea 15 Min      56066 (CPT®) Hc Ot Manual Therapy Ea 15 Min      12128 (CPT®) Hc Ot Iontophoresis Ea 15 Min      67214 (CPT®) Hc Ot Elec Stim Ea-Per 15 Min      80433 (CPT®) Hc Ot Ultrasound Ea 15 Min      08938 (CPT®) Hc Ot Self Care/Mgmt/Train Ea 15 Min 86 6    Total  86 6        Therapy Charges for Today     Code Description Service Date Service Provider Modifiers Qty    [id number] HC OT SELF CARE/MGMT/TRAIN EA 15 MIN 7/9/2018 PAMELLA Ngo/L GO 5    [id number] HC OT THER PROC EA 15 MIN 7/9/2018 PAMELLA Ngo/L GO 2          OT G-codes  OT Professional Judgement Used?: Yes  OT Functional Scales Options: AM-PAC 6 Clicks Daily Activity (OT)  Score: 11  Functional Limitation: Self care  Self Care Current Status (): At least 60 percent but less than 80 percent impaired, limited or restricted  Self Care Goal Status (): At least 40 percent but less than 60 percent impaired, limited or restricted    KINGSLEY Ngo  7/9/2018   Dry/Warm

## 2019-12-04 ENCOUNTER — TELEPHONE (OUTPATIENT)
Dept: FAMILY MEDICINE CLINIC | Facility: CLINIC | Age: 64
End: 2019-12-04

## 2019-12-04 NOTE — TELEPHONE ENCOUNTER
Tried to call patient to see if she would like to come in for an opening with Dr. Berry this morning since she is on the waitlist.     Patient declined both appointment times I offered. I told her I would keep her on the waitlist.     Thank you.

## 2019-12-09 DIAGNOSIS — J44.9 STAGE 3 SEVERE COPD BY GOLD CLASSIFICATION (HCC): ICD-10-CM

## 2019-12-09 RX ORDER — BUDESONIDE AND FORMOTEROL FUMARATE DIHYDRATE 160; 4.5 UG/1; UG/1
AEROSOL RESPIRATORY (INHALATION)
Qty: 1 INHALER | Refills: 5 | Status: SHIPPED | OUTPATIENT
Start: 2019-12-09 | End: 2019-12-10 | Stop reason: CLARIF

## 2019-12-10 DIAGNOSIS — J44.9 STAGE 3 SEVERE COPD BY GOLD CLASSIFICATION (HCC): ICD-10-CM

## 2019-12-10 RX ORDER — BUDESONIDE AND FORMOTEROL FUMARATE DIHYDRATE 160; 4.5 UG/1; UG/1
2 AEROSOL RESPIRATORY (INHALATION)
Qty: 3 INHALER | Refills: 3 | Status: SHIPPED | OUTPATIENT
Start: 2019-12-10 | End: 2021-01-01

## 2019-12-11 ENCOUNTER — TELEPHONE (OUTPATIENT)
Dept: FAMILY MEDICINE CLINIC | Facility: CLINIC | Age: 64
End: 2019-12-11

## 2019-12-11 NOTE — TELEPHONE ENCOUNTER
Pt called and is needing a refill on her budesonide-formoterol (SYMBICORT) 160-4.5 MCG/ACT inhaler and would like for it to be called into [facility].      Thank you,      Paula

## 2020-01-01 ENCOUNTER — LAB (OUTPATIENT)
Dept: LAB | Facility: HOSPITAL | Age: 65
End: 2020-01-01

## 2020-01-01 ENCOUNTER — TELEPHONE (OUTPATIENT)
Dept: FAMILY MEDICINE CLINIC | Facility: CLINIC | Age: 65
End: 2020-01-01

## 2020-01-01 ENCOUNTER — OFFICE VISIT (OUTPATIENT)
Dept: FAMILY MEDICINE CLINIC | Facility: CLINIC | Age: 65
End: 2020-01-01

## 2020-01-01 VITALS
HEART RATE: 67 BPM | BODY MASS INDEX: 22.88 KG/M2 | OXYGEN SATURATION: 98 % | HEIGHT: 64 IN | SYSTOLIC BLOOD PRESSURE: 140 MMHG | DIASTOLIC BLOOD PRESSURE: 70 MMHG | WEIGHT: 134 LBS

## 2020-01-01 DIAGNOSIS — D50.9 IRON DEFICIENCY ANEMIA, UNSPECIFIED IRON DEFICIENCY ANEMIA TYPE: ICD-10-CM

## 2020-01-01 DIAGNOSIS — I25.10 CORONARY ARTERIOSCLEROSIS: ICD-10-CM

## 2020-01-01 DIAGNOSIS — Z00.00 MEDICARE ANNUAL WELLNESS VISIT, SUBSEQUENT: Primary | ICD-10-CM

## 2020-01-01 DIAGNOSIS — Z23 NEED FOR PNEUMOCOCCAL VACCINATION: ICD-10-CM

## 2020-01-01 DIAGNOSIS — Z12.31 ENCOUNTER FOR SCREENING MAMMOGRAM FOR BREAST CANCER: ICD-10-CM

## 2020-01-01 DIAGNOSIS — E53.8 FOLIC ACID DEFICIENCY: ICD-10-CM

## 2020-01-01 DIAGNOSIS — I10 ESSENTIAL HYPERTENSION: ICD-10-CM

## 2020-01-01 LAB
ALBUMIN SERPL-MCNC: 4.4 G/DL (ref 3.5–5.2)
ALBUMIN UR-MCNC: 3.7 MG/DL
ALBUMIN/GLOB SERPL: 1.7 G/DL
ALP SERPL-CCNC: 121 U/L (ref 39–117)
ALT SERPL W P-5'-P-CCNC: 11 U/L (ref 1–33)
ANION GAP SERPL CALCULATED.3IONS-SCNC: 11.6 MMOL/L (ref 5–15)
AST SERPL-CCNC: 13 U/L (ref 1–32)
BILIRUB SERPL-MCNC: 0.4 MG/DL (ref 0–1.2)
BUN SERPL-MCNC: 13 MG/DL (ref 8–23)
BUN/CREAT SERPL: 11.8 (ref 7–25)
CALCIUM SPEC-SCNC: 9.2 MG/DL (ref 8.6–10.5)
CHLORIDE SERPL-SCNC: 103 MMOL/L (ref 98–107)
CHOLEST SERPL-MCNC: 139 MG/DL (ref 0–200)
CO2 SERPL-SCNC: 22.4 MMOL/L (ref 22–29)
CREAT SERPL-MCNC: 1.1 MG/DL (ref 0.57–1)
CREAT UR-MCNC: 148.2 MG/DL
DEPRECATED RDW RBC AUTO: 44.1 FL (ref 37–54)
ERYTHROCYTE [DISTWIDTH] IN BLOOD BY AUTOMATED COUNT: 13.1 % (ref 12.3–15.4)
FOLATE SERPL-MCNC: >20 NG/ML (ref 4.78–24.2)
GFR SERPL CREATININE-BSD FRML MDRD: 50 ML/MIN/1.73
GLOBULIN UR ELPH-MCNC: 2.6 GM/DL
GLUCOSE SERPL-MCNC: 80 MG/DL (ref 65–99)
HCT VFR BLD AUTO: 37.2 % (ref 34–46.6)
HDLC SERPL-MCNC: 85 MG/DL (ref 40–60)
HGB BLD-MCNC: 12 G/DL (ref 12–15.9)
LDLC SERPL CALC-MCNC: 42 MG/DL (ref 0–100)
LDLC/HDLC SERPL: 0.5 {RATIO}
MCH RBC QN AUTO: 29.3 PG (ref 26.6–33)
MCHC RBC AUTO-ENTMCNC: 32.3 G/DL (ref 31.5–35.7)
MCV RBC AUTO: 91 FL (ref 79–97)
MICROALBUMIN/CREAT UR: 25 MG/G
PLATELET # BLD AUTO: 253 10*3/MM3 (ref 140–450)
PMV BLD AUTO: 10.9 FL (ref 6–12)
POTASSIUM SERPL-SCNC: 4 MMOL/L (ref 3.5–5.2)
PROT SERPL-MCNC: 7 G/DL (ref 6–8.5)
RBC # BLD AUTO: 4.09 10*6/MM3 (ref 3.77–5.28)
SODIUM SERPL-SCNC: 137 MMOL/L (ref 136–145)
TRIGL SERPL-MCNC: 58 MG/DL (ref 0–150)
VLDLC SERPL-MCNC: 11.6 MG/DL (ref 5–40)
WBC # BLD AUTO: 8.76 10*3/MM3 (ref 3.4–10.8)

## 2020-01-01 PROCEDURE — 36415 COLL VENOUS BLD VENIPUNCTURE: CPT | Performed by: FAMILY MEDICINE

## 2020-01-01 PROCEDURE — G0439 PPPS, SUBSEQ VISIT: HCPCS | Performed by: FAMILY MEDICINE

## 2020-01-01 PROCEDURE — 82043 UR ALBUMIN QUANTITATIVE: CPT | Performed by: FAMILY MEDICINE

## 2020-01-01 PROCEDURE — 80061 LIPID PANEL: CPT | Performed by: FAMILY MEDICINE

## 2020-01-01 PROCEDURE — 82746 ASSAY OF FOLIC ACID SERUM: CPT | Performed by: FAMILY MEDICINE

## 2020-01-01 PROCEDURE — 82570 ASSAY OF URINE CREATININE: CPT | Performed by: FAMILY MEDICINE

## 2020-01-01 PROCEDURE — 80053 COMPREHEN METABOLIC PANEL: CPT | Performed by: FAMILY MEDICINE

## 2020-01-01 PROCEDURE — 85027 COMPLETE CBC AUTOMATED: CPT | Performed by: FAMILY MEDICINE

## 2020-01-01 RX ORDER — ASPIRIN 81 MG/1
TABLET, DELAYED RELEASE ORAL
Qty: 30 TABLET | Refills: 3 | Status: SHIPPED | OUTPATIENT
Start: 2020-01-01 | End: 2020-01-01

## 2020-01-01 RX ORDER — LOSARTAN POTASSIUM 100 MG/1
TABLET ORAL
Qty: 90 TABLET | Refills: 3 | Status: SHIPPED | OUTPATIENT
Start: 2020-01-01 | End: 2020-01-01 | Stop reason: SDUPTHER

## 2020-01-01 RX ORDER — LOSARTAN POTASSIUM 100 MG/1
100 TABLET ORAL DAILY
Qty: 90 TABLET | Refills: 3 | Status: SHIPPED | OUTPATIENT
Start: 2020-01-01 | End: 2021-01-01 | Stop reason: DRUGHIGH

## 2020-01-01 RX ORDER — FOLIC ACID 1 MG/1
TABLET ORAL
Qty: 90 TABLET | Refills: 3 | Status: SHIPPED | OUTPATIENT
Start: 2020-01-01 | End: 2021-01-01

## 2020-01-01 RX ORDER — ASPIRIN 81 MG/1
TABLET, DELAYED RELEASE ORAL
Qty: 90 TABLET | Refills: 3 | Status: SHIPPED | OUTPATIENT
Start: 2020-01-01 | End: 2021-01-01

## 2020-01-01 RX ORDER — PNV NO.95/FERROUS FUM/FOLIC AC 28MG-0.8MG
TABLET ORAL
Qty: 60 EACH | Refills: 5 | Status: SHIPPED | OUTPATIENT
Start: 2020-01-01 | End: 2021-01-01

## 2020-01-01 RX ORDER — FUROSEMIDE 20 MG/1
TABLET ORAL
Qty: 45 TABLET | Refills: 3 | Status: SHIPPED | OUTPATIENT
Start: 2020-01-01 | End: 2021-01-01

## 2020-01-01 RX ORDER — FLUOXETINE HYDROCHLORIDE 40 MG/1
CAPSULE ORAL
Qty: 90 CAPSULE | Refills: 3 | Status: SHIPPED | OUTPATIENT
Start: 2020-01-01 | End: 2021-01-01 | Stop reason: ALTCHOICE

## 2020-01-01 RX ORDER — POTASSIUM CHLORIDE 750 MG/1
TABLET, FILM COATED, EXTENDED RELEASE ORAL
Qty: 60 TABLET | Refills: 1 | Status: SHIPPED | OUTPATIENT
Start: 2020-01-01 | End: 2020-01-01

## 2020-01-01 RX ORDER — PRAVASTATIN SODIUM 40 MG
40 TABLET ORAL NIGHTLY
Qty: 90 TABLET | Refills: 3 | Status: SHIPPED | OUTPATIENT
Start: 2020-01-01 | End: 2021-01-01 | Stop reason: HOSPADM

## 2020-01-01 RX ORDER — ALBUTEROL SULFATE 90 UG/1
AEROSOL, METERED RESPIRATORY (INHALATION)
Qty: 8.5 G | Refills: 1 | Status: SHIPPED | OUTPATIENT
Start: 2020-01-01 | End: 2020-01-01

## 2020-01-01 RX ORDER — ASPIRIN 81 MG/1
TABLET, DELAYED RELEASE ORAL
Qty: 30 TABLET | Refills: 3 | Status: SHIPPED | OUTPATIENT
Start: 2020-01-01 | End: 2020-01-01 | Stop reason: SDUPTHER

## 2020-01-01 RX ORDER — FLUOXETINE HYDROCHLORIDE 20 MG/1
CAPSULE ORAL
Qty: 90 CAPSULE | Refills: 3 | Status: SHIPPED | OUTPATIENT
Start: 2020-01-01 | End: 2021-01-01

## 2020-01-01 RX ORDER — POTASSIUM CHLORIDE 750 MG/1
TABLET, FILM COATED, EXTENDED RELEASE ORAL
Qty: 60 TABLET | Refills: 1 | Status: SHIPPED | OUTPATIENT
Start: 2020-01-01 | End: 2021-01-01

## 2020-01-01 RX ORDER — OMEPRAZOLE 20 MG/1
20 CAPSULE, DELAYED RELEASE ORAL DAILY
COMMUNITY
End: 2021-01-01 | Stop reason: HOSPADM

## 2020-01-01 RX ORDER — RISPERIDONE 1 MG/1
TABLET ORAL
Qty: 90 TABLET | Refills: 3 | Status: SHIPPED | OUTPATIENT
Start: 2020-01-01 | End: 2021-01-01

## 2020-01-01 RX ORDER — ALBUTEROL SULFATE 90 UG/1
AEROSOL, METERED RESPIRATORY (INHALATION)
Qty: 8.5 G | Refills: 11 | Status: SHIPPED | OUTPATIENT
Start: 2020-01-01 | End: 2021-01-01

## 2020-01-10 RX ORDER — PNV NO.95/FERROUS FUM/FOLIC AC 28MG-0.8MG
TABLET ORAL
Qty: 60 EACH | Refills: 5 | Status: SHIPPED | OUTPATIENT
Start: 2020-01-10 | End: 2020-01-01

## 2020-01-14 RX ORDER — RANITIDINE 150 MG/1
TABLET ORAL
Qty: 180 TABLET | Refills: 3 | OUTPATIENT
Start: 2020-01-14

## 2020-01-14 RX ORDER — FAMOTIDINE 40 MG/1
40 TABLET, FILM COATED ORAL 2 TIMES DAILY PRN
Qty: 60 TABLET | Refills: 5 | Status: SHIPPED | OUTPATIENT
Start: 2020-01-14 | End: 2020-01-01

## 2020-01-14 NOTE — TELEPHONE ENCOUNTER
[facility] called and the raNITIdine (ZANTAC) 150 MG tablet is on backorder and they were wondering if Dr. Berry wanted to do something else to replace this. They would like a call back please.      Thank you,      Paula

## 2020-01-29 ENCOUNTER — OFFICE VISIT (OUTPATIENT)
Dept: FAMILY MEDICINE CLINIC | Facility: CLINIC | Age: 65
End: 2020-01-29

## 2020-01-29 VITALS
WEIGHT: 136 LBS | HEIGHT: 64 IN | DIASTOLIC BLOOD PRESSURE: 70 MMHG | BODY MASS INDEX: 23.22 KG/M2 | HEART RATE: 68 BPM | SYSTOLIC BLOOD PRESSURE: 140 MMHG | OXYGEN SATURATION: 98 %

## 2020-01-29 DIAGNOSIS — I05.0 RHEUMATIC MITRAL STENOSIS: ICD-10-CM

## 2020-01-29 DIAGNOSIS — J44.9 STAGE 3 SEVERE COPD BY GOLD CLASSIFICATION (HCC): ICD-10-CM

## 2020-01-29 DIAGNOSIS — I27.20 PULMONARY HYPERTENSION (HCC): ICD-10-CM

## 2020-01-29 DIAGNOSIS — I27.81 COR PULMONALE, CHRONIC (HCC): ICD-10-CM

## 2020-01-29 DIAGNOSIS — Z23 NEED FOR IMMUNIZATION AGAINST INFLUENZA: Primary | ICD-10-CM

## 2020-01-29 DIAGNOSIS — K21.9 GASTROESOPHAGEAL REFLUX DISEASE, ESOPHAGITIS PRESENCE NOT SPECIFIED: ICD-10-CM

## 2020-01-29 DIAGNOSIS — I07.1 RHEUMATIC TRICUSPID VALVE REGURGITATION: ICD-10-CM

## 2020-01-29 PROCEDURE — G0008 ADMIN INFLUENZA VIRUS VAC: HCPCS | Performed by: FAMILY MEDICINE

## 2020-01-29 PROCEDURE — 99214 OFFICE O/P EST MOD 30 MIN: CPT | Performed by: FAMILY MEDICINE

## 2020-01-29 PROCEDURE — 90662 IIV NO PRSV INCREASED AG IM: CPT | Performed by: FAMILY MEDICINE

## 2020-01-29 RX ORDER — POTASSIUM CHLORIDE 750 MG/1
10 TABLET, FILM COATED, EXTENDED RELEASE ORAL 2 TIMES DAILY
Qty: 60 TABLET | Refills: 5 | Status: SHIPPED | OUTPATIENT
Start: 2020-01-29 | End: 2020-01-01

## 2020-02-02 NOTE — PROGRESS NOTES
Subjective:     Michelle Child is a 64 y.o. female   Hypertension  Patient is here for follow-up of elevated blood pressure. She is not exercising and is adherent to a low-salt diet. Blood pressure is well controlled at home. Cardiac symptoms: none. Patient denies chest pain, exertional chest pressure/discomfort and palpitations. Cardiovascular risk factors: hypertension, sedentary lifestyle and smoking/ tobacco exposure. Use of agents associated with hypertension: none. History of target organ damage: none. She ran out of her medication a few days ago. She is getting them pill packed which seems to help her with compliance of her medication.    COPD  Patient complains of no symptoms at this time since she stopped smoking about 16 months ago. Symptoms began several years ago. Patient uses 1 pillows at night. Patient currently is not on home oxygen therapy.. Respiratory history: frequent episodes of bronchitis and COPD. She continues to be compliant with her inhalers. She is out walking around her yard now without shortness of breath.     Patient does have mitral valve prolapse with moderate regurgitation and pulmonary hypertension.  Her last echocardiogram was in May 2019.  She has not followed up  with cardiology.     She has been on Zantac previously for acid reflux which controlled her symptoms for many years.  Since the medication has been recalled she has been on over-the-counter Nexium.  She endorses breakthrough heartburn with the Nexium.  She would like an alternative.   Past Medical Hx:   Past Medical History:   Diagnosis Date   • Alkaline phosphatase raised    • Astigmatism    • Bilateral pseudophakia    • Coronary arteriosclerosis    • Depressive disorder    • Edema of lower extremity    • Encounter for general adult medical examination with abnormal findings    • Essential hypertension    • Gastroesophageal reflux disease    • H/O bone density study     DXA BONE DENSITY AXIAL    04/22/2016   • H/O  screening mammography     SCREENING MAMMOGRAPHY     04/25/2016   • Hx of screening mammography     x3; declined screening, understands risks and benefits and still declines      07/24/2015   • Influenza vaccine administered     INFLUENZA IMMUN ADMIN OR PREV RECV'D   x4       04/01/2016   • Insomnia    • Iron deficiency    • Migraine    • Tobacco dependence     continuous         Past Surgical Hx:  Past Surgical History:   Procedure Laterality Date   • APPENDECTOMY     • AUGMENTATION MAMMAPLASTY     • BREAST IMPLANT SURGERY     • CATARACT EXTRACTION WITH INTRAOCULAR LENS IMPLANT  12/17/2003    Phacoemulsification of a cataract with intraocular lens implant of rigt eye, Nixon model SA 60-AT; serial # [id number];20.5 diopter   • CHOLECYSTECTOMY  08/06/2007    Laparoscopic cholecystectomy. Symptomatic gallstones   • COLONOSCOPY  10/15/2013    declined stool cards and colonscopy   • ENDOSCOPY AND COLONOSCOPY  05/22/2014    Internal & external hemorrhoids found.   • ENDOSCOPY W/ PEG TUBE PLACEMENT  05/22/2014    EGD w/ tube: Normal esopahgus. Gastritis in stomach. Biopsy taken. Normal duodenum. Biopsy taken.   • INJECTION OF MEDICATION  07/24/2013    Inj(s) Tend-Sheath, Ligament, Single   • INJECTION OF MEDICATION  11/17/2014    Kenalog x6   • INJECTION OF MEDICATION  04/01/2016    PNEUMOC VAC/ADMIN/RCVD    • INJECTION OF MEDICATION  12/05/2014    Toradol   • OTHER SURGICAL HISTORY  01/19/2014    Drain/Inject Major Joint   x2   • PAP SMEAR  10/06/2011   • SUBTOTAL HYSTERECTOMY      Partial hyst    • TONSILLECTOMY     • TUBAL ABDOMINAL LIGATION         Health Maintenance:  Health Maintenance   Topic Date Due   • Pneumococcal Vaccine Once at 65 Years Old  11/13/2018   • ZOSTER VACCINE (2 of 2) 02/01/2020 (Originally 6/27/2018)   • LUNG CANCER SCREENING  08/22/2020 (Originally 1/19/2019)   • MAMMOGRAM  05/15/2020   • MEDICARE ANNUAL WELLNESS  08/19/2020   • DXA SCAN  08/19/2021   • COLONOSCOPY  05/22/2024   • TDAP/TD VACCINES  (2 - Td) 04/20/2027   • HEPATITIS C SCREENING  Completed   • INFLUENZA VACCINE  Completed       Current Meds:    Current Outpatient Medications:   •  acetaminophen (TYLENOL) 325 MG tablet, Take 650 mg by mouth Every 4 (Four) Hours As Needed for Mild Pain ., Disp: , Rfl:   •  albuterol sulfate  (90 Base) MCG/ACT inhaler, Inhale 2 puffs Every 4 (Four) Hours As Needed for Wheezing., Disp: 8 g, Rfl: 4  •  aspirin 81 MG chewable tablet, Chew 1 tablet Daily., Disp: 30 tablet, Rfl: 11  •  budesonide-formoterol (SYMBICORT) 160-4.5 MCG/ACT inhaler, Inhale 2 puffs 2 (Two) Times a Day., Disp: 3 inhaler, Rfl: 3  •  famotidine (PEPCID) 40 MG tablet, Take 1 tablet by mouth 2 (Two) Times a Day As Needed for Heartburn., Disp: 60 tablet, Rfl: 5  •  Ferrous Sulfate (IRON) 325 (65 Fe) MG tablet, TAKE ONE TABLET BY MOUTH TWICE A DAY WITH MEALS FOR SUPPLEMENTATION, Disp: 60 each, Rfl: 5  •  FLUoxetine (PROzac) 20 MG capsule, Take 1 capsule by mouth Daily. for depression, Disp: 90 capsule, Rfl: 3  •  FLUoxetine (PROzac) 40 MG capsule, Take 1 capsule by mouth Daily. for depression, Disp: 90 capsule, Rfl: 3  •  folic acid (FOLVITE) 1 MG tablet, Take 1 tablet by mouth Daily., Disp: 90 tablet, Rfl: 3  •  furosemide (LASIX) 20 MG tablet, Take 0.5 tablets by mouth Daily. for fluid retention, Disp: 45 tablet, Rfl: 3  •  losartan (COZAAR) 100 MG tablet, Take 1 tablet by mouth Daily. For heart, Disp: 90 tablet, Rfl: 3  •  ondansetron (ZOFRAN) 4 MG tablet, Take 1 tablet by mouth Every 6 (Six) Hours As Needed for Nausea or Vomiting., Disp: 30 tablet, Rfl: 3  •  potassium chloride (K-DUR) 10 MEQ CR tablet, Take 1 tablet by mouth 2 (Two) Times a Day., Disp: 60 tablet, Rfl: 5  •  pravastatin (PRAVACHOL) 40 MG tablet, Take 1 tablet by mouth Every Night., Disp: 90 tablet, Rfl: 3  •  risperiDONE (risperDAL) 1 MG tablet, Take 1 tablet by mouth every night at bedtime. for insomnia, Disp: 90 tablet, Rfl: 3    Allergies:  Codeine; Penicillins; and  Sulfa antibiotics    Family Hx:  Family History   Problem Relation Age of Onset   • Cholelithiasis Mother    • Thyroid cancer Sister    • Thyroid disease Sister         Thyroid disorder   • Graves' disease Sister    • Cholelithiasis Maternal Grandmother    • Thyroid cancer Other         Other 2nd degree relative, thyroid   • Diabetes Other    • Hypertension Other         Social History:  Social History     Socioeconomic History   • Marital status:      Spouse name: Not on file   • Number of children: Not on file   • Years of education: Not on file   • Highest education level: Not on file   Tobacco Use   • Smoking status: Former Smoker     Packs/day: 1.00     Years: 39.00     Pack years: 39.00     Last attempt to quit: 1/10/2018     Years since quittin.0   • Smokeless tobacco: Never Used   • Tobacco comment: SMOKED FOR 20>PLUS YRS   Substance and Sexual Activity   • Alcohol use: No   • Drug use: No   • Sexual activity: Defer       Review of Systems  Review of Systems   Constitutional: Negative for activity change, appetite change, fatigue and fever.   HENT: Negative.  Negative for ear pain and sore throat.    Eyes: Negative.  Negative for pain and visual disturbance.   Respiratory: Negative.  Negative for cough and shortness of breath.    Cardiovascular: Negative for chest pain and palpitations.   Gastrointestinal: Negative for abdominal pain and nausea.   Endocrine: Negative.  Negative for cold intolerance and heat intolerance.   Genitourinary: Negative for difficulty urinating and dysuria.   Musculoskeletal: Negative for arthralgias and gait problem.   Skin: Negative for color change and rash.   Allergic/Immunologic: Negative.    Neurological: Negative for dizziness, weakness and headaches.   Hematological: Negative for adenopathy. Does not bruise/bleed easily.   Psychiatric/Behavioral: Negative for agitation, confusion and sleep disturbance.       Objective:     /70 (BP Location: Left arm,  "Patient Position: Sitting, Cuff Size: Adult)   Pulse 68   Ht 162.6 cm (64\")   Wt 61.7 kg (136 lb)   SpO2 98%   BMI 23.34 kg/m²       Physical Exam   Constitutional: She is oriented to person, place, and time. She appears well-developed and well-nourished.   HENT:   Head: Normocephalic and atraumatic.   Right Ear: Tympanic membrane, external ear and ear canal normal. Decreased hearing is noted.   Left Ear: Tympanic membrane, external ear and ear canal normal. Decreased hearing is noted.   Nose: Nose normal.   Mouth/Throat: Oropharynx is clear and moist.   Eyes: Pupils are equal, round, and reactive to light. Conjunctivae and EOM are normal.   Neck: Normal range of motion. Neck supple.   Cardiovascular: Normal rate and regular rhythm.   Murmur heard.   Systolic murmur is present.  Pulmonary/Chest: Effort normal and breath sounds normal.   Abdominal: Soft. Bowel sounds are normal. She exhibits no distension. There is no tenderness.   Musculoskeletal: Normal range of motion.   Neurological: She is alert and oriented to person, place, and time.   Skin: Skin is warm and dry.   Psychiatric: She has a normal mood and affect. Her speech is normal and behavior is normal. Judgment and thought content normal. Cognition and memory are normal.          Assessment:       1. Need for immunization against influenza    2. Pulmonary hypertension (CMS/[facility])    3. Rheumatic tricuspid valve regurgitation    4. Rheumatic mitral stenosis    5. Cor pulmonale, chronic (CMS/[facility])    6. Stage 3 severe COPD by GOLD classification (CMS/[facility])    7. Gastroesophageal reflux disease, esophagitis presence not specified         Plan:     1.  Rx changes: Stop Zantac as recalled.  Patient has been taking over-the-counter Nexium.  Will stop that and start Pepcid 40 mg daily.  2.  Follow up: 4 months   3.  Patient will need cardiology follow-up in spring 2020 for rheumatic regular disease.    Goals        Exercise    • Exercise 150 minutes per week " (moderate activity)      Increase activity      • Exercise 150 minutes per week (moderate activity)      Pt said she will walk more          Lifestyle    • Increase physical activity (pt-stated)      Barriers to goals: Prolonged hospitalization and illness              Preventative:  Patient's Body mass index is 23.34 kg/m². BMI is within normal parameters. No follow-up required.  Recommended:Influenza  Patient has continued with smoking cessation for over 2  years now.     does not drink  eat more fruits and vegetables, plan meals and have 3 meals a day    RISK SCORE: 3       This document has been electronically signed by Coral Berry MD on February 1, 2020 7:30 PM

## 2020-02-17 ENCOUNTER — TELEPHONE (OUTPATIENT)
Dept: PHARMACY | Facility: HOSPITAL | Age: 65
End: 2020-02-17

## 2020-02-17 NOTE — PHARMACY PATIENT ASSISTANCE
Received fax from patient's insurance that Symbicort was to no longer be filled. I called patient's insurance - [facility] to clarify as it was listed as tier 3 on formulary page and no alternative was named in fax. Representative stated that she ran a test claim and it should be approved and was likely due to an early refill by the patient. Case reference # [id number]. MD notified and no changes made at this time.

## 2020-02-17 NOTE — TELEPHONE ENCOUNTER
Received fax from patient's insurance that Symbicort was to no longer be filled. I called patient's [facility] to clarify as it was listed as tier 3 on formulary page and no alternative was named in fax. Representative stated that she ran a test claim and it should be approved and was likely due to an early refill by the patient. Case reference # [id number]. MD notified and no changes made at this time.    negative...

## 2020-02-21 RX ORDER — RANITIDINE 150 MG/1
TABLET ORAL
Qty: 180 TABLET | Refills: 3 | OUTPATIENT
Start: 2020-02-21

## 2020-03-05 ENCOUNTER — TELEPHONE (OUTPATIENT)
Dept: FAMILY MEDICINE CLINIC | Facility: CLINIC | Age: 65
End: 2020-03-05

## 2020-03-05 NOTE — TELEPHONE ENCOUNTER
Called and spoke with patient in reference to an appointment she had scheduled with Dr. Berry on 3/16/2020. Since pt was just seen in Jan, Dr. Berry said she did not need to be seen until May 2020 and therefore we would cancel this appointment.     Pt voiced verbal understanding. Told her we would call her with her next appointment with Dr. Berry.

## 2020-03-10 RX ORDER — LOSARTAN POTASSIUM 100 MG/1
100 TABLET ORAL DAILY
Qty: 90 TABLET | Refills: 3 | Status: SHIPPED | OUTPATIENT
Start: 2020-03-10 | End: 2020-04-07 | Stop reason: SDUPTHER

## 2020-03-10 RX ORDER — PRAVASTATIN SODIUM 40 MG
40 TABLET ORAL NIGHTLY
Qty: 90 TABLET | Refills: 3 | Status: SHIPPED | OUTPATIENT
Start: 2020-03-10 | End: 2020-04-07 | Stop reason: SDUPTHER

## 2020-03-17 ENCOUNTER — TELEPHONE (OUTPATIENT)
Dept: FAMILY MEDICINE CLINIC | Facility: CLINIC | Age: 65
End: 2020-03-17

## 2020-04-07 RX ORDER — PRAVASTATIN SODIUM 40 MG
40 TABLET ORAL NIGHTLY
Qty: 90 TABLET | Refills: 3 | Status: SHIPPED | OUTPATIENT
Start: 2020-04-07 | End: 2020-01-01 | Stop reason: SDUPTHER

## 2020-04-07 RX ORDER — LOSARTAN POTASSIUM 100 MG/1
100 TABLET ORAL DAILY
Qty: 90 TABLET | Refills: 3 | Status: SHIPPED | OUTPATIENT
Start: 2020-04-07 | End: 2020-01-01

## 2020-05-04 NOTE — TELEPHONE ENCOUNTER
PATIENT CALLED ASKING FOR  A REFILL ON ALL OF HER MEDS. SHE WOULD LIKE THEM CALLED IN TO [facility].    THANKS,  PEDRO

## 2020-09-14 NOTE — PROGRESS NOTES
Pharmacy Medication Reconciliation  Patient: Michelle Child   Sex: female  : 1955  Primary Care Physician: Coral Berry MD   Outpatient Pharmacy: [facility]      Reconciled Medication List  Prior to Admission medications    Medication Sig Start Date End Date Taking? Authorizing Provider   acetaminophen (TYLENOL) 325 MG tablet Take 650 mg by mouth Every 4 (Four) Hours As Needed for Mild Pain .   Yes Mele Garcia MD   ALBUTEROL SULFATE  (90 Base) MCG/ACT inhaler INHALE 2 PUFFS EVERY 4 (FOUR) AS NEEDED  FOR WHEEZING. 20  Yes Vance Camara MD   aspirin 81 MG chewable tablet Chew 1 tablet Daily. 19  Yes Coral Berry MD   budesonide-formoterol (SYMBICORT) 160-4.5 MCG/ACT inhaler Inhale 2 puffs 2 (Two) Times a Day. 12/10/19  Yes Coral Berry MD   ferrous sulfate 325 (65 Fe) MG tablet TAKE ONE TABLET BY MOUTH TWICE A DAY WITH MEALS 20  Yes Coral Berry MD   FLUoxetine (PROzac) 20 MG capsule TAKE ONE CAPSULE BY MOUTH ONCE DAILY FOR DEPRESSION 20  Yes Coral Berry MD   FLUoxetine (PROzac) 40 MG capsule TAKE ONE CAPSULE BY MOUTH ONCE DAILY FOR DEPRESSION 20  Yes Coral Berry MD   folic acid (FOLVITE) 1 MG tablet TAKE ONE TABLET BY MOUTH ONCE DAILY 20  Yes Coral Berry MD   furosemide (LASIX) 20 MG tablet TAKE 1/2 TABLET BY MOUTH DAILY FOR FLUID RETENTION 20  Yes Coral Berry MD   losartan (COZAAR) 100 MG tablet Take 1 tablet by mouth Daily. For heart 20  Yes Coral Berry MD   omeprazole (priLOSEC) 20 MG capsule Take 20 mg by mouth Daily.   Yes Mele Garcia MD   ondansetron (ZOFRAN) 4 MG tablet Take 1 tablet by mouth Every 6 (Six) Hours As Needed for Nausea or Vomiting. 19  Yes Coral Berry MD   potassium chloride (K-DUR) 10 MEQ CR tablet TAKE ONE TABLET BY MOUTH TWICE A DAY 8/3/20  Yes Vance Camara MD   pravastatin (PRAVACHOL) 40 MG tablet Take 1 tablet by mouth Every Night.  "7/2/20  Yes Coral Berry MD   risperiDONE (risperDAL) 1 MG tablet TAKE ONE TABLET BY MOUTH EACH EVENING AT BEDTIME FOR INSOMNIA 5/1/20  Yes Coral Berry MD    ASPIRIN ADULT LOW STRENGTH 81 MG EC tablet TAKE ONE TABLET BY MOUTH ONCE DAILY 8/27/20 9/14/20 Yes Vance Camara MD   famotidine (PEPCID) 40 MG tablet Take 1 tablet by mouth 2 (Two) Times a Day As Needed for Heartburn. 1/14/20 9/14/20  Coral Berry MD         Allergies  Allergies   Allergen Reactions   • Codeine Nausea And Vomiting   • Penicillins Nausea And Vomiting   • Sulfa Antibiotics Nausea And Vomiting         Number of medications added - 1  Number of medications removed - 2  Number of clarifications - 2  Patient reported compliance - Excellent  Allergy changes - None      Summary/Recommendations  Patient reports that they get their medication pill packed by [facility].  Will call to establish compliance with medication regimen.  Patient reports not needing her albuterol inhaler.  Takes nexium 20 mg OTC and heartburn is well controlled.  Patient reports waking up frequently throughout the night having to go to the restroom.  Patient reports getting her DEXA scan done previously and results were \"good\".  Does not take any other supplements.  Aspirin was a duplicate.  Patient does not take zofran even though it is prescribed and she does get it.      Juanpablo Tucker, PharmD  09/14/20 10:31 CDT     "

## 2020-09-14 NOTE — ACP (ADVANCE CARE PLANNING)
Patient has advanced directive on file and have verified that is still valid today.      This document has been electronically signed by Coral Berry MD on September 14, 2020 13:07 CDT

## 2020-09-14 NOTE — PROGRESS NOTES
QUICK REFERENCE INFORMATION:  The ABCs of the Annual Wellness Visit    Subsequent Medicare Wellness Visit     HEALTH RISK ASSESSMENT    : 1955    Recent Hospitalizations:  No hospitalization(s) within the last year.        Current Medical Providers:  Patient Care Team:  Coral Berry MD as PCP - General  NimCoral roy MD as PCP - Claims Attributed  Michele Rivera MD as Consulting Physician (Cardiology)  Juan C Coffman MD as Consulting Physician (Neurology)  Lora Garcia APRN as Nurse Practitioner (Orthopedic Surgery)  Gay Cornejo MD as Consulting Physician (Pulmonary Disease)        Smoking Status:  Social History     Tobacco Use   Smoking Status Former Smoker   • Packs/day: 1.00   • Years: 39.00   • Pack years: 39.00   • Quit date: 1/10/2018   • Years since quittin.6   Smokeless Tobacco Never Used   Tobacco Comment    SMOKED FOR 20>PLUS YRS       Alcohol Consumption:  Social History     Substance and Sexual Activity   Alcohol Use No       Depression Screen:   PHQ-2/PHQ-9 Depression Screening 2020   Little interest or pleasure in doing things 0   Feeling down, depressed, or hopeless 0   Trouble falling or staying asleep, or sleeping too much 0   Feeling tired or having little energy 0   Poor appetite or overeating 0   Feeling bad about yourself - or that you are a failure or have let yourself or your family down 0   Trouble concentrating on things, such as reading the newspaper or watching television 0   Moving or speaking so slowly that other people could have noticed. Or the opposite - being so fidgety or restless that you have been moving around a lot more than usual 0   Thoughts that you would be better off dead, or of hurting yourself in some way 0   Total Score 0   If you checked off any problems, how difficult have these problems made it for you to do your work, take care of things at home, or get along with other people? Not difficult at all       Health Habits and  Functional and Cognitive Screening:  Functional & Cognitive Status 9/14/2020   Do you have difficulty preparing food and eating? No   Do you have difficulty bathing yourself, getting dressed or grooming yourself? No   Do you have difficulty using the toilet? No   Do you have difficulty moving around from place to place? No   Do you have trouble with steps or getting out of a bed or a chair? No   Current Diet Well Balanced Diet   Dental Exam Unknown   Eye Exam Unknown   Exercise (times per week) 1 times per week   Current Exercise Activities Include Walking   Do you need help using the phone?  No   Are you deaf or do you have serious difficulty hearing?  No   Do you need help with transportation? No   Do you need help shopping? No   Do you need help preparing meals?  No   Do you need help with housework?  No   Do you need help with laundry? No   Do you need help taking your medications? No   Do you need help managing money? No   Do you ever drive or ride in a car without wearing a seat belt? No           Does the patient have evidence of cognitive impairment? No    Asiprin use counseling: Taking ASA appropriately as indicated      Recent Lab Results:          Lab Results   Component Value Date    CHOL 152 05/08/2019    TRIG 69 05/08/2019     (H) 05/08/2019    VLDL 13.8 05/08/2019    LDLHDL 0.37 05/08/2019           Age-appropriate Screening Schedule:  Refer to the list below for future screening recommendations based on patient's age, sex and/or medical conditions. Orders for these recommended tests are listed in the plan section. The patient has been provided with a written plan.    Health Maintenance   Topic Date Due   • ZOSTER VACCINE (2 of 2) 06/27/2018   • MAMMOGRAM  05/15/2020   • INFLUENZA VACCINE  08/01/2020   • DXA SCAN  08/19/2021   • COLONOSCOPY  05/22/2024   • TDAP/TD VACCINES (2 - Td) 04/20/2027        Subjective   History of Present Illness    Michelle Child is a 64 y.o. female who presents  for an Annual Wellness Visit.    The following portions of the patient's history were reviewed and updated as appropriate: allergies, current medications, past family history, past medical history, past social history, past surgical history and problem list.    Outpatient Medications Prior to Visit   Medication Sig Dispense Refill   • acetaminophen (TYLENOL) 325 MG tablet Take 650 mg by mouth Every 4 (Four) Hours As Needed for Mild Pain .     • ALBUTEROL SULFATE  (90 Base) MCG/ACT inhaler INHALE 2 PUFFS EVERY 4 (FOUR) AS NEEDED  FOR WHEEZING. 8.5 g 1   • aspirin 81 MG chewable tablet Chew 1 tablet Daily. 30 tablet 11   • budesonide-formoterol (SYMBICORT) 160-4.5 MCG/ACT inhaler Inhale 2 puffs 2 (Two) Times a Day. 3 inhaler 3   • ferrous sulfate 325 (65 Fe) MG tablet TAKE ONE TABLET BY MOUTH TWICE A DAY WITH MEALS 60 each 5   • FLUoxetine (PROzac) 20 MG capsule TAKE ONE CAPSULE BY MOUTH ONCE DAILY FOR DEPRESSION 90 capsule 3   • FLUoxetine (PROzac) 40 MG capsule TAKE ONE CAPSULE BY MOUTH ONCE DAILY FOR DEPRESSION 90 capsule 3   • folic acid (FOLVITE) 1 MG tablet TAKE ONE TABLET BY MOUTH ONCE DAILY 90 tablet 3   • furosemide (LASIX) 20 MG tablet TAKE 1/2 TABLET BY MOUTH DAILY FOR FLUID RETENTION 45 tablet 3   • losartan (COZAAR) 100 MG tablet Take 1 tablet by mouth Daily. For heart 90 tablet 3   • omeprazole (priLOSEC) 20 MG capsule Take 20 mg by mouth Daily.     • ondansetron (ZOFRAN) 4 MG tablet Take 1 tablet by mouth Every 6 (Six) Hours As Needed for Nausea or Vomiting. 30 tablet 3   • potassium chloride (K-DUR) 10 MEQ CR tablet TAKE ONE TABLET BY MOUTH TWICE A DAY 60 tablet 1   • pravastatin (PRAVACHOL) 40 MG tablet Take 1 tablet by mouth Every Night. 90 tablet 3   • risperiDONE (risperDAL) 1 MG tablet TAKE ONE TABLET BY MOUTH EACH EVENING AT BEDTIME FOR INSOMNIA 90 tablet 3   • SM ASPIRIN ADULT LOW STRENGTH 81 MG EC tablet TAKE ONE TABLET BY MOUTH ONCE DAILY 30 tablet 3   • famotidine (PEPCID) 40 MG tablet  "Take 1 tablet by mouth 2 (Two) Times a Day As Needed for Heartburn. 60 tablet 5     No facility-administered medications prior to visit.        Patient Active Problem List   Diagnosis   • Migraine   • Iron deficiency anemia   • Gastroesophageal reflux disease   • Essential hypertension   • Coronary arteriosclerosis   • Bilateral pseudophakia   • Astigmatism   • Hypokalemia   • Cerebral microvascular disease   • Pulmonary hypertension (CMS/HCC)   • Rheumatic tricuspid valve regurgitation   • Rheumatic mitral stenosis   • Pulmonary hypertension due to left heart valvular disease (CMS/HCC)   • Cor pulmonale, chronic (CMS/HCC)   • Folic acid deficiency   • Stage 3 severe COPD by GOLD classification (CMS/HCC)   • Personal history of tobacco use, presenting hazards to health   • Tobacco abuse, in remission   • Recurrent major depressive disorder, in full remission (CMS/[facility])       Advance Care Planning:  ACP discussion was held with the patient during this visit. Patient has an advance directive in EMR which is still valid.     Identification of Risk Factors:  Risk factors include: Breast Cancer/Mammogram Screening  Fall Risk  Immunizations Discussed/Encouraged (specific immunizations; Pneumococcal 23 )  Polypharmacy.    Review of Systems    Compared to one year ago, the patient feels her physical health is better.  Compared to one year ago, the patient feels her mental health is the same.    Objective     Physical Exam    Vitals:    09/14/20 1018   BP: 140/70   BP Location: Left arm   Patient Position: Sitting   Cuff Size: Adult   Pulse: 67   SpO2: 98%   Weight: 60.8 kg (134 lb)   Height: 162.6 cm (64\")   PainSc: 0-No pain       Patient's Body mass index is 23 kg/m². BMI is within normal parameters. No follow-up required..      Assessment/Plan   Patient Self-Management and Personalized Health Advice  The patient has been provided with information about: diet, exercise and fall prevention and preventive services including: "   · Annual Wellness Visit (AWV).    Visit Diagnoses:    ICD-10-CM ICD-9-CM   1. Medicare annual wellness visit, subsequent  Z00.00 V70.0   2. Encounter for screening mammogram for breast cancer  Z12.31 V76.12   3. Need for pneumococcal vaccination  Z23 V03.82   4. Essential hypertension  I10 401.9   5. Iron deficiency anemia, unspecified iron deficiency anemia type  D50.9 280.9   6. Folic acid deficiency  E53.8 266.2   7. Coronary arteriosclerosis  I25.10 414.00       Orders Placed This Encounter   Procedures   • Mammo Screening Digital Tomosynthesis Bilateral With CAD     Order Specific Question:   Reason for Exam:     Answer:   screening for breast cancer   • CBC (No Diff)   • Comprehensive Metabolic Panel   • Lipid Panel   • Folate   • Microalbumin / Creatinine Urine Ratio - Urine, Clean Catch       Outpatient Encounter Medications as of 9/14/2020   Medication Sig Dispense Refill   • acetaminophen (TYLENOL) 325 MG tablet Take 650 mg by mouth Every 4 (Four) Hours As Needed for Mild Pain .     • ALBUTEROL SULFATE  (90 Base) MCG/ACT inhaler INHALE 2 PUFFS EVERY 4 (FOUR) AS NEEDED  FOR WHEEZING. 8.5 g 1   • aspirin 81 MG chewable tablet Chew 1 tablet Daily. 30 tablet 11   • budesonide-formoterol (SYMBICORT) 160-4.5 MCG/ACT inhaler Inhale 2 puffs 2 (Two) Times a Day. 3 inhaler 3   • ferrous sulfate 325 (65 Fe) MG tablet TAKE ONE TABLET BY MOUTH TWICE A DAY WITH MEALS 60 each 5   • FLUoxetine (PROzac) 20 MG capsule TAKE ONE CAPSULE BY MOUTH ONCE DAILY FOR DEPRESSION 90 capsule 3   • FLUoxetine (PROzac) 40 MG capsule TAKE ONE CAPSULE BY MOUTH ONCE DAILY FOR DEPRESSION 90 capsule 3   • folic acid (FOLVITE) 1 MG tablet TAKE ONE TABLET BY MOUTH ONCE DAILY 90 tablet 3   • furosemide (LASIX) 20 MG tablet TAKE 1/2 TABLET BY MOUTH DAILY FOR FLUID RETENTION 45 tablet 3   • losartan (COZAAR) 100 MG tablet Take 1 tablet by mouth Daily. For heart 90 tablet 3   • omeprazole (priLOSEC) 20 MG capsule Take 20 mg by mouth Daily.      • ondansetron (ZOFRAN) 4 MG tablet Take 1 tablet by mouth Every 6 (Six) Hours As Needed for Nausea or Vomiting. 30 tablet 3   • potassium chloride (K-DUR) 10 MEQ CR tablet TAKE ONE TABLET BY MOUTH TWICE A DAY 60 tablet 1   • pravastatin (PRAVACHOL) 40 MG tablet Take 1 tablet by mouth Every Night. 90 tablet 3   • risperiDONE (risperDAL) 1 MG tablet TAKE ONE TABLET BY MOUTH EACH EVENING AT BEDTIME FOR INSOMNIA 90 tablet 3   • [DISCONTINUED] SM ASPIRIN ADULT LOW STRENGTH 81 MG EC tablet TAKE ONE TABLET BY MOUTH ONCE DAILY 30 tablet 3   • [DISCONTINUED] famotidine (PEPCID) 40 MG tablet Take 1 tablet by mouth 2 (Two) Times a Day As Needed for Heartburn. 60 tablet 5     Facility-Administered Encounter Medications as of 9/14/2020   Medication Dose Route Frequency Provider Last Rate Last Dose   • [COMPLETED] pneumococcal polysaccharide 23-valent (PNEUMOVAX-23) vaccine 0.5 mL  0.5 mL Intramuscular Once Coral Berry MD   0.5 mL at 09/14/20 1101   • [DISCONTINUED] pneumococcal polysaccharide 23-valent (PNEUMOVAX-23) vaccine 0.5 mL  0.5 mL Intramuscular During Hospitalization Coral Berry MD         Goals        Exercise    • Exercise 150 minutes per week (moderate activity)      Increase activity      • Exercise 150 minutes per week (moderate activity)      Pt said she will walk more          Lifestyle    • Increase physical activity (pt-stated)      Barriers to goals: Prolonged hospitalization and illness          Reviewed use of high risk medication in the elderly: yes  Reviewed for potential of harmful drug interactions in the elderly: yes    Follow Up:  Return in about 1 year (around 9/14/2021) for Medicare Wellness.     An After Visit Summary and PPPS with all of these plans were given to the patient.        Risk Score 3      This document has been electronically signed by Coral Berry MD on September 14, 2020 13:07 CDT

## 2021-01-01 ENCOUNTER — APPOINTMENT (OUTPATIENT)
Dept: GENERAL RADIOLOGY | Facility: HOSPITAL | Age: 66
End: 2021-01-01

## 2021-01-01 ENCOUNTER — OFFICE VISIT (OUTPATIENT)
Dept: SURGERY | Facility: CLINIC | Age: 66
End: 2021-01-01

## 2021-01-01 ENCOUNTER — LAB (OUTPATIENT)
Dept: LAB | Facility: HOSPITAL | Age: 66
End: 2021-01-01

## 2021-01-01 ENCOUNTER — NURSING HOME (OUTPATIENT)
Dept: FAMILY MEDICINE CLINIC | Facility: CLINIC | Age: 66
End: 2021-01-01

## 2021-01-01 ENCOUNTER — HOSPITAL ENCOUNTER (OUTPATIENT)
Dept: RADIATION ONCOLOGY | Facility: HOSPITAL | Age: 66
Setting detail: RADIATION/ONCOLOGY SERIES
End: 2021-01-01

## 2021-01-01 ENCOUNTER — TELEPHONE (OUTPATIENT)
Dept: OBSTETRICS AND GYNECOLOGY | Facility: CLINIC | Age: 66
End: 2021-01-01

## 2021-01-01 ENCOUNTER — LAB (OUTPATIENT)
Dept: ONCOLOGY | Facility: HOSPITAL | Age: 66
End: 2021-01-01

## 2021-01-01 ENCOUNTER — INFUSION (OUTPATIENT)
Dept: ONCOLOGY | Facility: HOSPITAL | Age: 66
End: 2021-01-01

## 2021-01-01 ENCOUNTER — DOCUMENTATION (OUTPATIENT)
Dept: ONCOLOGY | Facility: CLINIC | Age: 66
End: 2021-01-01

## 2021-01-01 ENCOUNTER — CONSULT (OUTPATIENT)
Dept: RADIATION ONCOLOGY | Facility: HOSPITAL | Age: 66
End: 2021-01-01

## 2021-01-01 ENCOUNTER — HOSPITAL ENCOUNTER (INPATIENT)
Facility: HOSPITAL | Age: 66
LOS: 2 days | Discharge: HOSPICE | End: 2021-04-28
Attending: STUDENT IN AN ORGANIZED HEALTH CARE EDUCATION/TRAINING PROGRAM | Admitting: FAMILY MEDICINE

## 2021-01-01 ENCOUNTER — APPOINTMENT (OUTPATIENT)
Dept: ULTRASOUND IMAGING | Facility: HOSPITAL | Age: 66
End: 2021-01-01

## 2021-01-01 ENCOUNTER — HOSPITAL ENCOUNTER (OUTPATIENT)
Facility: HOSPITAL | Age: 66
Setting detail: HOSPITAL OUTPATIENT SURGERY
Discharge: HOME | End: 2021-03-31
Attending: SURGERY | Admitting: SURGERY

## 2021-01-01 ENCOUNTER — APPOINTMENT (OUTPATIENT)
Dept: CARDIOLOGY | Facility: HOSPITAL | Age: 66
End: 2021-01-01

## 2021-01-01 ENCOUNTER — HOSPITAL ENCOUNTER (INPATIENT)
Facility: HOSPITAL | Age: 66
LOS: 2 days | Discharge: NURSING, REHAB OR LONG-TERM CARE | End: 2021-03-19
Attending: FAMILY MEDICINE | Admitting: FAMILY MEDICINE

## 2021-01-01 ENCOUNTER — TELEPHONE (OUTPATIENT)
Dept: FAMILY MEDICINE CLINIC | Facility: CLINIC | Age: 66
End: 2021-01-01

## 2021-01-01 ENCOUNTER — APPOINTMENT (OUTPATIENT)
Dept: CT IMAGING | Facility: HOSPITAL | Age: 66
End: 2021-01-01

## 2021-01-01 ENCOUNTER — NURSE NAVIGATOR (OUTPATIENT)
Dept: ONCOLOGY | Facility: CLINIC | Age: 66
End: 2021-01-01

## 2021-01-01 ENCOUNTER — TELEPHONE (OUTPATIENT)
Dept: NUTRITION | Facility: HOSPITAL | Age: 66
End: 2021-01-01

## 2021-01-01 ENCOUNTER — APPOINTMENT (OUTPATIENT)
Dept: ONCOLOGY | Facility: CLINIC | Age: 66
End: 2021-01-01

## 2021-01-01 ENCOUNTER — OFFICE VISIT (OUTPATIENT)
Dept: FAMILY MEDICINE CLINIC | Facility: CLINIC | Age: 66
End: 2021-01-01

## 2021-01-01 ENCOUNTER — ANESTHESIA EVENT (OUTPATIENT)
Dept: EMERGENCY DEPT | Facility: HOSPITAL | Age: 66
End: 2021-01-01

## 2021-01-01 ENCOUNTER — EPISODE CHANGES (OUTPATIENT)
Dept: CASE MANAGEMENT | Facility: OTHER | Age: 66
End: 2021-01-01

## 2021-01-01 ENCOUNTER — ANESTHESIA (OUTPATIENT)
Dept: PERIOP | Facility: HOSPITAL | Age: 66
End: 2021-01-01

## 2021-01-01 ENCOUNTER — CONSULT (OUTPATIENT)
Dept: ONCOLOGY | Facility: CLINIC | Age: 66
End: 2021-01-01

## 2021-01-01 ENCOUNTER — APPOINTMENT (OUTPATIENT)
Dept: ONCOLOGY | Facility: HOSPITAL | Age: 66
End: 2021-01-01

## 2021-01-01 ENCOUNTER — LAB REQUISITION (OUTPATIENT)
Dept: LAB | Facility: HOSPITAL | Age: 66
End: 2021-01-01

## 2021-01-01 ENCOUNTER — ANESTHESIA EVENT (OUTPATIENT)
Dept: PERIOP | Facility: HOSPITAL | Age: 66
End: 2021-01-01

## 2021-01-01 ENCOUNTER — HOSPITAL ENCOUNTER (OUTPATIENT)
Facility: HOSPITAL | Age: 66
Setting detail: HOSPITAL OUTPATIENT SURGERY
Discharge: STILL ADMITTED OR ADMINISTRATIVE | End: 2021-04-26
Attending: SURGERY | Admitting: SURGERY

## 2021-01-01 ENCOUNTER — IMMUNIZATION (OUTPATIENT)
Dept: VACCINE CLINIC | Facility: HOSPITAL | Age: 66
End: 2021-01-01

## 2021-01-01 ENCOUNTER — ANESTHESIA (OUTPATIENT)
Dept: EMERGENCY DEPT | Facility: HOSPITAL | Age: 66
End: 2021-01-01

## 2021-01-01 ENCOUNTER — OFFICE VISIT (OUTPATIENT)
Dept: ONCOLOGY | Facility: CLINIC | Age: 66
End: 2021-01-01

## 2021-01-01 ENCOUNTER — HOSPITAL ENCOUNTER (EMERGENCY)
Facility: HOSPITAL | Age: 66
End: 2021-01-01

## 2021-01-01 ENCOUNTER — HOSPITAL ENCOUNTER (OUTPATIENT)
Dept: PET IMAGING | Facility: HOSPITAL | Age: 66
Discharge: HOME | End: 2021-04-09

## 2021-01-01 ENCOUNTER — APPOINTMENT (OUTPATIENT)
Dept: PREADMISSION TESTING | Facility: HOSPITAL | Age: 66
End: 2021-01-01

## 2021-01-01 ENCOUNTER — APPOINTMENT (OUTPATIENT)
Dept: MRI IMAGING | Facility: HOSPITAL | Age: 66
End: 2021-01-01

## 2021-01-01 VITALS
HEIGHT: 64 IN | HEART RATE: 79 BPM | WEIGHT: 112 LBS | SYSTOLIC BLOOD PRESSURE: 110 MMHG | OXYGEN SATURATION: 98 % | DIASTOLIC BLOOD PRESSURE: 68 MMHG | BODY MASS INDEX: 19.12 KG/M2

## 2021-01-01 VITALS
BODY MASS INDEX: 18.1 KG/M2 | RESPIRATION RATE: 18 BRPM | SYSTOLIC BLOOD PRESSURE: 129 MMHG | HEIGHT: 64 IN | TEMPERATURE: 98.1 F | WEIGHT: 106 LBS | DIASTOLIC BLOOD PRESSURE: 60 MMHG | HEART RATE: 77 BPM

## 2021-01-01 VITALS
BODY MASS INDEX: 18.13 KG/M2 | WEIGHT: 106.2 LBS | HEIGHT: 64 IN | SYSTOLIC BLOOD PRESSURE: 130 MMHG | DIASTOLIC BLOOD PRESSURE: 62 MMHG | TEMPERATURE: 97 F | HEART RATE: 69 BPM

## 2021-01-01 VITALS
HEART RATE: 104 BPM | DIASTOLIC BLOOD PRESSURE: 56 MMHG | RESPIRATION RATE: 20 BRPM | OXYGEN SATURATION: 97 % | SYSTOLIC BLOOD PRESSURE: 120 MMHG | HEIGHT: 64 IN | WEIGHT: 106.7 LBS | TEMPERATURE: 98 F | BODY MASS INDEX: 18.22 KG/M2

## 2021-01-01 VITALS
BODY MASS INDEX: 20.9 KG/M2 | OXYGEN SATURATION: 90 % | HEART RATE: 79 BPM | HEIGHT: 64 IN | RESPIRATION RATE: 18 BRPM | SYSTOLIC BLOOD PRESSURE: 127 MMHG | TEMPERATURE: 98.3 F | WEIGHT: 122.4 LBS | DIASTOLIC BLOOD PRESSURE: 60 MMHG

## 2021-01-01 VITALS
WEIGHT: 120 LBS | SYSTOLIC BLOOD PRESSURE: 102 MMHG | DIASTOLIC BLOOD PRESSURE: 60 MMHG | TEMPERATURE: 98.8 F | HEART RATE: 87 BPM | HEIGHT: 64 IN | BODY MASS INDEX: 20.49 KG/M2

## 2021-01-01 VITALS
BODY MASS INDEX: 20.06 KG/M2 | WEIGHT: 117.5 LBS | HEIGHT: 64 IN | TEMPERATURE: 98.2 F | SYSTOLIC BLOOD PRESSURE: 138 MMHG | DIASTOLIC BLOOD PRESSURE: 66 MMHG | HEART RATE: 92 BPM | RESPIRATION RATE: 16 BRPM

## 2021-01-01 VITALS
HEART RATE: 80 BPM | DIASTOLIC BLOOD PRESSURE: 65 MMHG | RESPIRATION RATE: 18 BRPM | RESPIRATION RATE: 20 BRPM | DIASTOLIC BLOOD PRESSURE: 53 MMHG | HEART RATE: 72 BPM | SYSTOLIC BLOOD PRESSURE: 132 MMHG | HEIGHT: 64 IN | WEIGHT: 117 LBS | TEMPERATURE: 97.5 F | SYSTOLIC BLOOD PRESSURE: 137 MMHG | TEMPERATURE: 97.6 F | BODY MASS INDEX: 19.97 KG/M2

## 2021-01-01 VITALS
HEART RATE: 82 BPM | TEMPERATURE: 98.7 F | DIASTOLIC BLOOD PRESSURE: 70 MMHG | BODY MASS INDEX: 19.97 KG/M2 | HEIGHT: 64 IN | WEIGHT: 117 LBS | SYSTOLIC BLOOD PRESSURE: 126 MMHG

## 2021-01-01 VITALS
WEIGHT: 105 LBS | HEART RATE: 78 BPM | RESPIRATION RATE: 18 BRPM | SYSTOLIC BLOOD PRESSURE: 103 MMHG | DIASTOLIC BLOOD PRESSURE: 58 MMHG | TEMPERATURE: 98.1 F | OXYGEN SATURATION: 96 % | BODY MASS INDEX: 18.02 KG/M2

## 2021-01-01 VITALS
TEMPERATURE: 98 F | DIASTOLIC BLOOD PRESSURE: 60 MMHG | SYSTOLIC BLOOD PRESSURE: 122 MMHG | OXYGEN SATURATION: 92 % | RESPIRATION RATE: 18 BRPM | HEART RATE: 91 BPM

## 2021-01-01 VITALS
WEIGHT: 117.73 LBS | BODY MASS INDEX: 20.1 KG/M2 | RESPIRATION RATE: 20 BRPM | HEIGHT: 64 IN | HEART RATE: 80 BPM | OXYGEN SATURATION: 94 % | DIASTOLIC BLOOD PRESSURE: 57 MMHG | SYSTOLIC BLOOD PRESSURE: 126 MMHG | TEMPERATURE: 98.3 F

## 2021-01-01 VITALS
SYSTOLIC BLOOD PRESSURE: 132 MMHG | TEMPERATURE: 97.8 F | DIASTOLIC BLOOD PRESSURE: 54 MMHG | OXYGEN SATURATION: 94 % | RESPIRATION RATE: 18 BRPM | HEART RATE: 82 BPM

## 2021-01-01 VITALS
OXYGEN SATURATION: 95 % | WEIGHT: 106 LBS | DIASTOLIC BLOOD PRESSURE: 72 MMHG | HEIGHT: 64 IN | HEART RATE: 91 BPM | BODY MASS INDEX: 18.1 KG/M2 | RESPIRATION RATE: 18 BRPM | TEMPERATURE: 97.9 F | SYSTOLIC BLOOD PRESSURE: 175 MMHG

## 2021-01-01 VITALS — OXYGEN SATURATION: 95 %

## 2021-01-01 DIAGNOSIS — I10 ESSENTIAL HYPERTENSION, BENIGN: ICD-10-CM

## 2021-01-01 DIAGNOSIS — R19.7 DIARRHEA, UNSPECIFIED TYPE: ICD-10-CM

## 2021-01-01 DIAGNOSIS — R53.1 WEAKNESS GENERALIZED: Primary | ICD-10-CM

## 2021-01-01 DIAGNOSIS — I25.10 ATHEROSCLEROSIS OF NATIVE CORONARY ARTERY OF NATIVE HEART, ANGINA PRESENCE UNSPECIFIED: ICD-10-CM

## 2021-01-01 DIAGNOSIS — R33.8 ACUTE URINARY RETENTION: Primary | ICD-10-CM

## 2021-01-01 DIAGNOSIS — K59.02 CONSTIPATION DUE TO OUTLET DYSFUNCTION: Primary | ICD-10-CM

## 2021-01-01 DIAGNOSIS — R53.81 DEBILITY: ICD-10-CM

## 2021-01-01 DIAGNOSIS — Z78.9 IMPAIRED MOBILITY AND ACTIVITIES OF DAILY LIVING: ICD-10-CM

## 2021-01-01 DIAGNOSIS — R53.1 WEAKNESS GENERALIZED: ICD-10-CM

## 2021-01-01 DIAGNOSIS — C21.1 MALIGNANT NEOPLASM OF ANAL CANAL (HCC): ICD-10-CM

## 2021-01-01 DIAGNOSIS — R63.4 WEIGHT LOSS OF MORE THAN 10% BODY WEIGHT: ICD-10-CM

## 2021-01-01 DIAGNOSIS — K62.89 MASS OF ANUS: ICD-10-CM

## 2021-01-01 DIAGNOSIS — R33.8 ACUTE URINARY RETENTION: ICD-10-CM

## 2021-01-01 DIAGNOSIS — Z01.818 PREOP TESTING: Primary | ICD-10-CM

## 2021-01-01 DIAGNOSIS — D63.8 ANEMIA OF CHRONIC DISEASE: ICD-10-CM

## 2021-01-01 DIAGNOSIS — K62.89 RECTALGIA: ICD-10-CM

## 2021-01-01 DIAGNOSIS — Z74.09 IMPAIRED MOBILITY AND ACTIVITIES OF DAILY LIVING: ICD-10-CM

## 2021-01-01 DIAGNOSIS — D64.9 ANEMIA, UNSPECIFIED TYPE: ICD-10-CM

## 2021-01-01 DIAGNOSIS — I10 ESSENTIAL HYPERTENSION: ICD-10-CM

## 2021-01-01 DIAGNOSIS — C21.0 ANAL SQUAMOUS CELL CARCINOMA (HCC): Chronic | ICD-10-CM

## 2021-01-01 DIAGNOSIS — K59.00 CONSTIPATION, UNSPECIFIED CONSTIPATION TYPE: Primary | ICD-10-CM

## 2021-01-01 DIAGNOSIS — D50.0 IRON DEFICIENCY ANEMIA DUE TO CHRONIC BLOOD LOSS: Primary | ICD-10-CM

## 2021-01-01 DIAGNOSIS — F33.2 SEVERE EPISODE OF RECURRENT MAJOR DEPRESSIVE DISORDER, WITHOUT PSYCHOTIC FEATURES (HCC): ICD-10-CM

## 2021-01-01 DIAGNOSIS — D50.9 IRON DEFICIENCY ANEMIA, UNSPECIFIED IRON DEFICIENCY ANEMIA TYPE: ICD-10-CM

## 2021-01-01 DIAGNOSIS — J44.9 STAGE 3 SEVERE COPD BY GOLD CLASSIFICATION (HCC): ICD-10-CM

## 2021-01-01 DIAGNOSIS — T17.908A ASPIRATION INTO RESPIRATORY TRACT, INITIAL ENCOUNTER: Primary | ICD-10-CM

## 2021-01-01 DIAGNOSIS — K64.9 HEMORRHOIDS, UNSPECIFIED HEMORRHOID TYPE: ICD-10-CM

## 2021-01-01 DIAGNOSIS — J96.01 ACUTE HYPOXEMIC RESPIRATORY FAILURE (HCC): Primary | ICD-10-CM

## 2021-01-01 DIAGNOSIS — Z74.09 IMPAIRED FUNCTIONAL MOBILITY, BALANCE, GAIT, AND ENDURANCE: ICD-10-CM

## 2021-01-01 DIAGNOSIS — C21.0 ANAL SQUAMOUS CELL CARCINOMA (HCC): ICD-10-CM

## 2021-01-01 DIAGNOSIS — C21.0 ANAL SQUAMOUS CELL CARCINOMA (HCC): Primary | Chronic | ICD-10-CM

## 2021-01-01 DIAGNOSIS — R29.90 STROKE-LIKE SYMPTOM: ICD-10-CM

## 2021-01-01 DIAGNOSIS — R53.1 WEAKNESS: ICD-10-CM

## 2021-01-01 DIAGNOSIS — D50.9 IRON DEFICIENCY ANEMIA, UNSPECIFIED: ICD-10-CM

## 2021-01-01 DIAGNOSIS — C21.0 ANAL SQUAMOUS CELL CARCINOMA (HCC): Primary | ICD-10-CM

## 2021-01-01 DIAGNOSIS — K62.5 BLOOD PER RECTUM: ICD-10-CM

## 2021-01-01 DIAGNOSIS — R41.0 DISORIENTATION: Primary | ICD-10-CM

## 2021-01-01 DIAGNOSIS — Z09 FOLLOW UP: ICD-10-CM

## 2021-01-01 DIAGNOSIS — T45.4X5A ADVERSE EFFECT OF IRON, INITIAL ENCOUNTER: ICD-10-CM

## 2021-01-01 DIAGNOSIS — E53.8 FOLIC ACID DEFICIENCY: ICD-10-CM

## 2021-01-01 DIAGNOSIS — I63.10 CEREBROVASCULAR ACCIDENT (CVA) DUE TO EMBOLISM OF PRECEREBRAL ARTERY (HCC): ICD-10-CM

## 2021-01-01 DIAGNOSIS — K62.89 MASS OF ANUS: Primary | ICD-10-CM

## 2021-01-01 DIAGNOSIS — T45.4X5A ADVERSE EFFECT OF IRON, INITIAL ENCOUNTER: Chronic | ICD-10-CM

## 2021-01-01 DIAGNOSIS — R47.9 DIFFICULTY SPEAKING: ICD-10-CM

## 2021-01-01 DIAGNOSIS — E86.0 DEHYDRATION: ICD-10-CM

## 2021-01-01 DIAGNOSIS — K62.5 RECTAL BLEED: ICD-10-CM

## 2021-01-01 DIAGNOSIS — Z51.5 COMFORT MEASURES ONLY STATUS: ICD-10-CM

## 2021-01-01 DIAGNOSIS — R41.82 ALTERED MENTAL STATUS, UNSPECIFIED ALTERED MENTAL STATUS TYPE: Primary | ICD-10-CM

## 2021-01-01 LAB
ABO GROUP BLD: NORMAL
ALBUMIN SERPL-MCNC: 2.5 G/DL (ref 3.5–5.2)
ALBUMIN SERPL-MCNC: 2.5 G/DL (ref 3.5–5.2)
ALBUMIN SERPL-MCNC: 2.7 G/DL (ref 3.5–5.2)
ALBUMIN SERPL-MCNC: 2.7 G/DL (ref 3.5–5.2)
ALBUMIN SERPL-MCNC: 2.8 G/DL (ref 3.5–5.2)
ALBUMIN SERPL-MCNC: 2.9 G/DL (ref 3.5–5.2)
ALBUMIN SERPL-MCNC: 2.9 G/DL (ref 3.5–5.2)
ALBUMIN SERPL-MCNC: 3 G/DL (ref 3.5–5.2)
ALBUMIN SERPL-MCNC: 3 G/DL (ref 3.5–5.2)
ALBUMIN SERPL-MCNC: 3.5 G/DL (ref 3.5–5.2)
ALBUMIN/GLOB SERPL: 0.9 G/DL
ALBUMIN/GLOB SERPL: 0.9 G/DL
ALBUMIN/GLOB SERPL: 1 G/DL
ALBUMIN/GLOB SERPL: 1.1 G/DL
ALBUMIN/GLOB SERPL: 1.1 G/DL
ALBUMIN/GLOB SERPL: 1.2 G/DL
ALBUMIN/GLOB SERPL: 1.3 G/DL
ALBUMIN/GLOB SERPL: 1.4 G/DL
ALP SERPL-CCNC: 105 U/L (ref 39–117)
ALP SERPL-CCNC: 108 U/L (ref 39–117)
ALP SERPL-CCNC: 82 U/L (ref 39–117)
ALP SERPL-CCNC: 85 U/L (ref 39–117)
ALP SERPL-CCNC: 88 U/L (ref 39–117)
ALP SERPL-CCNC: 93 U/L (ref 39–117)
ALP SERPL-CCNC: 93 U/L (ref 39–117)
ALP SERPL-CCNC: 94 U/L (ref 39–117)
ALP SERPL-CCNC: 94 U/L (ref 39–117)
ALP SERPL-CCNC: 98 U/L (ref 39–117)
ALT SERPL W P-5'-P-CCNC: 10 U/L (ref 1–33)
ALT SERPL W P-5'-P-CCNC: 10 U/L (ref 1–33)
ALT SERPL W P-5'-P-CCNC: 6 U/L (ref 1–33)
ALT SERPL W P-5'-P-CCNC: 6 U/L (ref 1–33)
ALT SERPL W P-5'-P-CCNC: 7 U/L (ref 1–33)
ALT SERPL W P-5'-P-CCNC: 8 U/L (ref 1–33)
ALT SERPL W P-5'-P-CCNC: 8 U/L (ref 1–33)
ALT SERPL W P-5'-P-CCNC: 9 U/L (ref 1–33)
ALT SERPL W P-5'-P-CCNC: 9 U/L (ref 1–33)
ALT SERPL W P-5'-P-CCNC: <5 U/L (ref 1–33)
ANION GAP SERPL CALCULATED.3IONS-SCNC: 10 MMOL/L (ref 5–15)
ANION GAP SERPL CALCULATED.3IONS-SCNC: 11 MMOL/L (ref 5–15)
ANION GAP SERPL CALCULATED.3IONS-SCNC: 12 MMOL/L (ref 5–15)
ANION GAP SERPL CALCULATED.3IONS-SCNC: 14 MMOL/L (ref 5–15)
ANION GAP SERPL CALCULATED.3IONS-SCNC: 5 MMOL/L (ref 5–15)
ANION GAP SERPL CALCULATED.3IONS-SCNC: 7 MMOL/L (ref 5–15)
ANION GAP SERPL CALCULATED.3IONS-SCNC: 8 MMOL/L (ref 5–15)
ANION GAP SERPL CALCULATED.3IONS-SCNC: 8 MMOL/L (ref 5–15)
ANION GAP SERPL CALCULATED.3IONS-SCNC: 9 MMOL/L (ref 5–15)
ANION GAP SERPL CALCULATED.3IONS-SCNC: 9 MMOL/L (ref 5–15)
APTT PPP: 29.7 SECONDS (ref 20–40.3)
ARTERIAL PATENCY WRIST A: ABNORMAL
AST SERPL-CCNC: 11 U/L (ref 1–32)
AST SERPL-CCNC: 11 U/L (ref 1–32)
AST SERPL-CCNC: 12 U/L (ref 1–32)
AST SERPL-CCNC: 13 U/L (ref 1–32)
AST SERPL-CCNC: 14 U/L (ref 1–32)
AST SERPL-CCNC: 14 U/L (ref 1–32)
AST SERPL-CCNC: 18 U/L (ref 1–32)
AST SERPL-CCNC: 19 U/L (ref 1–32)
AST SERPL-CCNC: 8 U/L (ref 1–32)
AST SERPL-CCNC: 9 U/L (ref 1–32)
ATMOSPHERIC PRESS: 746 MMHG
BACTERIA SPEC AEROBE CULT: ABNORMAL
BACTERIA SPEC AEROBE CULT: ABNORMAL
BACTERIA UR QL AUTO: ABNORMAL /HPF
BACTERIA UR QL AUTO: ABNORMAL /HPF
BASE EXCESS BLDA CALC-SCNC: -3.6 MMOL/L (ref 0–2)
BASOPHILS # BLD AUTO: 0.03 10*3/MM3 (ref 0–0.2)
BASOPHILS # BLD AUTO: 0.04 10*3/MM3 (ref 0–0.2)
BASOPHILS # BLD AUTO: 0.05 10*3/MM3 (ref 0–0.2)
BASOPHILS # BLD AUTO: 0.06 10*3/MM3 (ref 0–0.2)
BASOPHILS NFR BLD AUTO: 0.2 % (ref 0–1.5)
BASOPHILS NFR BLD AUTO: 0.2 % (ref 0–1.5)
BASOPHILS NFR BLD AUTO: 0.3 % (ref 0–1.5)
BASOPHILS NFR BLD AUTO: 0.4 % (ref 0–1.5)
BASOPHILS NFR BLD AUTO: 0.4 % (ref 0–1.5)
BASOPHILS NFR BLD AUTO: 0.5 % (ref 0–1.5)
BDY SITE: ABNORMAL
BH CV ECHO MEAS - ACS: 1.1 CM
BH CV ECHO MEAS - AI DEC SLOPE: 148 CM/SEC^2
BH CV ECHO MEAS - AI MAX PG: 85 MMHG
BH CV ECHO MEAS - AI MAX VEL: 461 CM/SEC
BH CV ECHO MEAS - AI P1/2T: 912.3 MSEC
BH CV ECHO MEAS - AO MAX PG (FULL): 8.2 MMHG
BH CV ECHO MEAS - AO MAX PG: 15.1 MMHG
BH CV ECHO MEAS - AO MEAN PG (FULL): 5 MMHG
BH CV ECHO MEAS - AO MEAN PG: 8 MMHG
BH CV ECHO MEAS - AO ROOT AREA (BSA CORRECTED): 2
BH CV ECHO MEAS - AO ROOT AREA: 7.5 CM^2
BH CV ECHO MEAS - AO ROOT DIAM: 3.1 CM
BH CV ECHO MEAS - AO V2 MAX: 194 CM/SEC
BH CV ECHO MEAS - AO V2 MEAN: 127 CM/SEC
BH CV ECHO MEAS - AO V2 VTI: 31.9 CM
BH CV ECHO MEAS - AVA(I,A): 2.4 CM^2
BH CV ECHO MEAS - AVA(I,D): 2.4 CM^2
BH CV ECHO MEAS - AVA(V,A): 2.1 CM^2
BH CV ECHO MEAS - AVA(V,D): 2.1 CM^2
BH CV ECHO MEAS - BSA(HAYCOCK): 1.5 M^2
BH CV ECHO MEAS - BSA(HAYCOCK): 1.6 M^2
BH CV ECHO MEAS - BSA: 1.5 M^2
BH CV ECHO MEAS - BSA: 1.6 M^2
BH CV ECHO MEAS - BZI_BMI: 18.2 KILOGRAMS/M^2
BH CV ECHO MEAS - BZI_BMI: 20.8 KILOGRAMS/M^2
BH CV ECHO MEAS - BZI_METRIC_HEIGHT: 162.6 CM
BH CV ECHO MEAS - BZI_METRIC_HEIGHT: 162.6 CM
BH CV ECHO MEAS - BZI_METRIC_WEIGHT: 48.1 KG
BH CV ECHO MEAS - BZI_METRIC_WEIGHT: 54.9 KG
BH CV ECHO MEAS - EDV(CUBED): 50.7 ML
BH CV ECHO MEAS - EDV(CUBED): 93.6 ML
BH CV ECHO MEAS - EDV(MOD-SP2): 31 ML
BH CV ECHO MEAS - EDV(MOD-SP4): 41.4 ML
BH CV ECHO MEAS - EDV(TEICH): 58.1 ML
BH CV ECHO MEAS - EDV(TEICH): 94.4 ML
BH CV ECHO MEAS - EF(CUBED): 63.7 %
BH CV ECHO MEAS - EF(CUBED): 73.4 %
BH CV ECHO MEAS - EF(MOD-SP2): 59.7 %
BH CV ECHO MEAS - EF(MOD-SP4): 67.4 %
BH CV ECHO MEAS - EF(TEICH): 56 %
BH CV ECHO MEAS - EF(TEICH): 65.3 %
BH CV ECHO MEAS - ESV(CUBED): 18.4 ML
BH CV ECHO MEAS - ESV(CUBED): 24.9 ML
BH CV ECHO MEAS - ESV(MOD-SP2): 12.5 ML
BH CV ECHO MEAS - ESV(MOD-SP4): 13.5 ML
BH CV ECHO MEAS - ESV(TEICH): 25.6 ML
BH CV ECHO MEAS - ESV(TEICH): 32.8 ML
BH CV ECHO MEAS - FS: 28.6 %
BH CV ECHO MEAS - FS: 35.7 %
BH CV ECHO MEAS - IVS/LVPW: 0.78
BH CV ECHO MEAS - IVS/LVPW: 0.91
BH CV ECHO MEAS - IVSD: 0.77 CM
BH CV ECHO MEAS - IVSD: 1 CM
BH CV ECHO MEAS - LA DIMENSION: 3.6 CM
BH CV ECHO MEAS - LA/AO: 1.2
BH CV ECHO MEAS - LV DIASTOLIC VOL/BSA (35-75): 26.2 ML/M^2
BH CV ECHO MEAS - LV MASS(C)D: 120.8 GRAMS
BH CV ECHO MEAS - LV MASS(C)D: 130.1 GRAMS
BH CV ECHO MEAS - LV MASS(C)DI: 76.5 GRAMS/M^2
BH CV ECHO MEAS - LV MASS(C)DI: 87.1 GRAMS/M^2
BH CV ECHO MEAS - LV MAX PG: 6.9 MMHG
BH CV ECHO MEAS - LV MEAN PG: 3 MMHG
BH CV ECHO MEAS - LV SYSTOLIC VOL/BSA (12-30): 8.5 ML/M^2
BH CV ECHO MEAS - LV V1 MAX: 131 CM/SEC
BH CV ECHO MEAS - LV V1 MEAN: 75.9 CM/SEC
BH CV ECHO MEAS - LV V1 VTI: 24.8 CM
BH CV ECHO MEAS - LVIDD: 3.7 CM
BH CV ECHO MEAS - LVIDD: 4.5 CM
BH CV ECHO MEAS - LVIDS: 2.6 CM
BH CV ECHO MEAS - LVIDS: 2.9 CM
BH CV ECHO MEAS - LVLD AP2: 5.1 CM
BH CV ECHO MEAS - LVLD AP4: 5.7 CM
BH CV ECHO MEAS - LVLS AP2: 4.6 CM
BH CV ECHO MEAS - LVLS AP4: 4.3 CM
BH CV ECHO MEAS - LVOT AREA (M): 3.1 CM^2
BH CV ECHO MEAS - LVOT AREA: 3.1 CM^2
BH CV ECHO MEAS - LVOT DIAM: 2 CM
BH CV ECHO MEAS - LVPWD: 0.98 CM
BH CV ECHO MEAS - LVPWD: 1.1 CM
BH CV ECHO MEAS - MR MAX PG: 61.2 MMHG
BH CV ECHO MEAS - MR MAX PG: 66.6 MMHG
BH CV ECHO MEAS - MR MAX VEL: 391 CM/SEC
BH CV ECHO MEAS - MR MAX VEL: 408 CM/SEC
BH CV ECHO MEAS - MV A MAX VEL: 164 CM/SEC
BH CV ECHO MEAS - MV DEC SLOPE: 628 CM/SEC^2
BH CV ECHO MEAS - MV E MAX VEL: 180 CM/SEC
BH CV ECHO MEAS - MV E/A: 1.1
BH CV ECHO MEAS - MV MAX PG: 20.1 MMHG
BH CV ECHO MEAS - MV MAX PG: 37 MMHG
BH CV ECHO MEAS - MV MEAN PG: 16 MMHG
BH CV ECHO MEAS - MV MEAN PG: 9 MMHG
BH CV ECHO MEAS - MV P1/2T MAX VEL: 232 CM/SEC
BH CV ECHO MEAS - MV P1/2T: 108.2 MSEC
BH CV ECHO MEAS - MV V2 MAX: 224 CM/SEC
BH CV ECHO MEAS - MV V2 MAX: 304 CM/SEC
BH CV ECHO MEAS - MV V2 MEAN: 144 CM/SEC
BH CV ECHO MEAS - MV V2 MEAN: 188 CM/SEC
BH CV ECHO MEAS - MV V2 VTI: 69.6 CM
BH CV ECHO MEAS - MV V2 VTI: 81.6 CM
BH CV ECHO MEAS - MVA P1/2T LCG: 0.95 CM^2
BH CV ECHO MEAS - MVA(P1/2T): 2 CM^2
BH CV ECHO MEAS - MVA(VTI): 1.1 CM^2
BH CV ECHO MEAS - PA MAX PG (FULL): 1.2 MMHG
BH CV ECHO MEAS - PA MAX PG: 3.8 MMHG
BH CV ECHO MEAS - PA V2 MAX: 98 CM/SEC
BH CV ECHO MEAS - RAP SYSTOLE: 10 MMHG
BH CV ECHO MEAS - RAP SYSTOLE: 10 MMHG
BH CV ECHO MEAS - RV MAX PG: 2.7 MMHG
BH CV ECHO MEAS - RV MEAN PG: 1 MMHG
BH CV ECHO MEAS - RV V1 MAX: 81.6 CM/SEC
BH CV ECHO MEAS - RV V1 MEAN: 55.6 CM/SEC
BH CV ECHO MEAS - RV V1 VTI: 11.3 CM
BH CV ECHO MEAS - RVDD: 2.4 CM
BH CV ECHO MEAS - RVDD: 3 CM
BH CV ECHO MEAS - RVSP: 71.5 MMHG
BH CV ECHO MEAS - RVSP: 75 MMHG
BH CV ECHO MEAS - SI(AO): 152.4 ML/M^2
BH CV ECHO MEAS - SI(CUBED): 20.4 ML/M^2
BH CV ECHO MEAS - SI(CUBED): 46 ML/M^2
BH CV ECHO MEAS - SI(LVOT): 49.3 ML/M^2
BH CV ECHO MEAS - SI(MOD-SP2): 11.7 ML/M^2
BH CV ECHO MEAS - SI(MOD-SP4): 17.7 ML/M^2
BH CV ECHO MEAS - SI(TEICH): 20.6 ML/M^2
BH CV ECHO MEAS - SI(TEICH): 41.3 ML/M^2
BH CV ECHO MEAS - SV(AO): 240.8 ML
BH CV ECHO MEAS - SV(CUBED): 32.3 ML
BH CV ECHO MEAS - SV(CUBED): 68.7 ML
BH CV ECHO MEAS - SV(LVOT): 77.9 ML
BH CV ECHO MEAS - SV(MOD-SP2): 18.5 ML
BH CV ECHO MEAS - SV(MOD-SP4): 27.9 ML
BH CV ECHO MEAS - SV(TEICH): 32.6 ML
BH CV ECHO MEAS - SV(TEICH): 61.6 ML
BH CV ECHO MEAS - TR MAX VEL: 392 CM/SEC
BH CV ECHO MEAS - TR MAX VEL: 403 CM/SEC
BILIRUB SERPL-MCNC: 0.2 MG/DL (ref 0–1.2)
BILIRUB SERPL-MCNC: 0.2 MG/DL (ref 0–1.2)
BILIRUB SERPL-MCNC: 0.3 MG/DL (ref 0–1.2)
BILIRUB SERPL-MCNC: 0.4 MG/DL (ref 0–1.2)
BILIRUB SERPL-MCNC: 0.5 MG/DL (ref 0–1.2)
BILIRUB UR QL STRIP: NEGATIVE
BILIRUB UR QL STRIP: NEGATIVE
BLD GP AB SCN SERPL QL: NEGATIVE
BUN SERPL-MCNC: 10 MG/DL (ref 8–23)
BUN SERPL-MCNC: 10 MG/DL (ref 8–23)
BUN SERPL-MCNC: 12 MG/DL (ref 8–23)
BUN SERPL-MCNC: 12 MG/DL (ref 8–23)
BUN SERPL-MCNC: 13 MG/DL (ref 8–23)
BUN SERPL-MCNC: 14 MG/DL (ref 8–23)
BUN SERPL-MCNC: 16 MG/DL (ref 8–23)
BUN SERPL-MCNC: 8 MG/DL (ref 8–23)
BUN/CREAT SERPL: 10.3 (ref 7–25)
BUN/CREAT SERPL: 12.2 (ref 7–25)
BUN/CREAT SERPL: 12.4 (ref 7–25)
BUN/CREAT SERPL: 13.4 (ref 7–25)
BUN/CREAT SERPL: 14.6 (ref 7–25)
BUN/CREAT SERPL: 15.4 (ref 7–25)
BUN/CREAT SERPL: 16.3 (ref 7–25)
BUN/CREAT SERPL: 16.9 (ref 7–25)
BUN/CREAT SERPL: 8.3 (ref 7–25)
BUN/CREAT SERPL: 9.8 (ref 7–25)
CALCIUM SPEC-SCNC: 8.1 MG/DL (ref 8.6–10.5)
CALCIUM SPEC-SCNC: 8.1 MG/DL (ref 8.6–10.5)
CALCIUM SPEC-SCNC: 8.3 MG/DL (ref 8.6–10.5)
CALCIUM SPEC-SCNC: 8.4 MG/DL (ref 8.6–10.5)
CALCIUM SPEC-SCNC: 8.4 MG/DL (ref 8.6–10.5)
CALCIUM SPEC-SCNC: 8.8 MG/DL (ref 8.6–10.5)
CALCIUM SPEC-SCNC: 9 MG/DL (ref 8.6–10.5)
CALCIUM SPEC-SCNC: 9 MG/DL (ref 8.6–10.5)
CALCIUM SPEC-SCNC: 9.2 MG/DL (ref 8.6–10.5)
CALCIUM SPEC-SCNC: 9.3 MG/DL (ref 8.6–10.5)
CHLORIDE SERPL-SCNC: 100 MMOL/L (ref 98–107)
CHLORIDE SERPL-SCNC: 101 MMOL/L (ref 98–107)
CHLORIDE SERPL-SCNC: 101 MMOL/L (ref 98–107)
CHLORIDE SERPL-SCNC: 104 MMOL/L (ref 98–107)
CHLORIDE SERPL-SCNC: 105 MMOL/L (ref 98–107)
CHLORIDE SERPL-SCNC: 105 MMOL/L (ref 98–107)
CHLORIDE SERPL-SCNC: 108 MMOL/L (ref 98–107)
CHLORIDE SERPL-SCNC: 97 MMOL/L (ref 98–107)
CHOLEST SERPL-MCNC: 121 MG/DL (ref 0–200)
CK SERPL-CCNC: 200 U/L (ref 20–180)
CLARITY UR: ABNORMAL
CLARITY UR: ABNORMAL
CO2 SERPL-SCNC: 17 MMOL/L (ref 22–29)
CO2 SERPL-SCNC: 21 MMOL/L (ref 22–29)
CO2 SERPL-SCNC: 21 MMOL/L (ref 22–29)
CO2 SERPL-SCNC: 23 MMOL/L (ref 22–29)
CO2 SERPL-SCNC: 23 MMOL/L (ref 22–29)
CO2 SERPL-SCNC: 24 MMOL/L (ref 22–29)
CO2 SERPL-SCNC: 24 MMOL/L (ref 22–29)
CO2 SERPL-SCNC: 25 MMOL/L (ref 22–29)
COLOR UR: YELLOW
COLOR UR: YELLOW
CREAT SERPL-MCNC: 0.78 MG/DL (ref 0.57–1)
CREAT SERPL-MCNC: 0.8 MG/DL (ref 0.57–1)
CREAT SERPL-MCNC: 0.83 MG/DL (ref 0.57–1)
CREAT SERPL-MCNC: 0.89 MG/DL (ref 0.57–1)
CREAT SERPL-MCNC: 0.96 MG/DL (ref 0.57–1)
CREAT SERPL-MCNC: 0.97 MG/DL (ref 0.57–1)
CREAT SERPL-MCNC: 1.02 MG/DL (ref 0.57–1)
CREAT SERPL-MCNC: 1.31 MG/DL (ref 0.57–1)
CRP SERPL-MCNC: 3.16 MG/DL (ref 0–0.5)
DEPRECATED RDW RBC AUTO: 46.5 FL (ref 37–54)
DEPRECATED RDW RBC AUTO: 47.1 FL (ref 37–54)
DEPRECATED RDW RBC AUTO: 47.7 FL (ref 37–54)
DEPRECATED RDW RBC AUTO: 47.8 FL (ref 37–54)
DEPRECATED RDW RBC AUTO: 48.1 FL (ref 37–54)
DEPRECATED RDW RBC AUTO: 48.8 FL (ref 37–54)
DEPRECATED RDW RBC AUTO: 49.5 FL (ref 37–54)
DEPRECATED RDW RBC AUTO: 50 FL (ref 37–54)
DEPRECATED RDW RBC AUTO: 51.3 FL (ref 37–54)
DEPRECATED RDW RBC AUTO: 51.7 FL (ref 37–54)
EOSINOPHIL # BLD AUTO: 0 10*3/MM3 (ref 0–0.4)
EOSINOPHIL # BLD AUTO: 0.02 10*3/MM3 (ref 0–0.4)
EOSINOPHIL # BLD AUTO: 0.03 10*3/MM3 (ref 0–0.4)
EOSINOPHIL # BLD AUTO: 0.03 10*3/MM3 (ref 0–0.4)
EOSINOPHIL # BLD AUTO: 0.07 10*3/MM3 (ref 0–0.4)
EOSINOPHIL # BLD AUTO: 0.08 10*3/MM3 (ref 0–0.4)
EOSINOPHIL # BLD AUTO: 0.09 10*3/MM3 (ref 0–0.4)
EOSINOPHIL # BLD AUTO: 0.11 10*3/MM3 (ref 0–0.4)
EOSINOPHIL # BLD AUTO: 0.15 10*3/MM3 (ref 0–0.4)
EOSINOPHIL # BLD AUTO: 0.54 10*3/MM3 (ref 0–0.4)
EOSINOPHIL NFR BLD AUTO: 0 % (ref 0.3–6.2)
EOSINOPHIL NFR BLD AUTO: 0.1 % (ref 0.3–6.2)
EOSINOPHIL NFR BLD AUTO: 0.2 % (ref 0.3–6.2)
EOSINOPHIL NFR BLD AUTO: 0.2 % (ref 0.3–6.2)
EOSINOPHIL NFR BLD AUTO: 0.4 % (ref 0.3–6.2)
EOSINOPHIL NFR BLD AUTO: 0.5 % (ref 0.3–6.2)
EOSINOPHIL NFR BLD AUTO: 0.6 % (ref 0.3–6.2)
EOSINOPHIL NFR BLD AUTO: 1 % (ref 0.3–6.2)
EOSINOPHIL NFR BLD AUTO: 1.4 % (ref 0.3–6.2)
EOSINOPHIL NFR BLD AUTO: 4.3 % (ref 0.3–6.2)
ERYTHROCYTE [DISTWIDTH] IN BLOOD BY AUTOMATED COUNT: 14.4 % (ref 12.3–15.4)
ERYTHROCYTE [DISTWIDTH] IN BLOOD BY AUTOMATED COUNT: 14.5 % (ref 12.3–15.4)
ERYTHROCYTE [DISTWIDTH] IN BLOOD BY AUTOMATED COUNT: 14.6 % (ref 12.3–15.4)
ERYTHROCYTE [DISTWIDTH] IN BLOOD BY AUTOMATED COUNT: 14.8 % (ref 12.3–15.4)
ERYTHROCYTE [DISTWIDTH] IN BLOOD BY AUTOMATED COUNT: 15.1 % (ref 12.3–15.4)
ERYTHROCYTE [DISTWIDTH] IN BLOOD BY AUTOMATED COUNT: 15.3 % (ref 12.3–15.4)
ERYTHROCYTE [SEDIMENTATION RATE] IN BLOOD: 26 MM/HR (ref 0–20)
FERRITIN SERPL-MCNC: 333.7 NG/ML (ref 13–150)
FLUAV RNA RESP QL NAA+PROBE: NOT DETECTED
FLUAV RNA RESP QL NAA+PROBE: NOT DETECTED
FLUBV RNA RESP QL NAA+PROBE: NOT DETECTED
FLUBV RNA RESP QL NAA+PROBE: NOT DETECTED
FOLATE SERPL-MCNC: 19.9 NG/ML (ref 4.78–24.2)
FOLATE SERPL-MCNC: >20 NG/ML (ref 4.78–24.2)
GAS FLOW AIRWAY: 4 LPM
GFR SERPL CREATININE-BSD FRML MDRD: 40 ML/MIN/1.73
GFR SERPL CREATININE-BSD FRML MDRD: 54 ML/MIN/1.73
GFR SERPL CREATININE-BSD FRML MDRD: 57 ML/MIN/1.73
GFR SERPL CREATININE-BSD FRML MDRD: 58 ML/MIN/1.73
GFR SERPL CREATININE-BSD FRML MDRD: 63 ML/MIN/1.73
GFR SERPL CREATININE-BSD FRML MDRD: 69 ML/MIN/1.73
GFR SERPL CREATININE-BSD FRML MDRD: 72 ML/MIN/1.73
GFR SERPL CREATININE-BSD FRML MDRD: 74 ML/MIN/1.73
GLOBULIN UR ELPH-MCNC: 2.3 GM/DL
GLOBULIN UR ELPH-MCNC: 2.4 GM/DL
GLOBULIN UR ELPH-MCNC: 2.4 GM/DL
GLOBULIN UR ELPH-MCNC: 2.5 GM/DL
GLOBULIN UR ELPH-MCNC: 2.6 GM/DL
GLOBULIN UR ELPH-MCNC: 2.6 GM/DL
GLOBULIN UR ELPH-MCNC: 2.7 GM/DL
GLOBULIN UR ELPH-MCNC: 2.9 GM/DL
GLOBULIN UR ELPH-MCNC: 3.1 GM/DL
GLOBULIN UR ELPH-MCNC: 3.1 GM/DL
GLUCOSE BLDC GLUCOMTR-MCNC: 100 MG/DL (ref 70–130)
GLUCOSE BLDC GLUCOMTR-MCNC: 100 MG/DL (ref 70–130)
GLUCOSE SERPL-MCNC: 108 MG/DL (ref 65–99)
GLUCOSE SERPL-MCNC: 113 MG/DL (ref 65–99)
GLUCOSE SERPL-MCNC: 117 MG/DL (ref 65–99)
GLUCOSE SERPL-MCNC: 117 MG/DL (ref 65–99)
GLUCOSE SERPL-MCNC: 126 MG/DL (ref 65–99)
GLUCOSE SERPL-MCNC: 144 MG/DL (ref 65–99)
GLUCOSE SERPL-MCNC: 90 MG/DL (ref 65–99)
GLUCOSE SERPL-MCNC: 97 MG/DL (ref 65–99)
GLUCOSE SERPL-MCNC: 98 MG/DL (ref 65–99)
GLUCOSE SERPL-MCNC: 98 MG/DL (ref 65–99)
GLUCOSE UR STRIP-MCNC: NEGATIVE MG/DL
GLUCOSE UR STRIP-MCNC: NEGATIVE MG/DL
HBA1C MFR BLD: 4.9 % (ref 4.8–5.6)
HCO3 BLDA-SCNC: 21 MMOL/L (ref 20–26)
HCT VFR BLD AUTO: 27.3 % (ref 34–46.6)
HCT VFR BLD AUTO: 27.3 % (ref 34–46.6)
HCT VFR BLD AUTO: 27.7 % (ref 34–46.6)
HCT VFR BLD AUTO: 29 % (ref 34–46.6)
HCT VFR BLD AUTO: 30 % (ref 34–46.6)
HCT VFR BLD AUTO: 30.1 % (ref 34–46.6)
HCT VFR BLD AUTO: 30.2 % (ref 34–46.6)
HCT VFR BLD AUTO: 30.5 % (ref 34–46.6)
HCT VFR BLD AUTO: 31 % (ref 34–46.6)
HCT VFR BLD AUTO: 33.8 % (ref 34–46.6)
HDLC SERPL-MCNC: 66 MG/DL (ref 40–60)
HEMOCCULT STL QL: NEGATIVE
HGB BLD-MCNC: 11.1 G/DL (ref 12–15.9)
HGB BLD-MCNC: 8.1 G/DL (ref 12–15.9)
HGB BLD-MCNC: 9.1 G/DL (ref 12–15.9)
HGB BLD-MCNC: 9.1 G/DL (ref 12–15.9)
HGB BLD-MCNC: 9.2 G/DL (ref 12–15.9)
HGB BLD-MCNC: 9.5 G/DL (ref 12–15.9)
HGB BLD-MCNC: 9.7 G/DL (ref 12–15.9)
HGB BLD-MCNC: 9.8 G/DL (ref 12–15.9)
HGB BLD-MCNC: 9.9 G/DL (ref 12–15.9)
HGB BLD-MCNC: 9.9 G/DL (ref 12–15.9)
HGB UR QL STRIP.AUTO: ABNORMAL
HGB UR QL STRIP.AUTO: ABNORMAL
HIV1+2 AB SER QL: NORMAL
HOLD SPECIMEN: NORMAL
HYALINE CASTS UR QL AUTO: ABNORMAL /LPF
HYALINE CASTS UR QL AUTO: ABNORMAL /LPF
IMM GRANULOCYTES # BLD AUTO: 0.03 10*3/MM3 (ref 0–0.05)
IMM GRANULOCYTES # BLD AUTO: 0.05 10*3/MM3 (ref 0–0.05)
IMM GRANULOCYTES # BLD AUTO: 0.06 10*3/MM3 (ref 0–0.05)
IMM GRANULOCYTES # BLD AUTO: 0.08 10*3/MM3 (ref 0–0.05)
IMM GRANULOCYTES # BLD AUTO: 0.08 10*3/MM3 (ref 0–0.05)
IMM GRANULOCYTES # BLD AUTO: 0.1 10*3/MM3 (ref 0–0.05)
IMM GRANULOCYTES # BLD AUTO: 0.22 10*3/MM3 (ref 0–0.05)
IMM GRANULOCYTES NFR BLD AUTO: 0.3 % (ref 0–0.5)
IMM GRANULOCYTES NFR BLD AUTO: 0.4 % (ref 0–0.5)
IMM GRANULOCYTES NFR BLD AUTO: 0.5 % (ref 0–0.5)
IMM GRANULOCYTES NFR BLD AUTO: 0.9 % (ref 0–0.5)
INR PPP: 1.05 (ref 0.8–1.2)
INR PPP: 1.12 (ref 0.8–1.2)
IRON 24H UR-MRATE: 20 MCG/DL (ref 37–145)
IRON SATN MFR SERPL: 9 % (ref 20–50)
KETONES UR QL STRIP: NEGATIVE
KETONES UR QL STRIP: NEGATIVE
L PNEUMO1 AG UR QL IA: NEGATIVE
LAB AP CASE REPORT: NORMAL
LDLC SERPL CALC-MCNC: 42 MG/DL (ref 0–100)
LDLC/HDLC SERPL: 0.66 {RATIO}
LEUKOCYTE ESTERASE UR QL STRIP.AUTO: ABNORMAL
LEUKOCYTE ESTERASE UR QL STRIP.AUTO: ABNORMAL
LV EF 2D ECHO EST: 57 %
LYMPHOCYTES # BLD AUTO: 0.44 10*3/MM3 (ref 0.7–3.1)
LYMPHOCYTES # BLD AUTO: 0.5 10*3/MM3 (ref 0.7–3.1)
LYMPHOCYTES # BLD AUTO: 0.53 10*3/MM3 (ref 0.7–3.1)
LYMPHOCYTES # BLD AUTO: 0.68 10*3/MM3 (ref 0.7–3.1)
LYMPHOCYTES # BLD AUTO: 0.7 10*3/MM3 (ref 0.7–3.1)
LYMPHOCYTES # BLD AUTO: 0.79 10*3/MM3 (ref 0.7–3.1)
LYMPHOCYTES # BLD AUTO: 0.85 10*3/MM3 (ref 0.7–3.1)
LYMPHOCYTES # BLD AUTO: 0.93 10*3/MM3 (ref 0.7–3.1)
LYMPHOCYTES # BLD AUTO: 0.98 10*3/MM3 (ref 0.7–3.1)
LYMPHOCYTES # BLD AUTO: 1.09 10*3/MM3 (ref 0.7–3.1)
LYMPHOCYTES NFR BLD AUTO: 2.1 % (ref 19.6–45.3)
LYMPHOCYTES NFR BLD AUTO: 2.8 % (ref 19.6–45.3)
LYMPHOCYTES NFR BLD AUTO: 3.5 % (ref 19.6–45.3)
LYMPHOCYTES NFR BLD AUTO: 3.6 % (ref 19.6–45.3)
LYMPHOCYTES NFR BLD AUTO: 4 % (ref 19.6–45.3)
LYMPHOCYTES NFR BLD AUTO: 5.3 % (ref 19.6–45.3)
LYMPHOCYTES NFR BLD AUTO: 7.2 % (ref 19.6–45.3)
LYMPHOCYTES NFR BLD AUTO: 7.2 % (ref 19.6–45.3)
LYMPHOCYTES NFR BLD AUTO: 8.7 % (ref 19.6–45.3)
LYMPHOCYTES NFR BLD AUTO: 9 % (ref 19.6–45.3)
Lab: ABNORMAL
Lab: NORMAL
MAGNESIUM SERPL-MCNC: 1.8 MG/DL (ref 1.6–2.4)
MAXIMAL PREDICTED HEART RATE: 153 BPM
MAXIMAL PREDICTED HEART RATE: 153 BPM
MCH RBC QN AUTO: 28.7 PG (ref 26.6–33)
MCH RBC QN AUTO: 28.8 PG (ref 26.6–33)
MCH RBC QN AUTO: 28.8 PG (ref 26.6–33)
MCH RBC QN AUTO: 29.2 PG (ref 26.6–33)
MCH RBC QN AUTO: 29.3 PG (ref 26.6–33)
MCH RBC QN AUTO: 29.4 PG (ref 26.6–33)
MCH RBC QN AUTO: 29.5 PG (ref 26.6–33)
MCH RBC QN AUTO: 29.8 PG (ref 26.6–33)
MCHC RBC AUTO-ENTMCNC: 29.7 G/DL (ref 31.5–35.7)
MCHC RBC AUTO-ENTMCNC: 31.4 G/DL (ref 31.5–35.7)
MCHC RBC AUTO-ENTMCNC: 31.5 G/DL (ref 31.5–35.7)
MCHC RBC AUTO-ENTMCNC: 31.9 G/DL (ref 31.5–35.7)
MCHC RBC AUTO-ENTMCNC: 32.2 G/DL (ref 31.5–35.7)
MCHC RBC AUTO-ENTMCNC: 32.5 G/DL (ref 31.5–35.7)
MCHC RBC AUTO-ENTMCNC: 32.7 G/DL (ref 31.5–35.7)
MCHC RBC AUTO-ENTMCNC: 32.8 G/DL (ref 31.5–35.7)
MCHC RBC AUTO-ENTMCNC: 32.9 G/DL (ref 31.5–35.7)
MCHC RBC AUTO-ENTMCNC: 33.7 G/DL (ref 31.5–35.7)
MCV RBC AUTO: 87.2 FL (ref 79–97)
MCV RBC AUTO: 89.2 FL (ref 79–97)
MCV RBC AUTO: 89.6 FL (ref 79–97)
MCV RBC AUTO: 89.9 FL (ref 79–97)
MCV RBC AUTO: 90 FL (ref 79–97)
MCV RBC AUTO: 91.5 FL (ref 79–97)
MCV RBC AUTO: 91.7 FL (ref 79–97)
MCV RBC AUTO: 91.8 FL (ref 79–97)
MCV RBC AUTO: 92.3 FL (ref 79–97)
MCV RBC AUTO: 96.8 FL (ref 79–97)
MODALITY: ABNORMAL
MONOCYTES # BLD AUTO: 0.47 10*3/MM3 (ref 0.1–0.9)
MONOCYTES # BLD AUTO: 0.52 10*3/MM3 (ref 0.1–0.9)
MONOCYTES # BLD AUTO: 0.62 10*3/MM3 (ref 0.1–0.9)
MONOCYTES # BLD AUTO: 0.69 10*3/MM3 (ref 0.1–0.9)
MONOCYTES # BLD AUTO: 0.7 10*3/MM3 (ref 0.1–0.9)
MONOCYTES # BLD AUTO: 0.76 10*3/MM3 (ref 0.1–0.9)
MONOCYTES # BLD AUTO: 0.78 10*3/MM3 (ref 0.1–0.9)
MONOCYTES # BLD AUTO: 0.93 10*3/MM3 (ref 0.1–0.9)
MONOCYTES # BLD AUTO: 1.02 10*3/MM3 (ref 0.1–0.9)
MONOCYTES # BLD AUTO: 1.1 10*3/MM3 (ref 0.1–0.9)
MONOCYTES NFR BLD AUTO: 4 % (ref 5–12)
MONOCYTES NFR BLD AUTO: 4.3 % (ref 5–12)
MONOCYTES NFR BLD AUTO: 4.5 % (ref 5–12)
MONOCYTES NFR BLD AUTO: 4.5 % (ref 5–12)
MONOCYTES NFR BLD AUTO: 4.6 % (ref 5–12)
MONOCYTES NFR BLD AUTO: 4.7 % (ref 5–12)
MONOCYTES NFR BLD AUTO: 4.8 % (ref 5–12)
MONOCYTES NFR BLD AUTO: 5.5 % (ref 5–12)
MONOCYTES NFR BLD AUTO: 5.9 % (ref 5–12)
MONOCYTES NFR BLD AUTO: 6.4 % (ref 5–12)
MYCOPLASMAE PNEUMONIAE BY PCR: NEGATIVE
NEUTROPHILS NFR BLD AUTO: 10.06 10*3/MM3 (ref 1.7–7)
NEUTROPHILS NFR BLD AUTO: 11.16 10*3/MM3 (ref 1.7–7)
NEUTROPHILS NFR BLD AUTO: 13.75 10*3/MM3 (ref 1.7–7)
NEUTROPHILS NFR BLD AUTO: 13.97 10*3/MM3 (ref 1.7–7)
NEUTROPHILS NFR BLD AUTO: 14.35 10*3/MM3 (ref 1.7–7)
NEUTROPHILS NFR BLD AUTO: 15.49 10*3/MM3 (ref 1.7–7)
NEUTROPHILS NFR BLD AUTO: 17.84 10*3/MM3 (ref 1.7–7)
NEUTROPHILS NFR BLD AUTO: 22.42 10*3/MM3 (ref 1.7–7)
NEUTROPHILS NFR BLD AUTO: 80.7 % (ref 42.7–76)
NEUTROPHILS NFR BLD AUTO: 84.5 % (ref 42.7–76)
NEUTROPHILS NFR BLD AUTO: 85.9 % (ref 42.7–76)
NEUTROPHILS NFR BLD AUTO: 86.1 % (ref 42.7–76)
NEUTROPHILS NFR BLD AUTO: 86.9 % (ref 42.7–76)
NEUTROPHILS NFR BLD AUTO: 9.2 10*3/MM3 (ref 1.7–7)
NEUTROPHILS NFR BLD AUTO: 9.48 10*3/MM3 (ref 1.7–7)
NEUTROPHILS NFR BLD AUTO: 90.2 % (ref 42.7–76)
NEUTROPHILS NFR BLD AUTO: 90.8 % (ref 42.7–76)
NEUTROPHILS NFR BLD AUTO: 91 % (ref 42.7–76)
NEUTROPHILS NFR BLD AUTO: 92.2 % (ref 42.7–76)
NEUTROPHILS NFR BLD AUTO: 92.3 % (ref 42.7–76)
NITRITE UR QL STRIP: NEGATIVE
NITRITE UR QL STRIP: NEGATIVE
NRBC BLD AUTO-RTO: 0 /100 WBC (ref 0–0.2)
PATH REPORT.FINAL DX SPEC: NORMAL
PCO2 BLDA: 35.3 MM HG (ref 35–45)
PH BLDA: 7.38 PH UNITS (ref 7.35–7.45)
PH UR STRIP.AUTO: 7.5 [PH] (ref 5–9)
PH UR STRIP.AUTO: 7.5 [PH] (ref 5–9)
PLATELET # BLD AUTO: 169 10*3/MM3 (ref 140–450)
PLATELET # BLD AUTO: 177 10*3/MM3 (ref 140–450)
PLATELET # BLD AUTO: 206 10*3/MM3 (ref 140–450)
PLATELET # BLD AUTO: 210 10*3/MM3 (ref 140–450)
PLATELET # BLD AUTO: 230 10*3/MM3 (ref 140–450)
PLATELET # BLD AUTO: 293 10*3/MM3 (ref 140–450)
PLATELET # BLD AUTO: 301 10*3/MM3 (ref 140–450)
PLATELET # BLD AUTO: 336 10*3/MM3 (ref 140–450)
PLATELET # BLD AUTO: 346 10*3/MM3 (ref 140–450)
PLATELET # BLD AUTO: 354 10*3/MM3 (ref 140–450)
PMV BLD AUTO: 10 FL (ref 6–12)
PMV BLD AUTO: 10 FL (ref 6–12)
PMV BLD AUTO: 10.1 FL (ref 6–12)
PMV BLD AUTO: 10.3 FL (ref 6–12)
PMV BLD AUTO: 10.3 FL (ref 6–12)
PMV BLD AUTO: 10.5 FL (ref 6–12)
PMV BLD AUTO: 10.8 FL (ref 6–12)
PMV BLD AUTO: 9.7 FL (ref 6–12)
PO2 BLDA: 76.7 MM HG (ref 83–108)
POTASSIUM SERPL-SCNC: 3.5 MMOL/L (ref 3.5–5.2)
POTASSIUM SERPL-SCNC: 3.6 MMOL/L (ref 3.5–5.2)
POTASSIUM SERPL-SCNC: 3.6 MMOL/L (ref 3.5–5.2)
POTASSIUM SERPL-SCNC: 3.7 MMOL/L (ref 3.5–5.2)
POTASSIUM SERPL-SCNC: 3.7 MMOL/L (ref 3.5–5.2)
POTASSIUM SERPL-SCNC: 3.9 MMOL/L (ref 3.5–5.2)
POTASSIUM SERPL-SCNC: 4.2 MMOL/L (ref 3.5–5.2)
POTASSIUM SERPL-SCNC: 4.5 MMOL/L (ref 3.5–5.2)
POTASSIUM SERPL-SCNC: 4.5 MMOL/L (ref 3.5–5.2)
POTASSIUM SERPL-SCNC: 4.9 MMOL/L (ref 3.5–5.2)
PROT SERPL-MCNC: 5.1 G/DL (ref 6–8.5)
PROT SERPL-MCNC: 5.3 G/DL (ref 6–8.5)
PROT SERPL-MCNC: 5.3 G/DL (ref 6–8.5)
PROT SERPL-MCNC: 5.7 G/DL (ref 6–8.5)
PROT SERPL-MCNC: 5.8 G/DL (ref 6–8.5)
PROT SERPL-MCNC: 5.8 G/DL (ref 6–8.5)
PROT SERPL-MCNC: 5.9 G/DL (ref 6–8.5)
PROT SERPL-MCNC: 6 G/DL (ref 6–8.5)
PROT UR QL STRIP: ABNORMAL
PROT UR QL STRIP: NEGATIVE
PROTHROMBIN TIME: 14.1 SECONDS (ref 11.1–15.3)
PROTHROMBIN TIME: 14.9 SECONDS (ref 11.1–15.3)
QT INTERVAL: 454 MS
QTC INTERVAL: 482 MS
RBC # BLD AUTO: 2.82 10*6/MM3 (ref 3.77–5.28)
RBC # BLD AUTO: 3.08 10*6/MM3 (ref 3.77–5.28)
RBC # BLD AUTO: 3.13 10*6/MM3 (ref 3.77–5.28)
RBC # BLD AUTO: 3.16 10*6/MM3 (ref 3.77–5.28)
RBC # BLD AUTO: 3.26 10*6/MM3 (ref 3.77–5.28)
RBC # BLD AUTO: 3.3 10*6/MM3 (ref 3.77–5.28)
RBC # BLD AUTO: 3.35 10*6/MM3 (ref 3.77–5.28)
RBC # BLD AUTO: 3.38 10*6/MM3 (ref 3.77–5.28)
RBC # BLD AUTO: 3.39 10*6/MM3 (ref 3.77–5.28)
RBC # BLD AUTO: 3.79 10*6/MM3 (ref 3.77–5.28)
RBC # UR: ABNORMAL /HPF
RBC # UR: ABNORMAL /HPF
REF LAB TEST METHOD: ABNORMAL
REF LAB TEST METHOD: ABNORMAL
RH BLD: NEGATIVE
RPR SER QL: NORMAL
S PNEUM AG SPEC QL LA: NEGATIVE
SAO2 % BLDCOA: 96.8 % (ref 94–99)
SARS-COV-2 N GENE RESP QL NAA+PROBE: NOT DETECTED
SARS-COV-2 RNA RESP QL NAA+PROBE: NOT DETECTED
SARS-COV-2 RNA RESP QL NAA+PROBE: NOT DETECTED
SODIUM SERPL-SCNC: 133 MMOL/L (ref 136–145)
SODIUM SERPL-SCNC: 133 MMOL/L (ref 136–145)
SODIUM SERPL-SCNC: 134 MMOL/L (ref 136–145)
SODIUM SERPL-SCNC: 135 MMOL/L (ref 136–145)
SODIUM SERPL-SCNC: 136 MMOL/L (ref 136–145)
SODIUM SERPL-SCNC: 138 MMOL/L (ref 136–145)
SODIUM SERPL-SCNC: 139 MMOL/L (ref 136–145)
SP GR UR STRIP: 1.01 (ref 1–1.03)
SP GR UR STRIP: 1.01 (ref 1–1.03)
SQUAMOUS #/AREA URNS HPF: ABNORMAL /HPF
SQUAMOUS #/AREA URNS HPF: ABNORMAL /HPF
STRESS TARGET HR: 130 BPM
STRESS TARGET HR: 130 BPM
T&S EXPIRATION DATE: NORMAL
T4 FREE SERPL-MCNC: 1.51 NG/DL (ref 0.93–1.7)
TIBC SERPL-MCNC: 215 MCG/DL (ref 298–536)
TRANSFERRIN SERPL-MCNC: 144 MG/DL (ref 200–360)
TRIGL SERPL-MCNC: 56 MG/DL (ref 0–150)
TROPONIN T SERPL-MCNC: 0.03 NG/ML (ref 0–0.03)
TROPONIN T SERPL-MCNC: 0.04 NG/ML (ref 0–0.03)
TROPONIN T SERPL-MCNC: 0.05 NG/ML (ref 0–0.03)
TSH SERPL DL<=0.05 MIU/L-ACNC: 1.2 UIU/ML (ref 0.27–4.2)
UROBILINOGEN UR QL STRIP: ABNORMAL
UROBILINOGEN UR QL STRIP: ABNORMAL
VENTILATOR MODE: ABNORMAL
VIT B12 BLD-MCNC: 228 PG/ML (ref 211–946)
VIT B12 BLD-MCNC: 281 PG/ML (ref 211–946)
VLDLC SERPL-MCNC: 13 MG/DL (ref 5–40)
WBC # BLD AUTO: 10.88 10*3/MM3 (ref 3.4–10.8)
WBC # BLD AUTO: 11 10*3/MM3 (ref 3.4–10.8)
WBC # BLD AUTO: 12.48 10*3/MM3 (ref 3.4–10.8)
WBC # BLD AUTO: 12.98 10*3/MM3 (ref 3.4–10.8)
WBC # BLD AUTO: 15.11 10*3/MM3 (ref 3.4–10.8)
WBC # BLD AUTO: 15.57 10*3/MM3 (ref 3.4–10.8)
WBC # BLD AUTO: 16.06 10*3/MM3 (ref 3.4–10.8)
WBC # BLD AUTO: 17.16 10*3/MM3 (ref 3.4–10.8)
WBC # BLD AUTO: 19.64 10*3/MM3 (ref 3.4–10.8)
WBC # BLD AUTO: 24.3 10*3/MM3 (ref 3.4–10.8)
WBC UR QL AUTO: ABNORMAL /HPF
WBC UR QL AUTO: ABNORMAL /HPF
WHOLE BLOOD HOLD SPECIMEN: NORMAL

## 2021-01-01 PROCEDURE — 91300 HC SARSCOV02 VAC 30MCG/0.3ML IM: CPT | Performed by: THORACIC SURGERY (CARDIOTHORACIC VASCULAR SURGERY)

## 2021-01-01 PROCEDURE — G0463 HOSPITAL OUTPT CLINIC VISIT: HCPCS | Performed by: RADIOLOGY

## 2021-01-01 PROCEDURE — 99204 OFFICE O/P NEW MOD 45 MIN: CPT | Performed by: INTERNAL MEDICINE

## 2021-01-01 PROCEDURE — 93880 EXTRACRANIAL BILAT STUDY: CPT

## 2021-01-01 PROCEDURE — G0463 HOSPITAL OUTPT CLINIC VISIT: HCPCS | Performed by: SURGERY

## 2021-01-01 PROCEDURE — 85730 THROMBOPLASTIN TIME PARTIAL: CPT | Performed by: STUDENT IN AN ORGANIZED HEALTH CARE EDUCATION/TRAINING PROGRAM

## 2021-01-01 PROCEDURE — 0 FLUDEOXYGLUCOSE F18 SOLUTION: Performed by: INTERNAL MEDICINE

## 2021-01-01 PROCEDURE — 94799 UNLISTED PULMONARY SVC/PX: CPT

## 2021-01-01 PROCEDURE — 70450 CT HEAD/BRAIN W/O DYE: CPT

## 2021-01-01 PROCEDURE — 99307 SBSQ NF CARE SF MDM 10: CPT | Performed by: STUDENT IN AN ORGANIZED HEALTH CARE EDUCATION/TRAINING PROGRAM

## 2021-01-01 PROCEDURE — 82728 ASSAY OF FERRITIN: CPT | Performed by: INTERNAL MEDICINE

## 2021-01-01 PROCEDURE — 25010000002 MORPHINE PER 10 MG: Performed by: STUDENT IN AN ORGANIZED HEALTH CARE EDUCATION/TRAINING PROGRAM

## 2021-01-01 PROCEDURE — 97166 OT EVAL MOD COMPLEX 45 MIN: CPT

## 2021-01-01 PROCEDURE — 87581 M.PNEUMON DNA AMP PROBE: CPT | Performed by: STUDENT IN AN ORGANIZED HEALTH CARE EDUCATION/TRAINING PROGRAM

## 2021-01-01 PROCEDURE — 63710000001 DIPHENHYDRAMINE PER 50 MG: Performed by: INTERNAL MEDICINE

## 2021-01-01 PROCEDURE — 84443 ASSAY THYROID STIM HORMONE: CPT | Performed by: STUDENT IN AN ORGANIZED HEALTH CARE EDUCATION/TRAINING PROGRAM

## 2021-01-01 PROCEDURE — 96374 THER/PROPH/DIAG INJ IV PUSH: CPT | Performed by: INTERNAL MEDICINE

## 2021-01-01 PROCEDURE — 82746 ASSAY OF FOLIC ACID SERUM: CPT | Performed by: INTERNAL MEDICINE

## 2021-01-01 PROCEDURE — 93010 ELECTROCARDIOGRAM REPORT: CPT | Performed by: INTERNAL MEDICINE

## 2021-01-01 PROCEDURE — 99214 OFFICE O/P EST MOD 30 MIN: CPT | Performed by: INTERNAL MEDICINE

## 2021-01-01 PROCEDURE — 80053 COMPREHEN METABOLIC PANEL: CPT | Performed by: FAMILY MEDICINE

## 2021-01-01 PROCEDURE — 81001 URINALYSIS AUTO W/SCOPE: CPT | Performed by: STUDENT IN AN ORGANIZED HEALTH CARE EDUCATION/TRAINING PROGRAM

## 2021-01-01 PROCEDURE — G0463 HOSPITAL OUTPT CLINIC VISIT: HCPCS | Performed by: INTERNAL MEDICINE

## 2021-01-01 PROCEDURE — 25010000002 HYDRALAZINE PER 20 MG: Performed by: STUDENT IN AN ORGANIZED HEALTH CARE EDUCATION/TRAINING PROGRAM

## 2021-01-01 PROCEDURE — 97162 PT EVAL MOD COMPLEX 30 MIN: CPT

## 2021-01-01 PROCEDURE — 87635 SARS-COV-2 COVID-19 AMP PRB: CPT

## 2021-01-01 PROCEDURE — 71045 X-RAY EXAM CHEST 1 VIEW: CPT

## 2021-01-01 PROCEDURE — 97535 SELF CARE MNGMENT TRAINING: CPT

## 2021-01-01 PROCEDURE — 25010000002 FERUMOXYTOL 510 MG/17ML SOLUTION 510 MG VIAL: Performed by: INTERNAL MEDICINE

## 2021-01-01 PROCEDURE — 80053 COMPREHEN METABOLIC PANEL: CPT | Performed by: STUDENT IN AN ORGANIZED HEALTH CARE EDUCATION/TRAINING PROGRAM

## 2021-01-01 PROCEDURE — 74022 RADEX COMPL AQT ABD SERIES: CPT

## 2021-01-01 PROCEDURE — 1126F AMNT PAIN NOTED NONE PRSNT: CPT | Performed by: INTERNAL MEDICINE

## 2021-01-01 PROCEDURE — 0001A: CPT | Performed by: THORACIC SURGERY (CARDIOTHORACIC VASCULAR SURGERY)

## 2021-01-01 PROCEDURE — 99214 OFFICE O/P EST MOD 30 MIN: CPT | Performed by: FAMILY MEDICINE

## 2021-01-01 PROCEDURE — 86850 RBC ANTIBODY SCREEN: CPT | Performed by: STUDENT IN AN ORGANIZED HEALTH CARE EDUCATION/TRAINING PROGRAM

## 2021-01-01 PROCEDURE — 84484 ASSAY OF TROPONIN QUANT: CPT | Performed by: STUDENT IN AN ORGANIZED HEALTH CARE EDUCATION/TRAINING PROGRAM

## 2021-01-01 PROCEDURE — 97530 THERAPEUTIC ACTIVITIES: CPT

## 2021-01-01 PROCEDURE — 99232 SBSQ HOSP IP/OBS MODERATE 35: CPT | Performed by: SURGERY

## 2021-01-01 PROCEDURE — 96375 TX/PRO/DX INJ NEW DRUG ADDON: CPT | Performed by: INTERNAL MEDICINE

## 2021-01-01 PROCEDURE — 82607 VITAMIN B-12: CPT | Performed by: STUDENT IN AN ORGANIZED HEALTH CARE EDUCATION/TRAINING PROGRAM

## 2021-01-01 PROCEDURE — G0378 HOSPITAL OBSERVATION PER HR: HCPCS

## 2021-01-01 PROCEDURE — 99212 OFFICE O/P EST SF 10 MIN: CPT | Performed by: SURGERY

## 2021-01-01 PROCEDURE — 85025 COMPLETE CBC W/AUTO DIFF WBC: CPT | Performed by: FAMILY MEDICINE

## 2021-01-01 PROCEDURE — 36415 COLL VENOUS BLD VENIPUNCTURE: CPT | Performed by: STUDENT IN AN ORGANIZED HEALTH CARE EDUCATION/TRAINING PROGRAM

## 2021-01-01 PROCEDURE — 99285 EMERGENCY DEPT VISIT HI MDM: CPT

## 2021-01-01 PROCEDURE — 87086 URINE CULTURE/COLONY COUNT: CPT | Performed by: STUDENT IN AN ORGANIZED HEALTH CARE EDUCATION/TRAINING PROGRAM

## 2021-01-01 PROCEDURE — 83540 ASSAY OF IRON: CPT | Performed by: INTERNAL MEDICINE

## 2021-01-01 PROCEDURE — 87088 URINE BACTERIA CULTURE: CPT | Performed by: STUDENT IN AN ORGANIZED HEALTH CARE EDUCATION/TRAINING PROGRAM

## 2021-01-01 PROCEDURE — 82272 OCCULT BLD FECES 1-3 TESTS: CPT | Performed by: STUDENT IN AN ORGANIZED HEALTH CARE EDUCATION/TRAINING PROGRAM

## 2021-01-01 PROCEDURE — 86592 SYPHILIS TEST NON-TREP QUAL: CPT | Performed by: STUDENT IN AN ORGANIZED HEALTH CARE EDUCATION/TRAINING PROGRAM

## 2021-01-01 PROCEDURE — 94760 N-INVAS EAR/PLS OXIMETRY 1: CPT

## 2021-01-01 PROCEDURE — 88331 PATH CONSLTJ SURG 1 BLK 1SPC: CPT

## 2021-01-01 PROCEDURE — 99213 OFFICE O/P EST LOW 20 MIN: CPT | Performed by: SURGERY

## 2021-01-01 PROCEDURE — 1157F ADVNC CARE PLAN IN RCRD: CPT | Performed by: INTERNAL MEDICINE

## 2021-01-01 PROCEDURE — 85025 COMPLETE CBC W/AUTO DIFF WBC: CPT | Performed by: STUDENT IN AN ORGANIZED HEALTH CARE EDUCATION/TRAINING PROGRAM

## 2021-01-01 PROCEDURE — 86901 BLOOD TYPING SEROLOGIC RH(D): CPT | Performed by: STUDENT IN AN ORGANIZED HEALTH CARE EDUCATION/TRAINING PROGRAM

## 2021-01-01 PROCEDURE — G0432 EIA HIV-1/HIV-2 SCREEN: HCPCS | Performed by: INTERNAL MEDICINE

## 2021-01-01 PROCEDURE — 99233 SBSQ HOSP IP/OBS HIGH 50: CPT | Performed by: NURSE PRACTITIONER

## 2021-01-01 PROCEDURE — 99232 SBSQ HOSP IP/OBS MODERATE 35: CPT | Performed by: STUDENT IN AN ORGANIZED HEALTH CARE EDUCATION/TRAINING PROGRAM

## 2021-01-01 PROCEDURE — 87186 SC STD MICRODIL/AGAR DIL: CPT | Performed by: STUDENT IN AN ORGANIZED HEALTH CARE EDUCATION/TRAINING PROGRAM

## 2021-01-01 PROCEDURE — 93306 TTE W/DOPPLER COMPLETE: CPT

## 2021-01-01 PROCEDURE — 99222 1ST HOSP IP/OBS MODERATE 55: CPT | Performed by: NURSE PRACTITIONER

## 2021-01-01 PROCEDURE — G9903 PT SCRN TBCO ID AS NON USER: HCPCS | Performed by: INTERNAL MEDICINE

## 2021-01-01 PROCEDURE — 25010000002 PHENYLEPHRINE 10 MG/ML SOLUTION: Performed by: NURSE ANESTHETIST, CERTIFIED REGISTERED

## 2021-01-01 PROCEDURE — 0002A: CPT | Performed by: THORACIC SURGERY (CARDIOTHORACIC VASCULAR SURGERY)

## 2021-01-01 PROCEDURE — 25010000002 MORPHINE PER 10 MG

## 2021-01-01 PROCEDURE — 97110 THERAPEUTIC EXERCISES: CPT

## 2021-01-01 PROCEDURE — 82962 GLUCOSE BLOOD TEST: CPT

## 2021-01-01 PROCEDURE — 93325 DOPPLER ECHO COLOR FLOW MAPG: CPT

## 2021-01-01 PROCEDURE — 25010000002 FUROSEMIDE PER 20 MG: Performed by: STUDENT IN AN ORGANIZED HEALTH CARE EDUCATION/TRAINING PROGRAM

## 2021-01-01 PROCEDURE — A9552 F18 FDG: HCPCS | Performed by: INTERNAL MEDICINE

## 2021-01-01 PROCEDURE — 25010000002 PROPOFOL 10 MG/ML EMULSION: Performed by: NURSE ANESTHETIST, CERTIFIED REGISTERED

## 2021-01-01 PROCEDURE — 25010000002 CEFAZOLIN PER 500 MG: Performed by: SURGERY

## 2021-01-01 PROCEDURE — 99233 SBSQ HOSP IP/OBS HIGH 50: CPT | Performed by: STUDENT IN AN ORGANIZED HEALTH CARE EDUCATION/TRAINING PROGRAM

## 2021-01-01 PROCEDURE — 86901 BLOOD TYPING SEROLOGIC RH(D): CPT

## 2021-01-01 PROCEDURE — 82746 ASSAY OF FOLIC ACID SERUM: CPT | Performed by: STUDENT IN AN ORGANIZED HEALTH CARE EDUCATION/TRAINING PROGRAM

## 2021-01-01 PROCEDURE — 84484 ASSAY OF TROPONIN QUANT: CPT | Performed by: FAMILY MEDICINE

## 2021-01-01 PROCEDURE — 99239 HOSP IP/OBS DSCHRG MGMT >30: CPT | Performed by: STUDENT IN AN ORGANIZED HEALTH CARE EDUCATION/TRAINING PROGRAM

## 2021-01-01 PROCEDURE — 99223 1ST HOSP IP/OBS HIGH 75: CPT | Performed by: STUDENT IN AN ORGANIZED HEALTH CARE EDUCATION/TRAINING PROGRAM

## 2021-01-01 PROCEDURE — 25010000002 CEFTRIAXONE PER 250 MG: Performed by: STUDENT IN AN ORGANIZED HEALTH CARE EDUCATION/TRAINING PROGRAM

## 2021-01-01 PROCEDURE — 82803 BLOOD GASES ANY COMBINATION: CPT

## 2021-01-01 PROCEDURE — 82550 ASSAY OF CK (CPK): CPT | Performed by: FAMILY MEDICINE

## 2021-01-01 PROCEDURE — 99205 OFFICE O/P NEW HI 60 MIN: CPT | Performed by: RADIOLOGY

## 2021-01-01 PROCEDURE — 86900 BLOOD TYPING SEROLOGIC ABO: CPT | Performed by: STUDENT IN AN ORGANIZED HEALTH CARE EDUCATION/TRAINING PROGRAM

## 2021-01-01 PROCEDURE — 25010000002 MIDAZOLAM PER 1 MG: Performed by: NURSE ANESTHETIST, CERTIFIED REGISTERED

## 2021-01-01 PROCEDURE — 83735 ASSAY OF MAGNESIUM: CPT | Performed by: FAMILY MEDICINE

## 2021-01-01 PROCEDURE — 0 IOPAMIDOL PER 1 ML: Performed by: STUDENT IN AN ORGANIZED HEALTH CARE EDUCATION/TRAINING PROGRAM

## 2021-01-01 PROCEDURE — 25010000002 FENTANYL CITRATE (PF) 100 MCG/2ML SOLUTION: Performed by: NURSE ANESTHETIST, CERTIFIED REGISTERED

## 2021-01-01 PROCEDURE — 97116 GAIT TRAINING THERAPY: CPT

## 2021-01-01 PROCEDURE — 85025 COMPLETE CBC W/AUTO DIFF WBC: CPT | Performed by: INTERNAL MEDICINE

## 2021-01-01 PROCEDURE — 87636 SARSCOV2 & INF A&B AMP PRB: CPT | Performed by: FAMILY MEDICINE

## 2021-01-01 PROCEDURE — 84466 ASSAY OF TRANSFERRIN: CPT | Performed by: INTERNAL MEDICINE

## 2021-01-01 PROCEDURE — 83036 HEMOGLOBIN GLYCOSYLATED A1C: CPT | Performed by: STUDENT IN AN ORGANIZED HEALTH CARE EDUCATION/TRAINING PROGRAM

## 2021-01-01 PROCEDURE — 93306 TTE W/DOPPLER COMPLETE: CPT | Performed by: INTERNAL MEDICINE

## 2021-01-01 PROCEDURE — 76536 US EXAM OF HEAD AND NECK: CPT

## 2021-01-01 PROCEDURE — 99308 SBSQ NF CARE LOW MDM 20: CPT | Performed by: STUDENT IN AN ORGANIZED HEALTH CARE EDUCATION/TRAINING PROGRAM

## 2021-01-01 PROCEDURE — 93005 ELECTROCARDIOGRAM TRACING: CPT | Performed by: FAMILY MEDICINE

## 2021-01-01 PROCEDURE — 11107 INCAL BX SKN EA SEP/ADDL: CPT | Performed by: SURGERY

## 2021-01-01 PROCEDURE — 87899 AGENT NOS ASSAY W/OPTIC: CPT | Performed by: STUDENT IN AN ORGANIZED HEALTH CARE EDUCATION/TRAINING PROGRAM

## 2021-01-01 PROCEDURE — 70498 CT ANGIOGRAPHY NECK: CPT

## 2021-01-01 PROCEDURE — 99306 1ST NF CARE HIGH MDM 50: CPT | Performed by: STUDENT IN AN ORGANIZED HEALTH CARE EDUCATION/TRAINING PROGRAM

## 2021-01-01 PROCEDURE — 1125F AMNT PAIN NOTED PAIN PRSNT: CPT | Performed by: INTERNAL MEDICINE

## 2021-01-01 PROCEDURE — 86900 BLOOD TYPING SEROLOGIC ABO: CPT

## 2021-01-01 PROCEDURE — 80053 COMPREHEN METABOLIC PANEL: CPT | Performed by: INTERNAL MEDICINE

## 2021-01-01 PROCEDURE — 85651 RBC SED RATE NONAUTOMATED: CPT | Performed by: STUDENT IN AN ORGANIZED HEALTH CARE EDUCATION/TRAINING PROGRAM

## 2021-01-01 PROCEDURE — 87635 SARS-COV-2 COVID-19 AMP PRB: CPT | Performed by: STUDENT IN AN ORGANIZED HEALTH CARE EDUCATION/TRAINING PROGRAM

## 2021-01-01 PROCEDURE — 78815 PET IMAGE W/CT SKULL-THIGH: CPT

## 2021-01-01 PROCEDURE — 85610 PROTHROMBIN TIME: CPT | Performed by: FAMILY MEDICINE

## 2021-01-01 PROCEDURE — 99284 EMERGENCY DEPT VISIT MOD MDM: CPT

## 2021-01-01 PROCEDURE — 93321 DOPPLER ECHO F-UP/LMTD STD: CPT

## 2021-01-01 PROCEDURE — 88305 TISSUE EXAM BY PATHOLOGIST: CPT

## 2021-01-01 PROCEDURE — 86140 C-REACTIVE PROTEIN: CPT | Performed by: STUDENT IN AN ORGANIZED HEALTH CARE EDUCATION/TRAINING PROGRAM

## 2021-01-01 PROCEDURE — 82607 VITAMIN B-12: CPT | Performed by: INTERNAL MEDICINE

## 2021-01-01 PROCEDURE — C9803 HOPD COVID-19 SPEC COLLECT: HCPCS

## 2021-01-01 PROCEDURE — 80061 LIPID PANEL: CPT | Performed by: STUDENT IN AN ORGANIZED HEALTH CARE EDUCATION/TRAINING PROGRAM

## 2021-01-01 PROCEDURE — 11106 INCAL BX SKN SINGLE LES: CPT | Performed by: SURGERY

## 2021-01-01 PROCEDURE — 85610 PROTHROMBIN TIME: CPT | Performed by: STUDENT IN AN ORGANIZED HEALTH CARE EDUCATION/TRAINING PROGRAM

## 2021-01-01 PROCEDURE — 87636 SARSCOV2 & INF A&B AMP PRB: CPT | Performed by: STUDENT IN AN ORGANIZED HEALTH CARE EDUCATION/TRAINING PROGRAM

## 2021-01-01 PROCEDURE — 93308 TTE F-UP OR LMTD: CPT

## 2021-01-01 PROCEDURE — 93005 ELECTROCARDIOGRAM TRACING: CPT | Performed by: STUDENT IN AN ORGANIZED HEALTH CARE EDUCATION/TRAINING PROGRAM

## 2021-01-01 PROCEDURE — 70496 CT ANGIOGRAPHY HEAD: CPT

## 2021-01-01 PROCEDURE — 70551 MRI BRAIN STEM W/O DYE: CPT

## 2021-01-01 PROCEDURE — 84439 ASSAY OF FREE THYROXINE: CPT | Performed by: STUDENT IN AN ORGANIZED HEALTH CARE EDUCATION/TRAINING PROGRAM

## 2021-01-01 RX ORDER — MORPHINE SULFATE 2 MG/ML
1 INJECTION, SOLUTION INTRAMUSCULAR; INTRAVENOUS EVERY 4 HOURS PRN
Status: DISCONTINUED | OUTPATIENT
Start: 2021-01-01 | End: 2021-01-01 | Stop reason: HOSPADM

## 2021-01-01 RX ORDER — LORAZEPAM 2 MG/ML
0.5 CONCENTRATE ORAL EVERY 8 HOURS PRN
Qty: 30 ML | Refills: 0 | Status: SHIPPED | OUTPATIENT
Start: 2021-01-01

## 2021-01-01 RX ORDER — SODIUM CHLORIDE 9 MG/ML
250 INJECTION, SOLUTION INTRAVENOUS ONCE
Status: COMPLETED | OUTPATIENT
Start: 2021-01-01 | End: 2021-01-01

## 2021-01-01 RX ORDER — IPRATROPIUM BROMIDE AND ALBUTEROL SULFATE 2.5; .5 MG/3ML; MG/3ML
3 SOLUTION RESPIRATORY (INHALATION) 4 TIMES DAILY PRN
Qty: 360 ML
Start: 2021-01-01 | End: 2021-01-01 | Stop reason: HOSPADM

## 2021-01-01 RX ORDER — ALBUTEROL SULFATE 2.5 MG/3ML
2.5 SOLUTION RESPIRATORY (INHALATION) EVERY 4 HOURS PRN
Status: DISCONTINUED | OUTPATIENT
Start: 2021-01-01 | End: 2021-01-01 | Stop reason: HOSPADM

## 2021-01-01 RX ORDER — DIAPER,BRIEF,INFANT-TODD,DISP
EACH MISCELLANEOUS DAILY
Status: DISCONTINUED | OUTPATIENT
Start: 2021-01-01 | End: 2021-01-01 | Stop reason: HOSPADM

## 2021-01-01 RX ORDER — ASPIRIN 81 MG/1
TABLET, DELAYED RELEASE ORAL
Qty: 30 TABLET | Refills: 3 | Status: SHIPPED | OUTPATIENT
Start: 2021-01-01 | End: 2021-01-01

## 2021-01-01 RX ORDER — LOSARTAN POTASSIUM 50 MG/1
100 TABLET ORAL DAILY
Status: DISCONTINUED | OUTPATIENT
Start: 2021-01-01 | End: 2021-01-01 | Stop reason: HOSPADM

## 2021-01-01 RX ORDER — HYDRALAZINE HYDROCHLORIDE 20 MG/ML
10 INJECTION INTRAMUSCULAR; INTRAVENOUS EVERY 6 HOURS PRN
Status: DISCONTINUED | OUTPATIENT
Start: 2021-01-01 | End: 2021-01-01

## 2021-01-01 RX ORDER — POLYETHYLENE GLYCOL 3350 17 G/17G
17 POWDER, FOR SOLUTION ORAL DAILY
Qty: 30 EACH
Start: 2021-01-01 | End: 2021-01-01 | Stop reason: ALTCHOICE

## 2021-01-01 RX ORDER — FLUOXETINE HYDROCHLORIDE 20 MG/1
60 CAPSULE ORAL DAILY
Status: DISCONTINUED | OUTPATIENT
Start: 2021-01-01 | End: 2021-01-01 | Stop reason: HOSPADM

## 2021-01-01 RX ORDER — DIPHENHYDRAMINE HCL 25 MG
25 CAPSULE ORAL ONCE
Status: COMPLETED | OUTPATIENT
Start: 2021-01-01 | End: 2021-01-01

## 2021-01-01 RX ORDER — FAMOTIDINE 10 MG/ML
20 INJECTION, SOLUTION INTRAVENOUS ONCE
Status: COMPLETED | OUTPATIENT
Start: 2021-01-01 | End: 2021-01-01

## 2021-01-01 RX ORDER — BUDESONIDE AND FORMOTEROL FUMARATE DIHYDRATE 160; 4.5 UG/1; UG/1
2 AEROSOL RESPIRATORY (INHALATION)
COMMUNITY
End: 2021-01-01 | Stop reason: HOSPADM

## 2021-01-01 RX ORDER — LOSARTAN POTASSIUM 25 MG/1
25 TABLET ORAL
Qty: 30 TABLET | Refills: 3
Start: 2021-01-01 | End: 2021-01-01 | Stop reason: HOSPADM

## 2021-01-01 RX ORDER — ONDANSETRON 2 MG/ML
4 INJECTION INTRAMUSCULAR; INTRAVENOUS ONCE AS NEEDED
Status: DISCONTINUED | OUTPATIENT
Start: 2021-01-01 | End: 2021-01-01 | Stop reason: HOSPADM

## 2021-01-01 RX ORDER — FAMOTIDINE 10 MG/ML
20 INJECTION, SOLUTION INTRAVENOUS ONCE
Status: CANCELLED | OUTPATIENT
Start: 2021-01-01 | End: 2021-01-01

## 2021-01-01 RX ORDER — FUROSEMIDE 20 MG/1
10 TABLET ORAL DAILY
COMMUNITY
End: 2021-01-01 | Stop reason: HOSPADM

## 2021-01-01 RX ORDER — ATORVASTATIN CALCIUM 40 MG/1
40 TABLET, FILM COATED ORAL NIGHTLY
Status: DISCONTINUED | OUTPATIENT
Start: 2021-01-01 | End: 2021-01-01 | Stop reason: HOSPADM

## 2021-01-01 RX ORDER — SODIUM CHLORIDE 0.9 % (FLUSH) 0.9 %
10 SYRINGE (ML) INJECTION EVERY 12 HOURS SCHEDULED
Status: DISCONTINUED | OUTPATIENT
Start: 2021-01-01 | End: 2021-01-01 | Stop reason: HOSPADM

## 2021-01-01 RX ORDER — SODIUM CHLORIDE 9 MG/ML
100 INJECTION, SOLUTION INTRAVENOUS CONTINUOUS
Status: DISCONTINUED | OUTPATIENT
Start: 2021-01-01 | End: 2021-01-01 | Stop reason: HOSPADM

## 2021-01-01 RX ORDER — ACETAMINOPHEN 325 MG/1
650 TABLET ORAL ONCE
Status: COMPLETED | OUTPATIENT
Start: 2021-01-01 | End: 2021-01-01

## 2021-01-01 RX ORDER — PANTOPRAZOLE SODIUM 40 MG/10ML
40 INJECTION, POWDER, LYOPHILIZED, FOR SOLUTION INTRAVENOUS
Status: DISCONTINUED | OUTPATIENT
Start: 2021-01-01 | End: 2021-01-01 | Stop reason: HOSPADM

## 2021-01-01 RX ORDER — ACETAMINOPHEN 325 MG/1
650 TABLET ORAL EVERY 4 HOURS PRN
Status: DISCONTINUED | OUTPATIENT
Start: 2021-01-01 | End: 2021-01-01 | Stop reason: HOSPADM

## 2021-01-01 RX ORDER — MORPHINE SULFATE 100 MG/5ML
5 SOLUTION ORAL
Qty: 30 ML | Refills: 0 | Status: SHIPPED | OUTPATIENT
Start: 2021-01-01

## 2021-01-01 RX ORDER — FENTANYL CITRATE 50 UG/ML
INJECTION, SOLUTION INTRAMUSCULAR; INTRAVENOUS AS NEEDED
Status: DISCONTINUED | OUTPATIENT
Start: 2021-01-01 | End: 2021-01-01 | Stop reason: SURG

## 2021-01-01 RX ORDER — BUPIVACAINE HCL/0.9 % NACL/PF 0.1 %
2 PLASTIC BAG, INJECTION (ML) EPIDURAL ONCE
Status: COMPLETED | OUTPATIENT
Start: 2021-01-01 | End: 2021-01-01

## 2021-01-01 RX ORDER — FLUOXETINE HYDROCHLORIDE 20 MG/1
40 CAPSULE ORAL DAILY
Status: DISCONTINUED | OUTPATIENT
Start: 2021-01-01 | End: 2021-01-01

## 2021-01-01 RX ORDER — HYDRALAZINE HYDROCHLORIDE 20 MG/ML
10 INJECTION INTRAMUSCULAR; INTRAVENOUS 2 TIMES DAILY
Status: DISCONTINUED | OUTPATIENT
Start: 2021-01-01 | End: 2021-01-01 | Stop reason: HOSPADM

## 2021-01-01 RX ORDER — POLYETHYLENE GLYCOL 3350 17 G/17G
17 POWDER, FOR SOLUTION ORAL DAILY
Qty: 30 EACH | Refills: 6
Start: 2021-01-01 | End: 2021-01-01 | Stop reason: HOSPADM

## 2021-01-01 RX ORDER — BISACODYL 10 MG
10 SUPPOSITORY, RECTAL RECTAL DAILY PRN
Qty: 4 EACH | Refills: 0 | Status: SHIPPED | OUTPATIENT
Start: 2021-01-01

## 2021-01-01 RX ORDER — ALBUTEROL SULFATE 90 UG/1
2 AEROSOL, METERED RESPIRATORY (INHALATION) EVERY 4 HOURS PRN
COMMUNITY
End: 2021-01-01 | Stop reason: HOSPADM

## 2021-01-01 RX ORDER — MIDAZOLAM HYDROCHLORIDE 1 MG/ML
INJECTION INTRAMUSCULAR; INTRAVENOUS AS NEEDED
Status: DISCONTINUED | OUTPATIENT
Start: 2021-01-01 | End: 2021-01-01 | Stop reason: SURG

## 2021-01-01 RX ORDER — FERROUS SULFATE TAB EC 324 MG (65 MG FE EQUIVALENT) 324 (65 FE) MG
324 TABLET DELAYED RESPONSE ORAL 2 TIMES DAILY WITH MEALS
Status: DISCONTINUED | OUTPATIENT
Start: 2021-01-01 | End: 2021-01-01 | Stop reason: HOSPADM

## 2021-01-01 RX ORDER — ASPIRIN 81 MG/1
81 TABLET ORAL DAILY
COMMUNITY
End: 2021-01-01 | Stop reason: HOSPADM

## 2021-01-01 RX ORDER — BUPIVACAINE HCL/0.9 % NACL/PF 0.1 %
2 PLASTIC BAG, INJECTION (ML) EPIDURAL ONCE
Status: CANCELLED | OUTPATIENT
Start: 2021-01-01 | End: 2021-01-01

## 2021-01-01 RX ORDER — RISPERIDONE 1 MG/1
1 TABLET ORAL NIGHTLY
Status: DISCONTINUED | OUTPATIENT
Start: 2021-01-01 | End: 2021-01-01 | Stop reason: HOSPADM

## 2021-01-01 RX ORDER — LIDOCAINE HYDROCHLORIDE 20 MG/ML
INJECTION, SOLUTION INFILTRATION; PERINEURAL AS NEEDED
Status: DISCONTINUED | OUTPATIENT
Start: 2021-01-01 | End: 2021-01-01 | Stop reason: SURG

## 2021-01-01 RX ORDER — DOCUSATE SODIUM 100 MG/1
300 CAPSULE, LIQUID FILLED ORAL DAILY
COMMUNITY
Start: 2021-01-01 | End: 2021-01-01 | Stop reason: SDUPTHER

## 2021-01-01 RX ORDER — ASPIRIN 81 MG/1
81 TABLET ORAL DAILY
Status: DISCONTINUED | OUTPATIENT
Start: 2021-01-01 | End: 2021-01-01 | Stop reason: HOSPADM

## 2021-01-01 RX ORDER — ATORVASTATIN CALCIUM 40 MG/1
80 TABLET, FILM COATED ORAL NIGHTLY
Status: DISCONTINUED | OUTPATIENT
Start: 2021-01-01 | End: 2021-01-01

## 2021-01-01 RX ORDER — SCOLOPAMINE TRANSDERMAL SYSTEM 1 MG/1
1 PATCH, EXTENDED RELEASE TRANSDERMAL
Status: DISCONTINUED | OUTPATIENT
Start: 2021-01-01 | End: 2021-01-01 | Stop reason: HOSPADM

## 2021-01-01 RX ORDER — AZITHROMYCIN 250 MG/1
250 TABLET, FILM COATED ORAL DAILY
Status: DISCONTINUED | OUTPATIENT
Start: 2021-01-01 | End: 2021-01-01

## 2021-01-01 RX ORDER — CETIRIZINE HYDROCHLORIDE 10 MG/1
10 TABLET ORAL ONCE
Status: COMPLETED | OUTPATIENT
Start: 2021-01-01 | End: 2021-01-01

## 2021-01-01 RX ORDER — CETIRIZINE HYDROCHLORIDE 10 MG/1
10 TABLET ORAL ONCE
Status: CANCELLED | OUTPATIENT
Start: 2021-01-01 | End: 2021-01-01

## 2021-01-01 RX ORDER — FLUOXETINE HYDROCHLORIDE 60 MG/1
60 TABLET, FILM COATED ORAL; ORAL DAILY
COMMUNITY
End: 2021-01-01 | Stop reason: HOSPADM

## 2021-01-01 RX ORDER — LIDOCAINE HYDROCHLORIDE AND EPINEPHRINE 10; 10 MG/ML; UG/ML
INJECTION, SOLUTION INFILTRATION; PERINEURAL AS NEEDED
Status: DISCONTINUED | OUTPATIENT
Start: 2021-01-01 | End: 2021-01-01 | Stop reason: HOSPADM

## 2021-01-01 RX ORDER — SODIUM CHLORIDE 0.9 % (FLUSH) 0.9 %
10 SYRINGE (ML) INJECTION AS NEEDED
Status: DISCONTINUED | OUTPATIENT
Start: 2021-01-01 | End: 2021-01-01 | Stop reason: HOSPADM

## 2021-01-01 RX ORDER — SODIUM CHLORIDE 9 MG/ML
250 INJECTION, SOLUTION INTRAVENOUS ONCE
Status: CANCELLED | OUTPATIENT
Start: 2021-01-01

## 2021-01-01 RX ORDER — CALCIUM CARBONATE 200(500)MG
2 TABLET,CHEWABLE ORAL 2 TIMES DAILY PRN
Status: DISCONTINUED | OUTPATIENT
Start: 2021-01-01 | End: 2021-01-01 | Stop reason: HOSPADM

## 2021-01-01 RX ORDER — CEFDINIR 300 MG/1
300 CAPSULE ORAL 2 TIMES DAILY
Qty: 12 CAPSULE | Refills: 0 | Status: SHIPPED | OUTPATIENT
Start: 2021-01-01 | End: 2021-01-01

## 2021-01-01 RX ORDER — DOCUSATE SODIUM 100 MG/1
300 CAPSULE, LIQUID FILLED ORAL DAILY
Qty: 90 CAPSULE | Refills: 0
Start: 2021-01-01 | End: 2021-01-01 | Stop reason: HOSPADM

## 2021-01-01 RX ORDER — ENALAPRILAT 2.5 MG/2ML
0.62 INJECTION INTRAVENOUS EVERY 6 HOURS PRN
Status: DISCONTINUED | OUTPATIENT
Start: 2021-01-01 | End: 2021-01-01 | Stop reason: HOSPADM

## 2021-01-01 RX ORDER — IPRATROPIUM BROMIDE AND ALBUTEROL SULFATE 2.5; .5 MG/3ML; MG/3ML
3 SOLUTION RESPIRATORY (INHALATION) 4 TIMES DAILY PRN
Status: DISCONTINUED | OUTPATIENT
Start: 2021-01-01 | End: 2021-01-01 | Stop reason: HOSPADM

## 2021-01-01 RX ORDER — SODIUM CHLORIDE 9 MG/ML
125 INJECTION, SOLUTION INTRAVENOUS CONTINUOUS
Status: DISCONTINUED | OUTPATIENT
Start: 2021-01-01 | End: 2021-01-01

## 2021-01-01 RX ORDER — ACETAMINOPHEN 325 MG/1
650 TABLET ORAL ONCE
Status: CANCELLED | OUTPATIENT
Start: 2021-01-01 | End: 2021-01-01

## 2021-01-01 RX ORDER — BUDESONIDE AND FORMOTEROL FUMARATE DIHYDRATE 160; 4.5 UG/1; UG/1
2 AEROSOL RESPIRATORY (INHALATION)
Status: DISCONTINUED | OUTPATIENT
Start: 2021-01-01 | End: 2021-01-01 | Stop reason: HOSPADM

## 2021-01-01 RX ORDER — ONDANSETRON 4 MG/1
4 TABLET, FILM COATED ORAL EVERY 6 HOURS PRN
Status: DISCONTINUED | OUTPATIENT
Start: 2021-01-01 | End: 2021-01-01 | Stop reason: HOSPADM

## 2021-01-01 RX ORDER — FLUOXETINE HYDROCHLORIDE 20 MG/1
20 CAPSULE ORAL DAILY
Status: DISCONTINUED | OUTPATIENT
Start: 2021-01-01 | End: 2021-01-01

## 2021-01-01 RX ORDER — SODIUM CHLORIDE 9 MG/ML
100 INJECTION, SOLUTION INTRAVENOUS CONTINUOUS
Status: CANCELLED | OUTPATIENT
Start: 2021-01-01

## 2021-01-01 RX ORDER — ASPIRIN 300 MG/1
300 SUPPOSITORY RECTAL DAILY
Status: DISCONTINUED | OUTPATIENT
Start: 2021-01-01 | End: 2021-01-01 | Stop reason: HOSPADM

## 2021-01-01 RX ORDER — POTASSIUM CHLORIDE 750 MG/1
10 TABLET, EXTENDED RELEASE ORAL 2 TIMES DAILY
COMMUNITY
End: 2021-01-01 | Stop reason: HOSPADM

## 2021-01-01 RX ORDER — FOLIC ACID 1 MG/1
1 TABLET ORAL DAILY
COMMUNITY
End: 2021-01-01 | Stop reason: HOSPADM

## 2021-01-01 RX ORDER — DIPHENHYDRAMINE HCL 25 MG
25 CAPSULE ORAL ONCE
Status: CANCELLED | OUTPATIENT
Start: 2021-01-01 | End: 2021-01-01

## 2021-01-01 RX ORDER — ONDANSETRON 4 MG/1
4 TABLET, FILM COATED ORAL EVERY 6 HOURS PRN
Status: DISCONTINUED | OUTPATIENT
Start: 2021-01-01 | End: 2021-01-01 | Stop reason: SDUPTHER

## 2021-01-01 RX ORDER — PANTOPRAZOLE SODIUM 40 MG/1
40 TABLET, DELAYED RELEASE ORAL EVERY MORNING
Status: DISCONTINUED | OUTPATIENT
Start: 2021-01-01 | End: 2021-01-01 | Stop reason: HOSPADM

## 2021-01-01 RX ORDER — BUDESONIDE AND FORMOTEROL FUMARATE DIHYDRATE 160; 4.5 UG/1; UG/1
AEROSOL RESPIRATORY (INHALATION)
Qty: 10.2 G | Refills: 5 | Status: SHIPPED | OUTPATIENT
Start: 2021-01-01 | End: 2021-01-01

## 2021-01-01 RX ORDER — HYDROCORTISONE 25 MG/G
CREAM TOPICAL 2 TIMES DAILY
Status: DISCONTINUED | OUTPATIENT
Start: 2021-01-01 | End: 2021-01-01

## 2021-01-01 RX ORDER — LOSARTAN POTASSIUM 25 MG/1
25 TABLET ORAL NIGHTLY
COMMUNITY
End: 2021-01-01 | Stop reason: HOSPADM

## 2021-01-01 RX ORDER — POLYETHYLENE GLYCOL 3350 17 G/17G
17 POWDER, FOR SOLUTION ORAL DAILY PRN
COMMUNITY
End: 2021-01-01 | Stop reason: HOSPADM

## 2021-01-01 RX ORDER — PNV NO.95/FERROUS FUM/FOLIC AC 28MG-0.8MG
TABLET ORAL
Qty: 60 EACH | Refills: 5 | Status: SHIPPED | OUTPATIENT
Start: 2021-01-01 | End: 2021-01-01

## 2021-01-01 RX ORDER — LABETALOL HYDROCHLORIDE 5 MG/ML
5 INJECTION, SOLUTION INTRAVENOUS 3 TIMES DAILY PRN
Status: DISCONTINUED | OUTPATIENT
Start: 2021-01-01 | End: 2021-01-01 | Stop reason: HOSPADM

## 2021-01-01 RX ORDER — PROPOFOL 10 MG/ML
VIAL (ML) INTRAVENOUS AS NEEDED
Status: DISCONTINUED | OUTPATIENT
Start: 2021-01-01 | End: 2021-01-01 | Stop reason: SURG

## 2021-01-01 RX ORDER — ACETAMINOPHEN 650 MG/1
650 SUPPOSITORY RECTAL EVERY 6 HOURS PRN
Qty: 16 EACH | Refills: 0 | Status: SHIPPED | OUTPATIENT
Start: 2021-01-01

## 2021-01-01 RX ORDER — ALBUTEROL SULFATE 2.5 MG/3ML
2.5 SOLUTION RESPIRATORY (INHALATION) EVERY 6 HOURS PRN
Status: DISCONTINUED | OUTPATIENT
Start: 2021-01-01 | End: 2021-01-01 | Stop reason: HOSPADM

## 2021-01-01 RX ORDER — POTASSIUM CHLORIDE 750 MG/1
10 CAPSULE, EXTENDED RELEASE ORAL 2 TIMES DAILY WITH MEALS
Status: DISCONTINUED | OUTPATIENT
Start: 2021-01-01 | End: 2021-01-01 | Stop reason: HOSPADM

## 2021-01-01 RX ORDER — RISPERIDONE 1 MG/1
1 TABLET ORAL NIGHTLY
COMMUNITY
End: 2021-01-01 | Stop reason: HOSPADM

## 2021-01-01 RX ORDER — MEGESTROL ACETATE 40 MG/1
40 TABLET ORAL 2 TIMES DAILY
Qty: 60 TABLET | Refills: 3
Start: 2021-01-01 | End: 2021-01-01 | Stop reason: HOSPADM

## 2021-01-01 RX ORDER — AMOXICILLIN 250 MG
2 CAPSULE ORAL 2 TIMES DAILY PRN
Status: DISCONTINUED | OUTPATIENT
Start: 2021-01-01 | End: 2021-01-01 | Stop reason: HOSPADM

## 2021-01-01 RX ORDER — MORPHINE SULFATE 2 MG/ML
INJECTION, SOLUTION INTRAMUSCULAR; INTRAVENOUS
Status: COMPLETED
Start: 2021-01-01 | End: 2021-01-01

## 2021-01-01 RX ORDER — HYDROCODONE BITARTRATE AND ACETAMINOPHEN 7.5; 325 MG/1; MG/1
1 TABLET ORAL EVERY 6 HOURS PRN
Qty: 5 TABLET | Refills: 0 | Status: SHIPPED | OUTPATIENT
Start: 2021-01-01 | End: 2021-01-01 | Stop reason: SDUPTHER

## 2021-01-01 RX ORDER — ASPIRIN 300 MG/1
300 SUPPOSITORY RECTAL ONCE
Status: COMPLETED | OUTPATIENT
Start: 2021-01-01 | End: 2021-01-01

## 2021-01-01 RX ORDER — LANOLIN ALCOHOL/MO/W.PET/CERES
1000 CREAM (GRAM) TOPICAL DAILY
Qty: 90 TABLET | Refills: 1 | Status: SHIPPED | OUTPATIENT
Start: 2021-01-01 | End: 2021-01-01 | Stop reason: HOSPADM

## 2021-01-01 RX ORDER — PHENYLEPHRINE HYDROCHLORIDE 10 MG/ML
INJECTION INTRAVENOUS AS NEEDED
Status: DISCONTINUED | OUTPATIENT
Start: 2021-01-01 | End: 2021-01-01 | Stop reason: SURG

## 2021-01-01 RX ORDER — PRAVASTATIN SODIUM 40 MG
40 TABLET ORAL NIGHTLY
Status: DISCONTINUED | OUTPATIENT
Start: 2021-01-01 | End: 2021-01-01 | Stop reason: HOSPADM

## 2021-01-01 RX ORDER — FUROSEMIDE 10 MG/ML
40 INJECTION INTRAMUSCULAR; INTRAVENOUS ONCE
Status: COMPLETED | OUTPATIENT
Start: 2021-01-01 | End: 2021-01-01

## 2021-01-01 RX ORDER — AZITHROMYCIN 250 MG/1
500 TABLET, FILM COATED ORAL DAILY
Status: COMPLETED | OUTPATIENT
Start: 2021-01-01 | End: 2021-01-01

## 2021-01-01 RX ORDER — FOLIC ACID 1 MG/1
1000 TABLET ORAL DAILY
Status: DISCONTINUED | OUTPATIENT
Start: 2021-01-01 | End: 2021-01-01 | Stop reason: HOSPADM

## 2021-01-01 RX ORDER — SCOLOPAMINE TRANSDERMAL SYSTEM 1 MG/1
1 PATCH, EXTENDED RELEASE TRANSDERMAL
Qty: 7 EACH | Refills: 0 | Status: SHIPPED | OUTPATIENT
Start: 2021-01-01

## 2021-01-01 RX ORDER — HYDROCODONE BITARTRATE AND ACETAMINOPHEN 7.5; 325 MG/1; MG/1
1 TABLET ORAL EVERY 6 HOURS PRN
Qty: 5 TABLET | Refills: 0 | Status: SHIPPED | OUTPATIENT
Start: 2021-01-01 | End: 2021-01-01 | Stop reason: HOSPADM

## 2021-01-01 RX ORDER — POTASSIUM CHLORIDE 750 MG/1
TABLET, FILM COATED, EXTENDED RELEASE ORAL
Qty: 60 TABLET | Refills: 0 | Status: SHIPPED | OUTPATIENT
Start: 2021-01-01 | End: 2021-01-01

## 2021-01-01 RX ORDER — POTASSIUM CHLORIDE 750 MG/1
TABLET, FILM COATED, EXTENDED RELEASE ORAL
Qty: 60 TABLET | Refills: 1 | Status: SHIPPED | OUTPATIENT
Start: 2021-01-01 | End: 2021-01-01

## 2021-01-01 RX ORDER — SODIUM CHLORIDE 9 MG/ML
125 INJECTION, SOLUTION INTRAVENOUS CONTINUOUS
Status: DISCONTINUED | OUTPATIENT
Start: 2021-01-01 | End: 2021-01-01 | Stop reason: HOSPADM

## 2021-01-01 RX ADMIN — POTASSIUM CHLORIDE 10 MEQ: 10 CAPSULE, COATED, EXTENDED RELEASE ORAL at 08:23

## 2021-01-01 RX ADMIN — DIPHENHYDRAMINE HYDROCHLORIDE 25 MG: 25 CAPSULE ORAL at 11:52

## 2021-01-01 RX ADMIN — SODIUM CHLORIDE 75 ML/HR: 900 INJECTION, SOLUTION INTRAVENOUS at 03:05

## 2021-01-01 RX ADMIN — FAMOTIDINE 20 MG: 10 INJECTION INTRAVENOUS at 11:29

## 2021-01-01 RX ADMIN — POTASSIUM CHLORIDE 10 MEQ: 10 CAPSULE, COATED, EXTENDED RELEASE ORAL at 17:39

## 2021-01-01 RX ADMIN — SODIUM CHLORIDE, PRESERVATIVE FREE 10 ML: 5 INJECTION INTRAVENOUS at 20:39

## 2021-01-01 RX ADMIN — MORPHINE SULFATE 1 MG: 2 INJECTION, SOLUTION INTRAMUSCULAR; INTRAVENOUS at 07:54

## 2021-01-01 RX ADMIN — ASPIRIN 300 MG: 300 SUPPOSITORY RECTAL at 08:44

## 2021-01-01 RX ADMIN — FERROUS SULFATE TAB EC 324 MG (65 MG FE EQUIVALENT) 324 MG: 324 (65 FE) TABLET DELAYED RESPONSE at 17:41

## 2021-01-01 RX ADMIN — CEFTRIAXONE SODIUM 1 G: 1 INJECTION, POWDER, FOR SOLUTION INTRAMUSCULAR; INTRAVENOUS at 09:13

## 2021-01-01 RX ADMIN — POTASSIUM CHLORIDE 10 MEQ: 10 CAPSULE, COATED, EXTENDED RELEASE ORAL at 10:09

## 2021-01-01 RX ADMIN — RISPERIDONE 1 MG: 1 TABLET ORAL at 20:17

## 2021-01-01 RX ADMIN — SODIUM CHLORIDE 125 ML/HR: 900 INJECTION, SOLUTION INTRAVENOUS at 19:46

## 2021-01-01 RX ADMIN — BUDESONIDE AND FORMOTEROL FUMARATE DIHYDRATE 2 PUFF: 160; 4.5 AEROSOL RESPIRATORY (INHALATION) at 19:07

## 2021-01-01 RX ADMIN — SODIUM CHLORIDE 125 ML/HR: 900 INJECTION, SOLUTION INTRAVENOUS at 10:17

## 2021-01-01 RX ADMIN — HYDRALAZINE HYDROCHLORIDE 10 MG: 20 INJECTION INTRAMUSCULAR; INTRAVENOUS at 10:29

## 2021-01-01 RX ADMIN — HYDROCORTISONE 2.5%: 25 CREAM TOPICAL at 08:24

## 2021-01-01 RX ADMIN — SCOLOPAMINE TRANSDERMAL SYSTEM 1 PATCH: 1 PATCH, EXTENDED RELEASE TRANSDERMAL at 10:31

## 2021-01-01 RX ADMIN — RISPERIDONE 1 MG: 1 TABLET ORAL at 20:57

## 2021-01-01 RX ADMIN — ASPIRIN 81 MG: 81 TABLET, FILM COATED ORAL at 10:08

## 2021-01-01 RX ADMIN — POTASSIUM CHLORIDE 10 MEQ: 10 CAPSULE, COATED, EXTENDED RELEASE ORAL at 16:53

## 2021-01-01 RX ADMIN — LOSARTAN POTASSIUM 100 MG: 50 TABLET, FILM COATED ORAL at 08:44

## 2021-01-01 RX ADMIN — FUROSEMIDE 40 MG: 10 INJECTION INTRAMUSCULAR; INTRAVENOUS at 04:54

## 2021-01-01 RX ADMIN — Medication 2 G: at 13:35

## 2021-01-01 RX ADMIN — PRAVASTATIN SODIUM 40 MG: 40 TABLET ORAL at 20:17

## 2021-01-01 RX ADMIN — FLUOXETINE 40 MG: 20 CAPSULE ORAL at 08:23

## 2021-01-01 RX ADMIN — SODIUM CHLORIDE, PRESERVATIVE FREE 10 ML: 5 INJECTION INTRAVENOUS at 08:52

## 2021-01-01 RX ADMIN — ASPIRIN 300 MG: 300 SUPPOSITORY RECTAL at 09:22

## 2021-01-01 RX ADMIN — SODIUM CHLORIDE 125 ML/HR: 900 INJECTION, SOLUTION INTRAVENOUS at 15:12

## 2021-01-01 RX ADMIN — PRAVASTATIN SODIUM 40 MG: 40 TABLET ORAL at 20:57

## 2021-01-01 RX ADMIN — SODIUM CHLORIDE 125 ML/HR: 900 INJECTION, SOLUTION INTRAVENOUS at 05:03

## 2021-01-01 RX ADMIN — BUDESONIDE AND FORMOTEROL FUMARATE DIHYDRATE 2 PUFF: 160; 4.5 AEROSOL RESPIRATORY (INHALATION) at 19:18

## 2021-01-01 RX ADMIN — PANTOPRAZOLE SODIUM 40 MG: 40 TABLET, DELAYED RELEASE ORAL at 06:37

## 2021-01-01 RX ADMIN — LOSARTAN POTASSIUM 100 MG: 50 TABLET, FILM COATED ORAL at 10:08

## 2021-01-01 RX ADMIN — IOPAMIDOL 90 ML: 755 INJECTION, SOLUTION INTRAVENOUS at 07:47

## 2021-01-01 RX ADMIN — FOLIC ACID 1000 MCG: 1 TABLET ORAL at 15:50

## 2021-01-01 RX ADMIN — FERROUS SULFATE TAB EC 324 MG (65 MG FE EQUIVALENT) 324 MG: 324 (65 FE) TABLET DELAYED RESPONSE at 17:26

## 2021-01-01 RX ADMIN — PRAVASTATIN SODIUM 40 MG: 40 TABLET ORAL at 19:58

## 2021-01-01 RX ADMIN — FERROUS SULFATE TAB EC 324 MG (65 MG FE EQUIVALENT) 324 MG: 324 (65 FE) TABLET DELAYED RESPONSE at 18:04

## 2021-01-01 RX ADMIN — ASPIRIN 81 MG: 81 TABLET, FILM COATED ORAL at 09:13

## 2021-01-01 RX ADMIN — BACITRACIN: 500 OINTMENT TOPICAL at 09:26

## 2021-01-01 RX ADMIN — PANTOPRAZOLE SODIUM 40 MG: 40 TABLET, DELAYED RELEASE ORAL at 06:25

## 2021-01-01 RX ADMIN — SODIUM CHLORIDE, PRESERVATIVE FREE 10 ML: 5 INJECTION INTRAVENOUS at 08:44

## 2021-01-01 RX ADMIN — PANTOPRAZOLE SODIUM 40 MG: 40 INJECTION, POWDER, FOR SOLUTION INTRAVENOUS at 05:02

## 2021-01-01 RX ADMIN — SODIUM CHLORIDE, PRESERVATIVE FREE 10 ML: 5 INJECTION INTRAVENOUS at 11:50

## 2021-01-01 RX ADMIN — CETIRIZINE HYDROCHLORIDE 10 MG: 10 TABLET, FILM COATED ORAL at 11:52

## 2021-01-01 RX ADMIN — FLUOXETINE 60 MG: 20 CAPSULE ORAL at 08:44

## 2021-01-01 RX ADMIN — FERROUS SULFATE TAB EC 324 MG (65 MG FE EQUIVALENT) 324 MG: 324 (65 FE) TABLET DELAYED RESPONSE at 17:39

## 2021-01-01 RX ADMIN — FAMOTIDINE 20 MG: 10 INJECTION INTRAVENOUS at 11:56

## 2021-01-01 RX ADMIN — SODIUM CHLORIDE 125 ML/HR: 900 INJECTION, SOLUTION INTRAVENOUS at 14:27

## 2021-01-01 RX ADMIN — POTASSIUM CHLORIDE 10 MEQ: 10 CAPSULE, COATED, EXTENDED RELEASE ORAL at 17:42

## 2021-01-01 RX ADMIN — FLUDEOXYGLUCOSE F18 1 DOSE: 300 INJECTION INTRAVENOUS at 11:25

## 2021-01-01 RX ADMIN — FLUOXETINE 60 MG: 20 CAPSULE ORAL at 08:51

## 2021-01-01 RX ADMIN — PHENYLEPHRINE HYDROCHLORIDE 100 MCG: 10 INJECTION INTRAVENOUS at 13:44

## 2021-01-01 RX ADMIN — ASPIRIN 81 MG: 81 TABLET, FILM COATED ORAL at 15:48

## 2021-01-01 RX ADMIN — LOSARTAN POTASSIUM 100 MG: 50 TABLET, FILM COATED ORAL at 08:51

## 2021-01-01 RX ADMIN — PANTOPRAZOLE SODIUM 40 MG: 40 INJECTION, POWDER, FOR SOLUTION INTRAVENOUS at 05:03

## 2021-01-01 RX ADMIN — BUDESONIDE AND FORMOTEROL FUMARATE DIHYDRATE 2 PUFF: 160; 4.5 AEROSOL RESPIRATORY (INHALATION) at 20:44

## 2021-01-01 RX ADMIN — POTASSIUM CHLORIDE 10 MEQ: 10 CAPSULE, COATED, EXTENDED RELEASE ORAL at 17:26

## 2021-01-01 RX ADMIN — SODIUM CHLORIDE 100 ML/HR: 9 INJECTION, SOLUTION INTRAVENOUS at 11:41

## 2021-01-01 RX ADMIN — PANTOPRAZOLE SODIUM 40 MG: 40 TABLET, DELAYED RELEASE ORAL at 05:22

## 2021-01-01 RX ADMIN — HYDRALAZINE HYDROCHLORIDE 10 MG: 20 INJECTION INTRAMUSCULAR; INTRAVENOUS at 20:22

## 2021-01-01 RX ADMIN — ASPIRIN 81 MG: 81 TABLET, FILM COATED ORAL at 08:46

## 2021-01-01 RX ADMIN — CEFTRIAXONE SODIUM 1 G: 1 INJECTION, POWDER, FOR SOLUTION INTRAMUSCULAR; INTRAVENOUS at 08:51

## 2021-01-01 RX ADMIN — SODIUM CHLORIDE, PRESERVATIVE FREE 10 ML: 5 INJECTION INTRAVENOUS at 21:31

## 2021-01-01 RX ADMIN — POTASSIUM CHLORIDE 10 MEQ: 10 CAPSULE, COATED, EXTENDED RELEASE ORAL at 18:05

## 2021-01-01 RX ADMIN — LOSARTAN POTASSIUM 100 MG: 50 TABLET, FILM COATED ORAL at 15:48

## 2021-01-01 RX ADMIN — SODIUM CHLORIDE, PRESERVATIVE FREE 10 ML: 5 INJECTION INTRAVENOUS at 20:22

## 2021-01-01 RX ADMIN — POTASSIUM CHLORIDE 10 MEQ: 10 CAPSULE, COATED, EXTENDED RELEASE ORAL at 08:45

## 2021-01-01 RX ADMIN — FENTANYL CITRATE 25 MCG: 50 INJECTION INTRAMUSCULAR; INTRAVENOUS at 13:41

## 2021-01-01 RX ADMIN — FLUOXETINE 20 MG: 20 CAPSULE ORAL at 15:49

## 2021-01-01 RX ADMIN — RISPERIDONE 1 MG: 1 TABLET ORAL at 21:31

## 2021-01-01 RX ADMIN — BUDESONIDE AND FORMOTEROL FUMARATE DIHYDRATE 2 PUFF: 160; 4.5 AEROSOL RESPIRATORY (INHALATION) at 07:09

## 2021-01-01 RX ADMIN — SODIUM CHLORIDE 250 ML: 9 INJECTION, SOLUTION INTRAVENOUS at 11:50

## 2021-01-01 RX ADMIN — FERROUS SULFATE TAB EC 324 MG (65 MG FE EQUIVALENT) 324 MG: 324 (65 FE) TABLET DELAYED RESPONSE at 08:24

## 2021-01-01 RX ADMIN — FENTANYL CITRATE 25 MCG: 50 INJECTION INTRAMUSCULAR; INTRAVENOUS at 13:31

## 2021-01-01 RX ADMIN — SODIUM CHLORIDE 500 ML: 9 INJECTION, SOLUTION INTRAVENOUS at 14:27

## 2021-01-01 RX ADMIN — CETIRIZINE HYDROCHLORIDE 10 MG: 10 TABLET, FILM COATED ORAL at 11:05

## 2021-01-01 RX ADMIN — FLUOXETINE 40 MG: 20 CAPSULE ORAL at 15:50

## 2021-01-01 RX ADMIN — MIDAZOLAM HYDROCHLORIDE 0.2 MG: 2 INJECTION, SOLUTION INTRAMUSCULAR; INTRAVENOUS at 13:30

## 2021-01-01 RX ADMIN — CEFTRIAXONE SODIUM 1 G: 1 INJECTION, POWDER, FOR SOLUTION INTRAMUSCULAR; INTRAVENOUS at 10:13

## 2021-01-01 RX ADMIN — FOLIC ACID 1000 MCG: 1 TABLET ORAL at 08:24

## 2021-01-01 RX ADMIN — FOLIC ACID 1000 MCG: 1 TABLET ORAL at 10:09

## 2021-01-01 RX ADMIN — SODIUM CHLORIDE, PRESERVATIVE FREE 10 ML: 5 INJECTION INTRAVENOUS at 08:39

## 2021-01-01 RX ADMIN — FLUOXETINE 20 MG: 20 CAPSULE ORAL at 08:24

## 2021-01-01 RX ADMIN — FLUOXETINE 20 MG: 20 CAPSULE ORAL at 15:04

## 2021-01-01 RX ADMIN — ASPIRIN 81 MG: 81 TABLET, FILM COATED ORAL at 08:51

## 2021-01-01 RX ADMIN — POTASSIUM CHLORIDE 10 MEQ: 10 CAPSULE, COATED, EXTENDED RELEASE ORAL at 08:51

## 2021-01-01 RX ADMIN — PRAVASTATIN SODIUM 40 MG: 40 TABLET ORAL at 21:31

## 2021-01-01 RX ADMIN — SODIUM CHLORIDE 125 ML/HR: 900 INJECTION, SOLUTION INTRAVENOUS at 00:47

## 2021-01-01 RX ADMIN — MORPHINE SULFATE 1 MG: 2 INJECTION, SOLUTION INTRAMUSCULAR; INTRAVENOUS at 13:37

## 2021-01-01 RX ADMIN — DIPHENHYDRAMINE HYDROCHLORIDE 25 MG: 25 CAPSULE ORAL at 11:06

## 2021-01-01 RX ADMIN — SODIUM CHLORIDE, PRESERVATIVE FREE 10 ML: 5 INJECTION INTRAVENOUS at 20:23

## 2021-01-01 RX ADMIN — FOLIC ACID 1000 MCG: 1 TABLET ORAL at 08:51

## 2021-01-01 RX ADMIN — CEFTRIAXONE SODIUM 1 G: 1 INJECTION, POWDER, FOR SOLUTION INTRAMUSCULAR; INTRAVENOUS at 08:44

## 2021-01-01 RX ADMIN — SODIUM CHLORIDE 100 ML/HR: 9 INJECTION, SOLUTION INTRAVENOUS at 06:32

## 2021-01-01 RX ADMIN — PHENYLEPHRINE HYDROCHLORIDE 100 MCG: 10 INJECTION INTRAVENOUS at 13:46

## 2021-01-01 RX ADMIN — HYDRALAZINE HYDROCHLORIDE 10 MG: 20 INJECTION INTRAMUSCULAR; INTRAVENOUS at 18:07

## 2021-01-01 RX ADMIN — FLUOXETINE 40 MG: 20 CAPSULE ORAL at 10:08

## 2021-01-01 RX ADMIN — HYDROCORTISONE 2.5%: 25 CREAM TOPICAL at 20:17

## 2021-01-01 RX ADMIN — BUDESONIDE AND FORMOTEROL FUMARATE DIHYDRATE 2 PUFF: 160; 4.5 AEROSOL RESPIRATORY (INHALATION) at 19:53

## 2021-01-01 RX ADMIN — ACETAMINOPHEN 650 MG: 325 TABLET, FILM COATED ORAL at 11:06

## 2021-01-01 RX ADMIN — FOLIC ACID 1000 MCG: 1 TABLET ORAL at 08:44

## 2021-01-01 RX ADMIN — FERROUS SULFATE TAB EC 324 MG (65 MG FE EQUIVALENT) 324 MG: 324 (65 FE) TABLET DELAYED RESPONSE at 16:53

## 2021-01-01 RX ADMIN — LOSARTAN POTASSIUM 100 MG: 50 TABLET, FILM COATED ORAL at 08:23

## 2021-01-01 RX ADMIN — RISPERIDONE 1 MG: 1 TABLET ORAL at 19:58

## 2021-01-01 RX ADMIN — AZITHROMYCIN MONOHYDRATE 500 MG: 250 TABLET ORAL at 10:12

## 2021-01-01 RX ADMIN — ENALAPRILAT 0.62 MG: 1.25 INJECTION INTRAVENOUS at 05:44

## 2021-01-01 RX ADMIN — BACITRACIN: 500 OINTMENT TOPICAL at 10:30

## 2021-01-01 RX ADMIN — LIDOCAINE HYDROCHLORIDE 50 MG: 20 INJECTION, SOLUTION INFILTRATION; PERINEURAL at 13:34

## 2021-01-01 RX ADMIN — BUDESONIDE AND FORMOTEROL FUMARATE DIHYDRATE 2 PUFF: 160; 4.5 AEROSOL RESPIRATORY (INHALATION) at 08:22

## 2021-01-01 RX ADMIN — FERUMOXYTOL 510 MG: 510 INJECTION INTRAVENOUS at 11:50

## 2021-01-01 RX ADMIN — SODIUM CHLORIDE, PRESERVATIVE FREE 10 ML: 5 INJECTION INTRAVENOUS at 19:58

## 2021-01-01 RX ADMIN — PANTOPRAZOLE SODIUM 40 MG: 40 TABLET, DELAYED RELEASE ORAL at 04:51

## 2021-01-01 RX ADMIN — SODIUM CHLORIDE 250 ML: 9 INJECTION, SOLUTION INTRAVENOUS at 11:28

## 2021-01-01 RX ADMIN — FERROUS SULFATE TAB EC 324 MG (65 MG FE EQUIVALENT) 324 MG: 324 (65 FE) TABLET DELAYED RESPONSE at 08:51

## 2021-01-01 RX ADMIN — FERROUS SULFATE TAB EC 324 MG (65 MG FE EQUIVALENT) 324 MG: 324 (65 FE) TABLET DELAYED RESPONSE at 10:08

## 2021-01-01 RX ADMIN — FERROUS SULFATE TAB EC 324 MG (65 MG FE EQUIVALENT) 324 MG: 324 (65 FE) TABLET DELAYED RESPONSE at 08:44

## 2021-01-01 RX ADMIN — PROPOFOL 100 MG: 10 INJECTION, EMULSION INTRAVENOUS at 13:34

## 2021-01-01 RX ADMIN — BUDESONIDE AND FORMOTEROL FUMARATE DIHYDRATE 2 PUFF: 160; 4.5 AEROSOL RESPIRATORY (INHALATION) at 07:30

## 2021-01-01 RX ADMIN — FERUMOXYTOL 510 MG: 510 INJECTION INTRAVENOUS at 12:25

## 2021-03-12 NOTE — TELEPHONE ENCOUNTER
CALLED PATIENT TO VERIFY SHE IS STILL SEEING DR HERNANDEZ AS HER PCP.  LOOKS LIKE SHE HAS EST WITH DR ESCALERA 10/27/2020    NO ANSWER   NO VCML     PLEASE VERIFY INFO IF PT RETURNS CALL     PATIENT HAS APT WITH DR HERNANDEZ 03/15/2021      PROSPER BOONE

## 2021-03-15 PROBLEM — R53.81 DEBILITY: Status: ACTIVE | Noted: 2018-07-02

## 2021-03-15 PROBLEM — F32.A DEPRESSION: Status: ACTIVE | Noted: 2021-01-01

## 2021-03-15 PROBLEM — G93.41 ACUTE METABOLIC ENCEPHALOPATHY: Status: ACTIVE | Noted: 2021-01-01

## 2021-03-15 PROBLEM — R53.1 WEAKNESS GENERALIZED: Status: ACTIVE | Noted: 2021-01-01

## 2021-03-15 PROBLEM — K64.9 HEMORRHOIDS: Status: ACTIVE | Noted: 2021-01-01

## 2021-03-15 PROBLEM — K59.00 CONSTIPATION: Status: ACTIVE | Noted: 2021-01-01

## 2021-03-15 PROBLEM — R41.0 DISORIENTATION: Status: ACTIVE | Noted: 2021-01-01

## 2021-03-15 PROBLEM — D63.8 ANEMIA OF CHRONIC DISEASE: Status: ACTIVE | Noted: 2021-01-01

## 2021-03-15 NOTE — PLAN OF CARE
Goal Outcome Evaluation:  Plan of Care Reviewed With: patient  Progress: no change  Outcome Summary: vss. waiting on pt to void for UA and stool sample. resting between care and will cont to monitor.

## 2021-03-15 NOTE — PROGRESS NOTES
"Michelle Gordon Danyell  3/15/2021   Chief Complaint   Patient presents with   • Rectal Pain              65-year-old female seen acutely with 4-day history of rectal pain and constipation.  According to her son the patient fell about a week ago since then she has been quieter limiting her responses to 1 or 2 words the patient reportedly has been off of all of her medications for 2 or 3 weeks reportedly had an appointment with her PCP but no showed due to diarrhea she reportedly has not had a bowel movement for 3 or 4 days and is currently having rectal pain and intermittent blood per rectum she relates \"when it happens it is a bunch\" she attributes this to having hemorrhoids.  Patient reportedly was off all of her medications for several weeks and started back on them last Friday after she fell several times on Thursday.  Patient reports decreased appetite and her son relates that she is losing weight having dropped from 134 pounds last September to 112 pounds today.  The patient reportedly has not had any head injury or loss of consciousness associated with her falls but she currently is having a lot of difficulty getting around and is more or less restricted to a wheelchair and requires full assistance with transfers    Past Medical History:   Diagnosis Date   • Alkaline phosphatase raised    • Astigmatism    • Bilateral pseudophakia    • Coronary arteriosclerosis    • Depressive disorder    • Edema of lower extremity    • Encounter for general adult medical examination with abnormal findings    • Essential hypertension    • Gastroesophageal reflux disease    • H/O bone density study     DXA BONE DENSITY AXIAL    04/22/2016   • H/O screening mammography     SCREENING MAMMOGRAPHY     04/25/2016   • Hx of screening mammography     x3; declined screening, understands risks and benefits and still declines      07/24/2015   • Influenza vaccine administered     INFLUENZA IMMUN ADMIN OR PREV RECV'D   x4       04/01/2016   • " Insomnia    • Iron deficiency    • Migraine    • Tobacco dependence     continuous       Past Surgical History:   Procedure Laterality Date   • APPENDECTOMY     • AUGMENTATION MAMMAPLASTY     • BREAST IMPLANT SURGERY     • CATARACT EXTRACTION WITH INTRAOCULAR LENS IMPLANT  12/17/2003    Phacoemulsification of a cataract with intraocular lens implant of rigt eye, Nixon model SA 60-AT; serial # [id number];20.5 diopter   • CHOLECYSTECTOMY  08/06/2007    Laparoscopic cholecystectomy. Symptomatic gallstones   • COLONOSCOPY  10/15/2013    declined stool cards and colonscopy   • ENDOSCOPY AND COLONOSCOPY  05/22/2014    Internal & external hemorrhoids found.   • ENDOSCOPY W/ PEG TUBE PLACEMENT  05/22/2014    EGD w/ tube: Normal esopahgus. Gastritis in stomach. Biopsy taken. Normal duodenum. Biopsy taken.   • INJECTION OF MEDICATION  07/24/2013    Inj(s) Tend-Sheath, Ligament, Single   • INJECTION OF MEDICATION  11/17/2014    Kenalog x6   • INJECTION OF MEDICATION  04/01/2016    PNEUMOC VAC/ADMIN/RCVD    • INJECTION OF MEDICATION  12/05/2014    Toradol   • OTHER SURGICAL HISTORY  01/19/2014    Drain/Inject Major Joint   x2   • PAP SMEAR  10/06/2011   • SUBTOTAL HYSTERECTOMY      Partial hyst    • TONSILLECTOMY     • TUBAL ABDOMINAL LIGATION       Current Outpatient Medications on File Prior to Visit   Medication Sig Dispense Refill   • acetaminophen (TYLENOL) 325 MG tablet Take 650 mg by mouth Every 4 (Four) Hours As Needed for Mild Pain .     • albuterol sulfate  (90 Base) MCG/ACT inhaler INHALE 2 PUFFS EVERY 4 (FOUR) AS NEEDED  FOR WHEEZING. 8.5 g 11   • ferrous sulfate 325 (65 Fe) MG tablet TAKE ONE TABLET BY MOUTH TWICE A DAY WITH MEALS  60 each 5   • FLUoxetine (PROzac) 20 MG capsule TAKE ONE CAPSULE BY MOUTH ONCE DAILY FOR DEPRESSION 90 capsule 3   • FLUoxetine (PROzac) 40 MG capsule TAKE ONE CAPSULE BY MOUTH ONCE DAILY FOR DEPRESSION 90 capsule 3   • folic acid (FOLVITE) 1 MG tablet TAKE ONE TABLET BY MOUTH ONCE  "DAILY 90 tablet 3   • furosemide (LASIX) 20 MG tablet TAKE 1/2 TABLET BY MOUTH DAILY FOR FLUID RETENTION 45 tablet 3   • losartan (COZAAR) 100 MG tablet Take 1 tablet by mouth Daily. For heart 90 tablet 3   • omeprazole (priLOSEC) 20 MG capsule Take 20 mg by mouth Daily.     • ondansetron (ZOFRAN) 4 MG tablet Take 1 tablet by mouth Every 6 (Six) Hours As Needed for Nausea or Vomiting. 30 tablet 3   • potassium chloride 10 MEQ CR tablet TAKE ONE TABLET BY MOUTH TWICE A DAY  60 tablet 0   • pravastatin (PRAVACHOL) 40 MG tablet Take 1 tablet by mouth Every Night. 90 tablet 3   • risperiDONE (risperDAL) 1 MG tablet TAKE ONE TABLET BY MOUTH EACH EVENING AT BEDTIME FOR INSOMNIA 90 tablet 3   • SM Aspirin Adult Low Strength 81 MG EC tablet TAKE ONE TABLET BY MOUTH ONCE DAILY  30 tablet 3   • Symbicort 160-4.5 MCG/ACT inhaler INHALE TWO (2) PUFFS BY MOUTH TWICE A DAY  10.2 g 5     No current facility-administered medications on file prior to visit.     Allergies   Allergen Reactions   • Codeine Nausea And Vomiting   • Penicillins Nausea And Vomiting   • Sulfa Antibiotics Nausea And Vomiting     Physical exam /68 (BP Location: Left arm, Patient Position: Sitting, Cuff Size: Adult)   Pulse 79   Ht 162.6 cm (64\")   Wt 50.8 kg (112 lb)   SpO2 98%   BMI 19.22 kg/m²   HEENT remarkable for flattened facial affect bilateral TM wasting noted no facial asymmetry PERRLA neck supple no JVD breath sounds are diminished without wheezes rales or rhonchi heart regular no murmur no extrasystole PMI diffuse abdomen soft nontender extremities show generalized muscle wasting no tremor no edema neuro nonfocal    Impression   Encounter Diagnoses   Name Primary?   • Disorientation Yes   • Debility    • Rectalgia    • Stage 3 severe COPD by GOLD classification (CMS/HCC)    • Blood per rectum      Plan: I spoke by telephone with Dr. Berry who knows the patient and states that she has done this previously requiring rehab at a long-term " care facility on 2 occasions.  My biggest concern today is the sudden change in the patient's level of functioning from driving and being independent in all activities of daily living to a frail 65-year-old lady who has lost over 20 pounds in the last 6 months.  Given the sudden change in her level of functioning it was felt an urgent evaluation at the emergency room was warranted.  I spoke with Dr. Shine and apprised him of my concerns.        This document has been electronically signed by Esa العراقي MD on March 15, 2021 12:37 CDT

## 2021-03-15 NOTE — H&P
HISTORY AND PHYSICAL  NAME: Michelle Child  : 1955  MRN: [id number]    DATE OF ADMISSION:  3/15/2021     DATE & TIME SEEN: 03/15/21 at 2:00pm  PCP: Coral Berry MD    CODE STATUS: DNR and DNI    CHIEF COMPLAINT:  Weakness     HPI:  Michelle Child is a 65 y.o. female with a CMH of copd, htn, depression, iron deficiency who presents complaining of weakness. Patient for the past few days has fallen three times. Denies hitting her head or loss of consciousness. Prior to the fall, she had no symptoms. Furthermore, Patient states over the past two weeks she has had a mix of diarrhea and constipation. She has not been eating as much as she use to. She has lost some weight. She complains of hemorrhoids that erupted a few days ago that have been bleeding and hurting. She has been applying a cream that helps. She has forgotten to take her medications for the past few days.     CONCURRENT MEDICAL HISTORY:  Past Medical History:   Diagnosis Date   • Alkaline phosphatase raised    • Astigmatism    • Bilateral pseudophakia    • Coronary arteriosclerosis    • Depressive disorder    • Edema of lower extremity    • Encounter for general adult medical examination with abnormal findings    • Essential hypertension    • Gastroesophageal reflux disease    • H/O bone density study     DXA BONE DENSITY AXIAL    2016   • H/O screening mammography     SCREENING MAMMOGRAPHY     2016   • Hx of screening mammography     x3; declined screening, understands risks and benefits and still declines      2015   • Influenza vaccine administered     INFLUENZA IMMUN ADMIN OR PREV RECV'D   x4       2016   • Insomnia    • Iron deficiency    • Migraine    • Tobacco dependence     continuous         PAST SURGICAL HISTORY:  Past Surgical History:   Procedure Laterality Date   • APPENDECTOMY     • AUGMENTATION MAMMAPLASTY     • BREAST IMPLANT SURGERY     • CATARACT EXTRACTION WITH INTRAOCULAR LENS IMPLANT   12/17/2003    Phacoemulsification of a cataract with intraocular lens implant of rigt eye, Nixon model SA 60-AT; serial # [id number];20.5 diopter   • CHOLECYSTECTOMY  08/06/2007    Laparoscopic cholecystectomy. Symptomatic gallstones   • COLONOSCOPY  10/15/2013    declined stool cards and colonscopy   • ENDOSCOPY AND COLONOSCOPY  05/22/2014    Internal & external hemorrhoids found.   • ENDOSCOPY W/ PEG TUBE PLACEMENT  05/22/2014    EGD w/ tube: Normal esopahgus. Gastritis in stomach. Biopsy taken. Normal duodenum. Biopsy taken.   • INJECTION OF MEDICATION  07/24/2013    Inj(s) Tend-Sheath, Ligament, Single   • INJECTION OF MEDICATION  11/17/2014    Kenalog x6   • INJECTION OF MEDICATION  04/01/2016    PNEUMOC VAC/ADMIN/RCVD    • INJECTION OF MEDICATION  12/05/2014    Toradol   • OTHER SURGICAL HISTORY  01/19/2014    Drain/Inject Major Joint   x2   • PAP SMEAR  10/06/2011   • SUBTOTAL HYSTERECTOMY      Partial hyst    • TONSILLECTOMY     • TUBAL ABDOMINAL LIGATION         FAMILY HISTORY:  Family History   Problem Relation Age of Onset   • Cholelithiasis Mother    • Thyroid cancer Sister    • Thyroid disease Sister         Thyroid disorder   • Graves' disease Sister    • Cholelithiasis Maternal Grandmother    • Thyroid cancer Other         Other 2nd degree relative, thyroid   • Diabetes Other    • Hypertension Other         SOCIAL HISTORY:  Social History     Socioeconomic History   • Marital status:      Spouse name: Not on file   • Number of children: Not on file   • Years of education: Not on file   • Highest education level: Not on file   Tobacco Use   • Smoking status: Former Smoker     Packs/day: 1.00     Years: 39.00     Pack years: 39.00     Quit date: 1/10/2018     Years since quitting: 3.1   • Smokeless tobacco: Never Used   • Tobacco comment: SMOKED FOR 20>PLUS YRS   Substance and Sexual Activity   • Alcohol use: No   • Drug use: No   • Sexual activity: Defer       HOME MEDICATIONS:  Prior to  Admission medications    Medication Sig Start Date End Date Taking? Authorizing Provider   acetaminophen (TYLENOL) 325 MG tablet Take 650 mg by mouth Every 4 (Four) Hours As Needed for Mild Pain .    ProviderMele MD   albuterol sulfate  (90 Base) MCG/ACT inhaler INHALE 2 PUFFS EVERY 4 (FOUR) AS NEEDED  FOR WHEEZING. 10/6/20   Coral Berry MD   ferrous sulfate 325 (65 Fe) MG tablet TAKE ONE TABLET BY MOUTH TWICE A DAY WITH MEALS  1/13/21   Esa العراقي MD   FLUoxetine (PROzac) 20 MG capsule TAKE ONE CAPSULE BY MOUTH ONCE DAILY FOR DEPRESSION 5/1/20   Coral Berry MD   FLUoxetine (PROzac) 40 MG capsule TAKE ONE CAPSULE BY MOUTH ONCE DAILY FOR DEPRESSION 5/1/20   Coral Berry MD   folic acid (FOLVITE) 1 MG tablet TAKE ONE TABLET BY MOUTH ONCE DAILY 5/1/20   Coral Berry MD   furosemide (LASIX) 20 MG tablet TAKE 1/2 TABLET BY MOUTH DAILY FOR FLUID RETENTION 5/1/20   Coral Berry MD   losartan (COZAAR) 100 MG tablet Take 1 tablet by mouth Daily. For heart 7/2/20   Coral Berry MD   omeprazole (priLOSEC) 20 MG capsule Take 20 mg by mouth Daily.    ProviderMele MD   ondansetron (ZOFRAN) 4 MG tablet Take 1 tablet by mouth Every 6 (Six) Hours As Needed for Nausea or Vomiting. 8/19/19   Coral Berry MD   potassium chloride 10 MEQ CR tablet TAKE ONE TABLET BY MOUTH TWICE A DAY  3/8/21   Esa العراقي MD   pravastatin (PRAVACHOL) 40 MG tablet Take 1 tablet by mouth Every Night. 7/2/20   Coral Berry MD   risperiDONE (risperDAL) 1 MG tablet TAKE ONE TABLET BY MOUTH EACH EVENING AT BEDTIME FOR INSOMNIA 5/1/20   Coral Berry MD   SM Aspirin Adult Low Strength 81 MG EC tablet TAKE ONE TABLET BY MOUTH ONCE DAILY  1/13/21   Esa العراقي MD   Symbicort 160-4.5 MCG/ACT inhaler INHALE TWO (2) PUFFS BY MOUTH TWICE A DAY  1/13/21   Esa العراقي MD       ALLERGIES:  Codeine, Penicillins, and Sulfa antibiotics    REVIEW OF SYSTEMS  Review  of Systems   Constitutional: Negative for activity change, appetite change, chills, fatigue and fever.   HENT: Negative for drooling, ear discharge, ear pain, facial swelling, hearing loss, mouth sores, rhinorrhea and sinus pain.    Eyes: Negative for pain, redness and itching.   Respiratory: Negative for cough, choking, chest tightness, shortness of breath and stridor.    Cardiovascular: Negative for chest pain, palpitations and leg swelling.   Gastrointestinal: Positive for constipation, diarrhea and nausea. Negative for abdominal distention, abdominal pain, anal bleeding and blood in stool.   Endocrine: Negative for heat intolerance, polydipsia and polyphagia.   Genitourinary: Positive for difficulty urinating. Negative for dysuria, flank pain, frequency and genital sores.        Hemorrhoids     Musculoskeletal: Negative for back pain, gait problem, joint swelling and myalgias.   Skin: Negative for pallor and rash.   Neurological: Negative for seizures, facial asymmetry, speech difficulty, light-headedness, numbness and headaches.        Falls     Hematological: Negative for adenopathy. Does not bruise/bleed easily.   Psychiatric/Behavioral: Negative for confusion, decreased concentration, dysphoric mood and hallucinations. The patient is not nervous/anxious and is not hyperactive.        PHYSICAL EXAM:  Temp:  [98.1 °F (36.7 °C)] 98.1 °F (36.7 °C)  Heart Rate:  [66-88] 66  Resp:  [14-18] 18  BP: (110-164)/(60-72) 145/69  Body mass index is 19.22 kg/m².  Physical Exam  Constitutional:       Appearance: She is well-developed.      Comments: Cachetic, weight loss     HENT:      Head: Normocephalic and atraumatic.      Right Ear: External ear normal.      Left Ear: External ear normal.      Nose: Nose normal.   Eyes:      Conjunctiva/sclera: Conjunctivae normal.      Pupils: Pupils are equal, round, and reactive to light.   Cardiovascular:      Rate and Rhythm: Normal rate and regular rhythm.      Heart sounds:  Normal heart sounds.   Pulmonary:      Effort: Pulmonary effort is normal.      Breath sounds: Normal breath sounds.   Abdominal:      General: Bowel sounds are normal.      Palpations: Abdomen is soft.   Musculoskeletal:         General: Normal range of motion.      Cervical back: Normal range of motion and neck supple.      Comments: Multiple bruises, scrapes on upper extremities     Skin:     General: Skin is warm and dry.   Neurological:      Mental Status: She is alert and oriented to person, place, and time.      Comments: Finger to nose test normal    Psychiatric:      Comments: Slow thoughts, minimal responses           DIAGNOSTIC DATA:   Lab Results (last 24 hours)     Procedure Component Value Units Date/Time    Troponin [[id number]] Collected: 03/15/21 1125    Specimen: Blood Updated: 03/15/21 1442    Protime-INR [[id number]]  (Normal) Collected: 03/15/21 1125    Specimen: Blood Updated: 03/15/21 1232     Protime 14.1 Seconds      INR 1.05    Narrative:      Therapeutic range for most indications is 2.0-3.0 INR,  or 2.5-3.5 for mechanical heart valves.    Extra Tubes [[id number]] Collected: 03/15/21 1125    Specimen: Blood Updated: 03/15/21 1230    Narrative:      The following orders were created for panel order Extra Tubes.  Procedure                               Abnormality         Status                     ---------                               -----------         ------                     Lavender Top[[id number]]                                     Final result               Gold Top - SST[[id number]]                                   Final result                 Please view results for these tests on the individual orders.    Lavender Top [[id number]] Collected: 03/15/21 1125    Specimen: Blood Updated: 03/15/21 1230     Extra Tube hold for add-on     Comment: Auto resulted       Gold Top - SST [[id number]] Collected: 03/15/21 1125    Specimen: Blood Updated: 03/15/21 1230     Extra Tube Hold for  add-ons.     Comment: Auto resulted.       COVID-19 and FLU A/B PCR - Swab, Nasopharynx [[id number]]  (Normal) Collected: 03/15/21 1106    Specimen: Swab from Nasopharynx Updated: 03/15/21 1223     COVID19 Not Detected     Influenza A PCR Not Detected     Influenza B PCR Not Detected    Narrative:      Fact sheet for providers: https://www.fda.gov/media/127730/download    Fact sheet for patients: https://www.fda.gov/media/169282/download    Test performed by PCR.    Comprehensive Metabolic Panel [[id number]]  (Abnormal) Collected: 03/15/21 1125    Specimen: Blood Updated: 03/15/21 1158     Glucose 117 mg/dL      BUN 16 mg/dL      Creatinine 1.31 mg/dL      Sodium 133 mmol/L      Potassium 3.5 mmol/L      Comment: Slight hemolysis detected by analyzer. Results may be affected.        Chloride 97 mmol/L      CO2 24.0 mmol/L      Calcium 9.3 mg/dL      Total Protein 6.0 g/dL      Albumin 3.50 g/dL      ALT (SGPT) 10 U/L      AST (SGOT) 18 U/L      Comment: Slight hemolysis detected by analyzer. Results may be affected.        Alkaline Phosphatase 108 U/L      Total Bilirubin 0.4 mg/dL      eGFR Non African Amer 40 mL/min/1.73      Globulin 2.5 gm/dL      A/G Ratio 1.4 g/dL      BUN/Creatinine Ratio 12.2     Anion Gap 12.0 mmol/L     Narrative:      GFR Normal >60  Chronic Kidney Disease <60  Kidney Failure <15      CK [[id number]]  (Abnormal) Collected: 03/15/21 1125    Specimen: Blood Updated: 03/15/21 1157     Creatine Kinase 200 U/L     Magnesium [[id number]]  (Normal) Collected: 03/15/21 1125    Specimen: Blood Updated: 03/15/21 1157     Magnesium 1.8 mg/dL     CBC & Differential [[id number]]  (Abnormal) Collected: 03/15/21 1125    Specimen: Blood Updated: 03/15/21 1143    Narrative:      The following orders were created for panel order CBC & Differential.  Procedure                               Abnormality         Status                     ---------                               -----------         ------                      CBC Auto Differential[[id number]]        Abnormal            Final result                 Please view results for these tests on the individual orders.    CBC Auto Differential [[id number]]  (Abnormal) Collected: 03/15/21 1125    Specimen: Blood Updated: 03/15/21 1143     WBC 15.11 10*3/mm3      RBC 3.79 10*6/mm3      Hemoglobin 11.1 g/dL      Hematocrit 33.8 %      MCV 89.2 fL      MCH 29.3 pg      MCHC 32.8 g/dL      RDW 14.6 %      RDW-SD 47.1 fl      MPV 10.5 fL      Platelets 230 10*3/mm3      Neutrophil % 91.0 %      Lymphocyte % 3.5 %      Monocyte % 4.6 %      Eosinophil % 0.2 %      Basophil % 0.3 %      Immature Grans % 0.4 %      Neutrophils, Absolute 13.75 10*3/mm3      Lymphocytes, Absolute 0.53 10*3/mm3      Monocytes, Absolute 0.70 10*3/mm3      Eosinophils, Absolute 0.03 10*3/mm3      Basophils, Absolute 0.04 10*3/mm3      Immature Grans, Absolute 0.06 10*3/mm3      nRBC 0.0 /100 WBC            Imaging Results (Last 24 Hours)     Procedure Component Value Units Date/Time    CT Head Without Contrast [[id number]] Collected: 03/15/21 1244     Updated: 03/15/21 1303    Narrative:      PROCEDURE: CT head without contrast.    INDICATION: Mental status change, unknown cause     COMPARISON: 7/12/2018 CT head.    Total DLP 1011.7 mGy-cm.    TECHNIQUE: This exam was performed according to our departmental  dose-optimization program, which includes automated exposure  control, adjustment of the mA and/or kV according to patient size  and/or use of iterative reconstruction technique.  CT head without contrast performed with axial and reconstructed  sagittal and coronal images.    FINDINGS:  Bony calvarium intact.  The frontal, ethmoid, sphenoid and visualized portions of the  maxillary sinuses are clear.  Mastoid air cells are clear.    There are no abnormal extra-axial fluid collections.  No intracranial bleed.  No mass effect or midline shift.  No focal areas of hypodensity or acute ischemic  change.  Heterogeneity of the white matter consistent with underlying  chronic microvascular white matter changes. Stable compared to  older study.    There is mild symmetrical prominence of the sulci, sylvian  fissures and ventricles consistent with diffuse atrophy.      Impression:      No acute intracranial abnormalities.    Mild diffuse atrophy.    [id number]     Electronically signed by:  Sunny Solis MD  3/15/2021 1:02  PM CDT Workstation: [id number]    XR Abdomen 2+ VW with Chest 1 VW [[id number]] Collected: 03/15/21 1132     Updated: 03/15/21 1156    Narrative:      PROCEDURE: Chest x-ray and flat and upright abdominal x-rays with  three views.    INDICATION: Fatigue and diarrhea.    COMPARISON: 7/2/2018 chest x-ray.    FINDINGS:    Upright PA chest:  Heart size normal with normal pulmonary vascularity.  Lungs clear with no acute infiltrate or airspace disease.  Stable small calcified nodule periphery left midlung field.  No pleural effusion.    Flat and upright abdominal x-rays:  No free intraperitoneal air.    Normal abdominal bowel gas pattern with no abnormally distended  small bowel or colon or air-fluid levels.    Small-to-moderate amount of fecal material throughout colon.    No suspicious calcifications or soft tissue densities.      Impression:      No acute disease upright PA chest.    Normal abdominal bowel gas pattern.    Small-to-moderate amount of fecal material throughout colon.    [id number]    Electronically signed by:  Sunny Solis MD  3/15/2021 11:55  AM CDT Workstation: [id number]            I reviewed the patient's new clinical results.    ASSESSMENT AND PLAN: This is a 65 y.o. female with:    Active Hospital Problems    Diagnosis  POA   • **Acute metabolic encephalopathy [G93.41]  Yes     Ct head negative  Will provide IV fluids  Na 133, will continue to trend  Ck 200, will continue to trend  UA pending  Vitamin b12 pending  Folate pending         • Weakness generalized [R53.1]  Yes     Ck  elevated at 200, will provide iv fluids  Urinalysis pending         • Depression [F32.9]  Yes     Continue risperdal and prozac 60     • Anemia of chronic disease [D63.8]  Yes     Will continue with iron supplementation       • Hemorrhoids [K64.9]  Yes     Will apply anti hemorrhoid cream     • Constipation [K59.00]  Yes     Abdominal xray showed mild stool burden  Will provide senna       • Stage 3 severe COPD by GOLD classification (CMS/HCC) [J44.9]  Yes     Continue home nebs and symbicort     • Essential hypertension [I10]  Yes     Current BP: 132/63   Continue with home dose of losartan  Will hold lasix as patient is volume depleted           DVT prophylaxis: SCDs/TEDs     Michelle Child and I have discussed pain goals for this hospitalization after reviewing her current clinical condition, medical history and prior pain experiences.  The goal is to keep the pain level at a minimum.  To help achieve this, I plan to add tylenol as needed.    PDMP, reviewed and consistent with patient reported medications.    Expected Length of Stay: Where: home and When:  1-2days    I discussed the patient's findings and my recommendations with patient.     Coral Berry MD is the attending on record at time of admission, She is aware of the patient's status and agrees with the above history and physical.         Madisyn Escobar M.D. PGY3  [facility]  [address] [place] [address]  Office: [phone]    This document has been electronically signed by Madisyn Escobar MD on March 15, 2021 14:42 CDT

## 2021-03-15 NOTE — ED PROVIDER NOTES
Subjective   Patient presents emergency department with diarrhea x2-week and complains of rectal pain and hemorrhoids.  She missed a week of her home medications secondary to her diarrhea, and states that she began taking her medications again this past Saturday, 2 days ago.  She has had 3 falls this past week, all on Thursday and has been having to use her walker since these falls.  Diarrhea x 2 weeks, pat c/o rectal pain and hemorrhoids. Pt began taking her home meds again this Saturday but had missed the week before secondary to diarrhea/. She has had falls x 3 on Thursday.      Diarrhea  The primary symptoms include fatigue and diarrhea. Primary symptoms do not include fever, abdominal pain, nausea, vomiting, dysuria, myalgias or rash.   The illness does not include chills.       Review of Systems   Constitutional: Positive for activity change, appetite change and fatigue. Negative for chills, diaphoresis and fever.   HENT: Negative for congestion, ear discharge, ear pain, nosebleeds, rhinorrhea, sinus pressure, sore throat and trouble swallowing.    Eyes: Negative for discharge and redness.   Respiratory: Negative for apnea, cough, chest tightness, shortness of breath and wheezing.    Cardiovascular: Negative for chest pain.   Gastrointestinal: Positive for blood in stool and diarrhea. Negative for abdominal pain, nausea and vomiting.   Endocrine: Negative for polyuria.   Genitourinary: Negative for difficulty urinating, dysuria, frequency and urgency.   Musculoskeletal: Negative for myalgias and neck pain.   Skin: Negative for color change and rash.   Allergic/Immunologic: Negative for immunocompromised state.   Neurological: Positive for weakness. Negative for dizziness, seizures, syncope, light-headedness and headaches.   Hematological: Negative for adenopathy. Does not bruise/bleed easily.   Psychiatric/Behavioral: Negative for behavioral problems and confusion.   All other systems reviewed and are  negative.      Past Medical History:   Diagnosis Date   • Alkaline phosphatase raised    • Astigmatism    • Bilateral pseudophakia    • Coronary arteriosclerosis    • Depressive disorder    • Edema of lower extremity    • Encounter for general adult medical examination with abnormal findings    • Essential hypertension    • Gastroesophageal reflux disease    • H/O bone density study     DXA BONE DENSITY AXIAL    04/22/2016   • H/O screening mammography     SCREENING MAMMOGRAPHY     04/25/2016   • Hx of screening mammography     x3; declined screening, understands risks and benefits and still declines      07/24/2015   • Influenza vaccine administered     INFLUENZA IMMUN ADMIN OR PREV RECV'D   x4       04/01/2016   • Insomnia    • Iron deficiency    • Migraine    • Tobacco dependence     continuous         Allergies   Allergen Reactions   • Codeine Nausea And Vomiting   • Penicillins Nausea And Vomiting   • Sulfa Antibiotics Nausea And Vomiting       Past Surgical History:   Procedure Laterality Date   • APPENDECTOMY     • AUGMENTATION MAMMAPLASTY     • BREAST IMPLANT SURGERY     • CATARACT EXTRACTION WITH INTRAOCULAR LENS IMPLANT  12/17/2003    Phacoemulsification of a cataract with intraocular lens implant of rigt eye, Nixon model SA 60-AT; serial # [id number];20.5 diopter   • CHOLECYSTECTOMY  08/06/2007    Laparoscopic cholecystectomy. Symptomatic gallstones   • COLONOSCOPY  10/15/2013    declined stool cards and colonscopy   • ENDOSCOPY AND COLONOSCOPY  05/22/2014    Internal & external hemorrhoids found.   • ENDOSCOPY W/ PEG TUBE PLACEMENT  05/22/2014    EGD w/ tube: Normal esopahgus. Gastritis in stomach. Biopsy taken. Normal duodenum. Biopsy taken.   • INJECTION OF MEDICATION  07/24/2013    Inj(s) Tend-Sheath, Ligament, Single   • INJECTION OF MEDICATION  11/17/2014    Kenalog x6   • INJECTION OF MEDICATION  04/01/2016    PNEUMOC VAC/ADMIN/RCVD    • INJECTION OF MEDICATION  12/05/2014    Toradol   • OTHER  SURGICAL HISTORY  01/19/2014    Drain/Inject Major Joint   x2   • PAP SMEAR  10/06/2011   • SUBTOTAL HYSTERECTOMY      Partial hyst    • TONSILLECTOMY     • TUBAL ABDOMINAL LIGATION         Family History   Problem Relation Age of Onset   • Cholelithiasis Mother    • Thyroid cancer Sister    • Thyroid disease Sister         Thyroid disorder   • Graves' disease Sister    • Cholelithiasis Maternal Grandmother    • Thyroid cancer Other         Other 2nd degree relative, thyroid   • Diabetes Other    • Hypertension Other        Social History     Socioeconomic History   • Marital status:      Spouse name: Not on file   • Number of children: Not on file   • Years of education: Not on file   • Highest education level: Not on file   Tobacco Use   • Smoking status: Former Smoker     Packs/day: 1.00     Years: 39.00     Pack years: 39.00     Quit date: 1/10/2018     Years since quitting: 3.1   • Smokeless tobacco: Never Used   • Tobacco comment: SMOKED FOR 20>PLUS YRS   Substance and Sexual Activity   • Alcohol use: No   • Drug use: No   • Sexual activity: Defer           Objective   Physical Exam  Vitals and nursing note reviewed.   Constitutional:       Appearance: She is well-developed.   HENT:      Head: Normocephalic and atraumatic.      Nose: Nose normal.   Eyes:      General: No scleral icterus.        Right eye: No discharge.         Left eye: No discharge.      Conjunctiva/sclera: Conjunctivae normal.      Pupils: Pupils are equal, round, and reactive to light.   Neck:      Trachea: No tracheal deviation.   Cardiovascular:      Rate and Rhythm: Normal rate and regular rhythm.      Heart sounds: Normal heart sounds. No murmur.   Pulmonary:      Effort: Pulmonary effort is normal. No respiratory distress.      Breath sounds: Normal breath sounds. No stridor. No wheezing or rales.   Abdominal:      General: Bowel sounds are normal. There is no distension.      Palpations: Abdomen is soft. There is no mass.       Tenderness: There is no abdominal tenderness. There is no guarding or rebound.   Genitourinary:     Rectum: Guaiac result positive.   Musculoskeletal:      Cervical back: Normal range of motion and neck supple.   Skin:     General: Skin is warm and dry.      Findings: No erythema or rash.   Neurological:      General: No focal deficit present.      Mental Status: She is alert.      GCS: GCS eye subscore is 4. GCS verbal subscore is 5. GCS motor subscore is 6.      Cranial Nerves: Cranial nerves are intact.      Sensory: Sensation is intact.      Motor: No weakness.      Coordination: Coordination is intact. Coordination normal.   Psychiatric:         Behavior: Behavior normal.         Thought Content: Thought content normal.         Procedures           ED Course                   Labs Reviewed   COMPREHENSIVE METABOLIC PANEL - Abnormal; Notable for the following components:       Result Value    Glucose 117 (*)     Creatinine 1.31 (*)     Sodium 133 (*)     Chloride 97 (*)     eGFR Non  Amer 40 (*)     All other components within normal limits    Narrative:     GFR Normal >60  Chronic Kidney Disease <60  Kidney Failure <15     CK - Abnormal; Notable for the following components:    Creatine Kinase 200 (*)     All other components within normal limits   CBC WITH AUTO DIFFERENTIAL - Abnormal; Notable for the following components:    WBC 15.11 (*)     Hemoglobin 11.1 (*)     Hematocrit 33.8 (*)     Neutrophil % 91.0 (*)     Lymphocyte % 3.5 (*)     Monocyte % 4.6 (*)     Eosinophil % 0.2 (*)     Neutrophils, Absolute 13.75 (*)     Lymphocytes, Absolute 0.53 (*)     Immature Grans, Absolute 0.06 (*)     All other components within normal limits   COVID-19 AND FLU A/B, NP SWAB IN TRANSPORT MEDIA 8-12 HR TAT - Normal    Narrative:     Fact sheet for providers: https://www.fda.gov/media/521179/download    Fact sheet for patients: https://www.fda.gov/media/585971/download    Test performed by PCR.   MAGNESIUM -  Normal   PROTIME-INR - Normal    Narrative:     Therapeutic range for most indications is 2.0-3.0 INR,  or 2.5-3.5 for mechanical heart valves.   URINALYSIS W/ CULTURE IF INDICATED   CBC AND DIFFERENTIAL    Narrative:     The following orders were created for panel order CBC & Differential.  Procedure                               Abnormality         Status                     ---------                               -----------         ------                     CBC Auto Differential[[id number]]        Abnormal            Final result                 Please view results for these tests on the individual orders.   EXTRA TUBES    Narrative:     The following orders were created for panel order Extra Tubes.  Procedure                               Abnormality         Status                     ---------                               -----------         ------                     Lavender Top[[id number]]                                     Final result               Gold Top - SST[[id number]]                                   Final result                 Please view results for these tests on the individual orders.   LAVENDER TOP   GOLD TOP - SST       CT Head Without Contrast   Final Result   No acute intracranial abnormalities.      Mild diffuse atrophy.      [id number]       Electronically signed by:  Sunny Solis MD  3/15/2021 1:02   PM CDT Workstation: [id number]      XR Abdomen 2+ VW with Chest 1 VW   Final Result   No acute disease upright PA chest.      Normal abdominal bowel gas pattern.      Small-to-moderate amount of fecal material throughout colon.      [id number]      Electronically signed by:  Sunny Solis MD  3/15/2021 11:55   AM CDT Workstation: [id number]                                       MDM    Final diagnoses:   Weakness generalized   Dehydration   Diarrhea, unspecified type   Hemorrhoids, unspecified hemorrhoid type   Rectal bleed            Toro Shine MD  03/15/21 [id number]

## 2021-03-16 PROBLEM — C20 RECTAL CANCER (HCC): Status: ACTIVE | Noted: 2021-01-01

## 2021-03-16 PROBLEM — N30.00 ACUTE CYSTITIS: Status: ACTIVE | Noted: 2021-01-01

## 2021-03-16 PROBLEM — K64.9 HEMORRHOIDS: Status: RESOLVED | Noted: 2021-01-01 | Resolved: 2021-01-01

## 2021-03-16 NOTE — PLAN OF CARE
Goal Outcome Evaluation:  Plan of Care Reviewed With: patient     Outcome Summary: Initial PT evaluation complete.  Patient is alert and cooperative, has quite a flat affect.  She demonstrates (I) with bed mobility, requires CGA with transfers and gait.  Patient ambulates 50'x1 with FWW, slow janey, decreased foot clearance and step length.  Tinetti assessed; 20/28 or moderate fall risk; demonstrates poor stepping response to perturbation.  Patient will either need SNF placement for rehab prior to discharge home alone, or 24 hour assistance and HHPT at home.  Goals established, continue skilled PT.

## 2021-03-16 NOTE — THERAPY EVALUATION
Patient Name: Michelle Child  : 1955    MRN: [id number]                              Today's Date: 3/16/2021       Admit Date: 3/15/2021    Visit Dx:     ICD-10-CM ICD-9-CM   1. Weakness generalized  R53.1 780.79   2. Dehydration  E86.0 276.51   3. Diarrhea, unspecified type  R19.7 787.91   4. Hemorrhoids, unspecified hemorrhoid type  K64.9 455.6   5. Rectal bleed  K62.5 569.3   6. Impaired mobility and activities of daily living  Z74.09 V49.89    Z78.9    7. Impaired functional mobility, balance, gait, and endurance  Z74.09 V49.89     Patient Active Problem List   Diagnosis   • Migraine   • Iron deficiency anemia   • Gastroesophageal reflux disease   • Essential hypertension   • Coronary arteriosclerosis   • Bilateral pseudophakia   • Astigmatism   • Hypokalemia   • Cerebral microvascular disease   • Pulmonary hypertension (CMS/HCC)   • Rheumatic tricuspid valve regurgitation   • Rheumatic mitral stenosis   • Pulmonary hypertension due to left heart valvular disease (CMS/HCC)   • Cor pulmonale, chronic (CMS/HCC)   • Folic acid deficiency   • Stage 3 severe COPD by GOLD classification (CMS/HCC)   • Personal history of tobacco use, presenting hazards to health   • Tobacco abuse, in remission   • Recurrent major depressive disorder, in full remission (CMS/HCC)   • Debility   • Disorientation   • Weakness generalized   • Acute metabolic encephalopathy   • Depression   • Anemia of chronic disease   • Constipation   • Acute cystitis   • Rectal cancer (CMS/HCC)     Past Medical History:   Diagnosis Date   • Alkaline phosphatase raised    • Astigmatism    • Bilateral pseudophakia    • Coronary arteriosclerosis    • Depressive disorder    • Edema of lower extremity    • Encounter for general adult medical examination with abnormal findings    • Essential hypertension    • Gastroesophageal reflux disease    • H/O bone density study     DXA BONE DENSITY AXIAL    2016   • H/O screening mammography      SCREENING MAMMOGRAPHY     04/25/2016   • Hx of screening mammography     x3; declined screening, understands risks and benefits and still declines      07/24/2015   • Influenza vaccine administered     INFLUENZA IMMUN ADMIN OR PREV RECV'D   x4       04/01/2016   • Insomnia    • Iron deficiency    • Migraine    • Tobacco dependence     continuous       Past Surgical History:   Procedure Laterality Date   • APPENDECTOMY     • AUGMENTATION MAMMAPLASTY     • BREAST IMPLANT SURGERY     • CATARACT EXTRACTION WITH INTRAOCULAR LENS IMPLANT  12/17/2003    Phacoemulsification of a cataract with intraocular lens implant of rigt eye, Nixon model SA 60-AT; serial # [id number];20.5 diopter   • CHOLECYSTECTOMY  08/06/2007    Laparoscopic cholecystectomy. Symptomatic gallstones   • COLONOSCOPY  10/15/2013    declined stool cards and colonscopy   • ENDOSCOPY AND COLONOSCOPY  05/22/2014    Internal & external hemorrhoids found.   • ENDOSCOPY W/ PEG TUBE PLACEMENT  05/22/2014    EGD w/ tube: Normal esopahgus. Gastritis in stomach. Biopsy taken. Normal duodenum. Biopsy taken.   • INJECTION OF MEDICATION  07/24/2013    Inj(s) Tend-Sheath, Ligament, Single   • INJECTION OF MEDICATION  11/17/2014    Kenalog x6   • INJECTION OF MEDICATION  04/01/2016    PNEUMOC VAC/ADMIN/RCVD    • INJECTION OF MEDICATION  12/05/2014    Toradol   • OTHER SURGICAL HISTORY  01/19/2014    Drain/Inject Major Joint   x2   • PAP SMEAR  10/06/2011   • SUBTOTAL HYSTERECTOMY      Partial hyst    • TONSILLECTOMY     • TUBAL ABDOMINAL LIGATION       General Information     Row Name 03/16/21 0915          Physical Therapy Time and Intention    Document Type  evaluation  -CZ     Mode of Treatment  individual therapy;physical therapy  -CZ     Row Name 03/16/21 0915          General Information    Patient Profile Reviewed  yes  -CZ     Prior Level of Function  independent:;all household mobility  -CZ     Existing Precautions/Restrictions  fall  -CZ     Row Name 03/16/21  0915          Living Environment    Lives With  alone  -CZ     Row Name 03/16/21 0915          Home Main Entrance    Number of Stairs, Main Entrance  none  -CZ     Stair Railings, Main Entrance  none  -CZ     Row Name 03/16/21 0915          Stairs Within Home, Primary    Stairs, Within Home, Primary  Lives alone but son checks on patient daily. Has FWW, uses occasionally.  -CZ     Row Name 03/16/21 0915          Cognition    Orientation Status (Cognition)  oriented x 4  -CZ     Row Name 03/16/21 0915          Safety Issues, Functional Mobility    Impairments Affecting Function (Mobility)  endurance/activity tolerance;strength;balance;cognition  -CZ       User Key  (r) = Recorded By, (t) = Taken By, (c) = Cosigned By    Initials Name Provider Type    CZ Hadley Ramirez, PT Physical Therapist        Mobility     Row Name 03/16/21 0915          Bed Mobility    Bed Mobility  supine-sit;sit-supine;scooting/bridging  -CZ     Scooting/Bridging [place] (Bed Mobility)  supervision;verbal cues  -CZ     Supine-Sit [place] (Bed Mobility)  modified independence  -CZ     Sit-Supine [place] (Bed Mobility)  modified independence  -CZ     Assistive Device (Bed Mobility)  head of bed elevated;bed rails  -CZ     Row Name 03/16/21 0915          Sit-Stand Transfer    Sit-Stand [place] (Transfers)  contact guard  -CZ     Assistive Device (Sit-Stand Transfers)  walker, front-wheeled  -CZ     Row Name 03/16/21 0915          Gait/Stairs (Locomotion)    [place] Level (Gait)  contact guard  -CZ     Assistive Device (Gait)  walker, front-wheeled  -CZ     Distance in Feet (Gait)  50'x1.  -CZ     Comment (Gait/Stairs)  Slow janey, decreased step length and foot clearance.  -CZ       User Key  (r) = Recorded By, (t) = Taken By, (c) = Cosigned By    Initials Name Provider Type    Hadley Sims, PT Physical Therapist        Obj/Interventions     Row Name 03/16/21 0915          Range of Motion Comprehensive     General Range of Motion  bilateral lower extremity ROM WFL  -CZ     Row Name 03/16/21 0915          Strength Comprehensive (MMT)    General Manual Muscle Testing (MMT) Assessment  other (see comments)  -CZ     Comment, General Manual Muscle Testing (MMT) Assessment  BLEs: 3/5 grossly.  -CZ     Row Name 03/16/21 0915          Sensory Assessment (Somatosensory)    Sensory Assessment (Somatosensory)  LE sensation intact  -CZ       User Key  (r) = Recorded By, (t) = Taken By, (c) = Cosigned By    Initials Name Provider Type    CZ Hadley Ramirez, PT Physical Therapist        Goals/Plan     Row Name 03/16/21 0915          Bed Mobility Goal 1 (PT)    Activity/Assistive Device (Bed Mobility Goal 1, PT)  sit to supine/supine to sit  -CZ     [place] Level/Cues Needed (Bed Mobility Goal 1, PT)  independent  -CZ     Time Frame (Bed Mobility Goal 1, PT)  by discharge  -CZ     Strategies/Barriers (Bed Mobility Goal 1, PT)  HOB flat, no bed rails.  -CZ     Progress/Outcomes (Bed Mobility Goal 1, PT)  goal not met  -CZ     Row Name 03/16/21 0915          Transfer Goal 1 (PT)    Activity/Assistive Device (Transfer Goal 1, PT)  sit-to-stand/stand-to-sit;bed-to-chair/chair-to-bed;walker, rolling  -CZ     [place] Level/Cues Needed (Transfer Goal 1, PT)  supervision required  -CZ     Time Frame (Transfer Goal 1, PT)  by discharge  -CZ     Strategies/Barriers (Transfers Goal 1, PT)  Hx of falls.  -CZ     Progress/Outcome (Transfer Goal 1, PT)  goal not met  -CZ     Row Name 03/16/21 0915          Gait Training Goal 1 (PT)    Activity/Assistive Device (Gait Training Goal 1, PT)  walker, rolling  -CZ     [place] Level (Gait Training Goal 1, PT)  supervision required  -CZ     Distance (Gait Training Goal 1, PT)  100'x1.  -CZ     Time Frame (Gait Training Goal 1, PT)  by discharge  -CZ     Strategies/Barriers (Gait Training Goal 1, PT)  Facilitate increased step length and foot clearance.  -CZ     Progress/Outcome (Gait  Training Goal 1, PT)  goal not met  -CZ     Row Name 03/16/21 0915          ROM Goal 1 (PT)    ROM Goal 1 (PT)  Patient will increase Tinetti fall risk score to 24/28.  -CZ     Time Frame (ROM Goal 1, PT)  by discharge  -CZ     Strategies/Barriers (ROM Goal 1, PT)  Poor stepping response to perturbation.  -CZ     Progress/Outcome (ROM Goal 1, PT)  goal not met  -CZ       User Key  (r) = Recorded By, (t) = Taken By, (c) = Cosigned By    Initials Name Provider Type    CZ Hadley Ramirez, PT Physical Therapist        Clinical Impression     Row Name 03/16/21 0915          Pain    Additional Documentation  Pain Scale: Numbers Pre/Post-Treatment (Group)  -CZ     Row Name 03/16/21 0915          Pain Scale: Numbers Pre/Post-Treatment    Pretreatment Pain Rating  0/10 - no pain  -CZ     Posttreatment Pain Rating  0/10 - no pain  -CZ     Row Name 03/16/21 0915          Plan of Care Review    Plan of Care Reviewed With  patient  -     Row Name 03/16/21 0915          Therapy Assessment/Plan (PT)    Rehab Potential (PT)  good, to achieve stated therapy goals  -CZ     Criteria for Skilled Interventions Met (PT)  yes;skilled treatment is necessary  -CZ     Row Name 03/16/21 0915          Vital Signs    Pre Systolic BP Rehab  101  -CZ     Pre Treatment Diastolic BP  52  -CZ     Pretreatment Heart Rate (beats/min)  87  -CZ     Pre SpO2 (%)  96  -CZ     O2 Delivery Pre Treatment  room air  -CZ     Pre Patient Position  Supine  -CZ     Row Name 03/16/21 0915          Positioning and Restraints    Pre-Treatment Position  in bed  -CZ     Post Treatment Position  bed  -CZ     In Bed  call light within reach;encouraged to call for assist;exit alarm on;SCD pump applied;side lying right  -CZ       User Key  (r) = Recorded By, (t) = Taken By, (c) = Cosigned By    Initials Name Provider Type    Hadley Sims, PT Physical Therapist        Outcome Measures     Row Name 03/16/21 0915          How much help from another person do you  "currently need...    Turning from your back to your side while in flat bed without using bedrails?  4  -CZ     Moving from lying on back to sitting on the side of a flat bed without bedrails?  4  -CZ     Moving to and from a bed to a chair (including a wheelchair)?  3  -CZ     Standing up from a chair using your arms (e.g., wheelchair, bedside chair)?  3  -CZ     Climbing 3-5 steps with a railing?  3  -CZ     To walk in hospital room?  3  -CZ     AM-PAC 6 Clicks Score (PT)  20  -CZ     Row Name 03/16/21 0915          Tinetti Assessment    Tinetti Assessment  yes  -CZ     Sitting Balance  1  -CZ     Arises  2  -CZ     Attempts to Rise  2  -CZ     Immediate Standing Balance (first 5 sec)  1  -CZ     Standing Balance  1  -CZ     Sternal Nudge (feet close together)  0  -CZ     Eyes Closed (feet close together)  0  -CZ     Turning 360 Degrees- Steps  1  -CZ     Turning 360 Degrees- Steadiness  1  -CZ     Sitting Down  2  -CZ     Tinetti Balance Score  11  -CZ     Gait Initiation (immediate after told \"go\")  1  -CZ     Step Length- Right Swing  1  -CZ     Step Length- Left Swing  1  -CZ     Foot Clearance- Right Foot  1  -CZ     Foot Clearance- Left Foot  1  -CZ     Step Symmetry  1  -CZ     Step Continuity  1  -CZ     Path (excursion)  1  -CZ     Trunk  0  -CZ     Base of Support  1  -CZ     Gait Score  9  -CZ     Tinetti Total Score  20  -CZ     Tinetti Assistive Device  rolling walker  -CZ     Row Name 03/16/21 0915          Functional Assessment    Outcome Measure Options  AM-PAC 6 Clicks Basic Mobility (PT);Tinetti  -CZ       User Key  (r) = Recorded By, (t) = Taken By, (c) = Cosigned By    Initials Name Provider Type    CZ Hadley Ramirez, PT Physical Therapist        Physical Therapy Education                 Title: PT OT SLP Therapies (In Progress)     Topic: Physical Therapy (In Progress)     Point: Mobility training (Not Started)     Learner Progress:  Not documented in this visit.          Point: Home exercise " program (Not Started)     Learner Progress:  Not documented in this visit.          Point: Body mechanics (Not Started)     Learner Progress:  Not documented in this visit.          Point: Precautions (Done)     Learning Progress Summary           Patient Acceptance, E, VU,NR by  at 3/16/2021 1247    Comment: Tinetti assessed; 20/28 or moderate fall risk; recommend continued use of FWW.  Discussed improving patient's stepping response to perturbation.                               User Key     Initials Effective Dates Name Provider Type Discipline     04/03/18 -  Hadley Ramirez, PT Physical Therapist PT              PT Recommendation and Plan  Planned Therapy Interventions (PT): balance training, patient/family education, gait training, bed mobility training, transfer training, strengthening, stretching  Plan of Care Reviewed With: patient  Outcome Summary: Initial PT evaluation complete.  Patient is alert and cooperative, has quite a flat affect.  She demonstrates (I) with bed mobility, requires CGA with transfers and gait.  Patient ambulates 50'x1 with FWW, slow janey, decreased foot clearance and step length.  Tinetti assessed; 20/28 or moderate fall risk; demonstrates poor stepping response to perturbation.  Patient will either need SNF placement for rehab prior to discharge home alone, or 24 hour assistance and HHPT at home.  Goals established, continue skilled PT.     Time Calculation:   PT Charges     Row Name 03/16/21 1251             Time Calculation    Start Time  0915  -      Stop Time  0949  -      Time Calculation (min)  34 min  -      PT Received On  03/16/21  -      PT Goal Re-Cert Due Date  03/29/21  -        User Key  (r) = Recorded By, (t) = Taken By, (c) = Cosigned By    Initials Name Provider Type     Hadley Ramirez, PT Physical Therapist        Therapy Charges for Today     Code Description Service Date Service Provider Modifiers Qty    [id number]  PT EVAL MOD COMPLEXITY  2 3/16/2021 Hadley Ramirez, PT GP 1          PT G-Codes  Outcome Measure Options: AM-PAC 6 Clicks Basic Mobility (PT), Tinetti  AM-PAC 6 Clicks Score (PT): 20  AM-PAC 6 Clicks Score (OT): 20  Tinetti Total Score: 20    Hadley Ramirez, DI  3/16/2021

## 2021-03-16 NOTE — THERAPY EVALUATION
Patient Name: Michelle Child  : 1955    MRN: [id number]                              Today's Date: 3/16/2021       Admit Date: 3/15/2021    Visit Dx:     ICD-10-CM ICD-9-CM   1. Weakness generalized  R53.1 780.79   2. Dehydration  E86.0 276.51   3. Diarrhea, unspecified type  R19.7 787.91   4. Hemorrhoids, unspecified hemorrhoid type  K64.9 455.6   5. Rectal bleed  K62.5 569.3   6. Impaired mobility and activities of daily living  Z74.09 V49.89    Z78.9      Patient Active Problem List   Diagnosis   • Migraine   • Iron deficiency anemia   • Gastroesophageal reflux disease   • Essential hypertension   • Coronary arteriosclerosis   • Bilateral pseudophakia   • Astigmatism   • Hypokalemia   • Cerebral microvascular disease   • Pulmonary hypertension (CMS/HCC)   • Rheumatic tricuspid valve regurgitation   • Rheumatic mitral stenosis   • Pulmonary hypertension due to left heart valvular disease (CMS/HCC)   • Cor pulmonale, chronic (CMS/HCC)   • Folic acid deficiency   • Stage 3 severe COPD by GOLD classification (CMS/HCC)   • Personal history of tobacco use, presenting hazards to health   • Tobacco abuse, in remission   • Recurrent major depressive disorder, in full remission (CMS/HCC)   • Debility   • Disorientation   • Weakness generalized   • Acute metabolic encephalopathy   • Depression   • Anemia of chronic disease   • Constipation   • Acute cystitis   • Rectal cancer (CMS/HCC)     Past Medical History:   Diagnosis Date   • Alkaline phosphatase raised    • Astigmatism    • Bilateral pseudophakia    • Coronary arteriosclerosis    • Depressive disorder    • Edema of lower extremity    • Encounter for general adult medical examination with abnormal findings    • Essential hypertension    • Gastroesophageal reflux disease    • H/O bone density study     DXA BONE DENSITY AXIAL    2016   • H/O screening mammography     SCREENING MAMMOGRAPHY     2016   • Hx of screening mammography     x3;  declined screening, understands risks and benefits and still declines      07/24/2015   • Influenza vaccine administered     INFLUENZA IMMUN ADMIN OR PREV RECV'D   x4       04/01/2016   • Insomnia    • Iron deficiency    • Migraine    • Tobacco dependence     continuous       Past Surgical History:   Procedure Laterality Date   • APPENDECTOMY     • AUGMENTATION MAMMAPLASTY     • BREAST IMPLANT SURGERY     • CATARACT EXTRACTION WITH INTRAOCULAR LENS IMPLANT  12/17/2003    Phacoemulsification of a cataract with intraocular lens implant of rigt eye, Nixon model SA 60-AT; serial # [id number];20.5 diopter   • CHOLECYSTECTOMY  08/06/2007    Laparoscopic cholecystectomy. Symptomatic gallstones   • COLONOSCOPY  10/15/2013    declined stool cards and colonscopy   • ENDOSCOPY AND COLONOSCOPY  05/22/2014    Internal & external hemorrhoids found.   • ENDOSCOPY W/ PEG TUBE PLACEMENT  05/22/2014    EGD w/ tube: Normal esopahgus. Gastritis in stomach. Biopsy taken. Normal duodenum. Biopsy taken.   • INJECTION OF MEDICATION  07/24/2013    Inj(s) Tend-Sheath, Ligament, Single   • INJECTION OF MEDICATION  11/17/2014    Kenalog x6   • INJECTION OF MEDICATION  04/01/2016    PNEUMOC VAC/ADMIN/RCVD    • INJECTION OF MEDICATION  12/05/2014    Toradol   • OTHER SURGICAL HISTORY  01/19/2014    Drain/Inject Major Joint   x2   • PAP SMEAR  10/06/2011   • SUBTOTAL HYSTERECTOMY      Partial hyst    • TONSILLECTOMY     • TUBAL ABDOMINAL LIGATION       General Information     Row Name 03/16/21 0833          OT Time and Intention    Document Type  evaluation  -     Mode of Treatment  individual therapy;occupational therapy  -     Row Name 03/16/21 0833          General Information    Patient Profile Reviewed  yes  -JH     Prior Level of Function  independent:;all household mobility;community mobility;gait;transfer;bed mobility;ADL's  -     Existing Precautions/Restrictions  fall  -     Row Name 03/16/21 0833          Living Environment     Lives With  alone  -     Row Name 03/16/21 0833          Home Main Entrance    Number of Stairs, Main Entrance  none  -     Stair Railings, Main Entrance  none  -     Row Name 03/16/21 08          Stairs Within Home, Primary    Stairs, Within Home, Primary  Has RW, Tub/shower unit, shower chair, grab bars around toilet - son does driving and assist with cooking and cleaning  -     Number of Stairs, Within Home, Primary  none  -     Stair Railings, Within Home, Primary  none  -     Row Name 03/16/21 0833          Cognition    Orientation Status (Cognition)  oriented x 4  -     Row Name 03/16/21 0833          Safety Issues, Functional Mobility    Impairments Affecting Function (Mobility)  balance;strength;endurance/activity tolerance  -       User Key  (r) = Recorded By, (t) = Taken By, (c) = Cosigned By    Initials Name Provider Type     Sundeep East OT Occupational Therapist          Mobility/ADL's     Row Name 03/16/21 0833          Bed Mobility    Bed Mobility  supine-sit;sit-supine  -     Supine-Sit [place] (Bed Mobility)  modified independence  -     Sit-Supine [place] (Bed Mobility)  modified independence  -     Assistive Device (Bed Mobility)  head of bed elevated;bed rails  -     Row Name 03/16/21 0833          Transfers    Transfers  sit-stand transfer  -     Sit-Stand [place] (Transfers)  contact guard  -     Row Name 03/16/21 0833          Sit-Stand Transfer    Assistive Device (Sit-Stand Transfers)  walker, front-wheeled  -     Row Name 03/16/21 0833          Functional Mobility    Functional Mobility- Ind. Level  minimum assist (75% patient effort)  -     Functional Mobility- Device  rolling walker  -     Functional Mobility-Distance (Feet)  16  -     Functional Mobility- Safety Issues  loses balance backward  -     Functional Mobility- Comment  1 LOB with use of RW - requiring Min A from therapist to correct  -     Row Name 03/16/21 0833           Activities of Daily Living    BADL Assessment/Intervention  lower body dressing;grooming  -[facility] Name 03/16/21 [facility]          Lower Body Dressing Assessment/Training    [place] Level (Lower Body Dressing)  doff;don;socks;contact guard assist  -     Position (Lower Body Dressing)  edge of bed sitting  -[facility] Name 03/16/21 08          Grooming Assessment/Training    [place] Level (Grooming)  wash face, hands;contact guard assist  -     Position (Grooming)  sink side  -       User Key  (r) = Recorded By, (t) = Taken By, (c) = Cosigned By    Initials Name Provider Type    Sundeep Hassan OT Occupational Therapist        Obj/Interventions     [facility] Name 03/16/21 [facility]          Sensory Assessment (Somatosensory)    Sensory Assessment (Somatosensory)  UE sensation intact;sensation intact  -JH     Row Name 03/16/21 [facility]          Vision Assessment/Intervention    Visual Impairment/Limitations  corrective lenses for reading  -JH     Row Name 03/16/21 [facility]          Range of Motion Comprehensive    Comment, General Range of Motion  BUE ROM WFL  -JH     Row Name 03/16/21 08          Strength Comprehensive (MMT)    Comment, General Manual Muscle Testing (MMT) Assessment  BUE MMT Strength 4-/5 Grossly  -       User Key  (r) = Recorded By, (t) = Taken By, (c) = Cosigned By    Initials Name Provider Type    Sundeep Hassan OT Occupational Therapist        Goals/Plan     Row Name 03/16/21 08          Bed Mobility Goal 1 (OT)    Activity/Assistive Device (Bed Mobility Goal 1, OT)  sit to supine/supine to sit  [facility]     [place] Level/Cues Needed (Bed Mobility Goal 1, OT)  independent  -     Time Frame (Bed Mobility Goal 1, OT)  long term goal (LTG);by discharge  [facility]     Progress/Outcomes (Bed Mobility Goal 1, OT)  goal not met  -JH     Row Name 03/16/21 [facility]          Transfer Goal 1 (OT)    Activity/Assistive Device (Transfer Goal 1, OT)  sit-to-stand/stand-to-sit  -     [place]  Level/Cues Needed (Transfer Goal 1, OT)  standby assist;verbal cues required;tactile cues required  -     Time Frame (Transfer Goal 1, OT)  long term goal (LTG);by discharge  -     Progress/Outcome (Transfer Goal 1, OT)  goal not met  -     Row Name 03/16/21 08          Bathing Goal 1 (OT)    Activity/Device (Bathing Goal 1, OT)  lower body bathing  -     [place] Level/Cues Needed (Bathing Goal 1, OT)  standby assist;verbal cues required;tactile cues required  -     Time Frame (Bathing Goal 1, OT)  long term goal (LTG);by discharge  -     Progress/Outcomes (Bathing Goal 1, OT)  goal not met  -[facility] Name 03/16/21 08          Dressing Goal 1 (OT)    Activity/Device (Dressing Goal 1, OT)  lower body dressing  -     [place]/Cues Needed (Dressing Goal 1, OT)  standby assist;verbal cues required;tactile cues required  -     Time Frame (Dressing Goal 1, OT)  long term goal (LTG);by discharge  -     Progress/Outcome (Dressing Goal 1, OT)  goal not met  -[facility] Name 03/16/21 08          Toileting Goal 1 (OT)    Activity/Device (Toileting Goal 1, OT)  toileting skills, all  -     [place] Level/Cues Needed (Toileting Goal 1, OT)  standby assist;verbal cues required;tactile cues required  -     Time Frame (Toileting Goal 1, OT)  long term goal (LTG);by discharge  -     Progress/Outcome (Toileting Goal 1, OT)  goal not met  -JH     Row Name 03/16/21 08          Therapy Assessment/Plan (OT)    Planned Therapy Interventions (OT)  activity tolerance training;functional balance retraining;occupation/activity based interventions;ROM/therapeutic exercise;strengthening exercise;transfer/mobility retraining;patient/caregiver education/training;neuromuscular control/coordination retraining;BADL retraining  -       User Key  (r) = Recorded By, (t) = Taken By, (c) = Cosigned By    Initials Name Provider Type    Sundeep Hassan OT Occupational Therapist        Clinical Impression      Row Name 03/16/21 0833          Pain Assessment    Additional Documentation  Pain Scale: Numbers Pre/Post-Treatment (Group)  -     Row Name 03/16/21 0833          Pain Scale: Numbers Pre/Post-Treatment    Pretreatment Pain Rating  0/10 - no pain  -     Posttreatment Pain Rating  0/10 - no pain  -     Pain Intervention(s)  Repositioned;Emotional support;Ambulation/increased activity;Medication (See MAR)  -     Row Name 03/16/21 0833          Plan of Care Review    Plan of Care Reviewed With  patient  -     Outcome Summary  OT Eval completed on this date. Pt pleasant and agreeable to therapy. Bed Mobility: Mod I Transfers: CGA with use of RW Funct Mob: CGA with use of RW - pt did have 1 LOB requiring Min A from therapist to correct (L posterior fall) LBD: CGA to don/doff socks Grooming: CGA to wash hands at sink side Toileting: Pt noted to have urinary incontinence episode upon standing from bed - requiring Min A for jackson care while standing EOB. Pt demos decreased balance, decreased strength, decreased activity tolerance, and decreased safety awareness. Pt would benefit from continued skilled OT to address functional deficits. Recommend d/c home with HHOT and 24/7 sup/assist or if family is not able to provide sup/assist then recommend d/c to SNF - pt is at an increased risk for falls and readmission d/t falls and poor safety awareness. Would also recommend TTB vs shower chair d/t increased risk for falls.  -     Row Name 03/16/21 0833          Therapy Assessment/Plan (OT)    Rehab Potential (OT)  good, to achieve stated therapy goals  -     Criteria for Skilled Therapeutic Interventions Met (OT)  yes;meets criteria;skilled treatment is necessary  -     Therapy Frequency (OT)  other (see comments) 5-7 days per week  -     Predicted Duration of Therapy Intervention (OT)  Until goals met or d/c  -     Row Name 03/16/21 0833          Therapy Plan Review/Discharge Plan (OT)    Anticipated Discharge  Disposition (OT)  home with home health;skilled nursing [facility]     Row Name 03/16/21 0833          Vital Signs    Pre Systolic BP Rehab  119  -     Pre Treatment Diastolic BP  56  -JH     Post Systolic BP Rehab  119  -JH     Post Treatment Diastolic BP  62  -JH     Pretreatment Heart Rate (beats/min)  87  -JH     Posttreatment Heart Rate (beats/min)  97  -     Pre SpO2 (%)  95  -     O2 Delivery Pre Treatment  room air  -     Post SpO2 (%)  95  -     O2 Delivery Post Treatment  room air  -     Pre Patient Position  Supine  -     Post Patient Position  Supine  -     Row Name 03/16/21 0833          Positioning and Restraints    Pre-Treatment Position  in bed  -     Post Treatment Position  bed  -     In Bed  supine;call light within reach;encouraged to call for assist;exit alarm on;side rails up x2  -       User Key  (r) = Recorded By, (t) = Taken By, (c) = Cosigned By    Initials Name Provider Type    Sundeep Hassan OT Occupational Therapist        Outcome Measures     Row Name 03/16/21 0833          How much help from another is currently needed...    Putting on and taking off regular lower body clothing?  3  -JH     Bathing (including washing, rinsing, and drying)  3  -JH     Toileting (which includes using toilet bed pan or urinal)  3  -JH     Putting on and taking off regular upper body clothing  3  -JH     Taking care of personal grooming (such as brushing teeth)  4  -JH     Eating meals  4  -     AM-PAC 6 Clicks Score (OT)  20  -     Row Name 03/16/21 0833          Functional Assessment    Outcome Measure Options  AM-PAC 6 Clicks Daily Activity (OT)  -       User Key  (r) = Recorded By, (t) = Taken By, (c) = Cosigned By    Initials Name Provider Type    Sundeep Hassan OT Occupational Therapist        Occupational Therapy Education                 Title: PT OT SLP Therapies (In Progress)     Topic: Occupational Therapy (In Progress)     Point: ADL training (Not Started)      Description:   Instruct learner(s) on proper safety adaptation and remediation techniques during self care or transfers.   Instruct in proper use of assistive devices.              Learner Progress:  Not documented in this visit.          Point: Home exercise program (Not Started)     Description:   Instruct learner(s) on appropriate technique for monitoring, assisting and/or progressing therapeutic exercises/activities.              Learner Progress:  Not documented in this visit.          Point: Precautions (Done)     Description:   Instruct learner(s) on prescribed precautions during self-care and functional transfers.              Learning Progress Summary           Patient Acceptance, E, VU by  at 3/16/2021 0841    Comment: Pt educated on role of OT, d/c recs, and POC                   Point: Body mechanics (Not Started)     Description:   Instruct learner(s) on proper positioning and spine alignment during self-care, functional mobility activities and/or exercises.              Learner Progress:  Not documented in this visit.                      User Key     Initials Effective Dates Name Provider Type Discipline     09/10/19 -  Sundeep East, OT Occupational Therapist OT              OT Recommendation and Plan  Planned Therapy Interventions (OT): activity tolerance training, functional balance retraining, occupation/activity based interventions, ROM/therapeutic exercise, strengthening exercise, transfer/mobility retraining, patient/caregiver education/training, neuromuscular control/coordination retraining, BADL retraining  Therapy Frequency (OT): other (see comments) (5-7 days per week)  Plan of Care Review  Plan of Care Reviewed With: patient  Outcome Summary: OT Eval completed on this date. Pt pleasant and agreeable to therapy. Bed Mobility: Mod I Transfers: CGA with use of RW Funct Mob: CGA with use of RW - pt did have 1 LOB requiring Min A from therapist to correct (L posterior fall) LBD: CGA to  don/doff socks Grooming: CGA to wash hands at sink side Toileting: Pt noted to have urinary incontinence episode upon standing from bed - requiring Min A for jackson care while standing EOB. Pt demos decreased balance, decreased strength, decreased activity tolerance, and decreased safety awareness. Pt would benefit from continued skilled OT to address functional deficits. Recommend d/c home with HHOT and 24/7 sup/assist or if family is not able to provide sup/assist then recommend d/c to SNF - pt is at an increased risk for falls and readmission d/t falls and poor safety awareness. Would also recommend TTB vs shower chair d/t increased risk for falls.     Time Calculation:   Time Calculation- OT     Row Name 03/16/21 1036             Time Calculation-     OT Start Time  0833  -      OT Stop Time  0911  -      OT Time Calculation (min)  38 min  -      OT Received On  03/16/21  -      OT Goal Re-Cert Due Date  03/29/21  -        User Key  (r) = Recorded By, (t) = Taken By, (c) = Cosigned By    Initials Name Provider Type     Sundeep East OT Occupational Therapist        Therapy Charges for Today     Code Description Service Date Service Provider Modifiers Qty    [id number] HC OT EVAL MOD COMPLEXITY 3 3/16/2021 Sundeep East OT GO 1               Sundeep East OT  3/16/2021

## 2021-03-16 NOTE — PLAN OF CARE
Goal Outcome Evaluation:  Plan of Care Reviewed With: patient     Outcome Summary: OT Eval completed on this date. Pt pleasant and agreeable to therapy. Bed Mobility: Mod I Transfers: CGA with use of RW Funct Mob: CGA with use of RW - pt did have 1 LOB requiring Min A from therapist to correct (L posterior fall) LBD: CGA to don/doff socks Grooming: CGA to wash hands at sink side Toileting: Pt noted to have urinary incontinence episode upon standing from bed - requiring Min A for jackson care while standing EOB. Pt demos decreased balance, decreased strength, decreased activity tolerance, and decreased safety awareness. Pt would benefit from continued skilled OT to address functional deficits. Recommend d/c home with HHOT and 24/7 sup/assist or if family is not able to provide sup/assist then recommend d/c to SNF - pt is at an increased risk for falls and readmission d/t falls and poor safety awareness. Would also recommend TTB vs shower chair d/t increased risk for falls.

## 2021-03-16 NOTE — THERAPY TREATMENT NOTE
Patient Name: Michelle Child  : 1955    MRN: [id number]                              Today's Date: 3/16/2021       Admit Date: 3/15/2021    Visit Dx:     ICD-10-CM ICD-9-CM   1. Weakness generalized  R53.1 780.79   2. Dehydration  E86.0 276.51   3. Diarrhea, unspecified type  R19.7 787.91   4. Hemorrhoids, unspecified hemorrhoid type  K64.9 455.6   5. Rectal bleed  K62.5 569.3   6. Impaired mobility and activities of daily living  Z74.09 V49.89    Z78.9    7. Impaired functional mobility, balance, gait, and endurance  Z74.09 V49.89     Patient Active Problem List   Diagnosis   • Migraine   • Iron deficiency anemia   • Gastroesophageal reflux disease   • Essential hypertension   • Coronary arteriosclerosis   • Bilateral pseudophakia   • Astigmatism   • Hypokalemia   • Cerebral microvascular disease   • Pulmonary hypertension (CMS/HCC)   • Rheumatic tricuspid valve regurgitation   • Rheumatic mitral stenosis   • Pulmonary hypertension due to left heart valvular disease (CMS/HCC)   • Cor pulmonale, chronic (CMS/HCC)   • Folic acid deficiency   • Stage 3 severe COPD by GOLD classification (CMS/HCC)   • Personal history of tobacco use, presenting hazards to health   • Tobacco abuse, in remission   • Recurrent major depressive disorder, in full remission (CMS/HCC)   • Debility   • Disorientation   • Weakness generalized   • Acute metabolic encephalopathy   • Depression   • Anemia of chronic disease   • Constipation   • Acute cystitis   • Rectal cancer (CMS/HCC)     Past Medical History:   Diagnosis Date   • Alkaline phosphatase raised    • Astigmatism    • Bilateral pseudophakia    • Coronary arteriosclerosis    • Depressive disorder    • Edema of lower extremity    • Encounter for general adult medical examination with abnormal findings    • Essential hypertension    • Gastroesophageal reflux disease    • H/O bone density study     DXA BONE DENSITY AXIAL    2016   • H/O screening mammography      SCREENING MAMMOGRAPHY     04/25/2016   • Hx of screening mammography     x3; declined screening, understands risks and benefits and still declines      07/24/2015   • Influenza vaccine administered     INFLUENZA IMMUN ADMIN OR PREV RECV'D   x4       04/01/2016   • Insomnia    • Iron deficiency    • Migraine    • Tobacco dependence     continuous       Past Surgical History:   Procedure Laterality Date   • APPENDECTOMY     • AUGMENTATION MAMMAPLASTY     • BREAST IMPLANT SURGERY     • CATARACT EXTRACTION WITH INTRAOCULAR LENS IMPLANT  12/17/2003    Phacoemulsification of a cataract with intraocular lens implant of rigt eye, Nixon model SA 60-AT; serial # [id number];20.5 diopter   • CHOLECYSTECTOMY  08/06/2007    Laparoscopic cholecystectomy. Symptomatic gallstones   • COLONOSCOPY  10/15/2013    declined stool cards and colonscopy   • ENDOSCOPY AND COLONOSCOPY  05/22/2014    Internal & external hemorrhoids found.   • ENDOSCOPY W/ PEG TUBE PLACEMENT  05/22/2014    EGD w/ tube: Normal esopahgus. Gastritis in stomach. Biopsy taken. Normal duodenum. Biopsy taken.   • INJECTION OF MEDICATION  07/24/2013    Inj(s) Tend-Sheath, Ligament, Single   • INJECTION OF MEDICATION  11/17/2014    Kenalog x6   • INJECTION OF MEDICATION  04/01/2016    PNEUMOC VAC/ADMIN/RCVD    • INJECTION OF MEDICATION  12/05/2014    Toradol   • OTHER SURGICAL HISTORY  01/19/2014    Drain/Inject Major Joint   x2   • PAP SMEAR  10/06/2011   • SUBTOTAL HYSTERECTOMY      Partial hyst    • TONSILLECTOMY     • TUBAL ABDOMINAL LIGATION       General Information     Row Name 03/16/21 1225 03/16/21 0833       OT Time and Intention    Document Type  therapy note (daily note)  -MARTIN  evaluation  -    Mode of Treatment  individual therapy;occupational therapy  -  individual therapy;occupational therapy  -    Row Name 03/16/21 1225 03/16/21 0833       General Information    Patient Profile Reviewed  yes  -MARTIN  yes  -DEVYN    Prior Level of Function  --   independent:;all household mobility;community mobility;gait;transfer;bed mobility;ADL's  -    Existing Precautions/Restrictions  --  fall  -    Row Name 03/16/21 0833          Living Environment    Lives With  alone  -     Row Name 03/16/21 0833          Home Main Entrance    Number of Stairs, Main Entrance  none  -     Stair Railings, Main Entrance  none  -     Row Name 03/16/21 0833          Stairs Within Home, Primary    Stairs, Within Home, Primary  Has RW, Tub/shower unit, shower chair, grab bars around toilet - son does driving and assist with cooking and cleaning  -     Number of Stairs, Within Home, Primary  none  -     Stair Railings, Within Home, Primary  none  -     Row Name 03/16/21 1225 03/16/21 0833       Cognition    Orientation Status (Cognition)  oriented x 4  -  oriented x 4  -    Row Name 03/16/21 1225 03/16/21 0833       Safety Issues, Functional Mobility    Impairments Affecting Function (Mobility)  endurance/activity tolerance;strength;cognition  -  balance;strength;endurance/activity tolerance  -      User Key  (r) = Recorded By, (t) = Taken By, (c) = Cosigned By    Initials Name Provider Type     Jaz Garcia COTA/L Occupational Therapy Assistant     Sundeep East OT Occupational Therapist          Mobility/ADL's     Row Name 03/16/21 1225 03/16/21 0833       Bed Mobility    Bed Mobility  supine-sit;sit-supine  -  supine-sit;sit-supine  -    Supine-Sit [place] (Bed Mobility)  modified independence  -  modified [place]  -    Sit-Supine [place] (Bed Mobility)  modified independence  -  modified [place]  -    Assistive Device (Bed Mobility)  draw sheet;bed rails  -  head of bed elevated;bed rails  -    Row Name 03/16/21 1225 03/16/21 0833       Transfers    Transfers  sit-stand transfer;toilet transfer  -  sit-stand transfer  -    Sit-Stand [place] (Transfers)  contact guard  -  contact guard  -    [place]  Level (Toilet Transfer)  contact guard  -LW  --    Assistive Device (Toilet Transfer)  commode;grab bars/safety frame  -LW  --    Row Name 03/16/21 1225 03/16/21 08       Sit-Stand Transfer    Assistive Device (Sit-Stand Transfers)  walker, front-wheeled  -  walker, front-wheeled  -    Row Name 03/16/21 1225          Toilet Transfer    Type (Toilet Transfer)  sit-stand;stand-sit  -[facility] Name 03/16/21 1225 03/16/21 [facility]       Functional Mobility    Functional Mobility- Ind. Level  contact guard assist  -  minimum assist (75% patient effort)  -    Functional Mobility- Device  rolling walker  -  rolling walker  -    Functional Mobility-Distance (Feet)  15  -LW  16  -    Functional Mobility- Safety Issues  --  loses balance backward  -    Functional Mobility- Comment  --  1 LOB with use of RW - requiring Min A from therapist to correct  -[facility] Name 03/16/21 122 03/16/21 [facility]       Activities of Daily Living    BADL Assessment/Intervention  bathing;lower body dressing;upper body dressing;grooming;toileting  -  lower body dressing;grooming  -[facility] Name 03/16/21 1225 03/16/21 [facility]       Lower Body Dressing Assessment/Training    [place] Level (Lower Body Dressing)  doff;don;socks;standby assist;set up  -  doff;don;socks;contact guard assist  -    Position (Lower Body Dressing)  edge of bed sitting  -  edge of bed sitting  -[facility] Name 03/16/21 122 03/16/21 08       Grooming Assessment/Training    [place] Level (Grooming)  hair care, combing/brushing;oral care regimen;wash face, hands;standby assist  -  wash face, hands;contact guard assist  -    Position (Grooming)  edge of bed sitting  -  sink side  -[facility] Name 03/16/21 1225          Bathing Assessment/Intervention    [place] Level (Bathing)  lower body;upper body;standby assist;set up  -     Position (Bathing)  edge of bed sitting  -[facility] Name 03/16/21 [facility]          Upper Body Dressing  Assessment/Training    [place] Level (Upper Body Dressing)  doff;don;standby assist;other (see comments) Hospital gown  -     Position (Upper Body Dressing)  edge of bed sitting  -     Row Name 03/16/21 1225          Toileting Assessment/Training    [place] Level (Toileting)  adjust/manage clothing;perform perineal hygiene;standby assist  -     Assistive Devices (Toileting)  commode;grab bar/safety frame  -       User Key  (r) = Recorded By, (t) = Taken By, (c) = Cosigned By    Initials Name Provider Type     Jaz Garcia COTA/L Occupational Therapy Assistant    Sundeep Hassan OT Occupational Therapist        Obj/Interventions     [facility] Name 03/16/21 0833          Sensory Assessment (Somatosensory)    Sensory Assessment (Somatosensory)  UE sensation intact;sensation intact  -JH     Row Name 03/16/21 0833          Vision Assessment/Intervention    Visual Impairment/Limitations  corrective lenses for reading  -[facility] Name 03/16/21 0833          Range of Motion Comprehensive    Comment, General Range of Motion  BUE ROM WFL  -[facility] Name 03/16/21 0833          Strength Comprehensive (MMT)    Comment, General Manual Muscle Testing (MMT) Assessment  BUE MMT Strength 4-/5 Grossly  -       User Key  (r) = Recorded By, (t) = Taken By, (c) = Cosigned By    Initials Name Provider Type    Sundeep Hassan OT Occupational Therapist        Goals/Plan     Row Name 03/16/21 1225 03/16/21 0833       Bed Mobility Goal 1 (OT)    Activity/Assistive Device (Bed Mobility Goal 1, OT)  sit to supine/supine to sit  -  sit to supine/supine to sit  -    [place] Level/Cues Needed (Bed Mobility Goal 1, OT)  independent  -  independent  -    Time Frame (Bed Mobility Goal 1, OT)  long term goal (LTG);by discharge  -  long term goal (LTG);by discharge  -    Progress/Outcomes (Bed Mobility Goal 1, OT)  goal not met  -  goal not met  -[facility] Name 03/16/21 1225 03/16/21 0833       Transfer  Goal 1 (OT)    Activity/Assistive Device (Transfer Goal 1, OT)  sit-to-stand/stand-to-sit  -LW  sit-to-stand/stand-to-sit  -    [place] Level/Cues Needed (Transfer Goal 1, OT)  standby assist;verbal cues required;tactile cues required  -LW  standby assist;verbal cues required;tactile cues required  -    Time Frame (Transfer Goal 1, OT)  long term goal (LTG);by discharge  -LW  long term goal (LTG);by discharge  -    Progress/Outcome (Transfer Goal 1, OT)  goal not met  -LW  goal not met  -    Row Name 03/16/21 1225 03/16/21 0833       Bathing Goal 1 (OT)    Activity/Device (Bathing Goal 1, OT)  lower body bathing  -LW  lower body bathing  -    [place] Level/Cues Needed (Bathing Goal 1, OT)  standby assist;verbal cues required;tactile cues required  -LW  standby assist;verbal cues required;tactile cues required  -    Time Frame (Bathing Goal 1, OT)  long term goal (LTG);by discharge  -  long term goal (LTG);by discharge  -    Progress/Outcomes (Bathing Goal 1, OT)  goal not met  -  goal not met  -    Row Name 03/16/21 1225 03/16/21 0833       Dressing Goal 1 (OT)    Activity/Device (Dressing Goal 1, OT)  lower body dressing  -LW  lower body dressing  -    [place]/Cues Needed (Dressing Goal 1, OT)  standby assist;verbal cues required;tactile cues required  -LW  standby assist;verbal cues required;tactile cues required  -    Time Frame (Dressing Goal 1, OT)  long term goal (LTG);by discharge  -  long term goal (LTG);by discharge  -    Progress/Outcome (Dressing Goal 1, OT)  goal not met  -  goal not met  -    Row Name 03/16/21 1225 03/16/21 0833       Toileting Goal 1 (OT)    Activity/Device (Toileting Goal 1, OT)  toileting skills, all  -LW  toileting skills, all  -    [place] Level/Cues Needed (Toileting Goal 1, OT)  standby assist;verbal cues required;tactile cues required  -LW  standby assist;verbal cues required;tactile cues required  -    Time Frame  (Toileting Goal 1, OT)  long term goal (LTG);by discharge  -  long term goal (LTG);by discharge  -    Progress/Outcome (Toileting Goal 1, OT)  goal not met  -  goal not met  -    Row Name 03/16/21 0833          Therapy Assessment/Plan (OT)    Planned Therapy Interventions (OT)  activity tolerance training;functional balance retraining;occupation/activity based interventions;ROM/therapeutic exercise;strengthening exercise;transfer/mobility retraining;patient/caregiver education/training;neuromuscular control/coordination retraining;BADL retraining  -       User Key  (r) = Recorded By, (t) = Taken By, (c) = Cosigned By    Initials Name Provider Type    Jaz Harvey COTA/L Occupational Therapy Assistant     Sundeep East OT Occupational Therapist        Clinical Impression     Row Name 03/16/21 0833          Pain Assessment    Additional Documentation  Pain Scale: Numbers Pre/Post-Treatment (Group)  -     Row Name 03/16/21 1225 03/16/21 0833       Pain Scale: Numbers Pre/Post-Treatment    Pretreatment Pain Rating  0/10 - no pain  -  0/10 - no pain  -    Posttreatment Pain Rating  0/10 - no pain  -  0/10 - no pain  -    Pain Intervention(s)  --  Repositioned;Emotional support;Ambulation/increased activity;Medication (See MAR)  -    Row Name 03/16/21 1225 03/16/21 0833       Plan of Care Review    Plan of Care Reviewed With  --  patient  -    Outcome Summary  Pt supine in bed upon entry. Pt completed UB and LB bathing with SBA. UB dressing and LB dressing with SBA. Pt performed groomng SBA. Pt t/f from bed to toilet with CGA. Pt needing increased time for bathing. Pt supine in bed all needs in reach.  -LW  OT Eval completed on this date. Pt pleasant and agreeable to therapy. Bed Mobility: Mod I Transfers: CGA with use of RW Funct Mob: CGA with use of RW - pt did have 1 LOB requiring Min A from therapist to correct (L posterior fall) LBD: CGA to don/doff socks Grooming: CGA to wash hands  at sink side Toileting: Pt noted to have urinary incontinence episode upon standing from bed - requiring Min A for jackson care while standing EOB. Pt demos decreased balance, decreased strength, decreased activity tolerance, and decreased safety awareness. Pt would benefit from continued skilled OT to address functional deficits. Recommend d/c home with HHOT and 24/7 sup/assist or if family is not able to provide sup/assist then recommend d/c to SNF - pt is at an increased risk for falls and readmission d/t falls and poor safety awareness. Would also recommend TTB vs shower chair d/t increased risk for falls.  -    Row Name 03/16/21 0833          Therapy Assessment/Plan (OT)    Rehab Potential (OT)  good, to achieve stated therapy goals  -     Criteria for Skilled Therapeutic Interventions Met (OT)  yes;meets criteria;skilled treatment is necessary  -     Therapy Frequency (OT)  other (see comments) 5-7 days per week  -     Predicted Duration of Therapy Intervention (OT)  Until goals met or d/c  -     Row Name 03/16/21 0833          Therapy Plan Review/Discharge Plan (OT)    Anticipated Discharge Disposition (OT)  home with home health;skilled nursing facility  -     Row Name 03/16/21 0833          Vital Signs    Pre Systolic BP Rehab  119  -JH     Pre Treatment Diastolic BP  56  -JH     Post Systolic BP Rehab  119  -JH     Post Treatment Diastolic BP  62  -JH     Pretreatment Heart Rate (beats/min)  87  -     Posttreatment Heart Rate (beats/min)  97  -     Pre SpO2 (%)  95  -     O2 Delivery Pre Treatment  room air  -     Post SpO2 (%)  95  -     O2 Delivery Post Treatment  room air  -     Pre Patient Position  Supine  -     Post Patient Position  Supine  -     Row Name 03/16/21 1225 03/16/21 0833       Positioning and Restraints    Pre-Treatment Position  in bed  -LW  in bed  -    Post Treatment Position  bed  -LW  bed  -    In Bed  supine;call light within reach;encouraged to call  for assist;exit alarm on;side rails up x2  -LW  supine;call light within reach;encouraged to call for assist;exit alarm on;side rails up x2  -JH      User Key  (r) = Recorded By, (t) = Taken By, (c) = Cosigned By    Initials Name Provider Type    Jaz Harvey COTA/L Occupational Therapy Assistant    Sundeep Hassan OT Occupational Therapist        Outcome Measures     Row Name 03/16/21 1225 03/16/21 0833       How much help from another is currently needed...    Putting on and taking off regular lower body clothing?  3  -LW  3  -JH    Bathing (including washing, rinsing, and drying)  3  -LW  3  -JH    Toileting (which includes using toilet bed pan or urinal)  3  -LW  3  -JH    Putting on and taking off regular upper body clothing  3  -LW  3  -JH    Taking care of personal grooming (such as brushing teeth)  4  -LW  4  -JH    Eating meals  4  -LW  4  -JH    AM-PAC 6 Clicks Score (OT)  20  -LW  20  -JH    Row Name 03/16/21 0833          Functional Assessment    Outcome Measure Options  AM-PAC 6 Clicks Daily Activity (OT)  -       User Key  (r) = Recorded By, (t) = Taken By, (c) = Cosigned By    Initials Name Provider Type    Jaz Harvey COTA/L Occupational Therapy Assistant     Sundeep East OT Occupational Therapist        Occupational Therapy Education                 Title: PT OT SLP Therapies (In Progress)     Topic: Occupational Therapy (Done)     Point: ADL training (Done)     Description:   Instruct learner(s) on proper safety adaptation and remediation techniques during self care or transfers.   Instruct in proper use of assistive devices.              Learning Progress Summary           Patient Acceptance, E,TB, VU by  at 3/16/2021 9525                   Point: Home exercise program (Done)     Description:   Instruct learner(s) on appropriate technique for monitoring, assisting and/or progressing therapeutic exercises/activities.              Learning Progress Summary            Patient Acceptance, E,TB, VU by  at 3/16/2021 1419                   Point: Precautions (Done)     Description:   Instruct learner(s) on prescribed precautions during self-care and functional transfers.              Learning Progress Summary           Patient Acceptance, E,TB, VU by  at 3/16/2021 1419    Acceptance, E, VU by  at 3/16/2021 0841    Comment: Pt educated on role of OT, d/c recs, and POC                   Point: Body mechanics (Done)     Description:   Instruct learner(s) on proper positioning and spine alignment during self-care, functional mobility activities and/or exercises.              Learning Progress Summary           Patient Acceptance, E,TB, VU by  at 3/16/2021 1419                               User Key     Initials Effective Dates Name Provider Type Discipline     03/07/18 -  Jaz Garcia COTA/L Occupational Therapy Assistant OT     09/10/19 -  Sundeep East OT Occupational Therapist OT              OT Recommendation and Plan     Plan of Care Review  Plan of Care Reviewed With: patient  Progress: no change  Outcome Summary: Pt supine in bed upon entry. Pt completed UB and LB bathing with SBA. UB dressing and LB dressing with SBA. Pt performed groomng SBA. Pt t/f from bed to toilet with CGA. Pt needing increased time for bathing. Pt supine in bed all needs in reach.     Time Calculation:   Time Calculation- OT     Row Name 03/16/21 1419 03/16/21 1036          Time Calculation- OT    OT Start Time  1225  -  0833  -     OT Stop Time  1335  -  0911  -     OT Time Calculation (min)  70 min  -  38 min  -     Total Timed Code Minutes- OT  70 minute(s)  -  --     OT Received On  03/16/21  -  03/16/21  -     OT Goal Re-Cert Due Date  --  03/29/21  -       User Key  (r) = Recorded By, (t) = Taken By, (c) = Cosigned By    Initials Name Provider Type     Jaz Garcia COTA/L Occupational Therapy Assistant     Sundeep East OT Occupational Therapist         Therapy Charges for Today     Code Description Service Date Service Provider Modifiers Qty    40887274748 HC OT SELF CARE/MGMT/TRAIN EA 15 MIN 3/16/2021 Jaz Garcia COTA/DALTON GO 5               PAMELLA Wade/DALTON  3/16/2021

## 2021-03-16 NOTE — PLAN OF CARE
Problem: Adult Inpatient Plan of Care  Goal: Plan of Care Review  Outcome: Ongoing, Progressing  Flowsheets  Taken 3/16/2021 0604 by Fadumo Romano RN  Progress: no change  Outcome Summary: pt vs stable, all labs sent to lab no s/s of distress.  Taken 3/15/2021 1832 by Shayy Gambino RN  Plan of Care Reviewed With: patient     Problem: Adult Inpatient Plan of Care  Goal: Patient-Specific Goal (Individualized)  Outcome: Ongoing, Progressing     Problem: Adult Inpatient Plan of Care  Goal: Absence of Hospital-Acquired Illness or Injury  Outcome: Ongoing, Progressing     Problem: Adult Inpatient Plan of Care  Goal: Absence of Hospital-Acquired Illness or Injury  Intervention: Identify and Manage Fall Risk  Recent Flowsheet Documentation  Taken 3/16/2021 0524 by Fadumo Romano RN  Safety Promotion/Fall Prevention:   activity supervised   assistive device/personal items within reach   clutter free environment maintained   fall prevention program maintained   safety round/check completed   nonskid shoes/slippers when out of bed  Taken 3/16/2021 0339 by Fadumo Romano RN  Safety Promotion/Fall Prevention:   activity supervised   assistive device/personal items within reach   clutter free environment maintained   fall prevention program maintained   safety round/check completed   nonskid shoes/slippers when out of bed  Taken 3/16/2021 0115 by Fadumo Romano RN  Safety Promotion/Fall Prevention:   activity supervised   assistive device/personal items within reach   clutter free environment maintained   fall prevention program maintained   safety round/check completed   nonskid shoes/slippers when out of bed  Taken 3/16/2021 0042 by Fadumo Romano RN  Safety Promotion/Fall Prevention:   activity supervised   assistive device/personal items within reach   clutter free environment maintained   fall prevention program maintained   safety round/check completed   nonskid shoes/slippers when out of  bed  Taken 3/15/2021 2113 by Fadumo Romano RN  Safety Promotion/Fall Prevention:   activity supervised   assistive device/personal items within reach   clutter free environment maintained   fall prevention program maintained   safety round/check completed   nonskid shoes/slippers when out of bed  Taken 3/15/2021 1955 by Fadumo Romano RN  Safety Promotion/Fall Prevention:   clutter free environment maintained   assistive device/personal items within reach   activity supervised   fall prevention program maintained   safety round/check completed   nonskid shoes/slippers when out of bed  Taken 3/15/2021 1928 by Fadumo Romano RN  Safety Promotion/Fall Prevention:   clutter free environment maintained   assistive device/personal items within reach   activity supervised   fall prevention program maintained   safety round/check completed   nonskid shoes/slippers when out of bed     Problem: Adult Inpatient Plan of Care  Goal: Absence of Hospital-Acquired Illness or Injury  Intervention: Prevent and Manage VTE (venous thromboembolism) Risk  Recent Flowsheet Documentation  Taken 3/15/2021 1955 by Fadumo Romano RN  VTE Prevention/Management: sequential compression devices on     Problem: Adult Inpatient Plan of Care  Goal: Optimal Comfort and Wellbeing  Outcome: Ongoing, Progressing     Problem: Adult Inpatient Plan of Care  Goal: Optimal Comfort and Wellbeing  Intervention: Provide Person-Centered Care  Recent Flowsheet Documentation  Taken 3/15/2021 1955 by Fadumo Romano RN  Trust Relationship/Rapport: care explained     Problem: Adult Inpatient Plan of Care  Goal: Optimal Comfort and Wellbeing  Intervention: Provide Person-Centered Care  Recent Flowsheet Documentation  Taken 3/15/2021 1955 by Fadumo Romano RN  Trust Relationship/Rapport: care explained     Problem: Adult Inpatient Plan of Care  Goal: Readiness for Transition of Care  Outcome: Ongoing, Progressing     Problem: Fall Injury  Risk  Goal: Absence of Fall and Fall-Related Injury  Outcome: Ongoing, Progressing     Problem: Fall Injury Risk  Goal: Absence of Fall and Fall-Related Injury  Intervention: Promote Injury-Free Environment  Recent Flowsheet Documentation  Taken 3/16/2021 0524 by Fadumo Romano RN  Safety Promotion/Fall Prevention:   activity supervised   assistive device/personal items within reach   clutter free environment maintained   fall prevention program maintained   safety round/check completed   nonskid shoes/slippers when out of bed  Taken 3/16/2021 0339 by Fadumo Romano RN  Safety Promotion/Fall Prevention:   activity supervised   assistive device/personal items within reach   clutter free environment maintained   fall prevention program maintained   safety round/check completed   nonskid shoes/slippers when out of bed  Taken 3/16/2021 0115 by Fadumo Romano RN  Safety Promotion/Fall Prevention:   activity supervised   assistive device/personal items within reach   clutter free environment maintained   fall prevention program maintained   safety round/check completed   nonskid shoes/slippers when out of bed  Taken 3/16/2021 0042 by Fadumo Romano RN  Safety Promotion/Fall Prevention:   activity supervised   assistive device/personal items within reach   clutter free environment maintained   fall prevention program maintained   safety round/check completed   nonskid shoes/slippers when out of bed  Taken 3/15/2021 2113 by Fadumo Romano RN  Safety Promotion/Fall Prevention:   activity supervised   assistive device/personal items within reach   clutter free environment maintained   fall prevention program maintained   safety round/check completed   nonskid shoes/slippers when out of bed  Taken 3/15/2021 1955 by Fadumo Romano RN  Safety Promotion/Fall Prevention:   clutter free environment maintained   assistive device/personal items within reach   activity supervised   fall prevention program  maintained   safety round/check completed   nonskid shoes/slippers when out of bed  Taken 3/15/2021 1928 by Fadumo Romano RN  Safety Promotion/Fall Prevention:   clutter free environment maintained   assistive device/personal items within reach   activity supervised   fall prevention program maintained   safety round/check completed   nonskid shoes/slippers when out of bed   Goal Outcome Evaluation:     Progress: no change  Outcome Summary: pt vs stable, all labs sent to lab no s/s of distress.

## 2021-03-16 NOTE — PLAN OF CARE
Goal Outcome Evaluation:  Plan of Care Reviewed With: patient  Progress: no change  Outcome Summary: vss. no complaints of pain at this time. resting between care and will cont to monitor.

## 2021-03-16 NOTE — PLAN OF CARE
Problem: Adult Inpatient Plan of Care  Goal: Plan of Care Review  Outcome: Ongoing, Progressing  Flowsheets  Taken 3/16/2021 1419  Progress: no change  Plan of Care Reviewed With: patient  Taken 3/16/2021 1225  Outcome Summary: Pt supine in bed upon entry. Pt completed UB and LB bathing with SBA. UB dressing and LB dressing with SBA. Pt performed groomng SBA. Pt t/f from bed to toilet with CGA. Pt needing increased time for bathing. Pt supine in bed all needs in reach.   Goal Outcome Evaluation:  Plan of Care Reviewed With: patient  Progress: no change  Outcome Summary: Pt supine in bed upon entry. Pt completed UB and LB bathing with SBA. UB dressing and LB dressing with SBA. Pt performed groomng SBA. Pt t/f from bed to toilet with CGA. Pt needing increased time for bathing. Pt supine in bed all needs in reach.

## 2021-03-16 NOTE — PROGRESS NOTES
FAMILY MEDICINE DAILY PROGRESS NOTE    NAME: Michelle Child  : 1955  MRN: [id number]      LOS: 0 days     PROVIDER OF SERVICE: Madisyn Escobar MD    Chief Complaint: Acute metabolic encephalopathy    Subjective:     Interval History:  History taken from: patient  No acute overnight events. Patient had a ua that was dirty, so will repeat ua today. Vital signs stable. Patient is unchange from yesterday, slow thoughts.     Review of Systems:   Review of Systems   Constitutional: Negative for activity change, appetite change, chills, fatigue and fever.   HENT: Negative for drooling, ear discharge, ear pain, facial swelling, hearing loss, mouth sores, rhinorrhea and sinus pain.    Eyes: Negative for pain, redness and itching.   Respiratory: Negative for cough, choking, chest tightness, shortness of breath and stridor.    Cardiovascular: Negative for chest pain, palpitations and leg swelling.   Gastrointestinal: Negative for abdominal distention, abdominal pain, anal bleeding, blood in stool, constipation, diarrhea and nausea.   Endocrine: Negative for heat intolerance, polydipsia and polyphagia.   Genitourinary: Negative for dysuria, flank pain, frequency and genital sores.        Hemorrhoids     Musculoskeletal: Negative for back pain, gait problem, joint swelling and myalgias.   Skin: Negative for pallor and rash.   Neurological: Negative for seizures, facial asymmetry, speech difficulty, light-headedness, numbness and headaches.   Hematological: Negative for adenopathy. Does not bruise/bleed easily.   Psychiatric/Behavioral: Negative for confusion, decreased concentration, dysphoric mood and hallucinations. The patient is not nervous/anxious and is not hyperactive.        Objective:     Vital Signs  Temp:  [97.2 °F (36.2 °C)-98.6 °F (37 °C)] 98.6 °F (37 °C)  Heart Rate:  [65-97] 86  Resp:  [14-18] 16  BP: (110-164)/(60-72) 146/61   Body mass index is 19.22 kg/m².    Physical Exam  Physical  Exam  Constitutional:       Appearance: She is well-developed.      Comments: Cachectic     HENT:      Head: Normocephalic and atraumatic.      Right Ear: External ear normal.      Left Ear: External ear normal.      Nose: Nose normal.   Eyes:      Conjunctiva/sclera: Conjunctivae normal.      Pupils: Pupils are equal, round, and reactive to light.   Cardiovascular:      Rate and Rhythm: Normal rate and regular rhythm.      Heart sounds: Normal heart sounds.   Pulmonary:      Effort: Pulmonary effort is normal.      Breath sounds: Normal breath sounds.   Abdominal:      General: Bowel sounds are normal.      Palpations: Abdomen is soft.   Genitourinary:     Comments: Erythematous large hemorrhoids  fobt negative    Musculoskeletal:         General: Normal range of motion.      Cervical back: Normal range of motion and neck supple.      Comments: Multiple bruises on upper extremities    Skin:     General: Skin is warm and dry.   Neurological:      Mental Status: She is alert and oriented to person, place, and time.   Psychiatric:         Behavior: Behavior normal.         Thought Content: Thought content normal.         Judgment: Judgment normal.         Scheduled Meds   aspirin, 81 mg, Oral, Daily  budesonide-formoterol, 2 puff, Inhalation, BID - RT  cefTRIAXone, 1 g, Intravenous, Q24H  ferrous sulfate, 324 mg, Oral, BID With Meals  FLUoxetine, 20 mg, Oral, Daily  FLUoxetine, 40 mg, Oral, Daily  folic acid, 1,000 mcg, Oral, Daily  Hydrocortisone (Perianal), , Rectal, BID  losartan, 100 mg, Oral, Daily  pantoprazole, 40 mg, Oral, QAM  potassium chloride, 10 mEq, Oral, BID With Meals  pravastatin, 40 mg, Oral, Nightly  risperiDONE, 1 mg, Oral, Nightly  sodium chloride, 10 mL, Intravenous, Q12H       PRN Meds   •  acetaminophen  •  albuterol  •  calcium carbonate  •  ondansetron  •  senna-docusate sodium  •  sodium chloride      Diagnostic Data    Results from last 7 days   Lab Units 03/16/21  0555   WBC 10*3/mm3  11.00*   HEMOGLOBIN g/dL 9.8*   HEMATOCRIT % 30.0*   PLATELETS 10*3/mm3 210   GLUCOSE mg/dL 98   CREATININE mg/dL 0.97   BUN mg/dL 12   SODIUM mmol/L 136   POTASSIUM mmol/L 3.6   AST (SGOT) U/L 12   ALT (SGPT) U/L 9   ALK PHOS U/L 94   BILIRUBIN mg/dL 0.3   ANION GAP mmol/L 7.0       XR Abdomen 2+ VW with Chest 1 VW    Result Date: 3/15/2021  No acute disease upright PA chest. Normal abdominal bowel gas pattern. Small-to-moderate amount of fecal material throughout colon. [id number] Electronically signed by:  Sunny Solis MD  3/15/2021 11:55 AM CDT Workstation: [id number]    CT Head Without Contrast    Result Date: 3/15/2021  No acute intracranial abnormalities. Mild diffuse atrophy. [id number] Electronically signed by:  Sunny Solis MD  3/15/2021 1:02 PM CDT Workstation: [id number]        I reviewed the patient's new clinical results.  I reviewed the patient's new imaging results and agree with the interpretation.  I reviewed the patient's other test results and agree with the interpretation    Assessment/Plan:     Active Hospital Problems    Diagnosis  POA   • **Acute metabolic encephalopathy [G93.41]  Yes     Ct head negative  Will provide IV fluids  Na 133, will continue to trend  Ck 200, will continue to trend  UA positive for LE, urine culture pending; will start on rocephin  Vitamin b12 normal  Folate normal         • Acute cystitis [N30.00]  Yes     Iv rocephin and iv fluids  Urine culture pending       • Weakness generalized [R53.1]  Yes     Ck elevated at 200, will provide iv fluids  Urinalysis pending         • Depression [F32.9]  Yes     Continue risperdal and prozac 60     • Anemia of chronic disease [D63.8]  Yes     Will continue with iron supplementation       • Hemorrhoids [K64.9]  Yes     Will apply anti hemorrhoid cream  nsaids and sitz baths  Consulted Dr. Schuster, will appreciate recs       • Constipation [K59.00]  Yes     Abdominal xray showed mild stool burden  Will provide senna       • Stage 3  severe COPD by GOLD classification (CMS/[facility]) [J44.9]  Yes     Continue home nebs and symbicort     • Essential hypertension [I10]  Yes     Current BP: 132/63   Continue with home dose of losartan  Will hold lasix as patient is volume depleted           DVT prophylaxis: SCDs/TEDs  Code status is   Code Status and Medical Interventions:   Ordered at: 03/15/21 1424     Limited Support to NOT Include:    Intubation     Level Of Support Discussed With:    Patient     Code Status:    No CPR     Medical Interventions (Level of Support Prior to Arrest):    Limited       Plan for disposition:Where: home and When:  1-2days           Madsiyn Escobar M.D. PGY3  [facility]  [address] [place] [address]  Office: [phone]    This document has been electronically signed by Madisyn Escobar MD on March 16, 2021 08:58 CDT

## 2021-03-16 NOTE — CONSULTS
GENERAL SURGERY CONSULT    Referring Provider: Shawn  Reason for Consultation: Hemorrhoids    Patient Care Team:  Coral Berry MD as PCP - General  Michele Rivera MD as Consulting Physician (Cardiology)  Juan C Coffman MD as Consulting Physician (Neurology)  Lora Garcia APRN as Nurse Practitioner (Orthopedic Surgery)  Gay Cornejo MD as Consulting Physician (Pulmonary Disease)    Chief complaint : Perianal swelling and discomfort    Subjective .     History of present illness: 65-year-old lady who presented to the hospital and was admitted by the [facility] practice service yesterday for weakness and falling at home.  She was found apparently to have a urinary tract infection.  She is currently being treated for this but per the primary team does not appear to be back to her normal mental status, though she is oriented to person place and time.    She also notes several weeks of progressive perianal pain and swelling which she has related to a hemorrhoid.  She has been applying topical creams without any change.  She also notes that for the past few weeks she has had intermittent diarrhea and constipation.    Review of Systems    Review of Systems   Gastrointestinal: Positive for constipation and diarrhea.        Anal pain         History  Past Medical History:   Diagnosis Date   • Alkaline phosphatase raised    • Astigmatism    • Bilateral pseudophakia    • Coronary arteriosclerosis    • Depressive disorder    • Edema of lower extremity    • Encounter for general adult medical examination with abnormal findings    • Essential hypertension    • Gastroesophageal reflux disease    • H/O bone density study     DXA BONE DENSITY AXIAL    04/22/2016   • H/O screening mammography     SCREENING MAMMOGRAPHY     04/25/2016   • Hx of screening mammography     x3; declined screening, understands risks and benefits and still declines      07/24/2015   • Influenza vaccine administered     INFLUENZA IMMUN ADMIN OR  PREV RECV'D   x4       04/01/2016   • Insomnia    • Iron deficiency    • Migraine    • Tobacco dependence     continuous     ,   Past Surgical History:   Procedure Laterality Date   • APPENDECTOMY     • AUGMENTATION MAMMAPLASTY     • BREAST IMPLANT SURGERY     • CATARACT EXTRACTION WITH INTRAOCULAR LENS IMPLANT  12/17/2003    Phacoemulsification of a cataract with intraocular lens implant of rigt eye, Nixon model SA 60-AT; serial # [id number];20.5 diopter   • CHOLECYSTECTOMY  08/06/2007    Laparoscopic cholecystectomy. Symptomatic gallstones   • COLONOSCOPY  10/15/2013    declined stool cards and colonscopy   • ENDOSCOPY AND COLONOSCOPY  05/22/2014    Internal & external hemorrhoids found.   • ENDOSCOPY W/ PEG TUBE PLACEMENT  05/22/2014    EGD w/ tube: Normal esopahgus. Gastritis in stomach. Biopsy taken. Normal duodenum. Biopsy taken.   • INJECTION OF MEDICATION  07/24/2013    Inj(s) Tend-Sheath, Ligament, Single   • INJECTION OF MEDICATION  11/17/2014    Kenalog x6   • INJECTION OF MEDICATION  04/01/2016    PNEUMOC VAC/ADMIN/RCVD    • INJECTION OF MEDICATION  12/05/2014    Toradol   • OTHER SURGICAL HISTORY  01/19/2014    Drain/Inject Major Joint   x2   • PAP SMEAR  10/06/2011   • SUBTOTAL HYSTERECTOMY      Partial hyst    • TONSILLECTOMY     • TUBAL ABDOMINAL LIGATION     ,   Family History   Problem Relation Age of Onset   • Cholelithiasis Mother    • Thyroid cancer Sister    • Thyroid disease Sister         Thyroid disorder   • Graves' disease Sister    • Cholelithiasis Maternal Grandmother    • Thyroid cancer Other         Other 2nd degree relative, thyroid   • Diabetes Other    • Hypertension Other    ,   Social History     Tobacco Use   • Smoking status: Former Smoker     Packs/day: 1.00     Years: 39.00     Pack years: 39.00     Quit date: 1/10/2018     Years since quitting: 3.1   • Smokeless tobacco: Never Used   • Tobacco comment: SMOKED FOR 20>PLUS YRS   Substance Use Topics   • Alcohol use: No   • Drug  use: No   ,   Medications Prior to Admission   Medication Sig Dispense Refill Last Dose   • acetaminophen (TYLENOL) 325 MG tablet Take 650 mg by mouth Every 4 (Four) Hours As Needed for Mild Pain .   Past Month at Unknown time   • albuterol sulfate  (90 Base) MCG/ACT inhaler INHALE 2 PUFFS EVERY 4 (FOUR) AS NEEDED  FOR WHEEZING. 8.5 g 11 Past Week at Unknown time   • ferrous sulfate 325 (65 Fe) MG tablet TAKE ONE TABLET BY MOUTH TWICE A DAY WITH MEALS  60 each 5 Past Week at Unknown time   • FLUoxetine (PROzac) 20 MG capsule TAKE ONE CAPSULE BY MOUTH ONCE DAILY FOR DEPRESSION 90 capsule 3 Past Week at Unknown time   • FLUoxetine (PROzac) 40 MG capsule TAKE ONE CAPSULE BY MOUTH ONCE DAILY FOR DEPRESSION 90 capsule 3 Past Week at Unknown time   • folic acid (FOLVITE) 1 MG tablet TAKE ONE TABLET BY MOUTH ONCE DAILY 90 tablet 3 Past Week at Unknown time   • furosemide (LASIX) 20 MG tablet TAKE 1/2 TABLET BY MOUTH DAILY FOR FLUID RETENTION 45 tablet 3 Past Week at Unknown time   • losartan (COZAAR) 100 MG tablet Take 1 tablet by mouth Daily. For heart 90 tablet 3 Past Week at Unknown time   • potassium chloride 10 MEQ CR tablet TAKE ONE TABLET BY MOUTH TWICE A DAY  60 tablet 0 Past Week at Unknown time   • pravastatin (PRAVACHOL) 40 MG tablet Take 1 tablet by mouth Every Night. 90 tablet 3 Past Week at Unknown time   • risperiDONE (risperDAL) 1 MG tablet TAKE ONE TABLET BY MOUTH EACH EVENING AT BEDTIME FOR INSOMNIA 90 tablet 3 Past Week at Unknown time   • Symbicort 160-4.5 MCG/ACT inhaler INHALE TWO (2) PUFFS BY MOUTH TWICE A DAY  10.2 g 5 Past Month at Unknown time   • omeprazole (priLOSEC) 20 MG capsule Take 20 mg by mouth Daily.   More than a month at Unknown time   • ondansetron (ZOFRAN) 4 MG tablet Take 1 tablet by mouth Every 6 (Six) Hours As Needed for Nausea or Vomiting. 30 tablet 3 More than a month at Unknown time   • SM Aspirin Adult Low Strength 81 MG EC tablet TAKE ONE TABLET BY MOUTH ONCE DAILY  30  tablet 3 More than a month at Unknown time   , Scheduled Meds:  aspirin, 81 mg, Oral, Daily  budesonide-formoterol, 2 puff, Inhalation, BID - RT  cefTRIAXone, 1 g, Intravenous, Q24H  ferrous sulfate, 324 mg, Oral, BID With Meals  FLUoxetine, 20 mg, Oral, Daily  FLUoxetine, 40 mg, Oral, Daily  folic acid, 1,000 mcg, Oral, Daily  losartan, 100 mg, Oral, Daily  pantoprazole, 40 mg, Oral, QAM  potassium chloride, 10 mEq, Oral, BID With Meals  pravastatin, 40 mg, Oral, Nightly  risperiDONE, 1 mg, Oral, Nightly  sodium chloride, 10 mL, Intravenous, Q12H    , Continuous Infusions:  sodium chloride, 125 mL/hr, Last Rate: 125 mL/hr (03/16/21 1017)    , PRN Meds:  •  acetaminophen  •  albuterol  •  calcium carbonate  •  ondansetron  •  senna-docusate sodium  •  sodium chloride and Allergies:  Codeine, Penicillins, and Sulfa antibiotics    Objective     Vital Signs   Temp:  [97.2 °F (36.2 °C)-98.6 °F (37 °C)] 98.6 °F (37 °C)  Heart Rate:  [65-97] 86  Resp:  [14-18] 16  BP: (123-164)/(60-72) 146/61    Physical Exam:       General Appearance:    Alert, answers questions apparently appropriately   Head:    Normocephalic, without obvious abnormality, atraumatic   Eyes:            Lids and lashes normal, conjunctivae and sclerae normal, no   icterus, no pallor, corneas clear   Ears:    Ears appear intact with no abnormalities noted   Lungs:     unlabored   Abdomen:     Fungating, firm mass in anal canal   Extremities:   Moves all extremities well   Neurologic:   Cranial nerves 2 - 12 grossly intact, sensation intact       Results Review:  Lab Results (last 72 hours)     Procedure Component Value Units Date/Time    Comprehensive Metabolic Panel [[id number]]  (Abnormal) Collected: 03/16/21 0561    Specimen: Blood Updated: 03/16/21 0654     Glucose 98 mg/dL      BUN 12 mg/dL      Creatinine 0.97 mg/dL      Sodium 136 mmol/L      Potassium 3.6 mmol/L      Chloride 105 mmol/L      CO2 24.0 mmol/L      Calcium 8.1 mg/dL      Total Protein  5.3 g/dL      Albumin 2.90 g/dL      ALT (SGPT) 9 U/L      AST (SGOT) 12 U/L      Alkaline Phosphatase 94 U/L      Total Bilirubin 0.3 mg/dL      eGFR Non African Amer 57 mL/min/1.73      Globulin 2.4 gm/dL      A/G Ratio 1.2 g/dL      BUN/Creatinine Ratio 12.4     Anion Gap 7.0 mmol/L     Narrative:      GFR Normal >60  Chronic Kidney Disease <60  Kidney Failure <15      Urinalysis, Microscopic Only - Urine, Random Void [[id number]]  (Abnormal) Collected: 03/16/21 0522    Specimen: Urine, Random Void Updated: 03/16/21 0648     RBC, UA 3-5 /HPF      WBC, UA 21-30 /HPF      Bacteria, UA 3+ /HPF      Squamous Epithelial Cells, UA 6-12 /HPF      Hyaline Casts, UA None Seen /LPF      Methodology Manual Light Microscopy    Urine Culture - Urine, Urine, Random Void [[id number]] Collected: 03/16/21 0522    Specimen: Urine, Random Void Updated: 03/16/21 0648    CBC & Differential [[id number]]  (Abnormal) Collected: 03/16/21 0555    Specimen: Blood Updated: 03/16/21 0627    Narrative:      The following orders were created for panel order CBC & Differential.  Procedure                               Abnormality         Status                     ---------                               -----------         ------                     CBC Auto Differential[[id number]]        Abnormal            Final result                 Please view results for these tests on the individual orders.    CBC Auto Differential [[id number]]  (Abnormal) Collected: 03/16/21 0555    Specimen: Blood Updated: 03/16/21 0627     WBC 11.00 10*3/mm3      RBC 3.35 10*6/mm3      Hemoglobin 9.8 g/dL      Hematocrit 30.0 %      MCV 89.6 fL      MCH 29.3 pg      MCHC 32.7 g/dL      RDW 14.6 %      RDW-SD 47.7 fl      MPV 10.3 fL      Platelets 210 10*3/mm3      Neutrophil % 86.1 %      Lymphocyte % 7.2 %      Monocyte % 4.3 %      Eosinophil % 1.4 %      Basophil % 0.5 %      Immature Grans % 0.5 %      Neutrophils, Absolute 9.48 10*3/mm3      Lymphocytes, Absolute  0.79 10*3/mm3      Monocytes, Absolute 0.47 10*3/mm3      Eosinophils, Absolute 0.15 10*3/mm3      Basophils, Absolute 0.05 10*3/mm3      Immature Grans, Absolute 0.06 10*3/mm3      nRBC 0.0 /100 WBC     Urinalysis With Culture If Indicated - Urine, Random Void [[id number]]  (Abnormal) Collected: 03/16/21 0522    Specimen: Urine, Random Void Updated: 03/16/21 0556     Color, UA Yellow     Appearance, UA Turbid     pH, UA 7.5     Specific Gravity, UA 1.007     Glucose, UA Negative     Ketones, UA Negative     Bilirubin, UA Negative     Blood, UA Moderate (2+)     Protein, UA Negative     Leuk Esterase, UA Large (3+)     Nitrite, UA Negative     Urobilinogen, UA 1.0 E.U./dL    Troponin [[id number]]  (Abnormal) Collected: 03/15/21 2114    Specimen: Blood Updated: 03/15/21 2158     Troponin T 0.049 ng/mL     Narrative:      Troponin T Reference Range:  <= 0.03 ng/mL-   Negative for AMI  >0.03 ng/mL-     Abnormal for myocardial necrosis.  Clinicians would have to utilize clinical acumen, EKG, Troponin and serial changes to determine if it is an Acute Myocardial Infarction or myocardial injury due to an underlying chronic condition.       Results may be falsely decreased if patient taking Biotin.      Folate [[id number]]  (Normal) Collected: 03/15/21 1125    Specimen: Blood Updated: 03/15/21 2100     Folate 19.90 ng/mL     Narrative:      Results may be falsely increased if patient taking Biotin.      Occult Blood X 1, Stool - Stool, Per Rectum [[id number]]  (Normal) Collected: 03/15/21 1952    Specimen: Stool from Per Rectum Updated: 03/15/21 2023     Fecal Occult Blood Negative    Urinalysis With Culture If Indicated - Urine, Clean Catch [[id number]]  (Abnormal) Collected: 03/15/21 1957    Specimen: Urine, Clean Catch Updated: 03/15/21 2005     Color, UA Yellow     Appearance, UA Turbid     pH, UA 7.5     Specific Gravity, UA 1.010     Glucose, UA Negative     Ketones, UA Negative     Bilirubin, UA Negative     Blood, UA  Large (3+)     Protein, UA Trace     Leuk Esterase, UA Large (3+)     Nitrite, UA Negative     Urobilinogen, UA 1.0 E.U./dL    Urinalysis, Microscopic Only - Urine, Clean Catch [[id number]]  (Abnormal) Collected: 03/15/21 1957    Specimen: Urine, Clean Catch Updated: 03/15/21 2005     RBC, UA 13-20 /HPF      WBC, UA Too Numerous to Count /HPF      Bacteria, UA 2+ /HPF      Squamous Epithelial Cells, UA 3-5 /HPF      Hyaline Casts, UA 13-20 /LPF      Methodology Automated Microscopy    Urine Culture - Urine, Urine, Clean Catch [[id number]] Collected: 03/15/21 1957    Specimen: Urine, Clean Catch Updated: 03/15/21 2005    Vitamin B12 [[id number]]  (Normal) Collected: 03/15/21 1125    Specimen: Blood Updated: 03/15/21 2003     Vitamin B-12 281 pg/mL     Narrative:      Results may be falsely increased if patient taking Biotin.      Troponin [[id number]]  (Abnormal) Collected: 03/15/21 1907    Specimen: Blood Updated: 03/15/21 1946     Troponin T 0.048 ng/mL     Narrative:      Troponin T Reference Range:  <= 0.03 ng/mL-   Negative for AMI  >0.03 ng/mL-     Abnormal for myocardial necrosis.  Clinicians would have to utilize clinical acumen, EKG, Troponin and serial changes to determine if it is an Acute Myocardial Infarction or myocardial injury due to an underlying chronic condition.       Results may be falsely decreased if patient taking Biotin.      Troponin [[id number]]  (Abnormal) Collected: 03/15/21 1735    Specimen: Blood Updated: 03/15/21 1830     Troponin T 0.044 ng/mL     Narrative:      Troponin T Reference Range:  <= 0.03 ng/mL-   Negative for AMI  >0.03 ng/mL-     Abnormal for myocardial necrosis.  Clinicians would have to utilize clinical acumen, EKG, Troponin and serial changes to determine if it is an Acute Myocardial Infarction or myocardial injury due to an underlying chronic condition.       Results may be falsely decreased if patient taking Biotin.      TSH+Free T4 [[id number]]  (Normal) Collected:  03/15/21 1735    Specimen: Blood Updated: 03/15/21 1808     TSH 1.200 uIU/mL      Free T4 1.51 ng/dL      Comment: T4 results may be falsely increased if patient taking Biotin.       Troponin [[id number]]  (Abnormal) Collected: 03/15/21 1125    Specimen: Blood Updated: 03/15/21 1503     Troponin T 0.051 ng/mL     Narrative:      Troponin T Reference Range:  <= 0.03 ng/mL-   Negative for AMI  >0.03 ng/mL-     Abnormal for myocardial necrosis.  Clinicians would have to utilize clinical acumen, EKG, Troponin and serial changes to determine if it is an Acute Myocardial Infarction or myocardial injury due to an underlying chronic condition.       Results may be falsely decreased if patient taking Biotin.      Protime-INR [[id number]]  (Normal) Collected: 03/15/21 1125    Specimen: Blood Updated: 03/15/21 1232     Protime 14.1 Seconds      INR 1.05    Narrative:      Therapeutic range for most indications is 2.0-3.0 INR,  or 2.5-3.5 for mechanical heart valves.    Extra Tubes [[id number]] Collected: 03/15/21 1125    Specimen: Blood Updated: 03/15/21 1230    Narrative:      The following orders were created for panel order Extra Tubes.  Procedure                               Abnormality         Status                     ---------                               -----------         ------                     Lavender Top[[id number]]                                     Final result               Gold Top - SST[[id number]]                                   Final result                 Please view results for these tests on the individual orders.    Lavender Top [[id number]] Collected: 03/15/21 1125    Specimen: Blood Updated: 03/15/21 1230     Extra Tube hold for add-on     Comment: Auto resulted       Gold Top - SST [[id number]] Collected: 03/15/21 1125    Specimen: Blood Updated: 03/15/21 1230     Extra Tube Hold for add-ons.     Comment: Auto resulted.       COVID-19 and FLU A/B PCR - Swab, Nasopharynx [[id number]]  (Normal)  Collected: 03/15/21 1106    Specimen: Swab from Nasopharynx Updated: 03/15/21 1223     COVID19 Not Detected     Influenza A PCR Not Detected     Influenza B PCR Not Detected    Narrative:      Fact sheet for providers: https://www.fda.gov/media/870237/download    Fact sheet for patients: https://www.fda.gov/media/562867/download    Test performed by PCR.    Comprehensive Metabolic Panel [[id number]]  (Abnormal) Collected: 03/15/21 1125    Specimen: Blood Updated: 03/15/21 1158     Glucose 117 mg/dL      BUN 16 mg/dL      Creatinine 1.31 mg/dL      Sodium 133 mmol/L      Potassium 3.5 mmol/L      Comment: Slight hemolysis detected by analyzer. Results may be affected.        Chloride 97 mmol/L      CO2 24.0 mmol/L      Calcium 9.3 mg/dL      Total Protein 6.0 g/dL      Albumin 3.50 g/dL      ALT (SGPT) 10 U/L      AST (SGOT) 18 U/L      Comment: Slight hemolysis detected by analyzer. Results may be affected.        Alkaline Phosphatase 108 U/L      Total Bilirubin 0.4 mg/dL      eGFR Non African Amer 40 mL/min/1.73      Globulin 2.5 gm/dL      A/G Ratio 1.4 g/dL      BUN/Creatinine Ratio 12.2     Anion Gap 12.0 mmol/L     Narrative:      GFR Normal >60  Chronic Kidney Disease <60  Kidney Failure <15      CK [[id number]]  (Abnormal) Collected: 03/15/21 1125    Specimen: Blood Updated: 03/15/21 1157     Creatine Kinase 200 U/L     Magnesium [[id number]]  (Normal) Collected: 03/15/21 1125    Specimen: Blood Updated: 03/15/21 1157     Magnesium 1.8 mg/dL     CBC & Differential [[id number]]  (Abnormal) Collected: 03/15/21 1125    Specimen: Blood Updated: 03/15/21 1143    Narrative:      The following orders were created for panel order CBC & Differential.  Procedure                               Abnormality         Status                     ---------                               -----------         ------                     CBC Auto Differential[[id number]]        Abnormal            Final result                 Please view  results for these tests on the individual orders.    CBC Auto Differential [[id number]]  (Abnormal) Collected: 03/15/21 1125    Specimen: Blood Updated: 03/15/21 1143     WBC 15.11 10*3/mm3      RBC 3.79 10*6/mm3      Hemoglobin 11.1 g/dL      Hematocrit 33.8 %      MCV 89.2 fL      MCH 29.3 pg      MCHC 32.8 g/dL      RDW 14.6 %      RDW-SD 47.1 fl      MPV 10.5 fL      Platelets 230 10*3/mm3      Neutrophil % 91.0 %      Lymphocyte % 3.5 %      Monocyte % 4.6 %      Eosinophil % 0.2 %      Basophil % 0.3 %      Immature Grans % 0.4 %      Neutrophils, Absolute 13.75 10*3/mm3      Lymphocytes, Absolute 0.53 10*3/mm3      Monocytes, Absolute 0.70 10*3/mm3      Eosinophils, Absolute 0.03 10*3/mm3      Basophils, Absolute 0.04 10*3/mm3      Immature Grans, Absolute 0.06 10*3/mm3      nRBC 0.0 /100 WBC         Imaging Results (Last 72 Hours)     Procedure Component Value Units Date/Time    CT Head Without Contrast [[id number]] Collected: 03/15/21 1244     Updated: 03/15/21 1303    Narrative:      PROCEDURE: CT head without contrast.    INDICATION: Mental status change, unknown cause     COMPARISON: 7/12/2018 CT head.    Total DLP 1011.7 mGy-cm.    TECHNIQUE: This exam was performed according to our departmental  dose-optimization program, which includes automated exposure  control, adjustment of the mA and/or kV according to patient size  and/or use of iterative reconstruction technique.  CT head without contrast performed with axial and reconstructed  sagittal and coronal images.    FINDINGS:  Bony calvarium intact.  The frontal, ethmoid, sphenoid and visualized portions of the  maxillary sinuses are clear.  Mastoid air cells are clear.    There are no abnormal extra-axial fluid collections.  No intracranial bleed.  No mass effect or midline shift.  No focal areas of hypodensity or acute ischemic change.  Heterogeneity of the white matter consistent with underlying  chronic microvascular white matter changes. Stable  compared to  older study.    There is mild symmetrical prominence of the sulci, sylvian  fissures and ventricles consistent with diffuse atrophy.      Impression:      No acute intracranial abnormalities.    Mild diffuse atrophy.    [id number]     Electronically signed by:  Sunny Solis MD  3/15/2021 1:02  PM CDT Workstation: [id number]    XR Abdomen 2+ VW with Chest 1 VW [[id number]] Collected: 03/15/21 1132     Updated: 03/15/21 1156    Narrative:      PROCEDURE: Chest x-ray and flat and upright abdominal x-rays with  three views.    INDICATION: Fatigue and diarrhea.    COMPARISON: 7/2/2018 chest x-ray.    FINDINGS:    Upright PA chest:  Heart size normal with normal pulmonary vascularity.  Lungs clear with no acute infiltrate or airspace disease.  Stable small calcified nodule periphery left midlung field.  No pleural effusion.    Flat and upright abdominal x-rays:  No free intraperitoneal air.    Normal abdominal bowel gas pattern with no abnormally distended  small bowel or colon or air-fluid levels.    Small-to-moderate amount of fecal material throughout colon.    No suspicious calcifications or soft tissue densities.      Impression:      No acute disease upright PA chest.    Normal abdominal bowel gas pattern.    Small-to-moderate amount of fecal material throughout colon.    [id number]    Electronically signed by:  Sunny Solis MD  3/15/2021 11:55  AM CDT Workstation: [id number]          I reviewed the patient's new clinical results.  I reviewed the patient's new imaging results and agree with the interpretation.  I reviewed the patient's other test results and agree with the interpretation      Assessment/Plan       Acute metabolic encephalopathy    Essential hypertension    Stage 3 severe COPD by GOLD classification (CMS/HCC)    Weakness generalized    Depression    Anemia of chronic disease    Constipation    Acute cystitis    Rectal cancer (CMS/HCC)      65-year-old lady with a fungating, firm mass in the  anal canal, appears to be consistent with malignancy in this region.    I discussed with the patient as well as with the primary team that the patient will need to have biopsy of this area.  I have a strong suspicion that she has squamous cancer of the anal canal.  Once her mental status has improved we will arrange for biopsy to be performed.  I will continue to follow her.    I discussed the patient's findings and my recommendations with patient and primary care team              This document has been electronically signed by Dimitry Schuster MD on March 16, 2021 10:29 CDT      Dimitry Schuster MD  03/16/21  10:29 CDT

## 2021-03-17 PROBLEM — J81.0 ACUTE PULMONARY EDEMA (HCC): Status: ACTIVE | Noted: 2021-01-01

## 2021-03-17 NOTE — PROGRESS NOTES
FAMILY MEDICINE DAILY PROGRESS NOTE  NAME: Michelle Child  : 1955  MRN: [id number]     LOS: 0 days     PROVIDER OF SERVICE: Madisyn Escobar MD    Chief Complaint: Acute respiratory failure with hypoxia (CMS/[facility])    Subjective:     Interval History:  History taken from: patient  Patient denies chest pain, nausea, vomiting. Patient still having pain by her rectum. Patent desaturated to 81 percent last night, is now resting stable on nasal canula. cxr showed possible pneumonia or edema. Will dc fluids and start on azithromycin in addition to the rocephin for her uti.     Review of Systems:   Review of Systems   Constitutional: Negative for activity change, appetite change, chills, fatigue and fever.   HENT: Negative for drooling, ear discharge, ear pain, facial swelling, hearing loss, mouth sores, rhinorrhea and sinus pain.    Eyes: Negative for pain, redness and itching.   Respiratory: Positive for shortness of breath. Negative for cough, choking, chest tightness and stridor.    Cardiovascular: Negative for chest pain, palpitations and leg swelling.   Gastrointestinal: Negative for abdominal distention, abdominal pain, anal bleeding, blood in stool, constipation, diarrhea and nausea.   Endocrine: Negative for heat intolerance, polydipsia and polyphagia.   Genitourinary: Negative for dysuria, flank pain, frequency and genital sores.        Rectal pain     Musculoskeletal: Negative for back pain, gait problem, joint swelling and myalgias.   Skin: Negative for pallor and rash.   Neurological: Negative for seizures, facial asymmetry, speech difficulty, light-headedness, numbness and headaches.   Hematological: Negative for adenopathy. Does not bruise/bleed easily.   Psychiatric/Behavioral: Negative for confusion, decreased concentration, dysphoric mood and hallucinations. The patient is not nervous/anxious and is not hyperactive.        Objective:     Vital Signs  Temp:  [97.5 °F (36.4 °C)-99.8 °F (37.7 °C)]  99.8 °F (37.7 °C)  Heart Rate:  [] 118  Resp:  [16-23] 23  BP: (130-157)/(60-67) 131/62    Physical Exam  Physical Exam  Constitutional:       Appearance: She is well-developed.   HENT:      Head: Normocephalic and atraumatic.      Right Ear: External ear normal.      Left Ear: External ear normal.      Nose: Nose normal.   Eyes:      Conjunctiva/sclera: Conjunctivae normal.      Pupils: Pupils are equal, round, and reactive to light.   Cardiovascular:      Rate and Rhythm: Normal rate and regular rhythm.      Heart sounds: Normal heart sounds.   Pulmonary:      Breath sounds: Normal breath sounds.      Comments: On 4L NC  Abdominal:      General: Bowel sounds are normal.      Palpations: Abdomen is soft.   Musculoskeletal:         General: Normal range of motion.      Cervical back: Normal range of motion and neck supple.   Skin:     General: Skin is warm and dry.   Neurological:      Mental Status: She is alert and oriented to person, place, and time.   Psychiatric:         Thought Content: Thought content normal.         Judgment: Judgment normal.      Comments: Slowed speech           Medication Review    Current Facility-Administered Medications:   •  acetaminophen (TYLENOL) tablet 650 mg, 650 mg, Oral, Q4H PRN, Madisyn Escobar MD  •  albuterol (PROVENTIL) nebulizer solution 0.083% 2.5 mg/3mL, 2.5 mg, Nebulization, Q4H PRN, Madisyn Escobar MD  •  aspirin EC tablet 81 mg, 81 mg, Oral, Daily, Madisyn Escobar MD, 81 mg at 03/16/21 0913  •  azithromycin (ZITHROMAX) tablet 500 mg, 500 mg, Oral, Daily **FOLLOWED BY** [START ON 3/18/2021] azithromycin (ZITHROMAX) tablet 250 mg, 250 mg, Oral, Daily, Sb Arvizu MD  •  budesonide-formoterol (SYMBICORT) 160-4.5 MCG/ACT inhaler 2 puff, 2 puff, Inhalation, BID - RT, Madisyn Escobar MD, 2 puff at 03/16/21 1907  •  calcium carbonate (TUMS) chewable tablet 500 mg (200 mg elemental), 2 tablet, Oral, BID PRN, Madisyn Escobar MD  •  cefTRIAXone (ROCEPHIN) 1 g/100 mL  0.9% NS (MBP), 1 g, Intravenous, Q24H, Madisyn Escobar MD, 1 g at 03/16/21 0913  •  ferrous sulfate EC tablet 324 mg, 324 mg, Oral, BID With Meals, Madisyn Escobar MD, 324 mg at 03/16/21 1739  •  FLUoxetine (PROzac) capsule 20 mg, 20 mg, Oral, Daily, Madisyn Escobar MD, 20 mg at 03/16/21 0824  •  FLUoxetine (PROzac) capsule 40 mg, 40 mg, Oral, Daily, Madisyn Escobar MD, 40 mg at 03/16/21 0823  •  folic acid (FOLVITE) tablet 1,000 mcg, 1,000 mcg, Oral, Daily, Madisyn Escobar MD, 1,000 mcg at 03/16/21 0824  •  losartan (COZAAR) tablet 100 mg, 100 mg, Oral, Daily, Madisyn Escobar MD, 100 mg at 03/16/21 0823  •  ondansetron (ZOFRAN) tablet 4 mg, 4 mg, Oral, Q6H PRN, Madisyn Escobar MD  •  pantoprazole (PROTONIX) EC tablet 40 mg, 40 mg, Oral, QAM, Madisyn Escobar MD, 40 mg at 03/17/21 0637  •  potassium chloride (MICRO-K) CR capsule 10 mEq, 10 mEq, Oral, BID With Meals, Madisyn Escobar MD, 10 mEq at 03/16/21 1739  •  pravastatin (PRAVACHOL) tablet 40 mg, 40 mg, Oral, Nightly, Madisyn Escobar MD, 40 mg at 03/16/21 2057  •  risperiDONE (risperDAL) tablet 1 mg, 1 mg, Oral, Nightly, Madisyn Escobar MD, 1 mg at 03/16/21 2057  •  sennosides-docusate (PERICOLACE) 8.6-50 MG per tablet 2 tablet, 2 tablet, Oral, BID PRN, Madisyn Escobar MD  •  sodium chloride 0.9 % flush 10 mL, 10 mL, Intravenous, Q12H, Madisyn Escobar MD  •  sodium chloride 0.9 % flush 10 mL, 10 mL, Intravenous, PRN, Madisyn Escobar MD     Diagnostic Data    Lab Results (last 24 hours)     Procedure Component Value Units Date/Time    Legionella Antigen, Urine - Urine, Urine, Clean Catch [[id number]] Collected: 03/17/21 0644    Specimen: Urine, Clean Catch Updated: 03/17/21 0644    S. Pneumo Ag Urine or CSF - Urine, Urine, Clean Catch [[id number]] Collected: 03/17/21 0644    Specimen: Urine, Clean Catch Updated: 03/17/21 0644    Mycoplasma Pneumoniae PCR - Swab, Throat [[id number]] Collected: 03/17/21 0644    Specimen: Swab from Throat Updated: 03/17/21 0644    Blood  Gas, Arterial - [[id number]]  (Abnormal) Collected: 03/17/21 0520    Specimen: Arterial Blood Updated: 03/17/21 0532     Site Arterial Line     Alfonso's Test N/A     pH, Arterial 7.383 pH units      pCO2, Arterial 35.3 mm Hg      pO2, Arterial 76.7 mm Hg      Comment: 84 Value below reference range        HCO3, Arterial 21.0 mmol/L      Base Excess, Arterial -3.6 mmol/L      Comment: 84 Value below reference range        O2 Saturation, Arterial 96.8 %      Barometric Pressure for Blood Gas 746 mmHg      Modality Nasal Cannula     Flow Rate 4.0 lpm      Ventilator Mode NA     Collected by RT     Comment: Meter: [id number]     :  [id number]       Comprehensive Metabolic Panel [[id number]]  (Abnormal) Collected: 03/17/21 0450    Specimen: Blood Updated: 03/17/21 0521     Glucose 117 mg/dL      BUN 10 mg/dL      Creatinine 0.97 mg/dL      Sodium 134 mmol/L      Potassium 3.7 mmol/L      Chloride 104 mmol/L      CO2 21.0 mmol/L      Calcium 8.1 mg/dL      Total Protein 5.3 g/dL      Albumin 3.00 g/dL      ALT (SGPT) 8 U/L      AST (SGOT) 11 U/L      Alkaline Phosphatase 93 U/L      Total Bilirubin 0.3 mg/dL      eGFR Non African Amer 57 mL/min/1.73      Globulin 2.3 gm/dL      A/G Ratio 1.3 g/dL      BUN/Creatinine Ratio 10.3     Anion Gap 9.0 mmol/L     Narrative:      GFR Normal >60  Chronic Kidney Disease <60  Kidney Failure <15      CBC & Differential [[id number]]  (Abnormal) Collected: 03/17/21 0450    Specimen: Blood Updated: 03/17/21 0505    Narrative:      The following orders were created for panel order CBC & Differential.  Procedure                               Abnormality         Status                     ---------                               -----------         ------                     CBC Auto Differential[[id number]]        Abnormal            Final result                 Please view results for these tests on the individual orders.    CBC Auto Differential [[id number]]  (Abnormal) Collected:  03/17/21 0450    Specimen: Blood Updated: 03/17/21 0505     WBC 15.57 10*3/mm3      RBC 3.39 10*6/mm3      Hemoglobin 9.9 g/dL      Hematocrit 30.5 %      MCV 90.0 fL      MCH 29.2 pg      MCHC 32.5 g/dL      RDW 15.1 %      RDW-SD 50.0 fl      MPV 10.1 fL      Platelets 206 10*3/mm3      Neutrophil % 92.2 %      Lymphocyte % 2.8 %      Monocyte % 4.0 %      Eosinophil % 0.4 %      Basophil % 0.2 %      Immature Grans % 0.4 %      Neutrophils, Absolute 14.35 10*3/mm3      Lymphocytes, Absolute 0.44 10*3/mm3      Monocytes, Absolute 0.62 10*3/mm3      Eosinophils, Absolute 0.07 10*3/mm3      Basophils, Absolute 0.03 10*3/mm3      Immature Grans, Absolute 0.06 10*3/mm3      nRBC 0.0 /100 WBC     Urine Culture - Urine, Urine, Clean Catch [[id number]]  (Abnormal)  (Susceptibility) Collected: 03/15/21 1957    Specimen: Urine, Clean Catch Updated: 03/17/21 0245     Urine Culture >100,000 CFU/mL Escherichia coli    Susceptibility      Escherichia coli      ROMA      Ampicillin Resistant      Ampicillin + Sulbactam Susceptible      Cefazolin Susceptible      Cefepime Susceptible      Ceftazidime Susceptible      Ceftriaxone Susceptible      Gentamicin Susceptible      Levofloxacin Resistant      Nitrofurantoin Susceptible      Piperacillin + Tazobactam Susceptible      Tetracycline Intermediate      Trimethoprim + Sulfamethoxazole Susceptible               Linear View                          Imaging Results (Last 24 Hours)     Procedure Component Value Units Date/Time    XR Chest 1 View [[id number]] Collected: 03/17/21 0404     Updated: 03/17/21 0441    Narrative:      EXAM DESCRIPTION:     XR CHEST 1 VW    CLINICAL HISTORY:     crackles, wheezes, R53.1 Weakness E86.0 Dehydration R19.7  Diarrhea, unspecified K64.9 Unspecified hemorrhoids K62.5  Hemorrhage of anus and rectum Z74.09 Other reduced mobility Z78.9  Other specified health status Z74.09 Other reduced mobility     COMPARISON:     07/02/2018    TECHNIQUE:    Single AP  view of the chest    FINDINGS:         Cardiac silhouette is within normal limits. Mild vascular  congestion    Right lower lobe airspace disease consistent with pneumonia.  Prominent interstitial markings throughout the right upper lung  could represent atypical infection or asymmetric interstitial  edema.    There is no acute osseous process visualized.      Impression:        Right lower lobe airspace disease consistent with pneumonia in  the appropriate clinical setting. Follow-up recommended 4-6 weeks  to document resolution. Prominent interstitial markings  throughout the right upper lung could represent atypical  infection or asymmetric interstitial edema      Electronically signed by:  Sam Hung MD  3/17/2021 4:40 AM CDT  Workstation: [id number]          I reviewed the patient's new clinical results.  I reviewed the patient's new imaging results and agree with the interpretation.  I reviewed the patient's other test results and agree with the interpretation    Assessment/Plan:     Active Hospital Problems    Diagnosis    • **Acute respiratory failure with hypoxia (CMS/HCC)      -Patient desatted to 80s on room air  -She has been receiving IV fluids at 125 cc/hour  -Previously satting well in the 90s on room air  -Has underlying history of COPD and HFpEF (LVEF is 66% with pseudo-normal diastolic dysfunction, severe pulmonary hypertension)  -Now on 4 L nasal cannula satting well in the 90s  -We will obtain stat chest x-ray and ABG; right lower lobe pneumonia on azithromcyin   atypicals pending  -Likely from fluid overload. Will give one-time Lasix 40  -Will monitor O2 sat's     • Acute pulmonary edema (CMS/HCC)      -Patient desatted to 80s on room air  -She has been receiving IV fluids at 125 cc/hour  -Previously satting well in the 90s on room air  -Has underlying history of COPD and HFpEF (LVEF is 66% with pseudo-normal diastolic dysfunction, severe pulmonary hypertension)  -Now on 4 L nasal cannula  satting well in the 90s  -We will obtain stat chest x-ray and ABG  -Likely from fluid overload. Will give one-time Lasix 40     • Acute cystitis      Iv rocephin and iv fluids  Urine culture pending       • Rectal cancer (CMS/HCC)      Mariely consulted, appreciate recs       • Weakness generalized      Ck elevated at 200, will provide iv fluids  Urinalysis pending         • Acute metabolic encephalopathy      Ct head negative  Will provide IV fluids  Na 133, will continue to trend  Ck 200, will continue to trend  UA positive for LE, urine culture pending; will start on rocephin  Vitamin b12 normal  Folate normal         • Depression      Continue risperdal and prozac 60     • Anemia of chronic disease      Will continue with iron supplementation       • Constipation      Abdominal xray showed mild stool burden  Will provide senna       • Stage 3 severe COPD by GOLD classification (CMS/[facility])      Continue home nebs and symbicort     • Atypical pneumonia      -Chest x-ray interpretation by radiologist reads atypical infection vs asymmetric interstitial edema  -Will obtain atypicals  -Currently receiving Rocephin.  I will add 5-day course of azithromycin while atypicals are pending   -Follow ABG     • Essential hypertension      Current BP: 132/63   Continue with home dose of losartan  Will hold lasix as patient is volume depleted             DVT prophylaxis: Lovenox  Code Status and Medical Interventions:   Ordered at: 03/15/21 1424     Limited Support to NOT Include:    Intubation     Level Of Support Discussed With:    Patient     Code Status:    No CPR     Medical Interventions (Level of Support Prior to Arrest):    Limited       Plan for disposition:Where: home and When:  today         Madisyn Escobar M.D. PGY3  [facility]  [address] [place] [address]  Office: [phone]    This document has been electronically signed by Madisyn Escobar MD on March 17, 2021  06:45 CDT

## 2021-03-17 NOTE — SIGNIFICANT NOTE
Chest x-ray interpretation by radiologist reads atypical  infection versus asymmetric interstitial edema.  I will obtain atypicals.  Patient is currently receiving Rocephin.  I will add 5-day course of azithromycin. ABG pending.              This document has been electronically signed by Sb Arvizu MD on March 17, 2021 05:32 CDT

## 2021-03-17 NOTE — SIGNIFICANT NOTE
Patient had an episode of O2 desaturation in the 80s on room air while RN was attempting to get vitals. Diffuse wheezes and crackles present throughout lung fields on physical examination. Prior to this episode, patient's O2 sats have been well in the 90s on room. Of note, patient has been receiving IV fluids at 125 cc/hour with good urinary output. She does have underlying history of COPD and HFpEF (LVEF is 66% with pseudo-normal diastolic dysfunction, severe pulmonary hypertension).     Patient is currently on 4 L nasal cannula satting well in the 90s. I will obtain a stat chest x-ray and ABG. We will give her a one-time IV Lasix 40 given that this is likely fluid overload. We will hold her IV fluids.              This document has been electronically signed by Sb Arvizu MD on March 17, 2021 04:27 CDT

## 2021-03-17 NOTE — PROGRESS NOTES
Will defer any plans for biopsy until after hypoxia issues and elevated WBC have resolved.          This document has been electronically signed by Dimitry Schuster MD on March 17, 2021 14:04 CDT

## 2021-03-17 NOTE — PLAN OF CARE
G  Problem: Adult Inpatient Plan of Care  Goal: Plan of Care Review  Recent Flowsheet Documentation  Taken 3/17/2021 1515 by Yolanda Mary, PAMELLA/L  Plan of Care Reviewed With: patient  Outcome Summary: Pt pleasant and agreeable to ADL. Pt incontinent of bowel and urine upon entry and required Max A for perineal hygiene. Pt educated on using call light and making nsg staff aware. UB bathing/dressing-CGA w/ vc's. Sit-stand-CGA. Pt required Max A pericare. Min A for BLE's. Pt CGA to amb to recliner. Once in recliner, pt reported she needed to go to restroom. Pt ambulated w/ CGA and RW to BR and was incontinent of urine during t/f'. Pt Max A to wash Diego feet and don/doff socks @ this time. Pt in recliner upon exit w/ all needs in reach. Nsg made aware pt is incontinent. Cont OT POC.

## 2021-03-17 NOTE — SIGNIFICANT NOTE
03/17/21 1511   OTHER   Discipline physical therapy assistant   Rehab Time/Intention   Session Not Performed patient unavailable for treatment   Checked on pt x 3. Once with IRINA, 2nd gone for testing, 3rd with nursing in room attending.

## 2021-03-17 NOTE — PLAN OF CARE
Goal Outcome Evaluation:     Progress: no change  Outcome Summary: Patient currently resting with no complaints. VSS

## 2021-03-17 NOTE — PLAN OF CARE
Goal Outcome Evaluation:        Outcome Summary: patient resting in chair at this time, VSS, no complaints of pain, incontience issue today, ECHO scheduled, will continue to monitor

## 2021-03-17 NOTE — DISCHARGE PLACEMENT REQUEST
"Michelle Child (65 y.o. Female)     Date of Birth Social Security Number Address Home Phone MRN    1955  [id number] [address] [phone] [id number]    Jain Marital Status          Episcopalian        Admission Date Admission Type Admitting Provider Attending Provider Department, Room/Bed    3/15/21 Emergency Coral Berry MD Nims, Diana Marie, MD [address], [facility]/1    Discharge Date Discharge Disposition Discharge Destination                       Attending Provider: Coral Berry MD    Allergies: Codeine, Penicillins, Sulfa Antibiotics    Isolation: None   Infection: None   Code Status: No CPR    Ht: 162.6 cm (64\")   Wt: 55.2 kg (121 lb 11.2 oz)    Admission Cmt: None   Principal Problem: Acute respiratory failure with hypoxia (CMS/[facility]) [J96.01] More...                 Active Insurance as of 3/15/2021     Primary Coverage     Payor Plan Insurance Group Employer/Plan Group    MEDICARE MEDICARE A & B      Payor Plan Address Payor Plan Phone Number Payor Plan Fax Number Effective Dates    [address] [phone]  8/1/2015 - None Entered    [id number]       Subscriber Name Subscriber Birth Date Member ID       MICHELLE CHILD 1955 [id number]           Secondary Coverage     Payor Plan Insurance Group Employer/Plan Group    [place]      Payor Plan Address Payor Plan Phone Number Payor Plan Fax Number Effective Dates    [address] [phone]  3/1/2017 - None Entered    [place] [address]       Subscriber Name Subscriber Birth Date Member ID       MICHELLE CHILD 1955 [id number]                 Emergency Contacts      (Rel.) Home Phone Work Phone Mobile Phone    Bryon Nelson (Son) [phone] -- [phone]    Remy Angel (Son) [phone] -- [phone]    OmarKatarzyna (Daughter) [phone] -- [phone]            Johnna Velázquez RN [facility] " [facility]  [phone] phone  [phone] fax

## 2021-03-17 NOTE — THERAPY TREATMENT NOTE
Patient Name: Michelle Child  : 1955    MRN: [id number]                              Today's Date: 3/17/2021       Admit Date: 3/15/2021    Visit Dx:     ICD-10-CM ICD-9-CM   1. Weakness generalized  R53.1 780.79   2. Dehydration  E86.0 276.51   3. Diarrhea, unspecified type  R19.7 787.91   4. Hemorrhoids, unspecified hemorrhoid type  K64.9 455.6   5. Rectal bleed  K62.5 569.3   6. Impaired mobility and activities of daily living  Z74.09 V49.89    Z78.9    7. Impaired functional mobility, balance, gait, and endurance  Z74.09 V49.89     Patient Active Problem List   Diagnosis   • Migraine   • Iron deficiency anemia   • Gastroesophageal reflux disease   • Essential hypertension   • Coronary arteriosclerosis   • Bilateral pseudophakia   • Astigmatism   • Hypokalemia   • Atypical pneumonia   • Acute respiratory failure with hypoxia (CMS/HCC)   • Cerebral microvascular disease   • Pulmonary hypertension (CMS/HCC)   • Rheumatic tricuspid valve regurgitation   • Rheumatic mitral stenosis   • Pulmonary hypertension due to left heart valvular disease (CMS/HCC)   • Cor pulmonale, chronic (CMS/HCC)   • Folic acid deficiency   • Stage 3 severe COPD by GOLD classification (CMS/HCC)   • Personal history of tobacco use, presenting hazards to health   • Tobacco abuse, in remission   • Recurrent major depressive disorder, in full remission (CMS/HCC)   • Debility   • Disorientation   • Weakness generalized   • Acute metabolic encephalopathy   • Depression   • Anemia of chronic disease   • Constipation   • Acute cystitis   • Rectal cancer (CMS/HCC)   • Acute pulmonary edema (CMS/HCC)     Past Medical History:   Diagnosis Date   • Alkaline phosphatase raised    • Astigmatism    • Bilateral pseudophakia    • Coronary arteriosclerosis    • Depressive disorder    • Edema of lower extremity    • Encounter for general adult medical examination with abnormal findings    • Essential hypertension    • Gastroesophageal reflux  disease    • H/O bone density study     DXA BONE DENSITY AXIAL    04/22/2016   • H/O screening mammography     SCREENING MAMMOGRAPHY     04/25/2016   • Hx of screening mammography     x3; declined screening, understands risks and benefits and still declines      07/24/2015   • Influenza vaccine administered     INFLUENZA IMMUN ADMIN OR PREV RECV'D   x4       04/01/2016   • Insomnia    • Iron deficiency    • Migraine    • Tobacco dependence     continuous       Past Surgical History:   Procedure Laterality Date   • APPENDECTOMY     • AUGMENTATION MAMMAPLASTY     • BREAST IMPLANT SURGERY     • CATARACT EXTRACTION WITH INTRAOCULAR LENS IMPLANT  12/17/2003    Phacoemulsification of a cataract with intraocular lens implant of rigt eye, Nixon model SA 60-AT; serial # [id number];20.5 diopter   • CHOLECYSTECTOMY  08/06/2007    Laparoscopic cholecystectomy. Symptomatic gallstones   • COLONOSCOPY  10/15/2013    declined stool cards and colonscopy   • ENDOSCOPY AND COLONOSCOPY  05/22/2014    Internal & external hemorrhoids found.   • ENDOSCOPY W/ PEG TUBE PLACEMENT  05/22/2014    EGD w/ tube: Normal esopahgus. Gastritis in stomach. Biopsy taken. Normal duodenum. Biopsy taken.   • INJECTION OF MEDICATION  07/24/2013    Inj(s) Tend-Sheath, Ligament, Single   • INJECTION OF MEDICATION  11/17/2014    Kenalog x6   • INJECTION OF MEDICATION  04/01/2016    PNEUMOC VAC/ADMIN/RCVD    • INJECTION OF MEDICATION  12/05/2014    Toradol   • OTHER SURGICAL HISTORY  01/19/2014    Drain/Inject Major Joint   x2   • PAP SMEAR  10/06/2011   • SUBTOTAL HYSTERECTOMY      Partial hyst    • TONSILLECTOMY     • TUBAL ABDOMINAL LIGATION       General Information     Row Name 03/17/21 1005          OT Time and Intention    Document Type  therapy note (daily note)  -KD     Mode of Treatment  individual therapy;occupational therapy  -KD     Row Name 03/17/21 1005          General Information    Patient Profile Reviewed  yes  -KD     Existing  Precautions/Restrictions  fall  -KD     Row Name 03/17/21 1510          Cognition    Orientation Status (Cognition)  oriented x 4  -KD     Row Name 03/17/21 1510          Safety Issues, Functional Mobility    Impairments Affecting Function (Mobility)  endurance/activity tolerance;strength;cognition  -KD       User Key  (r) = Recorded By, (t) = Taken By, (c) = Cosigned By    Initials Name Provider Type     Yolanda Mary COTA/L Occupational Therapy Assistant          Mobility/ADL's     Row Name 03/17/21 1510          Bed Mobility    Bed Mobility  supine-sit;sit-supine  -KD     Scooting/Bridging [place] (Bed Mobility)  supervision;verbal cues  -     Assistive Device (Bed Mobility)  bed rails;head of bed elevated  -     Row Name 03/17/21 1510          Transfers    Sit-Stand [place] (Transfers)  contact guard  -     [place] Level (Toilet Transfer)  contact guard  -     Assistive Device (Toilet Transfer)  commode;grab bars/safety frame  -     Row Name 03/17/21 1510          Sit-Stand Transfer    Assistive Device (Sit-Stand Transfers)  walker, front-wheeled  -     Row Name 03/17/21 1510          Toilet Transfer    Type (Toilet Transfer)  sit-stand;stand-sit  -     Row Name 03/17/21 1510          Functional Mobility    Functional Mobility- Ind. Level  contact guard assist  -     Functional Mobility- Device  rolling walker  -     Functional Mobility-Distance (Feet)  30 BR x 2  -KD     Row Name 03/17/21 1510          Lower Body Dressing Assessment/Training    [place] Level (Lower Body Dressing)  doff;don;socks;set up;minimum assist (75% patient effort)  -     Position (Lower Body Dressing)  edge of bed sitting  -     Row Name 03/17/21 1510          Grooming Assessment/Training    [place] Level (Grooming)  hair care, combing/brushing;oral care regimen;wash face, hands;standby assist  -     Position (Grooming)  edge of bed sitting  -     Row Name 03/17/21 1510           Bathing Assessment/Intervention    [place] Level (Bathing)  lower body;upper body;set up;standby assist;moderate assist (50% patient effort)  -KD     Assistive Devices (Bathing)  bath mitt  -KD     Position (Bathing)  edge of bed sitting  -KD     Row Name 03/17/21 1510          Toileting Assessment/Training    [place] Level (Toileting)  adjust/manage clothing;perform perineal hygiene;minimum assist (75% patient effort)  -KD     Assistive Devices (Toileting)  commode;grab bar/safety frame  -KD     Position (Toileting)  supported sitting  -KD       User Key  (r) = Recorded By, (t) = Taken By, (c) = Cosigned By    Initials Name Provider Type    Yolanda Mcbride COTA/L Occupational Therapy Assistant        Obj/Interventions    No documentation.       Goals/Plan     Row Name 03/17/21 1005          Bed Mobility Goal 1 (OT)    Activity/Assistive Device (Bed Mobility Goal 1, OT)  sit to supine/supine to sit  -KD     [place] Level/Cues Needed (Bed Mobility Goal 1, OT)  independent  -KD     Time Frame (Bed Mobility Goal 1, OT)  long term goal (LTG);by discharge  -KD     Progress/Outcomes (Bed Mobility Goal 1, OT)  goal not met  -KD     Row Name 03/17/21 1005          Transfer Goal 1 (OT)    Activity/Assistive Device (Transfer Goal 1, OT)  sit-to-stand/stand-to-sit  -KD     [place] Level/Cues Needed (Transfer Goal 1, OT)  standby assist;verbal cues required;tactile cues required  -KD     Time Frame (Transfer Goal 1, OT)  long term goal (LTG);by discharge  -KD     Progress/Outcome (Transfer Goal 1, OT)  goal not met  -KD     Row Name 03/17/21 1005          Bathing Goal 1 (OT)    Activity/Device (Bathing Goal 1, OT)  lower body bathing  -KD     [place] Level/Cues Needed (Bathing Goal 1, OT)  standby assist;verbal cues required;tactile cues required  -KD     Time Frame (Bathing Goal 1, OT)  long term goal (LTG);by discharge  -KD     Progress/Outcomes (Bathing Goal 1, OT)  goal not met  -KD     Row Name  03/17/21 1005          Dressing Goal 1 (OT)    Activity/Device (Dressing Goal 1, OT)  lower body dressing  -KD     [place]/Cues Needed (Dressing Goal 1, OT)  standby assist;verbal cues required;tactile cues required  -KD     Time Frame (Dressing Goal 1, OT)  long term goal (LTG);by discharge  -KD     Progress/Outcome (Dressing Goal 1, OT)  goal not met  -KD     Row Name 03/17/21 1005          Toileting Goal 1 (OT)    Activity/Device (Toileting Goal 1, OT)  toileting skills, all  -KD     [place] Level/Cues Needed (Toileting Goal 1, OT)  standby assist;verbal cues required;tactile cues required  -KD     Time Frame (Toileting Goal 1, OT)  long term goal (LTG);by discharge  -KD     Progress/Outcome (Toileting Goal 1, OT)  goal not met  -KD       User Key  (r) = Recorded By, (t) = Taken By, (c) = Cosigned By    Initials Name Provider Type     Yolanda Mary COTA/L Occupational Therapy Assistant        Clinical Impression     Row Name 03/17/21 4175          Pain Scale: Numbers Pre/Post-Treatment    Pretreatment Pain Rating  0/10 - no pain  -KD     Posttreatment Pain Rating  0/10 - no pain  -KD     Row Name 03/17/21 7681          Plan of Care Review    Plan of Care Reviewed With  patient  -KD     Outcome Summary  Pt pleasant and agreeable to ADL. Pt incontinent of bowel and urine upon entry and required Max A for perineal hygiene. Pt educated on using call light and making nsg staff aware. UB bahting/dressing-CGA w/ vc's. Sit-stand-CGA. Pt required Max A pericare. Min A for BLE's. Pt CGA to amb to recliner. Once in recliner, pt reported she needed to go to restroom. Pt ambulated w/ CGA and RW to BR and was incontiniet of urine during t/f'. Pt Max A to wash [place] feet and don/doff socks @ this time. Pt in recliner upon exit w/ all needs in reach. Nsg made aware pt is incontinent. Cont OT POC.  -KD     Row Name 03/17/21 8375          Therapy Plan Review/Discharge Plan (OT)    Anticipated Discharge Disposition  (OT)  home with home health;skilled nursing facility  -KD     Row Name 03/17/21 1515          Vital Signs    Pre Systolic BP Rehab  131  -KD     Pre Treatment Diastolic BP  62  -KD     Pretreatment Heart Rate (beats/min)  85  -KD     Pre SpO2 (%)  96  -KD     O2 Delivery Pre Treatment  supplemental O2  -KD     Pre Patient Position  Supine  -KD     Intra Patient Position  Standing  -KD     Post Patient Position  Sitting  -KD     Row Name 03/17/21 1515          Positioning and Restraints    Pre-Treatment Position  in bed  -KD     Post Treatment Position  chair  -KD     In Chair  sitting;call light within reach;encouraged to call for assist;exit alarm on  -KD       User Key  (r) = Recorded By, (t) = Taken By, (c) = Cosigned By    Initials Name Provider Type    Yolanda Mcbride COTA/L Occupational Therapy Assistant        Outcome Measures    No documentation.       Occupational Therapy Education                 Title: PT OT SLP Therapies (In Progress)     Topic: Occupational Therapy (Done)     Point: ADL training (Done)     Description:   Instruct learner(s) on proper safety adaptation and remediation techniques during self care or transfers.   Instruct in proper use of assistive devices.              Learning Progress Summary           Patient Acceptance, E,TB, VU by MARTIN at 3/16/2021 1419                   Point: Home exercise program (Done)     Description:   Instruct learner(s) on appropriate technique for monitoring, assisting and/or progressing therapeutic exercises/activities.              Learning Progress Summary           Patient Acceptance, E,TB, VU by MARTIN at 3/16/2021 1419                   Point: Precautions (Done)     Description:   Instruct learner(s) on prescribed precautions during self-care and functional transfers.              Learning Progress Summary           Patient Acceptance, E,TB, VU by MARTIN at 3/16/2021 1419    Acceptance, E, VU by  at 3/16/2021 0841    Comment: Pt educated on role of OT, d/c  recs, and POC                   Point: Body mechanics (Done)     Description:   Instruct learner(s) on proper positioning and spine alignment during self-care, functional mobility activities and/or exercises.              Learning Progress Summary           Patient Acceptance, E,TB, VU by LW at 3/16/2021 1419                               User Key     Initials Effective Dates Name Provider Type Discipline    LW 03/07/18 -  Jaz Garcia COTA/L Occupational Therapy Assistant OT     09/10/19 -  Sundeep East OT Occupational Therapist OT              OT Recommendation and Plan     Plan of Care Review  Plan of Care Reviewed With: patient  Outcome Summary: Pt pleasant and agreeable to ADL. Pt incontinent of bowel and urine upon entry and required Max A for perineal hygiene. Pt educated on using call light and making nsg staff aware. UB bahting/dressing-CGA w/ vc's. Sit-stand-CGA. Pt required Max A pericare. Min A for BLE's. Pt CGA to amb to recliner. Once in recliner, pt reported she needed to go to restroom. Pt ambulated w/ CGA and RW to BR and was incontiniet of urine during t/f'. Pt Max A to wash [place] feet and don/doff socks @ this time. Pt in recliner upon exit w/ all needs in reach. Nsg made aware pt is incontinent. Cont OT POC.     Time Calculation:   Time Calculation- OT     Row Name 03/17/21 1525             Time Calculation- OT    OT Start Time  1005  -KD      OT Stop Time  1106  -KD      OT Time Calculation (min)  61 min  -KD      Total Timed Code Minutes- OT  61 minute(s)  -KD      OT Received On  03/17/21  -KD        User Key  (r) = Recorded By, (t) = Taken By, (c) = Cosigned By    Initials Name Provider Type    KD Yolanda Mary COTA/L Occupational Therapy Assistant        Therapy Charges for Today     Code Description Service Date Service Provider Modifiers Qty    81238584900 HC OT SELF CARE/MGMT/TRAIN EA 15 MIN 3/17/2021 Yolanda Mary COTA/L GO 4               KINGSLEY Christiansen  3/17/2021

## 2021-03-18 PROBLEM — I27.20 PULMONARY HTN (HCC): Status: ACTIVE | Noted: 2021-01-01

## 2021-03-18 NOTE — THERAPY TREATMENT NOTE
Acute Care - Physical Therapy Treatment Note  [facility]     Patient Name: Michelle Child  : 1955  MRN: [id number]  Today's Date: 3/18/2021           PT Assessment (last 12 hours)      PT Evaluation and Treatment     Row Name 21 1330          Physical Therapy Time and Intention    Subjective Information  no complaints  -RW     Document Type  therapy note (daily note)  -RW     Mode of Treatment  physical therapy  -RW     Patient Effort  good  -RW     Row Name 21 1330          General Information    Patient Profile Reviewed  yes  -RW     Existing Precautions/Restrictions  fall  -RW     Row Name 21 1330          Cognition    Orientation Status (Cognition)  oriented x 4  -RW     Row Name 21 1330          Pain Scale: Numbers Pre/Post-Treatment    Pretreatment Pain Rating  0/10 - no pain  -RW     Posttreatment Pain Rating  0/10 - no pain  -RW     Row Name 21 1330          Range of Motion Comprehensive    General Range of Motion  bilateral lower extremity ROM WFL  -RW     Row Name 21 1330          Strength Comprehensive (MMT)    General Manual Muscle Testing (MMT) Assessment  other (see comments)  -RW     Row Name 21 1330          Bed Mobility    Bed Mobility  supine-sit;sit-supine;scooting/bridging  -RW     Scooting/Bridging [place] (Bed Mobility)  --  -RW     Supine-Sit [place] (Bed Mobility)  contact guard  -RW     Sit-Supine [place] (Bed Mobility)  --  -RW     Assistive Device (Bed Mobility)  head of bed elevated;bed rails  -RW     Row Name 21 1330          Transfers    Sit-Stand [place] (Transfers)  contact guard  -RW     [place] Level (Toilet Transfer)  contact guard  -RW     Assistive Device (Toilet Transfer)  walker, front-wheeled  -RW     Row Name 21 1330          Sit-Stand Transfer    Assistive Device (Sit-Stand Transfers)  walker, front-wheeled  -RW     Row Name 21 1330          Gait/Stairs (Locomotion)     [place] Level (Gait)  contact guard  -RW     Assistive Device (Gait)  walker, front-wheeled  -RW     Distance in Feet (Gait)  10,22,54  -RW     Pattern (Gait)  step-through  -RW     Row Name 03/18/21 1330          Safety Issues, Functional Mobility    Impairments Affecting Function (Mobility)  endurance/activity tolerance;strength;balance;cognition  -RW     Row Name 03/18/21 1330          Motor Skills    Therapeutic Exercise  hip;knee;ankle  -RW     Row Name 03/18/21 1330          Hip (Therapeutic Exercise)    Hip (Therapeutic Exercise)  AROM (active range of motion)  -RW     Hip AROM (Therapeutic Exercise)  flexion;extension;10 repetitions;5 repetitions  -RW     Row Name 03/18/21 1330          Knee (Therapeutic Exercise)    Knee (Therapeutic Exercise)  strengthening exercise  -RW     Knee Strengthening (Therapeutic Exercise)  LAQ (long arc quad);10 repetitions;5 repetitions  -RW     Row Name 03/18/21 1330          Ankle (Therapeutic Exercise)    Ankle (Therapeutic Exercise)  AROM (active range of motion)  -RW     Ankle AROM (Therapeutic Exercise)  dorsiflexion;plantarflexion;10 repetitions;5 repetitions  -RW     Row Name 03/18/21 1330          Vital Signs    Pre Systolic BP Rehab  91  -RW     Pre Treatment Diastolic BP  50  -RW     Pretreatment Heart Rate (beats/min)  80  -RW     Pre SpO2 (%)  97  -RW     O2 Delivery Pre Treatment  supplemental O2  -RW     Row Name 03/18/21 1330          Bed Mobility Goal 1 (PT)    Activity/Assistive Device (Bed Mobility Goal 1, PT)  sit to supine/supine to sit  -RW     [place] Level/Cues Needed (Bed Mobility Goal 1, PT)  independent  -RW     Time Frame (Bed Mobility Goal 1, PT)  by discharge  -RW     Strategies/Barriers (Bed Mobility Goal 1, PT)  HOB flat, no bed rails.  -RW     Progress/Outcomes (Bed Mobility Goal 1, PT)  goal not met  -RW     Row Name 03/18/21 1330          Transfer Goal 1 (PT)    Activity/Assistive Device (Transfer Goal 1, PT)   sit-to-stand/stand-to-sit;bed-to-chair/chair-to-bed;walker, rolling  -RW     [place] Level/Cues Needed (Transfer Goal 1, PT)  supervision required  -RW     Time Frame (Transfer Goal 1, PT)  by discharge  -RW     Strategies/Barriers (Transfers Goal 1, PT)  Hx of falls.  -RW     Progress/Outcome (Transfer Goal 1, PT)  goal not met  -RW     Row Name 03/18/21 8671          Gait Training Goal 1 (PT)    Activity/Assistive Device (Gait Training Goal 1, PT)  walker, rolling  -RW     [place] Level (Gait Training Goal 1, PT)  supervision required  -RW     Distance (Gait Training Goal 1, PT)  100'x1.  -RW     Time Frame (Gait Training Goal 1, PT)  by discharge  -RW     Strategies/Barriers (Gait Training Goal 1, PT)  Facilitate increased step length and foot clearance.  -RW     Progress/Outcome (Gait Training Goal 1, PT)  goal not met  -RW     Row Name 03/18/21 1416          ROM Goal 1 (PT)    ROM Goal 1 (PT)  Patient will increase Tinetti fall risk score to 24/28.  -RW     Time Frame (ROM Goal 1, PT)  by discharge  -RW     Strategies/Barriers (ROM Goal 1, PT)  Poor stepping response to perturbation.  -RW     Progress/Outcome (ROM Goal 1, PT)  goal not met  -RW     Row Name 03/18/21 5444          Therapy Assessment/Plan (PT)    Rehab Potential (PT)  good, to achieve stated therapy goals  -RW     Criteria for Skilled Interventions Met (PT)  yes;skilled treatment is necessary  -RW       User Key  (r) = Recorded By, (t) = Taken By, (c) = Cosigned By    Initials Name Provider Type    RW Juancarlos Agee PTA Physical Therapy Assistant        Physical Therapy Education                 Title: PT OT SLP Therapies (In Progress)     Topic: Physical Therapy (In Progress)     Point: Mobility training (Not Started)     Learner Progress:  Not documented in this visit.          Point: Home exercise program (Not Started)     Learner Progress:  Not documented in this visit.          Point: Body mechanics (Not Started)     Learner  Progress:  Not documented in this visit.          Point: Precautions (Done)     Learning Progress Summary           Patient Acceptance, E, VU,NR by  at 3/16/2021 1247    Comment: [place]etti assessed; 20/28 or moderate fall risk; recommend continued use of FWW.  Discussed improving patient's stepping response to perturbation.                               User Key     Initials Effective Dates Name Provider Type Discipline     04/03/18 -  Hadley Ramirez, PT Physical Therapist PT              PT Recommendation and Plan  Anticipated Discharge Disposition (PT): skilled nursing facility, home with 24/7 care  Therapy Frequency (PT): 5 times/wk  Plan of Care Reviewed With: patient  Progress: improving  Outcome Summary: pt in bed wanting to go to bathroom. sup to sit with cga and stands with same. gt x 10',22', 54. fww and cga. therex x 15 reps.nurse assisted pt with bathroom needs.       Time Calculation:   PT Charges     Row Name 03/18/21 1427             Time Calculation    Start Time  1330  -RW      Stop Time  1402  -RW      Time Calculation (min)  32 min  -RW         Time Calculation- PT    Total Timed Code Minutes- PT  32 minute(s)  -RW        User Key  (r) = Recorded By, (t) = Taken By, (c) = Cosigned By    Initials Name Provider Type    RW Juancarlos Agee PTA Physical Therapy Assistant        Therapy Charges for Today     Code Description Service Date Service Provider Modifiers Qty    55326047019 HC GAIT TRAINING EA 15 MIN 3/18/2021 Jauncarlos Agee PTA GP 1    15439392480 HC PT THERAPEUTIC ACT EA 15 MIN 3/18/2021 Juancarlos Agee PTA GP 1          PT G-Codes  Outcome Measure Options: AM-PAC 6 Clicks Basic Mobility (PT), Tinetti  AM-PAC 6 Clicks Score (PT): 20  AM-PAC 6 Clicks Score (OT): 20  [place] Total Score: 20    Juancarlos Agee PTA  3/18/2021

## 2021-03-18 NOTE — PROGRESS NOTES
Patient can follow up with me as outpatient to arrange for biopsy of suspected anal cancer.          This document has been electronically signed by Dimitry Schuster MD on March 18, 2021 12:16 CDT

## 2021-03-18 NOTE — PROGRESS NOTES
FAMILY MEDICINE DAILY PROGRESS NOTE    NAME: Michelle Child  : 1955  MRN: [id number]      LOS: 1 day     PROVIDER OF SERVICE: Madisyn Escobar MD    Chief Complaint: Acute respiratory failure with hypoxia (CMS/HCC)    Subjective:     Interval History:  History taken from: patient  No acute overnight events. Vitals stable. EF of 57 percent. Patient being treated for a uti and pneumonia with rocephin and azithromycin. Atypicals negative, discontinued azithromycin.     Review of Systems:   Review of Systems   Constitutional: Negative for activity change, appetite change, chills, fatigue and fever.   HENT: Negative for drooling, ear discharge, ear pain, facial swelling, hearing loss, mouth sores, rhinorrhea and sinus pain.    Eyes: Negative for pain, redness and itching.   Respiratory: Negative for cough, choking, chest tightness, shortness of breath and stridor.    Cardiovascular: Negative for chest pain, palpitations and leg swelling.   Gastrointestinal: Negative for abdominal distention, abdominal pain, anal bleeding, blood in stool, constipation, diarrhea and nausea.   Endocrine: Negative for heat intolerance, polydipsia and polyphagia.   Genitourinary: Negative for dysuria, flank pain, frequency and genital sores.   Musculoskeletal: Negative for back pain, gait problem, joint swelling and myalgias.   Skin: Negative for pallor and rash.   Neurological: Negative for seizures, facial asymmetry, speech difficulty, light-headedness, numbness and headaches.   Hematological: Negative for adenopathy. Does not bruise/bleed easily.   Psychiatric/Behavioral: Negative for confusion, decreased concentration, dysphoric mood and hallucinations. The patient is not nervous/anxious and is not hyperactive.        Objective:     Vital Signs  Temp:  [96.8 °F (36 °C)-97.5 °F (36.4 °C)] 97.5 °F (36.4 °C)  Heart Rate:  [] 94  Resp:  [16-18] 18  BP: (103-163)/(54-67) 146/66  Flow (L/min):  [2-2.5] 2   Body mass index  is 20.94 kg/m².    Physical Exam  Physical Exam  Constitutional:       Appearance: She is well-developed.   HENT:      Head: Normocephalic and atraumatic.      Right Ear: External ear normal.      Left Ear: External ear normal.      Nose: Nose normal.   Eyes:      Conjunctiva/sclera: Conjunctivae normal.      Pupils: Pupils are equal, round, and reactive to light.   Cardiovascular:      Rate and Rhythm: Normal rate and regular rhythm.      Heart sounds: Normal heart sounds.   Pulmonary:      Effort: Pulmonary effort is normal.      Breath sounds: Normal breath sounds.      Comments: On 2L NC  Abdominal:      General: Bowel sounds are normal.      Palpations: Abdomen is soft.   Musculoskeletal:         General: Normal range of motion.      Cervical back: Normal range of motion and neck supple.   Skin:     General: Skin is warm and dry.      Comments: Rectal fungating mass    Neurological:      Mental Status: She is alert and oriented to person, place, and time.   Psychiatric:         Behavior: Behavior normal.         Thought Content: Thought content normal.         Judgment: Judgment normal.         Scheduled Meds   aspirin, 81 mg, Oral, Daily  budesonide-formoterol, 2 puff, Inhalation, BID - RT  cefTRIAXone, 1 g, Intravenous, Q24H  ferrous sulfate, 324 mg, Oral, BID With Meals  FLUoxetine, 60 mg, Oral, Daily  folic acid, 1,000 mcg, Oral, Daily  losartan, 100 mg, Oral, Daily  pantoprazole, 40 mg, Oral, QAM  potassium chloride, 10 mEq, Oral, BID With Meals  pravastatin, 40 mg, Oral, Nightly  risperiDONE, 1 mg, Oral, Nightly  sodium chloride, 10 mL, Intravenous, Q12H       PRN Meds   •  acetaminophen  •  albuterol  •  calcium carbonate  •  ondansetron  •  senna-docusate sodium  •  sodium chloride      Diagnostic Data    Results from last 7 days   Lab Units 03/17/21  0450   WBC 10*3/mm3 15.57*   HEMOGLOBIN g/dL 9.9*   HEMATOCRIT % 30.5*   PLATELETS 10*3/mm3 206   GLUCOSE mg/dL 117*   CREATININE mg/dL 0.97   BUN mg/dL  10   SODIUM mmol/L 134*   POTASSIUM mmol/L 3.7   AST (SGOT) U/L 11   ALT (SGPT) U/L 8   ALK PHOS U/L 93   BILIRUBIN mg/dL 0.3   ANION GAP mmol/L 9.0       XR Chest 1 View    Result Date: 3/17/2021  Right lower lobe airspace disease consistent with pneumonia in the appropriate clinical setting. Follow-up recommended 4-6 weeks to document resolution. Prominent interstitial markings throughout the right upper lung could represent atypical infection or asymmetric interstitial edema Electronically signed by:  Sam Hung MD  3/17/2021 4:40 AM CDT Workstation: [id number]        I reviewed the patient's new clinical results.  I reviewed the patient's new imaging results and agree with the interpretation.  I reviewed the patient's other test results and agree with the interpretation    Assessment/Plan:     Active Hospital Problems    Diagnosis  POA   • **Acute respiratory failure with hypoxia (CMS/HCC) [J96.01]  Yes     -Patient desatted to 80s on room air  -She has been receiving IV fluids at 125 cc/hour  -Previously satting well in the 90s on room air  -Has underlying history of COPD and HFpEF (LVEF is 66% with pseudo-normal diastolic dysfunction, severe pulmonary hypertension)  -Now on 4 L nasal cannula satting well in the 90s  -We will obtain stat chest x-ray and ABG; right lower lobe pneumonia on azithromcyin   atypicals pending  -Likely from fluid overload. Will give one-time Lasix 40  -Will monitor O2 sat's     • Acute pulmonary edema (CMS/HCC) [J81.0]  Yes     -Patient desatted to 80s on room air  -She has been receiving IV fluids at 125 cc/hour  -Previously satting well in the 90s on room air  -Has underlying history of COPD and HFpEF (LVEF is 66% with pseudo-normal diastolic dysfunction, severe pulmonary hypertension)  -Now on 4 L nasal cannula satting well in the 90s  -We will obtain stat chest x-ray and ABG  -Likely from fluid overload. Will give one-time Lasix 40     • Acute cystitis [N30.00]  Yes     Iv  rocephin and iv fluids  Urine culture pending       • Rectal cancer (CMS/HCC) [C20]  Yes     Schuster consulted, appreciate recs       • Weakness generalized [R53.1]  Yes     Ck elevated at 200, will provide iv fluids  Urinalysis pending         • Acute metabolic encephalopathy [G93.41]  Yes     Ct head negative  Will provide IV fluids  Na 133, will continue to trend  Ck 200, will continue to trend  UA positive for LE, urine culture pending; will start on rocephin  Vitamin b12 normal  Folate normal         • Depression [F32.9]  Yes     Continue risperdal and prozac 60     • Anemia of chronic disease [D63.8]  Yes     Will continue with iron supplementation       • Constipation [K59.00]  Yes     Abdominal xray showed mild stool burden  Will provide senna       • Stage 3 severe COPD by GOLD classification (CMS/[facility]) [J44.9]  Yes     Continue home nebs and symbicort     • Atypical pneumonia [J18.9]  Yes     -Chest x-ray interpretation by radiologist reads atypical infection vs asymmetric interstitial edema  -Will obtain atypicals  -Currently receiving Rocephin.  I will add 5-day course of azithromycin while atypicals are pending   - atypical's negative, will continue with only rocephin      • Essential hypertension [I10]  Yes     Current BP: 132/63   Continue with home dose of losartan  Will hold lasix as patient is volume depleted           DVT prophylaxis: SCDs/TEDs  Code status is   Code Status and Medical Interventions:   Ordered at: 03/15/21 1424     Limited Support to NOT Include:    Intubation     Level Of Support Discussed With:    Patient     Code Status:    No CPR     Medical Interventions (Level of Support Prior to Arrest):    Limited       Plan for disposition:Where: home and When:  2-3days           Madisyn Escobar M.D. PGY3  [facility]  [address] [place], [place] [address]  Office: [phone]    This document has been electronically signed by Madisyn Escobar  MD on March 18, 2021 06:47 CDT

## 2021-03-18 NOTE — NURSING NOTE
Patient oxygen removed. Patient's saturation dropped to 86%. Placed oxygen back on pt. O2 now 94%.

## 2021-03-18 NOTE — THERAPY TREATMENT NOTE
Patient Name: Michelle Child  : 1955    MRN: [id number]                              Today's Date: 3/18/2021       Admit Date: 3/15/2021    Visit Dx:     ICD-10-CM ICD-9-CM   1. Weakness generalized  R53.1 780.79   2. Dehydration  E86.0 276.51   3. Diarrhea, unspecified type  R19.7 787.91   4. Hemorrhoids, unspecified hemorrhoid type  K64.9 455.6   5. Rectal bleed  K62.5 569.3   6. Impaired mobility and activities of daily living  Z74.09 V49.89    Z78.9    7. Impaired functional mobility, balance, gait, and endurance  Z74.09 V49.89     Patient Active Problem List   Diagnosis   • Migraine   • Iron deficiency anemia   • Gastroesophageal reflux disease   • Essential hypertension   • Coronary arteriosclerosis   • Bilateral pseudophakia   • Astigmatism   • Hypokalemia   • Atypical pneumonia   • Acute respiratory failure with hypoxia (CMS/HCC)   • Cerebral microvascular disease   • Pulmonary hypertension (CMS/HCC)   • Rheumatic tricuspid valve regurgitation   • Rheumatic mitral stenosis   • Pulmonary hypertension due to left heart valvular disease (CMS/HCC)   • Cor pulmonale, chronic (CMS/HCC)   • Folic acid deficiency   • Stage 3 severe COPD by GOLD classification (CMS/HCC)   • Personal history of tobacco use, presenting hazards to health   • Tobacco abuse, in remission   • Recurrent major depressive disorder, in full remission (CMS/HCC)   • Debility   • Disorientation   • Weakness generalized   • Acute metabolic encephalopathy   • Depression   • Anemia of chronic disease   • Constipation   • Acute cystitis   • Rectal cancer (CMS/HCC)   • Acute pulmonary edema (CMS/HCC)   • Pulmonary HTN (CMS/HCC)     Past Medical History:   Diagnosis Date   • Alkaline phosphatase raised    • Astigmatism    • Bilateral pseudophakia    • Coronary arteriosclerosis    • Depressive disorder    • Edema of lower extremity    • Encounter for general adult medical examination with abnormal findings    • Essential hypertension    •  Gastroesophageal reflux disease    • H/O bone density study     DXA BONE DENSITY AXIAL    04/22/2016   • H/O screening mammography     SCREENING MAMMOGRAPHY     04/25/2016   • Hx of screening mammography     x3; declined screening, understands risks and benefits and still declines      07/24/2015   • Influenza vaccine administered     INFLUENZA IMMUN ADMIN OR PREV RECV'D   x4       04/01/2016   • Insomnia    • Iron deficiency    • Migraine    • Tobacco dependence     continuous       Past Surgical History:   Procedure Laterality Date   • APPENDECTOMY     • AUGMENTATION MAMMAPLASTY     • BREAST IMPLANT SURGERY     • CATARACT EXTRACTION WITH INTRAOCULAR LENS IMPLANT  12/17/2003    Phacoemulsification of a cataract with intraocular lens implant of rigt eye, Nixon model SA 60-AT; serial # [id number];20.5 diopter   • CHOLECYSTECTOMY  08/06/2007    Laparoscopic cholecystectomy. Symptomatic gallstones   • COLONOSCOPY  10/15/2013    declined stool cards and colonscopy   • ENDOSCOPY AND COLONOSCOPY  05/22/2014    Internal & external hemorrhoids found.   • ENDOSCOPY W/ PEG TUBE PLACEMENT  05/22/2014    EGD w/ tube: Normal esopahgus. Gastritis in stomach. Biopsy taken. Normal duodenum. Biopsy taken.   • INJECTION OF MEDICATION  07/24/2013    Inj(s) Tend-Sheath, Ligament, Single   • INJECTION OF MEDICATION  11/17/2014    Kenalog x6   • INJECTION OF MEDICATION  04/01/2016    PNEUMOC VAC/ADMIN/RCVD    • INJECTION OF MEDICATION  12/05/2014    Toradol   • OTHER SURGICAL HISTORY  01/19/2014    Drain/Inject Major Joint   x2   • PAP SMEAR  10/06/2011   • SUBTOTAL HYSTERECTOMY      Partial hyst    • TONSILLECTOMY     • TUBAL ABDOMINAL LIGATION       General Information     Row Name 03/18/21 1158          OT Time and Intention    Document Type  therapy note (daily note)  -LM     Mode of Treatment  individual therapy;occupational therapy  -LM       User Key  (r) = Recorded By, (t) = Taken By, (c) = Cosigned By    Initials Name Provider  Type    Melyssa Wylie COTA/L Occupational Therapy Assistant          Mobility/ADL's     Row Name 03/18/21 1159          Bed Mobility    Bed Mobility  bed mobility (all) activities;supine-sit;sit-supine;supine-sit-supine  -       User Key  (r) = Recorded By, (t) = Taken By, (c) = Cosigned By    Initials Name Provider Type    Melyssa Wylie COTA/L Occupational Therapy Assistant        Obj/Interventions     Row Name 03/18/21 1159          Shoulder (Therapeutic Exercise)    Shoulder (Therapeutic Exercise)  AROM (active range of motion)  -     Shoulder AROM (Therapeutic Exercise)  bilateral;flexion;extension;aBduction;aDduction;external rotation;internal rotation;horizontal aBduction/aDduction;other (see comments);2 sets;10 repetitions and chest press  -     Row Name 03/18/21 1159          Elbow/Forearm (Therapeutic Exercise)    Elbow/Forearm (Therapeutic Exercise)  AROM (active range of motion)  -     Elbow/Forearm AROM (Therapeutic Exercise)  bilateral;flexion;extension;supination;pronation  -     Row Name 03/18/21 [facility]9          Wrist (Therapeutic Exercise)    Wrist (Therapeutic Exercise)  AROM (active range of motion)  -     Wrist AROM (Therapeutic Exercise)  bilateral;10 repetitions;2 sets  -     Row Name 03/18/21 1159          Hand (Therapeutic Exercise)    Hand (Therapeutic Exercise)  AROM (active range of motion)  -     Hand AROM/AAROM (Therapeutic Exercise)  bilateral;2 sets;10 repetitions  -     Row Name 03/18/21 1159          Balance    Balance Assessment  sitting static balance;sitting dynamic balance;sit to stand dynamic balance;standing static balance;standing dynamic balance  -     Static Sitting Balance  WNL  -     Row Name 03/18/21 1159          Therapeutic Exercise    Therapeutic Exercise  shoulder;elbow/forearm;wrist;hand  -       User Key  (r) = Recorded By, (t) = Taken By, (c) = Cosigned By    Initials Name Provider Type    Melyssa Wylie COTA/DALTON  Occupational Therapy Assistant        Goals/Plan    No documentation.       Clinical Impression     Row Name 03/18/21 1204          Pain Scale: Numbers Pre/Post-Treatment    Pretreatment Pain Rating  0/10 - no pain  -LM     Posttreatment Pain Rating  0/10 - no pain  -LM     Pain Intervention(s)  Medication (See MAR)  -LM     Row Name 03/18/21 1204          Vital Signs    Pre SpO2 (%)  94  -LM     O2 Delivery Pre Treatment  supplemental O2  -LM     Intra SpO2 (%)  86 o2 off 15 min  nursing notified  -LM     O2 Delivery Intra Treatment  room air  -LM     Post SpO2 (%)  94  -LM     O2 Delivery Post Treatment  supplemental O2  -LM     Row Name 03/18/21 1204          Positioning and Restraints    Pre-Treatment Position  in bed  -LM     Post Treatment Position  bed  -LM     In Bed  notified nsg  -LM     In Chair  notified nsg  -LM       User Key  (r) = Recorded By, (t) = Taken By, (c) = Cosigned By    Initials Name Provider Type    Melyssa Wylie, CAN/L Occupational Therapy Assistant        Outcome Measures    No documentation.       Occupational Therapy Education                 Title: PT OT SLP Therapies (In Progress)     Topic: Occupational Therapy (Done)     Point: ADL training (Done)     Description:   Instruct learner(s) on proper safety adaptation and remediation techniques during self care or transfers.   Instruct in proper use of assistive devices.              Learning Progress Summary           Patient Acceptance, E,TB, VU by LW at 3/16/2021 1419                   Point: Home exercise program (Done)     Description:   Instruct learner(s) on appropriate technique for monitoring, assisting and/or progressing therapeutic exercises/activities.              Learning Progress Summary           Patient Acceptance, E,TB, VU by LW at 3/16/2021 1419                   Point: Precautions (Done)     Description:   Instruct learner(s) on prescribed precautions during self-care and functional transfers.               Learning Progress Summary           Patient Acceptance, E,TB, VU by  at 3/16/2021 1419    Acceptance, E, VU by  at 3/16/2021 0841    Comment: Pt educated on role of OT, d/c recs, and POC                   Point: Body mechanics (Done)     Description:   Instruct learner(s) on proper positioning and spine alignment during self-care, functional mobility activities and/or exercises.              Learning Progress Summary           Patient Acceptance, E,TB, VU by  at 3/16/2021 1419                               User Key     Initials Effective Dates Name Provider Type Discipline     03/07/18 -  Jaz Garcia COTA/L Occupational Therapy Assistant OT    DEVYN 09/10/19 -  Sundeep East OT Occupational Therapist OT              OT Recommendation and Plan     Plan of Care Review  Plan of Care Reviewed With: patient  Progress: improving  Outcome Summary: pt in sup  nursing stated just took pt to restroom and she was bleeding during the entire tf  also had soiled bedding x 2 this am due to bleeding  pt stayed sup this treatment with ther ex 2 lb dowel divina  bed mob and ed   attempted to doff 02 per md in room removed to keep check 02 94 removed after 15 min was 86 redonned with immediate recovery to  94   pt non symtomatic with 02 at 86%     Time Calculation:   Time Calculation- OT     Row Name 03/18/21 1155             Time Calculation- OT    OT Start Time  0920  -LM      OT Stop Time  1015  -LM      OT Time Calculation (min)  55 min  -LM      Total Timed Code Minutes- OT  55 minute(s)  -LM      OT Received On  03/18/21  -LM        User Key  (r) = Recorded By, (t) = Taken By, (c) = Cosigned By    Initials Name Provider Type    LM Melyssa Bell COTA/L Occupational Therapy Assistant        Therapy Charges for Today     Code Description Service Date Service Provider Modifiers Qty    60584263404 HC OT THER PROC EA 15 MIN 3/18/2021 Melyssa Bell COTA/L GO 4               KINGSLEY Wong  3/18/2021

## 2021-03-18 NOTE — NURSING NOTE
Dr. Berry informed of rectal bleeding and amount which was an increase from yesterday when I had gotten the patient up to the bathroom, which was  a significant change.  Patient has also become incontent of urine and stool due to the lasix she had been given.

## 2021-03-18 NOTE — PLAN OF CARE
Problem: Adult Inpatient Plan of Care  Goal: Plan of Care Review  Outcome: Ongoing, Progressing  Flowsheets (Taken 3/18/2021 0327)  Progress: no change  Plan of Care Reviewed With: patient  Outcome Summary: Will continue to monitor.  Goal: Patient-Specific Goal (Individualized)  Outcome: Ongoing, Progressing  Goal: Absence of Hospital-Acquired Illness or Injury  Outcome: Ongoing, Progressing  Intervention: Identify and Manage Fall Risk  Recent Flowsheet Documentation  Taken 3/17/2021 2130 by Charlene Holley RN  Safety Promotion/Fall Prevention: safety round/check completed  Taken 3/17/2021 1955 by Charlene Holley RN  Safety Promotion/Fall Prevention: safety round/check completed  Intervention: Prevent Skin Injury  Recent Flowsheet Documentation  Taken 3/17/2021 2130 by Charlene Holley RN  Body Position:   position changed independently   sitting up in bed  Taken 3/17/2021 1955 by Charlene Holley RN  Body Position: supine  Intervention: Prevent and Manage VTE (venous thromboembolism) Risk  Recent Flowsheet Documentation  Taken 3/17/2021 1955 by Charlene Holley RN  VTE Prevention/Management: sequential compression devices on  Goal: Optimal Comfort and Wellbeing  Outcome: Ongoing, Progressing  Intervention: Provide Person-Centered Care  Recent Flowsheet Documentation  Taken 3/17/2021 1955 by Charlene Holley RN  Trust Relationship/Rapport: care explained  Goal: Readiness for Transition of Care  Outcome: Ongoing, Progressing   Goal Outcome Evaluation:  Plan of Care Reviewed With: patient  Progress: no change  Outcome Summary: Will continue to monitor.

## 2021-03-18 NOTE — PLAN OF CARE
Goal Outcome Evaluation:  Plan of Care Reviewed With: patient  Progress: improving  Outcome Summary: pt in bed wanting to go to bathroom. sup to sit with cga and stands with same. gt x 10',22', 54. fww and cga. therex x 15 reps.nurse assisted pt with bathroom needs.

## 2021-03-18 NOTE — PLAN OF CARE
Goal Outcome Evaluation:  Plan of Care Reviewed With: patient  Progress: no change  Outcome Summary: Patient has been pleasant and cooperative. Patient voices no c/o SOB. No c/o pain. No further issues noted.

## 2021-03-18 NOTE — PLAN OF CARE
Goal Outcome Evaluation:  Plan of Care Reviewed With: patient  Progress: improving  Outcome Summary: pt in sup  nursing stated just took pt to restroom and she was bleeding during the entire tf  also had soiled bedding x 2 this am due to bleeding  pt stayed sup this treatment with ther ex 2 lb dowel divina  bed mob and ed   attempted to doff 02 per md in room removed to keep check 02 94 removed after 15 min was 86 redonned with immediate recovery to  94   pt non symtomatic with 02 at 86%

## 2021-03-19 PROBLEM — J81.0 ACUTE PULMONARY EDEMA (HCC): Status: RESOLVED | Noted: 2021-01-01 | Resolved: 2021-01-01

## 2021-03-19 PROBLEM — J96.01 ACUTE RESPIRATORY FAILURE WITH HYPOXIA (HCC): Status: RESOLVED | Noted: 2018-01-10 | Resolved: 2021-01-01

## 2021-03-19 PROBLEM — C21.0 ANAL CANCER (HCC): Status: ACTIVE | Noted: 2021-01-01

## 2021-03-19 PROBLEM — G93.41 ACUTE METABOLIC ENCEPHALOPATHY: Status: RESOLVED | Noted: 2021-01-01 | Resolved: 2021-01-01

## 2021-03-19 NOTE — THERAPY TREATMENT NOTE
Patient Name: Michelle Child  : 1955    MRN: [id number]                              Today's Date: 3/19/2021       Admit Date: 3/15/2021    Visit Dx:     ICD-10-CM ICD-9-CM   1. Weakness generalized  R53.1 780.79   2. Dehydration  E86.0 276.51   3. Diarrhea, unspecified type  R19.7 787.91   4. Hemorrhoids, unspecified hemorrhoid type  K64.9 455.6   5. Rectal bleed  K62.5 569.3   6. Impaired mobility and activities of daily living  Z74.09 V49.89    Z78.9    7. Impaired functional mobility, balance, gait, and endurance  Z74.09 V49.89     Patient Active Problem List   Diagnosis   • Migraine   • Iron deficiency anemia   • Gastroesophageal reflux disease   • Essential hypertension   • Coronary arteriosclerosis   • Bilateral pseudophakia   • Astigmatism   • Hypokalemia   • Atypical pneumonia   • Cerebral microvascular disease   • Pulmonary hypertension (CMS/HCC)   • Rheumatic tricuspid valve regurgitation   • Rheumatic mitral stenosis   • Pulmonary hypertension due to left heart valvular disease (CMS/HCC)   • Cor pulmonale, chronic (CMS/HCC)   • Folic acid deficiency   • Stage 3 severe COPD by GOLD classification (CMS/HCC)   • Personal history of tobacco use, presenting hazards to health   • Tobacco abuse, in remission   • Recurrent major depressive disorder, in full remission (CMS/HCC)   • Debility   • Disorientation   • Weakness generalized   • Depression   • Anemia of chronic disease   • Constipation   • Acute cystitis   • Anal cancer (CMS/HCC)   • Pulmonary HTN (CMS/HCC)     Past Medical History:   Diagnosis Date   • Alkaline phosphatase raised    • Astigmatism    • Bilateral pseudophakia    • Coronary arteriosclerosis    • Depressive disorder    • Edema of lower extremity    • Encounter for general adult medical examination with abnormal findings    • Essential hypertension    • Gastroesophageal reflux disease    • H/O bone density study     DXA BONE DENSITY AXIAL    2016   • H/O screening  mammography     SCREENING MAMMOGRAPHY     04/25/2016   • Hx of screening mammography     x3; declined screening, understands risks and benefits and still declines      07/24/2015   • Influenza vaccine administered     INFLUENZA IMMUN ADMIN OR PREV RECV'D   x4       04/01/2016   • Insomnia    • Iron deficiency    • Migraine    • Tobacco dependence     continuous       Past Surgical History:   Procedure Laterality Date   • APPENDECTOMY     • AUGMENTATION MAMMAPLASTY     • BREAST IMPLANT SURGERY     • CATARACT EXTRACTION WITH INTRAOCULAR LENS IMPLANT  12/17/2003    Phacoemulsification of a cataract with intraocular lens implant of rigt eye, Nixon model SA 60-AT; serial # [id number];20.5 diopter   • CHOLECYSTECTOMY  08/06/2007    Laparoscopic cholecystectomy. Symptomatic gallstones   • COLONOSCOPY  10/15/2013    declined stool cards and colonscopy   • ENDOSCOPY AND COLONOSCOPY  05/22/2014    Internal & external hemorrhoids found.   • ENDOSCOPY W/ PEG TUBE PLACEMENT  05/22/2014    EGD w/ tube: Normal esopahgus. Gastritis in stomach. Biopsy taken. Normal duodenum. Biopsy taken.   • INJECTION OF MEDICATION  07/24/2013    Inj(s) Tend-Sheath, Ligament, Single   • INJECTION OF MEDICATION  11/17/2014    Kenalog x6   • INJECTION OF MEDICATION  04/01/2016    PNEUMOC VAC/ADMIN/RCVD    • INJECTION OF MEDICATION  12/05/2014    Toradol   • OTHER SURGICAL HISTORY  01/19/2014    Drain/Inject Major Joint   x2   • PAP SMEAR  10/06/2011   • SUBTOTAL HYSTERECTOMY      Partial hyst    • TONSILLECTOMY     • TUBAL ABDOMINAL LIGATION       General Information     Row Name 03/19/21 1351          OT Time and Intention    Document Type  therapy note (daily note)  -LM     Mode of Treatment  individual therapy;occupational therapy  -LM       User Key  (r) = Recorded By, (t) = Taken By, (c) = Cosigned By    Initials Name Provider Type    LM Melyssa Bell COTA/L Occupational Therapy Assistant          Mobility/ADL's    No documentation.        Obj/Interventions     Row Name 03/19/21 1352          Shoulder (Therapeutic Exercise)    Shoulder (Therapeutic Exercise)  AROM (active range of motion)  -LM     Shoulder AROM (Therapeutic Exercise)  bilateral arom with Habematolel and shld elevation and also using level 1 tband through most planes 10 reps with rbs and vcs  -LM     Row Name 03/19/21 1352          Elbow/Forearm (Therapeutic Exercise)    Elbow/Forearm (Therapeutic Exercise)  AROM (active range of motion)  -LM     Elbow/Forearm AROM (Therapeutic Exercise)  bilateral  -LM     Row Name 03/19/21 1352          Hand (Therapeutic Exercise)    Hand (Therapeutic Exercise)  AROM (active range of motion)  -LM       User Key  (r) = Recorded By, (t) = Taken By, (c) = Cosigned By    Initials Name Provider Type    LM Melyssa Bell COTA/L Occupational Therapy Assistant        Goals/Plan     Row Name 03/19/21 1401 03/19/21 1353       Bed Mobility Goal 1 (OT)    Activity/Assistive Device (Bed Mobility Goal 1, OT)  bed mobility activities, all;supine to sit  -LM  bed mobility activities, all;supine to sit  -LM    Butler Level/Cues Needed (Bed Mobility Goal 1, OT)  independent  -LM  independent  -LM    Time Frame (Bed Mobility Goal 1, OT)  long term goal (LTG);by discharge  -LM  --    Progress/Outcomes (Bed Mobility Goal 1, OT)  goal not met  -LM  --    Row Name 03/19/21 1401          Transfer Goal 1 (OT)    Activity/Assistive Device (Transfer Goal 1, OT)  sit-to-stand/stand-to-sit  -LM     Butler Level/Cues Needed (Transfer Goal 1, OT)  standby assist;verbal cues required;tactile cues required  -LM     Time Frame (Transfer Goal 1, OT)  long term goal (LTG);by discharge  -LM     Progress/Outcome (Transfer Goal 1, OT)  goal not met  -LM     Row Name 03/19/21 1401          Bathing Goal 1 (OT)    Activity/Device (Bathing Goal 1, OT)  lower body bathing  -LM     [place]ler Level/Cues Needed (Bathing Goal 1, OT)  standby assist;verbal cues required;tactile cues  required  -LM     Time Frame (Bathing Goal 1, OT)  long term goal (LTG);by discharge  -LM     Progress/Outcomes (Bathing Goal 1, OT)  goal not met  -LM     Row Name 03/19/21 1401          Dressing Goal 1 (OT)    Activity/Device (Dressing Goal 1, OT)  lower body dressing  -LM     Garvin/Cues Needed (Dressing Goal 1, OT)  standby assist;verbal cues required;tactile cues required  -LM     Time Frame (Dressing Goal 1, OT)  long term goal (LTG);by discharge  -LM     Progress/Outcome (Dressing Goal 1, OT)  goal not met  -LM     Row Name 03/19/21 1401          Toileting Goal 1 (OT)    Activity/Device (Toileting Goal 1, OT)  toileting skills, all  -LM     Garvin Level/Cues Needed (Toileting Goal 1, OT)  standby assist;verbal cues required;tactile cues required  -LM     Time Frame (Toileting Goal 1, OT)  long term goal (LTG);by discharge  -LM     Progress/Outcome (Toileting Goal 1, OT)  goal not met  -LM       User Key  (r) = Recorded By, (t) = Taken By, (c) = Cosigned By    Initials Name Provider Type    Melyssa Wylie, PAMELLA/L Occupational Therapy Assistant        Clinical Impression    No documentation.       Outcome Measures    No documentation.       Occupational Therapy Education                 Title: PT OT SLP Therapies (Resolved)     Topic: Occupational Therapy (Resolved)     Point: ADL training (Resolved)     Description:   Instruct learner(s) on proper safety adaptation and remediation techniques during self care or transfers.   Instruct in proper use of assistive devices.              Learning Progress Summary           Patient Acceptance, E,TB, VU by LW at 3/16/2021 1419                   Point: Home exercise program (Resolved)     Description:   Instruct learner(s) on appropriate technique for monitoring, assisting and/or progressing therapeutic exercises/activities.              Learning Progress Summary           Patient Acceptance, E,TB, VU by LW at 3/16/2021 1419                   Point:  Precautions (Resolved)     Description:   Instruct learner(s) on prescribed precautions during self-care and functional transfers.              Learning Progress Summary           Patient Acceptance, E,TB, VU by  at 3/16/2021 1419    Acceptance, E, VU by  at 3/16/2021 0841    Comment: Pt educated on role of OT, d/c recs, and POC                   Point: Body mechanics (Resolved)     Description:   Instruct learner(s) on proper positioning and spine alignment during self-care, functional mobility activities and/or exercises.              Learning Progress Summary           Patient Acceptance, E,TB, VU by  at 3/16/2021 1419                               User Key     Initials Effective Dates Name Provider Type Discipline     03/07/18 -  Jaz Garcia COTA/L Occupational Therapy Assistant OT    DEVYN 09/10/19 -  Sundeep East OT Occupational Therapist OT              OT Recommendation and Plan     Plan of Care Review  Plan of Care Reviewed With: patient  Progress: improving  Outcome Summary: pt in sup  nursing stated just took pt to restroom and she was bleeding during the entire tf  also had soiled bedding x 2 this am due to bleeding  pt stayed sup this treatment with ther ex 2 lb dowel divina  bed mob and ed   attempted to doff 02 per md in room removed to keep check 02 94 removed after 15 min was 86 redonned with immediate recovery to  94   pt non symtomatic with 02 at 86%     Time Calculation:   Time Calculation- OT     Row Name 03/19/21 1343             Time Calculation- OT    OT Start Time  1057  -LM      OT Stop Time  1120  -LM      OT Time Calculation (min)  23 min  -LM      Total Timed Code Minutes- OT  23 minute(s)  -LM        User Key  (r) = Recorded By, (t) = Taken By, (c) = Cosigned By    Initials Name Provider Type    LM Melyssa Bell COTA/L Occupational Therapy Assistant        Therapy Charges for Today     Code Description Service Date Service Provider Modifiers Qty    29260009069 HC OT THER  PROC EA 15 MIN 3/18/2021 Melyssa Bell CAN/L GO 4    19024667635 HC OT THERAPEUTIC ACT EA 15 MIN 3/19/2021 Melyssa Bell COTA/L GO 1    38070178143 HC OT THER PROC EA 15 MIN 3/19/2021 Melyssa Bell, CAN/L GO 1               PAMELLA Wong/DALTON  3/19/2021

## 2021-03-19 NOTE — PLAN OF CARE
Goal Outcome Evaluation:  Plan of Care Reviewed With: patient  Progress: improving  Outcome Summary: VSS. Pt answers questions tonight. Is slow to respond though speech is clear. Ambulates up to br with one person assist and walker. SpO2 levels at 88 to 89% on room air after ambulating up to br and back to bed this evening. Pt has rectal mas/hemorrhoid that is bleeding. MD aware. Denied c/o pain unless she sits on it. Denied need for pain medications.

## 2021-03-19 NOTE — DISCHARGE SUMMARY
DISCHARGE SUMMARY    PATIENT NAME: Michelle Child       PHYSICIAN: Madisyn Escobar MD  : [id number]  MRN: [id number]    ADMITTED: 3/15/2021     DISCHARGED: 21      ADMISSION DIAGNOSES:  Active Hospital Problems    Diagnosis  POA   • Pulmonary HTN (CMS/HCC) [I27.20]  Yes   • Acute cystitis [N30.00]  Yes   • Mass of anus [K62.89]  Yes   • Weakness generalized [R53.1]  Yes   • Depression [F32.9]  Yes   • Anemia of chronic disease [D63.8]  Yes   • Constipation [K59.00]  Yes   • Stage 3 severe COPD by GOLD classification (CMS/HCC) [J44.9]  Yes   • Atypical pneumonia [J18.9]  Yes   • Essential hypertension [I10]  Yes      Resolved Hospital Problems    Diagnosis Date Resolved POA   • **Acute respiratory failure with hypoxia (CMS/HCC) [J96.01] 2021 Yes   • Acute pulmonary edema (CMS/HCC) [J81.0] 2021 Yes   • Acute metabolic encephalopathy [G93.41] 2021 Yes   • Hemorrhoids [K64.9] 2021 Yes     DISCHARGE DIAGNOSES:   Active Hospital Problems    Diagnosis  POA   • Pulmonary HTN (CMS/HCC) [I27.20]  Yes   • Acute cystitis [N30.00]  Yes   • Mass of anus [K62.89]  Yes   • Weakness generalized [R53.1]  Yes   • Depression [F32.9]  Yes   • Anemia of chronic disease [D63.8]  Yes   • Constipation [K59.00]  Yes   • Stage 3 severe COPD by GOLD classification (CMS/HCC) [J44.9]  Yes   • Atypical pneumonia [J18.9]  Yes   • Essential hypertension [I10]  Yes      Resolved Hospital Problems    Diagnosis Date Resolved POA   • **Acute respiratory failure with hypoxia (CMS/HCC) [J96.01] 2021 Yes   • Acute pulmonary edema (CMS/HCC) [J81.0] 2021 Yes   • Acute metabolic encephalopathy [G93.41] 2021 Yes   • Hemorrhoids [K64.9] 2021 Yes       SERVICE: Family Medicine Residency  Attending: Vance Camara MD  Resident: Madisyn Escobar MD    CONSULTS:   Consult Orders (all) (From admission, onward)     Start     Ordered    21 1342  Inpatient Case Management  Consult  Once      Provider:  (Not yet assigned)    03/17/21 1342    03/16/21 0858  Inpatient General Surgery Consult  Once     Specialty:  General Surgery  Provider:  Dimitry Schuster MD    03/16/21 0858    03/15/21 1348  Family Practice - Resident (on-call MD unless specified)  Once     Specialty:  Family Medicine  Provider:  Madisyn Escobar MD    03/15/21 1347                PROCEDURES:   n/a    HISTORY OF PRESENT ILLNESS: H&P retrieved from Dr. Escobar on 3/15/21  Michelle Child is a 65 y.o. female with a CMH of copd, htn, depression, iron deficiency who presents complaining of weakness. Patient for the past few days has fallen three times. Denies hitting her head or loss of consciousness. Prior to the fall, she had no symptoms. Furthermore, Patient states over the past two weeks she has had a mix of diarrhea and constipation. She has not been eating as much as she use to. She has lost some weight. She complains of hemorrhoids that erupted a few days ago that have been bleeding and hurting. She has been applying a cream that helps. She has forgotten to take her medications for the past few days.     DIAGNOSTIC DATA:   Lab Results (last 72 hours)     Procedure Component Value Units Date/Time    Comprehensive Metabolic Panel [[id number]]  (Abnormal) Collected: 03/19/21 0619    Specimen: Blood Updated: 03/19/21 0719     Glucose 97 mg/dL      BUN 13 mg/dL      Creatinine 0.89 mg/dL      Sodium 133 mmol/L      Potassium 3.6 mmol/L      Chloride 100 mmol/L      CO2 25.0 mmol/L      Calcium 8.3 mg/dL      Total Protein 5.1 g/dL      Albumin 2.70 g/dL      ALT (SGPT) 9 U/L      AST (SGOT) 19 U/L      Alkaline Phosphatase 85 U/L      Total Bilirubin 0.4 mg/dL      eGFR Non African Amer 63 mL/min/1.73      Globulin 2.4 gm/dL      A/G Ratio 1.1 g/dL      BUN/Creatinine Ratio 14.6     Anion Gap 8.0 mmol/L     Narrative:      GFR Normal >60  Chronic Kidney Disease <60  Kidney Failure <15      CBC & Differential [[id number]]   (Abnormal) Collected: 03/19/21 0619    Specimen: Blood Updated: 03/19/21 0654    Narrative:      The following orders were created for panel order CBC & Differential.  Procedure                               Abnormality         Status                     ---------                               -----------         ------                     CBC Auto Differential[[id number]]        Abnormal            Final result                 Please view results for these tests on the individual orders.    CBC Auto Differential [[id number]]  (Abnormal) Collected: 03/19/21 0619    Specimen: Blood Updated: 03/19/21 0654     WBC 10.88 10*3/mm3      RBC 3.13 10*6/mm3      Hemoglobin 9.2 g/dL      Hematocrit 27.3 %      MCV 87.2 fL      MCH 29.4 pg      MCHC 33.7 g/dL      RDW 14.5 %      RDW-SD 46.5 fl      MPV 10.3 fL      Platelets 177 10*3/mm3      Neutrophil % 84.5 %      Lymphocyte % 9.0 %      Monocyte % 4.8 %      Eosinophil % 1.0 %      Basophil % 0.4 %      Immature Grans % 0.3 %      Neutrophils, Absolute 9.20 10*3/mm3      Lymphocytes, Absolute 0.98 10*3/mm3      Monocytes, Absolute 0.52 10*3/mm3      Eosinophils, Absolute 0.11 10*3/mm3      Basophils, Absolute 0.04 10*3/mm3      Immature Grans, Absolute 0.03 10*3/mm3      nRBC 0.0 /100 WBC     COVID-19, [facility] IN-HOUSE, NP SWAB IN TRANSPORT MEDIA 8-10 HR TAT - Swab, Nasopharynx [[id number]]  (Normal) Collected: 03/18/21 1749    Specimen: Swab from Nasopharynx Updated: 03/18/21 2350     COVID19 Not Detected    Narrative:      Testing performed by Real Time RT-PCR  This test has not been approved by the [facility] but is authorized under the Emergency Use Act (EUA)    Fact sheet for providers: https://www.fda.gov/media/511830/download    Fact sheet for patients: https://www.fda.gov/media/661572/download        Comprehensive Metabolic Panel [[id number]]  (Abnormal) Collected: 03/18/21 0622    Specimen: Blood Updated: 03/18/21 0744     Glucose 90 mg/dL      BUN 13 mg/dL      Creatinine  0.97 mg/dL      Sodium 134 mmol/L      Potassium 3.7 mmol/L      Comment: Slight hemolysis detected by analyzer. Results may be affected.        Chloride 101 mmol/L      CO2 25.0 mmol/L      Calcium 8.4 mg/dL      Total Protein 5.1 g/dL      Albumin 2.50 g/dL      ALT (SGPT) 8 U/L      AST (SGOT) 14 U/L      Alkaline Phosphatase 82 U/L      Total Bilirubin 0.4 mg/dL      eGFR Non African Amer 57 mL/min/1.73      Globulin 2.6 gm/dL      A/G Ratio 1.0 g/dL      BUN/Creatinine Ratio 13.4     Anion Gap 8.0 mmol/L     Narrative:      GFR Normal >60  Chronic Kidney Disease <60  Kidney Failure <15      CBC & Differential [[id number]]  (Abnormal) Collected: 03/18/21 0622    Specimen: Blood Updated: 03/18/21 0708    Narrative:      The following orders were created for panel order CBC & Differential.  Procedure                               Abnormality         Status                     ---------                               -----------         ------                     CBC Auto Differential[[id number]]        Abnormal            Final result                 Please view results for these tests on the individual orders.    CBC Auto Differential [[id number]]  (Abnormal) Collected: 03/18/21 0622    Specimen: Blood Updated: 03/18/21 0708     WBC 12.98 10*3/mm3      RBC 3.08 10*6/mm3      Hemoglobin 9.1 g/dL      Hematocrit 27.7 %      MCV 89.9 fL      MCH 29.5 pg      MCHC 32.9 g/dL      RDW 14.6 %      RDW-SD 47.8 fl      MPV 10.8 fL      Platelets 169 10*3/mm3      Neutrophil % 85.9 %      Lymphocyte % 7.2 %      Monocyte % 5.9 %      Eosinophil % 0.2 %      Basophil % 0.4 %      Immature Grans % 0.4 %      Neutrophils, Absolute 11.16 10*3/mm3      Lymphocytes, Absolute 0.93 10*3/mm3      Monocytes, Absolute 0.76 10*3/mm3      Eosinophils, Absolute 0.03 10*3/mm3      Basophils, Absolute 0.05 10*3/mm3      Immature Grans, Absolute 0.05 10*3/mm3      nRBC 0.0 /100 WBC     Urine Culture - Urine, Urine, Random Void [[id number]]   (Abnormal)  (Susceptibility) Collected: 03/16/21 0522    Specimen: Urine, Random Void Updated: 03/18/21 0148     Urine Culture >100,000 CFU/mL Escherichia coli    Susceptibility      Escherichia coli      ROMA      Ampicillin Resistant      Ampicillin + Sulbactam Susceptible      Cefazolin Susceptible      Cefepime Susceptible      Ceftazidime Susceptible      Ceftriaxone Susceptible      Gentamicin Susceptible      Levofloxacin Resistant      Nitrofurantoin Susceptible      Piperacillin + Tazobactam Susceptible      Tetracycline Resistant      Trimethoprim + Sulfamethoxazole Susceptible               Linear View                   Mycoplasma Pneumoniae PCR - Swab, Throat [[id number]] Collected: 03/17/21 0644    Specimen: Swab from Throat Updated: 03/17/21 0831     MYCOPLASMAE PNEUMONIAE BY PCR Negative    Narrative:      This test is performed by utilizing real time polymerace chain recation (PCR).     S. Pneumo Ag Urine or CSF - Urine, Urine, Clean Catch [[id number]]  (Normal) Collected: 03/17/21 0644    Specimen: Urine, Clean Catch Updated: 03/17/21 0701     Strep Pneumo Ag Negative    Legionella Antigen, Urine - Urine, Urine, Clean Catch [[id number]]  (Normal) Collected: 03/17/21 0644    Specimen: Urine, Clean Catch Updated: 03/17/21 0701     LEGIONELLA ANTIGEN, URINE Negative    Blood Gas, Arterial - [[id number]]  (Abnormal) Collected: 03/17/21 0520    Specimen: Arterial Blood Updated: 03/17/21 0532     Site Arterial Line     Alfonso's Test N/A     pH, Arterial 7.383 pH units      pCO2, Arterial 35.3 mm Hg      pO2, Arterial 76.7 mm Hg      Comment: 84 Value below reference range        HCO3, Arterial 21.0 mmol/L      Base Excess, Arterial -3.6 mmol/L      Comment: 84 Value below reference range        O2 Saturation, Arterial 96.8 %      Barometric Pressure for Blood Gas 746 mmHg      Modality Nasal Cannula     Flow Rate 4.0 lpm      Ventilator Mode NA     Collected by RT     Comment: Meter: [id number]      :  [id number]       Comprehensive Metabolic Panel [[id number]]  (Abnormal) Collected: 03/17/21 0450    Specimen: Blood Updated: 03/17/21 0521     Glucose 117 mg/dL      BUN 10 mg/dL      Creatinine 0.97 mg/dL      Sodium 134 mmol/L      Potassium 3.7 mmol/L      Chloride 104 mmol/L      CO2 21.0 mmol/L      Calcium 8.1 mg/dL      Total Protein 5.3 g/dL      Albumin 3.00 g/dL      ALT (SGPT) 8 U/L      AST (SGOT) 11 U/L      Alkaline Phosphatase 93 U/L      Total Bilirubin 0.3 mg/dL      eGFR Non African Amer 57 mL/min/1.73      Globulin 2.3 gm/dL      A/G Ratio 1.3 g/dL      BUN/Creatinine Ratio 10.3     Anion Gap 9.0 mmol/L     Narrative:      GFR Normal >60  Chronic Kidney Disease <60  Kidney Failure <15      CBC & Differential [[id number]]  (Abnormal) Collected: 03/17/21 0450    Specimen: Blood Updated: 03/17/21 0505    Narrative:      The following orders were created for panel order CBC & Differential.  Procedure                               Abnormality         Status                     ---------                               -----------         ------                     CBC Auto Differential[[id number]]        Abnormal            Final result                 Please view results for these tests on the individual orders.    CBC Auto Differential [[id number]]  (Abnormal) Collected: 03/17/21 0450    Specimen: Blood Updated: 03/17/21 0505     WBC 15.57 10*3/mm3      RBC 3.39 10*6/mm3      Hemoglobin 9.9 g/dL      Hematocrit 30.5 %      MCV 90.0 fL      MCH 29.2 pg      MCHC 32.5 g/dL      RDW 15.1 %      RDW-SD 50.0 fl      MPV 10.1 fL      Platelets 206 10*3/mm3      Neutrophil % 92.2 %      Lymphocyte % 2.8 %      Monocyte % 4.0 %      Eosinophil % 0.4 %      Basophil % 0.2 %      Immature Grans % 0.4 %      Neutrophils, Absolute 14.35 10*3/mm3      Lymphocytes, Absolute 0.44 10*3/mm3      Monocytes, Absolute 0.62 10*3/mm3      Eosinophils, Absolute 0.07 10*3/mm3      Basophils, Absolute 0.03 10*3/mm3       Immature Grans, Absolute 0.06 10*3/mm3      nRBC 0.0 /100 WBC     Urine Culture - Urine, Urine, Clean Catch [[id number]]  (Abnormal)  (Susceptibility) Collected: 03/15/21 1957    Specimen: Urine, Clean Catch Updated: 03/17/21 0245     Urine Culture >100,000 CFU/mL Escherichia coli    Susceptibility      Escherichia coli      RMOA      Ampicillin Resistant      Ampicillin + Sulbactam Susceptible      Cefazolin Susceptible      Cefepime Susceptible      Ceftazidime Susceptible      Ceftriaxone Susceptible      Gentamicin Susceptible      Levofloxacin Resistant      Nitrofurantoin Susceptible      Piperacillin + Tazobactam Susceptible      Tetracycline Intermediate      Trimethoprim + Sulfamethoxazole Susceptible               Linear View                          HOSPITAL COURSE:  Patient admitted for acute metabolic encephalopathy secondary to acute cystitis and pneumonia. Patient received four days of IV antibiotics, patient will go home with 6 more days of oral antibiotics to complete a course of ten days. Her acute encephalopathy has resolved. Patient had a desaturation during admission, thus cxr and echo were ordered. CXR confirmed pneumonia. Echo showed she has severe pulmonary hypertension which was also present on her echo in 2019. Patients acute respiratory failure resolved with antibiotics and patient was on room air on day of discharge. Patient was complaining of anal pain during admission, thus general surgery was consulted. Dr. Schuster believes the patient may have anal cancer due to her hx of weight loss and mass protruding from her anus for the past few weeks. He will perform a biopsy outpatient in one week. Patient received her first covid vaccine on day of discharge. She had been working well with PT/OT and was recommended SNF placement for strengthening. Patient will be discharged to [facility].       Patient was continued on her home medications and tolerated them well.     DISCHARGE  CONDITION:   Stable    DISPOSITION:  Skilled Nursing Facility (DC - External)    DISCHARGE MEDICATIONS     Discharge Medications      New Medications      Instructions Start Date   cefdinir 300 MG capsule  Commonly known as: OMNICEF   300 mg, Oral, 2 Times Daily         Continue These Medications      Instructions Start Date   acetaminophen 325 MG tablet  Commonly known as: TYLENOL   650 mg, Oral, Every 4 Hours PRN      albuterol sulfate  (90 Base) MCG/ACT inhaler  Commonly known as: PROVENTIL HFA;VENTOLIN HFA;PROAIR HFA   INHALE 2 PUFFS EVERY 4 (FOUR) AS NEEDED  FOR WHEEZING.      ferrous sulfate 325 (65 Fe) MG tablet   TAKE ONE TABLET BY MOUTH TWICE A DAY WITH MEALS       FLUoxetine 40 MG capsule  Commonly known as: PROzac   TAKE ONE CAPSULE BY MOUTH ONCE DAILY FOR DEPRESSION      FLUoxetine 20 MG capsule  Commonly known as: PROzac   TAKE ONE CAPSULE BY MOUTH ONCE DAILY FOR DEPRESSION      folic acid 1 MG tablet  Commonly known as: FOLVITE   TAKE ONE TABLET BY MOUTH ONCE DAILY      furosemide 20 MG tablet  Commonly known as: LASIX   TAKE 1/2 TABLET BY MOUTH DAILY FOR FLUID RETENTION      losartan 100 MG tablet  Commonly known as: COZAAR   100 mg, Oral, Daily, For heart      omeprazole 20 MG capsule  Commonly known as: priLOSEC   20 mg, Oral, Daily      ondansetron 4 MG tablet  Commonly known as: ZOFRAN   4 mg, Oral, Every 6 Hours PRN      potassium chloride 10 MEQ CR tablet   TAKE ONE TABLET BY MOUTH TWICE A DAY       pravastatin 40 MG tablet  Commonly known as: PRAVACHOL   40 mg, Oral, Nightly      risperiDONE 1 MG tablet  Commonly known as: risperDAL   TAKE ONE TABLET BY MOUTH EACH EVENING AT BEDTIME FOR INSOMNIA      SM Aspirin Adult Low Strength 81 MG EC tablet  Generic drug: aspirin   TAKE ONE TABLET BY MOUTH ONCE DAILY       Symbicort 160-4.5 MCG/ACT inhaler  Generic drug: budesonide-formoterol   INHALE TWO (2) PUFFS BY MOUTH TWICE A DAY              INSTRUCTIONS:  Activity:   Activity Instructions      Activity as Tolerated          Diet:   Diet Instructions     Diet: Regular      Discharge Diet: Regular          FOLLOW UP:   Additional Instructions for the Follow-ups that You Need to Schedule     Discharge Follow-up with PCP   As directed       Currently Documented PCP:    Coral Berry MD    PCP Phone Number:    [phone]     Follow Up Details: within one week         Discharge Follow-up with Specified Provider: Dr. Schuster; 1 Week   As directed      To: Dr. Schuster    Follow Up: 1 Week         Discharge Follow-up with Specified Provider: maria luisa; 1 Week   As directed      To: maria luisa    Follow Up: 1 Week            Contact information for follow-up providers     Coral Berry MD .    Specialty: Family Medicine  Why: within one week  Contact information:  [address] [facility] [address] [phone]                   Contact information for after-discharge care     Destination     [facility] .    Service: Skilled Nursing  Contact information:  [address] [place] [id number] [phone]                             PENDING TEST RESULTS AT DISCHARGE      Time: >30 minutes was spent in discharge planning, medication reconciliation and coordination of care for this patient.    Vance Camara MD is the attending at time of discharge, He is aware of the patient's status and agrees with the above discharge summary.         Madisyn Escobar M.D. PGY3  [facility]  [address] [place] [address]  Office: [phone]    This document has been electronically signed by Madisyn Escobar MD on March 20, 2021 06:19 CDT

## 2021-03-19 NOTE — PROGRESS NOTES
FAMILY MEDICINE DAILY PROGRESS NOTE    NAME: Michelle Child  : 1955  MRN: [id number]      LOS: 2 days     PROVIDER OF SERVICE: Madisyn Escobar MD    Chief Complaint: Acute respiratory failure with hypoxia (CMS/HCC)    Subjective:     Interval History:  History taken from: patient  No acute overnight complaints. Patient still bleeding rectally, though hb has remained stable. Dr glass to do biopsy outpatient. Vital signs stable. No longer on supplemental o2.     Review of Systems:   Review of Systems   Constitutional: Negative for activity change, appetite change, chills, fatigue and fever.   HENT: Negative for drooling, ear discharge, ear pain, facial swelling, hearing loss, mouth sores, rhinorrhea and sinus pain.    Eyes: Negative for pain, redness and itching.   Respiratory: Negative for cough, choking, chest tightness, shortness of breath and stridor.    Cardiovascular: Negative for chest pain, palpitations and leg swelling.   Gastrointestinal: Negative for abdominal distention, abdominal pain, anal bleeding, blood in stool, constipation, diarrhea and nausea.   Endocrine: Negative for heat intolerance, polydipsia and polyphagia.   Genitourinary: Negative for dysuria, flank pain, frequency and genital sores.   Musculoskeletal: Negative for back pain, gait problem, joint swelling and myalgias.   Skin: Negative for pallor and rash.   Neurological: Negative for seizures, facial asymmetry, speech difficulty, light-headedness, numbness and headaches.   Hematological: Negative for adenopathy. Does not bruise/bleed easily.   Psychiatric/Behavioral: Negative for confusion, decreased concentration, dysphoric mood and hallucinations. The patient is not nervous/anxious and is not hyperactive.        Objective:     Vital Signs  Temp:  [96.2 °F (35.7 °C)-99.2 °F (37.3 °C)] 99.2 °F (37.3 °C)  Heart Rate:  [75-97] 91  Resp:  [16-20] 20  BP: (103-117)/(51-54) 115/54  Flow (L/min):  [2] 2   Body mass index is  21.01 kg/m².    Physical Exam  Physical Exam  Constitutional:       Appearance: She is well-developed.   HENT:      Head: Normocephalic and atraumatic.      Right Ear: External ear normal.      Left Ear: External ear normal.      Nose: Nose normal.   Eyes:      Conjunctiva/sclera: Conjunctivae normal.      Pupils: Pupils are equal, round, and reactive to light.   Cardiovascular:      Rate and Rhythm: Normal rate and regular rhythm.      Heart sounds: Normal heart sounds.   Pulmonary:      Effort: Pulmonary effort is normal.      Breath sounds: Normal breath sounds.   Abdominal:      General: Bowel sounds are normal.      Palpations: Abdomen is soft.   Musculoskeletal:         General: Normal range of motion.      Cervical back: Normal range of motion and neck supple.   Skin:     General: Skin is warm and dry.      Comments: Rectal mass with bleeding     Neurological:      Mental Status: She is alert and oriented to person, place, and time.   Psychiatric:         Behavior: Behavior normal.         Thought Content: Thought content normal.         Judgment: Judgment normal.         Scheduled Meds   aspirin, 81 mg, Oral, Daily  budesonide-formoterol, 2 puff, Inhalation, BID - RT  cefTRIAXone, 1 g, Intravenous, Q24H  ferrous sulfate, 324 mg, Oral, BID With Meals  FLUoxetine, 60 mg, Oral, Daily  folic acid, 1,000 mcg, Oral, Daily  losartan, 100 mg, Oral, Daily  pantoprazole, 40 mg, Oral, QAM  potassium chloride, 10 mEq, Oral, BID With Meals  pravastatin, 40 mg, Oral, Nightly  risperiDONE, 1 mg, Oral, Nightly  sodium chloride, 10 mL, Intravenous, Q12H       PRN Meds   •  acetaminophen  •  albuterol  •  calcium carbonate  •  ondansetron  •  senna-docusate sodium  •  sodium chloride      Diagnostic Data    Results from last 7 days   Lab Units 03/19/21  0619 03/18/21  0622   WBC 10*3/mm3 10.88* 12.98*   HEMOGLOBIN g/dL 9.2* 9.1*   HEMATOCRIT % 27.3* 27.7*   PLATELETS 10*3/mm3 177 169   GLUCOSE mg/dL  --  90   CREATININE mg/dL   --  0.97   BUN mg/dL  --  13   SODIUM mmol/L  --  134*   POTASSIUM mmol/L  --  3.7   AST (SGOT) U/L  --  14   ALT (SGPT) U/L  --  8   ALK PHOS U/L  --  82   BILIRUBIN mg/dL  --  0.4   ANION GAP mmol/L  --  8.0       No radiology results for the last day      I reviewed the patient's new clinical results.  I reviewed the patient's new imaging results and agree with the interpretation.  I reviewed the patient's other test results and agree with the interpretation    Assessment/Plan:     Active Hospital Problems    Diagnosis  POA   • **Acute respiratory failure with hypoxia (CMS/HCC) [J96.01]  Yes     -Patient desatted to 80s on room air  -She has been receiving IV fluids at 125 cc/hour  -Previously satting well in the 90s on room air  -Has underlying history of COPD and HFpEF (LVEF is 66% with pseudo-normal diastolic dysfunction, severe pulmonary hypertension)  -Now on 4 L nasal cannula satting well in the 90s  -We will obtain stat chest x-ray and ABG; right lower lobe pneumonia on azithromcyin   atypicals pending  -Likely from fluid overload. Will give one-time Lasix 40  -Will monitor O2 sat's     • Pulmonary HTN (CMS/HCC) [I27.20]  Yes     Per echo, severe pulm htn        • Acute pulmonary edema (CMS/HCC) [J81.0]  Yes     -Patient desatted to 80s on room air  -She has been receiving IV fluids at 125 cc/hour  -Previously satting well in the 90s on room air  -Has underlying history of COPD and HFpEF (LVEF is 66% with pseudo-normal diastolic dysfunction, severe pulmonary hypertension)  -Now on 4 L nasal cannula satting well in the 90s  -We will obtain stat chest x-ray and ABG  -Likely from fluid overload. Will give one-time Lasix 40     • Acute cystitis [N30.00]  Yes     Iv rocephin and iv fluids  Urine culture pending       • Rectal cancer (CMS/HCC) [C20]  Yes     Mariely consulted, appreciate recs       • Weakness generalized [R53.1]  Yes     Ck elevated at 200, will provide iv fluids  Urinalysis pending         •  Acute metabolic encephalopathy [G93.41]  Yes     Ct head negative  Will provide IV fluids  Na 133, will continue to trend  Ck 200, will continue to trend  UA positive for LE, urine culture pending; will start on rocephin  Vitamin b12 normal  Folate normal         • Depression [F32.9]  Yes     Continue risperdal and prozac 60     • Anemia of chronic disease [D63.8]  Yes     Will continue with iron supplementation       • Constipation [K59.00]  Yes     Abdominal xray showed mild stool burden  Will provide senna       • Stage 3 severe COPD by GOLD classification (CMS/HCC) [J44.9]  Yes     Continue home nebs and symbicort     • Atypical pneumonia [J18.9]  Yes     -Chest x-ray interpretation by radiologist reads atypical infection vs asymmetric interstitial edema  -Will obtain atypicals  -Currently receiving Rocephin.  I will add 5-day course of azithromycin while atypicals are pending   - atypical's negative, will continue with only rocephin      • Essential hypertension [I10]  Yes     Current BP: 132/63   Continue with home dose of losartan  Will hold lasix as patient is volume depleted           DVT prophylaxis: SCDs/TEDs  Code status is   Code Status and Medical Interventions:   Ordered at: 03/15/21 1424     Limited Support to NOT Include:    Intubation     Level Of Support Discussed With:    Patient     Code Status:    No CPR     Medical Interventions (Level of Support Prior to Arrest):    Limited       Plan for disposition:Where: SNF and When:  today         Madisyn Escobar M.D. PGY3  [facility]  [address] [place] [address]  Office: [phone]    This document has been electronically signed by Madisyn Escobar MD on March 19, 2021 06:57 CDT

## 2021-03-19 NOTE — THERAPY TREATMENT NOTE
Acute Care - Physical Therapy Treatment Note  [facility]     Patient Name: Michelle Child  : 1955  MRN: [id number]  Today's Date: 3/19/2021           PT Assessment (last 12 hours)      PT Evaluation and Treatment     Row Name 21 1311          Physical Therapy Time and Intention    Subjective Information  no complaints  -RW     Document Type  therapy note (daily note)  -RW     Mode of Treatment  physical therapy  -RW     Patient Effort  good  -RW     Comment  son in room, preparing for dc  -RW     Row Name 21 1311          General Information    Patient Profile Reviewed  yes  -RW     Existing Precautions/Restrictions  fall  -RW     Row Name 21 1311          Cognition    Orientation Status (Cognition)  oriented x 4  -RW     Row Name 21 1311          Pain Scale: Numbers Pre/Post-Treatment    Pretreatment Pain Rating  0/10 - no pain  -RW     Posttreatment Pain Rating  0/10 - no pain  -RW     Row Name 21 1311          Range of Motion Comprehensive    General Range of Motion  bilateral lower extremity ROM WFL  -RW     Row Name 21 1311          Strength Comprehensive (MMT)    General Manual Muscle Testing (MMT) Assessment  other (see comments)  -RW     Row Name 21 1311          Bed Mobility    Bed Mobility  --  -RW     Supine-Sit Twiggs (Bed Mobility)  --  -RW     Assistive Device (Bed Mobility)  --  -RW     Row Name 21 1311          Transfers    Sit-Stand Twiggs (Transfers)  contact guard  -RW     Tw[place]gs Level (Toilet Transfer)  contact guard  -RW     Assistive Device (Toilet Transfer)  walker, front-wheeled  -RW     Row Name 21 1311          Sit-Stand Transfer    Assistive Device (Sit-Stand Transfers)  walker, front-wheeled  -RW     Row Name 21 1311          Gait/Stairs (Locomotion)    Twiggs Level (Gait)  contact guard  -RW     Assistive Device (Gait)  walker, front-wheeled  -RW     Distance in Feet (Gait)  24, 70  -RW      Pattern (Gait)  step-through  -RW     Deviations/Abnormal Patterns (Gait)  base of support, narrow;steppage;gait speed decreased  -RW     Bilateral Gait Deviations  -- bent knee gt  -     Row Name 03/19/21 1311          Safety Issues, Functional Mobility    Impairments Affecting Function (Mobility)  endurance/activity tolerance;strength;balance;cognition  -     Row Name 03/19/21 1311          Plan of Care Review    Outcome Summary  pt preparing for dc. agrees to assist to bathroom and gt in hallway.  -     Row Name 03/19/21 1311          Vital Signs    Posttreatment Heart Rate (beats/min)  94  -RW     Post SpO2 (%)  94  -RW     O2 Delivery Post Treatment  room air  -     Row Name 03/19/21 1311          Bed Mobility Goal 1 (PT)    Activity/Assistive Device (Bed Mobility Goal 1, PT)  sit to supine/supine to sit  -RW     [place] Level/Cues Needed (Bed Mobility Goal 1, PT)  independent  -RW     Time Frame (Bed Mobility Goal 1, PT)  by discharge  -RW     Strategies/Barriers (Bed Mobility Goal 1, PT)  HOB flat, no bed rails.  -RW     Progress/Outcomes (Bed Mobility Goal 1, PT)  goal not met  -     Row Name 03/19/21 1311          Transfer Goal 1 (PT)    Activity/Assistive Device (Transfer Goal 1, PT)  sit-to-stand/stand-to-sit;bed-to-chair/chair-to-bed;walker, rolling  -RW     [place] Level/Cues Needed (Transfer Goal 1, PT)  supervision required  -RW     Time Frame (Transfer Goal 1, PT)  by discharge  -RW     Strategies/Barriers (Transfers Goal 1, PT)  Hx of falls.  -RW     Progress/Outcome (Transfer Goal 1, PT)  goal not met  -     Row Name 03/19/21 1311          Gait Training Goal 1 (PT)    Activity/Assistive Device (Gait Training Goal 1, PT)  walker, rolling  -RW     [place] Level (Gait Training Goal 1, PT)  supervision required  -RW     Distance (Gait Training Goal 1, PT)  100'x1.  -RW     Time Frame (Gait Training Goal 1, PT)  by discharge  -RW     Strategies/Barriers (Gait Training Goal  1, PT)  Facilitate increased step length and foot clearance.  -RW     Progress/Outcome (Gait Training Goal 1, PT)  goal not met  -RW     Row Name 03/19/21 1311          ROM Goal 1 (PT)    ROM Goal 1 (PT)  Patient will increase Tinetti fall risk score to 24/28.  -RW     Time Frame (ROM Goal 1, PT)  by discharge  -RW     Strategies/Barriers (ROM Goal 1, PT)  Poor stepping response to perturbation.  -RW     Progress/Outcome (ROM Goal 1, PT)  goal not met  -RW     Row Name 03/19/21 1311          Therapy Assessment/Plan (PT)    Rehab Potential (PT)  good, to achieve stated therapy goals  -RW     Criteria for Skilled Interventions Met (PT)  yes;skilled treatment is necessary  -RW       User Key  (r) = Recorded By, (t) = Taken By, (c) = Cosigned By    Initials Name Provider Type    RW Juancarlos Agee, PTA Physical Therapy Assistant        Physical Therapy Education                 Title: PT OT SLP Therapies (Resolved)     Topic: Physical Therapy (Resolved)     Point: Mobility training (Resolved)     Learner Progress:  Not documented in this visit.          Point: Home exercise program (Resolved)     Learner Progress:  Not documented in this visit.          Point: Body mechanics (Resolved)     Learner Progress:  Not documented in this visit.          Point: Precautions (Resolved)     Learning Progress Summary           Patient Acceptance, E, VU,NR by  at 3/16/2021 7067    Comment: Tinetti assessed; 20/28 or moderate fall risk; recommend continued use of FWW.  Discussed improving patient's stepping response to perturbation.                               User Key     Initials Effective Dates Name Provider Type Discipline     04/03/18 -  Hadley Ramirez, PT Physical Therapist PT              PT Recommendation and Plan  Anticipated Discharge Disposition (PT): skilled nursing facility, home with 24/7 care  Therapy Frequency (PT): 5 times/wk  Plan of Care Reviewed With: patient  Progress: improving  Outcome Summary: pt  preparing for dc. agrees to assist to bathroom and gt in hallway.       Time Calculation:   PT Charges     Row Name 03/19/21 1344             Time Calculation    Start Time  1311  -RW      Stop Time  1329  -RW      Time Calculation (min)  18 min  -RW         Time Calculation- PT    Total Timed Code Minutes- PT  18 minute(s)  -RW        User Key  (r) = Recorded By, (t) = Taken By, (c) = Cosigned By    Initials Name Provider Type    Juancarlos Torres, KIN Physical Therapy Assistant        Therapy Charges for Today     Code Description Service Date Service Provider Modifiers Qty    35065896089 HC GAIT TRAINING EA 15 MIN 3/18/2021 Juancarlos Agee, PTA GP 1    51581832673 HC PT THERAPEUTIC ACT EA 15 MIN 3/18/2021 Juancarlos Agee, PTA GP 1    28771249235 HC GAIT TRAINING EA 15 MIN 3/19/2021 Juancarlos Agee, PTA GP 1          PT G-Codes  Outcome Measure Options: AM-PAC 6 Clicks Basic Mobility (PT), Tinetti  AM-PAC 6 Clicks Score (PT): 20  AM-PAC 6 Clicks Score (OT): 20  Tinetti Total Score: 20    Juancarlos Agee PTA  3/19/2021

## 2021-03-20 PROBLEM — K62.89 MASS OF ANUS: Status: ACTIVE | Noted: 2021-01-01

## 2021-03-22 NOTE — TELEPHONE ENCOUNTER
Returned called. Will have video visit tomorrow at 1:15 PM and address this issue then.      This document has been electronically signed by Michael Chavez MD on March 22, 2021 15:55 CDT

## 2021-03-22 NOTE — TELEPHONE ENCOUNTER
PROSPER FROM [facility] CALLED AND IS NEEDING A CALL BACK FROM DR MEDINA REGARDING THE MEDICATION risperiDONE (risperDAL) 1 MG tablet HER NUMBER TO CALL BACK [phone]. DR MEDINA IS SEEING THIS PATIENT IN THE NURSING HOME.        THANK YOU,        ELVI

## 2021-03-23 PROBLEM — K59.09 OTHER CONSTIPATION: Status: ACTIVE | Noted: 2021-01-01

## 2021-03-23 NOTE — PROGRESS NOTES
NURSING HOME HISTORY AND PHYSICAL    NAME: Michelle Child  : 1955  MRN: [id number]    DATE & TIME SEEN: 21 1320    PCP: Coral Berry MD    CODE STATUS: DNR    [facility] HOME: [facility]    Chief Complaint: hospital f/u for UTI, PNA and suspected anal cancer    HPI: Michelle Child is a 65 y.o. female recently admitted to the [facility] after hospitalization for UTI, pneumonia and incidental finding of what is now suspected to be anal cancer.    Patient has an appointment with Dr. Schuster in surgery for biopsy of the anal mass in 3 days.  She is having very minimal bleeding from the mass.  Labs were done today which showed a hemoglobin of 8.8 which is stable from the hemoglobin level at discharge.  The patient's only complaint today is that she is constipated and has not had a bowel movement in multiple days.  She is currently taking Colace.  She does not have abdominal pain.  Patient denies any further urinary symptoms or respiratory symptoms.  No concerns from nursing staff.  She reports that exercise with physical therapy is going well and she feels that she is making good progress.  She also feels like she has been more mentally clear since being at the nursing home.    CONCURRENT MEDICAL HISTORY:  Past Medical History:   Diagnosis Date   • Alkaline phosphatase raised    • Astigmatism    • Bilateral pseudophakia    • Coronary arteriosclerosis    • Depressive disorder    • Edema of lower extremity    • Encounter for general adult medical examination with abnormal findings    • Essential hypertension    • Gastroesophageal reflux disease    • H/O bone density study     DXA BONE DENSITY AXIAL    2016   • H/O screening mammography     SCREENING MAMMOGRAPHY     2016   • Hx of screening mammography     x3; declined screening, understands risks and benefits and still declines      2015   • Influenza vaccine administered     INFLUENZA IMMUN ADMIN OR PREV  RECV'D   x4       04/01/2016   • Insomnia    • Iron deficiency    • Migraine    • Tobacco dependence     continuous       PAST SURGICAL HISTORY:  Past Surgical History:   Procedure Laterality Date   • APPENDECTOMY     • AUGMENTATION MAMMAPLASTY     • BREAST IMPLANT SURGERY     • CATARACT EXTRACTION WITH INTRAOCULAR LENS IMPLANT  12/17/2003    Phacoemulsification of a cataract with intraocular lens implant of rigt eye, Nixon model SA 60-AT; serial # [id number];20.5 diopter   • CHOLECYSTECTOMY  08/06/2007    Laparoscopic cholecystectomy. Symptomatic gallstones   • COLONOSCOPY  10/15/2013    declined stool cards and colonscopy   • ENDOSCOPY AND COLONOSCOPY  05/22/2014    Internal & external hemorrhoids found.   • ENDOSCOPY W/ PEG TUBE PLACEMENT  05/22/2014    EGD w/ tube: Normal esopahgus. Gastritis in stomach. Biopsy taken. Normal duodenum. Biopsy taken.   • INJECTION OF MEDICATION  07/24/2013    Inj(s) Tend-Sheath, Ligament, Single   • INJECTION OF MEDICATION  11/17/2014    Kenalog x6   • INJECTION OF MEDICATION  04/01/2016    PNEUMOC VAC/ADMIN/RCVD    • INJECTION OF MEDICATION  12/05/2014    Toradol   • OTHER SURGICAL HISTORY  01/19/2014    Drain/Inject Major Joint   x2   • PAP SMEAR  10/06/2011   • SUBTOTAL HYSTERECTOMY      Partial hyst    • TONSILLECTOMY     • TUBAL ABDOMINAL LIGATION       FAMILY HISTORY:  Family History   Problem Relation Age of Onset   • Cholelithiasis Mother    • Thyroid cancer Sister    • Thyroid disease Sister         Thyroid disorder   • Graves' disease Sister    • Cholelithiasis Maternal Grandmother    • Thyroid cancer Other         Other 2nd degree relative, thyroid   • Diabetes Other    • Hypertension Other       SOCIAL HISTORY:  Social History     Socioeconomic History   • Marital status:      Spouse name: Not on file   • Number of children: Not on file   • Years of education: Not on file   • Highest education level: Not on file   Tobacco Use   • Smoking status: Former Smoker      Packs/day: 1.00     Years: 39.00     Pack years: 39.00     Quit date: 1/10/2018     Years since quitting: 3.2   • Smokeless tobacco: Never Used   • Tobacco comment: SMOKED FOR 20>PLUS YRS   Substance and Sexual Activity   • Alcohol use: No   • Drug use: No   • Sexual activity: Defer     CURRENT MEDS:    Current Outpatient Medications:   •  acetaminophen (TYLENOL) 325 MG tablet, Take 650 mg by mouth Every 4 (Four) Hours As Needed for Mild Pain ., Disp: , Rfl:   •  albuterol sulfate  (90 Base) MCG/ACT inhaler, INHALE 2 PUFFS EVERY 4 (FOUR) AS NEEDED  FOR WHEEZING., Disp: 8.5 g, Rfl: 11  •  cefdinir (OMNICEF) 300 MG capsule, Take 1 capsule by mouth 2 (Two) Times a Day for 6 days., Disp: 12 capsule, Rfl: 0  •  ferrous sulfate 325 (65 Fe) MG tablet, TAKE ONE TABLET BY MOUTH TWICE A DAY WITH MEALS , Disp: 60 each, Rfl: 5  •  FLUoxetine (PROzac) 20 MG capsule, TAKE ONE CAPSULE BY MOUTH ONCE DAILY FOR DEPRESSION, Disp: 90 capsule, Rfl: 3  •  FLUoxetine (PROzac) 40 MG capsule, TAKE ONE CAPSULE BY MOUTH ONCE DAILY FOR DEPRESSION, Disp: 90 capsule, Rfl: 3  •  folic acid (FOLVITE) 1 MG tablet, TAKE ONE TABLET BY MOUTH ONCE DAILY, Disp: 90 tablet, Rfl: 3  •  furosemide (LASIX) 20 MG tablet, TAKE 1/2 TABLET BY MOUTH DAILY FOR FLUID RETENTION, Disp: 45 tablet, Rfl: 3  •  losartan (COZAAR) 100 MG tablet, Take 1 tablet by mouth Daily. For heart, Disp: 90 tablet, Rfl: 3  •  omeprazole (priLOSEC) 20 MG capsule, Take 20 mg by mouth Daily., Disp: , Rfl:   •  ondansetron (ZOFRAN) 4 MG tablet, Take 1 tablet by mouth Every 6 (Six) Hours As Needed for Nausea or Vomiting., Disp: 30 tablet, Rfl: 3  •  potassium chloride 10 MEQ CR tablet, TAKE ONE TABLET BY MOUTH TWICE A DAY , Disp: 60 tablet, Rfl: 0  •  pravastatin (PRAVACHOL) 40 MG tablet, Take 1 tablet by mouth Every Night., Disp: 90 tablet, Rfl: 3  •  risperiDONE (risperDAL) 1 MG tablet, TAKE ONE TABLET BY MOUTH EACH EVENING AT BEDTIME FOR INSOMNIA, Disp: 90 tablet, Rfl: 3  •  SM  Aspirin Adult Low Strength 81 MG EC tablet, TAKE ONE TABLET BY MOUTH ONCE DAILY , Disp: 30 tablet, Rfl: 3  •  Symbicort 160-4.5 MCG/ACT inhaler, INHALE TWO (2) PUFFS BY MOUTH TWICE A DAY , Disp: 10.2 g, Rfl: 5    ALLERGIES:  Codeine, Penicillins, and Sulfa antibiotics    Review of Systems:  Review of Systems   Constitutional: Negative for chills and fever.   HENT: Negative for congestion and sore throat.    Eyes: Negative for pain and redness.   Respiratory: Positive for cough. Negative for shortness of breath.    Gastrointestinal: Positive for constipation. Negative for abdominal pain, nausea and vomiting.   Endocrine: Negative for polydipsia and polyphagia.   Genitourinary: Negative for difficulty urinating, dysuria and hematuria.        Rectal pain and bleeding   Musculoskeletal: Negative for arthralgias and myalgias.   Skin: Negative for rash.   Neurological: Negative for dizziness and light-headedness.   Psychiatric/Behavioral: Negative for agitation and confusion.       Blood pressure 122/64; respiratory rate 20; temperature 98.9 °F; heart rate 86 bpm; SPO2 95% on room air  Physical Exam  Vitals reviewed.   Constitutional:       General: She is not in acute distress.     Comments: Laying in bed; frequent coughing throughout interview; normal behavior and appropriate responses throughout interview; nonlabored breathing    Neurological:      Mental Status: She is alert.         DIAGNOSTIC DATA: hemoglobin 8.8.     Satting well on room air  ASSESSMENT/PLAN: 65 y.o. female with:  Problem List Items Addressed This Visit        Gastrointestinal Abdominal     Other constipation - Primary    Overview     Already taking colace.   Add miralax once daily          Relevant Medications    polyethylene glycol (MIRALAX) 17 g packet    Mass of anus    Overview     Biopsy planned for outpatient surgery clinic in 3 days              Hematology and Neoplasia    Anemia of chronic disease    Overview     Anemia of chronic disease  plus anemia iron deficiency.  Already on iron supplementation.  Labs from today show hemoglobin is stable when compared to hemoglobin level at discharge.               Dr. Berry  is the attending on record for this patient, She is aware of the patient's status and agrees with the above.      This document has been electronically signed by Michael Chavez MD on March 23, 2021 15:38 CDT

## 2021-03-26 NOTE — PROGRESS NOTES
Subjective:     Michelle Child is a 65 y.o. female who presents to [facility] for weakness.  Patient has been working with physical therapy and Occupational Therapy.  She is being treated for urinary tract infection.  She is also scheduled to see general surgery for biopsy of her anal mass.  She is still having some rectal pain and minimal bleeding.  She does endorse constipation despite taking Colace daily   The following portions of the patient's history were reviewed and updated as appropriate: allergies, current medications, past family history, past medical history, past social history, past surgical history and problem list.      Past Medical Hx:  Past Medical History:   Diagnosis Date   • Alkaline phosphatase raised    • Astigmatism    • Bilateral pseudophakia    • COPD (chronic obstructive pulmonary disease) (CMS/HCC)    • Coronary arteriosclerosis    • Depressive disorder    • Edema of lower extremity    • Encounter for general adult medical examination with abnormal findings    • Essential hypertension    • Gastroesophageal reflux disease    • H/O bone density study     DXA BONE DENSITY AXIAL    04/22/2016   • H/O screening mammography     SCREENING MAMMOGRAPHY     04/25/2016   • Hx of screening mammography     x3; declined screening, understands risks and benefits and still declines      07/24/2015   • Influenza vaccine administered     INFLUENZA IMMUN ADMIN OR PREV RECV'D   x4       04/01/2016   • Insomnia    • Iron deficiency    • Migraine    • Rectal mass    • Stroke (CMS/HCC)    • Tobacco dependence     continuous         Past Surgical Hx:  Past Surgical History:   Procedure Laterality Date   • APPENDECTOMY     • AUGMENTATION MAMMAPLASTY     • BREAST IMPLANT SURGERY     • CATARACT EXTRACTION WITH INTRAOCULAR LENS IMPLANT  12/17/2003    Phacoemulsification of a cataract with intraocular lens implant of rigt eye, Nixon model SA 60-AT; serial # [id number];20.5 diopter   • CHOLECYSTECTOMY   08/06/2007    Laparoscopic cholecystectomy. Symptomatic gallstones   • COLONOSCOPY  10/15/2013    declined stool cards and colonscopy   • ENDOSCOPY AND COLONOSCOPY  05/22/2014    Internal & external hemorrhoids found.   • ENDOSCOPY W/ PEG TUBE PLACEMENT  05/22/2014    EGD w/ tube: Normal esopahgus. Gastritis in stomach. Biopsy taken. Normal duodenum. Biopsy taken.   • EYE SURGERY     • INJECTION OF MEDICATION  07/24/2013    Inj(s) Tend-Sheath, Ligament, Single   • INJECTION OF MEDICATION  11/17/2014    Kenalog x6   • INJECTION OF MEDICATION  04/01/2016    PNEUMOC VAC/ADMIN/RCVD    • INJECTION OF MEDICATION  12/05/2014    Toradol   • OTHER SURGICAL HISTORY  01/19/2014    Drain/Inject Major Joint   x2   • PAP SMEAR  10/06/2011   • SUBTOTAL HYSTERECTOMY      Partial hyst    • TONSILLECTOMY     • TUBAL ABDOMINAL LIGATION         Health Maintenance:  Health Maintenance   Topic Date Due   • MAMMOGRAM  05/15/2020   • LUNG CANCER SCREENING  09/14/2021 (Originally 1/19/2019)   • COVID-19 Vaccine (2 - Pfizer 2-dose series) 04/09/2021   • DXA SCAN  08/19/2021   • ANNUAL WELLNESS VISIT  09/14/2021   • COLONOSCOPY  05/22/2024   • TDAP/TD VACCINES (2 - Td) 04/20/2027   • HEPATITIS C SCREENING  Completed   • INFLUENZA VACCINE  Completed   • Pneumococcal Vaccine 65+  Completed   • ZOSTER VACCINE  Completed   • MENINGOCOCCAL VACCINE  Aged Out   • PAP SMEAR  Discontinued       Current Meds:    Current Outpatient Medications:   •  acetaminophen (TYLENOL) 325 MG tablet, Take 650 mg by mouth Every 4 (Four) Hours As Needed for Mild Pain ., Disp: , Rfl:   •  albuterol sulfate  (90 Base) MCG/ACT inhaler, INHALE 2 PUFFS EVERY 4 (FOUR) AS NEEDED  FOR WHEEZING., Disp: 8.5 g, Rfl: 11  •  ferrous sulfate 325 (65 Fe) MG tablet, TAKE ONE TABLET BY MOUTH TWICE A DAY WITH MEALS  (Patient taking differently: Take 325 mg by mouth 2 (Two) Times a Day.), Disp: 60 each, Rfl: 5  •  FLUoxetine (PROzac) 20 MG capsule, TAKE ONE CAPSULE BY MOUTH ONCE  DAILY FOR DEPRESSION (Patient taking differently: Take 20 mg by mouth Daily.), Disp: 90 capsule, Rfl: 3  •  FLUoxetine (PROzac) 40 MG capsule, TAKE ONE CAPSULE BY MOUTH ONCE DAILY FOR DEPRESSION (Patient taking differently: Take 40 mg by mouth Daily.), Disp: 90 capsule, Rfl: 3  •  folic acid (FOLVITE) 1 MG tablet, TAKE ONE TABLET BY MOUTH ONCE DAILY (Patient taking differently: Take 1 mg by mouth Daily.), Disp: 90 tablet, Rfl: 3  •  furosemide (LASIX) 20 MG tablet, TAKE 1/2 TABLET BY MOUTH DAILY FOR FLUID RETENTION (Patient taking differently: Take 20 mg by mouth Take As Directed. TAKE 1/2 TABLET BY MOUTH DAILY), Disp: 45 tablet, Rfl: 3  •  losartan (COZAAR) 100 MG tablet, Take 1 tablet by mouth Daily. For heart, Disp: 90 tablet, Rfl: 3  •  omeprazole (priLOSEC) 20 MG capsule, Take 20 mg by mouth Daily., Disp: , Rfl:   •  potassium chloride 10 MEQ CR tablet, TAKE ONE TABLET BY MOUTH TWICE A DAY  (Patient taking differently: Take 10 mEq by mouth 2 (Two) Times a Day.), Disp: 60 tablet, Rfl: 0  •  pravastatin (PRAVACHOL) 40 MG tablet, Take 1 tablet by mouth Every Night., Disp: 90 tablet, Rfl: 3  •  risperiDONE (risperDAL) 1 MG tablet, TAKE ONE TABLET BY MOUTH EACH EVENING AT BEDTIME FOR INSOMNIA (Patient taking differently: Take 1 mg by mouth Every Night.), Disp: 90 tablet, Rfl: 3  •  SM Aspirin Adult Low Strength 81 MG EC tablet, TAKE ONE TABLET BY MOUTH ONCE DAILY  (Patient taking differently: Take 81 mg by mouth Daily.), Disp: 30 tablet, Rfl: 3  •  Symbicort 160-4.5 MCG/ACT inhaler, INHALE TWO (2) PUFFS BY MOUTH TWICE A DAY  (Patient taking differently: Inhale 2 puffs 2 (Two) Times a Day.), Disp: 10.2 g, Rfl: 5    Allergies:  Codeine, Penicillins, and Sulfa antibiotics    Family Hx:  Family History   Problem Relation Age of Onset   • Cholelithiasis Mother    • Thyroid cancer Sister    • Thyroid disease Sister         Thyroid disorder   • Graves' disease Sister    • Cholelithiasis Maternal Grandmother    • Thyroid  cancer Other         Other 2nd degree relative, thyroid   • Diabetes Other    • Hypertension Other         Social History:  Social History     Socioeconomic History   • Marital status:      Spouse name: Not on file   • Number of children: Not on file   • Years of education: Not on file   • Highest education level: Not on file   Tobacco Use   • Smoking status: Former Smoker     Packs/day: 1.00     Years: 39.00     Pack years: 39.00     Quit date: 1/10/2018     Years since quitting: 3.2   • Smokeless tobacco: Never Used   • Tobacco comment: SMOKED FOR 20>PLUS YRS   Vaping Use   • Vaping Use: Never used   Substance and Sexual Activity   • Alcohol use: No   • Drug use: No   • Sexual activity: Not Currently       Review of Systems  Review of Systems    Objective:     There were no vitals taken for this visit.  Physical Exam    Lab Review  Results for orders placed or performed during the hospital encounter of 03/15/21   COVID-19 and FLU A/B PCR - Swab, Nasopharynx    Specimen: Nasopharynx; Swab   Result Value Ref Range    COVID19 Not Detected Not Detected - Ref. Range    Influenza A PCR Not Detected Not Detected    Influenza B PCR Not Detected Not Detected   Urine Culture - Urine, Urine, Clean Catch    Specimen: Urine, Clean Catch   Result Value Ref Range    Urine Culture >100,000 CFU/mL Escherichia coli (A)        Susceptibility    Escherichia coli - ROMA     Ampicillin >=32 Resistant ug/ml     Ampicillin + Sulbactam 8 Susceptible ug/ml     Cefazolin <=4 Susceptible ug/ml     Cefepime <=1 Susceptible ug/ml     Ceftazidime <=1 Susceptible ug/ml     Ceftriaxone <=1 Susceptible ug/ml     Gentamicin <=1 Susceptible ug/ml     Levofloxacin >=8 Resistant ug/ml     Nitrofurantoin <=16 Susceptible ug/ml     Piperacillin + Tazobactam <=4 Susceptible ug/ml     Tetracycline 8 Intermediate ug/ml     Trimethoprim + Sulfamethoxazole <=20 Susceptible ug/ml   Urine Culture - Urine, Urine, Random Void    Specimen: Urine, Random  Void   Result Value Ref Range    Urine Culture >100,000 CFU/mL Escherichia coli (A)        Susceptibility    Escherichia coli - ROMA     Ampicillin >=32 Resistant ug/ml     Ampicillin + Sulbactam 8 Susceptible ug/ml     Cefazolin <=4 Susceptible ug/ml     Cefepime <=1 Susceptible ug/ml     Ceftazidime <=1 Susceptible ug/ml     Ceftriaxone <=1 Susceptible ug/ml     Gentamicin <=1 Susceptible ug/ml     Levofloxacin >=8 Resistant ug/ml     Nitrofurantoin <=16 Susceptible ug/ml     Piperacillin + Tazobactam <=4 Susceptible ug/ml     Tetracycline >=16 Resistant ug/ml     Trimethoprim + Sulfamethoxazole <=20 Susceptible ug/ml   Legionella Antigen, Urine - Urine, Urine, Clean Catch    Specimen: Urine, Clean Catch   Result Value Ref Range    LEGIONELLA ANTIGEN, URINE Negative Negative   S. Pneumo Ag Urine or CSF - Urine, Urine, Clean Catch    Specimen: Urine, Clean Catch   Result Value Ref Range    Strep Pneumo Ag Negative Negative   Mycoplasma Pneumoniae PCR - Swab, Throat    Specimen: Throat; Swab   Result Value Ref Range    MYCOPLASMAE PNEUMONIAE BY PCR Negative    COVID-19, [facility] IN-HOUSE, NP SWAB IN TRANSPORT MEDIA 8-10 HR TAT - Swab, Nasopharynx    Specimen: Nasopharynx; Swab   Result Value Ref Range    COVID19 Not Detected Not Detected - Ref. Range   Comprehensive Metabolic Panel    Specimen: Blood   Result Value Ref Range    Glucose 117 (H) 65 - 99 mg/dL    BUN 16 8 - 23 mg/dL    Creatinine 1.31 (H) 0.57 - 1.00 mg/dL    Sodium 133 (L) 136 - 145 mmol/L    Potassium 3.5 3.5 - 5.2 mmol/L    Chloride 97 (L) 98 - 107 mmol/L    CO2 24.0 22.0 - 29.0 mmol/L    Calcium 9.3 8.6 - 10.5 mg/dL    Total Protein 6.0 6.0 - 8.5 g/dL    Albumin 3.50 3.50 - 5.20 g/dL    ALT (SGPT) 10 1 - 33 U/L    AST (SGOT) 18 1 - 32 U/L    Alkaline Phosphatase 108 39 - 117 U/L    Total Bilirubin 0.4 0.0 - 1.2 mg/dL    eGFR Non African Amer 40 (L) >60 mL/min/1.73    Globulin 2.5 gm/dL    A/G Ratio 1.4 g/dL    BUN/Creatinine Ratio 12.2 7.0 - 25.0     Anion Gap 12.0 5.0 - 15.0 mmol/L   CK    Specimen: Blood   Result Value Ref Range    Creatine Kinase 200 (H) 20 - 180 U/L   Magnesium    Specimen: Blood   Result Value Ref Range    Magnesium 1.8 1.6 - 2.4 mg/dL   CBC Auto Differential    Specimen: Blood   Result Value Ref Range    WBC 15.11 (H) 3.40 - 10.80 10*3/mm3    RBC 3.79 3.77 - 5.28 10*6/mm3    Hemoglobin 11.1 (L) 12.0 - 15.9 g/dL    Hematocrit 33.8 (L) 34.0 - 46.6 %    MCV 89.2 79.0 - 97.0 fL    MCH 29.3 26.6 - 33.0 pg    MCHC 32.8 31.5 - 35.7 g/dL    RDW 14.6 12.3 - 15.4 %    RDW-SD 47.1 37.0 - 54.0 fl    MPV 10.5 6.0 - 12.0 fL    Platelets 230 140 - 450 10*3/mm3    Neutrophil % 91.0 (H) 42.7 - 76.0 %    Lymphocyte % 3.5 (L) 19.6 - 45.3 %    Monocyte % 4.6 (L) 5.0 - 12.0 %    Eosinophil % 0.2 (L) 0.3 - 6.2 %    Basophil % 0.3 0.0 - 1.5 %    Immature Grans % 0.4 0.0 - 0.5 %    Neutrophils, Absolute 13.75 (H) 1.70 - 7.00 10*3/mm3    Lymphocytes, Absolute 0.53 (L) 0.70 - 3.10 10*3/mm3    Monocytes, Absolute 0.70 0.10 - 0.90 10*3/mm3    Eosinophils, Absolute 0.03 0.00 - 0.40 10*3/mm3    Basophils, Absolute 0.04 0.00 - 0.20 10*3/mm3    Immature Grans, Absolute 0.06 (H) 0.00 - 0.05 10*3/mm3    nRBC 0.0 0.0 - 0.2 /100 WBC   Protime-INR    Specimen: Blood   Result Value Ref Range    Protime 14.1 11.1 - 15.3 Seconds    INR 1.05 0.80 - 1.20   Troponin    Specimen: Blood   Result Value Ref Range    Troponin T 0.051 (C) 0.000 - 0.030 ng/mL   Occult Blood X 1, Stool - Stool, Per Rectum    Specimen: Per Rectum; Stool   Result Value Ref Range    Fecal Occult Blood Negative Negative   TSH+Free T4    Specimen: Blood   Result Value Ref Range    TSH 1.200 0.270 - 4.200 uIU/mL    Free T4 1.51 0.93 - 1.70 ng/dL   Vitamin B12    Specimen: Blood   Result Value Ref Range    Vitamin B-12 281 211 - 946 pg/mL   Folate    Specimen: Blood   Result Value Ref Range    Folate 19.90 4.78 - 24.20 ng/mL   Troponin    Specimen: Blood   Result Value Ref Range    Troponin T 0.044 (C) 0.000 -  0.030 ng/mL   Troponin    Specimen: Blood   Result Value Ref Range    Troponin T 0.048 (C) 0.000 - 0.030 ng/mL   Troponin    Specimen: Blood   Result Value Ref Range    Troponin T 0.049 (C) 0.000 - 0.030 ng/mL   Urinalysis With Culture If Indicated - Urine, Clean Catch    Specimen: Urine, Clean Catch   Result Value Ref Range    Color, UA Yellow Yellow, Straw, Dark Yellow, Rosa Isela    Appearance, UA Turbid (A) Clear    pH, UA 7.5 5.0 - 9.0    Specific Gravity, UA 1.010 1.003 - 1.030    Glucose, UA Negative Negative    Ketones, UA Negative Negative    Bilirubin, UA Negative Negative    Blood, UA Large (3+) (A) Negative    Protein, UA Trace (A) Negative    Leuk Esterase, UA Large (3+) (A) Negative    Nitrite, UA Negative Negative    Urobilinogen, UA 1.0 E.U./dL 0.2 - 1.0 E.U./dL   Urinalysis, Microscopic Only - Urine, Clean Catch    Specimen: Urine, Clean Catch   Result Value Ref Range    RBC, UA 13-20 (A) None Seen /HPF    WBC, UA Too Numerous to Count (A) None Seen, 0-2, 3-5 /HPF    Bacteria, UA 2+ (A) None Seen /HPF    Squamous Epithelial Cells, UA 3-5 (A) None Seen, 0-2 /HPF    Hyaline Casts, UA 13-20 None Seen /LPF    Methodology Automated Microscopy    Comprehensive Metabolic Panel    Specimen: Blood   Result Value Ref Range    Glucose 98 65 - 99 mg/dL    BUN 12 8 - 23 mg/dL    Creatinine 0.97 0.57 - 1.00 mg/dL    Sodium 136 136 - 145 mmol/L    Potassium 3.6 3.5 - 5.2 mmol/L    Chloride 105 98 - 107 mmol/L    CO2 24.0 22.0 - 29.0 mmol/L    Calcium 8.1 (L) 8.6 - 10.5 mg/dL    Total Protein 5.3 (L) 6.0 - 8.5 g/dL    Albumin 2.90 (L) 3.50 - 5.20 g/dL    ALT (SGPT) 9 1 - 33 U/L    AST (SGOT) 12 1 - 32 U/L    Alkaline Phosphatase 94 39 - 117 U/L    Total Bilirubin 0.3 0.0 - 1.2 mg/dL    eGFR Non African Amer 57 (L) >60 mL/min/1.73    Globulin 2.4 gm/dL    A/G Ratio 1.2 g/dL    BUN/Creatinine Ratio 12.4 7.0 - 25.0    Anion Gap 7.0 5.0 - 15.0 mmol/L   CBC Auto Differential    Specimen: Blood   Result Value Ref Range    WBC  11.00 (H) 3.40 - 10.80 10*3/mm3    RBC 3.35 (L) 3.77 - 5.28 10*6/mm3    Hemoglobin 9.8 (L) 12.0 - 15.9 g/dL    Hematocrit 30.0 (L) 34.0 - 46.6 %    MCV 89.6 79.0 - 97.0 fL    MCH 29.3 26.6 - 33.0 pg    MCHC 32.7 31.5 - 35.7 g/dL    RDW 14.6 12.3 - 15.4 %    RDW-SD 47.7 37.0 - 54.0 fl    MPV 10.3 6.0 - 12.0 fL    Platelets 210 140 - 450 10*3/mm3    Neutrophil % 86.1 (H) 42.7 - 76.0 %    Lymphocyte % 7.2 (L) 19.6 - 45.3 %    Monocyte % 4.3 (L) 5.0 - 12.0 %    Eosinophil % 1.4 0.3 - 6.2 %    Basophil % 0.5 0.0 - 1.5 %    Immature Grans % 0.5 0.0 - 0.5 %    Neutrophils, Absolute 9.48 (H) 1.70 - 7.00 10*3/mm3    Lymphocytes, Absolute 0.79 0.70 - 3.10 10*3/mm3    Monocytes, Absolute 0.47 0.10 - 0.90 10*3/mm3    Eosinophils, Absolute 0.15 0.00 - 0.40 10*3/mm3    Basophils, Absolute 0.05 0.00 - 0.20 10*3/mm3    Immature Grans, Absolute 0.06 (H) 0.00 - 0.05 10*3/mm3    nRBC 0.0 0.0 - 0.2 /100 WBC   Urinalysis With Culture If Indicated - Urine, Random Void    Specimen: Urine, Random Void   Result Value Ref Range    Color, UA Yellow Yellow, Straw, Dark Yellow, Rosa Isela    Appearance, UA Turbid (A) Clear    pH, UA 7.5 5.0 - 9.0    Specific Gravity, UA 1.007 1.003 - 1.030    Glucose, UA Negative Negative    Ketones, UA Negative Negative    Bilirubin, UA Negative Negative    Blood, UA Moderate (2+) (A) Negative    Protein, UA Negative Negative    Leuk Esterase, UA Large (3+) (A) Negative    Nitrite, UA Negative Negative    Urobilinogen, UA 1.0 E.U./dL 0.2 - 1.0 E.U./dL   Urinalysis, Microscopic Only - Urine, Random Void    Specimen: Urine, Random Void   Result Value Ref Range    RBC, UA 3-5 (A) None Seen /HPF    WBC, UA 21-30 (A) None Seen, 0-2, 3-5 /HPF    Bacteria, UA 3+ (A) None Seen /HPF    Squamous Epithelial Cells, UA 6-12 (A) None Seen, 0-2 /HPF    Hyaline Casts, UA None Seen None Seen /LPF    Methodology Manual Light Microscopy    Comprehensive Metabolic Panel    Specimen: Blood   Result Value Ref Range    Glucose 117 (H) 65  - 99 mg/dL    BUN 10 8 - 23 mg/dL    Creatinine 0.97 0.57 - 1.00 mg/dL    Sodium 134 (L) 136 - 145 mmol/L    Potassium 3.7 3.5 - 5.2 mmol/L    Chloride 104 98 - 107 mmol/L    CO2 21.0 (L) 22.0 - 29.0 mmol/L    Calcium 8.1 (L) 8.6 - 10.5 mg/dL    Total Protein 5.3 (L) 6.0 - 8.5 g/dL    Albumin 3.00 (L) 3.50 - 5.20 g/dL    ALT (SGPT) 8 1 - 33 U/L    AST (SGOT) 11 1 - 32 U/L    Alkaline Phosphatase 93 39 - 117 U/L    Total Bilirubin 0.3 0.0 - 1.2 mg/dL    eGFR Non African Amer 57 (L) >60 mL/min/1.73    Globulin 2.3 gm/dL    A/G Ratio 1.3 g/dL    BUN/Creatinine Ratio 10.3 7.0 - 25.0    Anion Gap 9.0 5.0 - 15.0 mmol/L   CBC Auto Differential    Specimen: Blood   Result Value Ref Range    WBC 15.57 (H) 3.40 - 10.80 10*3/mm3    RBC 3.39 (L) 3.77 - 5.28 10*6/mm3    Hemoglobin 9.9 (L) 12.0 - 15.9 g/dL    Hematocrit 30.5 (L) 34.0 - 46.6 %    MCV 90.0 79.0 - 97.0 fL    MCH 29.2 26.6 - 33.0 pg    MCHC 32.5 31.5 - 35.7 g/dL    RDW 15.1 12.3 - 15.4 %    RDW-SD 50.0 37.0 - 54.0 fl    MPV 10.1 6.0 - 12.0 fL    Platelets 206 140 - 450 10*3/mm3    Neutrophil % 92.2 (H) 42.7 - 76.0 %    Lymphocyte % 2.8 (L) 19.6 - 45.3 %    Monocyte % 4.0 (L) 5.0 - 12.0 %    Eosinophil % 0.4 0.3 - 6.2 %    Basophil % 0.2 0.0 - 1.5 %    Immature Grans % 0.4 0.0 - 0.5 %    Neutrophils, Absolute 14.35 (H) 1.70 - 7.00 10*3/mm3    Lymphocytes, Absolute 0.44 (L) 0.70 - 3.10 10*3/mm3    Monocytes, Absolute 0.62 0.10 - 0.90 10*3/mm3    Eosinophils, Absolute 0.07 0.00 - 0.40 10*3/mm3    Basophils, Absolute 0.03 0.00 - 0.20 10*3/mm3    Immature Grans, Absolute 0.06 (H) 0.00 - 0.05 10*3/mm3    nRBC 0.0 0.0 - 0.2 /100 WBC   Blood Gas, Arterial -    Specimen: Arterial Blood   Result Value Ref Range    Site Arterial Line     Alfonso's Test N/A     pH, Arterial 7.383 7.350 - 7.450 pH units    pCO2, Arterial 35.3 35.0 - 45.0 mm Hg    pO2, Arterial 76.7 (L) 83.0 - 108.0 mm Hg    HCO3, Arterial 21.0 20.0 - 26.0 mmol/L    Base Excess, Arterial -3.6 (L) 0.0 - 2.0 mmol/L     O2 Saturation, Arterial 96.8 94.0 - 99.0 %    Barometric Pressure for Blood Gas 746 mmHg    Modality Nasal Cannula     Flow Rate 4.0 lpm    Ventilator Mode NA     Collected by RT    Comprehensive Metabolic Panel    Specimen: Blood   Result Value Ref Range    Glucose 90 65 - 99 mg/dL    BUN 13 8 - 23 mg/dL    Creatinine 0.97 0.57 - 1.00 mg/dL    Sodium 134 (L) 136 - 145 mmol/L    Potassium 3.7 3.5 - 5.2 mmol/L    Chloride 101 98 - 107 mmol/L    CO2 25.0 22.0 - 29.0 mmol/L    Calcium 8.4 (L) 8.6 - 10.5 mg/dL    Total Protein 5.1 (L) 6.0 - 8.5 g/dL    Albumin 2.50 (L) 3.50 - 5.20 g/dL    ALT (SGPT) 8 1 - 33 U/L    AST (SGOT) 14 1 - 32 U/L    Alkaline Phosphatase 82 39 - 117 U/L    Total Bilirubin 0.4 0.0 - 1.2 mg/dL    eGFR Non African Amer 57 (L) >60 mL/min/1.73    Globulin 2.6 gm/dL    A/G Ratio 1.0 g/dL    BUN/Creatinine Ratio 13.4 7.0 - 25.0    Anion Gap 8.0 5.0 - 15.0 mmol/L   CBC Auto Differential    Specimen: Blood   Result Value Ref Range    WBC 12.98 (H) 3.40 - 10.80 10*3/mm3    RBC 3.08 (L) 3.77 - 5.28 10*6/mm3    Hemoglobin 9.1 (L) 12.0 - 15.9 g/dL    Hematocrit 27.7 (L) 34.0 - 46.6 %    MCV 89.9 79.0 - 97.0 fL    MCH 29.5 26.6 - 33.0 pg    MCHC 32.9 31.5 - 35.7 g/dL    RDW 14.6 12.3 - 15.4 %    RDW-SD 47.8 37.0 - 54.0 fl    MPV 10.8 6.0 - 12.0 fL    Platelets 169 140 - 450 10*3/mm3    Neutrophil % 85.9 (H) 42.7 - 76.0 %    Lymphocyte % 7.2 (L) 19.6 - 45.3 %    Monocyte % 5.9 5.0 - 12.0 %    Eosinophil % 0.2 (L) 0.3 - 6.2 %    Basophil % 0.4 0.0 - 1.5 %    Immature Grans % 0.4 0.0 - 0.5 %    Neutrophils, Absolute 11.16 (H) 1.70 - 7.00 10*3/mm3    Lymphocytes, Absolute 0.93 0.70 - 3.10 10*3/mm3    Monocytes, Absolute 0.76 0.10 - 0.90 10*3/mm3    Eosinophils, Absolute 0.03 0.00 - 0.40 10*3/mm3    Basophils, Absolute 0.05 0.00 - 0.20 10*3/mm3    Immature Grans, Absolute 0.05 0.00 - 0.05 10*3/mm3    nRBC 0.0 0.0 - 0.2 /100 WBC   Comprehensive Metabolic Panel    Specimen: Blood   Result Value Ref Range    Glucose  97 65 - 99 mg/dL    BUN 13 8 - 23 mg/dL    Creatinine 0.89 0.57 - 1.00 mg/dL    Sodium 133 (L) 136 - 145 mmol/L    Potassium 3.6 3.5 - 5.2 mmol/L    Chloride 100 98 - 107 mmol/L    CO2 25.0 22.0 - 29.0 mmol/L    Calcium 8.3 (L) 8.6 - 10.5 mg/dL    Total Protein 5.1 (L) 6.0 - 8.5 g/dL    Albumin 2.70 (L) 3.50 - 5.20 g/dL    ALT (SGPT) 9 1 - 33 U/L    AST (SGOT) 19 1 - 32 U/L    Alkaline Phosphatase 85 39 - 117 U/L    Total Bilirubin 0.4 0.0 - 1.2 mg/dL    eGFR Non African Amer 63 >60 mL/min/1.73    Globulin 2.4 gm/dL    A/G Ratio 1.1 g/dL    BUN/Creatinine Ratio 14.6 7.0 - 25.0    Anion Gap 8.0 5.0 - 15.0 mmol/L   CBC Auto Differential    Specimen: Blood   Result Value Ref Range    WBC 10.88 (H) 3.40 - 10.80 10*3/mm3    RBC 3.13 (L) 3.77 - 5.28 10*6/mm3    Hemoglobin 9.2 (L) 12.0 - 15.9 g/dL    Hematocrit 27.3 (L) 34.0 - 46.6 %    MCV 87.2 79.0 - 97.0 fL    MCH 29.4 26.6 - 33.0 pg    MCHC 33.7 31.5 - 35.7 g/dL    RDW 14.5 12.3 - 15.4 %    RDW-SD 46.5 37.0 - 54.0 fl    MPV 10.3 6.0 - 12.0 fL    Platelets 177 140 - 450 10*3/mm3    Neutrophil % 84.5 (H) 42.7 - 76.0 %    Lymphocyte % 9.0 (L) 19.6 - 45.3 %    Monocyte % 4.8 (L) 5.0 - 12.0 %    Eosinophil % 1.0 0.3 - 6.2 %    Basophil % 0.4 0.0 - 1.5 %    Immature Grans % 0.3 0.0 - 0.5 %    Neutrophils, Absolute 9.20 (H) 1.70 - 7.00 10*3/mm3    Lymphocytes, Absolute 0.98 0.70 - 3.10 10*3/mm3    Monocytes, Absolute 0.52 0.10 - 0.90 10*3/mm3    Eosinophils, Absolute 0.11 0.00 - 0.40 10*3/mm3    Basophils, Absolute 0.04 0.00 - 0.20 10*3/mm3    Immature Grans, Absolute 0.03 0.00 - 0.05 10*3/mm3    nRBC 0.0 0.0 - 0.2 /100 WBC   ECG 12 Lead   Result Value Ref Range    QT Interval 454 ms    QTC Interval 482 ms   Adult Transthoracic Echo Complete W/ Cont if Necessary Per Protocol   Result Value Ref Range    BSA 1.6 m^2    RVIDd 3.0 cm    IVSd 1.0 cm    LVIDd 3.7 cm    LVIDs 2.6 cm    LVPWd 1.1 cm    IVS/LVPW 0.91     FS 28.6 %    EDV(Teich) 58.1 ml    ESV(Teich) 25.6 ml     EF(Teich) 56.0 %    EDV(cubed) 50.7 ml    ESV(cubed) 18.4 ml    EF(cubed) 63.7 %    LV mass(C)d 120.8 grams    LV mass(C)dI 76.5 grams/m^2    SV(Teich) 32.6 ml    SI(Teich) 20.6 ml/m^2    SV(cubed) 32.3 ml    SI(cubed) 20.4 ml/m^2    Ao root diam 3.1 cm    Ao root area 7.5 cm^2    ACS 1.1 cm    LA dimension 3.6 cm    LA/Ao 1.2     LVOT diam 2.0 cm    LVOT area 3.1 cm^2    LVOT area(traced) 3.1 cm^2    LVLd ap4 5.7 cm    EDV(MOD-sp4) 41.4 ml    LVLs ap4 4.3 cm    ESV(MOD-sp4) 13.5 ml    EF(MOD-sp4) 67.4 %    LVLd ap2 5.1 cm    EDV(MOD-sp2) 31.0 ml    LVLs ap2 4.6 cm    ESV(MOD-sp2) 12.5 ml    EF(MOD-sp2) 59.7 %    SV(MOD-sp4) 27.9 ml    SI(MOD-sp4) 17.7 ml/m^2    SV(MOD-sp2) 18.5 ml    SI(MOD-sp2) 11.7 ml/m^2    Ao root area (BSA corrected) 2.0     LV [place] Vol (BSA corrected) 26.2 ml/m^2    LV Sys Vol (BSA corrected) 8.5 ml/m^2    MV E max steve 180.0 cm/sec    MV A max steve 164.0 cm/sec    MV E/A 1.1     MV V2 max 224.0 cm/sec    MV max PG 20.1 mmHg    MV V2 mean 144.0 cm/sec    MV mean PG 9.0 mmHg    MV V2 VTI 69.6 cm    MVA(VTI) 1.1 cm^2    MV P1/2t max steve 232.0 cm/sec    MV P1/2t 108.2 msec    MVA(P1/2t) 2.0 cm^2    MV dec slope 628.0 cm/sec^2    Ao pk steve 194.0 cm/sec    Ao max PG 15.1 mmHg    Ao max PG (full) 8.2 mmHg    Ao V2 mean 127.0 cm/sec    Ao mean PG 8.0 mmHg    Ao mean PG (full) 5.0 mmHg    Ao V2 VTI 31.9 cm    HAROON(I,A) 2.4 cm^2    HAROON(I,D) 2.4 cm^2    HAROON(V,A) 2.1 cm^2    HAROON(V,D) 2.1 cm^2    AI max steve 461.0 cm/sec    AI max PG 85.0 mmHg    AI dec slope 148.0 cm/sec^2    AI P1/2t 912.3 msec    LV V1 max PG 6.9 mmHg    LV V1 mean PG 3.0 mmHg    LV V1 max 131.0 cm/sec    LV V1 mean 75.9 cm/sec    LV V1 VTI 24.8 cm    MR max steve 408.0 cm/sec    MR max PG 66.6 mmHg    SV(Ao) 240.8 ml    SI(Ao) 152.4 ml/m^2    SV(LVOT) 77.9 ml    SI(LVOT) 49.3 ml/m^2    PA V2 max 98.0 cm/sec    PA max PG 3.8 mmHg    PA max PG (full) 1.2 mmHg    RV V1 max PG 2.7 mmHg    RV V1 mean PG 1.0 mmHg    RV V1 max 81.6 cm/sec    RV  V1 mean 55.6 cm/sec    RV V1 VTI 11.3 cm    TR max steve 392.0 cm/sec    RVSP(TR) 71.5 mmHg    RAP systole 10.0 mmHg    MVA P1/2T LCG 0.95 cm^2     CV ECHO ANTONETTE - BZI_BMI 20.8 kilograms/m^2     CV ECHO ANTONETTE - BSA(HAYCOCK) 1.6 m^2     CV ECHO ANTONETTE - BZI_METRIC_WEIGHT 54.9 kg     CV ECHO ANTONETTE - BZI_METRIC_HEIGHT 162.6 cm    Target HR (85%) 130 bpm    Max. Pred. HR (100%) 153 bpm    Echo EF Estimated 57 %   Lavender Top   Result Value Ref Range    Extra Tube hold for add-on    Gold Top - SST   Result Value Ref Range    Extra Tube Hold for add-ons.             Assessment:     Diagnoses and all orders for this visit:    1. Constipation, unspecified constipation type (Primary)  -     Discontinue: polyethylene glycol (MIRALAX) 17 g packet; Take 17 g by mouth Daily.  Dispense: 30 each    2. Mass of anus    3. Anemia of chronic disease        Plan:     I have seen and examined the patient.  I have reviewed the notes, assessments, and/or procedures performed by Dr. Chavez during the office visit.  I concur with her/his documentation and assessment and plan for Michelle Child.      This document has been electronically signed by Coral Berry MD on March 26, 2021 17:02 CDT

## 2021-03-26 NOTE — TELEPHONE ENCOUNTER
[facility] CALLED REGARDING PATIENT.  THEY WANTED TO LET PROVIDER KNOW THAT SHE SAW DR. ROBLES TODAY AND WILL BE HAVING SURGERY ON WED THE 31ST.    CALL BACK NUMBER FOR THEM [phone].    THANKS,  PEDRO    PATIENT IS SEEING DR. MEDINA WHILE IN THE NURSING HOME.

## 2021-03-26 NOTE — PROGRESS NOTES
CHIEF COMPLAINT:    Hospital follow-up for anal mass    HISTORY OF PRESENT ILLNESS:    Michelle Child is a 65 y.o. female who was recently hospitalized with a UTI and fatigue.  She was noted to have an anal mass at the time of that hospitalization.  She is here today for outpatient follow-up and scheduling of a biopsy.  He notes continued 5 out of 10 constant pain in the perianal region.  She states that she is eating and drinking without issue.    Past Medical History:   Diagnosis Date   • Alkaline phosphatase raised    • Astigmatism    • Bilateral pseudophakia    • Coronary arteriosclerosis    • Depressive disorder    • Edema of lower extremity    • Encounter for general adult medical examination with abnormal findings    • Essential hypertension    • Gastroesophageal reflux disease    • H/O bone density study     DXA BONE DENSITY AXIAL    04/22/2016   • H/O screening mammography     SCREENING MAMMOGRAPHY     04/25/2016   • Hx of screening mammography     x3; declined screening, understands risks and benefits and still declines      07/24/2015   • Influenza vaccine administered     INFLUENZA IMMUN ADMIN OR PREV RECV'D   x4       04/01/2016   • Insomnia    • Iron deficiency    • Migraine    • Tobacco dependence     continuous         Past Surgical History:   Procedure Laterality Date   • APPENDECTOMY     • AUGMENTATION MAMMAPLASTY     • BREAST IMPLANT SURGERY     • CATARACT EXTRACTION WITH INTRAOCULAR LENS IMPLANT  12/17/2003    Phacoemulsification of a cataract with intraocular lens implant of rigt eye, Nixon model SA 60-AT; serial # [id number];20.5 diopter   • CHOLECYSTECTOMY  08/06/2007    Laparoscopic cholecystectomy. Symptomatic gallstones   • COLONOSCOPY  10/15/2013    declined stool cards and colonscopy   • ENDOSCOPY AND COLONOSCOPY  05/22/2014    Internal & external hemorrhoids found.   • ENDOSCOPY W/ PEG TUBE PLACEMENT  05/22/2014    EGD w/ tube: Normal esopahgus. Gastritis in stomach. Biopsy taken.  Normal duodenum. Biopsy taken.   • INJECTION OF MEDICATION  07/24/2013    Inj(s) Tend-Sheath, Ligament, Single   • INJECTION OF MEDICATION  11/17/2014    Kenalog x6   • INJECTION OF MEDICATION  04/01/2016    PNEUMOC VAC/ADMIN/RCVD    • INJECTION OF MEDICATION  12/05/2014    Toradol   • OTHER SURGICAL HISTORY  01/19/2014    Drain/Inject Major Joint   x2   • PAP SMEAR  10/06/2011   • SUBTOTAL HYSTERECTOMY      Partial hyst    • TONSILLECTOMY     • TUBAL ABDOMINAL LIGATION         Prior to Admission medications    Medication Sig Start Date End Date Taking? Authorizing Provider   acetaminophen (TYLENOL) 325 MG tablet Take 650 mg by mouth Every 4 (Four) Hours As Needed for Mild Pain .   Yes ProviderMele MD   albuterol sulfate  (90 Base) MCG/ACT inhaler INHALE 2 PUFFS EVERY 4 (FOUR) AS NEEDED  FOR WHEEZING. 10/6/20  Yes Coral Berry MD   ferrous sulfate 325 (65 Fe) MG tablet TAKE ONE TABLET BY MOUTH TWICE A DAY WITH MEALS  1/13/21  Yes Esa العراقي MD   FLUoxetine (PROzac) 20 MG capsule TAKE ONE CAPSULE BY MOUTH ONCE DAILY FOR DEPRESSION 5/1/20  Yes Coral Berry MD   FLUoxetine (PROzac) 40 MG capsule TAKE ONE CAPSULE BY MOUTH ONCE DAILY FOR DEPRESSION 5/1/20  Yes Croal Berry MD   folic acid (FOLVITE) 1 MG tablet TAKE ONE TABLET BY MOUTH ONCE DAILY 5/1/20  Yes Coral Berry MD   furosemide (LASIX) 20 MG tablet TAKE 1/2 TABLET BY MOUTH DAILY FOR FLUID RETENTION 5/1/20  Yes Coral Berry MD   losartan (COZAAR) 100 MG tablet Take 1 tablet by mouth Daily. For heart 7/2/20  Yes Coral Berry MD   omeprazole (priLOSEC) 20 MG capsule Take 20 mg by mouth Daily.   Yes ProviderMele MD   ondansetron (ZOFRAN) 4 MG tablet Take 1 tablet by mouth Every 6 (Six) Hours As Needed for Nausea or Vomiting. 8/19/19  Yes Coral Berry MD   polyethylene glycol (MIRALAX) 17 g packet Take 17 g by mouth Daily. 3/23/21  Yes Coral Berry MD   potassium chloride 10 MEQ CR tablet  "TAKE ONE TABLET BY MOUTH TWICE A DAY  3/8/21  Yes Esa العراقي MD   pravastatin (PRAVACHOL) 40 MG tablet Take 1 tablet by mouth Every Night. 7/2/20  Yes Coral Berry MD   risperiDONE (risperDAL) 1 MG tablet TAKE ONE TABLET BY MOUTH EACH EVENING AT BEDTIME FOR INSOMNIA 5/1/20  Yes Coral Berry MD   SM Aspirin Adult Low Strength 81 MG EC tablet TAKE ONE TABLET BY MOUTH ONCE DAILY  1/13/21  Yes Esa العراقي MD   Symbicort 160-4.5 MCG/ACT inhaler INHALE TWO (2) PUFFS BY MOUTH TWICE A DAY  1/13/21  Yes Esa العراقي MD   cefdinir (OMNICEF) 300 MG capsule Take 1 capsule by mouth 2 (Two) Times a Day for 6 days. 3/19/21 3/25/21  Madisyn Escobar MD       Allergies   Allergen Reactions   • Codeine Nausea And Vomiting   • Penicillins Nausea And Vomiting   • Sulfa Antibiotics Nausea And Vomiting       Family History   Problem Relation Age of Onset   • Cholelithiasis Mother    • Thyroid cancer Sister    • Thyroid disease Sister         Thyroid disorder   • Graves' disease Sister    • Cholelithiasis Maternal Grandmother    • Thyroid cancer Other         Other 2nd degree relative, thyroid   • Diabetes Other    • Hypertension Other        Social History     Socioeconomic History   • Marital status:      Spouse name: Not on file   • Number of children: Not on file   • Years of education: Not on file   • Highest education level: Not on file   Tobacco Use   • Smoking status: Former Smoker     Packs/day: 1.00     Years: 39.00     Pack years: 39.00     Quit date: 1/10/2018     Years since quitting: 3.2   • Smokeless tobacco: Never Used   • Tobacco comment: SMOKED FOR 20>PLUS YRS   Substance and Sexual Activity   • Alcohol use: No   • Drug use: No   • Sexual activity: Defer       Review of Systems   Gastrointestinal: Negative for abdominal pain.        Anal pain       Objective     /60   Pulse 87   Temp 98.8 °F (37.1 °C) (Oral)   Ht 162.6 cm (64\")   Wt 54.4 kg (120 lb)   BMI 20.60 kg/m² "     Physical Exam  Vitals reviewed.   Constitutional:       General: She is not in acute distress.     Appearance: Normal appearance. She is not ill-appearing, toxic-appearing or diaphoretic.   HENT:      Head: Normocephalic and atraumatic.   Eyes:      General: No scleral icterus.        Right eye: No discharge.         Left eye: No discharge.      Extraocular Movements: Extraocular movements intact.      Conjunctiva/sclera: Conjunctivae normal.   Cardiovascular:      Rate and Rhythm: Normal rate.   Pulmonary:      Effort: Pulmonary effort is normal. No respiratory distress.   Skin:     General: Skin is warm.   Neurological:      General: No focal deficit present.      Mental Status: She is alert and oriented to person, place, and time.   Psychiatric:         Mood and Affect: Mood normal.         Behavior: Behavior normal.         Thought Content: Thought content normal.         Judgment: Judgment normal.           ASSESSMENT:    Anal mass, likely malignant    PLAN:    I discussed with the patient the need for biopsy of this area.  She is agreeable to proceeding with this.  The risks and benefits of rectal examination under anesthesia with anal biopsy were discussed.  She is scheduled for the procedure on 3/31/2021.          This document has been electronically signed by Dimitry Schuster MD on March 26, 2021 10:53 CDT

## 2021-03-31 NOTE — ANESTHESIA PROCEDURE NOTES
Airway  Urgency: elective    Date/Time: 3/31/2021 1:36 PM  End Time:3/31/2021 1:36 PM  Airway not difficult    General Information and Staff    Patient location during procedure: OR  CRNA: Landy Coto CRNA    Indications and Patient Condition  Indications for airway management: airway protection    Preoxygenated: yes  Mask difficulty assessment: 0 - not attempted    Final Airway Details  Final airway type: supraglottic airway      Successful airway: I-gel  Size 3    Number of attempts at approach: 1  Assessment: lips, teeth, and gum same as pre-op

## 2021-03-31 NOTE — ANESTHESIA PREPROCEDURE EVALUATION
Anesthesia Evaluation     Patient summary reviewed and Nursing notes reviewed   no history of anesthetic complications:  NPO Solid Status: > 8 hours  NPO Liquid Status: > 8 hours           Airway   Mallampati: II  TM distance: >3 FB  Neck ROM: full  Anterior and Possible difficult intubation  Dental    (+) lower dentures and upper dentures    Pulmonary     breath sounds clear to auscultation  (+) pneumonia resolved , a smoker Former, COPD moderate, decreased breath sounds,   (-) asthma, sleep apnea, rhonchi, wheezes, no home oxygen  Cardiovascular   Exercise tolerance: poor (<4 METS)    ECG reviewed  Rhythm: regular  Rate: normal    (+) hypertension well controlled less than 2 medications, valvular problems/murmurs TI and MS, CHF Diastolic >=55%,   (-) pacemaker, past MI, CAD, dysrhythmias, murmur, DVT    ROS comment: TTE 3/17/2021:  · Estimated left ventricular EF = 57% Left ventricular ejection fraction appears to be 56 - 60%. Left ventricular systolic function is normal.  · Left ventricular diastolic function is consistent with (grade I) impaired relaxation.  · Estimated right ventricular systolic pressure from tricuspid regurgitation is markedly elevated (>55 mmHg).    Neuro/Psych  (+) CVA residual symptoms, headaches, psychiatric history Depression,     (-) seizures, TIA  GI/Hepatic/Renal/Endo    (+)  GERD well controlled,    (-)  obesity, diabetes    Musculoskeletal     Abdominal  - normal exam   Substance History - negative use     OB/GYN negative ob/gyn ROS         Other   arthritis,      ROS/Med Hx Other: Hx of mild COPD- a couple times a month uses albuterol inhaler. Symbicort Daily.     Hx of CVA many years ago- No sequelae    Recently started using a walker the past couple of weeks    MODERATE/SEVERE PULMONARY HTN        Phys Exam Other: Doesn't open mouth very much. Upper & lower denture. Anterior airway                Anesthesia Plan    ASA 4     general     intravenous induction     Anesthetic plan,  all risks, benefits, and alternatives have been provided, discussed and informed consent has been obtained with: patient and child.

## 2021-03-31 NOTE — ANESTHESIA POSTPROCEDURE EVALUATION
Patient: Michelle Child    Procedure Summary     Date: 03/31/21 Room / Location: Health system OR 03 / Health system OR    Anesthesia Start: 1330 Anesthesia Stop: 1357    Procedure: RECTAL EXAMINATION  UNDER ANESTHESIA with anal biopsy                  (candy canes) (N/A Rectum) Diagnosis:       Mass of anus      (Mass of anus [K62.89])    Surgeons: Dimitry Schuster MD Provider: Bettina Francis DO    Anesthesia Type: general ASA Status: 4          Anesthesia Type: general    Vitals  No vitals data found for the desired time range.          Post Anesthesia Care and Evaluation    Patient location during evaluation: PACU  Patient participation: waiting for patient participation  Pain management: adequate  Airway patency: patent  Anesthetic complications: No anesthetic complications  PONV Status: none  Cardiovascular status: hemodynamically stable  Respiratory status: spontaneous ventilation and room air (LMA)  Hydration status: acceptable

## 2021-04-02 PROBLEM — N30.00 ACUTE CYSTITIS: Status: RESOLVED | Noted: 2021-01-01 | Resolved: 2021-01-01

## 2021-04-02 PROBLEM — R41.0 DISORIENTATION: Status: RESOLVED | Noted: 2021-01-01 | Resolved: 2021-01-01

## 2021-04-02 PROBLEM — C21.0 ANAL SQUAMOUS CELL CARCINOMA (HCC): Status: ACTIVE | Noted: 2021-01-01

## 2021-04-02 PROBLEM — J18.9 ATYPICAL PNEUMONIA: Status: RESOLVED | Noted: 2018-01-10 | Resolved: 2021-01-01

## 2021-04-02 PROBLEM — K59.02 CONSTIPATION DUE TO OUTLET DYSFUNCTION: Status: ACTIVE | Noted: 2021-01-01

## 2021-04-02 PROBLEM — E87.6 HYPOKALEMIA: Status: RESOLVED | Noted: 2018-01-10 | Resolved: 2021-01-01

## 2021-04-02 NOTE — PROGRESS NOTES
MONTHLY NURSING HOME VISIT    NAME: Michelle Child  : 1955  MRN: [id number]    DATE & TIME SEEN: 21 0826    PCP: Coral Berry MD    NURSING HOME: [facility]    Monthly Nursing Home Visit for hospital follow up after being treated for acute metabolic encephalopathy, UTI, PNA and suspected (now confirmed) anal cancer.     Chief Complaint: constipation     Subjective:     Michelle Child is a 65 y.o. female with a current medical history including COPD, hypertension, depression, iron deficiency anemia who was discharged from the hospital on 3/19/2021 after being treated for acute metabolic encephalopathy, UTI, pneumonia and suspected anal cancer.    You have chosen to receive care through a telephone visit. Do you consent to use a telephone visit for your medical care today? Yes    Patient recently had a biopsy done by Dr. 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  Patient is aware that she has cancer.  Patient would like to pursue treatment for the cancer.  She denies any rectal bleeding or blood in her stool.  She does endorse constipation and states that she has not had a bowel movement in multiple days.  She states it is painful when she has bowel movements.  At her last visit docusate was added to her medication list.  She has a follow-up appointment with Dr. Schuster on 2021 but is not currently referred to oncology at this time.    In regards to the patient's recent pneumonia she denies any shortness of air, she is saturating well on room air, and she denies any cough or sputum production.    In regards to the patient's deconditioning she continues to work with physical and Occupational Therapy and feels that she is making significant improvements in her abilities to perform activities of daily living.    In regard to the patient's recent UTI she denies any hematuria, dysuria, frequency or suprapubic pain.    Current  Meds:    Current Outpatient Medications:   •  acetaminophen (TYLENOL) 325 MG tablet, Take 650 mg by mouth Every 4 (Four) Hours As Needed for Mild Pain ., Disp: , Rfl:   •  albuterol sulfate  (90 Base) MCG/ACT inhaler, INHALE 2 PUFFS EVERY 4 (FOUR) AS NEEDED  FOR WHEEZING., Disp: 8.5 g, Rfl: 11  •  docusate sodium (COLACE) 100 MG capsule, Take 100 mg by mouth 2 (Two) Times a Day., Disp: , Rfl:   •  ferrous sulfate 325 (65 Fe) MG tablet, TAKE ONE TABLET BY MOUTH TWICE A DAY WITH MEALS , Disp: 60 each, Rfl: 5  •  FLUoxetine (PROzac) 20 MG capsule, TAKE ONE CAPSULE BY MOUTH ONCE DAILY FOR DEPRESSION, Disp: 90 capsule, Rfl: 3  •  FLUoxetine (PROzac) 40 MG capsule, TAKE ONE CAPSULE BY MOUTH ONCE DAILY FOR DEPRESSION, Disp: 90 capsule, Rfl: 3  •  folic acid (FOLVITE) 1 MG tablet, TAKE ONE TABLET BY MOUTH ONCE DAILY (Patient taking differently: Take 1 mg by mouth Daily.), Disp: 90 tablet, Rfl: 3  •  furosemide (LASIX) 20 MG tablet, TAKE 1/2 TABLET BY MOUTH DAILY FOR FLUID RETENTION (Patient taking differently: Take 20 mg by mouth Take As Directed. TAKE 1/2 TABLET BY MOUTH DAILY), Disp: 45 tablet, Rfl: 3  •  losartan (COZAAR) 100 MG tablet, Take 1 tablet by mouth Daily. For heart, Disp: 90 tablet, Rfl: 3  •  omeprazole (priLOSEC) 20 MG capsule, Take 20 mg by mouth Daily., Disp: , Rfl:   •  potassium chloride 10 MEQ CR tablet, TAKE ONE TABLET BY MOUTH TWICE A DAY , Disp: 60 tablet, Rfl: 0  •  pravastatin (PRAVACHOL) 40 MG tablet, Take 1 tablet by mouth Every Night., Disp: 90 tablet, Rfl: 3  •  risperiDONE (risperDAL) 1 MG tablet, TAKE ONE TABLET BY MOUTH EACH EVENING AT BEDTIME FOR INSOMNIA, Disp: 90 tablet, Rfl: 3  •  SM Aspirin Adult Low Strength 81 MG EC tablet, TAKE ONE TABLET BY MOUTH ONCE DAILY , Disp: 30 tablet, Rfl: 3  •  Symbicort 160-4.5 MCG/ACT inhaler, INHALE TWO (2) PUFFS BY MOUTH TWICE A DAY , Disp: 10.2 g, Rfl: 5    Allergies:  Codeine, Penicillins, and Sulfa antibiotics    Review of Systems:  Review of  Systems   Constitutional: Negative for chills and fever.   HENT: Negative for congestion and sore throat.    Eyes: Negative for redness and itching.   Respiratory: Negative for cough, shortness of breath and wheezing.    Cardiovascular: Negative for chest pain and palpitations.   Gastrointestinal: Positive for constipation and rectal pain. Negative for abdominal pain, anal bleeding, blood in stool and nausea.   Endocrine: Negative for polydipsia and polyphagia.   Genitourinary: Negative for difficulty urinating, dysuria, hematuria and urgency.   Musculoskeletal: Negative for arthralgias and myalgias.   Skin: Negative for rash and wound.   Neurological: Negative for dizziness and light-headedness.   Psychiatric/Behavioral: Negative for agitation and confusion.       Objective:     /54   Pulse 82   Temp 97.8 °F (36.6 °C)   Resp 18   SpO2 94%   Physical Exam  Constitutional:       Comments: Physical exam could not be completed as this was a telephone visit.       Diagnostic Data:   Anal squamous cell carcinoma on frozen section noted.     Permanent specimen pathology results pending.      Assessment/Plan:      65 y.o. female with:  Problem List Items Addressed This Visit        Gastrointestinal Abdominal     Constipation due to outlet dysfunction - Primary    Overview     In setting of anal squamous cell carcinoma. Painful, nonbloody BMs.   -Increase colace to 300 mg daily  -Continue miralax once daily             Hematology and Neoplasia    Anal squamous cell carcinoma (CMS/HCC)    Overview     Confirmed on frozen section on 03/31/21.   -F/u apt with Dr. Schuster on 4/5/21  -Referral to oncology             CODE STATUS: DNR    Time: 16 min    Dr. العراقي  is the attending on record for this patient, He is aware of the patient's status and agrees with the above.      This document has been electronically signed by Michael Chavez MD on April 2, 2021 09:20 CDT

## 2021-04-05 NOTE — PROGRESS NOTES
CHIEF COMPLAINT:    Chief Complaint   Patient presents with   • Post-op     Biopsy of anal mass on 3/31/21.       HISTORY OF PRESENT ILLNESS:    Michelle Child is a 65 y.o. female who underwent biopsy of an anal mass on 3/31/2021.  Frozen section confirmed the presence of squamous carcinoma.  She returns today with her son to discuss this.  She has no new complaints today other than pain in her right arm from a recent fall.    EXAM:  Vitals:    04/05/21 1554   BP: 126/70   Pulse: 82   Temp: 98.7 °F (37.1 °C)         No acute distress    ASSESSMENT:    Recently diagnosed anal squamous carcinoma    PLAN:    Although her final pathology report is not in the system her frozen sections did show squamous carcinoma.  This was discussed with her today.  She has been referred to oncology to discuss chemo and radiation for this.  She has an appointment with them on Wednesday.  She can come back to see me if she needs a port to be placed.          This document has been electronically signed by Dimitry Schuster MD on April 5, 2021 16:10 CDT

## 2021-04-07 NOTE — PROGRESS NOTES
"I was present onsite and saw the patient in conjunction with Dr. Chavez.  Patient was seen via virtual format due to coronavirus restrictions.  Please see Dr. Chavez's note for more detailed information regarding today's encounter.  The patient was aware of the diagnosis of rectal carcinoma and has a follow-up scheduled with general surgery but still needs hematology oncology consultation reportedly her last bowel movement was \"a few days ago\" we will put the patient on routine stool softeners along with MiraLAX.  Patient is tolerating physical therapy and relates her pulmonary symptoms are stable.  I have reviewed the notes, assessments, and/or procedures performed by Dr. Chavez, I concur with her/his documentation and assessment and plan for Michelle Child.                This document has been electronically signed by Esa العراقي MD on April 7, 2021 17:06 CDT    "

## 2021-04-07 NOTE — PROGRESS NOTES
ONCOLOGY PATIENT NAVIGATION    DIAGNOSIS: Anal Cancer  REFERRAL SOURCE: Dr. Everett  ENCOUNTER TYPE: In-Person    NOTE: Spoke with patient and son today at consult appointment with Dr. Everett concerning newly diagnosed anal cancer. Time spent with pt was brief due to her extremely uncomfortable status while in sitting position for appointment. Referral received for nurse navigation support in cancer care in [facility]’s absence.     EDUCATION PROVIDED: Informed pt of my role as Oncology Patient Navigator in cancer care and my support to pt. and family. Pt. was given resources for support and education and contact information for Eli Morven, Oncology Nurse Navigator and myself. Pt validated family support to include son who was present at visit today. Pt is resident of [facility]; however, son states that there are no logistical or transportation barriers to care.  Educated pt on team navigation approach offered at [facility] to include Oncology Patient Navigator currently in place, Oncology Social Worker, Financial Navigator, and Dietician. Delma Lewis LCSW and Tana Davis, Financial Navigator met with patient today as well.  Explained to patient that Eli Calderón would be serving as her primary Navigator and that I would be happy to assist her in any way in [facility]’s absence.     NEEDS ASSESSMENT:     Barriers to care:  1. Information Needs/Questions/Understanding  2. Coordinating Appts/Tests/Procedures on Patient Behalf  3. Physical limitations and discomfort    Action Plan to Address Barriers:  1. Educate patient about Diagnosis and Treatment Planning  2. Assist Patient with Formulating Questions to Ask Healthcare Team  3. Provided Written Education and Information  4. Provided Verbal Education  5. Provide Emotional Support as needed  6. Navigator Available by Phone for Supportive Care Needs  7. Serve as Liaison between Patient and Healthcare Team    NAVIGATION OUTCOMES ACHIEVED:  1. Improved  Patient Satisfaction  2. Improved Healthcare Efficiency  3. Reduced Care Fragmentation  4. Improved Timeliness to Care  5. Distress Triage    PLAN OF CARE: Provided pt. with Eli Calderón’s contact information as well as my own and encouraged pt to call Eli or myself with concerns or questions. Reinforced plan of care for PET scan 4/9/21 and follow-up appt. with Dr. Everett on 4/14/21. Pt and son stated understanding and had no further questions. Dr. Everett’s nurse, Monica Shields contacted [facility] to convey scheduled appointments and PET scan instructions.

## 2021-04-07 NOTE — NURSING NOTE
[facility] contacted regarding pt visit today, along with plan for future appointments. Spoke with pt primary nurse, Neetu. PET education and instructions given as well.  V/u obtained.

## 2021-04-07 NOTE — PROGRESS NOTES
Oncology SW met face to face with patient in the exam room subsequent to her initial visit. She is accompanied in the room by her adult son, Remy Nelson, who presents attentive and supportive.  65 year old female from [place], [place] is currently at [facility] for physical rehabilitation.  She was recently diagnosed with anal mass that was determined by biopsy to be squamous cell cancer.  Per history , pt has memory problems and son presents offering collaborative feedback. Pt. Presents alert and oriented x 3, she speaks very little and is described as introverted in regard to expression of feelings.  Son indicates he and brother, Christo Nelson, share in caring for their mother.  The goal is for her to return home but at present that is not possible Pt. States discomfort as the mass creates difficulty in sitting.  SW offered feedback as to role and support to include  and assessment. Pt. With history of depression and anxiety with son stating concern for her emotional decompensation.  Due to patient's discomfort, SW expedited meeting and provided son with contact info so that pt can return to NH and get more comfortable. SW will continue to follow and support as pt proceeds with PET scan and follow up as definitive plan has not been determined.

## 2021-04-07 NOTE — TELEPHONE ENCOUNTER
DR. CA SCHEDULED A PET SCAN FOR PATIENT ON Friday.  THEY WANTED TO KNOW IF PCP WOULD ORDER HER SOME PAIN MEDICATION TO TAKE SINCE SHE WILL HAVE TO LAY FLAT FOR THE SCAN. PATIENT HAS ALLERGIES TO CODEINE PENICILLIN AND SULFA.    CALL BACK NUMBER FOR THEM IS  [phone].    THANKS,  PEDRO

## 2021-04-07 NOTE — PROGRESS NOTES
DATE OF CONSULT: 4/8/2021    REQUESTING SOURCE:  Michael Chavez MD  [address] [place] [address]      REASON FOR CONSULTATION: Squamous cell cancer of anus, anemia,      HISTORY OF PRESENT ILLNESS:    65-year-old female with medical problem consisting of coronary artery disease, anxiety/depression, history of mini strokes, hypertension was recently admitted to [facility] on March 19, 2021 with complaint of recurrent falls at nursing home and was found to have bleeding per rectum.  Patient was found to have anal mass.  Patient underwent examination under anesthesia by Dr. Schuster on March 31, 2021 with biopsy of mass that showed squamous cell cancer.  Patient has been referred to [facility] for further evaluation and recommendation.  Patient does have a memory problem and some of the history was provided by patient's son.  As per son patient has been having these bleeding per rectum for at least last 1 month.  There is some weight loss but patient is not able to quantify.  Denies any new shortness of breath or chest pain.  Denies any new lymph node enlargement.  Patient had another fall at nursing home 2 days ago.  Complains of constipation.  Denies any tingling or numbness affecting upper or lower extremity.        PAST MEDICAL HISTORY:    Past Medical History:   Diagnosis Date   • Alkaline phosphatase raised    • Astigmatism    • Bilateral pseudophakia    • COPD (chronic obstructive pulmonary disease) (CMS/HCC)    • Coronary arteriosclerosis    • Depressive disorder    • Edema of lower extremity    • Encounter for general adult medical examination with abnormal findings    • Essential hypertension    • Gastroesophageal reflux disease    • H/O bone density study     DXA BONE DENSITY AXIAL    04/22/2016   • H/O screening mammography     SCREENING MAMMOGRAPHY     04/25/2016   • Hx of screening mammography     x3; declined screening, understands risks and benefits and still  declines      07/24/2015   • Influenza vaccine administered     INFLUENZA IMMUN ADMIN OR PREV RECV'D   x4       04/01/2016   • Insomnia    • Iron deficiency    • Migraine    • Rectal mass    • Stroke (CMS/HCC)    • Tobacco dependence     continuous         PAST SURGICAL HISTORY:  Past Surgical History:   Procedure Laterality Date   • APPENDECTOMY     • AUGMENTATION MAMMAPLASTY     • BREAST IMPLANT SURGERY     • CATARACT EXTRACTION WITH INTRAOCULAR LENS IMPLANT  12/17/2003    Phacoemulsification of a cataract with intraocular lens implant of rigt eye, Nixon model SA 60-AT; serial # [id number];20.5 diopter   • CHOLECYSTECTOMY  08/06/2007    Laparoscopic cholecystectomy. Symptomatic gallstones   • COLONOSCOPY  10/15/2013    declined stool cards and colonscopy   • ENDOSCOPY AND COLONOSCOPY  05/22/2014    Internal & external hemorrhoids found.   • ENDOSCOPY W/ PEG TUBE PLACEMENT  05/22/2014    EGD w/ tube: Normal esopahgus. Gastritis in stomach. Biopsy taken. Normal duodenum. Biopsy taken.   • EYE SURGERY     • INJECTION OF MEDICATION  07/24/2013    Inj(s) Tend-Sheath, Ligament, Single   • INJECTION OF MEDICATION  11/17/2014    Kenalog x6   • INJECTION OF MEDICATION  04/01/2016    PNEUMOC VAC/ADMIN/RCVD    • INJECTION OF MEDICATION  12/05/2014    Toradol   • OTHER SURGICAL HISTORY  01/19/2014    Drain/Inject Major Joint   x2   • PAP SMEAR  10/06/2011   • RECTAL EXAMINATION UNDER ANESTHESIA N/A 3/31/2021    Procedure: BIOPSY OF ANAL MASS;  Surgeon: Dimitry Schuster MD;  Location: [facility];  Service: General;  Laterality: N/A;   • SUBTOTAL HYSTERECTOMY      Partial hyst    • TONSILLECTOMY     • TUBAL ABDOMINAL LIGATION         ALLERGIES:    Allergies   Allergen Reactions   • Codeine Nausea And Vomiting   • Penicillins Nausea And Vomiting   • Sulfa Antibiotics Nausea And Vomiting       SOCIAL HISTORY:   Social History     Tobacco Use   • Smoking status: Former Smoker     Packs/day: 1.00     Years: 39.00     Pack  years: 39.00     Quit date: 1/10/2018     Years since quitting: 3.2   • Smokeless tobacco: Never Used   • Tobacco comment: SMOKED FOR 20>PLUS YRS   Vaping Use   • Vaping Use: Never used   Substance Use Topics   • Alcohol use: No   • Drug use: No       CURRENT MEDICATIONS:    Current Outpatient Medications   Medication Sig Dispense Refill   • acetaminophen (TYLENOL) 325 MG tablet Take 650 mg by mouth Every 4 (Four) Hours As Needed for Mild Pain .     • albuterol sulfate  (90 Base) MCG/ACT inhaler INHALE 2 PUFFS EVERY 4 (FOUR) AS NEEDED  FOR WHEEZING. 8.5 g 11   • docusate sodium (COLACE) 100 MG capsule Take 3 capsules by mouth Daily. 90 capsule 0   • ferrous sulfate 325 (65 Fe) MG tablet TAKE ONE TABLET BY MOUTH TWICE A DAY WITH MEALS  60 each 5   • FLUoxetine (PROzac) 20 MG capsule Take 60 mg by mouth Daily.     • folic acid (FOLVITE) 1 MG tablet TAKE ONE TABLET BY MOUTH ONCE DAILY (Patient taking differently: Take 1 mg by mouth Daily.) 90 tablet 3   • furosemide (LASIX) 20 MG tablet TAKE 1/2 TABLET BY MOUTH DAILY FOR FLUID RETENTION (Patient taking differently: Take 20 mg by mouth Take As Directed. TAKE 1/2 TABLET BY MOUTH DAILY) 45 tablet 3   • losartan (COZAAR) 100 MG tablet Take 1 tablet by mouth Daily. For heart 90 tablet 3   • omeprazole (priLOSEC) 20 MG capsule Take 20 mg by mouth Daily.     • potassium chloride 10 MEQ CR tablet TAKE ONE TABLET BY MOUTH TWICE A DAY  60 tablet 0   • pravastatin (PRAVACHOL) 40 MG tablet Take 1 tablet by mouth Every Night. 90 tablet 3   • risperiDONE (risperDAL) 1 MG tablet TAKE ONE TABLET BY MOUTH EACH EVENING AT BEDTIME FOR INSOMNIA 90 tablet 3   • SM Aspirin Adult Low Strength 81 MG EC tablet TAKE ONE TABLET BY MOUTH ONCE DAILY  30 tablet 3   • Symbicort 160-4.5 MCG/ACT inhaler INHALE TWO (2) PUFFS BY MOUTH TWICE A DAY  10.2 g 5   • HYDROcodone-acetaminophen (Norco) 7.5-325 MG per tablet Take 1 tablet by mouth Every 6 (Six) Hours As Needed for Moderate Pain . 5 tablet  0     No current facility-administered medications for this visit.        HOME MEDICATIONS:   Current Outpatient Medications on File Prior to Visit   Medication Sig Dispense Refill   • acetaminophen (TYLENOL) 325 MG tablet Take 650 mg by mouth Every 4 (Four) Hours As Needed for Mild Pain .     • albuterol sulfate  (90 Base) MCG/ACT inhaler INHALE 2 PUFFS EVERY 4 (FOUR) AS NEEDED  FOR WHEEZING. 8.5 g 11   • docusate sodium (COLACE) 100 MG capsule Take 3 capsules by mouth Daily. 90 capsule 0   • ferrous sulfate 325 (65 Fe) MG tablet TAKE ONE TABLET BY MOUTH TWICE A DAY WITH MEALS  60 each 5   • FLUoxetine (PROzac) 20 MG capsule Take 60 mg by mouth Daily.     • folic acid (FOLVITE) 1 MG tablet TAKE ONE TABLET BY MOUTH ONCE DAILY (Patient taking differently: Take 1 mg by mouth Daily.) 90 tablet 3   • furosemide (LASIX) 20 MG tablet TAKE 1/2 TABLET BY MOUTH DAILY FOR FLUID RETENTION (Patient taking differently: Take 20 mg by mouth Take As Directed. TAKE 1/2 TABLET BY MOUTH DAILY) 45 tablet 3   • losartan (COZAAR) 100 MG tablet Take 1 tablet by mouth Daily. For heart 90 tablet 3   • omeprazole (priLOSEC) 20 MG capsule Take 20 mg by mouth Daily.     • potassium chloride 10 MEQ CR tablet TAKE ONE TABLET BY MOUTH TWICE A DAY  60 tablet 0   • pravastatin (PRAVACHOL) 40 MG tablet Take 1 tablet by mouth Every Night. 90 tablet 3   • risperiDONE (risperDAL) 1 MG tablet TAKE ONE TABLET BY MOUTH EACH EVENING AT BEDTIME FOR INSOMNIA 90 tablet 3   • SM Aspirin Adult Low Strength 81 MG EC tablet TAKE ONE TABLET BY MOUTH ONCE DAILY  30 tablet 3   • Symbicort 160-4.5 MCG/ACT inhaler INHALE TWO (2) PUFFS BY MOUTH TWICE A DAY  10.2 g 5     No current facility-administered medications on file prior to visit.       FAMILY HISTORY:    Family History   Problem Relation Age of Onset   • Cholelithiasis Mother    • Thyroid cancer Sister    • Thyroid disease Sister         Thyroid disorder   • Graves' disease Sister    • Cholelithiasis  "Maternal Grandmother    • Thyroid cancer Other         Other 2nd degree relative, thyroid   • Diabetes Other    • Hypertension Other        REVIEW OF SYSTEMS:        CONSTITUTIONAL:  Complains of fatigue.  Positive for recent weight loss but patient is not able to quantify.  Denies any fever, chills .     EYES: No visual disturbances. No discharge. No new lesions    ENMT:  No epistaxis, mouth sores or difficulty swallowing.    RESPIRATORY:  No new shortness of breath. No new cough or hemoptysis.    CARDIOVASCULAR:  No chest pain or palpitations.    GASTROINTESTINAL: Complains of constipation.  States blood in stool has been ongoing for at least last 30 days.  No abdominal pain nausea, vomiting .    GENITOURINARY: No Dysuria or Hematuria.    MUSCULOSKELETAL:  No new back pain or arthralgia..    LYMPHATICS:  Denies any abnormal swollen glands anywhere in the body.    NEUROLOGICAL : Positive for memory problem.  Positive for recurrent falls.  Positive for problem with balance.  No tingling or numbness. No headache or dizziness. No seizures .    SKIN: No new skin lesions.    ENDOCRINE : No new hot or cold intolerance. No new polyuria . No polydipsia.            PHYSICAL EXAMINATION:      VITAL SIGNS:  /66   Pulse 92   Temp 98.2 °F (36.8 °C)   Resp 16   Ht 162.6 cm (64\")   Wt 53.3 kg (117 lb 8 oz)   BMI 20.17 kg/m²       04/07/21  0829   Weight: 53.3 kg (117 lb 8 oz)       ECOG performance status: 2-3    CONSTITUTIONAL:  Not in any distress.    EYES: Mild conjunctival Pallor. No Icterus. No Pterygium. Extraocular Movements intact.No ptosis.    ENMT:  Normocephalic, Atraumatic.No Facial Asymmetry noted.    NECK:  No adenopathy.Trachea midline. NO JVD.    RESPIRATORY:  Fair air entry bilateral. No rhonchi or wheezing.Fair respiratory effort.    CARDIOVASCULAR:  S1, S2. Regular rate and rhythm. No murmur or gallop appreciated.    ABDOMEN:  Soft,  nontender. Bowel sounds present in all four quadrants.  No " Hepatosplenomegaly appreciated.    RECTAL Exam: Rectal exam was performed in presence of registered nurse Carmella.  There is a large fungating mass measuring close to 3 cm in perianal area.    MUSCULOSKELETAL:  No edema.No Calf Tenderness.Decreased range of motion.    NEUROLOGIC:    No  Motor or sensory deficit appreciated. Cranial Nerves 2-12 grossly intact.    SKIN : No new skin lesion identified. Skin is warm and dry to touch.    LYMPHATICS: No new enlarged lymph nodes in neck or supraclavicular area.    PSYCHIATRY: Alert, awake and oriented ×3.Flat  affect.         DIAGNOSTIC DATA:    WBC   Date Value Ref Range Status   04/07/2021 17.16 (H) 3.40 - 10.80 10*3/mm3 Final     RBC   Date Value Ref Range Status   04/07/2021 3.16 (L) 3.77 - 5.28 10*6/mm3 Final     Hemoglobin   Date Value Ref Range Status   04/07/2021 9.1 (L) 12.0 - 15.9 g/dL Final     Hematocrit   Date Value Ref Range Status   04/07/2021 29.0 (L) 34.0 - 46.6 % Final     MCV   Date Value Ref Range Status   04/07/2021 91.8 79.0 - 97.0 fL Final     MCH   Date Value Ref Range Status   04/07/2021 28.8 26.6 - 33.0 pg Final     MCHC   Date Value Ref Range Status   04/07/2021 31.4 (L) 31.5 - 35.7 g/dL Final     RDW   Date Value Ref Range Status   04/07/2021 14.4 12.3 - 15.4 % Final     RDW-SD   Date Value Ref Range Status   04/07/2021 48.1 37.0 - 54.0 fl Final     MPV   Date Value Ref Range Status   04/07/2021 9.7 6.0 - 12.0 fL Final     Platelets   Date Value Ref Range Status   04/07/2021 336 140 - 450 10*3/mm3 Final     Neutrophil %   Date Value Ref Range Status   04/07/2021 90.2 (H) 42.7 - 76.0 % Final     Lymphocyte %   Date Value Ref Range Status   04/07/2021 4.0 (L) 19.6 - 45.3 % Final     Monocyte %   Date Value Ref Range Status   04/07/2021 4.5 (L) 5.0 - 12.0 % Final     Eosinophil %   Date Value Ref Range Status   04/07/2021 0.5 0.3 - 6.2 % Final     Basophil %   Date Value Ref Range Status   04/07/2021 0.3 0.0 - 1.5 % Final     Immature Grans %    Date Value Ref Range Status   04/07/2021 0.5 0.0 - 0.5 % Final     Neutrophils, Absolute   Date Value Ref Range Status   04/07/2021 15.49 (H) 1.70 - 7.00 10*3/mm3 Final     Lymphocytes, Absolute   Date Value Ref Range Status   04/07/2021 0.68 (L) 0.70 - 3.10 10*3/mm3 Final     Monocytes, Absolute   Date Value Ref Range Status   04/07/2021 0.78 0.10 - 0.90 10*3/mm3 Final     Eosinophils, Absolute   Date Value Ref Range Status   04/07/2021 0.08 0.00 - 0.40 10*3/mm3 Final     Basophils, Absolute   Date Value Ref Range Status   04/07/2021 0.05 0.00 - 0.20 10*3/mm3 Final     Immature Grans, Absolute   Date Value Ref Range Status   04/07/2021 0.08 (H) 0.00 - 0.05 10*3/mm3 Final     nRBC   Date Value Ref Range Status   04/07/2021 0.0 0.0 - 0.2 /100 WBC Final     Glucose   Date Value Ref Range Status   04/07/2021 113 (H) 65 - 99 mg/dL Final     Glucose, Arterial   Date Value Ref Range Status   01/15/2018 143 mmol/L Final     Sodium   Date Value Ref Range Status   04/07/2021 135 (L) 136 - 145 mmol/L Final     Potassium   Date Value Ref Range Status   04/07/2021 4.5 3.5 - 5.2 mmol/L Final     CO2   Date Value Ref Range Status   04/07/2021 23.0 22.0 - 29.0 mmol/L Final     Chloride   Date Value Ref Range Status   04/07/2021 101 98 - 107 mmol/L Final     Anion Gap   Date Value Ref Range Status   04/07/2021 11.0 5.0 - 15.0 mmol/L Final     Creatinine   Date Value Ref Range Status   04/07/2021 0.96 0.57 - 1.00 mg/dL Final     BUN   Date Value Ref Range Status   04/07/2021 8 8 - 23 mg/dL Final     BUN/Creatinine Ratio   Date Value Ref Range Status   04/07/2021 8.3 7.0 - 25.0 Final     Calcium   Date Value Ref Range Status   04/07/2021 8.8 8.6 - 10.5 mg/dL Final     eGFR Non  Amer   Date Value Ref Range Status   04/07/2021 58 (L) >60 mL/min/1.73 Final     Alkaline Phosphatase   Date Value Ref Range Status   04/07/2021 105 39 - 117 U/L Final     Total Protein   Date Value Ref Range Status   04/07/2021 5.9 (L) 6.0 - 8.5 g/dL  Final     ALT (SGPT)   Date Value Ref Range Status   04/07/2021 6 1 - 33 U/L Final     AST (SGOT)   Date Value Ref Range Status   04/07/2021 9 1 - 32 U/L Final     Total Bilirubin   Date Value Ref Range Status   04/07/2021 0.5 0.0 - 1.2 mg/dL Final     Albumin   Date Value Ref Range Status   04/07/2021 2.80 (L) 3.50 - 5.20 g/dL Final     Globulin   Date Value Ref Range Status   04/07/2021 3.1 gm/dL Final     Lab Results   Component Value Date    IRON 20 (L) 04/07/2021    TIBC 215 (L) 04/07/2021    LABIRON 9 (L) 04/07/2021    FERRITIN 333.70 (H) 04/07/2021    [id number] 228 04/07/2021    FOLATE >20.00 04/07/2021     Lab Results   Component Value Date    REFLABREPO SEE NOTE: 04/07/2014         Radiology Data :  No radiology results for the last 7 days    Pathology :  Pathology report from 3/31/2021 showed:          * Cannot find OR log *      ASSESSMENT AND PLAN:      1.  Invasive squamous cell cancer of anus:  -Result of pathology showing invasive squamous cell cancer were discussed with patient and her son.  -It was discussed with them will need initial staging work-up consisting of PET/CT to look for extent of cancer and further treatment recommendation.  -Patient has quite poor performance status, I doubt patient will be able to tolerate any cytotoxic chemotherapy.  -We will do blood work today consisting of CBC, CMP, iron studies, ferritin, B12, folate and HIV testing.  -We will ask patient to return to clinic in 1 week to go over the result of blood work and PET/CT and further recommendation.    2.  Anemia:  -Patient is currently on ferrous sulfate 1 tablet p.o. daily.  Patient is complaining of constipation.  Recommend discontinuing ferrous sulfate from today  -Anemia work-up done earlier today shows hemoglobin is 9.1.  Iron studies are consistent with anemia of chronic disease with component of iron deficiency with iron saturation of 9%.  Since patient is not able to tolerate iron by mouth, will start patient  on intravenous Feraheme starting next week.  Side effect of Feraheme including allergic reaction were discussed with patient  -B12 level is borderline low at 228.  We will start patient on B12 1000 mcg p.o. daily.      3.  Leukocytosis:  -Likely reactive due to malignancy.  White blood cell count is 17,000.  Will monitor with CBC for now        3.  Hypertension    4.  Coronary artery disease    5.  Health maintenance: Patient quit smoking in 2018    6. Advance Care Planning   ACP discussion was held with the patient during this visit. Patient has an advance directive in EMR which is still valid.       PHQ-9 Total Score: 0   -Patient is not homicidal or suicidal.  No acute intervention required.      Michelle Child reports a pain score of 0.  Given her pain assessment as noted, treatment options were discussed and the following options were decided upon as a follow-up plan to address the patient's pain: No acute intervention required.             Thank you for this consultation.    Duong Everett MD  4/8/2021  06:56 CDT        Part of this note may be an electronic transcription/translation of spoken language to printed text using the Dragon Dictation System.

## 2021-04-08 PROBLEM — D50.9 IRON DEFICIENCY ANEMIA: Status: ACTIVE | Noted: 2021-01-01

## 2021-04-08 PROBLEM — T45.4X5A ADVERSE EFFECT OF IRON: Status: ACTIVE | Noted: 2021-01-01

## 2021-04-09 NOTE — PROGRESS NOTES
Subjective:     Michelle Child is a 65 y.o. female who presents for increased shortness of breath at rest.  Patient endorses that she has had to be placed on oxygen and feels short of breath.  She denies any coughing, fever or chills.  Denies any sick contacts but is residing in [facility].  She is scheduled for a PET scan in 2 days.   The following portions of the patient's history were reviewed and updated as appropriate: allergies, current medications, past family history, past medical history, past social history, past surgical history and problem list.      Past Medical Hx:  Past Medical History:   Diagnosis Date   • Alkaline phosphatase raised    • Astigmatism    • Bilateral pseudophakia    • COPD (chronic obstructive pulmonary disease) (CMS/HCC)    • Coronary arteriosclerosis    • Depressive disorder    • Edema of lower extremity    • Encounter for general adult medical examination with abnormal findings    • Essential hypertension    • Gastroesophageal reflux disease    • H/O bone density study     DXA BONE DENSITY AXIAL    04/22/2016   • H/O screening mammography     SCREENING MAMMOGRAPHY     04/25/2016   • Hx of screening mammography     x3; declined screening, understands risks and benefits and still declines      07/24/2015   • Influenza vaccine administered     INFLUENZA IMMUN ADMIN OR PREV RECV'D   x4       04/01/2016   • Insomnia    • Iron deficiency    • Migraine    • Rectal mass    • Stroke (CMS/HCC)    • Tobacco dependence     continuous         Past Surgical Hx:  Past Surgical History:   Procedure Laterality Date   • APPENDECTOMY     • AUGMENTATION MAMMAPLASTY     • BREAST IMPLANT SURGERY     • CATARACT EXTRACTION WITH INTRAOCULAR LENS IMPLANT  12/17/2003    Phacoemulsification of a cataract with intraocular lens implant of rigt eye, Nixon model SA 60-AT; serial # [id number];20.5 diopter   • CHOLECYSTECTOMY  08/06/2007    Laparoscopic cholecystectomy. Symptomatic gallstones   •  COLONOSCOPY  10/15/2013    declined stool cards and colonscopy   • ENDOSCOPY AND COLONOSCOPY  05/22/2014    Internal & external hemorrhoids found.   • ENDOSCOPY W/ PEG TUBE PLACEMENT  05/22/2014    EGD w/ tube: Normal esopahgus. Gastritis in stomach. Biopsy taken. Normal duodenum. Biopsy taken.   • EYE SURGERY     • INJECTION OF MEDICATION  07/24/2013    Inj(s) Tend-Sheath, Ligament, Single   • INJECTION OF MEDICATION  11/17/2014    Kenalog x6   • INJECTION OF MEDICATION  04/01/2016    PNEUMOC VAC/ADMIN/RCVD    • INJECTION OF MEDICATION  12/05/2014    Toradol   • OTHER SURGICAL HISTORY  01/19/2014    Drain/Inject Major Joint   x2   • PAP SMEAR  10/06/2011   • RECTAL EXAMINATION UNDER ANESTHESIA N/A 3/31/2021    Procedure: BIOPSY OF ANAL MASS;  Surgeon: Dimitry Schuster MD;  [facility];  Service: General;  Laterality: N/A;   • SUBTOTAL HYSTERECTOMY      Partial hyst    • TONSILLECTOMY     • TUBAL ABDOMINAL LIGATION         Health Maintenance:  Health Maintenance   Topic Date Due   • MAMMOGRAM  05/15/2020   • LUNG CANCER SCREENING  09/14/2021 (Originally 1/19/2019)   • INFLUENZA VACCINE  08/01/2021   • DXA SCAN  08/19/2021   • ANNUAL WELLNESS VISIT  09/14/2021   • COLONOSCOPY  05/22/2024   • TDAP/TD VACCINES (2 - Td) 04/20/2027   • HEPATITIS C SCREENING  Completed   • COVID-19 Vaccine  Completed   • Pneumococcal Vaccine 65+  Completed   • ZOSTER VACCINE  Completed   • MENINGOCOCCAL VACCINE  Aged Out   • PAP SMEAR  Discontinued       Current Meds:    Current Outpatient Medications:   •  acetaminophen (TYLENOL) 325 MG tablet, Take 650 mg by mouth Every 4 (Four) Hours As Needed for Mild Pain ., Disp: , Rfl:   •  albuterol sulfate  (90 Base) MCG/ACT inhaler, INHALE 2 PUFFS EVERY 4 (FOUR) AS NEEDED  FOR WHEEZING., Disp: 8.5 g, Rfl: 11  •  docusate sodium (COLACE) 100 MG capsule, Take 3 capsules by mouth Daily., Disp: 90 capsule, Rfl: 0  •  ferrous sulfate 325 (65 Fe) MG tablet, TAKE ONE TABLET BY  MOUTH TWICE A DAY WITH MEALS , Disp: 60 each, Rfl: 5  •  FLUoxetine (PROzac) 20 MG capsule, Take 60 mg by mouth Daily., Disp: , Rfl:   •  folic acid (FOLVITE) 1 MG tablet, TAKE ONE TABLET BY MOUTH ONCE DAILY (Patient taking differently: Take 1 mg by mouth Daily.), Disp: 90 tablet, Rfl: 3  •  furosemide (LASIX) 20 MG tablet, TAKE 1/2 TABLET BY MOUTH DAILY FOR FLUID RETENTION (Patient taking differently: Take 20 mg by mouth Take As Directed. TAKE 1/2 TABLET BY MOUTH DAILY), Disp: 45 tablet, Rfl: 3  •  HYDROcodone-acetaminophen (Norco) 7.5-325 MG per tablet, Take 1 tablet by mouth Every 6 (Six) Hours As Needed for Moderate Pain ., Disp: 5 tablet, Rfl: 0  •  ipratropium-albuterol (DUO-NEB) 0.5-2.5 mg/3 ml nebulizer, Take 3 mL by nebulization 4 (Four) Times a Day As Needed for Wheezing or Shortness of Air., Disp: 360 mL, Rfl:   •  losartan (COZAAR) 100 MG tablet, Take 1 tablet by mouth Daily. For heart, Disp: 90 tablet, Rfl: 3  •  omeprazole (priLOSEC) 20 MG capsule, Take 20 mg by mouth Daily., Disp: , Rfl:   •  potassium chloride 10 MEQ CR tablet, TAKE ONE TABLET BY MOUTH TWICE A DAY , Disp: 60 tablet, Rfl: 0  •  pravastatin (PRAVACHOL) 40 MG tablet, Take 1 tablet by mouth Every Night., Disp: 90 tablet, Rfl: 3  •  risperiDONE (risperDAL) 1 MG tablet, TAKE ONE TABLET BY MOUTH EACH EVENING AT BEDTIME FOR INSOMNIA, Disp: 90 tablet, Rfl: 3  •  SM Aspirin Adult Low Strength 81 MG EC tablet, TAKE ONE TABLET BY MOUTH ONCE DAILY , Disp: 30 tablet, Rfl: 3  •  Symbicort 160-4.5 MCG/ACT inhaler, INHALE TWO (2) PUFFS BY MOUTH TWICE A DAY , Disp: 10.2 g, Rfl: 5  •  vitamin B-12 (CYANOCOBALAMIN) 1000 MCG tablet, Take 1 tablet by mouth Daily., Disp: 90 tablet, Rfl: 1  No current facility-administered medications for this visit.    Allergies:  Codeine, Penicillins, and Sulfa antibiotics    Family Hx:  Family History   Problem Relation Age of Onset   • Cholelithiasis Mother    • Thyroid cancer Sister    • Thyroid disease Sister          Thyroid disorder   • Graves' disease Sister    • Cholelithiasis Maternal Grandmother    • Thyroid cancer Other         Other 2nd degree relative, thyroid   • Diabetes Other    • Hypertension Other         Social History:  Social History     Socioeconomic History   • Marital status:      Spouse name: Not on file   • Number of children: Not on file   • Years of education: Not on file   • Highest education level: Not on file   Tobacco Use   • Smoking status: Former Smoker     Packs/day: 1.00     Years: 39.00     Pack years: 39.00     Quit date: 1/10/2018     Years since quitting: 3.2   • Smokeless tobacco: Never Used   • Tobacco comment: SMOKED FOR 20>PLUS YRS   Vaping Use   • Vaping Use: Never used   Substance and Sexual Activity   • Alcohol use: No   • Drug use: No   • Sexual activity: Not Currently       Review of Systems  Review of Systems   Constitutional: Positive for activity change. Negative for fatigue and fever.   HENT: Negative.  Negative for ear pain and sore throat.    Eyes: Negative.  Negative for pain and visual disturbance.   Respiratory: Positive for shortness of breath. Negative for cough.    Cardiovascular: Negative.  Negative for chest pain and palpitations.   Gastrointestinal: Positive for rectal pain. Negative for abdominal pain, blood in stool and nausea.   Endocrine: Negative.    Genitourinary: Negative for dysuria.   Musculoskeletal: Negative for arthralgias.   Skin: Negative for rash.   Allergic/Immunologic: Negative.    Neurological: Negative for dizziness, weakness and headaches.   Psychiatric/Behavioral: Negative for sleep disturbance.       Objective:     /60   Pulse 91   Temp 98 °F (36.7 °C)   Resp 18   SpO2 92% Comment: on 2.5 L  Physical Exam  Constitutional:       Appearance: Normal appearance. She is normal weight.   HENT:      Head: Normocephalic and atraumatic.      Right Ear: External ear normal.      Left Ear: External ear normal.   Pulmonary:      Comments: On  nasal cannula oxygen  Neurological:      Mental Status: She is alert.         Lab Review  Results for orders placed or performed in visit on 04/07/21   Comprehensive Metabolic Panel    Specimen: Blood   Result Value Ref Range    Glucose 113 (H) 65 - 99 mg/dL    BUN 8 8 - 23 mg/dL    Creatinine 0.96 0.57 - 1.00 mg/dL    Sodium 135 (L) 136 - 145 mmol/L    Potassium 4.5 3.5 - 5.2 mmol/L    Chloride 101 98 - 107 mmol/L    CO2 23.0 22.0 - 29.0 mmol/L    Calcium 8.8 8.6 - 10.5 mg/dL    Total Protein 5.9 (L) 6.0 - 8.5 g/dL    Albumin 2.80 (L) 3.50 - 5.20 g/dL    ALT (SGPT) 6 1 - 33 U/L    AST (SGOT) 9 1 - 32 U/L    Alkaline Phosphatase 105 39 - 117 U/L    Total Bilirubin 0.5 0.0 - 1.2 mg/dL    eGFR Non African Amer 58 (L) >60 mL/min/1.73    Globulin 3.1 gm/dL    A/G Ratio 0.9 g/dL    BUN/Creatinine Ratio 8.3 7.0 - 25.0    Anion Gap 11.0 5.0 - 15.0 mmol/L   Iron and TIBC    Specimen: Blood   Result Value Ref Range    Iron 20 (L) 37 - 145 mcg/dL    Iron Saturation 9 (L) 20 - 50 %    Transferrin 144 (L) 200 - 360 mg/dL    TIBC 215 (L) 298 - 536 mcg/dL   Ferritin    Specimen: Blood   Result Value Ref Range    Ferritin 333.70 (H) 13.00 - 150.00 ng/mL   Folate    Specimen: Blood   Result Value Ref Range    Folate >20.00 4.78 - 24.20 ng/mL   Vitamin B12    Specimen: Blood   Result Value Ref Range    Vitamin B-12 228 211 - 946 pg/mL   CBC Auto Differential    Specimen: Blood   Result Value Ref Range    WBC 17.16 (H) 3.40 - 10.80 10*3/mm3    RBC 3.16 (L) 3.77 - 5.28 10*6/mm3    Hemoglobin 9.1 (L) 12.0 - 15.9 g/dL    Hematocrit 29.0 (L) 34.0 - 46.6 %    MCV 91.8 79.0 - 97.0 fL    MCH 28.8 26.6 - 33.0 pg    MCHC 31.4 (L) 31.5 - 35.7 g/dL    RDW 14.4 12.3 - 15.4 %    RDW-SD 48.1 37.0 - 54.0 fl    MPV 9.7 6.0 - 12.0 fL    Platelets 336 140 - 450 10*3/mm3    Neutrophil % 90.2 (H) 42.7 - 76.0 %    Lymphocyte % 4.0 (L) 19.6 - 45.3 %    Monocyte % 4.5 (L) 5.0 - 12.0 %    Eosinophil % 0.5 0.3 - 6.2 %    Basophil % 0.3 0.0 - 1.5 %     Immature Grans % 0.5 0.0 - 0.5 %    Neutrophils, Absolute 15.49 (H) 1.70 - 7.00 10*3/mm3    Lymphocytes, Absolute 0.68 (L) 0.70 - 3.10 10*3/mm3    Monocytes, Absolute 0.78 0.10 - 0.90 10*3/mm3    Eosinophils, Absolute 0.08 0.00 - 0.40 10*3/mm3    Basophils, Absolute 0.05 0.00 - 0.20 10*3/mm3    Immature Grans, Absolute 0.08 (H) 0.00 - 0.05 10*3/mm3    nRBC 0.0 0.0 - 0.2 /100 WBC   HIV-1 / O / 2 Ag / Antibody 4th Generation    Specimen: Blood   Result Value Ref Range    HIV-1/ HIV-2 Non-Reactive Non-Reactive            Assessment:     Diagnoses and all orders for this visit:    1. Acute hypoxemic respiratory failure (CMS/HCC) (Primary)  -     XR Chest PA & Lateral; Future  -     ipratropium-albuterol (DUO-NEB) 0.5-2.5 mg/3 ml nebulizer; Take 3 mL by nebulization 4 (Four) Times a Day As Needed for Wheezing or Shortness of Air.  Dispense: 360 mL        Plan:     I have seen the patient on the video with the resident.  I have reviewed the notes, assessments, and/or procedures performed by Dr. Chavez during the office visit.  I concur with her/his documentation and assessment and plan for Michelle Child.      This document has been electronically signed by Coral Berry MD on April 9, 2021 15:48 CDT

## 2021-04-09 NOTE — TELEPHONE ENCOUNTER
Called [facility] to discuss results of CXR and lab work. Labs stable with decreasing white count. Verbal read of CXR obtained yesterday c/w aspiration PNA. Gave verbal orders to RN for the following:  Augmentin 875-125 mg BID for 7 days  Speech evaluation and swallow study  Aspiration precautions with strict HOB elevation >/= to 30 degrees.       This document has been electronically signed by Michael Chavez MD on April 9, 2021 13:30 CDT

## 2021-04-10 NOTE — PROGRESS NOTES
Discussed with Dr Everett regarding PET scan finding of enlarged bladder. Both in mutual agreement to place hutchins catheter at [facility] and get an appointment with Urology unless anything further happens.        This document has been electronically signed by Coral Berry MD on April 10, 2021 10:47 CDT

## 2021-04-10 NOTE — PROGRESS NOTES
Received call from Cecille at [facility].  They received a call from Dr. Everett about enlargement of bladder and that patient needed to be sent into the hospital.  Informed Dr. Berry who contacted Dr. Everett and requested strict I's and O's, daily standing weight and anchoring of Horn at the nursing home and that rectal mass could be causing urinary issues.        This document has been electronically signed by Sanchez Williamson III, MD on April 10, 2021 18:53 CDT      Dragon disclaimer: Parts of this note were transcribed using dragon dictation software.

## 2021-04-10 NOTE — PROGRESS NOTES
Patient had a PET/CT done yesterday on April 9, 2021 for staging purpose for newly diagnosed squamous  cell cancer of anal canal.  Patient was found to have distended bladder measuring at 19 cm.  I was contacted by radiologist Dr. Zaidi for finding of significantly distended bladder concerning for severe urinary retention.  Patient's nurse at nursing [facility] was contacted.  As per nurse they do not have any capability of doing bladder scan at nursing home to find out if patient still has significant elevation of bladder or not.  Recommend referring patient to the emergency room for further evaluation of distended bladder.  Emergency room physician Dr. Canada was also informed about patient's referral to emergency room.

## 2021-04-14 PROBLEM — C21.0 ANAL SQUAMOUS CELL CARCINOMA (HCC): Chronic | Status: ACTIVE | Noted: 2021-01-01

## 2021-04-14 PROBLEM — T45.4X5A ADVERSE EFFECT OF IRON: Chronic | Status: ACTIVE | Noted: 2021-01-01

## 2021-04-14 NOTE — PROGRESS NOTES
DATE OF VISIT: 4/14/2021      REASON FOR VISIT: Squamous cell cancer of anus, anemia,      HISTORY OF PRESENT ILLNESS:   65-year-old female with medical problem consisting of coronary artery disease, anxiety/depression, history of mini strokes, hypertension who was initially seen in consultation on April 8, 2021 for newly diagnosed, cell cancer of anal canal.  Patient had a PET/CT done last week, she is here to discuss the results and further recommendation.  Continues to have generalized weakness.  Due to enlarged bladder on PET/CT patient has required Horn catheter placement since then.  Complains of pain affecting anal area.  Complains of constipation.  Denies any new lymph node enlargement.  Denies any tingling or numbness affecting upper or lower extremity.      Past Medical History, Past Surgical History, Social History, Family History have been reviewed and are without significant changes except as mentioned.    Review of Systems   Constitutional: Positive for fatigue.        Positive for recent weight loss but unable to quantify.  Positive for generalized weakness.   Gastrointestinal: Positive for blood in stool and constipation.        Complains of pain in the region of anus   Genitourinary: Positive for difficulty urinating.        Positive for urinary retention requiring Horn catheter placement.   Musculoskeletal: Positive for arthralgias.   Neurological:        Positive for memory problems.  Positive for problem with balance.  Positive for recurrent falls.      A comprehensive 14 point review of systems was performed and was negative except as mentioned.    Medications:  The current medication list was reviewed in the EMR    ALLERGIES:    Allergies   Allergen Reactions   • Codeine Nausea And Vomiting   • Penicillins Nausea And Vomiting   • Sulfa Antibiotics Nausea And Vomiting       Objective      Vitals:    04/14/21 1054   BP: 132/65   Pulse: 80   Resp: 18   Temp: 97.5 °F (36.4 °C)   TempSrc: Temporal  "  Weight: 53.1 kg (117 lb)  Comment: w/c   Height: 162.6 cm (64.02\")   PainSc:   5   PainLoc: Generalized  Comment: bottom     No flowsheet data found.    Physical Exam  Constitutional:       Comments: Appears frail   Cardiovascular:      Rate and Rhythm: Normal rate and regular rhythm.   Pulmonary:      Comments: Diminished air entry at bilateral base  Abdominal:      General: Bowel sounds are normal.      Palpations: Abdomen is soft.      Tenderness: There is no abdominal tenderness.      Comments: Horn catheter present.   Musculoskeletal:      Comments: Decreased range of motion   Neurological:      Mental Status: She is alert and oriented to person, place, and time.           RECENT LABS:  Glucose   Date Value Ref Range Status   04/08/2021 144 (H) 65 - 99 mg/dL Final     Glucose, Arterial   Date Value Ref Range Status   01/15/2018 143 mmol/L Final     Sodium   Date Value Ref Range Status   04/08/2021 134 (L) 136 - 145 mmol/L Final     Potassium   Date Value Ref Range Status   04/08/2021 3.9 3.5 - 5.2 mmol/L Final     CO2   Date Value Ref Range Status   04/08/2021 25.0 22.0 - 29.0 mmol/L Final     Chloride   Date Value Ref Range Status   04/08/2021 104 98 - 107 mmol/L Final     Anion Gap   Date Value Ref Range Status   04/08/2021 5.0 5.0 - 15.0 mmol/L Final     Creatinine   Date Value Ref Range Status   04/08/2021 1.02 (H) 0.57 - 1.00 mg/dL Final     BUN   Date Value Ref Range Status   04/08/2021 10 8 - 23 mg/dL Final     BUN/Creatinine Ratio   Date Value Ref Range Status   04/08/2021 9.8 7.0 - 25.0 Final     Calcium   Date Value Ref Range Status   04/08/2021 8.4 (L) 8.6 - 10.5 mg/dL Final     eGFR Non  Amer   Date Value Ref Range Status   04/08/2021 54 (L) >60 mL/min/1.73 Final     Alkaline Phosphatase   Date Value Ref Range Status   04/08/2021 98 39 - 117 U/L Final     Total Protein   Date Value Ref Range Status   04/08/2021 5.1 (L) 6.0 - 8.5 g/dL Final     ALT (SGPT)   Date Value Ref Range Status "   04/08/2021 <5 1 - 33 U/L Final     AST (SGOT)   Date Value Ref Range Status   04/08/2021 8 1 - 32 U/L Final     Total Bilirubin   Date Value Ref Range Status   04/08/2021 0.2 0.0 - 1.2 mg/dL Final     Albumin   Date Value Ref Range Status   04/08/2021 2.50 (L) 3.50 - 5.20 g/dL Final     Globulin   Date Value Ref Range Status   04/08/2021 2.6 gm/dL Final     Lab Results   Component Value Date    WBC 12.48 (H) 04/08/2021    HGB 8.1 (L) 04/08/2021    HCT 27.3 (L) 04/08/2021    MCV 96.8 04/08/2021     04/08/2021     Lab Results   Component Value Date    NEUTROABS 10.06 (H) 04/08/2021    IRON 20 (L) 04/07/2021    IRON 16 (L) 01/14/2018    IRON 92 12/21/2015    TIBC 215 (L) 04/07/2021    TIBC 270 01/14/2018    TIBC 412 12/21/2015    LABIRON 9 (L) 04/07/2021    LABIRON 6 (L) 01/14/2018    LABIRON 22.3 12/21/2015    FERRITIN 333.70 (H) 04/07/2021    FERRITIN 12.0 12/21/2015    FERRITIN 22.1 04/08/2014    [id number] 228 04/07/2021    [id number] 281 03/15/2021    [id number] 434 01/14/2018    FOLATE >20.00 04/07/2021    FOLATE 19.90 03/15/2021    FOLATE >20.00 09/14/2020     Lab Results   Component Value Date    REFLABREPO SEE NOTE: 04/07/2014         PATHOLOGY:  * Cannot find OR log *         RADIOLOGY DATA :  NM Pet Skull Base To Mid Thigh    Result Date: 4/10/2021  1.  Large pelvic, inguinal mass with pathologic F-18 FDG glucose uptake. 2. Focal area of increased uptake posterior to urinary bladder without evidence of any discrete masses. This is of uncertain significance and may represent physiological bowel, rectal uptake. 3. Prior cholecystectomy and hysterectomy. Marked urinary bladder dilatation. Catheterization may be necessary. 4. Bilateral pleural effusions and basilar infiltrative changes, consolidation possibly compressive atelectasis. Nuclear medicine PET/CT examination is otherwise unremarkable. Electronically signed by:  Niles Zaidi MD  4/10/2021 7:20 AM CDT Workstation:  [id number]          Assessment/Plan     1.  Invasive squamous cell cancer of anus:  -PET/CT done 1 April 9, 2021 shows increased uptake in region of anal canal that is known malignancy but there is also uptake in the region of rectum.  There is no evidence of any other metastatic focus on PET/CT which was discussed with patient and her son.  -It was discussed with patient and her son localized anal cancer is highly curative with concurrent chemoradiation with 5-FU and mitomycin.  -Due to her performance status and comorbidities, I doubt that patient will be able to tolerate any cytotoxic chemotherapy without excessive toxicity  -Option of doing low-dose chemotherapy at 60% of regional dose versus radiation treatment by itself versus hospice were discussed with patient and her son.  -Son wants to discuss with family and patient before making any final decision.  -Also recommend getting evaluated by radiation oncologist.  Referral has been placed today.  Case was discussed with radiation oncologist Dr. Barrett.  -We will ask patient to return to clinic in 2 weeks for further recommendation and decision regarding chemotherapy next    2.  Abnormal uptake in rectal area:  -PET/CT shows abnormal uptake in rectal area without any discrete finding on CT scan  -Case was discussed with Dr. Schuster who has agreed to consider doing a flexible sigmoidoscopy or similar procedure to evaluate rectal area.  Patient has an appointment with Dr. Schuster on April 20, 2021 she was encouraged to keep that appointment  -It was also discussed with patient's son that if they are considering trying chemotherapy recommend getting port placement at the same time.    3.  Anemia:  -Anemia work-up was showing component of iron deficiency.  Due to her constipation and large mass with rectal bleeding, recommend intravenous Feraheme.  First dose of Feraheme was administered today on April 14, 2021  -Patient also has been started on B12 1000 mcg p.o.  daily    4.  Urinary retention:  -S/p Horn catheter.  Patient has been referred to Dr. Mendez by primary medical provider for further evaluation of urinary retention    5.  Leukocytosis:  -Likely secondary to malignancy.  Will monitor with CBC    6.  Hypertension    7.  Coronary artery disease    8.  Health maintenance: Patient quit smoking in 2018    9. Advance Care Planning   ACP discussion was held with the patient during this visit. Patient has an advance directive in EMR which is still valid.                  PHQ-9 Total Score: 0   -Patient is not homicidal or suicidal.  No acute intervention required.    Michelle Child reports a pain score of 5.  Given her pain assessment as noted, treatment options were discussed and the following options were decided upon as a follow-up plan to address the patient's pain: continuation of current treatment plan for pain.         Duong Everett MD  4/14/2021  16:44 CDT        Part of this note may be an electronic transcription/translation of spoken language to printed text using the Dragon Dictation System.          CC:

## 2021-04-16 NOTE — TELEPHONE ENCOUNTER
AC FROM [facility] CALLED ASKING FOR A CALL BACK REGARDING PATIENT PARAMETERS FOR O2 AND TO DISCUSS O2 CHANGES THEY HAVE MADE FOR PATIENT.    CALL BACK NUMBER FOR THEM [phone].    THANKS,  PEDRO

## 2021-04-16 NOTE — TELEPHONE ENCOUNTER
Returned call to [facility] and spoke with RN Nikia. Patient's oxygen saturation on RA is 88 to 90%. Given patients COPD history we will use 88-92% oxygen saturation as goal range for oxygen titration, which pt currently does not require any.       This document has been electronically signed by Michael Chavez MD on April 16, 2021 10:12 CDT

## 2021-04-20 NOTE — PROGRESS NOTES
CHIEF COMPLAINT:    Evaluation for port placement    HISTORY OF PRESENT ILLNESS:    Michelle Child is a 65 y.o. female sent for evaluation for placement of a mediport at the request of Dr. Everett.  We recently identified that the patient had anal squamous carcinoma.  A PET scan was performed that showed possible activity in the rectum as well although, on my review this appears to be pooling of contrast in the patient's largely distended bladder.  In the interim since the PET scan the patient has had a Horn catheter placed with good drainage of yellow urine.  After discussing with the patient and her son they do wish to proceed with chemo and radiation for her anal squamous carcinoma due to the poor quality of life it is important to the patient currently.    Reason for port: Chemotherapy  Prior Port/Access: no    Past Medical History:   Diagnosis Date   • Alkaline phosphatase raised    • Astigmatism    • Bilateral pseudophakia    • COPD (chronic obstructive pulmonary disease) (CMS/HCC)    • Coronary arteriosclerosis    • Depressive disorder    • Edema of lower extremity    • Encounter for general adult medical examination with abnormal findings    • Essential hypertension    • Gastroesophageal reflux disease    • H/O bone density study     DXA BONE DENSITY AXIAL    04/22/2016   • H/O screening mammography     SCREENING MAMMOGRAPHY     04/25/2016   • Hx of screening mammography     x3; declined screening, understands risks and benefits and still declines      07/24/2015   • Influenza vaccine administered     INFLUENZA IMMUN ADMIN OR PREV RECV'D   x4       04/01/2016   • Insomnia    • Iron deficiency    • Migraine    • Rectal mass    • Stroke (CMS/HCC)    • Tobacco dependence     continuous         Past Surgical History:   Procedure Laterality Date   • APPENDECTOMY     • AUGMENTATION MAMMAPLASTY     • BREAST IMPLANT SURGERY     • CATARACT EXTRACTION WITH INTRAOCULAR LENS IMPLANT  12/17/2003    Phacoemulsification  of a cataract with intraocular lens implant of rigt eye, Nixon model SA 60-AT; serial # [id number];20.5 diopter   • CHOLECYSTECTOMY  08/06/2007    Laparoscopic cholecystectomy. Symptomatic gallstones   • COLONOSCOPY  10/15/2013    declined stool cards and colonscopy   • ENDOSCOPY AND COLONOSCOPY  05/22/2014    Internal & external hemorrhoids found.   • ENDOSCOPY W/ PEG TUBE PLACEMENT  05/22/2014    EGD w/ tube: Normal esopahgus. Gastritis in stomach. Biopsy taken. Normal duodenum. Biopsy taken.   • EYE SURGERY     • INJECTION OF MEDICATION  07/24/2013    Inj(s) Tend-Sheath, Ligament, Single   • INJECTION OF MEDICATION  11/17/2014    Kenalog x6   • INJECTION OF MEDICATION  04/01/2016    PNEUMOC VAC/ADMIN/RCVD    • INJECTION OF MEDICATION  12/05/2014    Toradol   • OTHER SURGICAL HISTORY  01/19/2014    Drain/Inject Major Joint   x2   • PAP SMEAR  10/06/2011   • RECTAL EXAMINATION UNDER ANESTHESIA N/A 3/31/2021    Procedure: BIOPSY OF ANAL MASS;  Surgeon: Dimitry Schuster MD;  Location: [facility];  Service: General;  Laterality: N/A;   • SUBTOTAL HYSTERECTOMY      Partial hyst    • TONSILLECTOMY     • TUBAL ABDOMINAL LIGATION           Current Outpatient Medications:   •  acetaminophen (TYLENOL) 325 MG tablet, Take 650 mg by mouth Every 4 (Four) Hours As Needed for Mild Pain ., Disp: , Rfl:   •  albuterol sulfate  (90 Base) MCG/ACT inhaler, INHALE 2 PUFFS EVERY 4 (FOUR) AS NEEDED  FOR WHEEZING., Disp: 8.5 g, Rfl: 11  •  docusate sodium (COLACE) 100 MG capsule, Take 3 capsules by mouth Daily., Disp: 90 capsule, Rfl: 0  •  ferrous sulfate 325 (65 Fe) MG tablet, TAKE ONE TABLET BY MOUTH TWICE A DAY WITH MEALS , Disp: 60 each, Rfl: 5  •  FLUoxetine (PROzac) 20 MG capsule, Take 60 mg by mouth Daily., Disp: , Rfl:   •  folic acid (FOLVITE) 1 MG tablet, TAKE ONE TABLET BY MOUTH ONCE DAILY (Patient taking differently: Take 1 mg by mouth Daily.), Disp: 90 tablet, Rfl: 3  •  furosemide (LASIX) 20 MG tablet, TAKE  1/2 TABLET BY MOUTH DAILY FOR FLUID RETENTION (Patient taking differently: Take 20 mg by mouth Take As Directed. TAKE 1/2 TABLET BY MOUTH DAILY), Disp: 45 tablet, Rfl: 3  •  HYDROcodone-acetaminophen (Norco) 7.5-325 MG per tablet, Take 1 tablet by mouth Every 6 (Six) Hours As Needed for Moderate Pain ., Disp: 5 tablet, Rfl: 0  •  ipratropium-albuterol (DUO-NEB) 0.5-2.5 mg/3 ml nebulizer, Take 3 mL by nebulization 4 (Four) Times a Day As Needed for Wheezing or Shortness of Air., Disp: 360 mL, Rfl:   •  losartan (COZAAR) 100 MG tablet, Take 1 tablet by mouth Daily. For heart, Disp: 90 tablet, Rfl: 3  •  omeprazole (priLOSEC) 20 MG capsule, Take 20 mg by mouth Daily., Disp: , Rfl:   •  potassium chloride 10 MEQ CR tablet, TAKE ONE TABLET BY MOUTH TWICE A DAY , Disp: 60 tablet, Rfl: 0  •  pravastatin (PRAVACHOL) 40 MG tablet, Take 1 tablet by mouth Every Night., Disp: 90 tablet, Rfl: 3  •  risperiDONE (risperDAL) 1 MG tablet, TAKE ONE TABLET BY MOUTH EACH EVENING AT BEDTIME FOR INSOMNIA, Disp: 90 tablet, Rfl: 3  •  SM Aspirin Adult Low Strength 81 MG EC tablet, TAKE ONE TABLET BY MOUTH ONCE DAILY , Disp: 30 tablet, Rfl: 3  •  Symbicort 160-4.5 MCG/ACT inhaler, INHALE TWO (2) PUFFS BY MOUTH TWICE A DAY , Disp: 10.2 g, Rfl: 5  •  vitamin B-12 (CYANOCOBALAMIN) 1000 MCG tablet, Take 1 tablet by mouth Daily., Disp: 90 tablet, Rfl: 1  No current facility-administered medications for this visit.    Allergies   Allergen Reactions   • Codeine Nausea And Vomiting   • Penicillins Nausea And Vomiting   • Sulfa Antibiotics Nausea And Vomiting       Family History   Problem Relation Age of Onset   • Cholelithiasis Mother    • Thyroid cancer Sister    • Thyroid disease Sister         Thyroid disorder   • Graves' disease Sister    • Cholelithiasis Maternal Grandmother    • Thyroid cancer Other         Other 2nd degree relative, thyroid   • Diabetes Other    • Hypertension Other        Social History     Socioeconomic History   •  "Marital status:      Spouse name: Not on file   • Number of children: Not on file   • Years of education: Not on file   • Highest education level: Not on file   Tobacco Use   • Smoking status: Former Smoker     Packs/day: 1.00     Years: 39.00     Pack years: 39.00     Quit date: 1/10/2018     Years since quitting: 3.2   • Smokeless tobacco: Never Used   • Tobacco comment: SMOKED FOR 20>PLUS YRS   Vaping Use   • Vaping Use: Never used   Substance and Sexual Activity   • Alcohol use: No   • Drug use: No   • Sexual activity: Not Currently       Review of Systems   Genitourinary: Positive for difficulty urinating (now with hutchins in place).       Objective     /62   Pulse 69   Temp 97 °F (36.1 °C) (Temporal)   Ht 162.6 cm (64\")   Wt 48.2 kg (106 lb 3.2 oz)   BMI 18.23 kg/m²     Physical Exam  Constitutional:       General: She is not in acute distress.     Appearance: She is not ill-appearing, toxic-appearing or diaphoretic.   HENT:      Head: Normocephalic and atraumatic.   Eyes:      General: No scleral icterus.        Right eye: No discharge.         Left eye: No discharge.      Extraocular Movements: Extraocular movements intact.   Cardiovascular:      Rate and Rhythm: Normal rate.   Pulmonary:      Effort: Pulmonary effort is normal. No respiratory distress.   Neurological:      General: No focal deficit present.      Mental Status: She is alert and oriented to person, place, and time.   Psychiatric:         Mood and Affect: Mood normal.         Behavior: Behavior normal.         Thought Content: Thought content normal.         Judgment: Judgment normal.           ASSESSMENT:    In need of port for chemotherapy for anal squamous cancer    PLAN:    Will plan for port placement.  Risks and benefits of Mediport placement were discussed with the patient and she agrees to proceed.  Additionally due to her PET abnormality since she would be in the operating room already we will plan to perform a " flexible sigmoidoscopy to rule out any other malignancy in the upper rectum.  The risks and benefits of Mediport placement and flexible sigmoidoscopy have been reviewed.  She is scheduled for 4/26/2021.

## 2021-04-22 NOTE — PROGRESS NOTES
New Patient Visit       Patient: Michelle Child   YOB: 1955   Medical Record Number: [id number]   Date of Visit  April 22, 2021   Primary Diagnosis:  Well to moderately differentiated squamous cell carcinoma of anus.  Preliminary staging: T3 N0 M0.  ICD-10  C 21.1.                                              History of Present Illness:  Mrs. Michelle Child is a 65 white female whose past medical history is remarkable for coronary artery disease, depression/anxiety, hypertension, and stroke.  The patient presented to medical attention in mid March with a 4-day history of rectal pain and constipation, increasing fatigue, cognitive changes, and poor appetite with a nonintentional 15 to 20 pound weight loss over the past 6 months.  The patient also reported a 6-week history of rectal/anal discomfort and rectal bleeding.  Mrs. Child was admitted to [facility] for further work-up.     The patient was discovered to have a protruding anal soft tissue mass.  Examination under anesthesia (03/31/2021) confirmed the presence of a firm and friable fungating mass emanating from the anal canal anteriorly to near the vaginal vestibule.  Tumor appeared to extend superiorly to the dentate line.  Biopsies of the mass revealed invasive and in situ well to moderately differentiated squamous cell carcinoma.     A PET CT scan (04/09/2021) revealed a large anal mass measuring 7.65 x 4.01 x 6.66 cm with SUV uptake 14.67.  An additional focus of increased uptake posterior to the bladder, probably in the rectum, was reported.  Bladder distention was noted.  There was no uptake in regional/distal lymph nodes, bone or viscera.    Mrs. Child was recently evaluated by Dr. Everett.  Treatment options for anal carcinoma including chemoradiation were discussed.  The patient will undergo infusaport port placement on 4/26/2021.  Flexible sigmoidoscopy will also be performed.    Mrs. Child is referred to our service  to discuss the role of radiotherapy in the treatment of anal cancer.    This is a new problem to me, and one that is potentially life-threatening.  (Old medical records were requested, reviewed, and summarized in the HPI)    Review of Systems   Patient reports easy fatigability, low energy levels and recent nonintentional weight loss.  Gastrointestinal: Patient reports constipation alternating with loose bowel movements, rectal/anal discomfort on prolonged sitting, and bright red blood per rectum.  Genitourinary: Patient reports slow urine flow and difficulty emptying bladder (Horn catheter recently placed).  Neurologic: Positive for memory lapses, diminished communication skills, and unsteady gait with recent falls.  Integumentary: Patient reports skin discoloration and scars from recent falls.  Patient denies HEENT, respiratory, cardiovascular, endocrine, lymphatic, and hematopoietic symptomatology.      Vitals:    04/22/21 1350   BP: 129/60   Pulse: 77   Resp: 18   Temp: 98.1 °F (36.7 °C)     Past Medical History:   Diagnosis Date   • Alkaline phosphatase raised    • Anemia    • Astigmatism    • Bilateral pseudophakia    • COPD (chronic obstructive pulmonary disease) (CMS/HCC)    • Coronary arteriosclerosis    • Depressive disorder    • Edema of lower extremity    • Encounter for general adult medical examination with abnormal findings    • Essential hypertension    • Gastroesophageal reflux disease    • H/O bone density study     DXA BONE DENSITY AXIAL    04/22/2016   • H/O screening mammography     SCREENING MAMMOGRAPHY     04/25/2016   • Hx of screening mammography     x3; declined screening, understands risks and benefits and still declines      07/24/2015   • Hyperlipidemia    • Influenza vaccine administered     INFLUENZA IMMUN ADMIN OR PREV RECV'D   x4       04/01/2016   • Insomnia    • Iron deficiency    • Migraine    • Rectal mass    • Stroke (CMS/HCC)    • Tobacco dependence     continuous           Past Surgical History:   Procedure Laterality Date   • APPENDECTOMY     • AUGMENTATION MAMMAPLASTY     • BREAST IMPLANT SURGERY     • CATARACT EXTRACTION WITH INTRAOCULAR LENS IMPLANT  12/17/2003    Phacoemulsification of a cataract with intraocular lens implant of rigt eye, Nixon model SA 60-AT; serial # [id number];20.5 diopter   • CHOLECYSTECTOMY  08/06/2007    Laparoscopic cholecystectomy. Symptomatic gallstones   • COLONOSCOPY  10/15/2013    declined stool cards and colonscopy   • ENDOSCOPY AND COLONOSCOPY  05/22/2014    Internal & external hemorrhoids found.   • ENDOSCOPY W/ PEG TUBE PLACEMENT  05/22/2014    EGD w/ tube: Normal esopahgus. Gastritis in stomach. Biopsy taken. Normal duodenum. Biopsy taken.   • EYE SURGERY     • INJECTION OF MEDICATION  07/24/2013    Inj(s) Tend-Sheath, Ligament, Single   • INJECTION OF MEDICATION  11/17/2014    Kenalog x6   • INJECTION OF MEDICATION  04/01/2016    PNEUMOC VAC/ADMIN/RCVD    • INJECTION OF MEDICATION  12/05/2014    Toradol   • OTHER SURGICAL HISTORY  01/19/2014    Drain/Inject Major Joint   x2   • PAP SMEAR  10/06/2011   • RECTAL EXAMINATION UNDER ANESTHESIA N/A 3/31/2021    Procedure: BIOPSY OF ANAL MASS;  Surgeon: Dimitry Schuster MD;  Location: [facility];  Service: General;  Laterality: N/A;   • SUBTOTAL HYSTERECTOMY      Partial hyst    • TONSILLECTOMY     • TUBAL ABDOMINAL LIGATION        Family History   Problem Relation Age of Onset   • Cholelithiasis Mother    • Thyroid cancer Sister    • Thyroid disease Sister         Thyroid disorder   • Graves' disease Sister    • Cholelithiasis Maternal Grandmother    • Thyroid cancer Other         Other 2nd degree relative, thyroid   • Diabetes Other    • Hypertension Other         Social History     Socioeconomic History   • Marital status:      Spouse name: Not on file   • Number of children: Not on file   • Years of education: Not on file   • Highest education level: Not on file   Tobacco Use   •  Smoking status: Former Smoker     Packs/day: 1.00     Years: 39.00     Pack years: 39.00     Quit date: 1/10/2018     Years since quitting: 3.2   • Smokeless tobacco: Never Used   Vaping Use   • Vaping Use: Never used   Substance and Sexual Activity   • Alcohol use: No   • Drug use: No   • Sexual activity: Defer      The patient is former smoker.  Tobacco cessation occurred in mid January 2018.  Nondrinker.    Allergies:  Codeine, Penicillins, and Sulfa antibiotics   Prior to Admission medications    Medication Sig Start Date End Date Taking? Authorizing Provider   acetaminophen (TYLENOL) 325 MG tablet Take 650 mg by mouth Every 4 (Four) Hours As Needed for Mild Pain .   Yes Mele Garcia MD   albuterol sulfate  (90 Base) MCG/ACT inhaler Inhale 2 puffs Every 4 (Four) Hours As Needed for Wheezing.   Yes Mele Garcia MD   aspirin 81 MG EC tablet Take 81 mg by mouth Daily.   Yes Mele Garcia MD   budesonide-formoterol (SYMBICORT) 160-4.5 MCG/ACT inhaler Inhale 2 puffs 2 (Two) Times a Day.   Yes Mele Garcia MD   docusate sodium (COLACE) 100 MG capsule Take 3 capsules by mouth Daily. 4/2/21  Yes Esa العراقي MD   FLUoxetine (PROzac) 60 MG tablet Take 60 mg by mouth Daily.   Yes Mele Garcia MD   folic acid (FOLVITE) 1 MG tablet Take 1 mg by mouth Daily.   Yes Mele Garcia MD   furosemide (LASIX) 20 MG tablet Take 10 mg by mouth Daily.   Yes Mele Garcia MD   HYDROcodone-acetaminophen (Norco) 7.5-325 MG per tablet Take 1 tablet by mouth Every 6 (Six) Hours As Needed for Moderate Pain . 4/7/21  Yes Coral Berry MD   ipratropium-albuterol (DUO-NEB) 0.5-2.5 mg/3 ml nebulizer Take 3 mL by nebulization 4 (Four) Times a Day As Needed for Wheezing or Shortness of Air. 4/8/21  Yes Coral Berry MD   losartan (COZAAR) 100 MG tablet Take 1 tablet by mouth Daily. For heart 7/2/20  Yes Coral Berry MD   omeprazole (priLOSEC) 20 MG capsule  "Take 20 mg by mouth Daily.   Yes Mele Garcia MD   potassium chloride (K-DUR,KLOR-CON) 10 MEQ CR tablet Take 10 mEq by mouth 2 (Two) Times a Day.   Yes Mele Garcia MD   pravastatin (PRAVACHOL) 40 MG tablet Take 1 tablet by mouth Every Night. 7/2/20  Yes Coral Berry MD   risperiDONE (risperDAL) 1 MG tablet Take 1 mg by mouth Every Night.   Yes Mele Garcia MD   vitamin B-12 (CYANOCOBALAMIN) 1000 MCG tablet Take 1 tablet by mouth Daily. 4/8/21  Yes Duong Everett MD      Pain:(on a scale of 0-10)    Pain Score    04/22/21 1350   PainSc: 0-No pain        Michelle Child reports a pain score of 0.       Quality of Life:   KPS: 50  ECOG: 3    Advanced Care Plan:    Not discussed during this visit     PHQ-9 Depression Screening:    Little interest or pleasure in doing things? 2   Feeling down, depressed, or hopeless? 0   Trouble falling or staying asleep, or sleeping too much? 0   Feeling tired or having little energy?     Poor appetite or overeating?     Feeling bad about yourself - or that you are a failure or have let yourself or your family down?     Trouble concentrating on things, such as reading the newspaper or watching television?     Moving or speaking so slowly that other people could have noticed? Or the opposite - being so fidgety or restless that you have been moving around a lot more than usual?     Thoughts that you would be better off dead, or of hurting yourself in some way?     PHQ-9 Total Score 2   If you checked off any problems, how difficult have these problems made it for you to do your work, take care of things at home, or get along with other people?      PHQ-9 Total Score: 2     Patient screened negative for depression based on a PHQ-9 score of 2 on 4/22/2021.      Physical Examination:  Vitals:  129/60 HR 82 RR 18   Height: 162.6 cm (64\")  Weight: Weight: 48.1 kg (106 lb)   Body mass index is 18.19 kg/m².    Physical Exam  Constitutional: The patient is a " thin, frail-appearing white female in no acute distress.    The patient appears older than stated age.  Alert and oriented ×3.  HEENT: Atraumatic. Normocephalic. No abnormalities noted.  Lymphatics: No cervical, supraclavicular, or axillary, or inguinal lymphadenopathy is palpated.  CV: Regular rate and rhythm.  No murmurs, rubs, or gallops are appreciated.  Respiratory: Lungs clear to auscultation.  Diminished breath sounds bilaterally.  Breasts: Bilateral breast implants.  GI: Abdomen soft, nontender, nondistended, with no hepatosplenomegaly or masses palpated.  Rectal: Digital rectal exam reveals normal rectal sphincter tone. The prostate bed is smooth, nontender, with no suspicious nodules palpated. Prostate margins are intact.  Abdominal: Slightly tender on deep palpation.  No inguinal adenopathy or organomegaly.  A fungating mass measuring 3-4 cm is to protrude from the anus. No active bleeding.  Horn catheter in place.  Extremities: No clubbing, cyanosis, or edema.  Neurologic: Cranial nerves II through XII are grossly intact, with no focal neurological deficits noted on exam.  Gait not observed.  Psychiatric: Patient appears subdued and provides monosyllabic answers to questions.    Radiographs : XR Abdomen 2+ VW with Chest 1 VW    Result Date: 3/15/2021  No acute disease upright PA chest. Normal abdominal bowel gas pattern. Small-to-moderate amount of fecal material throughout colon. [id number] Electronically signed by:  Sunny Solis MD  3/15/2021 11:55 AM CDT Workstation: [id number]    CT Head Without Contrast    Result Date: 3/15/2021  No acute intracranial abnormalities. Mild diffuse atrophy. [id number] Electronically signed by:  Sunny Solis MD  3/15/2021 1:02 PM CDT Workstation: [id number]    XR Chest 1 View    Result Date: 3/17/2021  Right lower lobe airspace disease consistent with pneumonia in the appropriate clinical setting. Follow-up recommended 4-6 weeks to document resolution. Prominent  interstitial markings throughout the right upper lung could represent atypical infection or asymmetric interstitial edema Electronically signed by:  Sam Hung MD  3/17/2021 4:40 AM CDT Workstation: [id number]    NM Pet Skull Base To Mid Thigh    Result Date: 4/10/2021  1.  Large pelvic, inguinal mass with pathologic F-18 FDG glucose uptake. 2. Focal area of increased uptake posterior to urinary bladder without evidence of any discrete masses. This is of uncertain significance and may represent physiological bowel, rectal uptake. 3. Prior cholecystectomy and hysterectomy. Marked urinary bladder dilatation. Catheterization may be necessary. 4. Bilateral pleural effusions and basilar infiltrative changes, consolidation possibly compressive atelectasis. Nuclear medicine PET/CT examination is otherwise unremarkable. Electronically signed by:  Niles Zaidi MD  4/10/2021 7:20 AM CDT Workstation: [id number]    Pathology:   Lab Results   Component Value Date    CASEREPORT  03/31/2021     Surgical Pathology Report                         Case: [id number]                                  Authorizing Provider:  Dimitry Schuster,   Collected:           03/31/2021 01:44 PM                                 MD                                                                           Ordering Location:     [facility]             Received:            03/31/2021 01:47 PM                                 [facility]                                                              Pathologist:           Frederic Landa MD                                                           Specimens:   1) - Anus, anal mass                                                                                2) - Anus, anal mass                                                                        Labs:   Lab Results   Component Value Date    GLUCOSE 144 (H) 04/08/2021    BUN 10 04/08/2021    CREATININE 1.02 (H) 04/08/2021    EGFRIFNONA 54 (L)  04/08/2021    BCR 9.8 04/08/2021    K 3.9 04/08/2021    CO2 25.0 04/08/2021    CALCIUM 8.4 (L) 04/08/2021    ALBUMIN 2.50 (L) 04/08/2021    AST 8 04/08/2021    ALT <5 04/08/2021       WBC   Date Value Ref Range Status   04/08/2021 12.48 (H) 3.40 - 10.80 10*3/mm3 Final     Hemoglobin   Date Value Ref Range Status   04/08/2021 8.1 (L) 12.0 - 15.9 g/dL Final     Hematocrit   Date Value Ref Range Status   04/08/2021 27.3 (L) 34.0 - 46.6 % Final     Platelets   Date Value Ref Range Status   04/08/2021 293 140 - 450 10*3/mm3 Final    No results found for: PSA No results found for: CEA         ASSESSMENT:  Squamous cell carcinoma of anus.  Preliminary staging (based on available clinical, pathologic, and radiographic information) is Stage T3 N0 M0.    PLAN:  I have discussed the patient's medical and radiographic records.  I have also discussed the case with Dr. Everett.  Mrs. Child will undergo flexible sigmoidoscopy and Gupwce-a-Njqy placement by Dr. Schuster on 04/26/2021.      I explained to the patient and Remy Nelson, her son, that multiple clinical trials have determined that the current standard of care for anal carcinomas is a combined modality approach employing radiotherapy and chemotherapy (usually mitomycin-C and 5-FU).  I explained to the patient that radiotherapy would be given with intensity modulated radiotherapy/volumetric modulated arc beam radiotherapy to reduce gastrointestinal, hematologic, and dermatologic morbidity.  Radiotherapy would consist in the administration of approximately 4500 cGy to regional lymph nodes and 5400 cGy to the primary tumor.  The duration of radiotherapy would be approximately 6 weeks.    I reviewed the rationale for pelvic radiotherapy, duration of treatment, expected outcomes, possible acute and chronic side effects, and posttreatment surveillance measures with the patient.  Mrs. Child had no questions regarding treatment.  The son's many treatment-related questions  were answered to his satisfaction.  I will coordinate the radiotherapy start date with the medical oncology service.    Time spent with patient 60 minutes (over 50% of time spent in patient counseling and coordination of care).    Thank you for allowing our participation in Mrs. Child's care.    Sincerely,          Electronically signed by Carlos Barrett MD 4/22/2021 15:43 CDT

## 2021-04-22 NOTE — PATIENT INSTRUCTIONS
Radiation Simulation  Radiation Simulation is a process where the radiation treatment fields are defined, filmed and drawn on your skin. The simulator is actually a computed tomography (CT) scanner that we use to contour your body. The images are then sent to the physics department where the team and your doctor -- arrange the radiation beams and make a customized plan. We will take special care to make your position as comfortable as possible while making sure treatment will be given the same every time. Since people come in all shapes and sizes, very specific patient measurements are important.  Treatment unit parameters are finalized and recorded. All setup information is documented to make your treatment record complete. This is an important part of the planning process. The CT simulation scan is not like a regular CT scan, instead we use it to contour the shape of your body and visualize its structures. You doctor can then arrange the beams and make a plan using a computer system.      The following are members of the radiation therapy team that is involved and you may see during your simulation:  Physician:  is in charge of your simulation and gives direction to the therapists in the simulator.  Radiation therapists: trained, certified, and qualified to participate in CT simulation and administer daily treatments.      Oncology nurse: is a registered nurse who specializes in the needs of patients receiving radiation treatments.   Medical physicist: specializes in the principles of radiation physics. He is responsible for maintaining all therapy equipment as well as overseeing the treatment planning process.  : is responsible for performing dose calculations as well as developing treatment plans in conjunction with the physician and physicist.    What to Wear  Since we will be marking on your skin, it is necessary that we expose the treatment site. We may also need you to remove other  clothing worn close to the treatment are so we can produce the same treatment every time. . For example, having a shirt rumpled underneath you or snugly fitting pantyhose around your waist may change your position slightly from day to day. We can easily avoid this problem by simply having you change into a gown.  Please leave your jewelry at home, especially if the simulation involves the head, neck, or chest region.   Choose clothing that is comfortable and easy to remove.   Please do not bother the skin markings unless your therapist tells you so. These markings may rub off slightly onto your clothing, especially during warm summer months, so consider wearing old clothing and underwear when you come for your appointment.  How to Prepare  There are no dietary restrictions prior to your simulation. You may eat and drink as usual, unless instructed otherwise.       Immobilization  Immobilization means “to prevent movement or to keep in place.” We use immobilization devices that do just that. These are treatment aids that are pre-made, or that we construct, to help maintain the desired treatment position throughout the entire course of therapy. Some of these aids, to name a few, are:  • Tape (simple masking, paper or silk tape)  • Foam sponges  • Specially designed headrests  • Acrylic molds  • Foam body molds  • Plaster casts      Keep in mind that your particular setup may not require any special devices, or it may require a combination of items. It depends on your treatment site, position and your ability to stay in a position for a limited amount of time. We try to anticipate which simulations will require special devices so that we correctly estimate the amount of time you will need to allow for your appointment.  Contrast  In order to better visualize organs and other anatomy in or around the treatment area, we may need to administer contrast material (radiographic dye) during simulation. Contrast can be given  intravenously (with a needle through the vein), by mouth or through a catheter inserted by the nurse or therapist. This procedure will be done for simulation only -- not for daily treatment -- and requires no special preparation prior to your appointment.  Marking the Fields  The therapist will take a few preliminary measurements. A rough sketch of the area may be marked on your skin by using markers directly on the skin with ink by the physician and/or the therapist. The simulation team will then leave the room. We will watch you and can hear you at all times.    Then a series of things may happen:  • Room lights may go on and off  • Red laser lights may appear  • The table will move into the scanner  • All kinds of noises will be audible  The CT scanner is a large hole in a machine that your body will pass through. There is plenty of room and you should not feel enclosed or claustrophobic.  Measurements  The physician will create a radiation therapy prescription, a written directive for the radiation team to follow. In order to carry this out, we must combine treatment field information with data taken about your body shape and size. Using the CT scan, the physician will contour the areas needed and generate the plan while you are at home.  Tattoos  In most cases, we do not apply tattoos to dimitris pateints unless it is requested or you cant keep the marks on your skin. Marks that are needed are covered with a special clear dressing called Tegaderm.   Photographs  We will take your photograph on the day of simulation for two reasons: 1) for chart documentation, and 2) for a treatment set-up aid. We will take a facial picture for identification purposes and this will be part of your permanent treatment record. Each day before your treatment, we will ask you for your name and birth date for verification.  We will also take photographs of the treatment and setup fields. Since the treatment therapist may not be present  at the time of simulation, using photos along with perkins makes daily setup easier. The same is true for the dosimetrist. Sometimes viewing a field on the patient via photograph can answer questions that arise during the calculation process. These photos are necessary and they remain a confidential part of your treatment record.  How Long Does Planning Take  Our Dosimetrist and Physicist will use the images captured during your CT simulation to plan your treatments. The planning process is very complex and takes a lot of time and detail.    Depending on the type of treatment you are going to receive, your radiation plan may take up to 2 weeks from the day of your simulation.   ? The radiation staff will call you when your plan is ready and you will be scheduled a date to start your radiation treatments.   ? We will also call you to update you on the progress of your plan as needed.   ? If we have not called you within 10 days from your simulation, please call us. The time between simulation and treatment in no way compromises the outcome of your therapy. These factors are considered by the radiation therapy team when planning your specific course of treatment.  Since the course of treatment can run from two to eight weeks, we constantly monitor each patient’s setup in a number of ways. If a change occurs, it may be necessary to schedule a return visit to the simulator on short notice. An example of this might be a weight gain or loss that would require new calculations. In order to interrupt your treatment schedule as little as possible, a simulation check that same day or the following morning may be appropriate. Again, this is routine and does not suggest a change in your health status. It simply means that we are doing our job in providing the best care we know how. It is important that we stay aware of all patient changes.  Our goal is to make every patient as knowledgeable about the radiation therapy process as  possible and comfortable enough to ask questions. If at any time during your many visits with us you are unsure as to what is happening around you, please do not hesitate to ask. At a time in your life when things are seemingly out of your control, knowing what to expect can be a powerful tool in becoming refocused and moving onward.      If you have any concerns or questions before your treatment gets started, please do not hesitate to call the Radiation staff at [phone]. Review patient education folder information:    • [facility] Cancer booklet  • Radiation Simulation   • Radiation Skin Care

## 2021-04-23 PROBLEM — R63.4 WEIGHT LOSS OF MORE THAN 10% BODY WEIGHT: Status: ACTIVE | Noted: 2021-01-01

## 2021-04-23 PROBLEM — F33.2 SEVERE EPISODE OF RECURRENT MAJOR DEPRESSIVE DISORDER, WITHOUT PSYCHOTIC FEATURES (HCC): Status: ACTIVE | Noted: 2018-04-05

## 2021-04-23 NOTE — PROGRESS NOTES
MONTHLY NURSING HOME VISIT    NAME: Michelle Child  : 1955  MRN: [id number]    DATE & TIME SEEN: 21 1400    PCP: Coral Berry MD    NURSING HOME: [facility]    Monthly Nursing Home Visit for deconditioning, urinary retention and anal squamous cell carcinoma.     Chief Complaint: urinary retention    Subjective:     Michelle Child is a 65 y.o. female with a CMHx including anal squamous cell carcinoma, HTN, COPD, urinary retention and deconditioning who presents for f/u of the medical conditions listed below.     You have chosen to receive care through a telehealth visit.  Do you consent to use a video/audio connection for your medical care today? Yes    Urinary retention: suspected to be due to rectal mass. Horn catheter in place. RN reports average UOP of 1000 ml daily.  Patient denies any suprapubic pain, dysuria or pneumaturia.    Deconditioning: continues to work with PT. Feels she is making progress.    HTN: patient having borderline low BP lately as PO intake as continued to be minimal. Currently on lasix 10 mg daily and losartan 100 mg daily.     Weight loss: RN reports today's weight of 105 lbs, down from 128 lbs on 21.     Anal squamous cell carcinoma: causing constipation and is following with Dr. Everett, radiation onc and Dr. Schuster general surgery.  Plan for flex sig on Monday.     Current Meds:    Current Outpatient Medications:   •  acetaminophen (TYLENOL) 325 MG tablet, Take 650 mg by mouth Every 4 (Four) Hours As Needed for Mild Pain ., Disp: , Rfl:   •  albuterol sulfate  (90 Base) MCG/ACT inhaler, Inhale 2 puffs Every 4 (Four) Hours As Needed for Wheezing., Disp: , Rfl:   •  aspirin 81 MG EC tablet, Take 81 mg by mouth Daily., Disp: , Rfl:   •  budesonide-formoterol (SYMBICORT) 160-4.5 MCG/ACT inhaler, Inhale 2 puffs 2 (Two) Times a Day., Disp: , Rfl:   •  docusate sodium (COLACE) 100 MG capsule, Take 3 capsules by mouth Daily., Disp: 90 capsule,  Rfl: 0  •  FLUoxetine (PROzac) 60 MG tablet, Take 60 mg by mouth Daily., Disp: , Rfl:   •  folic acid (FOLVITE) 1 MG tablet, Take 1 mg by mouth Daily., Disp: , Rfl:   •  furosemide (LASIX) 20 MG tablet, Take 10 mg by mouth Daily., Disp: , Rfl:   •  HYDROcodone-acetaminophen (Norco) 7.5-325 MG per tablet, Take 1 tablet by mouth Every 6 (Six) Hours As Needed for Moderate Pain ., Disp: 5 tablet, Rfl: 0  •  ipratropium-albuterol (DUO-NEB) 0.5-2.5 mg/3 ml nebulizer, Take 3 mL by nebulization 4 (Four) Times a Day As Needed for Wheezing or Shortness of Air., Disp: 360 mL, Rfl:   •  losartan (COZAAR) 100 MG tablet, Take 1 tablet by mouth Daily. For heart, Disp: 90 tablet, Rfl: 3  •  omeprazole (priLOSEC) 20 MG capsule, Take 20 mg by mouth Daily., Disp: , Rfl:   •  potassium chloride (K-DUR,KLOR-CON) 10 MEQ CR tablet, Take 10 mEq by mouth 2 (Two) Times a Day., Disp: , Rfl:   •  pravastatin (PRAVACHOL) 40 MG tablet, Take 1 tablet by mouth Every Night., Disp: 90 tablet, Rfl: 3  •  risperiDONE (risperDAL) 1 MG tablet, Take 1 mg by mouth Every Night., Disp: , Rfl:   •  vitamin B-12 (CYANOCOBALAMIN) 1000 MCG tablet, Take 1 tablet by mouth Daily., Disp: 90 tablet, Rfl: 1    Allergies:  Codeine, Penicillins, and Sulfa antibiotics    Review of Systems:  Review of Systems   Constitutional: Negative for chills and fever.   HENT: Negative for congestion and sore throat.    Eyes: Negative for pain and redness.   Respiratory: Negative for cough and shortness of breath.    Cardiovascular: Negative for chest pain and leg swelling.   Gastrointestinal: Positive for constipation. Negative for nausea and vomiting.   Endocrine: Negative for polydipsia and polyphagia.   Genitourinary: Positive for difficulty urinating. Negative for flank pain.   Musculoskeletal: Negative for neck pain and neck stiffness.   Skin: Negative for rash and wound.   Neurological: Positive for weakness.     Objective:     /58   Pulse 78   Temp 98.1 °F (36.7 °C)    Resp 18   Wt 47.6 kg (105 lb)   SpO2 96%   BMI 18.02 kg/m²   Physical Exam  Vitals reviewed.   Constitutional:       Comments: Full physical examination could not be completed as this was a video visit.  Patient overall had a very flat affect.  No acute distress.  Nonlabored breathing.   Neurological:      Mental Status: She is alert.       Diagnostic Data:   None     Assessment/Plan:      65 y.o. female with:  Problem List Items Addressed This Visit        Cardiac and Vasculature    Essential hypertension    Overview     Borderline low BP in setting of minimal PO intake and average daily UOP ~1000 mL.   -Decrease losartan from 100 mg daily to 25 mg QHS              Endocrine and Metabolic    Weight loss of more than 10% body weight    Overview     In setting of anal cancer.   -Start megace 40 mg BID  -Nutrition consult   -Strict I/O            Gastrointestinal Abdominal     Constipation due to outlet dysfunction - Primary    Overview     In setting of anal squamous cell carcinoma. Painful, nonbloody BMs. Last BM today however BM prior to that was 4 days ago.   -Continue colace 300 mg daily  -Restarted miralax 17 g daily  -[facility] to contact MD if no BM in greater than or equal to 3 days            Genitourinary and Reproductive     Acute urinary retention    Overview     Suspected to be due to 2/2 anal cancer.   -Continue indwelling hutchins catheter  -Will await results of flex sig to be done on 4/26 before referring to urology              Hematology and Neoplasia    Anal squamous cell carcinoma (CMS/HCC) (Chronic)    Overview     Confirmed on frozen section on 03/31/21.   -Following with Dr. Everett and radiation oncology with current plan of chemo (mitomycin & 5-FU) and radiation x6 weeks.  -Flex sig + port placement planned on 4/26               Mental Health    Severe episode of recurrent major depressive disorder, without psychotic features (CMS/HCC)    Overview     -Continue home prozac  -Psych consult to eros alfonso  in [place]            Symptoms and Signs    Weakness generalized    Overview     In setting of anal cancer.   -Continue PT/OT as tolerated                CODE STATUS: DNR    Dr. Berry  is the attending on record for this patient, She is aware of the patient's status and agrees with the above.    Time: 16 min      This document has been electronically signed by Michael Chavez MD on April 26, 2021 07:10 CDT

## 2021-04-26 PROBLEM — R41.82 ALTERED MENTAL STATUS: Status: ACTIVE | Noted: 2021-01-01

## 2021-04-26 PROBLEM — E78.5 HYPERLIPIDEMIA: Status: ACTIVE | Noted: 2021-01-01

## 2021-04-26 PROBLEM — I63.9 CVA (CEREBRAL VASCULAR ACCIDENT) (HCC): Status: ACTIVE | Noted: 2021-01-01

## 2021-04-26 PROBLEM — I63.512 ACUTE ISCHEMIC LEFT MIDDLE CEREBRAL ARTERY (MCA) STROKE (HCC): Status: ACTIVE | Noted: 2021-01-01

## 2021-04-27 NOTE — PROGRESS NOTES
Subjective:     Michelle Child is a 65 y.o. female seen via telehealth for deconditioning due to recent hospitalizations.  Patient has been losing weight in the SNF and has been having low blood pressures. She is working well with therapy. Plan for port placement on 4/26/2020. Nursing notes poor appetite.    The following portions of the patient's history were reviewed and updated as appropriate: allergies, current medications, past family history, past medical history, past social history, past surgical history and problem list.      Past Medical Hx:  Past Medical History:   Diagnosis Date   • Alkaline phosphatase raised    • Anemia    • Astigmatism    • Bilateral pseudophakia    • COPD (chronic obstructive pulmonary disease) (CMS/HCC)    • Coronary arteriosclerosis    • Depressive disorder    • Edema of lower extremity    • Encounter for general adult medical examination with abnormal findings    • Essential hypertension    • Gastroesophageal reflux disease    • H/O bone density study     DXA BONE DENSITY AXIAL    04/22/2016   • H/O screening mammography     SCREENING MAMMOGRAPHY     04/25/2016   • Hx of screening mammography     x3; declined screening, understands risks and benefits and still declines      07/24/2015   • Hyperlipidemia    • Influenza vaccine administered     INFLUENZA IMMUN ADMIN OR PREV RECV'D   x4       04/01/2016   • Insomnia    • Iron deficiency    • Migraine    • Rectal mass    • Stroke (CMS/HCC)    • Tobacco dependence     continuous         Past Surgical Hx:  Past Surgical History:   Procedure Laterality Date   • APPENDECTOMY     • AUGMENTATION MAMMAPLASTY     • BREAST IMPLANT SURGERY     • CATARACT EXTRACTION WITH INTRAOCULAR LENS IMPLANT  12/17/2003    Phacoemulsification of a cataract with intraocular lens implant of rigt eye, Nixon model SA 60-AT; serial # [id number];20.5 diopter   • CHOLECYSTECTOMY  08/06/2007    Laparoscopic cholecystectomy. Symptomatic gallstones   •  COLONOSCOPY  10/15/2013    declined stool cards and colonscopy   • ENDOSCOPY AND COLONOSCOPY  05/22/2014    Internal & external hemorrhoids found.   • ENDOSCOPY W/ PEG TUBE PLACEMENT  05/22/2014    EGD w/ tube: Normal esopahgus. Gastritis in stomach. Biopsy taken. Normal duodenum. Biopsy taken.   • EYE SURGERY     • INJECTION OF MEDICATION  07/24/2013    Inj(s) Tend-Sheath, Ligament, Single   • INJECTION OF MEDICATION  11/17/2014    Kenalog x6   • INJECTION OF MEDICATION  04/01/2016    PNEUMOC VAC/ADMIN/RCVD    • INJECTION OF MEDICATION  12/05/2014    Toradol   • OTHER SURGICAL HISTORY  01/19/2014    Drain/Inject Major Joint   x2   • PAP SMEAR  10/06/2011   • RECTAL EXAMINATION UNDER ANESTHESIA N/A 3/31/2021    Procedure: BIOPSY OF ANAL MASS;  Surgeon: Dimitry Schuster MD;  Location: [facility];  Service: General;  Laterality: N/A;   • SUBTOTAL HYSTERECTOMY      Partial hyst    • TONSILLECTOMY     • TUBAL ABDOMINAL LIGATION         Health Maintenance:  Health Maintenance   Topic Date Due   • MAMMOGRAM  05/15/2020   • LUNG CANCER SCREENING  09/14/2021 (Originally 1/19/2019)   • INFLUENZA VACCINE  08/01/2021   • DXA SCAN  08/19/2021   • ANNUAL WELLNESS VISIT  09/14/2021   • LIPID PANEL  09/14/2021   • COLONOSCOPY  05/22/2024   • TDAP/TD VACCINES (2 - Td) 04/20/2027   • HEPATITIS C SCREENING  Completed   • COVID-19 Vaccine  Completed   • Pneumococcal Vaccine 65+  Completed   • ZOSTER VACCINE  Completed   • PAP SMEAR  Discontinued       Current Meds:  No current facility-administered medications for this visit.  No current outpatient medications on file.    Facility-Administered Medications Ordered in Other Visits:   •  albuterol (PROVENTIL) nebulizer solution 0.083% 2.5 mg/3mL, 2.5 mg, Nebulization, Q6H PRN, Juan Carlos Vazquez MD  •  [START ON 4/27/2021] aspirin suppository 300 mg, 300 mg, Rectal, Daily, Juan Carlos Vazquez MD  •  atorvastatin (LIPITOR) tablet 80 mg, 80 mg, Oral, Nightly, Juan Carlos Vazquez MD  •   bacitracin ointment, , Topical, Daily, Ilana Cerda MD  •  budesonide-formoterol (SYMBICORT) 160-4.5 MCG/ACT inhaler 2 puff, 2 puff, Inhalation, BID - RT, Juan Carlos Vazquez MD  •  hydrALAZINE (APRESOLINE) injection 10 mg, 10 mg, Intravenous, Q6H PRN, Juan Carlos Vazquez MD, 10 mg at 04/26/21 1807  •  ipratropium-albuterol (DUO-NEB) nebulizer solution 3 mL, 3 mL, Nebulization, 4x Daily PRN, Juan Carlos Vazquez MD  •  [START ON 4/27/2021] pantoprazole (PROTONIX) injection 40 mg, 40 mg, Intravenous, Q AM, Juan Carlos Vazquez MD  •  sodium chloride 0.9 % flush 10 mL, 10 mL, Intravenous, PRN, Juan aCrlos Vazquez MD  •  sodium chloride 0.9 % flush 10 mL, 10 mL, Intravenous, Q12H, Juan Carlos Vazquez MD  •  sodium chloride 0.9 % flush 10 mL, 10 mL, Intravenous, PRN, Juan Carlos Vazquez MD  •  sodium chloride 0.9 % flush 10 mL, 10 mL, Intravenous, Q12H, Juan Carlos Vazquez MD  •  sodium chloride 0.9 % flush 10 mL, 10 mL, Intravenous, PRN, Juan Carlos Vazquez MD  •  sodium chloride 0.9 % infusion, 75 mL/hr, Intravenous, Continuous, Juan Carlos Vazquez MD    Allergies:  Codeine, Penicillins, and Sulfa antibiotics    Family Hx:  Family History   Problem Relation Age of Onset   • Cholelithiasis Mother    • Thyroid cancer Sister    • Thyroid disease Sister         Thyroid disorder   • Graves' disease Sister    • Cholelithiasis Maternal Grandmother    • Thyroid cancer Other         Other 2nd degree relative, thyroid   • Diabetes Other    • Hypertension Other         Social History:  Social History     Socioeconomic History   • Marital status:      Spouse name: Not on file   • Number of children: Not on file   • Years of education: Not on file   • Highest education level: Not on file   Tobacco Use   • Smoking status: Former Smoker     Packs/day: 1.00     Years: 39.00     Pack years: 39.00     Quit date: 1/10/2018     Years since quitting: 3.2   • Smokeless tobacco: Never Used   Vaping Use   • Vaping Use: Never used   Substance and Sexual  Activity   • Alcohol use: No   • Drug use: No   • Sexual activity: Defer       Review of Systems  Review of Systems   Constitutional: Positive for activity change, fatigue and unexpected weight change. Negative for appetite change and fever.   Respiratory: Negative for cough and shortness of breath.    Cardiovascular: Negative for chest pain and palpitations.   Gastrointestinal: Positive for constipation. Negative for abdominal pain and nausea.   Genitourinary: Negative for difficulty urinating and dysuria.   Musculoskeletal: Positive for arthralgias. Negative for gait problem.   Skin: Negative for color change and rash.   Neurological: Positive for weakness. Negative for dizziness and headaches.   Psychiatric/Behavioral: Negative for agitation, confusion and sleep disturbance.       Objective:     /58   Pulse 78   Temp 98.1 °F (36.7 °C)   Resp 18   Wt 47.6 kg (105 lb)   SpO2 96%   BMI 18.02 kg/m²   Physical Exam    Lab Review  Results for orders placed or performed in visit on 04/08/21   Comprehensive Metabolic Panel    Specimen: Blood   Result Value Ref Range    Glucose 144 (H) 65 - 99 mg/dL    BUN 10 8 - 23 mg/dL    Creatinine 1.02 (H) 0.57 - 1.00 mg/dL    Sodium 134 (L) 136 - 145 mmol/L    Potassium 3.9 3.5 - 5.2 mmol/L    Chloride 104 98 - 107 mmol/L    CO2 25.0 22.0 - 29.0 mmol/L    Calcium 8.4 (L) 8.6 - 10.5 mg/dL    Total Protein 5.1 (L) 6.0 - 8.5 g/dL    Albumin 2.50 (L) 3.50 - 5.20 g/dL    ALT (SGPT) <5 1 - 33 U/L    AST (SGOT) 8 1 - 32 U/L    Alkaline Phosphatase 98 39 - 117 U/L    Total Bilirubin 0.2 0.0 - 1.2 mg/dL    eGFR Non African Amer 54 (L) >60 mL/min/1.73    Globulin 2.6 gm/dL    A/G Ratio 1.0 g/dL    BUN/Creatinine Ratio 9.8 7.0 - 25.0    Anion Gap 5.0 5.0 - 15.0 mmol/L   CBC Auto Differential    Specimen: Blood   Result Value Ref Range    WBC 12.48 (H) 3.40 - 10.80 10*3/mm3    RBC 2.82 (L) 3.77 - 5.28 10*6/mm3    Hemoglobin 8.1 (L) 12.0 - 15.9 g/dL    Hematocrit 27.3 (L) 34.0 - 46.6  %    MCV 96.8 79.0 - 97.0 fL    MCH 28.7 26.6 - 33.0 pg    MCHC 29.7 (L) 31.5 - 35.7 g/dL    RDW 14.6 12.3 - 15.4 %    RDW-SD 51.3 37.0 - 54.0 fl    MPV 10.0 6.0 - 12.0 fL    Platelets 293 140 - 450 10*3/mm3    Neutrophil % 80.7 (H) 42.7 - 76.0 %    Lymphocyte % 8.7 (L) 19.6 - 45.3 %    Monocyte % 5.5 5.0 - 12.0 %    Eosinophil % 4.3 0.3 - 6.2 %    Basophil % 0.3 0.0 - 1.5 %    Immature Grans % 0.5 0.0 - 0.5 %    Neutrophils, Absolute 10.06 (H) 1.70 - 7.00 10*3/mm3    Lymphocytes, Absolute 1.09 0.70 - 3.10 10*3/mm3    Monocytes, Absolute 0.69 0.10 - 0.90 10*3/mm3    Eosinophils, Absolute 0.54 (H) 0.00 - 0.40 10*3/mm3    Basophils, Absolute 0.04 0.00 - 0.20 10*3/mm3    Immature Grans, Absolute 0.06 (H) 0.00 - 0.05 10*3/mm3    nRBC 0.0 0.0 - 0.2 /100 WBC            Assessment:     Diagnoses and all orders for this visit:    1. Constipation due to outlet dysfunction (Primary)  -     polyethylene glycol (MIRALAX) 17 g packet; Take 17 g by mouth Daily.  Dispense: 30 each; Refill: 6    2. Anal squamous cell carcinoma (CMS/HCC)  -     megestrol (MEGACE) 40 MG tablet; Take 1 tablet by mouth 2 (Two) Times a Day.  Dispense: 60 tablet; Refill: 3    3. Essential hypertension  -     losartan (Cozaar) 25 MG tablet; Take 1 tablet by mouth every night at bedtime.  Dispense: 30 tablet; Refill: 3    4. Severe episode of recurrent major depressive disorder, without psychotic features (CMS/HCC)    5. Weakness generalized    6. Weight loss of more than 10% body weight    7. Acute urinary retention        Plan:     I have seen and examined the patient.  I have reviewed the notes, assessments, and/or procedures performed by Dr. Chavez during the office visit.  I concur with her/his documentation and assessment and plan for Michelle Child.      This document has been electronically signed by Coral Berry MD on April 26, 2021 19:28 CDT

## 2021-04-28 PROBLEM — E78.2 MIXED HYPERLIPIDEMIA: Status: ACTIVE | Noted: 2021-01-01

## 2021-04-28 PROBLEM — E43 SEVERE MALNUTRITION (HCC): Status: ACTIVE | Noted: 2021-01-01

## 2021-04-28 PROBLEM — R63.4 WEIGHT LOSS OF MORE THAN 10% BODY WEIGHT: Status: RESOLVED | Noted: 2021-01-01 | Resolved: 2021-01-01

## 2021-04-29 PROBLEM — Z51.5 COMFORT MEASURES ONLY STATUS: Status: ACTIVE | Noted: 2021-01-01

## 2021-04-30 ENCOUNTER — TELEPHONE (OUTPATIENT)
Dept: FAMILY MEDICINE CLINIC | Facility: CLINIC | Age: 66
End: 2021-04-30

## 2021-04-30 NOTE — TELEPHONE ENCOUNTER
TRIED TO CALL PATIENT TO MAKE HOSPITAL F/U; FIRST ATTEMPT; UNABLE TO LEAVE VM X1.           THANK YOU,        ELVI

## 2021-05-03 ENCOUNTER — TELEPHONE (OUTPATIENT)
Dept: FAMILY MEDICINE CLINIC | Facility: CLINIC | Age: 66
End: 2021-05-03

## 2021-05-03 ENCOUNTER — APPOINTMENT (OUTPATIENT)
Dept: RADIATION ONCOLOGY | Facility: HOSPITAL | Age: 66
End: 2021-05-03

## 2021-05-10 LAB
QT INTERVAL: 386 MS
QTC INTERVAL: 482 MS

## 2021-08-07 NOTE — PROGRESS NOTES
Discharged to home, accompanied by staff to car, all belongings taken by patient. No questions re: discharge instructions/teaching. FAMILY MEDICINE DAILY PROGRESS NOTE  NAME: Michelle Child  : 1955  MRN: [id number]     LOS: 1 day     PROVIDER OF SERVICE: Sunny Linn DO    Chief Complaint: Hypokalemia    Subjective:     Interval History:  History taken from: patient chart RN. Son is here and he requests placement/rehab/ 24 hour care due to mother's confusion and debilitation.  Patient Complaints: None  Patient Denies:  Chest pain    Review of Systems:   Review of Systems   Constitutional: Negative for activity change and chills.   HENT: Negative for congestion and sore throat.    Eyes: Negative for pain and visual disturbance.   Respiratory: Negative for chest tightness and shortness of breath.    Cardiovascular: Negative for chest pain and palpitations.   Gastrointestinal: Negative for diarrhea, nausea and vomiting.   Endocrine: Negative for polyphagia and polyuria.   Genitourinary: Negative for frequency and hematuria.   Musculoskeletal: Positive for myalgias. Negative for arthralgias.   Skin: Negative for color change and pallor.   Neurological: Negative for dizziness and seizures.   Psychiatric/Behavioral: Negative for agitation and behavioral problems.       Objective:     Vital Signs  Temp:  [96.2 °F (35.7 °C)-98.8 °F (37.1 °C)] 98.4 °F (36.9 °C)  Heart Rate:  [50-64] 63  Resp:  [16-18] 18  BP: (122-174)/(56-86) 170/60    Physical Exam  Physical Exam   Constitutional: She is oriented to person, place, and time. Vital signs are normal. She appears well-developed and well-nourished. No distress.   HENT:   Head: Normocephalic and atraumatic.   Right Ear: External ear normal.   Left Ear: External ear normal.   Nose: Nose normal.   Mouth/Throat: No oropharyngeal exudate.   Eyes: Conjunctivae and EOM are normal. Pupils are equal, round, and reactive to light. Right eye exhibits no discharge. Left eye exhibits no discharge. No scleral icterus.   Neck: Neck supple. No tracheal deviation present. No thyromegaly present.    Cardiovascular: Normal rate, regular rhythm, normal heart sounds and intact distal pulses.  Exam reveals no gallop and no friction rub.    No murmur heard.  Pulmonary/Chest: Effort normal and breath sounds normal. No respiratory distress. She has no wheezes. She has no rales.   Abdominal: Soft. Bowel sounds are normal. She exhibits no distension. There is no tenderness.   Musculoskeletal: Normal range of motion. She exhibits tenderness (bilateral lower exremities). She exhibits no edema or deformity.   Neurological: She is alert and oriented to person, place, and time. She has normal reflexes.   Skin: Skin is warm. Capillary refill takes less than 2 seconds. She is not diaphoretic.   Psychiatric: She has a normal mood and affect. Her behavior is normal.       Medication Review    Current Facility-Administered Medications:   •  albuterol (PROVENTIL) nebulizer solution 0.083% 2.5 mg/3mL, 2.5 mg, Nebulization, Q6H PRN, Louie Roblero MD  •  aspirin chewable tablet 81 mg, 81 mg, Oral, Daily, Louie Roblero MD, 81 mg at 07/04/18 0852  •  atorvastatin (LIPITOR) tablet 10 mg, 10 mg, Oral, Daily, Louie Roblero MD, 10 mg at 07/04/18 0852  •  bisacodyl (DULCOLAX) EC tablet 5 mg, 5 mg, Oral, Daily PRN, Louie Roblero MD  •  budesonide-formoterol (SYMBICORT) 160-4.5 MCG/ACT inhaler 2 puff, 2 puff, Inhalation, BID, Louie Roblero MD, 2 puff at 07/04/18 0834  •  famotidine (PEPCID) tablet 20 mg, 20 mg, Oral, BID, Louie Roblero MD, 20 mg at 07/04/18 0851  •  ferrous sulfate EC tablet 324 mg, 324 mg, Oral, BID With Meals, Louie Roblero MD, 324 mg at 07/04/18 0852  •  FLUoxetine (PROzac) capsule 20 mg, 20 mg, Oral, Daily, Louie Roblero MD, 20 mg at 07/04/18 0852  •  FLUoxetine (PROzac) capsule 40 mg, 40 mg, Oral, Daily, Louie Roblero MD, 40 mg at 07/04/18 8747  •  folic acid (FOLVITE) tablet 1 mg, 1 mg, Oral, Daily, Louie Roblero MD, 1  mg at 07/04/18 0852  •  furosemide (LASIX) tablet 20 mg, 20 mg, Oral, Daily, Louie Roblero MD, 20 mg at 07/04/18 0852  •  hydrALAZINE (APRESOLINE) injection 10 mg, 10 mg, Intravenous, Q6H PRN, Louie Roblero MD  •  losartan (COZAAR) tablet 100 mg, 100 mg, Oral, Q24H, Louie Roblero MD, 100 mg at 07/04/18 0852  •  metoprolol succinate XL (TOPROL-XL) 24 hr tablet 25 mg, 25 mg, Oral, Daily, Louie Roblero MD, 25 mg at 07/04/18 0852  •  risperiDONE (risperDAL) tablet 1 mg, 1 mg, Oral, Nightly, Louie Roblero MD, 1 mg at 07/03/18 2041  •  sodium chloride 0.9 % flush 1-10 mL, 1-10 mL, Intravenous, PRN, Louie Roblero MD  •  sodium chloride 0.9 % flush 10 mL, 10 mL, Intravenous, PRN, Blake Euceda MD     Diagnostic Data    Lab Results (last 24 hours)     Procedure Component Value Units Date/Time    Basic Metabolic Panel [[id number]]  (Abnormal) Collected:  07/04/18 0619    Specimen:  Blood Updated:  07/04/18 0710     Glucose 102 (H) mg/dL      BUN 19 mg/dL      Creatinine 1.04 (H) mg/dL      Sodium 135 (L) mmol/L      Potassium 3.9 mmol/L      Chloride 101 mmol/L      CO2 28.0 mmol/L      Calcium 8.7 mg/dL      eGFR Non African Amer 53 mL/min/1.73      BUN/Creatinine Ratio 18.3     Anion Gap 6.0 mmol/L     CBC & Differential [[id number]] Collected:  07/04/18 0619    Specimen:  Blood Updated:  07/04/18 0655    Narrative:       The following orders were created for panel order CBC & Differential.  Procedure                               Abnormality         Status                     ---------                               -----------         ------                     CBC Auto Differential[[id number]]        Abnormal            Final result                 Please view results for these tests on the individual orders.    CBC Auto Differential [[id number]]  (Abnormal) Collected:  07/04/18 0619    Specimen:  Blood Updated:  07/04/18 0655     WBC 7.41 10*3/mm3      RBC 4.43  10*6/mm3      Hemoglobin 12.8 g/dL      Hematocrit 39.2 %      MCV 88.5 fL      MCH 28.9 pg      MCHC 32.7 g/dL      RDW 14.6 (H) %      RDW-SD 47.2 (H) fl      MPV 10.7 fL      Platelets 236 10*3/mm3      Neutrophil % 70.3 %      Lymphocyte % 18.8 %      Monocyte % 8.2 %      Eosinophil % 2.3 %      Basophil % 0.3 %      Immature Grans % 0.1 %      Neutrophils, Absolute 5.21 10*3/mm3      Lymphocytes, Absolute 1.39 10*3/mm3      Monocytes, Absolute 0.61 10*3/mm3      Eosinophils, Absolute 0.17 10*3/mm3      Basophils, Absolute 0.02 10*3/mm3      Immature Grans, Absolute 0.01 10*3/mm3     Potassium [[id number]]  (Normal) Collected:  07/03/18 2156    Specimen:  Blood Updated:  07/03/18 2213     Potassium 3.5 mmol/L             I reviewed the patient's new clinical results.    Assessment/Plan:     Active Hospital Problems (** Indicates Principal Problem)    Diagnosis Date Noted   • **Hypokalemia [E87.6] 01/10/2018     Started 0b35hrq IV and 40meq PO in ED  Replace and monitor  Mag 1.6   Recheck K+ this AM        • Peripheral edema [R60.9] 07/02/2018     - restart home dose lasix 20mg daily     • Recurrent falls while walking [R29.6] 07/02/2018     PT/OT  Case management consult - SNF placement     • Insomnia [G47.00] 07/02/2018     Continue home risperdal     • Recurrent major depressive disorder, in full remission (CMS/[facility], located [facility]) [F33.42] 04/05/2018     Continue home prozac     • Stage 3 severe COPD by GOLD classification (CMS/[facility], located [facility]) [J44.9] 03/01/2018     Continue home nebs and symbicort     • Pulmonary hypertension due to left heart valvular disease [I27.22, I38] 01/15/2018   • Rheumatic mitral stenosis [I05.0] 01/15/2018     Cards Consultation     • Rheumatic tricuspid valve regurgitation [I07.1] 01/14/2018   • Essential hypertension [I10]      Continue home toprol 25mg daily  Increase home losartan from 50mg to 100mg daily  Lasix 20mg PO daily  PRN hydralazine     • Gastroesophageal reflux disease [K21.9]      Pepcid         Resolved Hospital Problems    Diagnosis Date Noted Date Resolved   No resolved problems to display.       Code status is   Code Status and Medical Interventions:   Ordered at: 07/02/18 2709     Level Of Support Discussed With:    Patient     Code Status:    CPR     Medical Interventions (Level of Support Prior to Arrest):    Full     Will consult DP for placement disposition    I have seen and evaluated the patient.  I have discussed the case with the resident.  I have reviewed the notes, assessment, and plan, and/or procedures performed by the resident.  I concur with the resident's documentation.            This document has been electronically signed by Sunny Linn DO on July 4, 2018 11:26 AM

## 2023-08-08 NOTE — TELEPHONE ENCOUNTER
DR HERNANDEZ PT    PT WAS CALLING TO SEE WHEN SHE COULD START DRIVING AGAIN. PLEASE CALL AT EARLIEST CONVENIENCE.   Bexarotene Pregnancy And Lactation Text: This medication is Pregnancy Category X and should not be given to women who are pregnant or may become pregnant. This medication should not be used if you are breast feeding.

## 2024-12-12 NOTE — PLAN OF CARE
Problem: Patient Care Overview (Adult)  Goal: Plan of Care Review  Outcome: Ongoing (interventions implemented as appropriate)   01/13/18 1038   Coping/Psychosocial Response Interventions   Plan Of Care Reviewed With patient   Patient Care Overview   Progress progress toward functional goals as expected   Outcome Evaluation   Outcome Summary/Follow up Plan OT eval completed. Pt. very lethargic and remains very confused stating off the wall comments still. Pt. cont to benefit from skilled OT d.t decreased balance, decreased strength, decreased activity tolerance, decreased ind in ADls. 1/13 1038          IV intact

## 2025-01-12 NOTE — TELEPHONE ENCOUNTER
Care Due:                  Date            Visit Type   Department     Provider  --------------------------------------------------------------------------------                                [facility]                              PRIMARY      MED/ INTERNAL  Last Visit: 10-      CARE ([facility])   MED/ PEDS      Armida Edwards                              [facility]                              PRIMARY      MED/ INTERNAL  Next Visit: 05-      CARE ([facility])   MED/ PEDS      Armida Edwards                                                            Last  Test          Frequency    Reason                     Performed    Due Date  --------------------------------------------------------------------------------    HBA1C.......  6 months...  ADRIANA MAGALLANES,        09- 03-                             glimepiride..............    [employer] Embedded Care Due Messages. Reference number: [id number].   1/12/2025 12:29:59 PM CST   Pt daughter called, said that she has some questions about her meds.  Pt was just discharged from Nursing Home and there is some confusion      Please call [place] at [phone]        (sent to Yeboah as well since he was one who seen her in NH, and also sent clinical pool as daughter asked it to be [facility].  Explained that [facility] was on vacation)

## (undated) DEVICE — STERILE POLYISOPRENE POWDER-FREE SURGICAL GLOVES WITH EMOLLIENT COATING: Brand: PROTEXIS

## (undated) DEVICE — GARMENT,MEDLINE,DVT,INT,CALF,MED, GEN2: Brand: MEDLINE

## (undated) DEVICE — GLV SURG TRIUMPH LT PF LTX 6.5 STRL

## (undated) DEVICE — PREP SOL POVIDONE/IODINE BT 4OZ

## (undated) DEVICE — GLV SURG TRIUMPH LT PF LTX 7.5 STRL

## (undated) DEVICE — PK MAJ PROC LF 60

## (undated) DEVICE — PENCL ES MEGADINE EZ/CLEAN BUTN W/HOLSTR 10FT

## (undated) DEVICE — SOL IRR NACL 0.9PCT BT 1000ML

## (undated) DEVICE — STERILE POLYISOPRENE POWDER-FREE SURGICAL GLOVES: Brand: PROTEXIS

## (undated) DEVICE — SPNG GZ WOVN 4X4IN 12PLY 10/BX STRL

## (undated) DEVICE — SUT VIC 3/0 SH 27IN J416H

## (undated) DEVICE — PREP PVP-I 7.5P BT 4OZ

## (undated) DEVICE — PENCL E/S ULTRAVAC TELESCP NOSE HOLSTR 10FT

## (undated) DEVICE — CANN SMPL SOFTECH BIFLO ETCO2 A/M 7FT

## (undated) DEVICE — CONTAINER,SPECIMEN,OR STERILE,4OZ: Brand: MEDLINE

## (undated) DEVICE — PATIENT RETURN ELECTRODE, SINGLE-USE, CONTACT QUALITY MONITORING, ADULT, WITH 9FT CORD, FOR PATIENTS WEIGING OVER 33LBS. (15KG): Brand: MEGADYNE

## (undated) DEVICE — SKIN AFFIX SURG ADHESIVE 72/CS 0.55ML: Brand: MEDLINE

## (undated) DEVICE — GLV SURG SENSICARE PI LF PF 7.5 GRN STRL

## (undated) DEVICE — PANT KNIT MATERN L/XL 2BG

## (undated) DEVICE — ANTIBACTERIAL UNDYED BRAIDED (POLYGLACTIN 910), SYNTHETIC ABSORBABLE SUTURE: Brand: COATED VICRYL

## (undated) DEVICE — GLV SURG SENSICARE PI LF PF 8 GRN STRL

## (undated) DEVICE — ST CVR PROB PULLUP ULTRASND 5X48IN

## (undated) DEVICE — DRSNG SURESITE WNDW 4X4.5

## (undated) DEVICE — CVR SURG EQUIP BND RECTG 36X28

## (undated) DEVICE — PAD,ABDOMINAL,8"X10",ST,LF: Brand: MEDLINE

## (undated) DEVICE — SUT PROLN 3/0 SH D/A 36IN 8522H